# Patient Record
Sex: MALE | Race: WHITE | NOT HISPANIC OR LATINO | Employment: UNEMPLOYED | URBAN - METROPOLITAN AREA
[De-identification: names, ages, dates, MRNs, and addresses within clinical notes are randomized per-mention and may not be internally consistent; named-entity substitution may affect disease eponyms.]

---

## 2019-04-03 ENCOUNTER — HOSPITAL ENCOUNTER (OUTPATIENT)
Dept: ORTHOPEDIC SURGERY | Facility: CLINIC | Age: 44
Discharge: HOME | End: 2019-04-03
Attending: PODIATRIST | Admitting: PODIATRIST

## 2019-04-15 ENCOUNTER — HOSPITAL ENCOUNTER (OUTPATIENT)
Dept: HOME HEALTH SERVICES | Facility: HOME HEALTHCARE | Age: 44
Setting detail: SPECIMEN
Discharge: HOME | End: 2019-04-15
Attending: INTERNAL MEDICINE | Admitting: INTERNAL MEDICINE

## 2019-04-15 LAB
ALBUMIN SERPL-MCNC: 3.3 G/DL (ref 3.5–4.8)
ALBUMIN/GLOB SERPL: 0.8 {RATIO} (ref 1–1.7)
ALP SERPL-CCNC: 69 IU/L (ref 32–91)
ALT SERPL-CCNC: 40 IU/L (ref 17–63)
ANION GAP SERPL CALC-SCNC: 13.7 MMOL/L (ref 10–20)
AST SERPL-CCNC: 16 IU/L (ref 15–41)
BASOPHILS # BLD AUTO: 0.1 10*3/UL (ref 0–0.2)
BASOPHILS NFR BLD AUTO: 1 % (ref 0–2)
BILIRUB SERPL-MCNC: 0.3 MG/DL (ref 0.3–1.2)
BUN SERPL-MCNC: 17 MG/DL (ref 8–20)
BUN/CREAT SERPL: 21.3 (ref 6.2–20.3)
CALCIUM SERPL-MCNC: 8.6 MG/DL (ref 8.9–10.3)
CHLORIDE SERPL-SCNC: 100 MMOL/L (ref 101–111)
CONV CO2: 26 MMOL/L (ref 22–32)
CONV TOTAL PROTEIN: 7.3 G/DL (ref 6.1–7.9)
CREAT UR-MCNC: 0.8 MG/DL (ref 0.7–1.2)
DIFFERENTIAL METHOD BLD: (no result)
EOSINOPHIL # BLD AUTO: 0.3 10*3/UL (ref 0–0.3)
EOSINOPHIL # BLD AUTO: 3 % (ref 0–3)
ERYTHROCYTE [DISTWIDTH] IN BLOOD BY AUTOMATED COUNT: 14.2 % (ref 11.5–14.5)
ERYTHROCYTE [SEDIMENTATION RATE] IN BLOOD BY WESTERGREN METHOD: 34 MM/HR (ref 0–15)
GLOBULIN UR ELPH-MCNC: 4 G/DL (ref 2.5–3.8)
GLUCOSE SERPL-MCNC: 215 MG/DL (ref 65–99)
HCT VFR BLD AUTO: 36.9 % (ref 40–54)
HGB BLD-MCNC: 12.1 G/DL (ref 14–18)
LYMPHOCYTES # BLD AUTO: 2.9 10*3/UL (ref 0.8–4.8)
LYMPHOCYTES NFR BLD AUTO: 28 % (ref 18–42)
MCH RBC QN AUTO: 28.9 PG (ref 26–32)
MCHC RBC AUTO-ENTMCNC: 32.7 G/DL (ref 32–36)
MCV RBC AUTO: 88.6 FL (ref 80–94)
MONOCYTES # BLD AUTO: 0.8 10*3/UL (ref 0.1–1.3)
MONOCYTES NFR BLD AUTO: 8 % (ref 2–11)
NEUTROPHILS # BLD AUTO: 6.2 10*3/UL (ref 2.3–8.6)
NEUTROPHILS NFR BLD AUTO: 60 % (ref 50–75)
NRBC BLD AUTO-RTO: 0 /100{WBCS}
NRBC/RBC NFR BLD MANUAL: 0 10*3/UL
PLATELET # BLD AUTO: 372 10*3/UL (ref 150–450)
PMV BLD AUTO: 8.4 FL (ref 7.4–10.4)
POTASSIUM SERPL-SCNC: 3.7 MMOL/L (ref 3.6–5.1)
RBC # BLD AUTO: 4.17 10*6/UL (ref 4.6–6)
SODIUM SERPL-SCNC: 136 MMOL/L (ref 136–144)
VANCOMYCIN TROUGH SERPL-MCNC: 11 UG/ML (ref 10–20)
WBC # BLD AUTO: 10.3 10*3/UL (ref 4.5–11.5)

## 2019-04-22 ENCOUNTER — HOSPITAL ENCOUNTER (OUTPATIENT)
Dept: HOME HEALTH SERVICES | Facility: HOME HEALTHCARE | Age: 44
Setting detail: SPECIMEN
Discharge: HOME | End: 2019-04-22
Attending: INTERNAL MEDICINE | Admitting: INTERNAL MEDICINE

## 2019-04-22 LAB
ALBUMIN SERPL-MCNC: 3.6 G/DL (ref 3.5–4.8)
ALBUMIN/GLOB SERPL: 0.9 {RATIO} (ref 1–1.7)
ALP SERPL-CCNC: 71 IU/L (ref 32–91)
ALT SERPL-CCNC: 28 IU/L (ref 17–63)
ANION GAP SERPL CALC-SCNC: 14.2 MMOL/L (ref 10–20)
AST SERPL-CCNC: 13 IU/L (ref 15–41)
BASOPHILS # BLD AUTO: 0.1 10*3/UL (ref 0–0.2)
BASOPHILS NFR BLD AUTO: 1 % (ref 0–2)
BILIRUB SERPL-MCNC: 0.7 MG/DL (ref 0.3–1.2)
BUN SERPL-MCNC: 10 MG/DL (ref 8–20)
BUN/CREAT SERPL: 14.3 (ref 6.2–20.3)
CALCIUM SERPL-MCNC: 9.4 MG/DL (ref 8.9–10.3)
CHLORIDE SERPL-SCNC: 99 MMOL/L (ref 101–111)
CONV CO2: 26 MMOL/L (ref 22–32)
CONV TOTAL PROTEIN: 7.5 G/DL (ref 6.1–7.9)
CREAT UR-MCNC: 0.7 MG/DL (ref 0.7–1.2)
DIFFERENTIAL METHOD BLD: (no result)
EOSINOPHIL # BLD AUTO: 0.5 10*3/UL (ref 0–0.3)
EOSINOPHIL # BLD AUTO: 8 % (ref 0–3)
ERYTHROCYTE [DISTWIDTH] IN BLOOD BY AUTOMATED COUNT: 14.9 % (ref 11.5–14.5)
ERYTHROCYTE [SEDIMENTATION RATE] IN BLOOD BY WESTERGREN METHOD: 28 MM/HR (ref 0–15)
GLOBULIN UR ELPH-MCNC: 3.9 G/DL (ref 2.5–3.8)
GLUCOSE SERPL-MCNC: 226 MG/DL (ref 65–99)
HCT VFR BLD AUTO: 40.3 % (ref 40–54)
HGB BLD-MCNC: 13.3 G/DL (ref 14–18)
LYMPHOCYTES # BLD AUTO: 2 10*3/UL (ref 0.8–4.8)
LYMPHOCYTES NFR BLD AUTO: 30 % (ref 18–42)
MCH RBC QN AUTO: 29.2 PG (ref 26–32)
MCHC RBC AUTO-ENTMCNC: 33.1 G/DL (ref 32–36)
MCV RBC AUTO: 88.3 FL (ref 80–94)
MONOCYTES # BLD AUTO: 0.6 10*3/UL (ref 0.1–1.3)
MONOCYTES NFR BLD AUTO: 8 % (ref 2–11)
NEUTROPHILS # BLD AUTO: 3.5 10*3/UL (ref 2.3–8.6)
NEUTROPHILS NFR BLD AUTO: 53 % (ref 50–75)
NRBC BLD AUTO-RTO: 0 /100{WBCS}
NRBC/RBC NFR BLD MANUAL: 0 10*3/UL
PLATELET # BLD AUTO: 293 10*3/UL (ref 150–450)
PMV BLD AUTO: 9 FL (ref 7.4–10.4)
POTASSIUM SERPL-SCNC: 4.2 MMOL/L (ref 3.6–5.1)
RBC # BLD AUTO: 4.56 10*6/UL (ref 4.6–6)
SODIUM SERPL-SCNC: 135 MMOL/L (ref 136–144)
VANCOMYCIN TROUGH SERPL-MCNC: 8 UG/ML (ref 10–20)
WBC # BLD AUTO: 6.6 10*3/UL (ref 4.5–11.5)

## 2019-04-29 ENCOUNTER — HOSPITAL ENCOUNTER (OUTPATIENT)
Dept: HOME HEALTH SERVICES | Facility: HOME HEALTHCARE | Age: 44
Setting detail: SPECIMEN
Discharge: HOME | End: 2019-04-29
Attending: INTERNAL MEDICINE | Admitting: INTERNAL MEDICINE

## 2019-04-29 LAB
ALBUMIN SERPL-MCNC: 3.5 G/DL (ref 3.5–4.8)
ALBUMIN/GLOB SERPL: 1 {RATIO} (ref 1–1.7)
ALP SERPL-CCNC: 70 IU/L (ref 32–91)
ALT SERPL-CCNC: 28 IU/L (ref 17–63)
ANION GAP SERPL CALC-SCNC: 17.8 MMOL/L (ref 10–20)
AST SERPL-CCNC: 17 IU/L (ref 15–41)
BILIRUB SERPL-MCNC: 1 MG/DL (ref 0.3–1.2)
BUN SERPL-MCNC: 10 MG/DL (ref 8–20)
BUN/CREAT SERPL: 14.3 (ref 6.2–20.3)
CALCIUM SERPL-MCNC: 8.9 MG/DL (ref 8.9–10.3)
CHLORIDE SERPL-SCNC: 99 MMOL/L (ref 101–111)
CONV ABS BANDS: 0.1 10*3/UL
CONV CO2: 25 MMOL/L (ref 22–32)
CONV TOTAL PROTEIN: 7.1 G/DL (ref 6.1–7.9)
CREAT UR-MCNC: 0.7 MG/DL (ref 0.7–1.2)
DIFFERENTIAL METHOD BLD: (no result)
EOSINOPHIL # BLD AUTO: 0.3 10*3/UL (ref 0–0.3)
EOSINOPHIL # BLD AUTO: 8 % (ref 0–3)
ERYTHROCYTE [DISTWIDTH] IN BLOOD BY AUTOMATED COUNT: 14.4 % (ref 11.5–14.5)
GLOBULIN UR ELPH-MCNC: 3.6 G/DL (ref 2.5–3.8)
GLUCOSE SERPL-MCNC: 253 MG/DL (ref 65–99)
HCT VFR BLD AUTO: 39.4 % (ref 40–54)
HGB BLD-MCNC: 13.3 G/DL (ref 14–18)
LYMPHOCYTES # BLD AUTO: 1.3 10*3/UL (ref 0.8–4.8)
LYMPHOCYTES NFR BLD AUTO: 45 % (ref 18–42)
MCH RBC QN AUTO: 29.5 PG (ref 26–32)
MCHC RBC AUTO-ENTMCNC: 33.8 G/DL (ref 32–36)
MCV RBC AUTO: 87.4 FL (ref 80–94)
MONOCYTES # BLD AUTO: 0.5 10*3/UL (ref 0.1–1.3)
MONOCYTES NFR BLD AUTO: 15 % (ref 2–11)
NEUTROPHILS # BLD AUTO: 1 10*3/UL (ref 2.3–8.6)
NEUTROPHILS NFR BLD AUTO: 30 % (ref 50–75)
NEUTS BAND NFR BLD MANUAL: 2 % (ref 0–5)
PATHOLOGIST REVIEW: (no result)
PLATELET # BLD AUTO: (no result) 10*3/UL (ref 150–450)
PMV BLD AUTO: 8.8 FL (ref 7.4–10.4)
POTASSIUM SERPL-SCNC: 3.8 MMOL/L (ref 3.6–5.1)
RBC # BLD AUTO: 4.51 10*6/UL (ref 4.6–6)
SODIUM SERPL-SCNC: 138 MMOL/L (ref 136–144)
VANCOMYCIN TROUGH SERPL-MCNC: 14 UG/ML (ref 10–20)
WBC # BLD AUTO: 3.2 10*3/UL (ref 4.5–11.5)

## 2019-05-06 ENCOUNTER — HOSPITAL ENCOUNTER (OUTPATIENT)
Dept: HOME HEALTH SERVICES | Facility: HOME HEALTHCARE | Age: 44
Setting detail: SPECIMEN
Discharge: HOME | End: 2019-05-06
Attending: INTERNAL MEDICINE | Admitting: INTERNAL MEDICINE

## 2019-05-22 ENCOUNTER — CONVERSION ENCOUNTER (OUTPATIENT)
Dept: ORTHOPEDIC SURGERY | Facility: CLINIC | Age: 44
End: 2019-05-22

## 2019-06-04 VITALS
SYSTOLIC BLOOD PRESSURE: 181 MMHG | BODY MASS INDEX: 27.1 KG/M2 | HEIGHT: 75 IN | HEART RATE: 64 BPM | WEIGHT: 218 LBS | DIASTOLIC BLOOD PRESSURE: 98 MMHG

## 2019-08-21 ENCOUNTER — OFFICE VISIT (OUTPATIENT)
Dept: PODIATRY | Facility: CLINIC | Age: 44
End: 2019-08-21

## 2019-08-21 VITALS
WEIGHT: 215 LBS | BODY MASS INDEX: 26.73 KG/M2 | DIASTOLIC BLOOD PRESSURE: 78 MMHG | HEIGHT: 75 IN | SYSTOLIC BLOOD PRESSURE: 136 MMHG | HEART RATE: 76 BPM

## 2019-08-21 DIAGNOSIS — E11.610 CHARCOT'S ARTHROPATHY ASSOCIATED WITH TYPE 2 DIABETES MELLITUS (HCC): Primary | ICD-10-CM

## 2019-08-21 DIAGNOSIS — E11.42 DM TYPE 2 WITH DIABETIC PERIPHERAL NEUROPATHY (HCC): ICD-10-CM

## 2019-08-21 PROCEDURE — 99213 OFFICE O/P EST LOW 20 MIN: CPT | Performed by: PODIATRIST

## 2019-08-21 PROCEDURE — 97760 ORTHOTIC MGMT&TRAING 1ST ENC: CPT | Performed by: PODIATRIST

## 2019-08-21 NOTE — PROGRESS NOTES
"08/21/2019  Foot and Ankle Surgery - Established Patient/Follow-up  Provider: Dr. Adan Souza DPM  Location: [facility]    Subjective:  Maynor Gregg is a 44 y.o. male.     Chief Complaint   Patient presents with   • Left Foot - Follow-up       HPI: Patient returns for routine diabetic foot check.  He states that the lateral aspect of his foot has remained well-healed.  He has not noticed any open wounds or infections since last exam.  He has noticed progressive collapse to the bottom of his foot with pain.  His symptoms are noticed with weightbearing and improved with rest.  He has not had any substantial swelling or redness.  Patient feels that his blood sugars are doing quite well.  He denies any other issues today.    Allergies   Allergen Reactions   • Metformin Diarrhea and Nausea And Vomiting   • Oxycodone-Acetaminophen Nausea And Vomiting and Rash       Current Outpatient Medications on File Prior to Visit   Medication Sig Dispense Refill   • aspirin 325 MG tablet Take 325 mg by mouth Daily.     • Insulin Glargine (BASAGLAR KWIKPEN) 100 UNIT/ML injection pen      • insulin lispro (HUMALOG) 100 UNIT/ML injection Every 8 (Eight) Hours.     • [DISCONTINUED] EPINEPHrine (EPIPEN JR) 0.15 MG/0.3ML solution auto-injector injection      • [DISCONTINUED] insulin glargine (LANTUS) 100 UNIT/ML injection BASAGLAR KWIKPEN 100 UNIT/ML SOPN     • [DISCONTINUED] metFORMIN ER (GLUCOPHAGE-XR) 500 MG 24 hr tablet Take 500 mg by mouth Daily.       No current facility-administered medications on file prior to visit.        Objective   /78   Pulse 76   Ht 190.5 cm (75\")   Wt 97.5 kg (215 lb)   BMI 26.87 kg/m²     Podiatry Exam       General Appearance:  well nourished; well hydrated; no acute distress  Mental Status Exam        Judgement and Insight:  Intact       Orientation:  Oriented to time, place, and person  Cardiovascular (Left)       Dorsalis Pedis Pulse (Lt):   2/4       Posterior Tibialis " Pulse (Lt):   2/4       Capillary Filling Time (Lt):  1-3 Seconds       Edema (Lt):   decreased edema  Dermatological Exam       Temperature:  warm to warm       Skin Elasticity:  normal skin elasticity  Skin: Lateral column of the foot remains well-healed.  No other open wounds or obvious pre-ulcerative lesions.  Neurological Exam (Left)       Paresthesia (Lt):  negative       Patella DTRs (Lt):  symmetric  Monofilament Test (Lt):   not intact       Diabetic Risk (Lt):  Loss of protective sensation with no weakness deformity callus or pre-ulcer        MusculoSkeletal Exam (Left)       Gait and Stance (Lt):   deferred       Muscle Strength (Lt):   appropriate       Hammertoe Deformity (Lt):  2nd, 3rd       Gastroc soleus equinus (Lt):  Inability to dorsiflex past neutral position both straight legged and bent knee       Post tibial tendon (Lt):  no soreness noted       Peroneal Tendon (Lt):  no soreness noted  Additional Musculoskelatal Findings   Progressive plantar lateral collapse to the left foot.  Mild discomfort with firm palpation to the cuboid region.  Laxity noted to the midfoot.    Assessment/Plan   Maynor was seen today for follow-up.    Diagnoses and all orders for this visit:    Charcot's arthropathy associated with type 2 diabetes mellitus (CMS/HCC)  -     XR Foot 3+ View Left    DM type 2 with diabetic peripheral neuropathy (CMS/HCC)      Patient was evaluated today for increased discomfort and collapse of the plantar aspect of the left foot.  Imaging was performed showing progressive deformity and instability affecting the midfoot consistent with Charcot neuroarthropathy.  I explained that this is secondary to his peripheral neuropathy and diabetes as well as the previous partial fifth metatarsal resection.  We discussed the diagnosis and further treatment options at length.  Luckily, he has no open wounds or signs of pre-ulceration I have recommended that we proceed with a cam boot for offloading.   Greater than 15 minutes was spent discussing the proper use and effects.  I have also referred him to [facility] for custom accommodative inserts and shoes.  I also feel that he would benefit from an ankle-foot orthosis with uprights to the left lower extremity to decrease overall stress and prevent further pedal complications.  Patient understands and agrees.  He is to call with any additional issues or concerns.  I would like to see him after he receives the AFO.    Explained importance of diabetic foot care, daily foot checks, and glycemic control. Patient should check both feet on a daily basis, monitor and control blood sugars, make sure that both feet and in between toes are towel dried after baths or showers. Avoid barefoot walking at all times. Check shoes before putting them on.   Patient was given information on proper foot care. Call the office at the first signs of a wound or with signs of infection.    Orders Placed This Encounter   Procedures   • XR Foot 3+ View Left     Weight Bearing     Order Specific Question:   Reason for Exam:     Answer:   Left foot pain in the arch witha noticable lump RM 13 WB          Note is dictated utilizing voice recognition software. Unfortunately this leads to occasional typographical errors. I apologize in advance if the situation occurs. If questions occur please do not hesitate to call our office.

## 2019-08-21 NOTE — PATIENT INSTRUCTIONS
Diabetes Mellitus and Foot Care  Foot care is an important part of your health, especially when you have diabetes. Diabetes may cause you to have problems because of poor blood flow (circulation) to your feet and legs, which can cause your skin to:  · Become thinner and drier.  · Break more easily.  · Heal more slowly.  · Peel and crack.  You may also have nerve damage (neuropathy) in your legs and feet, causing decreased feeling in them. This means that you may not notice minor injuries to your feet that could lead to more serious problems. Noticing and addressing any potential problems early is the best way to prevent future foot problems.  How to care for your feet  Foot hygiene  · Wash your feet daily with warm water and mild soap. Do not use hot water. Then, pat your feet and the areas between your toes until they are completely dry. Do not soak your feet as this can dry your skin.  · Trim your toenails straight across. Do not dig under them or around the cuticle. File the edges of your nails with an emery board or nail file.  · Apply a moisturizing lotion or petroleum jelly to the skin on your feet and to dry, brittle toenails. Use lotion that does not contain alcohol and is unscented. Do not apply lotion between your toes.  Shoes and socks  · Wear clean socks or stockings every day. Make sure they are not too tight. Do not wear knee-high stockings since they may decrease blood flow to your legs.  · Wear shoes that fit properly and have enough cushioning. Always look in your shoes before you put them on to be sure there are no objects inside.  · To break in new shoes, wear them for just a few hours a day. This prevents injuries on your feet.  Wounds, scrapes, corns, and calluses  · Check your feet daily for blisters, cuts, bruises, sores, and redness. If you cannot see the bottom of your feet, use a mirror or ask someone for help.  · Do not cut corns or calluses or try to remove them with medicine.  · If you  find a minor scrape, cut, or break in the skin on your feet, keep it and the skin around it clean and dry. You may clean these areas with mild soap and water. Do not clean the area with peroxide, alcohol, or iodine.  · If you have a wound, scrape, corn, or callus on your foot, look at it several times a day to make sure it is healing and not infected. Check for:  ? Redness, swelling, or pain.  ? Fluid or blood.  ? Warmth.  ? Pus or a bad smell.  General instructions  · Do not cross your legs. This may decrease blood flow to your feet.  · Do not use heating pads or hot water bottles on your feet. They may burn your skin. If you have lost feeling in your feet or legs, you may not know this is happening until it is too late.  · Protect your feet from hot and cold by wearing shoes, such as at the beach or on hot pavement.  · Schedule a complete foot exam at least once a year (annually) or more often if you have foot problems. If you have foot problems, report any cuts, sores, or bruises to your health care provider immediately.  Contact a health care provider if:  · You have a medical condition that increases your risk of infection and you have any cuts, sores, or bruises on your feet.  · You have an injury that is not healing.  · You have redness on your legs or feet.  · You feel burning or tingling in your legs or feet.  · You have pain or cramps in your legs and feet.  · Your legs or feet are numb.  · Your feet always feel cold.  · You have pain around a toenail.  Get help right away if:  · You have a wound, scrape, corn, or callus on your foot and:  ? You have pain, swelling, or redness that gets worse.  ? You have fluid or blood coming from the wound, scrape, corn, or callus.  ? Your wound, scrape, corn, or callus feels warm to the touch.  ? You have pus or a bad smell coming from the wound, scrape, corn, or callus.  ? You have a fever.  ? You have a red line going up your leg.  Summary  · Check your feet every day  for cuts, sores, red spots, swelling, and blisters.  · Moisturize feet and legs daily.  · Wear shoes that fit properly and have enough cushioning.  · If you have foot problems, report any cuts, sores, or bruises to your health care provider immediately.  · Schedule a complete foot exam at least once a year (annually) or more often if you have foot problems.  This information is not intended to replace advice given to you by your health care provider. Make sure you discuss any questions you have with your health care provider.  Document Released: 12/15/2001 Document Revised: 01/30/2019 Document Reviewed: 01/19/2018  [employer] Interactive Patient Education © 2019 [employer]

## 2019-11-20 ENCOUNTER — OFFICE VISIT (OUTPATIENT)
Dept: PODIATRY | Facility: CLINIC | Age: 44
End: 2019-11-20

## 2019-11-20 VITALS
HEART RATE: 84 BPM | DIASTOLIC BLOOD PRESSURE: 79 MMHG | HEIGHT: 75 IN | SYSTOLIC BLOOD PRESSURE: 120 MMHG | WEIGHT: 216 LBS | BODY MASS INDEX: 26.86 KG/M2

## 2019-11-20 DIAGNOSIS — S93.601A FOOT SPRAIN, RIGHT, INITIAL ENCOUNTER: Primary | ICD-10-CM

## 2019-11-20 DIAGNOSIS — E11.42 DM TYPE 2 WITH DIABETIC PERIPHERAL NEUROPATHY (HCC): ICD-10-CM

## 2019-11-20 DIAGNOSIS — M62.838 MUSCLE SPASMS OF BOTH LOWER EXTREMITIES: ICD-10-CM

## 2019-11-20 DIAGNOSIS — E11.610 CHARCOT'S ARTHROPATHY ASSOCIATED WITH TYPE 2 DIABETES MELLITUS (HCC): ICD-10-CM

## 2019-11-20 PROCEDURE — 99213 OFFICE O/P EST LOW 20 MIN: CPT | Performed by: PODIATRIST

## 2019-11-20 RX ORDER — CYCLOBENZAPRINE HCL 5 MG
5 TABLET ORAL 2 TIMES DAILY PRN
Qty: 30 TABLET | Refills: 0 | Status: SHIPPED | OUTPATIENT
Start: 2019-11-20 | End: 2020-01-18

## 2019-11-20 NOTE — PATIENT INSTRUCTIONS
Muscle Cramps and Spasms  Muscle cramps and spasms are when muscles tighten by themselves. They usually get better within minutes. Muscle cramps are painful. They are usually stronger and last longer than muscle spasms. Muscle spasms may or may not be painful. They can last a few seconds or much longer.  Cramps and spasms can affect any muscle, but they occur most often in the calf muscles of the leg. They are usually not caused by a serious problem. In many cases, the cause is not known. Some common causes include:  · Doing more physical work or exercise than your body is ready for.  · Using the muscles too much (overuse) by repeating certain movements too many times.  · Staying in a certain position for a long time.  · Playing a sport or doing an activity without preparing properly.  · Using bad form or technique while playing a sport or doing an activity.  · Not having enough water in your body (dehydration).  · Injury.  · Side effects of some medicines.  · Low levels of the salts and minerals in your blood (electrolytes), such as low potassium or calcium.  Follow these instructions at home:  Managing pain and stiffness         · Massage, stretch, and relax the muscle. Do this for many minutes at a time.  · If told, put heat on tight or tense muscles as often as told by your doctor. Use the heat source that your doctor recommends, such as a moist heat pack or a heating pad.  ? Place a towel between your skin and the heat source.  ? Leave the heat on for 20-30 minutes.  ? Remove the heat if your skin turns bright red. This is very important if you are not able to feel pain, heat, or cold. You may have a greater risk of getting burned.  · If told, put ice on the affected area. This may help if you are sore or have pain after a cramp or spasm.  ? Put ice in a plastic bag.  ? Place a towel between your skin and the bag.  ? Leave the ice on for 20 minutes, 2-3 times a day.  · Try taking hot showers or baths to help  relax tight muscles.  Eating and drinking  · Drink enough fluid to keep your pee (urine) pale yellow.  · Eat a healthy diet to help ensure that your muscles work well. This should include:  ? Fruits and vegetables.  ? Lean protein.  ? Whole grains.  ? Low-fat or nonfat dairy products.  General instructions  · If you are having cramps often, avoid intense exercise for several days.  · Take over-the-counter and prescription medicines only as told by your doctor.  · Watch for any changes in your symptoms.  · Keep all follow-up visits as told by your doctor. This is important.  Contact a doctor if:  · Your cramps or spasms get worse or happen more often.  · Your cramps or spasms do not get better with time.  Summary  · Muscle cramps and spasms are when muscles tighten by themselves. They usually get better within minutes.  · Cramps and spasms occur most often in the calf muscles of the leg.  · Massage, stretch, and relax the muscle. This may help the cramp or spasm go away.  · Drink enough fluid to keep your pee (urine) pale yellow.  This information is not intended to replace advice given to you by your health care provider. Make sure you discuss any questions you have with your health care provider.  Document Released: 11/30/2009 Document Revised: 05/13/2019 Document Reviewed: 05/13/2019  [employer] Interactive Patient Education © 2019 [employer]

## 2019-11-20 NOTE — PROGRESS NOTES
"11/20/2019  Foot and Ankle Surgery - Established Patient/Follow-up  Provider: Dr. Adan Souza DPM  Location: [facility]    Subjective:  Maynor Gregg is a 44 y.o. male.     Chief Complaint   Patient presents with   • Left Foot - Follow-up       HPI: Patient returns for routine diabetic foot check.  He states that he has been doing fairly well and did receive the AFO to the left lower extremity.  He has been wearing the diabetic shoes, inserts, and AFO on a daily basis.  He has not noticed any substantial changes involving the left foot.  He does complain of nightly cramps and spasms to both lower extremities.  He also has intermittent groin discomfort with weightbearing.  He feels that he may have kicked his right foot on something because he noticed mild swelling to the fourth digit.  Otherwise, he feels that his overall health and glycemic control are approximately the same.  He has not had any open wounds or infections since last exam.    Allergies   Allergen Reactions   • Metformin Diarrhea and Nausea And Vomiting   • Oxycodone-Acetaminophen Nausea And Vomiting and Rash       Current Outpatient Medications on File Prior to Visit   Medication Sig Dispense Refill   • aspirin 325 MG tablet Take 325 mg by mouth Daily.     • Insulin Glargine (BASAGLAR KWIKPEN) 100 UNIT/ML injection pen      • insulin lispro (HUMALOG) 100 UNIT/ML injection Every 8 (Eight) Hours.       No current facility-administered medications on file prior to visit.        Objective   /79   Pulse 84   Ht 190.5 cm (75\")   Wt 98 kg (216 lb)   BMI 27.00 kg/m²     Foot/Ankle Exam:       General:   Appearance: appears stated age and healthy    Orientation: AAOx3    Affect: appropriate    Shoes: Custom AFO left.     Right Foot/Ankle:      Vascular   Dorsalis pedis:  2+  Posterior tibial:  2+  Skin Temperature: warm    Edema Grading:  None  CFT:  < 3 seconds  Pedal Hair Growth:  Absent     Neurologic   Light touch sensation:  " Diminished  Hot/Cold sensation: diminished    Protective Sensation using [place]-Stephon Monofilament:  4  Achilles reflex:  1+     Musculoskeletal   Ecchymosis:  None  Tenderness: none    Arch:  Normal  Claw toe:  Second toe, third toe, fourth toe and fifth toe (Reducible deformities)     Muscle Strength   Normal strength    Foot dorsiflexion:  5  Foot plantar flexion:  5  Foot inversion:  5  Foot eversion:  5     Range of Motion   Foot and ankle ROM within normal limits       Dermatologic   Skin: skin intact        Additional Comments No open wounds or signs of infection.  No areas of concern.  Minimal swelling noted to the fourth digit.     Left Foot/Ankle:      Vascular   Dorsalis pedis:  2+  Posterior tibial:  2+  Skin Temperature: warm    Edema Grading:  None  CFT:  < 3 seconds  Pedal Hair Growth:  Absent     Neurologic   Light touch sensation:  Diminished  Hot/cold sensation: diminished    Protective Sensation using [place]-Stephon Monofilament:  2  Achilles reflex:  1+     Musculoskeletal    Amputation   Toes amputated: first toe and fourth toe  Ecchymosis:  None  Tenderness: none    Arch:  Charcot  Claw toe:  Second toe and third toe (Rigid)     Dermatologic   Skin: skin intact        Additional Comments: No progressive deformity or instability affecting the left lower extremity.  Range of motion and muscle strength are limited but appropriate     Image:       Assessment/Plan   Maynor was seen today for follow-up.    Diagnoses and all orders for this visit:    Foot sprain, right, initial encounter  -     XR Foot 3+ View Right    Muscle spasms of both lower extremities    Charcot's arthropathy associated with type 2 diabetes mellitus (CMS/HCC)    DM type 2 with diabetic peripheral neuropathy (CMS/HCC)    Other orders  -     cyclobenzaprine (FLEXERIL) 5 MG tablet; Take 1 tablet by mouth 2 (Two) Times a Day As Needed for Muscle Spasms.      Patient returns for routine diabetic foot check.  He states that  he has been doing fairly well since last exam but does complain of muscle cramps and spasms at night.  He also has been dealing with intermittent groin discomfort with weightbearing.  I explained that a majority of his symptoms are likely secondary to compensation from the AFO.  We did discuss proper stretching, manual therapy, and potentially formal physical therapy for treatment.  He would like to perform at home exercises.  I have prescribed Flexeril 5 mg for symptom management at night.  He was concerned about potential injury affecting the right foot.  Imaging was reviewed showing no fractures or dislocations.  He understands that he remains at high risk for pedal complications.  We did discuss the importance of proper diabetic foot care and glycemic control.  I would like him to monitor his feet closely and call with any issues.  Otherwise, I will see him in 3 months for routine diabetic foot check.  He is to continue wearing the shoes, inserts, and AFO on a daily basis.    Orders Placed This Encounter   Procedures   • XR Foot 3+ View Right     Weight Bearing     Order Specific Question:   Reason for Exam:     Answer:   Right foot pain for about 2 weeks 4th digit was stubbed and he has had swelling and pain since RM 14 WB          Note is dictated utilizing voice recognition software. Unfortunately this leads to occasional typographical errors. I apologize in advance if the situation occurs. If questions occur please do not hesitate to call our office.

## 2020-01-09 ENCOUNTER — TELEPHONE (OUTPATIENT)
Dept: PODIATRY | Facility: CLINIC | Age: 45
End: 2020-01-09

## 2020-01-09 RX ORDER — DOXYCYCLINE HYCLATE 100 MG
100 TABLET ORAL 2 TIMES DAILY
Qty: 20 TABLET | Refills: 0 | Status: SHIPPED | OUTPATIENT
Start: 2020-01-09 | End: 2020-01-24 | Stop reason: HOSPADM

## 2020-01-09 NOTE — TELEPHONE ENCOUNTER
Patient called stating that he has an open wound to his left second toe with redness and swelling.  I have stated that I will call him in an antibiotic and he is to monitor.  If symptoms progress, he will require follow-up in the ED. patient is to call tomorrow and set up an appointment for me early next week.  He is to call with any additional issues or concerns.

## 2020-01-18 ENCOUNTER — APPOINTMENT (OUTPATIENT)
Dept: GENERAL RADIOLOGY | Facility: HOSPITAL | Age: 45
End: 2020-01-18

## 2020-01-18 ENCOUNTER — APPOINTMENT (OUTPATIENT)
Dept: CT IMAGING | Facility: HOSPITAL | Age: 45
End: 2020-01-18

## 2020-01-18 ENCOUNTER — HOSPITAL ENCOUNTER (INPATIENT)
Facility: HOSPITAL | Age: 45
LOS: 4 days | Discharge: HOME HEALTH | End: 2020-01-24
Attending: EMERGENCY MEDICINE | Admitting: PODIATRIST

## 2020-01-18 DIAGNOSIS — L97.514: ICD-10-CM

## 2020-01-18 DIAGNOSIS — M25.511 ACUTE PAIN OF RIGHT SHOULDER: ICD-10-CM

## 2020-01-18 DIAGNOSIS — R73.9 HYPERGLYCEMIA: ICD-10-CM

## 2020-01-18 DIAGNOSIS — R11.15 PERSISTENT VOMITING: ICD-10-CM

## 2020-01-18 DIAGNOSIS — R55 SYNCOPE, UNSPECIFIED SYNCOPE TYPE: Primary | ICD-10-CM

## 2020-01-18 DIAGNOSIS — S06.0X0A CONCUSSION WITHOUT LOSS OF CONSCIOUSNESS, INITIAL ENCOUNTER: ICD-10-CM

## 2020-01-18 DIAGNOSIS — S91.105A OPEN WOUND OF SECOND TOE OF LEFT FOOT, INITIAL ENCOUNTER: ICD-10-CM

## 2020-01-18 PROBLEM — E10.9 TYPE 1 DIABETES MELLITUS WITHOUT COMPLICATIONS (HCC): Status: ACTIVE | Noted: 2018-11-14

## 2020-01-18 PROBLEM — E11.42 DIABETIC PERIPHERAL NEUROPATHY (HCC): Status: ACTIVE | Noted: 2019-04-03

## 2020-01-18 LAB
ANION GAP SERPL CALCULATED.3IONS-SCNC: 13 MMOL/L (ref 5–15)
BASOPHILS # BLD AUTO: 0.1 10*3/MM3 (ref 0–0.2)
BASOPHILS NFR BLD AUTO: 0.4 % (ref 0–1.5)
BUN BLD-MCNC: 19 MG/DL (ref 6–20)
BUN/CREAT SERPL: 18.3 (ref 7–25)
CALCIUM SPEC-SCNC: 8.2 MG/DL (ref 8.6–10.5)
CHLORIDE SERPL-SCNC: 88 MMOL/L (ref 98–107)
CO2 SERPL-SCNC: 27 MMOL/L (ref 22–29)
CREAT BLD-MCNC: 1.04 MG/DL (ref 0.76–1.27)
DEPRECATED RDW RBC AUTO: 41.6 FL (ref 37–54)
EOSINOPHIL # BLD AUTO: 0 10*3/MM3 (ref 0–0.4)
EOSINOPHIL NFR BLD AUTO: 0 % (ref 0.3–6.2)
ERYTHROCYTE [DISTWIDTH] IN BLOOD BY AUTOMATED COUNT: 12.4 % (ref 12.3–15.4)
GFR SERPL CREATININE-BSD FRML MDRD: 78 ML/MIN/1.73
GLUCOSE BLD-MCNC: 520 MG/DL (ref 65–99)
GLUCOSE BLDC GLUCOMTR-MCNC: 312 MG/DL (ref 70–105)
HCT VFR BLD AUTO: 38.5 % (ref 37.5–51)
HGB BLD-MCNC: 13.4 G/DL (ref 13–17.7)
LYMPHOCYTES # BLD AUTO: 1.2 10*3/MM3 (ref 0.7–3.1)
LYMPHOCYTES NFR BLD AUTO: 4.9 % (ref 19.6–45.3)
MCH RBC QN AUTO: 32.6 PG (ref 26.6–33)
MCHC RBC AUTO-ENTMCNC: 34.7 G/DL (ref 31.5–35.7)
MCV RBC AUTO: 94.1 FL (ref 79–97)
MONOCYTES # BLD AUTO: 1.3 10*3/MM3 (ref 0.1–0.9)
MONOCYTES NFR BLD AUTO: 5.6 % (ref 5–12)
NEUTROPHILS # BLD AUTO: 21.3 10*3/MM3 (ref 1.7–7)
NEUTROPHILS NFR BLD AUTO: 89.1 % (ref 42.7–76)
NRBC BLD AUTO-RTO: 0 /100 WBC (ref 0–0.2)
PLATELET # BLD AUTO: 197 10*3/MM3 (ref 140–450)
PMV BLD AUTO: 8.4 FL (ref 6–12)
POTASSIUM BLD-SCNC: 4.2 MMOL/L (ref 3.5–5.2)
RBC # BLD AUTO: 4.09 10*6/MM3 (ref 4.14–5.8)
SODIUM BLD-SCNC: 128 MMOL/L (ref 136–145)
WBC NRBC COR # BLD: 23.9 10*3/MM3 (ref 3.4–10.8)

## 2020-01-18 PROCEDURE — 99219 PR INITIAL OBSERVATION CARE/DAY 50 MINUTES: CPT | Performed by: NURSE PRACTITIONER

## 2020-01-18 PROCEDURE — G0378 HOSPITAL OBSERVATION PER HR: HCPCS

## 2020-01-18 PROCEDURE — 99284 EMERGENCY DEPT VISIT MOD MDM: CPT

## 2020-01-18 PROCEDURE — 82962 GLUCOSE BLOOD TEST: CPT

## 2020-01-18 PROCEDURE — 85025 COMPLETE CBC W/AUTO DIFF WBC: CPT | Performed by: EMERGENCY MEDICINE

## 2020-01-18 PROCEDURE — 63710000001 INSULIN REGULAR HUMAN PER 5 UNITS: Performed by: EMERGENCY MEDICINE

## 2020-01-18 PROCEDURE — 93005 ELECTROCARDIOGRAM TRACING: CPT | Performed by: EMERGENCY MEDICINE

## 2020-01-18 PROCEDURE — 72125 CT NECK SPINE W/O DYE: CPT

## 2020-01-18 PROCEDURE — 70450 CT HEAD/BRAIN W/O DYE: CPT

## 2020-01-18 PROCEDURE — 63710000001 INSULIN GLARGINE PER 5 UNITS: Performed by: NURSE PRACTITIONER

## 2020-01-18 PROCEDURE — 25010000002 ONDANSETRON PER 1 MG: Performed by: EMERGENCY MEDICINE

## 2020-01-18 PROCEDURE — 80048 BASIC METABOLIC PNL TOTAL CA: CPT | Performed by: EMERGENCY MEDICINE

## 2020-01-18 PROCEDURE — 73660 X-RAY EXAM OF TOE(S): CPT

## 2020-01-18 PROCEDURE — 63710000001 INSULIN LISPRO (HUMAN) PER 5 UNITS: Performed by: NURSE PRACTITIONER

## 2020-01-18 PROCEDURE — 25010000002 ONDANSETRON PER 1 MG: Performed by: NURSE PRACTITIONER

## 2020-01-18 RX ORDER — SODIUM CHLORIDE 0.9 % (FLUSH) 0.9 %
10 SYRINGE (ML) INJECTION EVERY 12 HOURS SCHEDULED
Status: DISCONTINUED | OUTPATIENT
Start: 2020-01-18 | End: 2020-01-24 | Stop reason: HOSPADM

## 2020-01-18 RX ORDER — SODIUM CHLORIDE 0.9 % (FLUSH) 0.9 %
10 SYRINGE (ML) INJECTION AS NEEDED
Status: DISCONTINUED | OUTPATIENT
Start: 2020-01-18 | End: 2020-01-24 | Stop reason: HOSPADM

## 2020-01-18 RX ORDER — BISACODYL 5 MG/1
5 TABLET, DELAYED RELEASE ORAL DAILY PRN
Status: DISCONTINUED | OUTPATIENT
Start: 2020-01-18 | End: 2020-01-24 | Stop reason: HOSPADM

## 2020-01-18 RX ORDER — INSULIN GLARGINE 100 [IU]/ML
12 INJECTION, SOLUTION SUBCUTANEOUS NIGHTLY
Status: DISCONTINUED | OUTPATIENT
Start: 2020-01-18 | End: 2020-01-22

## 2020-01-18 RX ORDER — ONDANSETRON 4 MG/1
4 TABLET, FILM COATED ORAL EVERY 6 HOURS PRN
Status: DISCONTINUED | OUTPATIENT
Start: 2020-01-18 | End: 2020-01-24 | Stop reason: HOSPADM

## 2020-01-18 RX ORDER — SODIUM CHLORIDE 0.9 % (FLUSH) 0.9 %
10 SYRINGE (ML) INJECTION AS NEEDED
Status: DISCONTINUED | OUTPATIENT
Start: 2020-01-18 | End: 2020-01-20 | Stop reason: SDUPTHER

## 2020-01-18 RX ORDER — ACETAMINOPHEN 325 MG/1
650 TABLET ORAL EVERY 4 HOURS PRN
Status: DISCONTINUED | OUTPATIENT
Start: 2020-01-18 | End: 2020-01-24 | Stop reason: HOSPADM

## 2020-01-18 RX ORDER — ASPIRIN 325 MG
325 TABLET ORAL DAILY
Status: DISCONTINUED | OUTPATIENT
Start: 2020-01-19 | End: 2020-01-24 | Stop reason: HOSPADM

## 2020-01-18 RX ORDER — ONDANSETRON 2 MG/ML
4 INJECTION INTRAMUSCULAR; INTRAVENOUS ONCE
Status: COMPLETED | OUTPATIENT
Start: 2020-01-18 | End: 2020-01-18

## 2020-01-18 RX ORDER — ONDANSETRON 2 MG/ML
4 INJECTION INTRAMUSCULAR; INTRAVENOUS EVERY 6 HOURS PRN
Status: DISCONTINUED | OUTPATIENT
Start: 2020-01-18 | End: 2020-01-24 | Stop reason: HOSPADM

## 2020-01-18 RX ORDER — ACETAMINOPHEN 160 MG/5ML
650 SOLUTION ORAL EVERY 4 HOURS PRN
Status: DISCONTINUED | OUTPATIENT
Start: 2020-01-18 | End: 2020-01-24 | Stop reason: HOSPADM

## 2020-01-18 RX ORDER — ALUMINA, MAGNESIA, AND SIMETHICONE 2400; 2400; 240 MG/30ML; MG/30ML; MG/30ML
15 SUSPENSION ORAL EVERY 6 HOURS PRN
Status: DISCONTINUED | OUTPATIENT
Start: 2020-01-18 | End: 2020-01-24 | Stop reason: HOSPADM

## 2020-01-18 RX ORDER — ACETAMINOPHEN 650 MG/1
650 SUPPOSITORY RECTAL EVERY 4 HOURS PRN
Status: DISCONTINUED | OUTPATIENT
Start: 2020-01-18 | End: 2020-01-24 | Stop reason: HOSPADM

## 2020-01-18 RX ORDER — SODIUM CHLORIDE 9 MG/ML
125 INJECTION, SOLUTION INTRAVENOUS CONTINUOUS
Status: DISCONTINUED | OUTPATIENT
Start: 2020-01-18 | End: 2020-01-24 | Stop reason: HOSPADM

## 2020-01-18 RX ORDER — NICOTINE POLACRILEX 4 MG
15 LOZENGE BUCCAL
Status: DISCONTINUED | OUTPATIENT
Start: 2020-01-18 | End: 2020-01-24 | Stop reason: HOSPADM

## 2020-01-18 RX ORDER — CHOLECALCIFEROL (VITAMIN D3) 125 MCG
5 CAPSULE ORAL NIGHTLY PRN
Status: DISCONTINUED | OUTPATIENT
Start: 2020-01-18 | End: 2020-01-24 | Stop reason: HOSPADM

## 2020-01-18 RX ORDER — DEXTROSE MONOHYDRATE 25 G/50ML
25 INJECTION, SOLUTION INTRAVENOUS
Status: DISCONTINUED | OUTPATIENT
Start: 2020-01-18 | End: 2020-01-24 | Stop reason: HOSPADM

## 2020-01-18 RX ADMIN — Medication 10 ML: at 23:29

## 2020-01-18 RX ADMIN — INSULIN HUMAN 6 UNITS: 100 INJECTION, SOLUTION PARENTERAL at 21:11

## 2020-01-18 RX ADMIN — SODIUM CHLORIDE 500 ML: 900 INJECTION, SOLUTION INTRAVENOUS at 19:29

## 2020-01-18 RX ADMIN — ACETAMINOPHEN 650 MG: 325 TABLET ORAL at 23:11

## 2020-01-18 RX ADMIN — INSULIN LISPRO 10 UNITS: 100 INJECTION, SOLUTION INTRAVENOUS; SUBCUTANEOUS at 23:10

## 2020-01-18 RX ADMIN — ONDANSETRON 4 MG: 2 INJECTION INTRAMUSCULAR; INTRAVENOUS at 19:29

## 2020-01-18 RX ADMIN — INSULIN GLARGINE 12 UNITS: 100 INJECTION, SOLUTION SUBCUTANEOUS at 23:17

## 2020-01-18 RX ADMIN — ONDANSETRON 4 MG: 2 INJECTION INTRAMUSCULAR; INTRAVENOUS at 23:11

## 2020-01-18 RX ADMIN — SODIUM CHLORIDE 125 ML/HR: 900 INJECTION, SOLUTION INTRAVENOUS at 21:11

## 2020-01-18 NOTE — ED TRIAGE NOTES
Pt reports syncope in the shower at home today. Pt c/o posterior neck pain. C-collar placed in triage.

## 2020-01-19 ENCOUNTER — APPOINTMENT (OUTPATIENT)
Dept: ULTRASOUND IMAGING | Facility: HOSPITAL | Age: 45
End: 2020-01-19

## 2020-01-19 ENCOUNTER — APPOINTMENT (OUTPATIENT)
Dept: GENERAL RADIOLOGY | Facility: HOSPITAL | Age: 45
End: 2020-01-19

## 2020-01-19 PROBLEM — S91.105A OPEN WOUND OF SECOND TOE OF LEFT FOOT: Chronic | Status: ACTIVE | Noted: 2020-01-19

## 2020-01-19 PROBLEM — E10.59 TYPE 1 DIABETES MELLITUS WITH CIRCULATORY COMPLICATION (HCC): Chronic | Status: ACTIVE | Noted: 2018-11-14

## 2020-01-19 PROBLEM — K81.0 ACUTE CHOLECYSTITIS: Status: ACTIVE | Noted: 2020-01-19

## 2020-01-19 LAB
ALBUMIN SERPL-MCNC: 2.3 G/DL (ref 3.5–5.2)
ALBUMIN/GLOB SERPL: 0.6 G/DL
ALP SERPL-CCNC: 121 U/L (ref 39–117)
ALT SERPL W P-5'-P-CCNC: 50 U/L (ref 1–41)
AMPHET+METHAMPHET UR QL: NEGATIVE
ANION GAP SERPL CALCULATED.3IONS-SCNC: 8 MMOL/L (ref 5–15)
AST SERPL-CCNC: 21 U/L (ref 1–40)
B PERT DNA SPEC QL NAA+PROBE: NOT DETECTED
BARBITURATES UR QL SCN: NEGATIVE
BASOPHILS # BLD AUTO: 0.1 10*3/MM3 (ref 0–0.2)
BASOPHILS NFR BLD AUTO: 0.5 % (ref 0–1.5)
BENZODIAZ UR QL SCN: NEGATIVE
BILIRUB SERPL-MCNC: 1.3 MG/DL (ref 0.2–1.2)
BUN BLD-MCNC: 19 MG/DL (ref 6–20)
BUN/CREAT SERPL: 21.8 (ref 7–25)
C PNEUM DNA NPH QL NAA+NON-PROBE: NOT DETECTED
CALCIUM SPEC-SCNC: 8.3 MG/DL (ref 8.6–10.5)
CANNABINOIDS SERPL QL: NEGATIVE
CHLORIDE SERPL-SCNC: 93 MMOL/L (ref 98–107)
CO2 SERPL-SCNC: 29 MMOL/L (ref 22–29)
COCAINE UR QL: NEGATIVE
CREAT BLD-MCNC: 0.87 MG/DL (ref 0.76–1.27)
DEPRECATED RDW RBC AUTO: 41.1 FL (ref 37–54)
EOSINOPHIL # BLD AUTO: 0 10*3/MM3 (ref 0–0.4)
EOSINOPHIL NFR BLD AUTO: 0 % (ref 0.3–6.2)
ERYTHROCYTE [DISTWIDTH] IN BLOOD BY AUTOMATED COUNT: 12.4 % (ref 12.3–15.4)
FLUAV H1 2009 PAND RNA NPH QL NAA+PROBE: NOT DETECTED
FLUAV H1 HA GENE NPH QL NAA+PROBE: NOT DETECTED
FLUAV H3 RNA NPH QL NAA+PROBE: NOT DETECTED
FLUAV SUBTYP SPEC NAA+PROBE: NOT DETECTED
FLUBV RNA ISLT QL NAA+PROBE: NOT DETECTED
GFR SERPL CREATININE-BSD FRML MDRD: 95 ML/MIN/1.73
GLOBULIN UR ELPH-MCNC: 4 GM/DL
GLUCOSE BLD-MCNC: 286 MG/DL (ref 65–99)
GLUCOSE BLDC GLUCOMTR-MCNC: 193 MG/DL (ref 70–105)
GLUCOSE BLDC GLUCOMTR-MCNC: 207 MG/DL (ref 70–105)
GLUCOSE BLDC GLUCOMTR-MCNC: 228 MG/DL (ref 70–105)
GLUCOSE BLDC GLUCOMTR-MCNC: 272 MG/DL (ref 70–105)
HADV DNA SPEC NAA+PROBE: NOT DETECTED
HCOV 229E RNA SPEC QL NAA+PROBE: NOT DETECTED
HCOV HKU1 RNA SPEC QL NAA+PROBE: NOT DETECTED
HCOV NL63 RNA SPEC QL NAA+PROBE: NOT DETECTED
HCOV OC43 RNA SPEC QL NAA+PROBE: NOT DETECTED
HCT VFR BLD AUTO: 37.8 % (ref 37.5–51)
HGB BLD-MCNC: 13.1 G/DL (ref 13–17.7)
HMPV RNA NPH QL NAA+NON-PROBE: NOT DETECTED
HPIV1 RNA SPEC QL NAA+PROBE: NOT DETECTED
HPIV2 RNA SPEC QL NAA+PROBE: NOT DETECTED
HPIV3 RNA NPH QL NAA+PROBE: NOT DETECTED
HPIV4 P GENE NPH QL NAA+PROBE: NOT DETECTED
LIPASE SERPL-CCNC: 10 U/L (ref 13–60)
LYMPHOCYTES # BLD AUTO: 1.6 10*3/MM3 (ref 0.7–3.1)
LYMPHOCYTES NFR BLD AUTO: 7.2 % (ref 19.6–45.3)
M PNEUMO IGG SER IA-ACNC: NOT DETECTED
MCH RBC QN AUTO: 32.9 PG (ref 26.6–33)
MCHC RBC AUTO-ENTMCNC: 34.7 G/DL (ref 31.5–35.7)
MCV RBC AUTO: 94.9 FL (ref 79–97)
METHADONE UR QL SCN: NEGATIVE
MONOCYTES # BLD AUTO: 2.3 10*3/MM3 (ref 0.1–0.9)
MONOCYTES NFR BLD AUTO: 9.9 % (ref 5–12)
NEUTROPHILS # BLD AUTO: 18.9 10*3/MM3 (ref 1.7–7)
NEUTROPHILS NFR BLD AUTO: 82.4 % (ref 42.7–76)
NRBC BLD AUTO-RTO: 0.1 /100 WBC (ref 0–0.2)
OPIATES UR QL: NEGATIVE
OXYCODONE UR QL SCN: NEGATIVE
PLATELET # BLD AUTO: 181 10*3/MM3 (ref 140–450)
PMV BLD AUTO: 9.2 FL (ref 6–12)
POTASSIUM BLD-SCNC: 3.7 MMOL/L (ref 3.5–5.2)
PROT SERPL-MCNC: 6.3 G/DL (ref 6–8.5)
RBC # BLD AUTO: 3.98 10*6/MM3 (ref 4.14–5.8)
RHINOVIRUS RNA SPEC NAA+PROBE: NOT DETECTED
RSV RNA NPH QL NAA+NON-PROBE: NOT DETECTED
SODIUM BLD-SCNC: 130 MMOL/L (ref 136–145)
TSH SERPL DL<=0.05 MIU/L-ACNC: 0.65 UIU/ML (ref 0.27–4.2)
WBC NRBC COR # BLD: 23 10*3/MM3 (ref 3.4–10.8)

## 2020-01-19 PROCEDURE — 87147 CULTURE TYPE IMMUNOLOGIC: CPT | Performed by: HOSPITALIST

## 2020-01-19 PROCEDURE — 80307 DRUG TEST PRSMV CHEM ANLYZR: CPT | Performed by: HOSPITALIST

## 2020-01-19 PROCEDURE — 84443 ASSAY THYROID STIM HORMONE: CPT | Performed by: HOSPITALIST

## 2020-01-19 PROCEDURE — 25010000002 VANCOMYCIN 10 G RECONSTITUTED SOLUTION: Performed by: HOSPITALIST

## 2020-01-19 PROCEDURE — 80053 COMPREHEN METABOLIC PANEL: CPT | Performed by: NURSE PRACTITIONER

## 2020-01-19 PROCEDURE — 76705 ECHO EXAM OF ABDOMEN: CPT

## 2020-01-19 PROCEDURE — 25010000002 PIPERACILLIN SOD-TAZOBACTAM PER 1 G: Performed by: HOSPITALIST

## 2020-01-19 PROCEDURE — 0099U HC BIOFIRE FILMARRAY RESP PANEL 1: CPT | Performed by: HOSPITALIST

## 2020-01-19 PROCEDURE — 85025 COMPLETE CBC W/AUTO DIFF WBC: CPT | Performed by: NURSE PRACTITIONER

## 2020-01-19 PROCEDURE — 63710000001 INSULIN LISPRO (HUMAN) PER 5 UNITS: Performed by: NURSE PRACTITIONER

## 2020-01-19 PROCEDURE — G0378 HOSPITAL OBSERVATION PER HR: HCPCS

## 2020-01-19 PROCEDURE — 87070 CULTURE OTHR SPECIMN AEROBIC: CPT | Performed by: HOSPITALIST

## 2020-01-19 PROCEDURE — 87186 SC STD MICRODIL/AGAR DIL: CPT | Performed by: HOSPITALIST

## 2020-01-19 PROCEDURE — 25010000002 MORPHINE PER 10 MG: Performed by: HOSPITALIST

## 2020-01-19 PROCEDURE — 87205 SMEAR GRAM STAIN: CPT | Performed by: HOSPITALIST

## 2020-01-19 PROCEDURE — 25010000002 CEFEPIME PER 500 MG: Performed by: HOSPITALIST

## 2020-01-19 PROCEDURE — 99225 PR SBSQ OBSERVATION CARE/DAY 25 MINUTES: CPT | Performed by: HOSPITALIST

## 2020-01-19 PROCEDURE — 82962 GLUCOSE BLOOD TEST: CPT

## 2020-01-19 PROCEDURE — 71046 X-RAY EXAM CHEST 2 VIEWS: CPT

## 2020-01-19 PROCEDURE — 63710000001 INSULIN GLARGINE PER 5 UNITS: Performed by: NURSE PRACTITIONER

## 2020-01-19 PROCEDURE — 83690 ASSAY OF LIPASE: CPT | Performed by: NURSE PRACTITIONER

## 2020-01-19 RX ORDER — MORPHINE SULFATE 4 MG/ML
1 INJECTION, SOLUTION INTRAMUSCULAR; INTRAVENOUS 4 TIMES DAILY PRN
Status: DISCONTINUED | OUTPATIENT
Start: 2020-01-19 | End: 2020-01-20

## 2020-01-19 RX ORDER — VANCOMYCIN HYDROCHLORIDE
1500 EVERY 12 HOURS
Status: DISCONTINUED | OUTPATIENT
Start: 2020-01-20 | End: 2020-01-23

## 2020-01-19 RX ADMIN — INSULIN LISPRO 5 UNITS: 100 INJECTION, SOLUTION INTRAVENOUS; SUBCUTANEOUS at 08:03

## 2020-01-19 RX ADMIN — INSULIN GLARGINE 12 UNITS: 100 INJECTION, SOLUTION SUBCUTANEOUS at 21:15

## 2020-01-19 RX ADMIN — INSULIN LISPRO 5 UNITS: 100 INJECTION, SOLUTION INTRAVENOUS; SUBCUTANEOUS at 11:31

## 2020-01-19 RX ADMIN — PIPERACILLIN AND TAZOBACTAM 3.38 G: 3; .375 INJECTION, POWDER, LYOPHILIZED, FOR SOLUTION INTRAVENOUS at 11:31

## 2020-01-19 RX ADMIN — Medication 10 ML: at 21:15

## 2020-01-19 RX ADMIN — CEFEPIME 2 G: 2 INJECTION, POWDER, FOR SOLUTION INTRAVENOUS at 18:03

## 2020-01-19 RX ADMIN — Medication 10 ML: at 08:04

## 2020-01-19 RX ADMIN — INSULIN LISPRO 3 UNITS: 100 INJECTION, SOLUTION INTRAVENOUS; SUBCUTANEOUS at 18:19

## 2020-01-19 RX ADMIN — VANCOMYCIN HYDROCHLORIDE 2000 MG: 10 INJECTION, POWDER, LYOPHILIZED, FOR SOLUTION INTRAVENOUS at 18:49

## 2020-01-19 RX ADMIN — INSULIN LISPRO 8 UNITS: 100 INJECTION, SOLUTION INTRAVENOUS; SUBCUTANEOUS at 20:43

## 2020-01-19 RX ADMIN — ASPIRIN 325 MG ORAL TABLET 325 MG: 325 PILL ORAL at 08:03

## 2020-01-19 RX ADMIN — CEFEPIME 2 G: 2 INJECTION, POWDER, FOR SOLUTION INTRAVENOUS at 23:05

## 2020-01-19 RX ADMIN — ACETAMINOPHEN 650 MG: 325 TABLET ORAL at 08:03

## 2020-01-19 RX ADMIN — MORPHINE SULFATE 1 MG: 4 INJECTION INTRAVENOUS at 18:03

## 2020-01-19 NOTE — PROGRESS NOTES
"      [facility] Medicine Services Daily Progress Note      Hospitalist Team  LOS 1 days      Patient Care Team:  Erwin Castorena MD as PCP - General (Family Medicine)    Patient Location: 246/1      Subjective   Subjective   Complaining of abdominal pain, in the right upper quadrant, he says he has been having some nausea and vomiting for last few days.  Pt right foot on plantar side revealed necrotic ulcer with some black eschar with some discharge.       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 Gregg is a 44 y.o. with a history of DM 1 and neuropathy presented to the [facility] ED on 1/18/2020 with a complaint of syncope, he passed out while he was in the shower today, states he \"went out and woke up in the tub\" Pt is also complaining of nausea, vomiting and fever x 3 days. Pt states he began having nausea and vomiting on Thursday, associated with low grade fever. Pt denies melena, hematochezia, diarrhea. Pt states he then began to have episodes of dizziness, even when lying down, he felt as if the room was spinning. Pt is complaining of head, neck and back pain s/p fall today. Pt has an open wound on his left 2nd toe, he is current with Dr. Souza and is finishing a round of doxycycline.      In the ED, CT head: negative for acute abnormality, CT spine: Normal, Xray of second toe on left foot: negative for fracture, chronic abnormal appearance of second and third MTP joints, soft tissue swelling. Glucose 520, K+4.2, BUN 19, Cr. 1.04, WBC 23.9, Hgb 13.4, Hct 38.5. Pt was given IV insulin 6 units, 500ml NS bolus IV, Zofran 4mg IV. Pt will be admitted for further evaluation and management. " "          Date::          ROS      Objective   Objective      Vital Signs  Temp:  [97.8 °F (36.6 °C)-99.2 °F (37.3 °C)] 98.2 °F (36.8 °C)  Heart Rate:  [] 86  Resp:  [16-21] 18  BP: ()/(56-77) 105/67  Oxygen Therapy  SpO2: 98 %  Pulse Oximetry Type: Intermittent  Device (Oxygen Therapy): room air  Flowsheet Rows      First Filed Value   Admission Height  191.8 cm (75.5\") Documented at 01/18/2020 1854   Admission Weight  98 kg (216 lb 0.8 oz) Documented at 01/18/2020 1854        Intake & Output (last 3 days)       01/16 0701 - 01/17 0700 01/17 0701 - 01/18 0700 01/18 0701 - 01/19 0700 01/19 0701 - 01/20 0700    P.O.   1200     I.V. (mL/kg)    1270.8 (13.4)    IV Piggyback   500 100    Total Intake(mL/kg)   1700 (17.9) 1370.8 (14.5)    Urine (mL/kg/hr)   700     Total Output   700     Net   +1000 +1370.8                Lines, Drains & Airways    Active LDAs     Name:   Placement date:   Placement time:   Site:   Days:    Peripheral IV 01/18/20 1926 Right Arm   01/18/20 1926    Arm   less than 1                  Physical Exam:    Physical Exam   Constitutional: He is oriented to person, place, and time. He appears well-developed and well-nourished. No distress.   HENT:   Head: Normocephalic and atraumatic.   Right Ear: External ear normal.   Left Ear: External ear normal.   Nose: Nose normal.   Mouth/Throat: Oropharynx is clear and moist. No oropharyngeal exudate.   Eyes: Pupils are equal, round, and reactive to light. Conjunctivae and EOM are normal. Right eye exhibits no discharge. Left eye exhibits no discharge. No scleral icterus.   Neck: Normal range of motion. No JVD present. No tracheal deviation present. No thyromegaly present.   Cardiovascular: Normal rate, regular rhythm, normal heart sounds and intact distal pulses. Exam reveals no gallop and no friction rub.   No murmur heard.  Pulmonary/Chest: Effort normal and breath sounds normal. No stridor. No respiratory distress. He has no wheezes. He " has no rales. He exhibits no tenderness.   Abdominal: Soft. He exhibits no distension and no mass. There is tenderness. There is no rebound and no guarding. No hernia.   Slight tender in right upper quadrant, questionable positive Perry sign.   Musculoskeletal: Normal range of motion. He exhibits no edema, tenderness or deformity.   Lymphadenopathy:     He has no cervical adenopathy.   Neurological: He is alert and oriented to person, place, and time. No cranial nerve deficit or sensory deficit. He exhibits normal muscle tone. Coordination normal.   Skin: Skin is warm and dry. No rash noted. He is not diaphoretic. No erythema.   Psychiatric: He has a normal mood and affect. His behavior is normal.   Nursing note and vitals reviewed.           Wounds (last 24 hours)      LDA Wound     Row Name 01/18/20 2338 01/18/20 2247          Wound 01/18/20 2244 Right heel Diabetic Ulcer    Wound - Properties Group Date first assessed: 01/18/20  - Time first assessed: 2244  -SS Present on Hospital Admission: Y  -SS Side: Right  -SS Location: heel  -SS Primary Wound Type: Diabetic ulc  - Stage, Pressure Injury: unstageable  -    Wound Image  --  Images linked: 1  -DL     Closure  Other (Comment) 4x4's, abd pad with curlex wrapped around foot.  -DL  --       User Key  (r) = Recorded By, (t) = Taken By, (c) = Cosigned By    Initials Name Provider Type     Bryanna Ordaz LPN Licensed Nurse    Dorcas Rios RN Registered Nurse          Procedures:              Results Review:     I reviewed the patient's new clinical results.      Lab Results (last 24 hours)     Procedure Component Value Units Date/Time    Respiratory Panel, PCR - Swab, Nasopharynx [[id number]]  (Normal) Collected:  01/19/20 1059    Specimen:  Swab from Nasopharynx Updated:  01/19/20 1545     ADENOVIRUS, PCR Not Detected     Coronavirus 229E Not Detected     Coronavirus HKU1 Not Detected     Coronavirus NL63 Not Detected     Coronavirus OC43 Not  Detected     Human Metapneumovirus Not Detected     Human Rhinovirus/Enterovirus Not Detected     Influenza B PCR Not Detected     Parainfluenza Virus 1 Not Detected     Parainfluenza Virus 2 Not Detected     Parainfluenza Virus 3 Not Detected     Parainfluenza Virus 4 Not Detected     Bordetella pertussis pcr Not Detected     Influenza A H1 2009 PCR Not Detected     Chlamydophila pneumoniae PCR Not Detected     Mycoplasma pneumo by PCR Not Detected     Influenza A PCR Not Detected     Influenza A H3 Not Detected     Influenza A H1 Not Detected     RSV, PCR Not Detected    POC Glucose Once [[id number]]  (Abnormal) Collected:  01/19/20 1116    Specimen:  Blood Updated:  01/19/20 1120     Glucose 207 mg/dL      Comment: Serial Number: [id number]:  [id number]       Lipase [[id number]]  (Abnormal) Collected:  01/19/20 0441    Specimen:  Blood Updated:  01/19/20 0745     Lipase 10 U/L     POC Glucose Once [[id number]]  (Abnormal) Collected:  01/19/20 0700    Specimen:  Blood Updated:  01/19/20 0701     Glucose 228 mg/dL      Comment: Serial Number: [id number]:  [id number]       Comprehensive Metabolic Panel [[id number]]  (Abnormal) Collected:  01/19/20 0441    Specimen:  Blood Updated:  01/19/20 0549     Glucose 286 mg/dL      BUN 19 mg/dL      Creatinine 0.87 mg/dL      Sodium 130 mmol/L      Potassium 3.7 mmol/L      Chloride 93 mmol/L      CO2 29.0 mmol/L      Calcium 8.3 mg/dL      Total Protein 6.3 g/dL      Albumin 2.30 g/dL      ALT (SGPT) 50 U/L      AST (SGOT) 21 U/L      Alkaline Phosphatase 121 U/L      Total Bilirubin 1.3 mg/dL      eGFR Non African Amer 95 mL/min/1.73      Globulin 4.0 gm/dL      A/G Ratio 0.6 g/dL      BUN/Creatinine Ratio 21.8     Anion Gap 8.0 mmol/L     Narrative:       GFR Normal >60  Chronic Kidney Disease <60  Kidney Failure <15      CBC Auto Differential [[id number]]  (Abnormal) Collected:  01/19/20 0441    Specimen:  Blood Updated:  01/19/20 0518     WBC 23.00 10*3/mm3       RBC 3.98 10*6/mm3      Hemoglobin 13.1 g/dL      Hematocrit 37.8 %      MCV 94.9 fL      MCH 32.9 pg      MCHC 34.7 g/dL      RDW 12.4 %      RDW-SD 41.1 fl      MPV 9.2 fL      Platelets 181 10*3/mm3      Neutrophil % 82.4 %      Lymphocyte % 7.2 %      Monocyte % 9.9 %      Eosinophil % 0.0 %      Basophil % 0.5 %      Neutrophils, Absolute 18.90 10*3/mm3      Lymphocytes, Absolute 1.60 10*3/mm3      Monocytes, Absolute 2.30 10*3/mm3      Eosinophils, Absolute 0.00 10*3/mm3      Basophils, Absolute 0.10 10*3/mm3      nRBC 0.1 /100 WBC     POC Glucose Once [[id number]]  (Abnormal) Collected:  01/18/20 2153    Specimen:  Blood Updated:  01/18/20 2154     Glucose 312 mg/dL      Comment: Serial Number: [id number]:  [id number]       Basic Metabolic Panel [[id number]]  (Abnormal) Collected:  01/18/20 1927    Specimen:  Blood Updated:  01/18/20 1956     Glucose 520 mg/dL      BUN 19 mg/dL      Creatinine 1.04 mg/dL      Sodium 128 mmol/L      Potassium 4.2 mmol/L      Chloride 88 mmol/L      CO2 27.0 mmol/L      Calcium 8.2 mg/dL      eGFR Non African Amer 78 mL/min/1.73      BUN/Creatinine Ratio 18.3     Anion Gap 13.0 mmol/L     Narrative:       GFR Normal >60  Chronic Kidney Disease <60  Kidney Failure <15      CBC & Differential [[id number]] Collected:  01/18/20 1927    Specimen:  Blood Updated:  01/18/20 1932    Narrative:       The following orders were created for panel order CBC & Differential.  Procedure                               Abnormality         Status                     ---------                               -----------         ------                     CBC Auto Differential[[id number]]        Abnormal            Final result                 Please view results for these tests on the individual orders.    CBC Auto Differential [[id number]]  (Abnormal) Collected:  01/18/20 1927    Specimen:  Blood Updated:  01/18/20 1932     WBC 23.90 10*3/mm3      RBC 4.09 10*6/mm3      Hemoglobin 13.4 g/dL       Hematocrit 38.5 %      MCV 94.1 fL      MCH 32.6 pg      MCHC 34.7 g/dL      RDW 12.4 %      RDW-SD 41.6 fl      MPV 8.4 fL      Platelets 197 10*3/mm3      Neutrophil % 89.1 %      Lymphocyte % 4.9 %      Monocyte % 5.6 %      Eosinophil % 0.0 %      Basophil % 0.4 %      Neutrophils, Absolute 21.30 10*3/mm3      Lymphocytes, Absolute 1.20 10*3/mm3      Monocytes, Absolute 1.30 10*3/mm3      Eosinophils, Absolute 0.00 10*3/mm3      Basophils, Absolute 0.10 10*3/mm3      nRBC 0.0 /100 WBC         No results found for: HGBA1C            Lab Results   Component Value Date    LIPASE 10 (L) 01/19/2020     No results found for: CHOL, CHLPL, TRIG, HDL, LDL, LDLDIRECT    No results found for: INTRAOP, PREDX, FINALDX, COMDX    Microbiology Results (last 10 days)     Procedure Component Value - Date/Time    Respiratory Panel, PCR - Swab, Nasopharynx [[id number]]  (Normal) Collected:  01/19/20 1059    Lab Status:  Final result Specimen:  Swab from Nasopharynx Updated:  01/19/20 1545     ADENOVIRUS, PCR Not Detected     Coronavirus 229E Not Detected     Coronavirus HKU1 Not Detected     Coronavirus NL63 Not Detected     Coronavirus OC43 Not Detected     Human Metapneumovirus Not Detected     Human Rhinovirus/Enterovirus Not Detected     Influenza B PCR Not Detected     Parainfluenza Virus 1 Not Detected     Parainfluenza Virus 2 Not Detected     Parainfluenza Virus 3 Not Detected     Parainfluenza Virus 4 Not Detected     Bordetella pertussis pcr Not Detected     Influenza A H1 2009 PCR Not Detected     Chlamydophila pneumoniae PCR Not Detected     Mycoplasma pneumo by PCR Not Detected     Influenza A PCR Not Detected     Influenza A H3 Not Detected     Influenza A H1 Not Detected     RSV, PCR Not Detected          ECG/EMG Results (most recent)     Procedure Component Value Units Date/Time    ECG 12 Lead [[id number]] Collected:  01/18/20 1906     Updated:  01/18/20 1907    Narrative:       HEART RATE= 105  bpm  RR Interval= 572   ms  OH Interval= 149  ms  P Horizontal Axis= 3  deg  P Front Axis= 48  deg  QRSD Interval= 79  ms  QT Interval= 328  ms  QRS Axis= 34  deg  T Wave Axis= 2  deg  - BORDERLINE ECG -  Sinus tachycardia  Borderline ST elevation, lateral leads  When compared with ECG of 04-Apr-2019 13:03:16,  Significant change in rhythm  Electronically Signed By:   Date and Time of Study: 2020-01-18 19:06:28                    Ct Head Without Contrast    Result Date: 1/18/2020  1.  Normal exam.  Electronically Signed By-Arabella Mendez On:1/18/2020 8:09 PM This report was finalized on [id number] by  Arabella Mendez .    Ct Cervical Spine Without Contrast    Result Date: 1/18/2020  Normal CT cervical spine without contrast.  Electronically Signed ByAnna Mendez On:1/18/2020 8:11 PM This report was finalized on [id number] by  Arabella Mendez .    Xr Toe 2+ View Left    Result Date: 1/18/2020   1. Negative for fracture. 2. Chronic abnormal appearance of second and third MTP joints. 3. Soft tissue swelling.  Electronically Signed ByAnna Mendez On:1/18/2020 8:07 PM This report was finalized on [id number] by  Arabella Mendez, .    Xr Chest Pa & Lateral    Result Date: 1/19/2020   1. No active cardiopulmonary disease 2. Right PICC line is been removed since previous study of 04/07/2019 3. No significant change from 04/07/2019  Electronically Signed By-Hermilo Vaz Jr. On:1/19/2020 1:44 PM This report was finalized on [id number] by  Hermilo Vaz Jr., .          Xrays, labs reviewed personally by physician.    Medication Review:   I have reviewed the patient's current medication list      Scheduled Meds    aspirin 325 mg Oral Daily   insulin glargine 12 Units Subcutaneous Nightly   insulin lispro 0-14 Units Subcutaneous 4x Daily With Meals & Nightly   piperacillin-tazobactam 3.375 g Intravenous Q8H   sodium chloride 10 mL Intravenous Q12H       Meds Infusions    sodium chloride 125 mL/hr Last Rate: 125 mL/hr (01/19/20 0721)       Meds  PRN  •  acetaminophen **OR** acetaminophen **OR** acetaminophen  •  aluminum-magnesium hydroxide-simethicone  •  bisacodyl  •  dextrose  •  dextrose  •  glucagon (human recombinant)  •  insulin lispro **AND** insulin lispro  •  magnesium hydroxide  •  melatonin  •  ondansetron **OR** ondansetron  •  [COMPLETED] Insert peripheral IV **AND** sodium chloride  •  sodium chloride        Assessment/Plan   Assessment/Plan     Active Hospital Problems    Diagnosis  POA   • Open wound of second toe of left foot [S91.105A]  Yes   • Syncope [R55]  Yes   • Diabetic peripheral neuropathy (CMS/HCC) [E11.42]  Yes   • Type 1 diabetes mellitus with circulatory complication (CMS/HCC) [E10.59]  Yes      Resolved Hospital Problems   No resolved problems to display.       MEDICAL DECISION MAKING COMPLEXITY BY PROBLEM:     Active Hospital Problems:  No notes have been filed under this hospital service.  Service: Hospitalist     Syncope, can be vasovagal from nausea and vomiting persistent in nature otherwise etiology  unclear to me, possible be secondary to dehydration.  -CT head negative  -neuro checks q4  -IV fluids, telemetry, check TSH.  -We will ask for the 2D echo  -We will ask for the urine toxicology.    Diabetic wound ulcer with some discharge and surrounding edema ....   - MRI foot right side  -  Consult Dr. Souza podiatry  - wound culture  - iv cefepime and vancomycin  - ID consult.       Diabetes type 1  -Continue Patient's lantus  -Accuchecks AC and HS  -Cover with sliding scale     Right upper quadrant abdominal pain, patient noted slightly tender there, I am concerned about acute cholecystitis, will ask for the ultrasound gallbladder, IV cefepime, monitor WBC count, monitor clinical progress.           VTE Prophylaxis - SCDs.    VTE Prophylaxis - SCDs.      Code Status -   Code Status and Medical Interventions:   Ordered at: 01/18/20 2121     Level Of Support Discussed With:    Patient     Code Status:    CPR     Medical  Interventions (Level of Support Prior to Arrest):    Full       Discharge Planning          Destination      Coordination has not been started for this encounter.      Durable Medical Equipment      Coordination has not been started for this encounter.      Dialysis/Infusion      Coordination has not been started for this encounter.      Home Medical Care      Coordination has not been started for this encounter.      Therapy      Coordination has not been started for this encounter.      Community Resources      Coordination has not been started for this encounter.            Electronically signed by Peewee Rdz MD, 01/19/20, 3:48 PM.  [facility] Hospitalist Team

## 2020-01-19 NOTE — PLAN OF CARE
Continuing care and monitoring pain and temperature of pt.  Problem: Patient Care Overview  Goal: Plan of Care Review  Outcome: Ongoing (interventions implemented as appropriate)  Flowsheets  Taken 1/19/2020 0335 by Dorcas Bernabe RN  Progress: no change  Outcome Summary: Patient came in with nausea and vomiting. Gave pt. zofran. Pt. resting in room. Pt. has wound on his bottom right foot.  Taken 1/19/2020 0739 by Emy Beckham RN  Plan of Care Reviewed With: patient  Goal: Individualization and Mutuality  Outcome: Ongoing (interventions implemented as appropriate)  Goal: Discharge Needs Assessment  Outcome: Ongoing (interventions implemented as appropriate)  Flowsheets  Taken 1/18/2020 2156 by Dorcas Bernabe RN  Equipment Currently Used at Home: none;glucometer  Taken 1/18/2020 2206 by Dorcas Bernabe RN  Transportation Anticipated: family or friend will provide  Patient/Family Anticipated Services at Transition: none  Patient/Family Anticipates Transition to: home  Goal: Interprofessional Rounds/Family Conf  Outcome: Ongoing (interventions implemented as appropriate)  Flowsheets (Taken 1/19/2020 0812)  Participants: nursing; patient; physician     Problem: Syncope (Adult)  Goal: Identify Related Risk Factors and Signs and Symptoms  Outcome: Ongoing (interventions implemented as appropriate)  Flowsheets (Taken 1/19/2020 0812)  Related Risk Factors (Syncope): other (see comments)  Signs and Symptoms (Syncope): dizziness  Goal: Physical Safety/Health Maintenance  Outcome: Ongoing (interventions implemented as appropriate)  Flowsheets (Taken 1/19/2020 0812)  Physical Safety/Health Maintenance: making progress toward outcome  Goal: Optimal Emotional/Functional Rhea  Outcome: Ongoing (interventions implemented as appropriate)  Flowsheets (Taken 1/19/2020 0812)  Optimal Emotional/Functional Rhea: making progress toward outcome     Problem: Nausea/Vomiting (Adult)  Goal: Identify Related Risk Factors  and Signs and Symptoms  Outcome: Ongoing (interventions implemented as appropriate)  Flowsheets (Taken 1/19/2020 0812)  Related Risk Factors (Nausea/Vomiting): other (see comments)  Signs and Symptoms (Nausea/Vomiting): abdominal discomfort/pain; report of emesis  Goal: Symptom Relief  Outcome: Ongoing (interventions implemented as appropriate)  Flowsheets (Taken 1/19/2020 0812)  Symptom Relief: making progress toward outcome  Goal: Adequate Hydration  Outcome: Ongoing (interventions implemented as appropriate)  Flowsheets (Taken 1/19/2020 0812)  Adequate Hydration: making progress toward outcome     Problem: Fall Risk (Adult)  Goal: Identify Related Risk Factors and Signs and Symptoms  Outcome: Ongoing (interventions implemented as appropriate)  Flowsheets (Taken 1/19/2020 0812)  Related Risk Factors (Fall Risk): sensory deficits; other (see comments) (dizziness and blacking out)  Goal: Absence of Fall  Outcome: Ongoing (interventions implemented as appropriate)  Flowsheets (Taken 1/19/2020 0812)  Absence of Fall: making progress toward outcome     Problem: Pain, Chronic (Adult)  Goal: Identify Related Risk Factors and Signs and Symptoms  Outcome: Ongoing (interventions implemented as appropriate)  Flowsheets (Taken 1/19/2020 0812)  Related Risk Factors (Chronic Pain): disease process  Signs and Symptoms (Chronic Pain): verbalization of pain/discomfort for a prolonged time period  Goal: Acceptable Pain/Comfort Level and Functional Ability  Outcome: Ongoing (interventions implemented as appropriate)  Flowsheets (Taken 1/19/2020 0812)  Acceptable Pain/Comfort Level and Functional Ability: making progress toward outcome

## 2020-01-19 NOTE — PROGRESS NOTES
"Pharmacy Antimicrobial Dosing Service    Subjective:  Maynor Gregg is a 44 y.o.male with skin and soft tissue infection. Pharmacy to dose vancomycin.         Assessment/Plan    1. Day #1 Vancomycin: 2000 mg X 1 dose then 1500 mg every 12 hrs. Trough 1/21 0700 and peak 1/21 0000.     2. Day #1 Cefepime: 2 grams every 8 hrs for crcl >60 ml/min infused over 30 minutes.    Will continue to monitor drug levels, renal function, culture and sensitivities, and patient clinical status.       Objective:  Relevant clinical data and objective history reviewed:  190.5 cm (75\")   94.8 kg (208 lb 14.4 oz)   Ideal body weight: 84.5 kg (186 lb 4.6 oz)  Adjusted ideal body weight: 88.6 kg (195 lb 5.3 oz)  Body mass index is 26.11 kg/m².        Results from last 7 days   Lab Units 01/19/20  0441 01/18/20  1927   CREATININE mg/dL 0.87 1.04     Estimated Creatinine Clearance: 145.3 mL/min (by C-G formula based on SCr of 0.87 mg/dL).  I/O last 3 completed shifts:  In: 1700 [P.O.:1200; IV Piggyback:500]  Out: 700 [Urine:700]    Results from last 7 days   Lab Units 01/19/20  0441 01/18/20  1927   WBC 10*3/mm3 23.00* 23.90*     Temperature    01/19/20 0400 01/19/20 0818 01/19/20 1206   Temp: 98.5 °F (36.9 °C) 97.8 °F (36.6 °C) 98.2 °F (36.8 °C)     Baseline culture/source/susceptibility:  Microbiology Results (last 10 days)       Procedure Component Value - Date/Time    Respiratory Panel, PCR - Swab, Nasopharynx [[id number]]  (Normal) Collected:  01/19/20 1059    Lab Status:  Final result Specimen:  Swab from Nasopharynx Updated:  01/19/20 3691     ADENOVIRUS, PCR Not Detected     Coronavirus 229E Not Detected     Coronavirus HKU1 Not Detected     Coronavirus NL63 Not Detected     Coronavirus OC43 Not Detected     Human Metapneumovirus Not Detected     Human Rhinovirus/Enterovirus Not Detected     Influenza B PCR Not Detected     Parainfluenza Virus 1 Not Detected     Parainfluenza Virus 2 Not Detected     Parainfluenza Virus 3 Not " Detected     Parainfluenza Virus 4 Not Detected     Bordetella pertussis pcr Not Detected     Influenza A H1 2009 PCR Not Detected     Chlamydophila pneumoniae PCR Not Detected     Mycoplasma pneumo by PCR Not Detected     Influenza A PCR Not Detected     Influenza A H3 Not Detected     Influenza A H1 Not Detected     RSV, PCR Not Detected             Anti-Infectives (From admission, onward)      Ordered     Dose/Rate Route Frequency Start Stop    01/19/20 1620  !Vancomycin Level Draw Needed     Ordering Provider:  Peewee Rdz MD     Does not apply Once 01/21/20 0700      01/19/20 1620  !Vancomycin Level Draw Needed     Ordering Provider:  Peewee Rdz MD     Does not apply Once 01/21/20 0000      01/19/20 1619  vancomycin IVPB 1500 mg in 0.9% NaCl (Premix) 250 mL     Ordering Provider:  Peewee Rdz MD    1,500 mg  over 120 Minutes Intravenous Every 12 Hours 01/20/20 0800 01/27/20 0759    01/19/20 1615  cefepime 2 gm IVPB in 100 ml NS (MBP)     Ordering Provider:  Peewee Rdz MD    2 g  over 30 Minutes Intravenous Every 8 Hours 01/19/20 2300 01/26/20 2259    01/19/20 1618  vancomycin 2000 mg/500 mL 0.9% NS IVPB ([facility])     Ordering Provider:  Peewee Rdz MD    2,000 mg  over 120 Minutes Intravenous Once 01/19/20 1800      01/19/20 1615  cefepime 2 gm IVPB in 100 ml NS (MBP)     Ordering Provider:  Peewee Rdz MD    2 g  over 30 Minutes Intravenous Once 01/19/20 1700      01/19/20 1615  Pharmacy to dose vancomycin     Ordering Provider:  Peewee Rdz MD     Does not apply Continuous PRN 01/19/20 1615 01/26/20 1614    01/19/20 1055  piperacillin-tazobactam (ZOSYN) IVPB 3.375 g in 100 mL NS (CD)     Ordering Provider:  Peewee Rdz MD    3.375 g  over 30 Minutes Intravenous Once 01/19/20 1145 01/19/20 1201            Elisa Levi, PharmD  01/19/20 4:21 PM

## 2020-01-19 NOTE — H&P
"      [facility] Medicine Services      Patient Name: Maynor Gregg  : 1975  MRN: [id number]  Primary Care Physician: Erwin Castorena MD  Date of admission: 2020    Patient Care Team:  Erwin Castorena MD as PCP - General (Family Medicine)          Subjective   History Present Illness     Chief Complaint:   Chief Complaint   Patient presents with   • Syncope       Mr. Gregg is a 44 y.o. with a history of DM 1 and neuropathy presented to the [facility] ED on 2020 with a complaint of syncope, he passed out while he was in the shower today, states he \"went out and woke up in the tub\" Pt is also complaining of nausea, vomiting and fever x 3 days. Pt states he began having nausea and vomiting on Thursday, associated with low grade fever. Pt denies melena, hematochezia, diarrhea. Pt states he then began to have episodes of dizziness, even when lying down, he felt as if the room was spinning. Pt is complaining of head, neck and back pain s/p fall today. Pt has an open wound on his left 2nd toe, he is current with Dr. Souza and is finishing a round of doxycycline.     In the ED, CT head: negative for acute abnormality, CT spine: Normal, Xray of second toe on left foot: negative for fracture, chronic abnormal appearance of second and third MTP joints, soft tissue swelling. Glucose 520, K+4.2, BUN 19, Cr. 1.04, WBC 23.9, Hgb 13.4, Hct 38.5. Pt was given IV insulin 6 units, 500ml NS bolus IV, Zofran 4mg IV. Pt will be admitted for further evaluation and management.     Review of Systems   Constitution: Positive for fever and malaise/fatigue.   Eyes: Negative.    Cardiovascular: Positive for syncope.   Respiratory: Negative.    Endocrine: Negative.    Hematologic/Lymphatic: Negative.    Skin: Negative.    Musculoskeletal: Positive for neck pain.   Gastrointestinal: Positive for abdominal pain, nausea and vomiting.   Genitourinary: Negative.    Neurological: " Positive for dizziness, headaches and weakness.   Psychiatric/Behavioral: Negative.    Allergic/Immunologic: Negative.    All other systems reviewed and are negative.          Personal History     Past Medical History:   Past Medical History:   Diagnosis Date   • Coronary artery disease    • Diabetes mellitus (CMS/HCC)        Surgical History:      Past Surgical History:   Procedure Laterality Date   • AMPUTATION FOOT / TOE     • CARDIAC SURGERY     • TOE AMPUTATION Left            Family History: family history includes Diabetes in his father and paternal grandfather; Heart failure in his father. Otherwise pertinent FHx was reviewed and unremarkable.     Social History:  reports that he quit smoking about 8 months ago. He smoked 0.50 packs per day. He has never used smokeless tobacco. He reports that he does not drink alcohol or use drugs.      Medications:  Prior to Admission medications    Medication Sig Start Date End Date Taking? Authorizing Provider   aspirin 325 MG tablet Take 325 mg by mouth Daily.   Yes Emergency, Nurse ALESSIA Calvert   doxycycline (VIBRAMYICN) 100 MG tablet Take 1 tablet by mouth 2 (Two) Times a Day for 10 days. 1/9/20 1/19/20 Yes CROW Souza DPM   Insulin Glargine (BASAGLAR KWIKPEN) 100 UNIT/ML injection pen Inject 12 Units under the skin into the appropriate area as directed Every Night. 4/2/19  Yes Emergency, Nurse Nehal RN   insulin lispro (HUMALOG) 100 UNIT/ML injection Inject  under the skin into the appropriate area as directed Every 8 (Eight) Hours. SSI   Yes Emergency, Nurse Epic, RN   cyclobenzaprine (FLEXERIL) 5 MG tablet Take 1 tablet by mouth 2 (Two) Times a Day As Needed for Muscle Spasms. 11/20/19 1/18/20  CROW Souza DPM       Allergies:    Allergies   Allergen Reactions   • Metformin Diarrhea and Nausea And Vomiting   • Oxycodone-Acetaminophen Nausea And Vomiting and Rash       Objective   Objective     Vital Signs  Temp:  [98.5 °F (36.9 °C)-99.2 °F (37.3 °C)] 99.2  °F (37.3 °C)  Heart Rate:  [] 114  Resp:  [16-21] 21  BP: ()/(59-77) 116/70  SpO2:  [95 %-100 %] 99 %  on   ;   Device (Oxygen Therapy): room air  Body mass index is 26.11 kg/m².    Physical Exam   Constitutional: He is oriented to person, place, and time. He appears well-developed and well-nourished. No distress.   HENT:   Head: Normocephalic.   Eyes: Pupils are equal, round, and reactive to light.   Neck: Normal range of motion.   Cardiovascular: Normal rate, regular rhythm, normal heart sounds and intact distal pulses.   Pulmonary/Chest: Effort normal and breath sounds normal.   Abdominal: Bowel sounds are normal. There is tenderness.   Musculoskeletal: Normal range of motion.   Neurological: He is alert and oriented to person, place, and time. He has normal strength. No cranial nerve deficit or sensory deficit.   Skin: Skin is warm and dry.   Vitals reviewed.      Results Review:  I have personally reviewed most recent cardiac tracings, lab results and radiology images and interpretations and agree with findings.    Results from last 7 days   Lab Units 01/18/20 1927   WBC 10*3/mm3 23.90*   HEMOGLOBIN g/dL 13.4   HEMATOCRIT % 38.5   PLATELETS 10*3/mm3 197     Results from last 7 days   Lab Units 01/18/20 1927   SODIUM mmol/L 128*   POTASSIUM mmol/L 4.2   CHLORIDE mmol/L 88*   CO2 mmol/L 27.0   BUN mg/dL 19   CREATININE mg/dL 1.04   GLUCOSE mg/dL 520*   CALCIUM mg/dL 8.2*     Estimated Creatinine Clearance: 121.5 mL/min (by C-G formula based on SCr of 1.04 mg/dL).  Brief Urine Lab Results     None          Microbiology Results (last 10 days)     ** No results found for the last 240 hours. **          ECG/EMG Results (most recent)     Procedure Component Value Units Date/Time    ECG 12 Lead [[id number]] Collected:  01/18/20 1906     Updated:  01/18/20 1907    Narrative:       HEART RATE= 105  bpm  RR Interval= 572  ms  FL Interval= 149  ms  P Horizontal Axis= 3  deg  P Front Axis= 48  deg  QRSD  Interval= 79  ms  QT Interval= 328  ms  QRS Axis= 34  deg  T Wave Axis= 2  deg  - BORDERLINE ECG -  Sinus tachycardia  Borderline ST elevation, lateral leads  When compared with ECG of 04-Apr-2019 13:03:16,  Significant change in rhythm  Electronically Signed By:   Date and Time of Study: 2020-01-18 19:06:28                    Ct Head Without Contrast    Result Date: 1/18/2020  1.  Normal exam.  Electronically Signed ByAnna Mnedez On:1/18/2020 8:09 PM This report was finalized on [id number] by  Arabella Mendez .    Ct Cervical Spine Without Contrast    Result Date: 1/18/2020  Normal CT cervical spine without contrast.  Electronically Signed ByAnna Mendez On:1/18/2020 8:11 PM This report was finalized on [id number] by  Arabella Mendez .    Xr Toe 2+ View Left    Result Date: 1/18/2020   1. Negative for fracture. 2. Chronic abnormal appearance of second and third MTP joints. 3. Soft tissue swelling.  Electronically Signed ByAnna Mendez On:1/18/2020 8:07 PM This report was finalized on [id number] by  Arabella Mendez, .        Estimated Creatinine Clearance: 121.5 mL/min (by C-G formula based on SCr of 1.04 mg/dL).    Assessment/Plan   Assessment/Plan       Active Hospital Problems    Diagnosis  POA   • Open wound of second toe of left foot [S91.105A]  Yes   • Syncope [R55]  Yes   • Diabetic peripheral neuropathy (CMS/HCC) [E11.42]  Yes   • Type 1 diabetes mellitus with circulatory complication (CMS/HCC) [E10.59]  Yes      Resolved Hospital Problems   No resolved problems to display.       Active Hospital Problems:  No notes have been filed under this hospital service.  Service: Hospitalist    Syncope  -CT head negative  -neuro checks q4  -Check orthostatics  -Most likely due to dehydration, pt with several days worth of vomiting.   -Rehydrate NS 125ml/hr IVF  -Clear liquid diet, advance as tolerated    Diabetic Foot wound, left second toe  -One dose of doxycyline left  -Wound nurse to see and eval  -current  with Dr. Souza. May benefit from consult     Diabetes type 1  -Continue Patient's lantus  -Accuchecks AC and HS  -Cover with sliding scale          VTE Prophylaxis - SCDs.    CODE STATUS:    Code Status and Medical Interventions:   Ordered at: 01/18/20 2121     Level Of Support Discussed With:    Patient     Code Status:    CPR     Medical Interventions (Level of Support Prior to Arrest):    Full       Admission Status:  I believe this patient meets observation criteria.      I discussed the patient's findings and my recommendations with patient and family.        Electronically signed by KAMILLE Degroot, 01/18/20, 9:23 PM.  [facility] Hospitalist Team

## 2020-01-19 NOTE — ED NOTES
Pt states that he has been having vomiting with fever. States that tonight he passed out in the shower and hit his head. States that he does not remember passing out but woke up in the tub. States that he has been having neck pain and a headache.      Kyra Jiménez, RN  01/18/20 1932

## 2020-01-19 NOTE — PLAN OF CARE
Problem: Patient Care Overview  Goal: Plan of Care Review  Outcome: Ongoing (interventions implemented as appropriate)  Flowsheets (Taken 1/19/2020 3746)  Progress: no change  Plan of Care Reviewed With: patient  Outcome Summary: Patient came in with nausea and vomiting. Gave pt. zofran. Pt. resting in room. Pt. has wound on his bottom right foot.

## 2020-01-19 NOTE — ED PROVIDER NOTES
Subjective   Patient is a 44-year-old male complaining of weakness and dizziness and syncope.  He states he has been vomiting the past several days.  He also complains of pain to his left foot from when he had fallen.  He does continue to complain of pain to his head and neck after the syncopal episode.  He denies fever diarrhea dysuria or other complaint.          Review of Systems  Negative for previous headache vomiting chest pain shortness of breath abdominal pain diarrhea dysuria back pain or other complaint.  A complete review systems was obtained and is otherwise negative.  Past Medical History:   Diagnosis Date   • Coronary artery disease    • Diabetes mellitus (CMS/HCC)        Allergies   Allergen Reactions   • Metformin Diarrhea and Nausea And Vomiting   • Oxycodone-Acetaminophen Nausea And Vomiting and Rash       Past Surgical History:   Procedure Laterality Date   • AMPUTATION FOOT / TOE     • CARDIAC SURGERY     • TOE AMPUTATION Left        Family History   Problem Relation Age of Onset   • Diabetes Father    • Heart failure Father    • Diabetes Paternal Grandfather        Social History     Socioeconomic History   • Marital status: Single     Spouse name: Not on file   • Number of children: Not on file   • Years of education: Not on file   • Highest education level: Not on file   Tobacco Use   • Smoking status: Former Smoker     Packs/day: 0.50     Last attempt to quit: 2019     Years since quittin.7   • Smokeless tobacco: Never Used   Substance and Sexual Activity   • Alcohol use: No     Frequency: Never   • Drug use: No   • Sexual activity: Defer           Objective   Physical Exam  Neurologic exam is nonfocal.  HEENT exam shows TMs to be clear.  Oropharynx comers but sclerae nonicteric.  Neck has no nothing JVD or bruits.  He does have tenderness on palpation throughout his C-spine.  Lungs are clear.  Heart has a regular rhythm without murmur gallop her chest is nontender.  Abdomen is soft  nontender.  Patient has normal bowel sounds without rebound or guard.  Back has no CVA tenderness.  Extremity exam shows patient have edema to his left third toe.  He has 2+ pulses and is neurovascular tach  Procedures     EKG interpretation shows normal sinus rhythm with no acute ST change      ED Course      Results for orders placed or performed during the hospital encounter of 01/18/20   Basic Metabolic Panel   Result Value Ref Range    Glucose 520 (C) 65 - 99 mg/dL    BUN 19 6 - 20 mg/dL    Creatinine 1.04 0.76 - 1.27 mg/dL    Sodium 128 (L) 136 - 145 mmol/L    Potassium 4.2 3.5 - 5.2 mmol/L    Chloride 88 (L) 98 - 107 mmol/L    CO2 27.0 22.0 - 29.0 mmol/L    Calcium 8.2 (L) 8.6 - 10.5 mg/dL    eGFR Non African Amer 78 >60 mL/min/1.73    BUN/Creatinine Ratio 18.3 7.0 - 25.0    Anion Gap 13.0 5.0 - 15.0 mmol/L   CBC Auto Differential   Result Value Ref Range    WBC 23.90 (H) 3.40 - 10.80 10*3/mm3    RBC 4.09 (L) 4.14 - 5.80 10*6/mm3    Hemoglobin 13.4 13.0 - 17.7 g/dL    Hematocrit 38.5 37.5 - 51.0 %    MCV 94.1 79.0 - 97.0 fL    MCH 32.6 26.6 - 33.0 pg    MCHC 34.7 31.5 - 35.7 g/dL    RDW 12.4 12.3 - 15.4 %    RDW-SD 41.6 37.0 - 54.0 fl    MPV 8.4 6.0 - 12.0 fL    Platelets 197 140 - 450 10*3/mm3    Neutrophil % 89.1 (H) 42.7 - 76.0 %    Lymphocyte % 4.9 (L) 19.6 - 45.3 %    Monocyte % 5.6 5.0 - 12.0 %    Eosinophil % 0.0 (L) 0.3 - 6.2 %    Basophil % 0.4 0.0 - 1.5 %    Neutrophils, Absolute 21.30 (H) 1.70 - 7.00 10*3/mm3    Lymphocytes, Absolute 1.20 0.70 - 3.10 10*3/mm3    Monocytes, Absolute 1.30 (H) 0.10 - 0.90 10*3/mm3    Eosinophils, Absolute 0.00 0.00 - 0.40 10*3/mm3    Basophils, Absolute 0.10 0.00 - 0.20 10*3/mm3    nRBC 0.0 0.0 - 0.2 /100 WBC     Ct Head Without Contrast    Result Date: 1/18/2020  1.  Normal exam.  Electronically Signed By-Arabella Mendez On:1/18/2020 8:09 PM This report was finalized on [id number] by  Arabella Mendez, .    Ct Cervical Spine Without Contrast    Result Date:  1/18/2020  Normal CT cervical spine without contrast.  Electronically Signed ByAnna Mendez On:1/18/2020 8:11 PM This report was finalized on [id number] by  Arabella Mendez .    Xr Toe 2+ View Left    Result Date: 1/18/2020   1. Negative for fracture. 2. Chronic abnormal appearance of second and third MTP joints. 3. Soft tissue swelling.  Electronically Signed ByAnna Mendez On:1/18/2020 8:07 PM This report was finalized on [id number] by  Arabella Mendez, .                                             MDM  Number of Diagnoses or Management Options  Diagnosis management comments: She has benign physical exam other than hyperglycemia.  Patient was given insulin.  He also has hyponatremia as well as leukocytosis most likely from his persistent vomiting.  Patient will be admitted for IV hydration glucose control and antiemetics.  I did speak the on-call hospitalist.    Risk of Complications, Morbidity, and/or Mortality  Presenting problems: high  Diagnostic procedures: high  Management options: high    Patient Progress  Patient progress: stable      Final diagnoses:   Syncope, unspecified syncope type   Concussion without loss of consciousness, initial encounter   Hyperglycemia   Persistent vomiting            Sidney Miller MD  01/18/20 2053

## 2020-01-20 ENCOUNTER — APPOINTMENT (OUTPATIENT)
Dept: CT IMAGING | Facility: HOSPITAL | Age: 45
End: 2020-01-20

## 2020-01-20 ENCOUNTER — APPOINTMENT (OUTPATIENT)
Dept: MRI IMAGING | Facility: HOSPITAL | Age: 45
End: 2020-01-20

## 2020-01-20 ENCOUNTER — APPOINTMENT (OUTPATIENT)
Dept: GENERAL RADIOLOGY | Facility: HOSPITAL | Age: 45
End: 2020-01-20

## 2020-01-20 ENCOUNTER — APPOINTMENT (OUTPATIENT)
Dept: CARDIOLOGY | Facility: HOSPITAL | Age: 45
End: 2020-01-20

## 2020-01-20 PROBLEM — K81.0 ACUTE CHOLECYSTITIS: Status: RESOLVED | Noted: 2020-01-19 | Resolved: 2020-01-20

## 2020-01-20 PROBLEM — M25.511 ACUTE PAIN OF RIGHT SHOULDER: Status: ACTIVE | Noted: 2020-01-20

## 2020-01-20 PROBLEM — L97.514: Status: ACTIVE | Noted: 2020-01-20

## 2020-01-20 LAB
GLUCOSE BLDC GLUCOMTR-MCNC: 154 MG/DL (ref 70–105)
GLUCOSE BLDC GLUCOMTR-MCNC: 161 MG/DL (ref 70–105)
GLUCOSE BLDC GLUCOMTR-MCNC: 174 MG/DL (ref 70–105)
GLUCOSE BLDC GLUCOMTR-MCNC: 212 MG/DL (ref 70–105)
GLUCOSE BLDC GLUCOMTR-MCNC: 449 MG/DL (ref 70–105)

## 2020-01-20 PROCEDURE — 87070 CULTURE OTHR SPECIMN AEROBIC: CPT | Performed by: INTERNAL MEDICINE

## 2020-01-20 PROCEDURE — 25010000002 VANCOMYCIN 10 G RECONSTITUTED SOLUTION: Performed by: HOSPITALIST

## 2020-01-20 PROCEDURE — 87205 SMEAR GRAM STAIN: CPT | Performed by: INTERNAL MEDICINE

## 2020-01-20 PROCEDURE — 93306 TTE W/DOPPLER COMPLETE: CPT

## 2020-01-20 PROCEDURE — 87147 CULTURE TYPE IMMUNOLOGIC: CPT | Performed by: INTERNAL MEDICINE

## 2020-01-20 PROCEDURE — 73221 MRI JOINT UPR EXTREM W/O DYE: CPT

## 2020-01-20 PROCEDURE — 71275 CT ANGIOGRAPHY CHEST: CPT

## 2020-01-20 PROCEDURE — 73718 MRI LOWER EXTREMITY W/O DYE: CPT

## 2020-01-20 PROCEDURE — 87186 SC STD MICRODIL/AGAR DIL: CPT | Performed by: INTERNAL MEDICINE

## 2020-01-20 PROCEDURE — 63710000001 INSULIN LISPRO (HUMAN) PER 5 UNITS: Performed by: NURSE PRACTITIONER

## 2020-01-20 PROCEDURE — 82962 GLUCOSE BLOOD TEST: CPT

## 2020-01-20 PROCEDURE — 25010000002 CEFEPIME PER 500 MG: Performed by: HOSPITALIST

## 2020-01-20 PROCEDURE — 0 IOPAMIDOL PER 1 ML: Performed by: INTERNAL MEDICINE

## 2020-01-20 PROCEDURE — 73720 MRI LWR EXTREMITY W/O&W/DYE: CPT

## 2020-01-20 PROCEDURE — A9576 INJ PROHANCE MULTIPACK: HCPCS | Performed by: INTERNAL MEDICINE

## 2020-01-20 PROCEDURE — 99253 IP/OBS CNSLTJ NEW/EST LOW 45: CPT | Performed by: PODIATRIST

## 2020-01-20 PROCEDURE — 25010000002 MORPHINE PER 10 MG: Performed by: HOSPITALIST

## 2020-01-20 PROCEDURE — 63710000001 INSULIN GLARGINE PER 5 UNITS: Performed by: NURSE PRACTITIONER

## 2020-01-20 PROCEDURE — 25010000002 GADOTERIDOL PER 1 ML: Performed by: INTERNAL MEDICINE

## 2020-01-20 PROCEDURE — 99232 SBSQ HOSP IP/OBS MODERATE 35: CPT | Performed by: INTERNAL MEDICINE

## 2020-01-20 PROCEDURE — 87641 MR-STAPH DNA AMP PROBE: CPT | Performed by: INTERNAL MEDICINE

## 2020-01-20 PROCEDURE — 25010000002 KETOROLAC TROMETHAMINE PER 15 MG: Performed by: INTERNAL MEDICINE

## 2020-01-20 PROCEDURE — 73030 X-RAY EXAM OF SHOULDER: CPT

## 2020-01-20 RX ORDER — KETOROLAC TROMETHAMINE 30 MG/ML
30 INJECTION, SOLUTION INTRAMUSCULAR; INTRAVENOUS EVERY 6 HOURS PRN
Status: DISCONTINUED | OUTPATIENT
Start: 2020-01-20 | End: 2020-01-24 | Stop reason: HOSPADM

## 2020-01-20 RX ORDER — BUPIVACAINE HYDROCHLORIDE 2.5 MG/ML
4 INJECTION, SOLUTION EPIDURAL; INFILTRATION; INTRACAUDAL ONCE
Status: COMPLETED | OUTPATIENT
Start: 2020-01-21 | End: 2020-01-21

## 2020-01-20 RX ORDER — TRIAMCINOLONE ACETONIDE 40 MG/ML
40 INJECTION, SUSPENSION INTRA-ARTICULAR; INTRAMUSCULAR ONCE
Status: COMPLETED | OUTPATIENT
Start: 2020-01-21 | End: 2020-01-21

## 2020-01-20 RX ADMIN — Medication 10 ML: at 21:15

## 2020-01-20 RX ADMIN — CEFEPIME 2 G: 2 INJECTION, POWDER, FOR SOLUTION INTRAVENOUS at 08:39

## 2020-01-20 RX ADMIN — KETOROLAC TROMETHAMINE 30 MG: 30 INJECTION, SOLUTION INTRAMUSCULAR at 16:08

## 2020-01-20 RX ADMIN — CEFEPIME 2 G: 2 INJECTION, POWDER, FOR SOLUTION INTRAVENOUS at 23:55

## 2020-01-20 RX ADMIN — MORPHINE SULFATE 1 MG: 4 INJECTION INTRAVENOUS at 08:43

## 2020-01-20 RX ADMIN — MORPHINE SULFATE 1 MG: 4 INJECTION INTRAVENOUS at 03:30

## 2020-01-20 RX ADMIN — INSULIN LISPRO 5 UNITS: 100 INJECTION, SOLUTION INTRAVENOUS; SUBCUTANEOUS at 21:15

## 2020-01-20 RX ADMIN — GADOTERIDOL 20 ML: 279.3 INJECTION, SOLUTION INTRAVENOUS at 17:55

## 2020-01-20 RX ADMIN — INSULIN LISPRO 3 UNITS: 100 INJECTION, SOLUTION INTRAVENOUS; SUBCUTANEOUS at 13:23

## 2020-01-20 RX ADMIN — Medication 10 ML: at 09:04

## 2020-01-20 RX ADMIN — INSULIN LISPRO 3 UNITS: 100 INJECTION, SOLUTION INTRAVENOUS; SUBCUTANEOUS at 08:40

## 2020-01-20 RX ADMIN — KETOROLAC TROMETHAMINE 30 MG: 30 INJECTION, SOLUTION INTRAMUSCULAR at 19:36

## 2020-01-20 RX ADMIN — IOPAMIDOL 100 ML: 755 INJECTION, SOLUTION INTRAVENOUS at 17:22

## 2020-01-20 RX ADMIN — VANCOMYCIN HYDROCHLORIDE 1500 MG: 10 INJECTION, POWDER, LYOPHILIZED, FOR SOLUTION INTRAVENOUS at 20:28

## 2020-01-20 RX ADMIN — INSULIN GLARGINE 12 UNITS: 100 INJECTION, SOLUTION SUBCUTANEOUS at 21:15

## 2020-01-20 RX ADMIN — CEFEPIME 2 G: 2 INJECTION, POWDER, FOR SOLUTION INTRAVENOUS at 18:25

## 2020-01-20 RX ADMIN — VANCOMYCIN HYDROCHLORIDE 1500 MG: 10 INJECTION, POWDER, LYOPHILIZED, FOR SOLUTION INTRAVENOUS at 13:08

## 2020-01-20 NOTE — PLAN OF CARE
Problem: Patient Care Overview  Goal: Plan of Care Review  Outcome: Ongoing (interventions implemented as appropriate)  Flowsheets  Taken 1/20/2020 0327  Progress: improving  Outcome Summary: Pt. c/o right shoulder pain, morphine given PRN, pt. wants X-ray of shoulder to be sure there isn't anythign wrong, will continue to monitor  Taken 1/19/2020 1905  Plan of Care Reviewed With: patient

## 2020-01-20 NOTE — CONSULTS
Referring Provider: Emergency room  Reason for Consultation: Right shoulder pain    Patient Care Team:  Erwin Castorena MD as PCP - General (Family Medicine)    Chief complaint right shoulder pain    Subjective .     History of present illness: This patient is a 44-year-old right-handed male who said he fell in the shower 2 nights ago.  He injured his right shoulder.  Never had problems with his shoulder before.  The reason for loss of consciousness is unknown.  He is admitted to the hospital because of a work-up for loss of consciousness, shoulder pain, and foot pain.  He seen Dr. simons for his foot pain.  He has no numbness in his right shoulder he says it just hurts a lot.  Never had pain before.  He says it has not gotten better since he has been in the hospital.      History  Past Medical History:   Diagnosis Date   • Coronary artery disease    • Diabetes mellitus (CMS/HCC)        Past Surgical History:   Procedure Laterality Date   • AMPUTATION FOOT / TOE     • CARDIAC SURGERY     • TOE AMPUTATION Left           Scheduled Meds:      [START ON 1/21/2020] !Vancomycin Level Draw Needed  Does not apply Once   [START ON 1/21/2020] !Vancomycin Level Draw Needed  Does not apply Once   aspirin 325 mg Oral Daily   cefepime 2 g Intravenous Q8H   insulin glargine 12 Units Subcutaneous Nightly   insulin lispro 0-14 Units Subcutaneous 4x Daily With Meals & Nightly   sodium chloride 10 mL Intravenous Q12H   vancomycin 1,500 mg Intravenous Q12H        PRN Meds:   acetaminophen **OR** acetaminophen **OR** acetaminophen  •  aluminum-magnesium hydroxide-simethicone  •  bisacodyl  •  dextrose  •  dextrose  •  glucagon (human recombinant)  •  insulin lispro **AND** insulin lispro  •  ketorolac  •  magnesium hydroxide  •  melatonin  •  ondansetron **OR** ondansetron  •  Pharmacy to dose vancomycin  •  sodium chloride      Allergies:    Metformin and Oxycodone-acetaminophen        Objective     Vital Signs    [unfilled]    Physical Exam:     General Appearance:   Is with 2 nurses in his room.  He is oriented to time place and person.  His in bed with his head of bed elevated approximately 45 degrees.   Extremities:  Right arm he has no deformity.  Good radial pulse.  Skin is intact   Pulses:  Intact   Skin:  Intact   Neurologic:  Except for foot diabetic neuropathy he has upper extremity has normal neurovascular function.       Results Review:  CBC    Results from last 7 days   Lab Units 01/19/20  0441 01/18/20 1927   WBC 10*3/mm3 23.00* 23.90*   HEMOGLOBIN g/dL 13.1 13.4   PLATELETS 10*3/mm3 181 197     BMP Results from last 7 days   Lab Units 01/19/20  0441 01/18/20  1927   SODIUM mmol/L 130* 128*   POTASSIUM mmol/L 3.7 4.2   CHLORIDE mmol/L 93* 88*   CO2 mmol/L 29.0 27.0   BUN mg/dL 19 19   CREATININE mg/dL 0.87 1.04   GLUCOSE mg/dL 286* 520*     Infection Results from last 7 days   Lab Units 01/19/20  1630   WOUNDCX  Culture in progress     Radiology(recent) Xr Shoulder 2+ View Right    Result Date: 1/20/2020   1. No acute right shoulder findings. No significant right shoulder osteoarthritic change. 2. Suspected benign enchondroma or bone infarcts in the right humeral shaft.  Electronically Signed By-Dr. Yamileth Bourne MD On:1/20/2020 12:56 PM This report was finalized on [id number] by Dr. Yamileth Bourne MD.    Ct Head Without Contrast    Result Date: 1/18/2020  1.  Normal exam.  Electronically Signed By-Arabella Mendez On:1/18/2020 8:09 PM This report was finalized on [id number] by  Arabella Mendez .    Ct Cervical Spine Without Contrast    Result Date: 1/18/2020  Normal CT cervical spine without contrast.  Electronically Signed ByAnna Mendez On:1/18/2020 8:11 PM This report was finalized on [id number] by  Ynes Haywood    Xr Toe 2+ View Left    Result Date: 1/18/2020   1. Negative for fracture. 2. Chronic abnormal appearance of second and third MTP joints. 3. Soft tissue swelling.  Electronically  Signed By-Arabella Mendez On:1/18/2020 8:07 PM This report was finalized on [id number] by  Arabella Mendez, .    Us Gallbladder    Result Date: 1/19/2020   1. Sludge in the gallbladder lumen. No evidence of gallbladder wall thickening or fluid in the gallbladder fossa. No evidence of dilatation of bile ducts. No evidence of ascites.  Electronically Signed By-DR. Mal Lott MD On:1/19/2020 5:50 PM This report was finalized on [id number] by DR. Mal Lott MD.    Mri Shoulder Right Without Contrast    Result Date: 1/20/2020  1.There is a tear of the superior labrum from anterior to posterior with mild adjacent cartilage loss in the superior glenoid. This is age indeterminate. 2.No imaging findings of shoulder dislocation. No Hill-Sachs impaction fracture or Bankart lesion. 3.Chondroid lesions in the proximal humeral diaphysis. No associated aggressive features on MRI. Radiographic follow-up of the humerus is recommended in 6 months time. These are likely benign enchondromas. The largest lesion is not seen in the field-of-view. 4.No findings rotator cuff tear.   Electronically Signed By-Millicent Springer MD On:1/20/2020 2:17 PM This report was finalized on [id number] by  Millicent Springer MD.    Mri Foot Right Without Contrast    Result Date: 1/20/2020  1.There is a sinus tract along the plantar aspect of foot superficial to the first metatarsal phalange joint with abnormal soft tissue density along this tract along the plantar aspect of the great toe with abnormal signal intensity in the hallux sesamoid bones. This is suspicious for osteomyelitis of the hallux sesamoid bones. Adjacent phlegmon is likely. Soft tissue evaluation limited without contrast. 2.There are remote and subacute fractures as described. 3.There is erosive change at the second proximal interphalangeal joint and the first interphalangeal joint that may be seen with gouty arthritis. Apparently erosive arthritis such as rheumatoid  arthritis is thought to be unlikely.    Electronically Signed By-Millicent Springer MD On:1/20/2020 2:04 PM This report was finalized on [id number] by  Millicent Springer MD.    Xr Chest Pa & Lateral    Result Date: 1/19/2020   1. No active cardiopulmonary disease 2. Right PICC line is been removed since previous study of 04/07/2019 3. No significant change from 04/07/2019  Electronically Signed By-Hermilo Vaz Jr. On:1/19/2020 1:44 PM This report was finalized on [id number] by  Hermilo Vaz Jr., .        I reviewed the patient's new clinical results.  I reviewed the patient's new imaging results and agree with the interpretation.      Assessment/Plan       Syncope    Diabetic peripheral neuropathy (CMS/HCC)    Type 1 diabetes mellitus with circulatory complication (CMS/HCC)    Open wound of second toe of left foot    Acute pain of right shoulder    Chronic ulcer of right foot, with necrosis of bone (CMS/HCC)      Impression #1 injury to right shoulder with possible labral tear #2 diabetes with peripheral neuropathy #3 open wounds of left foot being taken care of by Dr. Souza    Recommendations #1 I discussed his MRI with him.  I told him it is impossible to tell with whether the labral tear is acute or chronic.  As a result tomorrow we will give him an injection in his right shoulder.  If it helps him a lot then we can start him on some physical therapy.  If it does not help much then we may end up repairing his labral tear.  Thank you    I discussed the patients findings and my recommendations with patient    Indra Rivas MD  01/20/20  4:16 PM

## 2020-01-20 NOTE — CONSULTS
"Diabetes Education  Assessment/Teaching    Patient Name:  Maynor Gregg  YOB: 1975  MRN: [id number]  Admit Date:  1/18/2020      Assessment Date:  1/20/2020    Most Recent Value   General Information    Referral From:  Other (comment) [Admission blood sugar 520.]   Height  190.5 cm (75\")   Height Method  Stated   Weight  94.3 kg (208 lb)   Weight Method  Bed scale   Pregnancy Assessment   Diabetes History   What type of diabetes do you have?  Type 2   Length of Diabetes Diagnosis  1 - 5 years   Current DM knowledge  fair   Do you test your blood sugar at home?  yes   Frequency of checks  3X a day   Meter type  One Touch   Who performs the test?  patient   Typical readings  130-170   Have you had high blood sugar? (>140mg/dl)  yes   How often do you have high blood sugar?  frequently   Do you have any diabetes complications?  foot ulcers, neuropathy, heart disease   Education Preferences   What areas of diabetes would you like to learn about?  avoiding high blood sugar, diabetes complications   Nutrition Information   Assessment Topics   Problem Solving - Assessment  Needs education   Reducing Risk - Assessment  Needs education   DM Goals   Problem Solving - Goal  Today   Reducing Risk - Goal  Today            Most Recent Value   DM Education Needs   Meter  Has own   Meter Type  One Touch   Frequency of Testing  AC meals   Medication  Insulin [Patient on Basaglar kwikpen 12 units at bedtime and Humalog 3 units before meals at home.  Patient states he only eats 2 meals a day.]   Problem Solving  Hyperglycemia, Signs, Symptoms, Treatment   Discharge Plan  Home   Motivation  Not interested   Teaching Method  Discussion   Patient Response  Verbalized understanding            Other Comments:  Discussed admission blood sugar of 520.  Patient states he fell and hit his head in the tub. Patient not interested in discussing his diabetes.        Electronically signed by:  Miranda Marquez RN  01/20/20 3:51 " PM

## 2020-01-20 NOTE — PROGRESS NOTES
Nutrition Services    Patient Name:  Maynor Gregg  YOB: 1975  MRN: [id number]  Admit Date:  1/18/2020    Chart review r/t MST score =2. No significant wt loss per EMR, BMI currently 26. Attempted pt interview, MD at bedside during visit attempt. No PI. Plan for surgery with podiatry tomorrow for debridement of bilateral foot wounds. Screen for diet advancement on 1/22.    Electronically signed by:  Georgia Erickson RD  01/20/20 4:13 PM

## 2020-01-20 NOTE — PAT
Reviewing pt chart for surgery on 1/21/20 with Dr. Souza.  Spoke with Diamond (nurse), CXR wnl, advised to notify surgeon of WBC= 23.00, need to order MRSA screening.  Pt had EKG and Echo , getting chest ct today.

## 2020-01-20 NOTE — PROGRESS NOTES
Discharge Planning Assessment  [facility]     Patient Name: Maynor Gregg  MRN: [id number]  Today's Date: 1/20/2020    Admit Date: 1/18/2020    Discharge Needs Assessment     Row Name 01/20/20 1626       Living Environment    Lives With  child(marcus), dependent    Name(s) of Who Lives With Patient  8 yo son, now staying with grandparents.    Current Living Arrangements  home/apartment/condo    Primary Care Provided by  self    Provides Primary Care For  child(marcus)    Family Caregiver if Needed  none    Able to Return to Prior Arrangements  yes       Resource/Environmental Concerns    Resource/Environmental Concerns  none       Transition Planning    Patient/Family Anticipates Transition to  home    Patient/Family Anticipated Services at Transition  none    Transportation Anticipated  family or friend will provide       Discharge Needs Assessment    Readmission Within the Last 30 Days  no previous admission in last 30 days    Concerns to be Addressed  no discharge needs identified    Equipment Currently Used at Home  glucometer    Anticipated Changes Related to Illness  none    Equipment Needed After Discharge  none    Discharge Coordination/Progress  Return home - possible IV antibiotics at DC.        Discharge Plan     Row Name 01/20/20 8678       Plan    Plan  Return home - possible IV antibiotics at DC.    Plan Comments  Pt has given self home IV antibiotics in the past. Currently employed.                  Demographic Summary     Row Name 01/20/20 1628       General Information    Admission Type  observation    Arrived From  emergency department    Referral Source  admission list    Reason for Consult  discharge planning    Preferred Language  English                   Jason Price RN

## 2020-01-20 NOTE — CONSULTS
Infectious Diseases Consult Note    Referring Provider: Christopher Ron MD    Reason for Consultation: Diabetic foot wounds    Patient Care Team:  Erwin Castorena MD as PCP - General (Family Medicine)    Chief complaint syncope with fall, wound to the right foot and left second toe    Subjective     The patient has been afebrile for the last 24 hours.  The patient is on room air, hemodynamically stable, and is tolerating antimicrobial therapy.    History of present illness:      This is a 44-year-old male who presents the hospital on 1/18/2020 with complaints of syncope and a fall in his shower.  States he also had nausea and vomiting with fever over the previous 3 days.  Patient states that he been feeling dizzy for several days.  He complained of head, neck, and back pain along with right shoulder pain after the fall.  Patient states that he has a wound on his right foot and on his left second toe which was being treated with p.o. doxycycline as an outpatient.  Patient currently denies fever, chills, diaphoresis, shortness of breath but has had some congestion.  He currently still has some nausea but denies current vomiting and diarrhea.  Patient denies any urinary symptoms.  Still having some dizziness.    Patient has been afebrile since admission.  Labs from 1/19/2020 show creatinine 0.87, white blood cell count 23, hemoglobin 13.1, platelets of 181.  Drug screen was negative, respiratory viral DNA panel was negative, and wound culture of the right foot with pending.  CT of the cervical spine was negative for acute findings, CT of the head was normal, x-ray shows no active cardiopulmonary disease, gallbladder ultrasound shows sludge but no evidence of obstruction.  MRI of the right foot shows suspicion for osteomyelitis of the hallux sesamoid bones.  MRI of the right shoulder does not show any indications of dislocation or fracture or rotator cuff tear.  She is currently on IV cefepime and IV  vancomycin and has no known antibiotic allergies.    Our service saw the patient when he had a left great toe amputation in 2018.  We also saw the patient in 2019 when he had a  left foot, fourth digit amputation at the mid tarsal phalangeal joint, excisional debridement to level of bone, left foot partial fifth metatarsal resection on 19.  Patient had group A streptococcus and was treated with 6 weeks of IV antibiotics.  The patient has had an MI with cardiac stents in the past. The patient has a history of type 1 diabetes with neuropathy, CAD, left great toe amputation, and right transmetatarsal amputation.  Patient quit smoking approximately 8 months ago and denies any alcohol or illicit drug abuse.      Review of Systems   Review of Systems   Constitutional: Positive for fatigue.   Respiratory: Negative.    Cardiovascular: Negative.    Gastrointestinal: Negative.    Genitourinary: Negative.    Musculoskeletal: Positive for arthralgias and back pain.        Right shoulder pain    Skin: Positive for wound.   Neurological: Positive for dizziness and headaches.   Psychiatric/Behavioral: Negative.    All other systems reviewed and are negative.      Medications  Medications Prior to Admission   Medication Sig Dispense Refill Last Dose   • aspirin 325 MG tablet Take 325 mg by mouth Daily.   Taking   • [] doxycycline (VIBRAMYICN) 100 MG tablet Take 1 tablet by mouth 2 (Two) Times a Day for 10 days. 20 tablet 0    • Insulin Glargine (BASAGLAR KWIKPEN) 100 UNIT/ML injection pen Inject 12 Units under the skin into the appropriate area as directed Every Night.   Taking   • insulin lispro (HUMALOG) 100 UNIT/ML injection Inject  under the skin into the appropriate area as directed Every 8 (Eight) Hours. SSI   Taking       History  Past Medical History:   Diagnosis Date   • Coronary artery disease    • Diabetes mellitus (CMS/[facility])      Past Surgical History:   Procedure Laterality Date   • AMPUTATION  FOOT / TOE     • CARDIAC SURGERY     • TOE AMPUTATION Left        Family History  Family History   Problem Relation Age of Onset   • Diabetes Father    • Heart failure Father    • Diabetes Paternal Grandfather        Social History   reports that he quit smoking about 8 months ago. He smoked 0.50 packs per day. He has never used smokeless tobacco. He reports that he does not drink alcohol or use drugs.    Allergies  Metformin and Oxycodone-acetaminophen    Objective     Vital Signs   Vital Signs (last 24 hours)       01/19 0700  -  01/20 0659 01/20 0700  -  01/20 1053   Most Recent    Temp (°F) 97.8 -  99.4       99.4 (37.4)    Heart Rate 86 -  110      99     99    Resp   18      20     20    /67 -  107/63      100/66     100/66    SpO2 (%) 97 -  98       97          Physical Exam:  Physical Exam   Constitutional: He is oriented to person, place, and time. He appears well-developed and well-nourished.   HENT:   Head: Normocephalic and atraumatic.   Eyes: Pupils are equal, round, and reactive to light. Conjunctivae and EOM are normal.   Neck: Neck supple.   Cardiovascular: Normal rate, regular rhythm and normal heart sounds.   Pulmonary/Chest: Effort normal and breath sounds normal.   Abdominal: Soft. Bowel sounds are normal.   Musculoskeletal: Normal range of motion.   Previous right TMA    Previous left great toe amputation, 4th and 5th amputation    Neurological: He is alert and oriented to person, place, and time.   Skin: Skin is warm and dry.   Wound on the dorsal aspect of the second digit of the left foot  Wound on the plantar aspect of the right foot  Foul odor    Psychiatric: He has a normal mood and affect.   Vitals reviewed.      Microbiology  Microbiology Results (last 10 days)     Procedure Component Value - Date/Time    Respiratory Panel, PCR - Swab, Nasopharynx [[id number]]  (Normal) Collected:  01/19/20 1059    Lab Status:  Final result Specimen:  Swab from Nasopharynx Updated:  01/19/20 2444      ADENOVIRUS, PCR Not Detected     Coronavirus 229E Not Detected     Coronavirus HKU1 Not Detected     Coronavirus NL63 Not Detected     Coronavirus OC43 Not Detected     Human Metapneumovirus Not Detected     Human Rhinovirus/Enterovirus Not Detected     Influenza B PCR Not Detected     Parainfluenza Virus 1 Not Detected     Parainfluenza Virus 2 Not Detected     Parainfluenza Virus 3 Not Detected     Parainfluenza Virus 4 Not Detected     Bordetella pertussis pcr Not Detected     Influenza A H1 2009 PCR Not Detected     Chlamydophila pneumoniae PCR Not Detected     Mycoplasma pneumo by PCR Not Detected     Influenza A PCR Not Detected     Influenza A H3 Not Detected     Influenza A H1 Not Detected     RSV, PCR Not Detected          Laboratory  Results from last 7 days   Lab Units 01/19/20  0441   WBC 10*3/mm3 23.00*   HEMOGLOBIN g/dL 13.1   HEMATOCRIT % 37.8   PLATELETS 10*3/mm3 181     Results from last 7 days   Lab Units 01/19/20  0441   SODIUM mmol/L 130*   POTASSIUM mmol/L 3.7   CHLORIDE mmol/L 93*   CO2 mmol/L 29.0   BUN mg/dL 19   CREATININE mg/dL 0.87   GLUCOSE mg/dL 286*   CALCIUM mg/dL 8.3*     Results from last 7 days   Lab Units 01/19/20  0441   SODIUM mmol/L 130*   POTASSIUM mmol/L 3.7   CHLORIDE mmol/L 93*   CO2 mmol/L 29.0   BUN mg/dL 19   CREATININE mg/dL 0.87   GLUCOSE mg/dL 286*   CALCIUM mg/dL 8.3*                   Radiology  Imaging Results (Last 72 Hours)     Procedure Component Value Units Date/Time    US Gallbladder [[id number]] Collected:  01/19/20 1747     Updated:  01/19/20 1803    Narrative:       DATE OF EXAM:  1/19/2020 5:04 PM     PROCEDURE:  US GALLBLADDER-     INDICATIONS:  pain in lower ab; R55-Syncope and collapse; S06.6G1W-Fyeaadxoew without  loss of consciousness, initial encounter; R73.9-Hyperglycemia,  unspecified; R11.15-Cyclical vomiting syndrome unrelated to migraine  right upper quadrant abdominal pain today with nausea and vomiting     COMPARISON:  No Comparisons  Available     TECHNIQUE:   Grayscale and color Doppler ultrasound evaluation of the limited abdomen  was performed.     FINDINGS:  Ultrasound right upper quadrant the abdomen reveals no evidence of liver  mass. Liver measures 18.4 cm in size and is mildly enlarged. No evidence  of ascites. Right kidney measures 13.9 cm x 6.9 cm x 6.4 severe size. No  evidence of mass or obstruction right kidney. Ultrasound Doppler shows  normal antegrade blood flow in the portal vein toward the liver and  shows normal antegrade blood flow in hepatic veins away from liver.  Common bile duct measures 2.6 mm diameter and is normal. No evidence  dilatation of bile ducts. Sludge is seen in the gallbladder lumen. No  evidence of gallbladder wall thickening or fluid in the gallbladder  fossa. The pancreas is difficult to visualize        Impression:          1. Sludge in the gallbladder lumen. No evidence of gallbladder wall  thickening or fluid in the gallbladder fossa. No evidence of dilatation  of bile ducts. No evidence of ascites.     Electronically Signed By-DR. Mal Lott MD On:1/19/2020 5:50  PM  This report was finalized on [id number] by DR. Mal Lott MD.    XR Chest PA & Lateral [[id number]] Collected:  01/19/20 1342     Updated:  01/19/20 1355    Narrative:       Examination: XR CHEST PA AND LATERAL-     Date of Exam: 1/19/2020 1:27 PM     Indication: pt fell on Rt side; R55-Syncope and collapse;  S06.7L9P-Axjfzdymif without loss of consciousness, initial encounter;  R73.9-Hyperglycemia, unspecified; R11.15-Cyclical vomiting syndrome  unrelated to migraine.  Patient's a 44-year-old male with high white  blood count, right-sided chest pain from fall yesterday, patient quit  smoking 3 months ago.  Patient gives history of diabetes type 1.  Patient had syncopal episode yesterday      Comparison: Study of 04/07/2019     Technique: 2 radiographic views of the chest were obtained.     Findings:  Today's 2 view  study of the chest was obtained on 01/19/2020 at 1:28 PM  in the PA and lateral projections     The heart size is normal. The lung fields are clear. The diaphragmatic  surfaces are smooth. The upper abdomen is unremarkable. A few scattered  parenchymal calcifications are seen, old granulomatous disease. There is  no evidence of active disease. The right PICC line has been removed  since previous study.       Impression:          1. No active cardiopulmonary disease  2. Right PICC line is been removed since previous study of 04/07/2019  3. No significant change from 04/07/2019     Electronically Signed By-Hermilo Vaz Jr. On:1/19/2020 1:44 PM  This report was finalized on [id number] by  Hermilo Vaz Jr., .    CT Cervical Spine Without Contrast [[id number]] Collected:  01/18/20 2009     Updated:  01/18/20 2013    Narrative:       DATE OF EXAM:  1/18/2020 7:40 PM     PROCEDURE:  CT CERVICAL SPINE WO CONTRAST-     INDICATIONS:   C-spine trauma, NEXUS/CCR negative, low risk     COMPARISON:   No Comparisons Available     TECHNIQUE:  Routine transaxial slices were obtained through the cervical spine  without the administration of intravenous contrast. Reconstructed  coronal and sagittal images were also obtained. Automated exposure  control and iterative construction methods were used.      FINDINGS:  No cervical spine fractures are seen. The bone mineral density is  normal. The alignment is anatomic. The prevertebral soft tissues and  soft tissues in the field of view are normal. MRI is more sensitive to  evaluate for central canal or neural foraminal narrowing.     C1-C2: No significant central canal or neural foraminal narrowing. The  C1-C2 articulation is normal in appearance.     C2-C3: No significant central canal or neural foraminal narrowing.     C3-C4: No significant central canal or neural foraminal narrowing.     C4-C5: No significant central canal or neural foraminal narrowing.     C5-C6: No significant  central canal or neural foraminal narrowing.     C6-C7: No significant central canal or neural foraminal narrowing.     C7-T1: No significant central canal or neural foraminal narrowing.       Impression:       Normal CT cervical spine without contrast.     Electronically Signed ByAnna Mendez On:1/18/2020 8:11 PM  This report was finalized on [id number] by  Arabella Mendez .    CT Head Without Contrast [[id number]] Collected:  01/18/20 2008     Updated:  01/18/20 2011    Narrative:       CT HEAD WO CONTRAST-     Date of Exam: 1/18/2020 7:40 PM     Indication: Dizziness.     Comparison: None available.     Technique:  Without contrast, contiguous axial CT images of the head  were obtained from skull base to vertex.  Coronal and sagittal  reconstructions were performed.  Automated exposure control and  iterative reconstruction methods were used.     FINDINGS  No evidence of intracranial hemorrhage, mass, or midline shift. The  ventricles appear normal in size for the patient's age. No extra-axial  collections identified. The gray white matter differentiation is intact.  No air-fluid levels identified within the paranasal sinuses. The  extra-axial structures demonstrate no acute process.       Impression:       1.  Normal exam.     Electronically Signed ByAnna Mendez On:1/18/2020 8:09 PM  This report was finalized on [id number] by  Arabella Mendez, .    XR Toe 2+ View Left [[id number]] Collected:  01/18/20 2005     Updated:  01/18/20 2009    Narrative:       DATE OF EXAM:  1/18/2020 7:25 PM     PROCEDURE:  XR TOE 2+ VW LEFT-     INDICATIONS:  Trauma     COMPARISON:  08/21/2019 TECHNIQUE:   A minimum of two routine standard radiographic views were obtained of  the left toes.        FINDINGS:  The big toe has been amputated. The head of the second and third  metatarsal tarsals are deformed and compressed. Some small defect in the  base of the proximal phalanx of the second toe. The fourth toe has been  amputated.  There is no soft tissue gas or opaque foreign body. There is  some soft tissue swelling mainly of the second toe. There is no  periostitis identified.     No fracture is identified.       Impression:          1. Negative for fracture.  2. Chronic abnormal appearance of second and third MTP joints.  3. Soft tissue swelling.     Electronically Signed By-Arabella Mendez On:1/18/2020 8:07 PM  This report was finalized on [id number] by  Arabella Mendez, .          Cardiology      Results Review:  I have reviewed all clinical data, test, lab, and imaging results.       Schedule Meds    [START ON 1/21/2020] !Vancomycin Level Draw Needed  Does not apply Once   [START ON 1/21/2020] !Vancomycin Level Draw Needed  Does not apply Once   aspirin 325 mg Oral Daily   cefepime 2 g Intravenous Q8H   insulin glargine 12 Units Subcutaneous Nightly   insulin lispro 0-14 Units Subcutaneous 4x Daily With Meals & Nightly   sodium chloride 10 mL Intravenous Q12H   vancomycin 1,500 mg Intravenous Q12H       Infusion Meds    Pharmacy to dose vancomycin     sodium chloride 125 mL/hr Last Rate: 125 mL/hr (01/19/20 0721)       PRN Meds  acetaminophen **OR** acetaminophen **OR** acetaminophen  •  aluminum-magnesium hydroxide-simethicone  •  bisacodyl  •  dextrose  •  dextrose  •  glucagon (human recombinant)  •  insulin lispro **AND** insulin lispro  •  ketorolac  •  magnesium hydroxide  •  melatonin  •  ondansetron **OR** ondansetron  •  Pharmacy to dose vancomycin  •  sodium chloride      Assessment/Plan       Assessment    Diabetic foot wound  -Wound on the dorsal aspect of the second digit of the left foot  -Wound on the plantar aspect of the right foot   -MRI of the right foot shows suspicion for osteomyelitis of the hallux sesamoid bones  -History of left great toe amputation in November 2018, fourth and fifth digit amputation in April 2019 with 6 weeks of IV antibiotics  -History of right transmetatarsal amputation    Leukocytosis    Type 1  diabetes with neuropathy    Recent complaints of dizziness with syncope on 1/18/2020 which resulted in a fall in the shower  -Imaging studies do not show any acute findings on the CT the head, CT the cervical spine or MRI of the right shoulder  -Screen was negative    Also evidence of nausea, vomiting, and fever at home  -Patient has been afebrile since admission  -Respiratory viral DNA panel was negative    CAD, history of MI with cardiac catheterization    Plan    Continue IV cefepime   Continue IV vancomycin  And IV Flagyl 500 mg every q 8 hours  Wound culture of right foot is pending  Wound culture of left foot  MRI of left foot with and without contrast  2D echo pending  Discussed case with podiatry with I&D of right foot and exploration of left foot depending on results of the MRI- scheduled for tomorrow   Continue supportive care  A.mYnes Knight, APRN  01/20/20  10:53 AM

## 2020-01-20 NOTE — CONSULTS
2020  Foot and Ankle Surgery - Consult  Provider: Dr. Adan Souza DPM  Location: [facility]    Subjective:  Maynor Gregg is a 44 y.o. male.     Chief Complaint   Patient presents with   • Syncope       Consulting Physician: Primary service    Reason for Consult: Bilateral foot wounds    HPI: Patient is a 44-year-old diabetic male who is well-known to me and been admitted for concerns of foot infection.  Patient presents with progressive swelling, redness, and drainage affecting the left second digit.  He feels that the wound is been present for approximately 2 weeks.  He did call my office when wound was noted and we did start him on an oral antibiotic.  He feels that the wound is getting worse.  He was running a fever at home and passed out injuring his shoulder.  He also has noticed progressive wound to the plantar aspect of the right foot.  He has not been feeling well over the last few days.    Allergies   Allergen Reactions   • Metformin Diarrhea and Nausea And Vomiting   • Oxycodone-Acetaminophen Nausea And Vomiting and Rash       Past Medical History:   Diagnosis Date   • Coronary artery disease    • Diabetes mellitus (CMS/[facility])        Past Surgical History:   Procedure Laterality Date   • AMPUTATION FOOT / TOE     • CARDIAC SURGERY     • TOE AMPUTATION Left        Family History   Problem Relation Age of Onset   • Diabetes Father    • Heart failure Father    • Diabetes Paternal Grandfather        Social History     Socioeconomic History   • Marital status: Single     Spouse name: Not on file   • Number of children: Not on file   • Years of education: Not on file   • Highest education level: Not on file   Tobacco Use   • Smoking status: Former Smoker     Packs/day: 0.50     Last attempt to quit: 2019     Years since quittin.7   • Smokeless tobacco: Never Used   Substance and Sexual Activity   • Alcohol use: No     Frequency: Never   • Drug use: No   • Sexual activity: Defer           Current Facility-Administered Medications:   •  [START ON 1/21/2020] !Vancomycin Level Draw Needed, , Does not apply, Once, Peewee Rdz MD  •  [START ON 1/21/2020] !Vancomycin Level Draw Needed, , Does not apply, Once, Peewee Rdz MD  •  acetaminophen (TYLENOL) tablet 650 mg, 650 mg, Oral, Q4H PRN, 650 mg at 01/19/20 0803 **OR** acetaminophen (TYLENOL) 160 MG/5ML solution 650 mg, 650 mg, Oral, Q4H PRN **OR** acetaminophen (TYLENOL) suppository 650 mg, 650 mg, Rectal, Q4H PRN, Travis Garcia APRN  •  aluminum-magnesium hydroxide-simethicone (MAALOX MAX) 400-400-40 MG/5ML suspension 15 mL, 15 mL, Oral, Q6H PRN, Travis Garcia APRN  •  aspirin tablet 325 mg, 325 mg, Oral, Daily, Travis Garcia APRN, 325 mg at 01/19/20 0803  •  bisacodyl (DULCOLAX) EC tablet 5 mg, 5 mg, Oral, Daily PRN, Travis Garcia APRN  •  cefepime 2 gm IVPB in 100 ml NS (MBP), 2 g, Intravenous, Q8H, Peewee Rdz MD, Stopped at 01/20/20 1308  •  dextrose (D50W) 25 g/ 50mL Intravenous Solution 25 g, 25 g, Intravenous, Q15 Min PRN, Travis Garcia APRN  •  dextrose (GLUTOSE) oral gel 15 g, 15 g, Oral, Q15 Min PRN, Travis Garcia APRN  •  glucagon (human recombinant) (GLUCAGEN DIAGNOSTIC) injection 1 mg, 1 mg, Subcutaneous, Q15 Min PRN, Travis Garcia APRN  •  insulin glargine (LANTUS) injection 12 Units, 12 Units, Subcutaneous, Nightly, Travis Garcia APRN, 12 Units at 01/19/20 2115  •  insulin lispro (humaLOG) injection 0-14 Units, 0-14 Units, Subcutaneous, 4x Daily With Meals & Nightly, 3 Units at 01/20/20 1323 **AND** insulin lispro (humaLOG) injection 0-14 Units, 0-14 Units, Subcutaneous, PRN, Travis Garcia APRN  •  ketorolac (TORADOL) injection 30 mg, 30 mg, Intravenous, Q6H PRN, Christopher Ron MD  •  magnesium hydroxide (MILK OF MAGNESIA) suspension 2400 mg/10mL 10 mL, 10 mL, Oral, Daily PRN, Travis Garcia APRN  •  melatonin tablet 5 mg, 5 mg, Oral,  "Nightly PRN, Travis Garcia APRN  •  ondansetron (ZOFRAN) tablet 4 mg, 4 mg, Oral, Q6H PRN **OR** ondansetron (ZOFRAN) injection 4 mg, 4 mg, Intravenous, Q6H PRN, Travis Garcia APRN, 4 mg at 01/18/20 2311  •  Pharmacy to dose vancomycin, , Does not apply, Continuous PRN, Peewee Rdz MD  •  sodium chloride 0.9 % flush 10 mL, 10 mL, Intravenous, Q12H, Travis Garcia APRN, 10 mL at 01/20/20 0904  •  sodium chloride 0.9 % flush 10 mL, 10 mL, Intravenous, PRN, Travis Garcia APRN  •  sodium chloride 0.9 % infusion, 125 mL/hr, Intravenous, Continuous, Sidney Miller MD, Last Rate: 125 mL/hr at 01/19/20 0721, 125 mL/hr at 01/19/20 0721  •  vancomycin IVPB 1500 mg in 0.9% NaCl (Premix) 250 mL, 1,500 mg, Intravenous, Q12H, Peewee Rdz MD, 1,500 mg at 01/20/20 1308    Review of Systems:  General: Denies fever, chills, fatigue, and weakness.  Eyes: Denies vision loss, blurry vision, and excessive redness.  ENT: Denies hearing issues and difficulty swallowing.  Cardiovascular: Denies palpitations, chest pain, or syncopal episodes.  Respiratory: Denies shortness of breath, wheezing, and coughing.  GI: Denies abdominal pain, nausea, and vomiting.   : Denies frequency, hematuria, and urgency.  Musculoskeletal: Denies muscle cramps, joint pains, and stiffness.  Derm: + open wounds to both feet  Neuro: Denies headaches, numbness, loss of coordination, and tremors.  Psych: Denies anxiety and depression.  Endocrine: Denies temperature intolerance and changes in appetite.  Heme: Denies bleeding disorders or abnormal bruising.     Objective   /81   Pulse 86   Temp 98.8 °F (37.1 °C) (Oral)   Resp 16   Ht 190.5 cm (75\")   Wt 94.3 kg (208 lb)   SpO2 97%   BMI 26.00 kg/m²     Foot/Ankle Exam:       General:   Appearance: disheveled    Orientation: AAOx3    Affect: appropriate       Right Foot/Ankle:      Vascular   Dorsalis pedis:  2+  Posterior tibial:  2+  Skin Temperature: warm  "   Edema Grading:  None  CFT:  < 3 seconds  Pedal Hair Growth:  Absent     Neurologic   Light touch sensation:  Diminished  Hot/Cold sensation: diminished    Protective Sensation using Linden-Stephon Monofilament:  Diminished  Achilles reflex:  1+     Musculoskeletal    Amputation   Right toes amputated: No    Ecchymosis:  None  Tenderness: none    Arch:  Normal  Hallux limitus: Yes       Muscle Strength   Normal strength    Foot dorsiflexion:  5  Foot plantar flexion:  5  Foot inversion:  5  Foot eversion:  5     Dermatologic   Skin: color abnormal, drainage, erythema and ulcer    Skin: right foot skin not intact and no right foot cellulitis       Left Foot/Ankle:      Vascular   Dorsalis pedis:  2+  Posterior tibial:  2+  Skin Temperature: warm    Edema Grading:  +2 and pitting  CFT:  < 3 seconds  Pedal Hair Growth:  Absent     Neurologic   Light touch sensation:  Diminished  Hot/cold sensation: diminished    Protective Sensation using Linden-Stephon Monofilament:  Diminished  Achilles reflex:  1+     Musculoskeletal    Amputation   Left toes amputated: Yes    Toes amputated: first toe and fourth toe  Ecchymosis:  None  Tenderness: none    Hammertoe:  Second toe, third toe and fifth toe     Muscle Strength   Normal strength    Foot dorsiflexion:  5  Foot plantar flexion:  5  Foot inversion:  5  Foot eversion:  5     Dermatologic   Skin: cellulitis, erythema, skin changes and ulcer    Skin: left foot skin not intact       Image:       Culture results from last 30 days   Lab 01/19/20  1630   WOUNDCX Culture in progress       Results from last 7 days   Lab Units 01/19/20  0441   WBC 10*3/mm3 23.00*   HEMOGLOBIN g/dL 13.1   HEMATOCRIT % 37.8   PLATELETS 10*3/mm3 181       Assessment/Plan   Patient Active Problem List   Diagnosis   • Syncope   • Diabetic peripheral neuropathy (CMS/HCC)   • Type 1 diabetes mellitus with circulatory complication (CMS/HCC)   • Open wound of second toe of left foot   • Acute pain of  right shoulder   • Chronic ulcer of right foot, with necrosis of bone (CMS/HCC)     Patient presents with progressive issues involving his left foot and has noticed progressing wound affecting the plantar aspect of the right foot.  His white count remains elevated.  MRI was reviewed of the right foot but has not been formally read yet.  I am concerned of marrow edema affecting the sesamoids to this region.  MRI has been ordered for the left foot.  Patient does have a wound that extends to the level of the joint.  I did discuss the findings with the patient at bedside.  I have recommended that we proceed with debridement and sesamoid excision of the right foot.  Planning for the left foot is pending MRI findings.  Patient understands and agrees. We will plan for surgery tomorrow. Case has been discussed with infectious disease.    Thank you for the consultation and allowing me to participate in this patient's care. Please call with any additional questions or concerns.     Note is dictated utilizing voice recognition software. Unfortunately this leads to occasional typographical errors. I apologize in advance if the situation occurs. If questions occur please do not hesitate to call our office.

## 2020-01-20 NOTE — PROGRESS NOTES
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 Gregg is a 44 y.o. with a history of DM 1 and neuropathy presented to the [facility] ED on 1/18/2020 with a complaint of syncope, he passed out while he was in the shower today, states he \"went out and woke up in the tub\" Pt is also complaining of nausea, vomiting and fever x 3 days. Pt states he began having nausea and vomiting on Thursday, associated with low grade fever. Pt denies melena, hematochezia, diarrhea. Pt states he then began to have episodes of dizziness, even when lying down, he felt as if the room was spinning. Pt is complaining of head, neck and back pain s/p fall today. Pt has an open wound on his left 2nd toe, he is current with Dr. Souza and is finishing a round of doxycycline.      In the ED, CT head: negative for acute abnormality, CT spine: Normal, Xray of second toe on left foot: negative for fracture, chronic abnormal appearance of second and third MTP joints, soft tissue swelling. Glucose 520, K+4.2, BUN 19, Cr. 1.04, WBC 23.9, Hgb 13.4, Hct 38.5. Pt was given IV insulin 6 units, 500ml NS bolus IV, Zofran 4mg IV. Pt will be admitted for further evaluation and management.     Date:: 1/20/2020: Patient seen and examined and complains of right shoulder pain.  Patient was admitted because of the syncopal episode and at this time we do not know exactly why he passed out.  Patient landed on the right side and hurt his " "shoulder and arm during the x-ray and MRI of the right shoulder.  Patient also will be seen by orthopedics.        Review of Systems   All other systems reviewed and are negative.        Objective   Objective      Vital Signs  Temp:  [98.8 °F (37.1 °C)-99.4 °F (37.4 °C)] 98.8 °F (37.1 °C)  Heart Rate:  [] 86  Resp:  [16-20] 16  BP: (100-121)/(63-81) 121/81  Oxygen Therapy  SpO2: 97 %  Pulse Oximetry Type: Intermittent  Device (Oxygen Therapy): room air  Flowsheet Rows      First Filed Value   Admission Height  191.8 cm (75.5\") Documented at 01/18/2020 1854   Admission Weight  98 kg (216 lb 0.8 oz) Documented at 01/18/2020 1854        Intake & Output (last 3 days)       01/17 0701 - 01/18 0700 01/18 0701 - 01/19 0700 01/19 0701 - 01/20 0700 01/20 0701 - 01/21 0700    P.O.  1200 720     I.V. (mL/kg)   1270.8 (13.4)     IV Piggyback  500 600     Total Intake(mL/kg)  1700 (17.9) 2590.8 (27.3)     Urine (mL/kg/hr)  700 860 (0.4)     Total Output  700 860     Net  +1000 +1730.8                 Lines, Drains & Airways    Active LDAs     Name:   Placement date:   Placement time:   Site:   Days:    Peripheral IV 01/18/20 1926 Right Arm   01/18/20 1926    Arm   1                  Physical Exam:    Physical Exam   Constitutional: He is oriented to person, place, and time. He appears well-developed and well-nourished. No distress.   HENT:   Head: Normocephalic and atraumatic.   Nose: Nose normal.   Mouth/Throat: Oropharynx is clear and moist. No oropharyngeal exudate.   Eyes: Pupils are equal, round, and reactive to light. Conjunctivae and EOM are normal. Right eye exhibits no discharge. Left eye exhibits no discharge. No scleral icterus.   Neck: Normal range of motion. No JVD present. No tracheal deviation present. No thyromegaly present.   Cardiovascular: Normal rate, regular rhythm, normal heart sounds and intact distal pulses. Exam reveals no gallop and no friction rub.   No murmur heard.  Pulmonary/Chest: Effort " normal and breath sounds normal. No stridor. No respiratory distress. He has no wheezes. He has no rales. He exhibits no tenderness.   Abdominal: Soft. Bowel sounds are normal. He exhibits no distension and no mass. There is no tenderness. There is no rebound and no guarding. No hernia.   Musculoskeletal: He exhibits deformity. He exhibits no edema or tenderness.   Lymphadenopathy:     He has no cervical adenopathy.   Neurological: He is alert and oriented to person, place, and time. No cranial nerve deficit or sensory deficit. He exhibits normal muscle tone. Coordination normal.   Skin: Skin is warm and dry. No rash noted. He is not diaphoretic. No erythema.   Psychiatric: He has a normal mood and affect. His behavior is normal.   Nursing note and vitals reviewed.           Wounds (last 24 hours)      LDA Wound     Row Name 01/20/20 0710 01/19/20 1905          Wound 01/18/20 2244 Right heel Diabetic Ulcer    Wound - Properties Group Date first assessed: 01/18/20  - Time first assessed: 2244  - Present on Hospital Admission: Y  -SS Side: Right  -SS Location: heel  -SS Primary Wound Type: Diabetic ulc  -SS Stage, Pressure Injury: unstageable  -SS    Dressing Appearance  dry;intact  -SM  dry;intact  -VB     Closure  DORIS  -SM  DORIS  -VB       User Key  (r) = Recorded By, (t) = Taken By, (c) = Cosigned By    Initials Name Provider Type    VB Tamara Swartz, RN Registered Nurse    Bryanna Albarran LPN Licensed Nurse    Mae Harrell RN Registered Nurse          Procedures:              Results Review:     I reviewed the patient's new clinical results.      Lab Results (last 24 hours)     Procedure Component Value Units Date/Time    Wound Culture - Wound, Toe, Left [[id number]] Collected:  01/20/20 1342    Specimen:  Wound from Toe, Left Updated:  01/20/20 1350    Wound Culture - Wound, Foot, Right [[id number]] Collected:  01/19/20 1630    Specimen:  Wound from Foot, Right Updated:  01/20/20 1324     Wound  Culture Culture in progress     Gram Stain Many (4+) WBCs per low power field      Many (4+) Gram positive cocci in pairs, chains and clusters      Rare (1+) Gram negative cocci      Few (2+) Gram negative bacilli    POC Glucose Once [[id number]]  (Abnormal) Collected:  01/20/20 1250    Specimen:  Blood Updated:  01/20/20 1251     Glucose 161 mg/dL      Comment: Serial Number: [id number]:  [id number]       POC Glucose Once [[id number]]  (Abnormal) Collected:  01/18/20 1855    Specimen:  Blood Updated:  01/20/20 0932     Glucose 449 mg/dL      Comment: Serial Number: [id number]:  [id number]       POC Glucose Once [[id number]]  (Abnormal) Collected:  01/20/20 0719    Specimen:  Blood Updated:  01/20/20 0721     Glucose 174 mg/dL      Comment: Serial Number: [id number]:  [id number]       POC Glucose Once [[id number]]  (Abnormal) Collected:  01/19/20 2036    Specimen:  Blood Updated:  01/19/20 2038     Glucose 272 mg/dL      Comment: Serial Number: [id number]:  [id number]       Urine Drug Screen - Urine, Clean Catch [[id number]]  (Normal) Collected:  01/19/20 1703    Specimen:  Urine, Clean Catch Updated:  01/19/20 1842     Amphet/Methamphet, Screen Negative     Barbiturates Screen, Urine Negative     Benzodiazepine Screen, Urine Negative     Cocaine Screen, Urine Negative     Opiate Screen Negative     THC, Screen, Urine Negative     Methadone Screen, Urine Negative     Oxycodone Screen, Urine Negative    Narrative:       Negative Thresholds For Drugs Screened:     Amphetamines               500 ng/ml   Barbiturates               200 ng/ml   Benzodiazepines            100 ng/ml   Cocaine                    300 ng/ml   Methadone                  300 ng/ml   Opiates                    300 ng/ml   Oxycodone                  100 ng/ml   THC                        50 ng/ml    The Normal Value for all drugs tested is negative. This report includes final unconfirmed screening results to be used for medical  treatment purposes only. Unconfirmed results must not be used for non-medical purposes such as employment or legal testing. Clinical consideration should be applied to any drug of abuse test, particulary when unconfirmed results are used.  All urine drugs of abuse requests without chain of custody are for medical screening purposes only.  False positives are possible.      TSH [[id number]]  (Normal) Collected:  01/19/20 1633    Specimen:  Blood Updated:  01/19/20 1801     TSH 0.649 uIU/mL     POC Glucose Once [[id number]]  (Abnormal) Collected:  01/19/20 1630    Specimen:  Blood Updated:  01/19/20 1631     Glucose 193 mg/dL      Comment: Serial Number: [id number]:  [id number]       Respiratory Panel, PCR - Swab, Nasopharynx [[id number]]  (Normal) Collected:  01/19/20 1059    Specimen:  Swab from Nasopharynx Updated:  01/19/20 1545     ADENOVIRUS, PCR Not Detected     Coronavirus 229E Not Detected     Coronavirus HKU1 Not Detected     Coronavirus NL63 Not Detected     Coronavirus OC43 Not Detected     Human Metapneumovirus Not Detected     Human Rhinovirus/Enterovirus Not Detected     Influenza B PCR Not Detected     Parainfluenza Virus 1 Not Detected     Parainfluenza Virus 2 Not Detected     Parainfluenza Virus 3 Not Detected     Parainfluenza Virus 4 Not Detected     Bordetella pertussis pcr Not Detected     Influenza A H1 2009 PCR Not Detected     Chlamydophila pneumoniae PCR Not Detected     Mycoplasma pneumo by PCR Not Detected     Influenza A PCR Not Detected     Influenza A H3 Not Detected     Influenza A H1 Not Detected     RSV, PCR Not Detected        No results found for: HGBA1C            Lab Results   Component Value Date    LIPASE 10 (L) 01/19/2020     No results found for: CHOL, CHLPL, TRIG, HDL, LDL, LDLDIRECT    No results found for: INTRAOP, PREDX, FINALDX, COMDX    Microbiology Results (last 10 days)     Procedure Component Value - Date/Time    Wound Culture - Wound, Foot, Right [[id number]]  Collected:  01/19/20 1630    Lab Status:  Preliminary result Specimen:  Wound from Foot, Right Updated:  01/20/20 1324     Wound Culture Culture in progress     Gram Stain Many (4+) WBCs per low power field      Many (4+) Gram positive cocci in pairs, chains and clusters      Rare (1+) Gram negative cocci      Few (2+) Gram negative bacilli    Respiratory Panel, PCR - Swab, Nasopharynx [[id number]]  (Normal) Collected:  01/19/20 1059    Lab Status:  Final result Specimen:  Swab from Nasopharynx Updated:  01/19/20 1545     ADENOVIRUS, PCR Not Detected     Coronavirus 229E Not Detected     Coronavirus HKU1 Not Detected     Coronavirus NL63 Not Detected     Coronavirus OC43 Not Detected     Human Metapneumovirus Not Detected     Human Rhinovirus/Enterovirus Not Detected     Influenza B PCR Not Detected     Parainfluenza Virus 1 Not Detected     Parainfluenza Virus 2 Not Detected     Parainfluenza Virus 3 Not Detected     Parainfluenza Virus 4 Not Detected     Bordetella pertussis pcr Not Detected     Influenza A H1 2009 PCR Not Detected     Chlamydophila pneumoniae PCR Not Detected     Mycoplasma pneumo by PCR Not Detected     Influenza A PCR Not Detected     Influenza A H3 Not Detected     Influenza A H1 Not Detected     RSV, PCR Not Detected          ECG/EMG Results (most recent)     Procedure Component Value Units Date/Time    ECG 12 Lead [[id number]] Collected:  01/18/20 1906     Updated:  01/18/20 1907    Narrative:       HEART RATE= 105  bpm  RR Interval= 572  ms  CT Interval= 149  ms  P Horizontal Axis= 3  deg  P Front Axis= 48  deg  QRSD Interval= 79  ms  QT Interval= 328  ms  QRS Axis= 34  deg  T Wave Axis= 2  deg  - BORDERLINE ECG -  Sinus tachycardia  Borderline ST elevation, lateral leads  When compared with ECG of 04-Apr-2019 13:03:16,  Significant change in rhythm  Electronically Signed By:   Date and Time of Study: 2020-01-18 19:06:28    Adult Transthoracic Echo Complete W/ Cont if Necessary Per Protocol  [[id number]] Resulted:  01/20/20 1412     Updated:  01/20/20 1412     Target HR (85%) 150 bpm      Max. Pred. HR (100%) 176 bpm                     Xr Shoulder 2+ View Right    Result Date: 1/20/2020   1. No acute right shoulder findings. No significant right shoulder osteoarthritic change. 2. Suspected benign enchondroma or bone infarcts in the right humeral shaft.  Electronically Signed By-Dr. Yamileth Bourne MD On:1/20/2020 12:56 PM This report was finalized on [id number] by Dr. Yamileth Bourne MD.    Ct Head Without Contrast    Result Date: 1/18/2020  1.  Normal exam.  Electronically Signed By-Arabella Mendez On:1/18/2020 8:09 PM This report was finalized on [id number] by  Arabella Mendez, .    Ct Cervical Spine Without Contrast    Result Date: 1/18/2020  Normal CT cervical spine without contrast.  Electronically Signed By-Arabella Mendez On:1/18/2020 8:11 PM This report was finalized on [id number] by  Arabella Mendez .    Xr Toe 2+ View Left    Result Date: 1/18/2020   1. Negative for fracture. 2. Chronic abnormal appearance of second and third MTP joints. 3. Soft tissue swelling.  Electronically Signed By-Arabella Mendez On:1/18/2020 8:07 PM This report was finalized on [id number] by  Arabella Mendez .    Us Gallbladder    Result Date: 1/19/2020   1. Sludge in the gallbladder lumen. No evidence of gallbladder wall thickening or fluid in the gallbladder fossa. No evidence of dilatation of bile ducts. No evidence of ascites.  Electronically Signed By-DR. Mal Lott MD On:1/19/2020 5:50 PM This report was finalized on [id number] by DR. Mal Lott MD.    Xr Chest Pa & Lateral    Result Date: 1/19/2020   1. No active cardiopulmonary disease 2. Right PICC line is been removed since previous study of 04/07/2019 3. No significant change from 04/07/2019  Electronically Signed By-Hermilo Vaz Jr. On:1/19/2020 1:44 PM This report was finalized on [id number] by  Hermilo Vaz Jr., .          Xrays,  labs reviewed personally by physician.    Medication Review:   I have reviewed the patient's current medication list      Scheduled Meds    [START ON 1/21/2020] !Vancomycin Level Draw Needed  Does not apply Once   [START ON 1/21/2020] !Vancomycin Level Draw Needed  Does not apply Once   aspirin 325 mg Oral Daily   cefepime 2 g Intravenous Q8H   insulin glargine 12 Units Subcutaneous Nightly   insulin lispro 0-14 Units Subcutaneous 4x Daily With Meals & Nightly   sodium chloride 10 mL Intravenous Q12H   vancomycin 1,500 mg Intravenous Q12H       Meds Infusions    Pharmacy to dose vancomycin     sodium chloride 125 mL/hr Last Rate: 125 mL/hr (01/19/20 0721)       Meds PRN  acetaminophen **OR** acetaminophen **OR** acetaminophen  •  aluminum-magnesium hydroxide-simethicone  •  bisacodyl  •  dextrose  •  dextrose  •  glucagon (human recombinant)  •  insulin lispro **AND** insulin lispro  •  ketorolac  •  magnesium hydroxide  •  melatonin  •  ondansetron **OR** ondansetron  •  Pharmacy to dose vancomycin  •  sodium chloride    I personally reviewed patient's x-ray films and my findings are x-ray shoulder is negative and MRI pending    I personally reviewed patient's EKG strips and my findings are sinus tachycardia    Assessment/Plan   Assessment/Plan     Active Hospital Problems    Diagnosis  POA   • **Syncope [R55]  Yes   • Open wound of second toe of left foot [S91.105A]  Yes   • Acute cholecystitis [K81.0]  Unknown   • Diabetic peripheral neuropathy (CMS/HCC) [E11.42]  Yes   • Type 1 diabetes mellitus with circulatory complication (CMS/HCC) [E10.59]  Yes      Resolved Hospital Problems   No resolved problems to display.       MEDICAL DECISION MAKING COMPLEXITY BY PROBLEM:       Syncope, can be vasovagal from nausea and vomiting persistent in nature otherwise etiology  unclear to me, possible be secondary to dehydration.  -CT head negative  -Continue telemetry  -Echocardiogram done and pending  -We will check for CT PE  study as well as MRI of the brain    Right shoulder pain with very limited range of motion  -We will do MRI of the right shoulder  -Orthopedic consulted  -X-ray of the shoulder reviewed  -IV Toradol for pain control    Diabetic wound ulcer with some discharge and surrounding edema ....   - MRI foot right side pending  -  Consult Dr. Souza podiatry  - wound culture  - iv cefepime and vancomycin  - ID consult.     Diabetes type 1  -Continue Patient's lantus  -Accuchecks AC and HS  -Cover with sliding scale     Right upper quadrant abdominal pain: Resolved      VTE Prophylaxis - Lovenox 40 mg SC daily.      Code Status -   Code Status and Medical Interventions:   Ordered at: 01/18/20 2121     Level Of Support Discussed With:    Patient     Code Status:    CPR     Medical Interventions (Level of Support Prior to Arrest):    Full       Discharge Planning    Patient will need physical therapy and pain medications.      Destination      Coordination has not been started for this encounter.      Durable Medical Equipment      Coordination has not been started for this encounter.      Dialysis/Infusion      Coordination has not been started for this encounter.      Home Medical Care      Coordination has not been started for this encounter.      Therapy      Coordination has not been started for this encounter.      Community Resources      Coordination has not been started for this encounter.            Electronically signed by Christopher Ron MD, 01/20/20, 2:47 PM.  [facility] Hospitalist Team

## 2020-01-20 NOTE — PLAN OF CARE
Problem: Patient Care Overview  Goal: Interprofessional Rounds/Family Conf  Outcome: Ongoing (interventions implemented as appropriate)     Problem: Syncope (Adult)  Goal: Identify Related Risk Factors and Signs and Symptoms  Outcome: Ongoing (interventions implemented as appropriate)     Problem: Fall Risk (Adult)  Goal: Identify Related Risk Factors and Signs and Symptoms  Outcome: Ongoing (interventions implemented as appropriate)     Problem: Fall Risk (Adult)  Goal: Absence of Fall  Outcome: Ongoing (interventions implemented as appropriate)   Patient continues to have pain in right shoulder and right foot, MRI of both extremities done, orthopedic consult , will continue with current care plan and monitor.

## 2020-01-21 ENCOUNTER — ANESTHESIA (OUTPATIENT)
Dept: PERIOP | Facility: HOSPITAL | Age: 45
End: 2020-01-21

## 2020-01-21 ENCOUNTER — ANESTHESIA EVENT (OUTPATIENT)
Dept: PERIOP | Facility: HOSPITAL | Age: 45
End: 2020-01-21

## 2020-01-21 LAB
ALBUMIN SERPL-MCNC: 2.1 G/DL (ref 3.5–5.2)
ALBUMIN/GLOB SERPL: 0.5 G/DL
ALP SERPL-CCNC: 107 U/L (ref 39–117)
ALT SERPL W P-5'-P-CCNC: 41 U/L (ref 1–41)
ANION GAP SERPL CALCULATED.3IONS-SCNC: 7 MMOL/L (ref 5–15)
AST SERPL-CCNC: 28 U/L (ref 1–40)
BASOPHILS # BLD AUTO: 0 10*3/MM3 (ref 0–0.2)
BASOPHILS NFR BLD AUTO: 0.4 % (ref 0–1.5)
BH CV ECHO MEAS - ACS: 2.2 CM
BH CV ECHO MEAS - AO MAX PG (FULL): 0.11 MMHG
BH CV ECHO MEAS - AO MAX PG: 8.8 MMHG
BH CV ECHO MEAS - AO MEAN PG (FULL): 1.8 MMHG
BH CV ECHO MEAS - AO MEAN PG: 5.7 MMHG
BH CV ECHO MEAS - AO ROOT AREA (BSA CORRECTED): 1.2
BH CV ECHO MEAS - AO ROOT AREA: 5.5 CM^2
BH CV ECHO MEAS - AO ROOT DIAM: 2.6 CM
BH CV ECHO MEAS - AO V2 MAX: 148.3 CM/SEC
BH CV ECHO MEAS - AO V2 MEAN: 115.9 CM/SEC
BH CV ECHO MEAS - AO V2 VTI: 28.7 CM
BH CV ECHO MEAS - AORTIC HR: 92.2 BPM
BH CV ECHO MEAS - AORTIC R-R: 0.65 SEC
BH CV ECHO MEAS - ASC AORTA: 2.9 CM
BH CV ECHO MEAS - AVA(I,A): 3.3 CM^2
BH CV ECHO MEAS - AVA(I,D): 3.3 CM^2
BH CV ECHO MEAS - AVA(V,A): 3.6 CM^2
BH CV ECHO MEAS - AVA(V,D): 3.6 CM^2
BH CV ECHO MEAS - BSA(HAYCOCK): 2.2 M^2
BH CV ECHO MEAS - BSA: 2.2 M^2
BH CV ECHO MEAS - BZI_BMI: 26 KILOGRAMS/M^2
BH CV ECHO MEAS - BZI_METRIC_HEIGHT: 190.5 CM
BH CV ECHO MEAS - BZI_METRIC_WEIGHT: 94.3 KG
BH CV ECHO MEAS - CI(AO): 6.5 L/MIN/M^2
BH CV ECHO MEAS - CI(LVOT): 3.9 L/MIN/M^2
BH CV ECHO MEAS - CO(AO): 14.5 L/MIN
BH CV ECHO MEAS - CO(LVOT): 8.7 L/MIN
BH CV ECHO MEAS - EDV(CUBED): 49.1 ML
BH CV ECHO MEAS - EDV(MOD-SP2): 72.1 ML
BH CV ECHO MEAS - EDV(MOD-SP4): 89 ML
BH CV ECHO MEAS - EDV(TEICH): 56.7 ML
BH CV ECHO MEAS - EF(CUBED): 63.8 %
BH CV ECHO MEAS - EF(MOD-BP): 65 %
BH CV ECHO MEAS - EF(MOD-SP2): 59.9 %
BH CV ECHO MEAS - EF(MOD-SP4): 65.9 %
BH CV ECHO MEAS - EF(TEICH): 56.2 %
BH CV ECHO MEAS - ESV(CUBED): 17.8 ML
BH CV ECHO MEAS - ESV(MOD-SP2): 28.9 ML
BH CV ECHO MEAS - ESV(MOD-SP4): 30.3 ML
BH CV ECHO MEAS - ESV(TEICH): 24.8 ML
BH CV ECHO MEAS - FS: 28.7 %
BH CV ECHO MEAS - IVS/LVPW: 1.1
BH CV ECHO MEAS - IVSD: 1.3 CM
BH CV ECHO MEAS - LA DIMENSION(2D): 2.9 CM
BH CV ECHO MEAS - LV DIASTOLIC VOL/BSA (35-75): 39.9 ML/M^2
BH CV ECHO MEAS - LV MASS(C)D: 154.9 GRAMS
BH CV ECHO MEAS - LV MASS(C)DI: 69.4 GRAMS/M^2
BH CV ECHO MEAS - LV MAX PG: 8.7 MMHG
BH CV ECHO MEAS - LV MEAN PG: 3.9 MMHG
BH CV ECHO MEAS - LV SYSTOLIC VOL/BSA (12-30): 13.6 ML/M^2
BH CV ECHO MEAS - LV V1 MAX: 147.4 CM/SEC
BH CV ECHO MEAS - LV V1 MEAN: 90.2 CM/SEC
BH CV ECHO MEAS - LV V1 VTI: 25.7 CM
BH CV ECHO MEAS - LVIDD: 3.7 CM
BH CV ECHO MEAS - LVIDS: 2.6 CM
BH CV ECHO MEAS - LVOT AREA: 3.7 CM^2
BH CV ECHO MEAS - LVOT DIAM: 2.2 CM
BH CV ECHO MEAS - LVPWD: 1.2 CM
BH CV ECHO MEAS - MV A MAX VEL: 92.8 CM/SEC
BH CV ECHO MEAS - MV DEC SLOPE: 401.4 CM/SEC^2
BH CV ECHO MEAS - MV DEC TIME: 0.28 SEC
BH CV ECHO MEAS - MV E MAX VEL: 113.5 CM/SEC
BH CV ECHO MEAS - MV E/A: 1.2
BH CV ECHO MEAS - MV MAX PG: 6.2 MMHG
BH CV ECHO MEAS - MV MEAN PG: 3.3 MMHG
BH CV ECHO MEAS - MV V2 MAX: 124.8 CM/SEC
BH CV ECHO MEAS - MV V2 MEAN: 86.5 CM/SEC
BH CV ECHO MEAS - MV V2 VTI: 27.3 CM
BH CV ECHO MEAS - MVA(VTI): 3.4 CM^2
BH CV ECHO MEAS - PA ACC TIME: 0.12 SEC
BH CV ECHO MEAS - PA MAX PG (FULL): 1 MMHG
BH CV ECHO MEAS - PA MAX PG: 2.8 MMHG
BH CV ECHO MEAS - PA MEAN PG (FULL): 0.95 MMHG
BH CV ECHO MEAS - PA MEAN PG: 2 MMHG
BH CV ECHO MEAS - PA PR(ACCEL): 25.9 MMHG
BH CV ECHO MEAS - PA V2 MAX: 83.9 CM/SEC
BH CV ECHO MEAS - PA V2 MEAN: 68.9 CM/SEC
BH CV ECHO MEAS - PA V2 VTI: 18.4 CM
BH CV ECHO MEAS - PULM DIAS VEL: 49.8 CM/SEC
BH CV ECHO MEAS - PULM S/D: 1.1
BH CV ECHO MEAS - PULM SYS VEL: 55 CM/SEC
BH CV ECHO MEAS - PVA(I,A): 3.7 CM^2
BH CV ECHO MEAS - PVA(I,D): 3.7 CM^2
BH CV ECHO MEAS - PVA(V,A): 3.8 CM^2
BH CV ECHO MEAS - PVA(V,D): 3.8 CM^2
BH CV ECHO MEAS - QP/QS: 0.72
BH CV ECHO MEAS - RAP SYSTOLE: 3 MMHG
BH CV ECHO MEAS - RV MAX PG: 1.8 MMHG
BH CV ECHO MEAS - RV MEAN PG: 1 MMHG
BH CV ECHO MEAS - RV V1 MAX: 67.2 CM/SEC
BH CV ECHO MEAS - RV V1 MEAN: 47.8 CM/SEC
BH CV ECHO MEAS - RV V1 VTI: 14.4 CM
BH CV ECHO MEAS - RVDD: 2.8 CM
BH CV ECHO MEAS - RVOT AREA: 4.7 CM^2
BH CV ECHO MEAS - RVOT DIAM: 2.4 CM
BH CV ECHO MEAS - RVSP: 23.2 MMHG
BH CV ECHO MEAS - SI(AO): 70.4 ML/M^2
BH CV ECHO MEAS - SI(CUBED): 14.1 ML/M^2
BH CV ECHO MEAS - SI(LVOT): 42.1 ML/M^2
BH CV ECHO MEAS - SI(MOD-SP2): 19.4 ML/M^2
BH CV ECHO MEAS - SI(MOD-SP4): 26.3 ML/M^2
BH CV ECHO MEAS - SI(TEICH): 14.3 ML/M^2
BH CV ECHO MEAS - SV(AO): 157.2 ML
BH CV ECHO MEAS - SV(CUBED): 31.4 ML
BH CV ECHO MEAS - SV(LVOT): 94 ML
BH CV ECHO MEAS - SV(MOD-SP2): 43.2 ML
BH CV ECHO MEAS - SV(MOD-SP4): 58.7 ML
BH CV ECHO MEAS - SV(RVOT): 67.3 ML
BH CV ECHO MEAS - SV(TEICH): 31.9 ML
BH CV ECHO MEAS - TR MAX VEL: 224.6 CM/SEC
BILIRUB SERPL-MCNC: 1.9 MG/DL (ref 0.2–1.2)
BUN BLD-MCNC: 21 MG/DL (ref 6–20)
BUN/CREAT SERPL: 29.2 (ref 7–25)
CALCIUM SPEC-SCNC: 7.9 MG/DL (ref 8.6–10.5)
CHLORIDE SERPL-SCNC: 96 MMOL/L (ref 98–107)
CHOLEST SERPL-MCNC: 82 MG/DL (ref 0–200)
CK SERPL-CCNC: 14 U/L (ref 20–200)
CO2 SERPL-SCNC: 29 MMOL/L (ref 22–29)
CREAT BLD-MCNC: 0.72 MG/DL (ref 0.76–1.27)
CRP SERPL-MCNC: 18.76 MG/DL (ref 0–0.5)
DEPRECATED RDW RBC AUTO: 41.1 FL (ref 37–54)
EOSINOPHIL # BLD AUTO: 0 10*3/MM3 (ref 0–0.4)
EOSINOPHIL NFR BLD AUTO: 0.4 % (ref 0.3–6.2)
ERYTHROCYTE [DISTWIDTH] IN BLOOD BY AUTOMATED COUNT: 12.4 % (ref 12.3–15.4)
ERYTHROCYTE [SEDIMENTATION RATE] IN BLOOD: 87 MM/HR (ref 0–15)
FERRITIN SERPL-MCNC: 856.3 NG/ML (ref 30–400)
GFR SERPL CREATININE-BSD FRML MDRD: 119 ML/MIN/1.73
GLOBULIN UR ELPH-MCNC: 4.2 GM/DL
GLUCOSE BLD-MCNC: 184 MG/DL (ref 65–99)
GLUCOSE BLDC GLUCOMTR-MCNC: 130 MG/DL (ref 70–105)
GLUCOSE BLDC GLUCOMTR-MCNC: 142 MG/DL (ref 70–105)
GLUCOSE BLDC GLUCOMTR-MCNC: 157 MG/DL (ref 70–105)
GLUCOSE BLDC GLUCOMTR-MCNC: 167 MG/DL (ref 70–105)
GLUCOSE BLDC GLUCOMTR-MCNC: 180 MG/DL (ref 70–105)
HBA1C MFR BLD: 10.5 % (ref 3.5–5.6)
HCT VFR BLD AUTO: 35.2 % (ref 37.5–51)
HDLC SERPL-MCNC: 15 MG/DL (ref 40–60)
HGB BLD-MCNC: 12.5 G/DL (ref 13–17.7)
LDLC SERPL CALC-MCNC: 37 MG/DL (ref 0–100)
LDLC/HDLC SERPL: 2.49 {RATIO}
LYMPHOCYTES # BLD AUTO: 1.4 10*3/MM3 (ref 0.7–3.1)
LYMPHOCYTES NFR BLD AUTO: 15.9 % (ref 19.6–45.3)
MAGNESIUM SERPL-MCNC: 2.1 MG/DL (ref 1.6–2.6)
MCH RBC QN AUTO: 33.6 PG (ref 26.6–33)
MCHC RBC AUTO-ENTMCNC: 35.5 G/DL (ref 31.5–35.7)
MCV RBC AUTO: 94.6 FL (ref 79–97)
MONOCYTES # BLD AUTO: 1.1 10*3/MM3 (ref 0.1–0.9)
MONOCYTES NFR BLD AUTO: 12.5 % (ref 5–12)
MRSA DNA SPEC QL NAA+PROBE: NORMAL
NEUTROPHILS # BLD AUTO: 6.2 10*3/MM3 (ref 1.7–7)
NEUTROPHILS NFR BLD AUTO: 70.8 % (ref 42.7–76)
NRBC BLD AUTO-RTO: 0 /100 WBC (ref 0–0.2)
PHOSPHATE SERPL-MCNC: 2.6 MG/DL (ref 2.5–4.5)
PLATELET # BLD AUTO: 194 10*3/MM3 (ref 140–450)
PMV BLD AUTO: 8.6 FL (ref 6–12)
POTASSIUM BLD-SCNC: 3.2 MMOL/L (ref 3.5–5.2)
POTASSIUM BLD-SCNC: 3.5 MMOL/L (ref 3.5–5.2)
PROT SERPL-MCNC: 6.3 G/DL (ref 6–8.5)
RBC # BLD AUTO: 3.73 10*6/MM3 (ref 4.14–5.8)
SODIUM BLD-SCNC: 132 MMOL/L (ref 136–145)
TRIGL SERPL-MCNC: 148 MG/DL (ref 0–150)
TROPONIN T SERPL-MCNC: <0.01 NG/ML (ref 0–0.03)
TSH SERPL DL<=0.05 MIU/L-ACNC: 1.22 UIU/ML (ref 0.27–4.2)
URATE SERPL-MCNC: 3.9 MG/DL (ref 3.4–7)
VANCOMYCIN PEAK SERPL-MCNC: 22.6 MCG/ML (ref 20–40)
VANCOMYCIN TROUGH SERPL-MCNC: 8.2 MCG/ML (ref 5–20)
VIT B12 BLD-MCNC: 988 PG/ML (ref 211–946)
VLDLC SERPL-MCNC: 29.6 MG/DL
WBC NRBC COR # BLD: 8.8 10*3/MM3 (ref 3.4–10.8)

## 2020-01-21 PROCEDURE — 87205 SMEAR GRAM STAIN: CPT | Performed by: PODIATRIST

## 2020-01-21 PROCEDURE — 25010000002 KETOROLAC TROMETHAMINE PER 15 MG: Performed by: INTERNAL MEDICINE

## 2020-01-21 PROCEDURE — 83036 HEMOGLOBIN GLYCOSYLATED A1C: CPT | Performed by: INTERNAL MEDICINE

## 2020-01-21 PROCEDURE — 82962 GLUCOSE BLOOD TEST: CPT

## 2020-01-21 PROCEDURE — 88305 TISSUE EXAM BY PATHOLOGIST: CPT | Performed by: PODIATRIST

## 2020-01-21 PROCEDURE — 88311 DECALCIFY TISSUE: CPT | Performed by: PODIATRIST

## 2020-01-21 PROCEDURE — 84100 ASSAY OF PHOSPHORUS: CPT | Performed by: INTERNAL MEDICINE

## 2020-01-21 PROCEDURE — 25010000002 ONDANSETRON PER 1 MG: Performed by: NURSE ANESTHETIST, CERTIFIED REGISTERED

## 2020-01-21 PROCEDURE — 0MBS0ZZ EXCISION OF RIGHT FOOT BURSA AND LIGAMENT, OPEN APPROACH: ICD-10-PCS | Performed by: PODIATRIST

## 2020-01-21 PROCEDURE — 82728 ASSAY OF FERRITIN: CPT | Performed by: INTERNAL MEDICINE

## 2020-01-21 PROCEDURE — 99254 IP/OBS CNSLTJ NEW/EST MOD 60: CPT | Performed by: INTERNAL MEDICINE

## 2020-01-21 PROCEDURE — 93306 TTE W/DOPPLER COMPLETE: CPT | Performed by: INTERNAL MEDICINE

## 2020-01-21 PROCEDURE — 85025 COMPLETE CBC W/AUTO DIFF WBC: CPT | Performed by: INTERNAL MEDICINE

## 2020-01-21 PROCEDURE — 25010000002 MIDAZOLAM PER 1 MG: Performed by: NURSE ANESTHETIST, CERTIFIED REGISTERED

## 2020-01-21 PROCEDURE — 25010000002 CEFEPIME PER 500 MG: Performed by: PODIATRIST

## 2020-01-21 PROCEDURE — 84550 ASSAY OF BLOOD/URIC ACID: CPT | Performed by: INTERNAL MEDICINE

## 2020-01-21 PROCEDURE — 25010000002 KETOROLAC TROMETHAMINE PER 15 MG: Performed by: PODIATRIST

## 2020-01-21 PROCEDURE — 63710000001 INSULIN GLARGINE PER 5 UNITS: Performed by: PODIATRIST

## 2020-01-21 PROCEDURE — 25010000002 VANCOMYCIN 10 G RECONSTITUTED SOLUTION: Performed by: HOSPITALIST

## 2020-01-21 PROCEDURE — 84132 ASSAY OF SERUM POTASSIUM: CPT | Performed by: INTERNAL MEDICINE

## 2020-01-21 PROCEDURE — 25010000002 VANCOMYCIN 10 G RECONSTITUTED SOLUTION: Performed by: PODIATRIST

## 2020-01-21 PROCEDURE — 3E0U33Z INTRODUCTION OF ANTI-INFLAMMATORY INTO JOINTS, PERCUTANEOUS APPROACH: ICD-10-PCS | Performed by: PODIATRIST

## 2020-01-21 PROCEDURE — 87176 TISSUE HOMOGENIZATION CULTR: CPT | Performed by: PODIATRIST

## 2020-01-21 PROCEDURE — 84443 ASSAY THYROID STIM HORMONE: CPT | Performed by: INTERNAL MEDICINE

## 2020-01-21 PROCEDURE — 87070 CULTURE OTHR SPECIMN AEROBIC: CPT | Performed by: PODIATRIST

## 2020-01-21 PROCEDURE — 0QBN0ZZ EXCISION OF RIGHT METATARSAL, OPEN APPROACH: ICD-10-PCS | Performed by: PODIATRIST

## 2020-01-21 PROCEDURE — 25010000002 FENTANYL CITRATE (PF) 100 MCG/2ML SOLUTION: Performed by: NURSE ANESTHETIST, CERTIFIED REGISTERED

## 2020-01-21 PROCEDURE — 86140 C-REACTIVE PROTEIN: CPT | Performed by: INTERNAL MEDICINE

## 2020-01-21 PROCEDURE — 82550 ASSAY OF CK (CPK): CPT | Performed by: INTERNAL MEDICINE

## 2020-01-21 PROCEDURE — 82607 VITAMIN B-12: CPT | Performed by: INTERNAL MEDICINE

## 2020-01-21 PROCEDURE — 85652 RBC SED RATE AUTOMATED: CPT | Performed by: INTERNAL MEDICINE

## 2020-01-21 PROCEDURE — 63710000001 INSULIN LISPRO (HUMAN) PER 5 UNITS: Performed by: NURSE PRACTITIONER

## 2020-01-21 PROCEDURE — 28820 AMPUTATION OF TOE: CPT | Performed by: PODIATRIST

## 2020-01-21 PROCEDURE — 80061 LIPID PANEL: CPT | Performed by: INTERNAL MEDICINE

## 2020-01-21 PROCEDURE — 87075 CULTR BACTERIA EXCEPT BLOOD: CPT | Performed by: PODIATRIST

## 2020-01-21 PROCEDURE — 84484 ASSAY OF TROPONIN QUANT: CPT | Performed by: INTERNAL MEDICINE

## 2020-01-21 PROCEDURE — 0L9W0ZX DRAINAGE OF LEFT FOOT TENDON, OPEN APPROACH, DIAGNOSTIC: ICD-10-PCS | Performed by: PODIATRIST

## 2020-01-21 PROCEDURE — 25010000002 TRIAMCINOLONE PER 10 MG: Performed by: PODIATRIST

## 2020-01-21 PROCEDURE — 3E0U3BZ INTRODUCTION OF ANESTHETIC AGENT INTO JOINTS, PERCUTANEOUS APPROACH: ICD-10-PCS | Performed by: PODIATRIST

## 2020-01-21 PROCEDURE — 87015 SPECIMEN INFECT AGNT CONCNTJ: CPT | Performed by: PODIATRIST

## 2020-01-21 PROCEDURE — 25010000002 CEFEPIME PER 500 MG: Performed by: HOSPITALIST

## 2020-01-21 PROCEDURE — 80202 ASSAY OF VANCOMYCIN: CPT | Performed by: HOSPITALIST

## 2020-01-21 PROCEDURE — 28315 REMOVAL OF SESAMOID BONE: CPT | Performed by: PODIATRIST

## 2020-01-21 PROCEDURE — 25010000002 PROPOFOL 10 MG/ML EMULSION: Performed by: NURSE ANESTHETIST, CERTIFIED REGISTERED

## 2020-01-21 PROCEDURE — 0Y6S0Z0 DETACHMENT AT LEFT 2ND TOE, COMPLETE, OPEN APPROACH: ICD-10-PCS | Performed by: PODIATRIST

## 2020-01-21 PROCEDURE — 87147 CULTURE TYPE IMMUNOLOGIC: CPT | Performed by: PODIATRIST

## 2020-01-21 PROCEDURE — 11043 DBRDMT MUSC&/FSCA 1ST 20/<: CPT | Performed by: PODIATRIST

## 2020-01-21 PROCEDURE — 80053 COMPREHEN METABOLIC PANEL: CPT | Performed by: INTERNAL MEDICINE

## 2020-01-21 PROCEDURE — 83735 ASSAY OF MAGNESIUM: CPT | Performed by: INTERNAL MEDICINE

## 2020-01-21 RX ORDER — PROMETHAZINE HYDROCHLORIDE 25 MG/ML
12.5 INJECTION, SOLUTION INTRAMUSCULAR; INTRAVENOUS ONCE AS NEEDED
Status: DISCONTINUED | OUTPATIENT
Start: 2020-01-21 | End: 2020-01-21 | Stop reason: HOSPADM

## 2020-01-21 RX ORDER — LIDOCAINE HYDROCHLORIDE 20 MG/ML
INJECTION, SOLUTION EPIDURAL; INFILTRATION; INTRACAUDAL; PERINEURAL AS NEEDED
Status: DISCONTINUED | OUTPATIENT
Start: 2020-01-21 | End: 2020-01-21 | Stop reason: SURG

## 2020-01-21 RX ORDER — HYDRALAZINE HYDROCHLORIDE 20 MG/ML
5 INJECTION INTRAMUSCULAR; INTRAVENOUS
Status: DISCONTINUED | OUTPATIENT
Start: 2020-01-21 | End: 2020-01-21 | Stop reason: HOSPADM

## 2020-01-21 RX ORDER — POTASSIUM CHLORIDE 1.5 G/1.77G
40 POWDER, FOR SOLUTION ORAL AS NEEDED
Status: DISCONTINUED | OUTPATIENT
Start: 2020-01-21 | End: 2020-01-24 | Stop reason: HOSPADM

## 2020-01-21 RX ORDER — FENTANYL CITRATE 50 UG/ML
25 INJECTION, SOLUTION INTRAMUSCULAR; INTRAVENOUS
Status: DISCONTINUED | OUTPATIENT
Start: 2020-01-21 | End: 2020-01-21 | Stop reason: HOSPADM

## 2020-01-21 RX ORDER — EPHEDRINE SULFATE 50 MG/ML
5 INJECTION, SOLUTION INTRAVENOUS ONCE AS NEEDED
Status: DISCONTINUED | OUTPATIENT
Start: 2020-01-21 | End: 2020-01-21 | Stop reason: HOSPADM

## 2020-01-21 RX ORDER — MIDAZOLAM HYDROCHLORIDE 1 MG/ML
INJECTION INTRAMUSCULAR; INTRAVENOUS AS NEEDED
Status: DISCONTINUED | OUTPATIENT
Start: 2020-01-21 | End: 2020-01-21 | Stop reason: SURG

## 2020-01-21 RX ORDER — PROMETHAZINE HYDROCHLORIDE 25 MG/1
25 TABLET ORAL ONCE AS NEEDED
Status: DISCONTINUED | OUTPATIENT
Start: 2020-01-21 | End: 2020-01-21 | Stop reason: SDUPTHER

## 2020-01-21 RX ORDER — PROMETHAZINE HYDROCHLORIDE 25 MG/1
25 SUPPOSITORY RECTAL ONCE AS NEEDED
Status: DISCONTINUED | OUTPATIENT
Start: 2020-01-21 | End: 2020-01-21 | Stop reason: SDUPTHER

## 2020-01-21 RX ORDER — FENTANYL CITRATE 50 UG/ML
INJECTION, SOLUTION INTRAMUSCULAR; INTRAVENOUS AS NEEDED
Status: DISCONTINUED | OUTPATIENT
Start: 2020-01-21 | End: 2020-01-21 | Stop reason: SURG

## 2020-01-21 RX ORDER — PROMETHAZINE HYDROCHLORIDE 25 MG/1
25 SUPPOSITORY RECTAL ONCE AS NEEDED
Status: DISCONTINUED | OUTPATIENT
Start: 2020-01-21 | End: 2020-01-21 | Stop reason: HOSPADM

## 2020-01-21 RX ORDER — PROMETHAZINE HYDROCHLORIDE 25 MG/ML
6.25 INJECTION, SOLUTION INTRAMUSCULAR; INTRAVENOUS ONCE AS NEEDED
Status: DISCONTINUED | OUTPATIENT
Start: 2020-01-21 | End: 2020-01-21 | Stop reason: HOSPADM

## 2020-01-21 RX ORDER — ONDANSETRON 2 MG/ML
4 INJECTION INTRAMUSCULAR; INTRAVENOUS ONCE AS NEEDED
Status: DISCONTINUED | OUTPATIENT
Start: 2020-01-21 | End: 2020-01-21 | Stop reason: HOSPADM

## 2020-01-21 RX ORDER — MEPERIDINE HYDROCHLORIDE 25 MG/ML
12.5 INJECTION INTRAMUSCULAR; INTRAVENOUS; SUBCUTANEOUS
Status: DISCONTINUED | OUTPATIENT
Start: 2020-01-21 | End: 2020-01-21 | Stop reason: HOSPADM

## 2020-01-21 RX ORDER — ONDANSETRON 2 MG/ML
INJECTION INTRAMUSCULAR; INTRAVENOUS AS NEEDED
Status: DISCONTINUED | OUTPATIENT
Start: 2020-01-21 | End: 2020-01-21 | Stop reason: SURG

## 2020-01-21 RX ORDER — BACITRACIN 50000 [IU]/1
INJECTION, POWDER, FOR SOLUTION INTRAMUSCULAR AS NEEDED
Status: DISCONTINUED | OUTPATIENT
Start: 2020-01-21 | End: 2020-01-21 | Stop reason: HOSPADM

## 2020-01-21 RX ORDER — PHENYLEPHRINE HCL IN 0.9% NACL 0.5 MG/5ML
.5-3 SYRINGE (ML) INTRAVENOUS
Status: DISCONTINUED | OUTPATIENT
Start: 2020-01-21 | End: 2020-01-21 | Stop reason: HOSPADM

## 2020-01-21 RX ORDER — IPRATROPIUM BROMIDE AND ALBUTEROL SULFATE 2.5; .5 MG/3ML; MG/3ML
3 SOLUTION RESPIRATORY (INHALATION) ONCE AS NEEDED
Status: DISCONTINUED | OUTPATIENT
Start: 2020-01-21 | End: 2020-01-21 | Stop reason: HOSPADM

## 2020-01-21 RX ORDER — LABETALOL HYDROCHLORIDE 5 MG/ML
5 INJECTION, SOLUTION INTRAVENOUS
Status: DISCONTINUED | OUTPATIENT
Start: 2020-01-21 | End: 2020-01-21 | Stop reason: HOSPADM

## 2020-01-21 RX ORDER — ACETAMINOPHEN 650 MG
TABLET, EXTENDED RELEASE ORAL AS NEEDED
Status: DISCONTINUED | OUTPATIENT
Start: 2020-01-21 | End: 2020-01-21 | Stop reason: HOSPADM

## 2020-01-21 RX ORDER — POTASSIUM CHLORIDE 7.45 MG/ML
10 INJECTION INTRAVENOUS
Status: DISCONTINUED | OUTPATIENT
Start: 2020-01-21 | End: 2020-01-24 | Stop reason: HOSPADM

## 2020-01-21 RX ORDER — PROPOFOL 10 MG/ML
VIAL (ML) INTRAVENOUS AS NEEDED
Status: DISCONTINUED | OUTPATIENT
Start: 2020-01-21 | End: 2020-01-21 | Stop reason: SURG

## 2020-01-21 RX ORDER — POTASSIUM CHLORIDE 20 MEQ/1
40 TABLET, EXTENDED RELEASE ORAL AS NEEDED
Status: DISCONTINUED | OUTPATIENT
Start: 2020-01-21 | End: 2020-01-24 | Stop reason: HOSPADM

## 2020-01-21 RX ORDER — PROMETHAZINE HYDROCHLORIDE 25 MG/1
25 TABLET ORAL ONCE AS NEEDED
Status: DISCONTINUED | OUTPATIENT
Start: 2020-01-21 | End: 2020-01-21 | Stop reason: HOSPADM

## 2020-01-21 RX ADMIN — METRONIDAZOLE 500 MG: 500 INJECTION, SOLUTION INTRAVENOUS at 01:00

## 2020-01-21 RX ADMIN — Medication 10 ML: at 20:03

## 2020-01-21 RX ADMIN — KETOROLAC TROMETHAMINE 30 MG: 30 INJECTION, SOLUTION INTRAMUSCULAR at 04:15

## 2020-01-21 RX ADMIN — METRONIDAZOLE 500 MG: 500 INJECTION, SOLUTION INTRAVENOUS at 20:02

## 2020-01-21 RX ADMIN — LIDOCAINE HYDROCHLORIDE 100 MG: 20 INJECTION, SOLUTION EPIDURAL; INFILTRATION; INTRACAUDAL; PERINEURAL at 12:09

## 2020-01-21 RX ADMIN — FENTANYL CITRATE 100 MCG: 50 INJECTION, SOLUTION INTRAMUSCULAR; INTRAVENOUS at 12:58

## 2020-01-21 RX ADMIN — Medication: at 00:05

## 2020-01-21 RX ADMIN — FENTANYL CITRATE 100 MCG: 50 INJECTION, SOLUTION INTRAMUSCULAR; INTRAVENOUS at 12:05

## 2020-01-21 RX ADMIN — POTASSIUM CHLORIDE 40 MEQ: 1500 TABLET, EXTENDED RELEASE ORAL at 09:54

## 2020-01-21 RX ADMIN — VANCOMYCIN HYDROCHLORIDE 1500 MG: 10 INJECTION, POWDER, LYOPHILIZED, FOR SOLUTION INTRAVENOUS at 20:54

## 2020-01-21 RX ADMIN — CEFEPIME 2 G: 2 INJECTION, POWDER, FOR SOLUTION INTRAVENOUS at 16:45

## 2020-01-21 RX ADMIN — MIDAZOLAM 2 MG: 1 INJECTION INTRAMUSCULAR; INTRAVENOUS at 12:05

## 2020-01-21 RX ADMIN — METRONIDAZOLE 500 MG: 500 INJECTION, SOLUTION INTRAVENOUS at 08:16

## 2020-01-21 RX ADMIN — VANCOMYCIN HYDROCHLORIDE 1500 MG: 10 INJECTION, POWDER, LYOPHILIZED, FOR SOLUTION INTRAVENOUS at 09:34

## 2020-01-21 RX ADMIN — INSULIN LISPRO 3 UNITS: 100 INJECTION, SOLUTION INTRAVENOUS; SUBCUTANEOUS at 08:16

## 2020-01-21 RX ADMIN — Medication 10 ML: at 09:00

## 2020-01-21 RX ADMIN — INSULIN GLARGINE 12 UNITS: 100 INJECTION, SOLUTION SUBCUTANEOUS at 20:02

## 2020-01-21 RX ADMIN — Medication: at 06:14

## 2020-01-21 RX ADMIN — ONDANSETRON 4 MG: 2 INJECTION INTRAMUSCULAR; INTRAVENOUS at 13:08

## 2020-01-21 RX ADMIN — CEFEPIME 2 G: 2 INJECTION, POWDER, FOR SOLUTION INTRAVENOUS at 06:13

## 2020-01-21 RX ADMIN — TRIAMCINOLONE ACETONIDE 40 MG: 40 INJECTION, SUSPENSION INTRA-ARTICULAR; INTRAMUSCULAR at 17:55

## 2020-01-21 RX ADMIN — KETOROLAC TROMETHAMINE 30 MG: 30 INJECTION, SOLUTION INTRAMUSCULAR at 20:02

## 2020-01-21 RX ADMIN — PROPOFOL 200 MG: 10 INJECTION, EMULSION INTRAVENOUS at 12:09

## 2020-01-21 RX ADMIN — BUPIVACAINE HYDROCHLORIDE 4 ML: 2.5 INJECTION, SOLUTION EPIDURAL; INFILTRATION; INTRACAUDAL at 17:55

## 2020-01-21 NOTE — PROGRESS NOTES
Infectious Diseases Progress Note      LOS: 2 days   Patient Care Team:  Erwin Castorena MD as PCP - General (Family Medicine)    Chief Complaint:  Fatigue,  right shoulder pain    Subjective       The patient has been afebrile for the last 24 hours.  The patient is on room air, hemodynamically stable, and is tolerating antimicrobial therapy.  Patient one bout of emesis this morning before surgery.  He is just back from surgery on both feet.      Review of Systems:   Review of Systems   Constitutional: Positive for fatigue.   Respiratory: Negative.    Cardiovascular: Negative.    Gastrointestinal: Positive for vomiting.   Genitourinary: Negative.    Musculoskeletal: Positive for arthralgias and back pain.        Right shoulder pain   Skin: Positive for wound.   Neurological: Positive for dizziness and headaches.   Psychiatric/Behavioral: Negative.    All other systems reviewed and are negative.       Objective     Vital Signs  Temp:  [97.6 °F (36.4 °C)-99.4 °F (37.4 °C)] 97.6 °F (36.4 °C)  Heart Rate:  [73-90] 77  Resp:  [11-18] 12  BP: ()/(57-80) 125/76    Physical Exam:  Physical Exam   Constitutional: He is oriented to person, place, and time. He appears well-developed and well-nourished.   HENT:   Head: Normocephalic and atraumatic.   Eyes: Pupils are equal, round, and reactive to light. Conjunctivae and EOM are normal.   Neck: Neck supple.   Cardiovascular: Normal rate, regular rhythm and normal heart sounds.   Pulmonary/Chest: Effort normal and breath sounds normal.   Abdominal: Soft. Bowel sounds are normal.   Musculoskeletal: Normal range of motion.   Neurological: He is alert and oriented to person, place, and time.   Skin: Skin is warm and dry.   Surgical dressing on both feet   Psychiatric: He has a normal mood and affect.   Vitals reviewed.       Results Review:    I have reviewed all clinical data, test, lab, and imaging results.     Radiology  Ct Chest Pulmonary Embolism    Result Date:  1/20/2020   DATE OF EXAM: 1/20/2020 5:20 PM  PROCEDURE: CT CHEST PULMONARY EMBOLISM-  INDICATIONS: Chest pain, acute, PE suspected, high pretest prob; R55-Syncope and collapse; S06.8A9Y-Psygbkcofh without loss of consciousness, initial encounter; R73.9-Hyperglycemia, unspecified; R11.15-Cyclical vomiting syndrome unrelated to migraine; S91.105A-Unspecified open wound of left lesser toe(s) without damage to nail, initial encounter; L97.514-Non-pressure chronic ulcer of other part of right foot chest pain today. Evaluate for pulmonary embolism today.  COMPARISON: No Comparisons Available  TECHNIQUE: Routine transaxial slices were obtained through chest after administration of intravenous 100 ml of Isovue 370. Reconstructed coronal and sagittal images were also obtained. Automated exposure control and iterative reconstruction methods were used.  FINDINGS: CT the chest with contrast reveals no evidence of mass or adenopathy in the base the neck or in the periclavicular soft tissues or the axillary soft tissues. No evidence of mass or adenopathy in the mediastinum or in the right or left pulmonary orquidea. The heart size normal. No evidence pericardial effusion. No evidence of hiatal hernia. There is focal increased density in the right and left breast in the right left anterior chest wall consistent with gynecomastia. No acute disease in the superior aspect of the abdomen. No evidence of thrombus or embolus in the right or left pulmonary artery branches. There are very mild infiltrates in the right lower lobe and left lower lobe along of uncertain etiology.       1. No evidence of thrombus or embolus in the right or left pulmonary artery branches. 2. Questionable very mild diffuse infiltrates throughout the right lower lobe and left lower lobe along might be caused by atelectasis or vascular congestion. 3. No focal abnormality seen in the right or left lungs.  Electronically Signed By-DR. Mal Lott MD  On:1/20/2020 5:42 PM This report was finalized on [id number] by DR. Mal Lott MD.    Mri Foot Left With & Without Contrast    Result Date: 1/20/2020   DATE OF EXAM: 1/20/2020 5:30 PM  PROCEDURE: MRI FOOT LEFT W WO CONTRAST-  INDICATIONS: Osteomyelitis suspected, foot swelling, diabetic; R55-Syncope and collapse; S06.0M5T-Wommobvdbz without loss of consciousness, initial encounter; R73.9-Hyperglycemia, unspecified; R11.15-Cyclical vomiting syndrome unrelated to migraine; S91.105A-Unspecified open wound of left lesser toe(s) without damage to nail, initial encounter; L97.514-Non-pressure chronic ulcer of other part of right foot wi diabetic patient with previous left foot surgery redness and swelling in the left mid and forefoot today. Evaluate for osteomyelitis. Pl  COMPARISON: Plain film images left foot performed on 08/21/2019 MRI left foot performed on 04/04/2019  TECHNIQUE: Prior to and after the administration of 20 mL of ProHance intravenous contrast, multiplanar/multisequence images of the left foot were performed according to routine MRI protocol.  FINDINGS: MRI left foot without and with contrast reveals that the patient is status post amputation of the distal proximal phalanges of the left first toe the patient is status post amputation of distal and middle proximal phalanges of the left fourth toe. The patient is status post surgical resection of the base of the left fifth metatarsal there are small tiny areas of blooming metallic artifact in the soft tissues overlying the lateral aspect of the left foot there is increased T2-weighted signal in the bone marrow of the distal middle proximal phalanges of the left second toe and in the distal head of the left second metatarsal and this is probably caused by fracture and less likely osteomyelitis. There is deformity of the distal head of the left third metatarsal and this is consistent with old fracture.  inflammation and edema can be seen in  the subcutaneous fat soft tissues overlying the dorsal aspect of the left forefoot arthritis is seen in the right first and second and third cuneiform bones and in the left navicular bone. There are several small focal fluid collections in the soft tissues overlying the dorsal aspect of the left midfoot. Largest fluid collection overlies the left first cuneiform bone and measures 2.2 cm x 8.7 mm size. These have uncertain etiology and might be caused by cellulitis or soft tissue infection. Smaller focal fluid collections overlie the dorsal aspect of the left second and third cuneiform bone and overlie the dorsal aspect of the proximal left fourth metatarsal base 1 small focal fluid collection overlie the dorsal aspect of the joint between the left navicular bone in the left cuboid bone. 1 small fluid collection can be seen lying between the bases of the left second and third metatarsal bones there is inflammation and edema and contrast enhancement in the bone marrow in the base of the left second and third and fourth metatarsals and in the anterior aspect of the left cuboid bone that I believe is caused by a Charcot joint and less likely caused by osteomyelitis      Diffuse inflammation and edema in the soft tissues surrounding the dorsal plantar aspect of the left mid and left forefoot. 2. There are several small focal loculated fluid collections overlying the dorsal aspect of the left first and second and third cuneiform bones and overlying the dorsal aspect of the left navicular bone and left cuboid bone in a small fluid collection can be seen lying between the bases of the left second and third metatarsal bases. These fluid collections have uncertain etiology and might be sterile or caused by numerous small abscesses in the soft tissues. Clinical correlation would BE helpful. 3. Signal abnormality in the bone marrow in the distal metaphysis and head of the left second metatarsal and in the bone marrow in the  distal middle and proximal phalanges of the left second toe probably caused by stress fracture and less likely caused by osteomyelitis. 4. Deformity of the distal metaphysis and head of the left third metatarsal most consistent with trauma and fracture. 5. Mild deformity of the left first second and third cuneiform bones in the left navicular bone and left cuboid bone with mild inflammation and edema in the bone marrow of these bones most consistent with arthritis and Charcot joint and less likely caused by osteomyelitis.  Electronically Signed By-DR. Mal Lott MD On:1/20/2020 9:23 PM This report was finalized on [id number] by DR. Mal Lott MD.      Cardiology    Laboratory  Results from last 7 days   Lab Units 01/21/20  0824   WBC 10*3/mm3 8.80   HEMOGLOBIN g/dL 12.5*   HEMATOCRIT % 35.2*   PLATELETS 10*3/mm3 194     Results from last 7 days   Lab Units 01/21/20  0823   SODIUM mmol/L 132*   POTASSIUM mmol/L 3.5  3.2*   CHLORIDE mmol/L 96*   CO2 mmol/L 29.0   BUN mg/dL 21*   CREATININE mg/dL 0.72*   GLUCOSE mg/dL 184*   CALCIUM mg/dL 7.9*     Results from last 7 days   Lab Units 01/21/20  0823   SODIUM mmol/L 132*   POTASSIUM mmol/L 3.5  3.2*   CHLORIDE mmol/L 96*   CO2 mmol/L 29.0   BUN mg/dL 21*   CREATININE mg/dL 0.72*   GLUCOSE mg/dL 184*   CALCIUM mg/dL 7.9*     Results from last 7 days   Lab Units 01/21/20  0823   CK TOTAL U/L 14*     Results from last 7 days   Lab Units 01/21/20  0824   SED RATE mm/hr 87*         Microbiology   Microbiology Results (last 10 days)     Procedure Component Value - Date/Time    Body Fluid Culture - Body Fluid, Toe, Left [[id number]] Collected:  01/21/20 1314    Lab Status:  Preliminary result Specimen:  Body Fluid from Toe, Left Updated:  01/21/20 1412     Gram Stain Few (2+) WBCs per low power field      No organisms seen    Tissue / Bone Culture - Tissue, Foot, Right [[id number]] Collected:  01/21/20 1238    Lab Status:  Preliminary result Specimen:   Tissue from Foot, Right Updated:  01/21/20 1342     Gram Stain Few (2+) WBCs per low power field      Moderate (3+) Gram positive cocci in pairs and chains      Rare (1+) Gram negative bacilli    MRSA Screen, PCR - Swab, Nares [[id number]]  (Normal) Collected:  01/20/20 1839    Lab Status:  Final result Specimen:  Swab from Nares Updated:  01/21/20 0453     MRSA PCR No MRSA Detected    Wound Culture - Wound, Toe, Left [[id number]]  (Abnormal) Collected:  01/20/20 1342    Lab Status:  Preliminary result Specimen:  Wound from Toe, Left Updated:  01/21/20 0807     Wound Culture Scant growth (1+) Staphylococcus aureus      Scant growth (1+) Normal Skin Juliet     Gram Stain Moderate (3+) WBCs per low power field      Occasional Gram positive cocci    Narrative:       Organism under investigation.      Wound Culture - Wound, Foot, Right [[id number]]  (Abnormal) Collected:  01/19/20 1630    Lab Status:  Preliminary result Specimen:  Wound from Foot, Right Updated:  01/21/20 0954     Wound Culture Moderate growth (3+) Streptococcus agalactiae (Group B)     Comment:   This organism is considered to be universally susceptible to penicillin.  No further antibiotic testing will be performed. If Clindamycin or Erythromycin is the drug of choice, notify the laboratory within 7 days to request susceptibility testing.        STREP GROUPING B     Gram Stain Many (4+) WBCs per low power field      Many (4+) Gram positive cocci in pairs, chains and clusters      Rare (1+) Gram negative cocci      Few (2+) Gram negative bacilli    Narrative:       Organism under investigation.      Respiratory Panel, PCR - Swab, Nasopharynx [[id number]]  (Normal) Collected:  01/19/20 1059    Lab Status:  Final result Specimen:  Swab from Nasopharynx Updated:  01/19/20 1545     ADENOVIRUS, PCR Not Detected     Coronavirus 229E Not Detected     Coronavirus HKU1 Not Detected     Coronavirus NL63 Not Detected     Coronavirus OC43 Not Detected     Human  Metapneumovirus Not Detected     Human Rhinovirus/Enterovirus Not Detected     Influenza B PCR Not Detected     Parainfluenza Virus 1 Not Detected     Parainfluenza Virus 2 Not Detected     Parainfluenza Virus 3 Not Detected     Parainfluenza Virus 4 Not Detected     Bordetella pertussis pcr Not Detected     Influenza A H1 2009 PCR Not Detected     Chlamydophila pneumoniae PCR Not Detected     Mycoplasma pneumo by PCR Not Detected     Influenza A PCR Not Detected     Influenza A H3 Not Detected     Influenza A H1 Not Detected     RSV, PCR Not Detected          Medication Review:       Schedule Meds    aspirin 325 mg Oral Daily   triamcinolone acetonide 40 mg Intra-articular Once   And      bupivacaine (PF) 4 mL Intra-articular Once   cefepime 2 g Intravenous Q8H   insulin glargine 12 Units Subcutaneous Nightly   insulin lispro 0-14 Units Subcutaneous 4x Daily With Meals & Nightly   metroNIDAZOLE 500 mg Intravenous Q8H   sodium chloride 10 mL Intravenous Q12H   vancomycin 1,500 mg Intravenous Q12H       Infusion Meds    Pharmacy Consult - Pharmacy to dose     Pharmacy to dose vancomycin     sodium chloride 125 mL/hr Last Rate: 125 mL/hr (01/19/20 0721)       PRN Meds  •  acetaminophen **OR** acetaminophen **OR** acetaminophen  •  aluminum-magnesium hydroxide-simethicone  •  bisacodyl  •  dextrose  •  dextrose  •  glucagon (human recombinant)  •  insulin lispro **AND** insulin lispro  •  ketorolac  •  magnesium hydroxide  •  melatonin  •  ondansetron **OR** ondansetron  •  Pharmacy Consult - Pharmacy to dose  •  Pharmacy to dose vancomycin  •  potassium chloride **OR** potassium chloride **OR** potassium chloride  •  sodium chloride        Assessment/Plan       Antimicrobial Therapy   1.  IV vancomycin    Day 3  2.  IV cefepime    Day 3  3.  IV Flagyl     Day 2  4.      Day  5.      Day      Assessment     Diabetic foot wound  -Wound on the dorsal aspect of the second digit of the left foot  -Wound on the plantar  aspect of the right foot   -MRI of the right foot shows suspicion for osteomyelitis of the hallux sesamoid bones  -History of left great toe amputation in November 2018, fourth and fifth digit amputation in April 2019 with 6 weeks of IV antibiotics  -History of right transmetatarsal amputation  -1/21/2020- right foot excisional debridement to the muscle/tendon with tibial and fibular sesamoidectomy, left foot incision and drainage with second digit amputation at the metatarsophalangeal joint  -Wound culture of the right foot is growing Streptococcus group G and wound culture of the left foot is growing Staphylococcus aureus     Leukocytosis  -resolved     Type 1 diabetes with neuropathy     Recent complaints of dizziness with syncope on 1/18/2020 which resulted in a fall in the shower  -Imaging studies do not show any acute findings on the CT the head, CT the cervical spine or MRI of the right shoulder  -Screen was negative  -Orthopedic surgery following patient for right shoulder pain, will try injection, patient may need surgery     Also evidence of nausea, vomiting, and fever at home  -Patient has been afebrile since admission  -Respiratory viral DNA panel was negative     CAD, history of MI with cardiac catheterization     Plan     Continue IV cefepime   Continue IV vancomycin  Continue IV Flagyl 500 mg every q 8 hours  Will de-escalate antibiotics once intraoperative cultures have resulted  Patient will need 6 weeks of IV antibiotics  2D echo report pending  Continue supportive care  Herb Knight, APRN  01/21/20  3:14 PM

## 2020-01-21 NOTE — PLAN OF CARE
Problem: Patient Care Overview  Goal: Interprofessional Rounds/Family Conf  1/21/2020 1831 by Mae Soriano, RN  Outcome: Ongoing (interventions implemented as appropriate)  1/21/2020 1809 by Mae Soriano, RN  Outcome: Ongoing (interventions implemented as appropriate)   Patient had surgery bilateral feet, Dr Rivas gave injection to right shoulder for pain, patient stable.

## 2020-01-21 NOTE — NURSING NOTE
Patient returned to floor from PACU, report taken, patients vital signs stable, surgical dressing intact bilateral feet, patient is alert and oriented.

## 2020-01-21 NOTE — CONSULTS
Inpatient Endocrine Consult  Consultation requested by Dr. ADDISON Ron for uncontrolled type 1 diabetes  Patient Care Team:  Erwin Castorena MD as PCP - General (Family Medicine)    Chief Complaint: Uncontrolled type 1 diabetes    HPI: 44-year-old male with history of type 1 diabetes diagnosed in 2018, have diabetic neuropathy with amputation of 3 toes on the left side also have ulcer on the right foot admitted after he passed out during the shower at home.  He tells me that his sugars were not low or high.  He is found to have ulcer on the right foot and is being followed by orthopedics/podiatry.  Endocrine consultation is requested for management of diabetes.  At home he takes Basaglar insulin 12 units subcu once a day along with Humalog 3 units with each meal.  He tells me blood sugars at home usually range between .  He denies any chest pain, shortness of breath, nausea, vomiting fever or cough.  He is eating okay at this time.    Past Medical History:   Diagnosis Date   • Coronary artery disease    • Diabetes mellitus (CMS/[facility])        Social History     Socioeconomic History   • Marital status: Single     Spouse name: Not on file   • Number of children: Not on file   • Years of education: Not on file   • Highest education level: Not on file   Tobacco Use   • Smoking status: Former Smoker     Packs/day: 0.50     Last attempt to quit: 2019     Years since quittin.7   • Smokeless tobacco: Never Used   Substance and Sexual Activity   • Alcohol use: No     Frequency: Never   • Drug use: No   • Sexual activity: Defer       Family History   Problem Relation Age of Onset   • Diabetes Father    • Heart failure Father    • Diabetes Paternal Grandfather        Allergies   Allergen Reactions   • Metformin Diarrhea and Nausea And Vomiting   • Oxycodone-Acetaminophen Nausea And Vomiting and Rash       ROS:   Constitutional:  Denies fatigue, tiredness.    Eyes:  Denies change in visual  acuity   HENT:  Denies nasal congestion or sore throat   Respiratory: denies cough, shortness of breath.   Cardiovascular:  denies chest pain, edema   GI:  Denies abdominal pain, nausea, vomiting.   :  Denies Polyuria and Polydipsia  Musculoskeletal:  Denies back pain or joint pain   Integument:  Denies dry skin, rash   Neurologic:  Denies headache, focal weakness or sensory changes   Endocrine:  Denies polyuria or polydipsia   Psychiatric:  Denies depression or anxiety      Vitals:    01/21/20 1429   BP: 125/76   Pulse: 77   Resp: 12   Temp: 97.6 °F (36.4 °C)   SpO2: 95%        General Appearance:    Alert, cooperative, in no acute distress   Head:    Normocephalic, without obvious abnormality, atraumatic           Eyes:    Lids and lashes normal, conjunctivae and sclerae normal,     no  icterus, no pallor, corneas clear, PERRL   Throat:   No oral lesions,  oral mucosa moist. Edentulous   Neck:   No adenopathy, supple,  no thyromegaly, no   carotid bruit   Lungs:    Clear     Heart:    Regular rhythm and normal rate   Chest Wall:    No abnormalities observed   Abdomen:     Normal bowel sounds                Extremities:   Has ulcer on the right foot, he has bandage on the left    foot.              Pulses:   Pulses palpable and equal bilaterally   Skin:   Dry   Neurologic:  Psych:    DTR normal. No focal deficit.     AAOx3, appropriate mood, affect normal         Results Review:     I reviewed the patient's new clinical results.    Lab Results   Component Value Date    GLUCOSE 184 (H) 01/21/2020    BUN 21 (H) 01/21/2020    CREATININE 0.72 (L) 01/21/2020    EGFRIFNONA 119 01/21/2020    BCR 29.2 (H) 01/21/2020    K 3.2 (L) 01/21/2020    K 3.5 01/21/2020    CO2 29.0 01/21/2020    CALCIUM 7.9 (L) 01/21/2020    ALBUMIN 2.10 (L) 01/21/2020    LABIL2 1.0 04/29/2019    AST 28 01/21/2020    ALT 41 01/21/2020       Lab Results   Component Value Date    HGBA1C 10.5 (H) 01/21/2020     Lab Results   Component Value Date     CREATININE 0.72 (L) 01/21/2020     Results from last 7 days   Lab Units 01/21/20  1700 01/21/20  1334 01/21/20  1052 01/21/20  0728 01/20/20  2028 01/20/20  1624   GLUCOSE mg/dL 130* 157* 167* 180* 212* 154*       Medication Review: Reviewed.       Current Facility-Administered Medications:   •  acetaminophen (TYLENOL) tablet 650 mg, 650 mg, Oral, Q4H PRN, 650 mg at 01/19/20 0803 **OR** acetaminophen (TYLENOL) 160 MG/5ML solution 650 mg, 650 mg, Oral, Q4H PRN **OR** acetaminophen (TYLENOL) suppository 650 mg, 650 mg, Rectal, Q4H PRN, CROW Souza DPM  •  aluminum-magnesium hydroxide-simethicone (MAALOX MAX) 400-400-40 MG/5ML suspension 15 mL, 15 mL, Oral, Q6H PRN, CROW Souza DPM  •  aspirin tablet 325 mg, 325 mg, Oral, Daily, CROW Souza DPM, 325 mg at 01/19/20 0803  •  bisacodyl (DULCOLAX) EC tablet 5 mg, 5 mg, Oral, Daily PRN, CROW Souza DPM  •  cefepime 2 gm IVPB in 100 ml NS (MBP), 2 g, Intravenous, Q8H, CROW Souza DPM, Last Rate: 0 mL/hr at 01/20/20 1308, 2 g at 01/21/20 1645  •  dextrose (D50W) 25 g/ 50mL Intravenous Solution 25 g, 25 g, Intravenous, Q15 Min PRN, CROW Souza DPM  •  dextrose (GLUTOSE) oral gel 15 g, 15 g, Oral, Q15 Min PRN, CROW Souza DPM  •  glucagon (human recombinant) (GLUCAGEN DIAGNOSTIC) injection 1 mg, 1 mg, Subcutaneous, Q15 Min PRN, CROW Souza DPM  •  insulin glargine (LANTUS) injection 12 Units, 12 Units, Subcutaneous, Nightly, CROW Souza DPM, 12 Units at 01/20/20 2115  •  insulin lispro (humaLOG) injection 0-14 Units, 0-14 Units, Subcutaneous, 4x Daily With Meals & Nightly, 3 Units at 01/21/20 0816 **AND** insulin lispro (humaLOG) injection 0-14 Units, 0-14 Units, Subcutaneous, PRN, CROW Souza DPM  •  ketorolac (TORADOL) injection 30 mg, 30 mg, Intravenous, Q6H PRN, CROW Souza DPM, 30 mg at 01/21/20 0415  •  magnesium hydroxide (MILK OF MAGNESIA) suspension 2400 mg/10mL 10 mL, 10 mL, Oral, Daily PRN, CROW Souza  URIEL Arellano  •  melatonin tablet 5 mg, 5 mg, Oral, Nightly PRN, CROW Souza DPM  •  metroNIDAZOLE (FLAGYL) 500 mg/100mL IVPB, 500 mg, Intravenous, Q8H, CROW Souza DPM, 500 mg at 01/21/20 0816  •  ondansetron (ZOFRAN) tablet 4 mg, 4 mg, Oral, Q6H PRN **OR** ondansetron (ZOFRAN) injection 4 mg, 4 mg, Intravenous, Q6H PRN, CROW Souza DPM, 4 mg at 01/18/20 2311  •  Pharmacy Consult - Pharmacy to dose, , Does not apply, Continuous PRN, CROW Souza DPM  •  Pharmacy to dose vancomycin, , Does not apply, Continuous PRN, CROW Souza DPM  •  potassium chloride (K-DUR,KLOR-CON) CR tablet 40 mEq, 40 mEq, Oral, PRN, 40 mEq at 01/21/20 0954 **OR** potassium chloride (KLOR-CON) packet 40 mEq, 40 mEq, Oral, PRN **OR** potassium chloride 10 mEq in 100 mL IVPB, 10 mEq, Intravenous, Q1H PRN, CROW Souza DPM  •  sodium chloride 0.9 % flush 10 mL, 10 mL, Intravenous, Q12H, CROW Souza DPM, 10 mL at 01/21/20 0900  •  sodium chloride 0.9 % flush 10 mL, 10 mL, Intravenous, PRN, CROW Souza DPM  •  sodium chloride 0.9 % infusion, 125 mL/hr, Intravenous, Continuous, CROW Souza DPM, Last Rate: 125 mL/hr at 01/19/20 0721  •  vancomycin IVPB 1500 mg in 0.9% NaCl (Premix) 250 mL, 1,500 mg, Intravenous, Q12H, CROW Souza DPM, 1,500 mg at 01/21/20 0934    Assessment/Plan   1   Diabetes mellitus type 1: Uncontrolled with very high A1c  2.  Diabetic foot ulcer on the right side  3.  Multiple toe amputations on the left foot  4.  Diabetic neuropathy  5.  Syncope    Plan:   At this time I will continue Lantus 12 units subcu nightly and add Humalog 3 units with each meal.  We will change Humalog sliding scale to mealtime only.  Will follow blood sugars and make further adjustments.  Have advised him that he needs to control his diabetes better to decrease his risk for future complications.  He verbalized understanding.    Thank you very much for the consultation.             Ezekiel Ron,  MD [facility].      Much of the above report is an electronic transcription/translation of the spoken language to printed text using Dragon Software. As such, the subtleties and finesse of the spoken language may permit erroneous, or at times, nonsensical words or phrases to be inadvertently transcribed; thus changes may be made at a later date to rectify these errors.

## 2020-01-21 NOTE — ANESTHESIA PREPROCEDURE EVALUATION
Anesthesia Evaluation     Patient summary reviewed and Nursing notes reviewed   NPO Solid Status: > 8 hours  NPO Liquid Status: > 8 hours           Airway   Mallampati: I  TM distance: >3 FB  Neck ROM: full  No difficulty expected  Dental - normal exam     Pulmonary - negative pulmonary ROS and normal exam   Cardiovascular - normal exam    (+) CAD,       Neuro/Psych  (+) syncope, numbness,     GI/Hepatic/Renal/Endo    (+)   diabetes mellitus,     Musculoskeletal (-) negative ROS    Abdominal  - normal exam    Bowel sounds: normal.   Substance History - negative use     OB/GYN negative ob/gyn ROS         Other                        Anesthesia Plan    ASA 3     general     intravenous induction     Anesthetic plan, all risks, benefits, and alternatives have been provided, discussed and informed consent has been obtained with: patient.    Plan discussed with CAA.

## 2020-01-21 NOTE — PAYOR COMM NOTE
"Patient was observation through the ER on 01/18/2020 and was changed to inpatient on 01/20/2020.    AUTHORIZATION PENDING:   PLEASE CALL OR FAX DETERMINATION TO CONTACT BELOW. THANK YOU.        Lauren Nair RN MSN  /UR  [facility]  [phone] office  [phone] fax  [email or web address]    [facility]  NPI: [id number]  Tax: 375-      Ebenezer Gregg (44 y.o. Male) 1975  Auth # [id number]      Clinicals for pending inpatient precert authorization.      Date of Birth Social Security Number Address Home Phone MRN    1975  [address] [address] [phone] [id number]    Yazidism Marital Status          Islam Single       Admission Date Admission Type Admitting Provider Attending Provider Department, Room/Bed    1/18/20 Emergency Peewee Rdz MD Lohano, Suresh, MD [facility] 2C MEDICAL INPATIENT, 246/1    Discharge Date Discharge Disposition Discharge Destination                       Attending Provider:  Christopher Ron MD    Allergies:  Metformin, Oxycodone-acetaminophen    Isolation:  Contact   Infection:  MRSA (11/09/18)   Code Status:  CPR    Ht:  190.5 cm (75\")   Wt:  94.3 kg (208 lb)    Admission Cmt:  None   Principal Problem:  Syncope [R55]                 Active Insurance as of 1/18/2020     Primary Coverage     Payor Plan Insurance Group Employer/Plan Group    [facility] BLUE [facility] PPO [id number]     Payor Plan Address Payor Plan Phone Number Payor Plan Fax Number Effective Dates    [address] [phone]  10/13/2019 - None Entered    [facility] [id number]       Subscriber Name Subscriber Birth Date Member ID       EBENEZER GREGG 1975 [id number]                 Emergency Contacts      (Rel.) Home Phone Work Phone Mobile Phone    DAYDAY BATISTA (Friend) [phone] -- [phone]        01/20/20 1353  Inpatient Admission Once    Completed "   Level of Care: Med/Surg    Diagnosis: Syncope, unspecified syncope type [[id number]]    Admitting Physician: ANUPAM BELTARN [[id number]]    Attending Physician: ANNE CRUM [[id number]]    Certification: I certify that inpatient hospital services are medically necessary for greater than 2 midnights.        20 1353   207  Initiate Observation Status Once    Completed   Level of Care: Med/Surg    Diagnosis: Syncope, unspecified syncope type [[id number]]    Admitting Physician: ANUPAM BELTRAN [[id number]]             DX:   Open wound of second toe of left foot (Chronic) ICD-10-CM: S91.105A     Cellulitis ICD-10-CM: L03.90     Syncope ICD-10-CM: R55       Criteria Review: [facility]   Cellulitis  Clinical Indications for Admission to Inpatient Care  •Admission is indicated for 1 or more of the following(1)(2)(3)(4)(5)(6)(7):?Failure of outpatient therapy as indicated by ALL of the following(8)(9):?Progression or no improvement after adequate trial (minimum of 48 hours, with longer period for stable lower extremity infection)  ?Adequate antibiotic regimen as indicated by use of 1 or more of the following:  •Penicillin-allergic patient regimen (clindamycin, extended-spectrum fluoroquinolone, or doxycycline)  ?Outpatient intravenous therapy is not appropriate due to 1 or more of the following(12)(13):  •It is not available or cannot be arranged in clinically appropriate time frame (eg, next day).               History & Physical      Travis Garcia APRN at 203                [facility] Medicine Services      Patient Name: Maynor Gregg  : 1975  MRN: [id number]  Primary Care Physician: Erwin Castorena MD  Date of admission: 2020    Patient Care Team:  Erwin Castorena MD as PCP - General (Family Medicine)          Subjective   History Present Illness     Chief Complaint:   Chief Complaint   Patient presents with   • Syncope       Mr. Gregg  "is a 44 y.o. with a history of DM 1 and neuropathy presented to the [facility] ED on 1/18/2020 with a complaint of syncope, he passed out while he was in the shower today, states he \"went out and woke up in the tub\" Pt is also complaining of nausea, vomiting and fever x 3 days. Pt states he began having nausea and vomiting on Thursday, associated with low grade fever. Pt denies melena, hematochezia, diarrhea. Pt states he then began to have episodes of dizziness, even when lying down, he felt as if the room was spinning. Pt is complaining of head, neck and back pain s/p fall today. Pt has an open wound on his left 2nd toe, he is current with Dr. Souza and is finishing a round of doxycycline.     In the ED, CT head: negative for acute abnormality, CT spine: Normal, Xray of second toe on left foot: negative for fracture, chronic abnormal appearance of second and third MTP joints, soft tissue swelling. Glucose 520, K+4.2, BUN 19, Cr. 1.04, WBC 23.9, Hgb 13.4, Hct 38.5. Pt was given IV insulin 6 units, 500ml NS bolus IV, Zofran 4mg IV. Pt will be admitted for further evaluation and management.     Review of Systems   Constitution: Positive for fever and malaise/fatigue.   Eyes: Negative.    Cardiovascular: Positive for syncope.   Respiratory: Negative.    Endocrine: Negative.    Hematologic/Lymphatic: Negative.    Skin: Negative.    Musculoskeletal: Positive for neck pain.   Gastrointestinal: Positive for abdominal pain, nausea and vomiting.   Genitourinary: Negative.    Neurological: Positive for dizziness, headaches and weakness.   Psychiatric/Behavioral: Negative.    Allergic/Immunologic: Negative.    All other systems reviewed and are negative.          Personal History     Past Medical History:   Past Medical History:   Diagnosis Date   • Coronary artery disease    • Diabetes mellitus (CMS/HCC)        Surgical History:      Past Surgical History:   Procedure Laterality Date   • AMPUTATION FOOT / TOE     • " CARDIAC SURGERY     • TOE AMPUTATION Left            Family History: family history includes Diabetes in his father and paternal grandfather; Heart failure in his father. Otherwise pertinent FHx was reviewed and unremarkable.     Social History:  reports that he quit smoking about 8 months ago. He smoked 0.50 packs per day. He has never used smokeless tobacco. He reports that he does not drink alcohol or use drugs.      Medications:  Prior to Admission medications    Medication Sig Start Date End Date Taking? Authorizing Provider   aspirin 325 MG tablet Take 325 mg by mouth Daily.   Yes Emergency, Nurse ALESSIA Calvert   doxycycline (VIBRAMYICN) 100 MG tablet Take 1 tablet by mouth 2 (Two) Times a Day for 10 days. 1/9/20 1/19/20 Yes CROW Souza DPM   Insulin Glargine (BASAGLAR KWIKPEN) 100 UNIT/ML injection pen Inject 12 Units under the skin into the appropriate area as directed Every Night. 4/2/19  Yes Emergency, Nurse ALESSIA Calvert   insulin lispro (HUMALOG) 100 UNIT/ML injection Inject  under the skin into the appropriate area as directed Every 8 (Eight) Hours. SSI   Yes Emergency, Nurse Epic, RN   cyclobenzaprine (FLEXERIL) 5 MG tablet Take 1 tablet by mouth 2 (Two) Times a Day As Needed for Muscle Spasms. 11/20/19 1/18/20  CROW Souza DPM       Allergies:    Allergies   Allergen Reactions   • Metformin Diarrhea and Nausea And Vomiting   • Oxycodone-Acetaminophen Nausea And Vomiting and Rash       Objective   Objective     Vital Signs  Temp:  [98.5 °F (36.9 °C)-99.2 °F (37.3 °C)] 99.2 °F (37.3 °C)  Heart Rate:  [] 114  Resp:  [16-21] 21  BP: ()/(59-77) 116/70  SpO2:  [95 %-100 %] 99 %  on   ;   Device (Oxygen Therapy): room air  Body mass index is 26.11 kg/m².    Physical Exam   Constitutional: He is oriented to person, place, and time. He appears well-developed and well-nourished. No distress.   HENT:   Head: Normocephalic.   Eyes: Pupils are equal, round, and reactive to light.   Neck: Normal range  of motion.   Cardiovascular: Normal rate, regular rhythm, normal heart sounds and intact distal pulses.   Pulmonary/Chest: Effort normal and breath sounds normal.   Abdominal: Bowel sounds are normal. There is tenderness.   Musculoskeletal: Normal range of motion.   Neurological: He is alert and oriented to person, place, and time. He has normal strength. No cranial nerve deficit or sensory deficit.   Skin: Skin is warm and dry.   Vitals reviewed.      Results Review:  I have personally reviewed most recent cardiac tracings, lab results and radiology images and interpretations and agree with findings.    Results from last 7 days   Lab Units 01/18/20 1927   WBC 10*3/mm3 23.90*   HEMOGLOBIN g/dL 13.4   HEMATOCRIT % 38.5   PLATELETS 10*3/mm3 197     Results from last 7 days   Lab Units 01/18/20 1927   SODIUM mmol/L 128*   POTASSIUM mmol/L 4.2   CHLORIDE mmol/L 88*   CO2 mmol/L 27.0   BUN mg/dL 19   CREATININE mg/dL 1.04   GLUCOSE mg/dL 520*   CALCIUM mg/dL 8.2*     Estimated Creatinine Clearance: 121.5 mL/min (by C-G formula based on SCr of 1.04 mg/dL).  Brief Urine Lab Results     None          Microbiology Results (last 10 days)     ** No results found for the last 240 hours. **          ECG/EMG Results (most recent)     Procedure Component Value Units Date/Time    ECG 12 Lead [[id number]] Collected:  01/18/20 1906     Updated:  01/18/20 1907    Narrative:       HEART RATE= 105  bpm  RR Interval= 572  ms  WV Interval= 149  ms  P Horizontal Axis= 3  deg  P Front Axis= 48  deg  QRSD Interval= 79  ms  QT Interval= 328  ms  QRS Axis= 34  deg  T Wave Axis= 2  deg  - BORDERLINE ECG -  Sinus tachycardia  Borderline ST elevation, lateral leads  When compared with ECG of 04-Apr-2019 13:03:16,  Significant change in rhythm  Electronically Signed By:   Date and Time of Study: 2020-01-18 19:06:28                    Ct Head Without Contrast    Result Date: 1/18/2020  1.  Normal exam.  Electronically Signed By-Arabella Mendez  On:1/18/2020 8:09 PM This report was finalized on [id number] by  Arabella Mendez, .    Ct Cervical Spine Without Contrast    Result Date: 1/18/2020  Normal CT cervical spine without contrast.  Electronically Signed By-Arabella Mendez On:1/18/2020 8:11 PM This report was finalized on [id number] by  Arabella Mendez .    Xr Toe 2+ View Left    Result Date: 1/18/2020   1. Negative for fracture. 2. Chronic abnormal appearance of second and third MTP joints. 3. Soft tissue swelling.  Electronically Signed By-Arabella Mendez On:1/18/2020 8:07 PM This report was finalized on [id number] by  Arabella Mendez, .        Estimated Creatinine Clearance: 121.5 mL/min (by C-G formula based on SCr of 1.04 mg/dL).    Assessment/Plan   Assessment/Plan       Active Hospital Problems    Diagnosis  POA   • Open wound of second toe of left foot [S91.105A]  Yes   • Syncope [R55]  Yes   • Diabetic peripheral neuropathy (CMS/HCC) [E11.42]  Yes   • Type 1 diabetes mellitus with circulatory complication (CMS/HCC) [E10.59]  Yes      Resolved Hospital Problems   No resolved problems to display.       Active Hospital Problems:  No notes have been filed under this hospital service.  Service: Hospitalist    Syncope  -CT head negative  -neuro checks q4  -Check orthostatics  -Most likely due to dehydration, pt with several days worth of vomiting.   -Rehydrate NS 125ml/hr IVF  -Clear liquid diet, advance as tolerated    Diabetic Foot wound, left second toe  -One dose of doxycyline left  -Wound nurse to see and eval  -current with Dr. Souza. May benefit from consult     Diabetes type 1  -Continue Patient's lantus  -Accuchecks AC and HS  -Cover with sliding scale          VTE Prophylaxis - SCDs.    CODE STATUS:    Code Status and Medical Interventions:   Ordered at: 01/18/20 2121     Level Of Support Discussed With:    Patient     Code Status:    CPR     Medical Interventions (Level of Support Prior to Arrest):    Full       Admission Status:  I believe  this patient meets observation criteria.      I discussed the patient's findings and my recommendations with patient and family.        Electronically signed by KAMILLE Degroot, 01/18/20, 9:23 PM.  [facility] Hospitalist Team          Electronically signed by Peewee Rdz MD at 01/19/20 1555          Emergency Department Notes      Naya Li RN at 01/18/20 1856        Pt reports syncope in the shower at home today. Pt c/o posterior neck pain. C-collar placed in triage.     Electronically signed by Naya Li RN at 01/18/20 1856     Kyra Jiménez RN at 01/18/20 1932        Pt states that he has been having vomiting with fever. States that tonight he passed out in the shower and hit his head. States that he does not remember passing out but woke up in the tub. States that he has been having neck pain and a headache.      Kyra Jiménez RN  01/18/20 1932      Electronically signed by Kyra Jiménez RN at 01/18/20 1932     Sidney Miller MD at 01/18/20 2048          Subjective   Patient is a 44-year-old male complaining of weakness and dizziness and syncope.  He states he has been vomiting the past several days.  He also complains of pain to his left foot from when he had fallen.  He does continue to complain of pain to his head and neck after the syncopal episode.  He denies fever diarrhea dysuria or other complaint.          Review of Systems  Negative for previous headache vomiting chest pain shortness of breath abdominal pain diarrhea dysuria back pain or other complaint.  A complete review systems was obtained and is otherwise negative.  Past Medical History:   Diagnosis Date   • Coronary artery disease    • Diabetes mellitus (CMS/HCC)        Allergies   Allergen Reactions   • Metformin Diarrhea and Nausea And Vomiting   • Oxycodone-Acetaminophen Nausea And Vomiting and Rash       Past Surgical History:   Procedure Laterality Date   • AMPUTATION FOOT / TOE     •  CARDIAC SURGERY     • TOE AMPUTATION Left        Family History   Problem Relation Age of Onset   • Diabetes Father    • Heart failure Father    • Diabetes Paternal Grandfather        Social History     Socioeconomic History   • Marital status: Single     Spouse name: Not on file   • Number of children: Not on file   • Years of education: Not on file   • Highest education level: Not on file   Tobacco Use   • Smoking status: Former Smoker     Packs/day: 0.50     Last attempt to quit: 2019     Years since quittin.7   • Smokeless tobacco: Never Used   Substance and Sexual Activity   • Alcohol use: No     Frequency: Never   • Drug use: No   • Sexual activity: Defer           Objective   Physical Exam  Neurologic exam is nonfocal.  HEENT exam shows TMs to be clear.  Oropharynx comers but sclerae nonicteric.  Neck has no nothing JVD or bruits.  He does have tenderness on palpation throughout his C-spine.  Lungs are clear.  Heart has a regular rhythm without murmur gallop her chest is nontender.  Abdomen is soft nontender.  Patient has normal bowel sounds without rebound or guard.  Back has no CVA tenderness.  Extremity exam shows patient have edema to his left third toe.  He has 2+ pulses and is neurovascular tach  Procedures    EKG interpretation shows normal sinus rhythm with no acute ST change      ED Course      Results for orders placed or performed during the hospital encounter of 20   Basic Metabolic Panel   Result Value Ref Range    Glucose 520 (C) 65 - 99 mg/dL    BUN 19 6 - 20 mg/dL    Creatinine 1.04 0.76 - 1.27 mg/dL    Sodium 128 (L) 136 - 145 mmol/L    Potassium 4.2 3.5 - 5.2 mmol/L    Chloride 88 (L) 98 - 107 mmol/L    CO2 27.0 22.0 - 29.0 mmol/L    Calcium 8.2 (L) 8.6 - 10.5 mg/dL    eGFR Non African Amer 78 >60 mL/min/1.73    BUN/Creatinine Ratio 18.3 7.0 - 25.0    Anion Gap 13.0 5.0 - 15.0 mmol/L   CBC Auto Differential   Result Value Ref Range    WBC 23.90 (H) 3.40 - 10.80 10*3/mm3    RBC  4.09 (L) 4.14 - 5.80 10*6/mm3    Hemoglobin 13.4 13.0 - 17.7 g/dL    Hematocrit 38.5 37.5 - 51.0 %    MCV 94.1 79.0 - 97.0 fL    MCH 32.6 26.6 - 33.0 pg    MCHC 34.7 31.5 - 35.7 g/dL    RDW 12.4 12.3 - 15.4 %    RDW-SD 41.6 37.0 - 54.0 fl    MPV 8.4 6.0 - 12.0 fL    Platelets 197 140 - 450 10*3/mm3    Neutrophil % 89.1 (H) 42.7 - 76.0 %    Lymphocyte % 4.9 (L) 19.6 - 45.3 %    Monocyte % 5.6 5.0 - 12.0 %    Eosinophil % 0.0 (L) 0.3 - 6.2 %    Basophil % 0.4 0.0 - 1.5 %    Neutrophils, Absolute 21.30 (H) 1.70 - 7.00 10*3/mm3    Lymphocytes, Absolute 1.20 0.70 - 3.10 10*3/mm3    Monocytes, Absolute 1.30 (H) 0.10 - 0.90 10*3/mm3    Eosinophils, Absolute 0.00 0.00 - 0.40 10*3/mm3    Basophils, Absolute 0.10 0.00 - 0.20 10*3/mm3    nRBC 0.0 0.0 - 0.2 /100 WBC     Ct Head Without Contrast    Result Date: 1/18/2020  1.  Normal exam.  Electronically Signed ByAnna Mendez On:1/18/2020 8:09 PM This report was finalized on [id number] by  Ynes Haywood    Ct Cervical Spine Without Contrast    Result Date: 1/18/2020  Normal CT cervical spine without contrast.  Electronically Signed ByAnna Mendez On:1/18/2020 8:11 PM This report was finalized on [id number] by  Ynes Haywood    Xr Toe 2+ View Left    Result Date: 1/18/2020   1. Negative for fracture. 2. Chronic abnormal appearance of second and third MTP joints. 3. Soft tissue swelling.  Electronically Signed ByAnna Mendez On:1/18/2020 8:07 PM This report was finalized on [id number] by  Arabella Mendez .                                             MDM  Number of Diagnoses or Management Options  Diagnosis management comments: She has benign physical exam other than hyperglycemia.  Patient was given insulin.  He also has hyponatremia as well as leukocytosis most likely from his persistent vomiting.  Patient will be admitted for IV hydration glucose control and antiemetics.  I did speak the on-call hospitalist.    Risk of Complications, Morbidity, and/or  "Mortality  Presenting problems: high  Diagnostic procedures: high  Management options: high    Patient Progress  Patient progress: stable      Final diagnoses:   Syncope, unspecified syncope type   Concussion without loss of consciousness, initial encounter   Hyperglycemia   Persistent vomiting            Sidney Miller MD  01/18/20 2053      Electronically signed by Sidney Miller MD at 01/18/20 2053          Physician Progress Notes (last 72 hours) (Notes from 01/18/20 0914 through 01/21/20 0914)      Christopher Ron MD at 01/20/20 1447                [facility] Medicine Services Daily Progress Note      Hospitalist Team  LOS 1 days      Patient Care Team:  Erwin Castorena MD as PCP - General (Family Medicine)    Patient Location: [facility]      Subjective   Subjective     Chief Complaint / Subjective  Chief Complaint   Patient presents with   • Syncope       Present on Admission:  • Syncope  • Diabetic peripheral neuropathy (CMS/HCC)  • Type 1 diabetes mellitus with circulatory complication (CMS/HCC)  • Open wound of second toe of left foot      Brief Synopsis of Hospital Course/HPI  Mr. Gregg is a 44 y.o. with a history of DM 1 and neuropathy presented to the [facility] ED on 1/18/2020 with a complaint of syncope, he passed out while he was in the shower today, states he \"went out and woke up in the tub\" Pt is also complaining of nausea, vomiting and fever x 3 days. Pt states he began having nausea and vomiting on Thursday, associated with low grade fever. Pt denies melena, hematochezia, diarrhea. Pt states he then began to have episodes of dizziness, even when lying down, he felt as if the room was spinning. Pt is complaining of head, neck and back pain s/p fall today. Pt has an open wound on his left 2nd toe, he is current with Dr. Souza and is finishing a round of doxycycline.      In the ED, CT head: negative for acute abnormality, CT spine: Normal, Xray of second toe on left " "foot: negative for fracture, chronic abnormal appearance of second and third MTP joints, soft tissue swelling. Glucose 520, K+4.2, BUN 19, Cr. 1.04, WBC 23.9, Hgb 13.4, Hct 38.5. Pt was given IV insulin 6 units, 500ml NS bolus IV, Zofran 4mg IV. Pt will be admitted for further evaluation and management.     Date:: 1/20/2020: Patient seen and examined and complains of right shoulder pain.  Patient was admitted because of the syncopal episode and at this time we do not know exactly why he passed out.  Patient landed on the right side and hurt his shoulder and arm during the x-ray and MRI of the right shoulder.  Patient also will be seen by orthopedics.        Review of Systems   All other systems reviewed and are negative.        Objective   Objective      Vital Signs  Temp:  [98.8 °F (37.1 °C)-99.4 °F (37.4 °C)] 98.8 °F (37.1 °C)  Heart Rate:  [] 86  Resp:  [16-20] 16  BP: (100-121)/(63-81) 121/81  Oxygen Therapy  SpO2: 97 %  Pulse Oximetry Type: Intermittent  Device (Oxygen Therapy): room air  Flowsheet Rows      First Filed Value   Admission Height  191.8 cm (75.5\") Documented at 01/18/2020 1854   Admission Weight  98 kg (216 lb 0.8 oz) Documented at 01/18/2020 1854        Intake & Output (last 3 days)       01/17 0701 - 01/18 0700 01/18 0701 - 01/19 0700 01/19 0701 - 01/20 0700 01/20 0701 - 01/21 0700    P.O.  1200 720     I.V. (mL/kg)   1270.8 (13.4)     IV Piggyback  500 600     Total Intake(mL/kg)  1700 (17.9) 2590.8 (27.3)     Urine (mL/kg/hr)  700 860 (0.4)     Total Output  700 860     Net  +1000 +1730.8                 Lines, Drains & Airways    Active LDAs     Name:   Placement date:   Placement time:   Site:   Days:    Peripheral IV 01/18/20 1926 Right Arm   01/18/20 1926    Arm   1                  Physical Exam:    Physical Exam   Constitutional: He is oriented to person, place, and time. He appears well-developed and well-nourished. No distress.   HENT:   Head: Normocephalic and atraumatic. "   Nose: Nose normal.   Mouth/Throat: Oropharynx is clear and moist. No oropharyngeal exudate.   Eyes: Pupils are equal, round, and reactive to light. Conjunctivae and EOM are normal. Right eye exhibits no discharge. Left eye exhibits no discharge. No scleral icterus.   Neck: Normal range of motion. No JVD present. No tracheal deviation present. No thyromegaly present.   Cardiovascular: Normal rate, regular rhythm, normal heart sounds and intact distal pulses. Exam reveals no gallop and no friction rub.   No murmur heard.  Pulmonary/Chest: Effort normal and breath sounds normal. No stridor. No respiratory distress. He has no wheezes. He has no rales. He exhibits no tenderness.   Abdominal: Soft. Bowel sounds are normal. He exhibits no distension and no mass. There is no tenderness. There is no rebound and no guarding. No hernia.   Musculoskeletal: He exhibits deformity. He exhibits no edema or tenderness.   Lymphadenopathy:     He has no cervical adenopathy.   Neurological: He is alert and oriented to person, place, and time. No cranial nerve deficit or sensory deficit. He exhibits normal muscle tone. Coordination normal.   Skin: Skin is warm and dry. No rash noted. He is not diaphoretic. No erythema.   Psychiatric: He has a normal mood and affect. His behavior is normal.   Nursing note and vitals reviewed.           Wounds (last 24 hours)      LDA Wound     Row Name 01/20/20 0710 01/19/20 1905          Wound 01/18/20 2244 Right heel Diabetic Ulcer    Wound - Properties Group Date first assessed: 01/18/20  - Time first assessed: 2244  -SS Present on Hospital Admission: Y  -SS Side: Right  -SS Location: heel  -SS Primary Wound Type: Diabetic ulc  - Stage, Pressure Injury: unstageable  -SS    Dressing Appearance  dry;intact  -SM  dry;intact  -VB     Closure  DORIS  -SM  DORIS  -VB       User Key  (r) = Recorded By, (t) = Taken By, (c) = Cosigned By    Initials Name Provider Type    Tamara Pimentel RN Registered  Nurse    Bryanna Albarran LPN Licensed Nurse    Mae Harrell RN Registered Nurse          Procedures:              Results Review:     I reviewed the patient's new clinical results.      Lab Results (last 24 hours)     Procedure Component Value Units Date/Time    Wound Culture - Wound, Toe, Left [[id number]] Collected:  01/20/20 1342    Specimen:  Wound from Toe, Left Updated:  01/20/20 1350    Wound Culture - Wound, Foot, Right [[id number]] Collected:  01/19/20 1630    Specimen:  Wound from Foot, Right Updated:  01/20/20 1324     Wound Culture Culture in progress     Gram Stain Many (4+) WBCs per low power field      Many (4+) Gram positive cocci in pairs, chains and clusters      Rare (1+) Gram negative cocci      Few (2+) Gram negative bacilli    POC Glucose Once [[id number]]  (Abnormal) Collected:  01/20/20 1250    Specimen:  Blood Updated:  01/20/20 1251     Glucose 161 mg/dL      Comment: Serial Number: [id number]:  [id number]       POC Glucose Once [[id number]]  (Abnormal) Collected:  01/18/20 1855    Specimen:  Blood Updated:  01/20/20 0932     Glucose 449 mg/dL      Comment: Serial Number: [id number]:  [id number]       POC Glucose Once [[id number]]  (Abnormal) Collected:  01/20/20 0719    Specimen:  Blood Updated:  01/20/20 0721     Glucose 174 mg/dL      Comment: Serial Number: [id number]:  [id number]       POC Glucose Once [[id number]]  (Abnormal) Collected:  01/19/20 2036    Specimen:  Blood Updated:  01/19/20 2038     Glucose 272 mg/dL      Comment: Serial Number: [id number]:  [id number]       Urine Drug Screen - Urine, Clean Catch [[id number]]  (Normal) Collected:  01/19/20 1703    Specimen:  Urine, Clean Catch Updated:  01/19/20 1842     Amphet/Methamphet, Screen Negative     Barbiturates Screen, Urine Negative     Benzodiazepine Screen, Urine Negative     Cocaine Screen, Urine Negative     Opiate Screen Negative     THC, Screen, Urine Negative     Methadone Screen, Urine  Negative     Oxycodone Screen, Urine Negative    Narrative:       Negative Thresholds For Drugs Screened:     Amphetamines               500 ng/ml   Barbiturates               200 ng/ml   Benzodiazepines            100 ng/ml   Cocaine                    300 ng/ml   Methadone                  300 ng/ml   Opiates                    300 ng/ml   Oxycodone                  100 ng/ml   THC                        50 ng/ml    The Normal Value for all drugs tested is negative. This report includes final unconfirmed screening results to be used for medical treatment purposes only. Unconfirmed results must not be used for non-medical purposes such as employment or legal testing. Clinical consideration should be applied to any drug of abuse test, particulary when unconfirmed results are used.  All urine drugs of abuse requests without chain of custody are for medical screening purposes only.  False positives are possible.      TSH [[id number]]  (Normal) Collected:  01/19/20 1633    Specimen:  Blood Updated:  01/19/20 1801     TSH 0.649 uIU/mL     POC Glucose Once [[id number]]  (Abnormal) Collected:  01/19/20 1630    Specimen:  Blood Updated:  01/19/20 1631     Glucose 193 mg/dL      Comment: Serial Number: [id number]:  [id number]       Respiratory Panel, PCR - Swab, Nasopharynx [[id number]]  (Normal) Collected:  01/19/20 1059    Specimen:  Swab from Nasopharynx Updated:  01/19/20 1545     ADENOVIRUS, PCR Not Detected     Coronavirus 229E Not Detected     Coronavirus HKU1 Not Detected     Coronavirus NL63 Not Detected     Coronavirus OC43 Not Detected     Human Metapneumovirus Not Detected     Human Rhinovirus/Enterovirus Not Detected     Influenza B PCR Not Detected     Parainfluenza Virus 1 Not Detected     Parainfluenza Virus 2 Not Detected     Parainfluenza Virus 3 Not Detected     Parainfluenza Virus 4 Not Detected     Bordetella pertussis pcr Not Detected     Influenza A H1 2009 PCR Not Detected     Chlamydophila  pneumoniae PCR Not Detected     Mycoplasma pneumo by PCR Not Detected     Influenza A PCR Not Detected     Influenza A H3 Not Detected     Influenza A H1 Not Detected     RSV, PCR Not Detected        No results found for: HGBA1C            Lab Results   Component Value Date    LIPASE 10 (L) 01/19/2020     No results found for: CHOL, CHLPL, TRIG, HDL, LDL, LDLDIRECT    No results found for: INTRAOP, PREDX, FINALDX, COMDX    Microbiology Results (last 10 days)     Procedure Component Value - Date/Time    Wound Culture - Wound, Foot, Right [[id number]] Collected:  01/19/20 1630    Lab Status:  Preliminary result Specimen:  Wound from Foot, Right Updated:  01/20/20 1324     Wound Culture Culture in progress     Gram Stain Many (4+) WBCs per low power field      Many (4+) Gram positive cocci in pairs, chains and clusters      Rare (1+) Gram negative cocci      Few (2+) Gram negative bacilli    Respiratory Panel, PCR - Swab, Nasopharynx [[id number]]  (Normal) Collected:  01/19/20 1059    Lab Status:  Final result Specimen:  Swab from Nasopharynx Updated:  01/19/20 1545     ADENOVIRUS, PCR Not Detected     Coronavirus 229E Not Detected     Coronavirus HKU1 Not Detected     Coronavirus NL63 Not Detected     Coronavirus OC43 Not Detected     Human Metapneumovirus Not Detected     Human Rhinovirus/Enterovirus Not Detected     Influenza B PCR Not Detected     Parainfluenza Virus 1 Not Detected     Parainfluenza Virus 2 Not Detected     Parainfluenza Virus 3 Not Detected     Parainfluenza Virus 4 Not Detected     Bordetella pertussis pcr Not Detected     Influenza A H1 2009 PCR Not Detected     Chlamydophila pneumoniae PCR Not Detected     Mycoplasma pneumo by PCR Not Detected     Influenza A PCR Not Detected     Influenza A H3 Not Detected     Influenza A H1 Not Detected     RSV, PCR Not Detected          ECG/EMG Results (most recent)     Procedure Component Value Units Date/Time    ECG 12 Lead [[id number]] Collected:  01/18/20  1906     Updated:  01/18/20 1907    Narrative:       HEART RATE= 105  bpm  RR Interval= 572  ms  DE Interval= 149  ms  P Horizontal Axis= 3  deg  P Front Axis= 48  deg  QRSD Interval= 79  ms  QT Interval= 328  ms  QRS Axis= 34  deg  T Wave Axis= 2  deg  - BORDERLINE ECG -  Sinus tachycardia  Borderline ST elevation, lateral leads  When compared with ECG of 04-Apr-2019 13:03:16,  Significant change in rhythm  Electronically Signed By:   Date and Time of Study: 2020-01-18 19:06:28    Adult Transthoracic Echo Complete W/ Cont if Necessary Per Protocol [[id number]] Resulted:  01/20/20 1412     Updated:  01/20/20 1412     Target HR (85%) 150 bpm      Max. Pred. HR (100%) 176 bpm                     Xr Shoulder 2+ View Right    Result Date: 1/20/2020   1. No acute right shoulder findings. No significant right shoulder osteoarthritic change. 2. Suspected benign enchondroma or bone infarcts in the right humeral shaft.  Electronically Signed By-Dr. Yamileth Bourne MD On:1/20/2020 12:56 PM This report was finalized on [id number] by Dr. Yamileth Bourne MD.    Ct Head Without Contrast    Result Date: 1/18/2020  1.  Normal exam.  Electronically Signed ByAnna Mendez On:1/18/2020 8:09 PM This report was finalized on [id number] by  Arabella Mendez .    Ct Cervical Spine Without Contrast    Result Date: 1/18/2020  Normal CT cervical spine without contrast.  Electronically Signed ByAnna Mendez On:1/18/2020 8:11 PM This report was finalized on [id number] by  Ynes Haywood    Xr Toe 2+ View Left    Result Date: 1/18/2020   1. Negative for fracture. 2. Chronic abnormal appearance of second and third MTP joints. 3. Soft tissue swelling.  Electronically Signed ByAnna Mendez On:1/18/2020 8:07 PM This report was finalized on [id number] by  Arabella Mendez .    Us Gallbladder    Result Date: 1/19/2020   1. Sludge in the gallbladder lumen. No evidence of gallbladder wall thickening or fluid in the gallbladder fossa. No  evidence of dilatation of bile ducts. No evidence of ascites.  Electronically Signed By-DR. Mal Lott MD On:1/19/2020 5:50 PM This report was finalized on [id number] by DR. Mal Lott MD.    Xr Chest Pa & Lateral    Result Date: 1/19/2020   1. No active cardiopulmonary disease 2. Right PICC line is been removed since previous study of 04/07/2019 3. No significant change from 04/07/2019  Electronically Signed By-Hermilo Vaz Jr. On:1/19/2020 1:44 PM This report was finalized on [id number] by  Hermilo Vaz Jr., .          Xrays, labs reviewed personally by physician.    Medication Review:   I have reviewed the patient's current medication list      Scheduled Meds    [START ON 1/21/2020] !Vancomycin Level Draw Needed  Does not apply Once   [START ON 1/21/2020] !Vancomycin Level Draw Needed  Does not apply Once   aspirin 325 mg Oral Daily   cefepime 2 g Intravenous Q8H   insulin glargine 12 Units Subcutaneous Nightly   insulin lispro 0-14 Units Subcutaneous 4x Daily With Meals & Nightly   sodium chloride 10 mL Intravenous Q12H   vancomycin 1,500 mg Intravenous Q12H       Meds Infusions    Pharmacy to dose vancomycin     sodium chloride 125 mL/hr Last Rate: 125 mL/hr (01/19/20 0721)       Meds PRN  acetaminophen **OR** acetaminophen **OR** acetaminophen  •  aluminum-magnesium hydroxide-simethicone  •  bisacodyl  •  dextrose  •  dextrose  •  glucagon (human recombinant)  •  insulin lispro **AND** insulin lispro  •  ketorolac  •  magnesium hydroxide  •  melatonin  •  ondansetron **OR** ondansetron  •  Pharmacy to dose vancomycin  •  sodium chloride    I personally reviewed patient's x-ray films and my findings are x-ray shoulder is negative and MRI pending    I personally reviewed patient's EKG strips and my findings are sinus tachycardia    Assessment/Plan   Assessment/Plan     Active Hospital Problems    Diagnosis  POA   • **Syncope [R55]  Yes   • Open wound of second toe of left foot  [S91.105A]  Yes   • Acute cholecystitis [K81.0]  Unknown   • Diabetic peripheral neuropathy (CMS/[facility]) [E11.42]  Yes   • 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       MEDICAL DECISION MAKING COMPLEXITY BY PROBLEM:       Syncope, can be vasovagal from nausea and vomiting persistent in nature otherwise etiology  unclear to me, possible be secondary to dehydration.  -CT head negative  -Continue telemetry  -Echocardiogram done and pending  -We will check for CT PE study as well as MRI of the brain    Right shoulder pain with very limited range of motion  -We will do MRI of the right shoulder  -Orthopedic consulted  -X-ray of the shoulder reviewed  -IV Toradol for pain control    Diabetic wound ulcer with some discharge and surrounding edema ....   - MRI foot right side pending  -  Consult Dr. Souza podiatry  - wound culture  - iv cefepime and vancomycin  - ID consult.     Diabetes type 1  -Continue Patient's lantus  -Accuchecks AC and HS  -Cover with sliding scale     Right upper quadrant abdominal pain: Resolved      VTE Prophylaxis - Lovenox 40 mg SC daily.      Code Status -   Code Status and Medical Interventions:   Ordered at: 01/18/20 2121     Level Of Support Discussed With:    Patient     Code Status:    CPR     Medical Interventions (Level of Support Prior to Arrest):    Full       Discharge Planning    Patient will need physical therapy and pain medications.      Destination      Coordination has not been started for this encounter.      Durable Medical Equipment      Coordination has not been started for this encounter.      Dialysis/Infusion      Coordination has not been started for this encounter.      Home Medical Care      Coordination has not been started for this encounter.      Therapy      Coordination has not been started for this encounter.      Community Resources      Coordination has not  "been started for this encounter.            Electronically signed by Christopher Ron MD, 01/20/20, 2:47 PM.  [facility] Hospitalist Team          Electronically signed by Christopher Ron MD at 01/20/20 8262     Peewee Rdz MD at 01/19/20 7448                [facility] Medicine Services Daily Progress Note      Hospitalist Team  LOS 1 days      Patient Care Team:  Erwin Castorena MD as PCP - General (Family Medicine)    Patient Location: [facility]/1      Subjective   Subjective   Complaining of abdominal pain, in the right upper quadrant, he says he has been having some nausea and vomiting for last few days.  Pt right foot on plantar side revealed necrotic ulcer with some black eschar with some discharge.       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 Gregg is a 44 y.o. with a history of DM 1 and neuropathy presented to the [facility] ED on 1/18/2020 with a complaint of syncope, he passed out while he was in the shower today, states he \"went out and woke up in the tub\" Pt is also complaining of nausea, vomiting and fever x 3 days. Pt states he began having nausea and vomiting on Thursday, associated with low grade fever. Pt denies melena, hematochezia, diarrhea. Pt states he then began to have episodes of dizziness, even when lying down, he felt as if the room was spinning. Pt is complaining of head, neck and back pain s/p fall today. Pt has an open wound on his left 2nd toe, he is current with Dr. Souza and is finishing a round of doxycycline.      In the ED, CT head: negative for acute abnormality, CT spine: Normal, Xray of second toe on left foot: negative for fracture, chronic abnormal " "appearance of second and third MTP joints, soft tissue swelling. Glucose 520, K+4.2, BUN 19, Cr. 1.04, WBC 23.9, Hgb 13.4, Hct 38.5. Pt was given IV insulin 6 units, 500ml NS bolus IV, Zofran 4mg IV. Pt will be admitted for further evaluation and management.           Date::          ROS      Objective   Objective      Vital Signs  Temp:  [97.8 °F (36.6 °C)-99.2 °F (37.3 °C)] 98.2 °F (36.8 °C)  Heart Rate:  [] 86  Resp:  [16-21] 18  BP: ()/(56-77) 105/67  Oxygen Therapy  SpO2: 98 %  Pulse Oximetry Type: Intermittent  Device (Oxygen Therapy): room air  Flowsheet Rows      First Filed Value   Admission Height  191.8 cm (75.5\") Documented at 01/18/2020 1854   Admission Weight  98 kg (216 lb 0.8 oz) Documented at 01/18/2020 1854        Intake & Output (last 3 days)       01/16 0701 - 01/17 0700 01/17 0701 - 01/18 0700 01/18 0701 - 01/19 0700 01/19 0701 - 01/20 0700    P.O.   1200     I.V. (mL/kg)    1270.8 (13.4)    IV Piggyback   500 100    Total Intake(mL/kg)   1700 (17.9) 1370.8 (14.5)    Urine (mL/kg/hr)   700     Total Output   700     Net   +1000 +1370.8                Lines, Drains & Airways    Active LDAs     Name:   Placement date:   Placement time:   Site:   Days:    Peripheral IV 01/18/20 1926 Right Arm   01/18/20 1926    Arm   less than 1                  Physical Exam:    Physical Exam   Constitutional: He is oriented to person, place, and time. He appears well-developed and well-nourished. No distress.   HENT:   Head: Normocephalic and atraumatic.   Right Ear: External ear normal.   Left Ear: External ear normal.   Nose: Nose normal.   Mouth/Throat: Oropharynx is clear and moist. No oropharyngeal exudate.   Eyes: Pupils are equal, round, and reactive to light. Conjunctivae and EOM are normal. Right eye exhibits no discharge. Left eye exhibits no discharge. No scleral icterus.   Neck: Normal range of motion. No JVD present. No tracheal deviation present. No thyromegaly present. "   Cardiovascular: Normal rate, regular rhythm, normal heart sounds and intact distal pulses. Exam reveals no gallop and no friction rub.   No murmur heard.  Pulmonary/Chest: Effort normal and breath sounds normal. No stridor. No respiratory distress. He has no wheezes. He has no rales. He exhibits no tenderness.   Abdominal: Soft. He exhibits no distension and no mass. There is tenderness. There is no rebound and no guarding. No hernia.   Slight tender in right upper quadrant, questionable positive Perry sign.   Musculoskeletal: Normal range of motion. He exhibits no edema, tenderness or deformity.   Lymphadenopathy:     He has no cervical adenopathy.   Neurological: He is alert and oriented to person, place, and time. No cranial nerve deficit or sensory deficit. He exhibits normal muscle tone. Coordination normal.   Skin: Skin is warm and dry. No rash noted. He is not diaphoretic. No erythema.   Psychiatric: He has a normal mood and affect. His behavior is normal.   Nursing note and vitals reviewed.           Wounds (last 24 hours)      LDA Wound     Row Name 01/18/20 2338 01/18/20 2247          Wound 01/18/20 2244 Right heel Diabetic Ulcer    Wound - Properties Group Date first assessed: 01/18/20  - Time first assessed: 2244  -SS Present on Hospital Admission: Y  -SS Side: Right  -SS Location: heel  -SS Primary Wound Type: Diabetic [facility]  - Stage, Pressure Injury: unstageable  -    Wound Image  --  Images linked: 1  -DL     Closure  Other (Comment) 4x4's, abd pad with curlex wrapped around foot.  -DL  --       User Key  (r) = Recorded By, (t) = Taken By, (c) = Cosigned By    Initials Name Provider Type    Bryanna Albarran LPN Licensed Nurse    Dorcas Rios RN Registered Nurse          Procedures:              Results Review:     I reviewed the patient's new clinical results.      Lab Results (last 24 hours)     Procedure Component Value Units Date/Time    Respiratory Panel, PCR - Swab, Nasopharynx  [[id number]]  (Normal) Collected:  01/19/20 1059    Specimen:  Swab from Nasopharynx Updated:  01/19/20 1545     ADENOVIRUS, PCR Not Detected     Coronavirus 229E Not Detected     Coronavirus HKU1 Not Detected     Coronavirus NL63 Not Detected     Coronavirus OC43 Not Detected     Human Metapneumovirus Not Detected     Human Rhinovirus/Enterovirus Not Detected     Influenza B PCR Not Detected     Parainfluenza Virus 1 Not Detected     Parainfluenza Virus 2 Not Detected     Parainfluenza Virus 3 Not Detected     Parainfluenza Virus 4 Not Detected     Bordetella pertussis pcr Not Detected     Influenza A H1 2009 PCR Not Detected     Chlamydophila pneumoniae PCR Not Detected     Mycoplasma pneumo by PCR Not Detected     Influenza A PCR Not Detected     Influenza A H3 Not Detected     Influenza A H1 Not Detected     RSV, PCR Not Detected    POC Glucose Once [[id number]]  (Abnormal) Collected:  01/19/20 1116    Specimen:  Blood Updated:  01/19/20 1120     Glucose 207 mg/dL      Comment: Serial Number: [id number]:  [id number]       Lipase [[id number]]  (Abnormal) Collected:  01/19/20 0441    Specimen:  Blood Updated:  01/19/20 0745     Lipase 10 U/L     POC Glucose Once [[id number]]  (Abnormal) Collected:  01/19/20 0700    Specimen:  Blood Updated:  01/19/20 0701     Glucose 228 mg/dL      Comment: Serial Number: [id number]:  [id number]       Comprehensive Metabolic Panel [[id number]]  (Abnormal) Collected:  01/19/20 0441    Specimen:  Blood Updated:  01/19/20 0549     Glucose 286 mg/dL      BUN 19 mg/dL      Creatinine 0.87 mg/dL      Sodium 130 mmol/L      Potassium 3.7 mmol/L      Chloride 93 mmol/L      CO2 29.0 mmol/L      Calcium 8.3 mg/dL      Total Protein 6.3 g/dL      Albumin 2.30 g/dL      ALT (SGPT) 50 U/L      AST (SGOT) 21 U/L      Alkaline Phosphatase 121 U/L      Total Bilirubin 1.3 mg/dL      eGFR Non African Amer 95 mL/min/1.73      Globulin 4.0 gm/dL      A/G Ratio 0.6 g/dL      BUN/Creatinine  Ratio 21.8     Anion Gap 8.0 mmol/L     Narrative:       GFR Normal >60  Chronic Kidney Disease <60  Kidney Failure <15      CBC Auto Differential [[id number]]  (Abnormal) Collected:  01/19/20 0441    Specimen:  Blood Updated:  01/19/20 0518     WBC 23.00 10*3/mm3      RBC 3.98 10*6/mm3      Hemoglobin 13.1 g/dL      Hematocrit 37.8 %      MCV 94.9 fL      MCH 32.9 pg      MCHC 34.7 g/dL      RDW 12.4 %      RDW-SD 41.1 fl      MPV 9.2 fL      Platelets 181 10*3/mm3      Neutrophil % 82.4 %      Lymphocyte % 7.2 %      Monocyte % 9.9 %      Eosinophil % 0.0 %      Basophil % 0.5 %      Neutrophils, Absolute 18.90 10*3/mm3      Lymphocytes, Absolute 1.60 10*3/mm3      Monocytes, Absolute 2.30 10*3/mm3      Eosinophils, Absolute 0.00 10*3/mm3      Basophils, Absolute 0.10 10*3/mm3      nRBC 0.1 /100 WBC     POC Glucose Once [[id number]]  (Abnormal) Collected:  01/18/20 2153    Specimen:  Blood Updated:  01/18/20 2154     Glucose 312 mg/dL      Comment: Serial Number: [id number]:  [id number]       Basic Metabolic Panel [[id number]]  (Abnormal) Collected:  01/18/20 1927    Specimen:  Blood Updated:  01/18/20 1956     Glucose 520 mg/dL      BUN 19 mg/dL      Creatinine 1.04 mg/dL      Sodium 128 mmol/L      Potassium 4.2 mmol/L      Chloride 88 mmol/L      CO2 27.0 mmol/L      Calcium 8.2 mg/dL      eGFR Non African Amer 78 mL/min/1.73      BUN/Creatinine Ratio 18.3     Anion Gap 13.0 mmol/L     Narrative:       GFR Normal >60  Chronic Kidney Disease <60  Kidney Failure <15      CBC & Differential [[id number]] Collected:  01/18/20 1927    Specimen:  Blood Updated:  01/18/20 1932    Narrative:       The following orders were created for panel order CBC & Differential.  Procedure                               Abnormality         Status                     ---------                               -----------         ------                     CBC Auto Differential[[id number]]        Abnormal            Final result                  Please view results for these tests on the individual orders.    CBC Auto Differential [[id number]]  (Abnormal) Collected:  01/18/20 1927    Specimen:  Blood Updated:  01/18/20 1932     WBC 23.90 10*3/mm3      RBC 4.09 10*6/mm3      Hemoglobin 13.4 g/dL      Hematocrit 38.5 %      MCV 94.1 fL      MCH 32.6 pg      MCHC 34.7 g/dL      RDW 12.4 %      RDW-SD 41.6 fl      MPV 8.4 fL      Platelets 197 10*3/mm3      Neutrophil % 89.1 %      Lymphocyte % 4.9 %      Monocyte % 5.6 %      Eosinophil % 0.0 %      Basophil % 0.4 %      Neutrophils, Absolute 21.30 10*3/mm3      Lymphocytes, Absolute 1.20 10*3/mm3      Monocytes, Absolute 1.30 10*3/mm3      Eosinophils, Absolute 0.00 10*3/mm3      Basophils, Absolute 0.10 10*3/mm3      nRBC 0.0 /100 WBC         No results found for: HGBA1C            Lab Results   Component Value Date    LIPASE 10 (L) 01/19/2020     No results found for: CHOL, CHLPL, TRIG, HDL, LDL, LDLDIRECT    No results found for: INTRAOP, PREDX, FINALDX, COMDX    Microbiology Results (last 10 days)     Procedure Component Value - Date/Time    Respiratory Panel, PCR - Swab, Nasopharynx [[id number]]  (Normal) Collected:  01/19/20 1059    Lab Status:  Final result Specimen:  Swab from Nasopharynx Updated:  01/19/20 1545     ADENOVIRUS, PCR Not Detected     Coronavirus 229E Not Detected     Coronavirus HKU1 Not Detected     Coronavirus NL63 Not Detected     Coronavirus OC43 Not Detected     Human Metapneumovirus Not Detected     Human Rhinovirus/Enterovirus Not Detected     Influenza B PCR Not Detected     Parainfluenza Virus 1 Not Detected     Parainfluenza Virus 2 Not Detected     Parainfluenza Virus 3 Not Detected     Parainfluenza Virus 4 Not Detected     Bordetella pertussis pcr Not Detected     Influenza A H1 2009 PCR Not Detected     Chlamydophila pneumoniae PCR Not Detected     Mycoplasma pneumo by PCR Not Detected     Influenza A PCR Not Detected     Influenza A H3 Not Detected     Influenza A H1  Not Detected     RSV, PCR Not Detected          ECG/EMG Results (most recent)     Procedure Component Value Units Date/Time    ECG 12 Lead [[id number]] Collected:  01/18/20 1906     Updated:  01/18/20 1907    Narrative:       HEART RATE= 105  bpm  RR Interval= 572  ms  NE Interval= 149  ms  P Horizontal Axis= 3  deg  P Front Axis= 48  deg  QRSD Interval= 79  ms  QT Interval= 328  ms  QRS Axis= 34  deg  T Wave Axis= 2  deg  - BORDERLINE ECG -  Sinus tachycardia  Borderline ST elevation, lateral leads  When compared with ECG of 04-Apr-2019 13:03:16,  Significant change in rhythm  Electronically Signed By:   Date and Time of Study: 2020-01-18 19:06:28                    Ct Head Without Contrast    Result Date: 1/18/2020  1.  Normal exam.  Electronically Signed By-Arabella Mendez On:1/18/2020 8:09 PM This report was finalized on [id number] by  Arabella Mendez, .    Ct Cervical Spine Without Contrast    Result Date: 1/18/2020  Normal CT cervical spine without contrast.  Electronically Signed ByAnna Mendez On:1/18/2020 8:11 PM This report was finalized on [id number] by  Arabella Mendez .    Xr Toe 2+ View Left    Result Date: 1/18/2020   1. Negative for fracture. 2. Chronic abnormal appearance of second and third MTP joints. 3. Soft tissue swelling.  Electronically Signed ByAnna Mendez On:1/18/2020 8:07 PM This report was finalized on [id number] by  Arabella Mendez, .    Xr Chest Pa & Lateral    Result Date: 1/19/2020   1. No active cardiopulmonary disease 2. Right PICC line is been removed since previous study of 04/07/2019 3. No significant change from 04/07/2019  Electronically Signed By-Hermilo Vaz Jr. On:1/19/2020 1:44 PM This report was finalized on [id number] by  Hermilo Vaz Jr., .          Xrays, labs reviewed personally by physician.    Medication Review:   I have reviewed the patient's current medication list      Scheduled Meds    aspirin 325 mg Oral Daily   insulin glargine 12 Units Subcutaneous  Nightly   insulin lispro 0-14 Units Subcutaneous 4x Daily With Meals & Nightly   piperacillin-tazobactam 3.375 g Intravenous Q8H   sodium chloride 10 mL Intravenous Q12H       Meds Infusions    sodium chloride 125 mL/hr Last Rate: 125 mL/hr (01/19/20 0721)       Meds PRN  •  acetaminophen **OR** acetaminophen **OR** acetaminophen  •  aluminum-magnesium hydroxide-simethicone  •  bisacodyl  •  dextrose  •  dextrose  •  glucagon (human recombinant)  •  insulin lispro **AND** insulin lispro  •  magnesium hydroxide  •  melatonin  •  ondansetron **OR** ondansetron  •  [COMPLETED] Insert peripheral IV **AND** sodium chloride  •  sodium chloride        Assessment/Plan   Assessment/Plan     Active Hospital Problems    Diagnosis  POA   • Open wound of second toe of left foot [S91.105A]  Yes   • Syncope [R55]  Yes   • Diabetic peripheral neuropathy (CMS/HCC) [E11.42]  Yes   • Type 1 diabetes mellitus with circulatory complication (CMS/HCC) [E10.59]  Yes      Resolved Hospital Problems   No resolved problems to display.       MEDICAL DECISION MAKING COMPLEXITY BY PROBLEM:     Active Hospital Problems:  No notes have been filed under this hospital service.  Service: Hospitalist     Syncope, can be vasovagal from nausea and vomiting persistent in nature otherwise etiology  unclear to me, possible be secondary to dehydration.  -CT head negative  -neuro checks q4  -IV fluids, telemetry, check TSH.  -We will ask for the 2D echo  -We will ask for the urine toxicology.    Diabetic wound ulcer with some discharge and surrounding edema ....   - MRI foot right side  -  Consult Dr. Souza podiatry  - wound culture  - iv cefepime and vancomycin  - ID consult.       Diabetes type 1  -Continue Patient's lantus  -Accuchecks AC and HS  -Cover with sliding scale     Right upper quadrant abdominal pain, patient noted slightly tender there, I am concerned about acute cholecystitis, will ask for the ultrasound gallbladder, IV cefepime, monitor WBC  count, monitor clinical progress.           VTE Prophylaxis - SCDs.    VTE Prophylaxis - SCDs.      Code Status -   Code Status and Medical Interventions:   Ordered at: 01/18/20 2121     Level Of Support Discussed With:    Patient     Code Status:    CPR     Medical Interventions (Level of Support Prior to Arrest):    Full       Discharge Planning          Destination      Coordination has not been started for this encounter.      Durable Medical Equipment      Coordination has not been started for this encounter.      Dialysis/Infusion      Coordination has not been started for this encounter.      Home Medical Care      Coordination has not been started for this encounter.      Therapy      Coordination has not been started for this encounter.      Community Resources      Coordination has not been started for this encounter.            Electronically signed by Peewee Rdz MD, 01/19/20, 3:48 PM.  [facility] Hospitalist Team        Electronically signed by Peewee Rdz MD at 01/19/20 1616          Consult Notes (last 72 hours) (Notes from 01/18/20 0914 through 01/21/20 0914)      Indra Rivas MD at 01/20/20 1615      Consult Orders    1. Inpatient Orthopedic Surgery Consult [[id number]] ordered by Christopher Ron MD at 01/20/20 1012                    Referring Provider: Emergency room  Reason for Consultation: Right shoulder pain    Patient Care Team:  Erwin Castorena MD as PCP - General (Family Medicine)    Chief complaint right shoulder pain    Subjective .     History of present illness: This patient is a 44-year-old right-handed male who said he fell in the shower 2 nights ago.  He injured his right shoulder.  Never had problems with his shoulder before.  The reason for loss of consciousness is unknown.  He is admitted to the hospital because of a work-up for loss of consciousness, shoulder pain, and foot pain.  He seen Dr. simons for his foot pain.  He has no numbness in his  right shoulder he says it just hurts a lot.  Never had pain before.  He says it has not gotten better since he has been in the hospital.      History  Past Medical History:   Diagnosis Date   • Coronary artery disease    • Diabetes mellitus (CMS/HCC)        Past Surgical History:   Procedure Laterality Date   • AMPUTATION FOOT / TOE     • CARDIAC SURGERY     • TOE AMPUTATION Left           Scheduled Meds:      [START ON 1/21/2020] !Vancomycin Level Draw Needed  Does not apply Once   [START ON 1/21/2020] !Vancomycin Level Draw Needed  Does not apply Once   aspirin 325 mg Oral Daily   cefepime 2 g Intravenous Q8H   insulin glargine 12 Units Subcutaneous Nightly   insulin lispro 0-14 Units Subcutaneous 4x Daily With Meals & Nightly   sodium chloride 10 mL Intravenous Q12H   vancomycin 1,500 mg Intravenous Q12H        PRN Meds:   acetaminophen **OR** acetaminophen **OR** acetaminophen  •  aluminum-magnesium hydroxide-simethicone  •  bisacodyl  •  dextrose  •  dextrose  •  glucagon (human recombinant)  •  insulin lispro **AND** insulin lispro  •  ketorolac  •  magnesium hydroxide  •  melatonin  •  ondansetron **OR** ondansetron  •  Pharmacy to dose vancomycin  •  sodium chloride      Allergies:    Metformin and Oxycodone-acetaminophen        Objective     Vital Signs   [unfilled]    Physical Exam:     General Appearance:   Is with 2 nurses in his room.  He is oriented to time place and person.  His in bed with his head of bed elevated approximately 45 degrees.   Extremities:  Right arm he has no deformity.  Good radial pulse.  Skin is intact   Pulses:  Intact   Skin:  Intact   Neurologic:  Except for foot diabetic neuropathy he has upper extremity has normal neurovascular function.       Results Review:  CBC    Results from last 7 days   Lab Units 01/19/20 0441 01/18/20  1927   WBC 10*3/mm3 23.00* 23.90*   HEMOGLOBIN g/dL 13.1 13.4   PLATELETS 10*3/mm3 181 197     BMP Results from last 7 days   Lab Units 01/19/20 0441  01/18/20  1927   SODIUM mmol/L 130* 128*   POTASSIUM mmol/L 3.7 4.2   CHLORIDE mmol/L 93* 88*   CO2 mmol/L 29.0 27.0   BUN mg/dL 19 19   CREATININE mg/dL 0.87 1.04   GLUCOSE mg/dL 286* 520*     Infection Results from last 7 days   Lab Units 01/19/20  1630   WOUNDCX  Culture in progress     Radiology(recent) Xr Shoulder 2+ View Right    Result Date: 1/20/2020   1. No acute right shoulder findings. No significant right shoulder osteoarthritic change. 2. Suspected benign enchondroma or bone infarcts in the right humeral shaft.  Electronically Signed By-Dr. Yamileth Bourne MD On:1/20/2020 12:56 PM This report was finalized on [id number] by Dr. Yamileth Bourne MD.    Ct Head Without Contrast    Result Date: 1/18/2020  1.  Normal exam.  Electronically Signed ByAnna Mendez On:1/18/2020 8:09 PM This report was finalized on [id number] by  Arabella Mendez .    Ct Cervical Spine Without Contrast    Result Date: 1/18/2020  Normal CT cervical spine without contrast.  Electronically Signed ByAnna Mendez On:1/18/2020 8:11 PM This report was finalized on [id number] by  Arabella Mendez .    Xr Toe 2+ View Left    Result Date: 1/18/2020   1. Negative for fracture. 2. Chronic abnormal appearance of second and third MTP joints. 3. Soft tissue swelling.  Electronically Signed ByAnna Mendez On:1/18/2020 8:07 PM This report was finalized on [id number] by  Arabella Mendez .    Us Gallbladder    Result Date: 1/19/2020   1. Sludge in the gallbladder lumen. No evidence of gallbladder wall thickening or fluid in the gallbladder fossa. No evidence of dilatation of bile ducts. No evidence of ascites.  Electronically Signed By-DR. Mal Lott MD On:1/19/2020 5:50 PM This report was finalized on [id number] by DR. Mal Lott MD.    Mri Shoulder Right Without Contrast    Result Date: 1/20/2020  1.There is a tear of the superior labrum from anterior to posterior with mild adjacent cartilage loss in the superior  glenoid. This is age indeterminate. 2.No imaging findings of shoulder dislocation. No Hill-Sachs impaction fracture or Bankart lesion. 3.Chondroid lesions in the proximal humeral diaphysis. No associated aggressive features on MRI. Radiographic follow-up of the humerus is recommended in 6 months time. These are likely benign enchondromas. The largest lesion is not seen in the field-of-view. 4.No findings rotator cuff tear.   Electronically Signed By-Millicent Springer MD On:1/20/2020 2:17 PM This report was finalized on [id number] by  Millicent Springer MD.    Mri Foot Right Without Contrast    Result Date: 1/20/2020  1.There is a sinus tract along the plantar aspect of foot superficial to the first metatarsal phalange joint with abnormal soft tissue density along this tract along the plantar aspect of the great toe with abnormal signal intensity in the hallux sesamoid bones. This is suspicious for osteomyelitis of the hallux sesamoid bones. Adjacent phlegmon is likely. Soft tissue evaluation limited without contrast. 2.There are remote and subacute fractures as described. 3.There is erosive change at the second proximal interphalangeal joint and the first interphalangeal joint that may be seen with gouty arthritis. Apparently erosive arthritis such as rheumatoid arthritis is thought to be unlikely.    Electronically Signed By-Millicent Springer MD On:1/20/2020 2:04 PM This report was finalized on [id number] by  Millicent Springer MD.    Xr Chest Pa & Lateral    Result Date: 1/19/2020   1. No active cardiopulmonary disease 2. Right PICC line is been removed since previous study of 04/07/2019 3. No significant change from 04/07/2019  Electronically Signed By-Hermilo Vaz Jr. On:1/19/2020 1:44 PM This report was finalized on [id number] by  Hermilo Vaz Jr., .        I reviewed the patient's new clinical results.  I reviewed the patient's new imaging results and agree with the interpretation.      Assessment/Plan       Syncope     Diabetic peripheral neuropathy (CMS/[facility])    Type 1 diabetes mellitus with circulatory complication (CMS/HCC)    Open wound of second toe of left foot    Acute pain of right shoulder    Chronic ulcer of right foot, with necrosis of bone (CMS/[facility])      Impression #1 injury to right shoulder with possible labral tear #2 diabetes with peripheral neuropathy #3 open wounds of left foot being taken care of by Dr. Souza    Recommendations #1 I discussed his MRI with him.  I told him it is impossible to tell with whether the labral tear is acute or chronic.  As a result tomorrow we will give him an injection in his right shoulder.  If it helps him a lot then we can start him on some physical therapy.  If it does not help much then we may end up repairing his labral tear.  Thank you    I discussed the patients findings and my recommendations with patient    Indra Rivas MD  01/20/20  4:16 PM              Electronically signed by Indra Rivas MD at 01/20/20 1619     CROW Souza DPM at 01/20/20 1411      Consult Orders    1. Inpatient Podiatry Consult [[id number]] ordered by Peewee Rdz MD at 01/19/20 1613                01/18/2020  Foot and Ankle Surgery - Consult  Provider: Dr. Adan Souza DPM  Location: [facility]    Subjective:  Maynor Gregg is a 44 y.o. male.     Chief Complaint   Patient presents with   • Syncope       Consulting Physician: Primary service    Reason for Consult: Bilateral foot wounds    HPI: Patient is a 44-year-old diabetic male who is well-known to me and been admitted for concerns of foot infection.  Patient presents with progressive swelling, redness, and drainage affecting the left second digit.  He feels that the wound is been present for approximately 2 weeks.  He did call my office when wound was noted and we did start him on an oral antibiotic.  He feels that the wound is getting worse.  He was running a fever at home and passed out injuring his shoulder.   He also has noticed progressive wound to the plantar aspect of the right foot.  He has not been feeling well over the last few days.    Allergies   Allergen Reactions   • Metformin Diarrhea and Nausea And Vomiting   • Oxycodone-Acetaminophen Nausea And Vomiting and Rash       Past Medical History:   Diagnosis Date   • Coronary artery disease    • Diabetes mellitus (CMS/HCC)        Past Surgical History:   Procedure Laterality Date   • AMPUTATION FOOT / TOE     • CARDIAC SURGERY     • TOE AMPUTATION Left        Family History   Problem Relation Age of Onset   • Diabetes Father    • Heart failure Father    • Diabetes Paternal Grandfather        Social History     Socioeconomic History   • Marital status: Single     Spouse name: Not on file   • Number of children: Not on file   • Years of education: Not on file   • Highest education level: Not on file   Tobacco Use   • Smoking status: Former Smoker     Packs/day: 0.50     Last attempt to quit: 2019     Years since quittin.7   • Smokeless tobacco: Never Used   Substance and Sexual Activity   • Alcohol use: No     Frequency: Never   • Drug use: No   • Sexual activity: Defer          Current Facility-Administered Medications:   •  [START ON 2020] !Vancomycin Level Draw Needed, , Does not apply, Once, Peewee Rdz MD  •  [START ON 2020] !Vancomycin Level Draw Needed, , Does not apply, Once, Peewee Rdz MD  •  acetaminophen (TYLENOL) tablet 650 mg, 650 mg, Oral, Q4H PRN, 650 mg at 20 **OR** acetaminophen (TYLENOL) 160 MG/5ML solution 650 mg, 650 mg, Oral, Q4H PRN **OR** acetaminophen (TYLENOL) suppository 650 mg, 650 mg, Rectal, Q4H PRN, Travis Garcia APRN  •  aluminum-magnesium hydroxide-simethicone (MAALOX MAX) 400-400-40 MG/5ML suspension 15 mL, 15 mL, Oral, Q6H PRN, Travis Garcia APRN  •  aspirin tablet 325 mg, 325 mg, Oral, Daily, Travis Garcia APRN, 325 mg at 20 08  •  bisacodyl (DULCOLAX) EC  tablet 5 mg, 5 mg, Oral, Daily PRN, Travis Garcia APRN  •  cefepime 2 gm IVPB in 100 ml NS (MBP), 2 g, Intravenous, Q8H, Peewee Rdz MD, Stopped at 01/20/20 1308  •  dextrose (D50W) 25 g/ 50mL Intravenous Solution 25 g, 25 g, Intravenous, Q15 Min PRN, Travis Garcia APRN  •  dextrose (GLUTOSE) oral gel 15 g, 15 g, Oral, Q15 Min PRN, Travis Garcia APRN  •  glucagon (human recombinant) (GLUCAGEN DIAGNOSTIC) injection 1 mg, 1 mg, Subcutaneous, Q15 Min PRN, Travis Garcia APRN  •  insulin glargine (LANTUS) injection 12 Units, 12 Units, Subcutaneous, Nightly, Travis Garcia APRN, 12 Units at 01/19/20 2115  •  insulin lispro (humaLOG) injection 0-14 Units, 0-14 Units, Subcutaneous, 4x Daily With Meals & Nightly, 3 Units at 01/20/20 1323 **AND** insulin lispro (humaLOG) injection 0-14 Units, 0-14 Units, Subcutaneous, PRN, Travis Garcia APRN  •  ketorolac (TORADOL) injection 30 mg, 30 mg, Intravenous, Q6H PRN, Christopher Ron MD  •  magnesium hydroxide (MILK OF MAGNESIA) suspension 2400 mg/10mL 10 mL, 10 mL, Oral, Daily PRN, Travis Garcia APRN  •  melatonin tablet 5 mg, 5 mg, Oral, Nightly PRN, Travis Garcia APRN  •  ondansetron (ZOFRAN) tablet 4 mg, 4 mg, Oral, Q6H PRN **OR** ondansetron (ZOFRAN) injection 4 mg, 4 mg, Intravenous, Q6H PRN, Travis Garcia APRN, 4 mg at 01/18/20 2311  •  Pharmacy to dose vancomycin, , Does not apply, Continuous PRN, Peewee Rdz MD  •  sodium chloride 0.9 % flush 10 mL, 10 mL, Intravenous, Q12H, Travis Garcia APRN, 10 mL at 01/20/20 0904  •  sodium chloride 0.9 % flush 10 mL, 10 mL, Intravenous, PRN, Travis Garcia APRN  •  sodium chloride 0.9 % infusion, 125 mL/hr, Intravenous, Continuous, Sidney Miller MD, Last Rate: 125 mL/hr at 01/19/20 0721, 125 mL/hr at 01/19/20 0721  •  vancomycin IVPB 1500 mg in 0.9% NaCl (Premix) 250 mL, 1,500 mg, Intravenous, Q12H, Peewee Rdz MD, 1,500 mg at  "01/20/20 8216    Review of Systems:  General: Denies fever, chills, fatigue, and weakness.  Eyes: Denies vision loss, blurry vision, and excessive redness.  ENT: Denies hearing issues and difficulty swallowing.  Cardiovascular: Denies palpitations, chest pain, or syncopal episodes.  Respiratory: Denies shortness of breath, wheezing, and coughing.  GI: Denies abdominal pain, nausea, and vomiting.   : Denies frequency, hematuria, and urgency.  Musculoskeletal: Denies muscle cramps, joint pains, and stiffness.  Derm: + open wounds to both feet  Neuro: Denies headaches, numbness, loss of coordination, and tremors.  Psych: Denies anxiety and depression.  Endocrine: Denies temperature intolerance and changes in appetite.  Heme: Denies bleeding disorders or abnormal bruising.     Objective   /81   Pulse 86   Temp 98.8 °F (37.1 °C) (Oral)   Resp 16   Ht 190.5 cm (75\")   Wt 94.3 kg (208 lb)   SpO2 97%   BMI 26.00 kg/m²      Foot/Ankle Exam:       General:   Appearance: disheveled    Orientation: AAOx3    Affect: appropriate       Right Foot/Ankle:      Vascular   Dorsalis pedis:  2+  Posterior tibial:  2+  Skin Temperature: warm    Edema Grading:  None  CFT:  < 3 seconds  Pedal Hair Growth:  Absent     Neurologic   Light touch sensation:  Diminished  Hot/Cold sensation: diminished    Protective Sensation using [place]-Stephon Monofilament:  Diminished  Achilles reflex:  1+     Musculoskeletal    Amputation   Right toes amputated: No    Ecchymosis:  None  Tenderness: none    Arch:  Normal  Hallux limitus: Yes       Muscle Strength   Normal strength    Foot dorsiflexion:  5  Foot plantar flexion:  5  Foot inversion:  5  Foot eversion:  5     Dermatologic   Skin: color abnormal, drainage, erythema and ulcer    Skin: right foot skin not intact and no right foot cellulitis       Left Foot/Ankle:      Vascular   Dorsalis pedis:  2+  Posterior tibial:  2+  Skin Temperature: warm    Edema Grading:  +2 and " pitting  CFT:  < 3 seconds  Pedal Hair Growth:  Absent     Neurologic   Light touch sensation:  Diminished  Hot/cold sensation: diminished    Protective Sensation using [place]-Stephon Monofilament:  Diminished  Achilles reflex:  1+     Musculoskeletal    Amputation   Left toes amputated: Yes    Toes amputated: first toe and fourth toe  Ecchymosis:  None  Tenderness: none    Hammertoe:  Second toe, third toe and fifth toe     Muscle Strength   Normal strength    Foot dorsiflexion:  5  Foot plantar flexion:  5  Foot inversion:  5  Foot eversion:  5     Dermatologic   Skin: cellulitis, erythema, skin changes and ulcer    Skin: left foot skin not intact       Image:       Culture results from last 30 days   Lab 01/19/20  1630   WOUNDCX Culture in progress       Results from last 7 days   Lab Units 01/19/20  0441   WBC 10*3/mm3 23.00*   HEMOGLOBIN g/dL 13.1   HEMATOCRIT % 37.8   PLATELETS 10*3/mm3 181       Assessment/Plan   Patient Active Problem List   Diagnosis   • Syncope   • Diabetic peripheral neuropathy (CMS/HCC)   • Type 1 diabetes mellitus with circulatory complication (CMS/HCC)   • Open wound of second toe of left foot   • Acute pain of right shoulder   • Chronic ulcer of right foot, with necrosis of bone (CMS/HCC)     Patient presents with progressive issues involving his left foot and has noticed progressing wound affecting the plantar aspect of the right foot.  His white count remains elevated.  MRI was reviewed of the right foot but has not been formally read yet.  I am concerned of marrow edema affecting the sesamoids to this region.  MRI has been ordered for the left foot.  Patient does have a wound that extends to the level of the joint.  I did discuss the findings with the patient at bedside.  I have recommended that we proceed with debridement and sesamoid excision of the right foot.  Planning for the left foot is pending MRI findings.  Patient understands and agrees. We will plan for surgery  tomorrow. Case has been discussed with infectious disease.    Thank you for the consultation and allowing me to participate in this patient's care. Please call with any additional questions or concerns.     Note is dictated utilizing voice recognition software. Unfortunately this leads to occasional typographical errors. I apologize in advance if the situation occurs. If questions occur please do not hesitate to call our office.      Electronically signed by CROW Souza DPM at 01/20/20 1534     Bailee Knight APRN at 01/20/20 1053      Consult Orders    1. Inpatient Infectious Diseases Consult [[id number]] ordered by Peewee Rdz MD at 01/19/20 1613           Attestation signed by Bobo Garcia MD at 01/20/20 1721    I have reviewed the documentation above and agree.  The patient was seen and examined.  I agree with the nurse practitioner note including assessment and plan.  Labs including microbiology data and imaging studies were reviewed.    The patient presented to the hospital with bilateral diabetic foot.  There is a foul smell from the right foot and the significant edema of the left foot.  MRI of the right foot was done but not reported yet.  We will order MRI of the left foot.  We will continue vancomycin IV cefepime.  We will add IV Flagyl for empirical anaerobic coverage.  The patient will need surgery depending on MRI findings.  Based on the clinical examination the left second toe appears to be infected with exposed PIP joint.  The patient will require a minimal elevation of the left second toe and may need more surgery depending on the MRI findings.                      Infectious Diseases Consult Note    Referring Provider: Christopher Ron MD    Reason for Consultation: Diabetic foot wounds    Patient Care Team:  Erwin Castorena MD as PCP - General (Family Medicine)    Chief complaint syncope with fall, wound to the right foot and left second toe    Subjective     The  patient has been afebrile for the last 24 hours.  The patient is on room air, hemodynamically stable, and is tolerating antimicrobial therapy.    History of present illness:      This is a 44-year-old male who presents the hospital on 1/18/2020 with complaints of syncope and a fall in his shower.  States he also had nausea and vomiting with fever over the previous 3 days.  Patient states that he been feeling dizzy for several days.  He complained of head, neck, and back pain along with right shoulder pain after the fall.  Patient states that he has a wound on his right foot and on his left second toe which was being treated with p.o. doxycycline as an outpatient.  Patient currently denies fever, chills, diaphoresis, shortness of breath but has had some congestion.  He currently still has some nausea but denies current vomiting and diarrhea.  Patient denies any urinary symptoms.  Still having some dizziness.    Patient has been afebrile since admission.  Labs from 1/19/2020 show creatinine 0.87, white blood cell count 23, hemoglobin 13.1, platelets of 181.  Drug screen was negative, respiratory viral DNA panel was negative, and wound culture of the right foot with pending.  CT of the cervical spine was negative for acute findings, CT of the head was normal, x-ray shows no active cardiopulmonary disease, gallbladder ultrasound shows sludge but no evidence of obstruction.  MRI of the right foot shows suspicion for osteomyelitis of the hallux sesamoid bones.  MRI of the right shoulder does not show any indications of dislocation or fracture or rotator cuff tear.  She is currently on IV cefepime and IV vancomycin and has no known antibiotic allergies.    Our service saw the patient when he had a left great toe amputation in November 2018.  We also saw the patient in April 2019 when he had a  left foot, fourth digit amputation at the mid tarsal phalangeal joint, excisional debridement to level of bone, left foot partial  fifth metatarsal resection on 19.  Patient had group A streptococcus and was treated with 6 weeks of IV antibiotics.  The patient has had an MI with cardiac stents in the past. The patient has a history of type 1 diabetes with neuropathy, CAD, left great toe amputation, and right transmetatarsal amputation.  Patient quit smoking approximately 8 months ago and denies any alcohol or illicit drug abuse.      Review of Systems   Review of Systems   Constitutional: Positive for fatigue.   Respiratory: Negative.    Cardiovascular: Negative.    Gastrointestinal: Negative.    Genitourinary: Negative.    Musculoskeletal: Positive for arthralgias and back pain.        Right shoulder pain    Skin: Positive for wound.   Neurological: Positive for dizziness and headaches.   Psychiatric/Behavioral: Negative.    All other systems reviewed and are negative.      Medications  Medications Prior to Admission   Medication Sig Dispense Refill Last Dose   • aspirin 325 MG tablet Take 325 mg by mouth Daily.   Taking   • [] doxycycline (VIBRAMYICN) 100 MG tablet Take 1 tablet by mouth 2 (Two) Times a Day for 10 days. 20 tablet 0    • Insulin Glargine (BASAGLAR KWIKPEN) 100 UNIT/ML injection pen Inject 12 Units under the skin into the appropriate area as directed Every Night.   Taking   • insulin lispro (HUMALOG) 100 UNIT/ML injection Inject  under the skin into the appropriate area as directed Every 8 (Eight) Hours. SSI   Taking       History  Past Medical History:   Diagnosis Date   • Coronary artery disease    • Diabetes mellitus (CMS/HCC)      Past Surgical History:   Procedure Laterality Date   • AMPUTATION FOOT / TOE     • CARDIAC SURGERY     • TOE AMPUTATION Left        Family History  Family History   Problem Relation Age of Onset   • Diabetes Father    • Heart failure Father    • Diabetes Paternal Grandfather        Social History   reports that he quit smoking about 8 months ago. He smoked 0.50 packs per day. He has  never used smokeless tobacco. He reports that he does not drink alcohol or use drugs.    Allergies  Metformin and Oxycodone-acetaminophen    Objective     Vital Signs   Vital Signs (last 24 hours)       01/19 0700  -  01/20 0659 01/20 0700  -  01/20 1053   Most Recent    Temp (°F) 97.8 -  99.4       99.4 (37.4)    Heart Rate 86 -  110      99     99    Resp   18      20     20    /67 -  107/63      100/66     100/66    SpO2 (%) 97 -  98       97          Physical Exam:  Physical Exam   Constitutional: He is oriented to person, place, and time. He appears well-developed and well-nourished.   HENT:   Head: Normocephalic and atraumatic.   Eyes: Pupils are equal, round, and reactive to light. Conjunctivae and EOM are normal.   Neck: Neck supple.   Cardiovascular: Normal rate, regular rhythm and normal heart sounds.   Pulmonary/Chest: Effort normal and breath sounds normal.   Abdominal: Soft. Bowel sounds are normal.   Musculoskeletal: Normal range of motion.   Previous right TMA    Previous left great toe amputation, 4th and 5th amputation    Neurological: He is alert and oriented to person, place, and time.   Skin: Skin is warm and dry.   Wound on the dorsal aspect of the second digit of the left foot  Wound on the plantar aspect of the right foot  Foul odor    Psychiatric: He has a normal mood and affect.   Vitals reviewed.      Microbiology  Microbiology Results (last 10 days)     Procedure Component Value - Date/Time    Respiratory Panel, PCR - Swab, Nasopharynx [[id number]]  (Normal) Collected:  01/19/20 1059    Lab Status:  Final result Specimen:  Swab from Nasopharynx Updated:  01/19/20 1545     ADENOVIRUS, PCR Not Detected     Coronavirus 229E Not Detected     Coronavirus HKU1 Not Detected     Coronavirus NL63 Not Detected     Coronavirus OC43 Not Detected     Human Metapneumovirus Not Detected     Human Rhinovirus/Enterovirus Not Detected     Influenza B PCR Not Detected     Parainfluenza Virus 1 Not  Detected     Parainfluenza Virus 2 Not Detected     Parainfluenza Virus 3 Not Detected     Parainfluenza Virus 4 Not Detected     Bordetella pertussis pcr Not Detected     Influenza A H1 2009 PCR Not Detected     Chlamydophila pneumoniae PCR Not Detected     Mycoplasma pneumo by PCR Not Detected     Influenza A PCR Not Detected     Influenza A H3 Not Detected     Influenza A H1 Not Detected     RSV, PCR Not Detected          Laboratory  Results from last 7 days   Lab Units 01/19/20  0441   WBC 10*3/mm3 23.00*   HEMOGLOBIN g/dL 13.1   HEMATOCRIT % 37.8   PLATELETS 10*3/mm3 181     Results from last 7 days   Lab Units 01/19/20  0441   SODIUM mmol/L 130*   POTASSIUM mmol/L 3.7   CHLORIDE mmol/L 93*   CO2 mmol/L 29.0   BUN mg/dL 19   CREATININE mg/dL 0.87   GLUCOSE mg/dL 286*   CALCIUM mg/dL 8.3*     Results from last 7 days   Lab Units 01/19/20  0441   SODIUM mmol/L 130*   POTASSIUM mmol/L 3.7   CHLORIDE mmol/L 93*   CO2 mmol/L 29.0   BUN mg/dL 19   CREATININE mg/dL 0.87   GLUCOSE mg/dL 286*   CALCIUM mg/dL 8.3*                   Radiology  Imaging Results (Last 72 Hours)     Procedure Component Value Units Date/Time    US Gallbladder [[id number]] Collected:  01/19/20 1747     Updated:  01/19/20 1803    Narrative:       DATE OF EXAM:  1/19/2020 5:04 PM     PROCEDURE:  US GALLBLADDER-     INDICATIONS:  pain in lower ab; R55-Syncope and collapse; S06.2L9T-Hacfbbojra without  loss of consciousness, initial encounter; R73.9-Hyperglycemia,  unspecified; R11.15-Cyclical vomiting syndrome unrelated to migraine  right upper quadrant abdominal pain today with nausea and vomiting     COMPARISON:  No Comparisons Available     TECHNIQUE:   Grayscale and color Doppler ultrasound evaluation of the limited abdomen  was performed.     FINDINGS:  Ultrasound right upper quadrant the abdomen reveals no evidence of liver  mass. Liver measures 18.4 cm in size and is mildly enlarged. No evidence  of ascites. Right kidney measures 13.9 cm x  6.9 cm x 6.4 severe size. No  evidence of mass or obstruction right kidney. Ultrasound Doppler shows  normal antegrade blood flow in the portal vein toward the liver and  shows normal antegrade blood flow in hepatic veins away from liver.  Common bile duct measures 2.6 mm diameter and is normal. No evidence  dilatation of bile ducts. Sludge is seen in the gallbladder lumen. No  evidence of gallbladder wall thickening or fluid in the gallbladder  fossa. The pancreas is difficult to visualize        Impression:          1. Sludge in the gallbladder lumen. No evidence of gallbladder wall  thickening or fluid in the gallbladder fossa. No evidence of dilatation  of bile ducts. No evidence of ascites.     Electronically Signed By-DR. Mal Lott MD On:1/19/2020 5:50  PM  This report was finalized on [id number] by DR. Mal Lott MD.    XR Chest PA & Lateral [[id number]] Collected:  01/19/20 1342     Updated:  01/19/20 1355    Narrative:       Examination: XR CHEST PA AND LATERAL-     Date of Exam: 1/19/2020 1:27 PM     Indication: pt fell on Rt side; R55-Syncope and collapse;  S06.5B9Z-Qpmvhwrexg without loss of consciousness, initial encounter;  R73.9-Hyperglycemia, unspecified; R11.15-Cyclical vomiting syndrome  unrelated to migraine.  Patient's a 44-year-old male with high white  blood count, right-sided chest pain from fall yesterday, patient quit  smoking 3 months ago.  Patient gives history of diabetes type 1.  Patient had syncopal episode yesterday      Comparison: Study of 04/07/2019     Technique: 2 radiographic views of the chest were obtained.     Findings:  Today's 2 view study of the chest was obtained on 01/19/2020 at 1:28 PM  in the PA and lateral projections     The heart size is normal. The lung fields are clear. The diaphragmatic  surfaces are smooth. The upper abdomen is unremarkable. A few scattered  parenchymal calcifications are seen, old granulomatous disease. There is  no  evidence of active disease. The right PICC line has been removed  since previous study.       Impression:          1. No active cardiopulmonary disease  2. Right PICC line is been removed since previous study of 04/07/2019  3. No significant change from 04/07/2019     Electronically Signed By-Hermilo Vaz Jr. On:1/19/2020 1:44 PM  This report was finalized on [id number] by  Hermilo Vaz Jr., .    CT Cervical Spine Without Contrast [[id number]] Collected:  01/18/20 2009     Updated:  01/18/20 2013    Narrative:       DATE OF EXAM:  1/18/2020 7:40 PM     PROCEDURE:  CT CERVICAL SPINE WO CONTRAST-     INDICATIONS:   C-spine trauma, NEXUS/CCR negative, low risk     COMPARISON:   No Comparisons Available     TECHNIQUE:  Routine transaxial slices were obtained through the cervical spine  without the administration of intravenous contrast. Reconstructed  coronal and sagittal images were also obtained. Automated exposure  control and iterative construction methods were used.      FINDINGS:  No cervical spine fractures are seen. The bone mineral density is  normal. The alignment is anatomic. The prevertebral soft tissues and  soft tissues in the field of view are normal. MRI is more sensitive to  evaluate for central canal or neural foraminal narrowing.     C1-C2: No significant central canal or neural foraminal narrowing. The  C1-C2 articulation is normal in appearance.     C2-C3: No significant central canal or neural foraminal narrowing.     C3-C4: No significant central canal or neural foraminal narrowing.     C4-C5: No significant central canal or neural foraminal narrowing.     C5-C6: No significant central canal or neural foraminal narrowing.     C6-C7: No significant central canal or neural foraminal narrowing.     C7-T1: No significant central canal or neural foraminal narrowing.       Impression:       Normal CT cervical spine without contrast.     Electronically Signed By-Arabella Mendez On:1/18/2020 8:11 PM  This  report was finalized on [id number] by  Arabella Mendez, .    CT Head Without Contrast [[id number]] Collected:  01/18/20 2008     Updated:  01/18/20 2011    Narrative:       CT HEAD WO CONTRAST-     Date of Exam: 1/18/2020 7:40 PM     Indication: Dizziness.     Comparison: None available.     Technique:  Without contrast, contiguous axial CT images of the head  were obtained from skull base to vertex.  Coronal and sagittal  reconstructions were performed.  Automated exposure control and  iterative reconstruction methods were used.     FINDINGS  No evidence of intracranial hemorrhage, mass, or midline shift. The  ventricles appear normal in size for the patient's age. No extra-axial  collections identified. The gray white matter differentiation is intact.  No air-fluid levels identified within the paranasal sinuses. The  extra-axial structures demonstrate no acute process.       Impression:       1.  Normal exam.     Electronically Signed By-Arabella Mendez On:1/18/2020 8:09 PM  This report was finalized on [id number] by  Arabella Mendez, .    XR Toe 2+ View Left [[id number]] Collected:  01/18/20 2005     Updated:  01/18/20 2009    Narrative:       DATE OF EXAM:  1/18/2020 7:25 PM     PROCEDURE:  XR TOE 2+ VW LEFT-     INDICATIONS:  Trauma     COMPARISON:  08/21/2019 TECHNIQUE:   A minimum of two routine standard radiographic views were obtained of  the left toes.        FINDINGS:  The big toe has been amputated. The head of the second and third  metatarsal tarsals are deformed and compressed. Some small defect in the  base of the proximal phalanx of the second toe. The fourth toe has been  amputated. There is no soft tissue gas or opaque foreign body. There is  some soft tissue swelling mainly of the second toe. There is no  periostitis identified.     No fracture is identified.       Impression:          1. Negative for fracture.  2. Chronic abnormal appearance of second and third MTP joints.  3. Soft tissue  swelling.     Electronically Signed By-Arabella Mendez On:1/18/2020 8:07 PM  This report was finalized on [id number] by  Arabella Mendez, .          Cardiology      Results Review:  I have reviewed all clinical data, test, lab, and imaging results.       Schedule Meds    [START ON 1/21/2020] !Vancomycin Level Draw Needed  Does not apply Once   [START ON 1/21/2020] !Vancomycin Level Draw Needed  Does not apply Once   aspirin 325 mg Oral Daily   cefepime 2 g Intravenous Q8H   insulin glargine 12 Units Subcutaneous Nightly   insulin lispro 0-14 Units Subcutaneous 4x Daily With Meals & Nightly   sodium chloride 10 mL Intravenous Q12H   vancomycin 1,500 mg Intravenous Q12H       Infusion Meds    Pharmacy to dose vancomycin     sodium chloride 125 mL/hr Last Rate: 125 mL/hr (01/19/20 0721)       PRN Meds  acetaminophen **OR** acetaminophen **OR** acetaminophen  •  aluminum-magnesium hydroxide-simethicone  •  bisacodyl  •  dextrose  •  dextrose  •  glucagon (human recombinant)  •  insulin lispro **AND** insulin lispro  •  ketorolac  •  magnesium hydroxide  •  melatonin  •  ondansetron **OR** ondansetron  •  Pharmacy to dose vancomycin  •  sodium chloride      Assessment/Plan       Assessment    Diabetic foot wound  -Wound on the dorsal aspect of the second digit of the left foot  -Wound on the plantar aspect of the right foot   -MRI of the right foot shows suspicion for osteomyelitis of the hallux sesamoid bones  -History of left great toe amputation in November 2018, fourth and fifth digit amputation in April 2019 with 6 weeks of IV antibiotics  -History of right transmetatarsal amputation    Leukocytosis    Type 1 diabetes with neuropathy    Recent complaints of dizziness with syncope on 1/18/2020 which resulted in a fall in the shower  -Imaging studies do not show any acute findings on the CT the head, CT the cervical spine or MRI of the right shoulder  -Screen was negative    Also evidence of nausea, vomiting, and fever  at home  -Patient has been afebrile since admission  -Respiratory viral DNA panel was negative    CAD, history of MI with cardiac catheterization    Plan    Continue IV cefepime   Continue IV vancomycin  And IV Flagyl 500 mg every q 8 hours  Wound culture of right foot is pending  Wound culture of left foot  MRI of left foot with and without contrast  2D echo pending  Discussed case with podiatry with I&D of right foot and exploration of left foot depending on results of the MRI- scheduled for tomorrow   Continue supportive care  [facility]      Bailee Knight, KAMILLE  01/20/20  10:53 AM    Electronically signed by Bobo Garcia MD at 01/20/20 0780

## 2020-01-21 NOTE — PROGRESS NOTES
Continued Stay Note  DESTIN Cohen     Patient Name: Maynor Gregg  MRN: [id number]  Today's Date: 1/21/2020    Admit Date: 1/18/2020    Discharge Plan     Row Name 01/21/20 1443       Plan    Plan  Surgery 1/21, will need 6 wks of IV antibiotics at home per ID.                     Jason Price RN

## 2020-01-21 NOTE — ANESTHESIA PROCEDURE NOTES
Airway  Urgency: elective    Date/Time: 1/21/2020 12:10 PM  Airway not difficult    General Information and Staff    Patient location during procedure: OR    Indications and Patient Condition  Indications for airway management: airway protection    Preoxygenated: yes  MILS maintained throughout  Mask difficulty assessment: 1 - vent by mask    Final Airway Details  Final airway type: supraglottic airway      Successful airway: LMA  Size 4    Number of attempts at approach: 1  Assessment: lips, teeth, and gum same as pre-op and atraumatic intubation

## 2020-01-21 NOTE — PLAN OF CARE
Problem: Patient Care Overview  Goal: Plan of Care Review  Outcome: Ongoing (interventions implemented as appropriate)  Flowsheets  Taken 1/21/2020 7551  Progress: improving  Outcome Summary: Pt. states toradol helps better than morphine for shoulder pain, surgery scheduled for today, pt. has been NPO since midnight, will continue to monitor.  Taken 1/20/2020 1903  Plan of Care Reviewed With: patient

## 2020-01-21 NOTE — OP NOTE
Operative Note   Foot and Ankle Surgery   Provider: Dr. dAan Souza   Location: [facility]      Procedure:  1.  Excisional debridement to muscle/tendon, right foot  2.  Tibial and fibular sesamoidectomy with biopsy, right foot  3.  Incision and drainage to the level of tendon, left foot  4.  Second digit amputation at the metatarsal phalangeal joint, left foot    Pre-operative Diagnosis:   1.  Chronic ulcer to the level of bone, right foot  2.  Suspected osteomyelitis, right foot  3.  Chronic ulcer to the level of bone, left foot  4.  Suspected deep space abscess, left foot  5.  Diabetes mellitus with peripheral neuropathy    Post-operative Diagnosis: Same    Surgeon: Adan Souza    Assistant: None    Anesthesia: General    Implants: None    Findings: Significant purulence to the subcutaneous tissues of the right foot extending to the level of the sesamoid apparatus.  Proximal extension of purulence along the fascial plane to the midfoot.  Significant discoloration involving the sesamoid apparatus.  No obvious purulence, tracking, or other concerns to the left foot.     Specimen: Tissue specimen sent to microbiology from the right foot for culture and sensitivity.  Sesamoid apparatus sent to pathology as permanent.  Swab culture of the left foot sent to micro.  Second digit sent to pathology as permanent    Blood Loss: 10cc    Complications: None    Post Op Plan: Return to floor.  Nonweightbearing activity.  Consult placed with wound care nurse for evaluation for wound VAC to the right foot.  Continue IV antibiotics.  Anticipate 6 weeks of IV antibiotics at discharge.  Case was discussed with infectious disease service.  We will continue to monitor the left lower extremity.  I anticipate the swelling is from acute on chronic neuropathic arthropathy.    Summary:    Patient is a 44-year-old diabetic male that is well-known to me and recently admitted for issues involving both feet.  He states that he did  have a syncopal episode last week causing progressive swelling and redness to both feet.  He does have progressive wound involving the plantar aspect of the right foot.  He also noticed substantial swelling and redness to the left foot.  He feels that the left second toe wound is relatively unchanged.  He called me about the left second toe approximately 1 to 2 weeks ago and was placed on an oral antibiotic.  He was doing well until his fall.  On presentation to the hospital, he had significant increased white count.  MRI was performed showing changes involving the sesamoid bones to the right foot.  Given his findings, I have recommended that we proceed with debridement and sesamoidectomy of the right foot with incision and drainage and amputation of the second digit to the left foot.  Patient understands and agrees.    Procedure, risks, complications, and goals were discussed with the patient at bedside.  Risks include but are not limited to infection, complications from anesthesia (including death), chronic pain or numbness, hematoma/seroma, deep vein thrombosis, wound complications, and potential for additional surgical procedures.  Patient understands and elects to proceed with surgery at this time. Informed consent was obtained before proceeding to the operating suite.  All questions were answered to the patient's satisfaction. No guarantees or assurances were given or implied.    Procedure:    Patient was brought to the operating room and placed in the operative table in supine position.  Once general anesthesia was performed, a pneumatic tourniquet was placed about the patient's calf to both lower legs.  Both feet were scrubbed prepped and draped in usual sterile fashion.  A formal timeout was conducted prior skin incision.    Attention was then directed to the right lower extremity.  The limb was elevated and exsanguinated and the pneumatic tourniquet was inflated to 250 mmHg.  The ulcer was evaluated.  A 15  blade was used to perform excisional debridement removing viable and nonviable skin, subcutaneous tissue, deep fascia, and ligamentous tissue.  Purulence was noted at the level of the sesamoid apparatus and shown to extend proximally.  Full-thickness incision was performed proximally and distally to the plantar aspect of the foot.  Purulence was noted to track along the fascial plane proximally.  The purulence was decompressed showing no further extension.  Attention was then redirected to the sesamoid apparatus.  Discoloration was noted to the flexor hallucis longus tendon and the sesamoid apparatus.  The tendon was resected along with the sesamoid apparatus giving direct visualization to the plantar aspect of the first metatarsal phalangeal joint.  Soft tissue specimen was obtained and sent to microbiology.  The sesamoid apparatus was sent to pathology as permanent.  Thorough irrigation was performed with bacitracin and normal saline.  Final evaluation was performed showing no continued necrotic tissue or purulence.  Deep structures were closed with a 2-0 Vicryl in a simple interrupted manner.  The wound was packed with half-inch iodoform.  The proximal and distal incisions were closed with a 2-0 nylon.  Betadine wet-to-dry dressing was performed to the right foot.  The tourniquet was released and prompt hyperemic response was noted to all digits.    The left foot was then addressed.  The limb was elevated and held in position for approximately 3 minutes prior to inflating the Tourniquet to 250 mmHg.  Attention was then directed to the second digit. Two semi-elliptical converging incisions were performed in a vertical fashion encompassing the digit.  Full-thickness incisions were performed to the level of the proximal phalanx.  Dissection continued along the proximal phalanx to the level of the metatarsophalangeal joint.  The digit was disarticulated in its entirety without complication.  The digit was sent off to  pathology as permanent.    Incision was performed proximally over the second metatarsal.  Blunt dissection was performed to the second interspace where noted fluid collection was present on the MRI.  Swab culture was performed to this area.  No abnormal features were noted to the forefoot.  Soft tissues appeared healthy.  No evidence of purulence was noted.  Irrigation to the dorsal incision in the amputation site was performed with bacitracin and normal saline.  Deep structures are closed with a 2-0 Vicryl in a simple interrupted manner.  The incisions were closed with 2-0 nylon and skin staples.  Again, a wet to dry dressing was applied along with Ace wrap compression.  The tourniquet was released and a prompt hyperemic response was noted to remaining digits of the left foot.  Patient tolerated the procedure and anesthesia well. Patient was transferred from the operating room to the recovery room with vital signs stable and neurovascular status unchanged to the operative extremity.      Dr. Adan Souza, URIEL  [facility]  [phone]    Note is dictated utilizing voice recognition software. Unfortunately this leads to occasional typographical errors. I apologize in advance if the situation occurs. If questions occur please do not hesitate to call our office.

## 2020-01-21 NOTE — OP NOTE
Preoperative diagnosis pain in right shoulder plus diabetes  Postoperative diagnosis same with labral tear  Surgeon Dr. Indra Rivas  Anesthesia none  Complications drains transfusions none estimated blood loss 0    Procedure performed injection right shoulder    Indications this patient is a 44-year-old male who fell and injured his right shoulder.  He also has problems with both feet because of his diabetes and was just operated on but by Dr. ismons today.  He was placed on some Toradol and his right shoulder feels much better today.  He was told that I was going to get an injection today and that if his shoulder felt much better than we could avoid surgery.  He was agreeable to that    Procedure the patient was seen in his room and evaluated right shoulder was prepped and draped in usual sterile fashion.  He was given an injection in his right shoulder 40 mg of Kenalog and 4 cc of 0.25% Marcaine plain he tolerated the procedure well.    We will see in the office in 6 weeks or sooner if he has problems.

## 2020-01-21 NOTE — PROGRESS NOTES
"Pharmacy Dosing Service  Antibiotic  Vancomycin    Maynor Gregg is a 44 y.o.male w/bilateral diabetic foot infection, osteo of right foot. S aureus growing in L foot wound cx; Strep agalactiae growing in R foot wound cx.  PMH: DM, history toe amputations      Assessment/Plan  1. Day #3 vancomycin: Goal -600 mcg*h/mL.  Pt received load dose of 2000mg x1 (~21 mg/kg) followed by maintenance dosing of 1500mg Q12h (~16 mg/kg). Based on levels obtained today at steady state, calculated k= 0.14-hr, AUC= 435, true trough= 8.1 mcg/mL. D/t therapeutic AUC, will continue current regimen. Repeat trough in ~2-3 days to assess for accumulation (or sooner if clinically warranted).    2. Day #3 cefepime: 2gm IV Q8h, appropriate for estCrCl >60 mL/min.    3. Day #1 metronidazole: 500mg IV Q8h, for anaerobic coverage.    4. Monitor renal fxn, C&S, pt clinical status, MD A&P.       Relevant clinical data and objective history reviewed:  190.5 cm (75\")   94.3 kg (208 lb)   Ideal body weight: 84.5 kg (186 lb 4.6 oz)  Adjusted ideal body weight: 88.4 kg (194 lb 15.6 oz)  Body mass index is 26 kg/m².    Lab Results   Component Value Date    CREATININE 0.72 (L) 01/21/2020    CREATININE 0.87 01/19/2020    CREATININE 1.04 01/18/2020     Estimated Creatinine Clearance: 174.6 mL/min (A) (by C-G formula based on SCr of 0.72 mg/dL (L)).  I/O last 3 completed shifts:  In: 370 [P.O.:120; IV Piggyback:250]  Out: -     Temperature    01/20/20 1150 01/20/20 2029 01/21/20 0410   Temp: 98.8 °F (37.1 °C) 98.1 °F (36.7 °C) 98.9 °F (37.2 °C)     Lab Results   Component Value Date    WBC 8.80 01/21/2020    WBC 23.00 (H) 01/19/2020    WBC 23.90 (H) 01/18/2020    WBC 3.2 (L) 04/29/2019    WBC 6.6 04/22/2019     No results found for: PROCALCITO  Lab Results   Component Value Date    SEDRATE 87 (H) 01/21/2020       Results from last 7 days   Lab Units 01/21/20  0823 01/21/20  0108   VANCOMYCIN PK mcg/mL  --  22.60   VANCOMYCIN TR mcg/mL 8.20  -- "        Culture/source/susceptibility:  Microbiology Results (last 10 days)       Procedure Component Value - Date/Time    MRSA Screen, PCR - Swab, Nares [[id number]]  (Normal) Collected:  01/20/20 1839    Lab Status:  Final result Specimen:  Swab from Nares Updated:  01/21/20 0453     MRSA PCR No MRSA Detected    Wound Culture - Wound, Toe, Left [[id number]]  (Abnormal) Collected:  01/20/20 1342    Lab Status:  Preliminary result Specimen:  Wound from Toe, Left Updated:  01/21/20 0807     Wound Culture Scant growth (1+) Staphylococcus aureus      Scant growth (1+) Normal Skin Juliet     Gram Stain Moderate (3+) WBCs per low power field      Occasional Gram positive cocci    Narrative:       Organism under investigation.      Wound Culture - Wound, Foot, Right [[id number]]  (Abnormal) Collected:  01/19/20 1630    Lab Status:  Preliminary result Specimen:  Wound from Foot, Right Updated:  01/21/20 0954     Wound Culture Moderate growth (3+) Streptococcus agalactiae (Group B)     Comment:   This organism is considered to be universally susceptible to penicillin.  No further antibiotic testing will be performed. If Clindamycin or Erythromycin is the drug of choice, notify the laboratory within 7 days to request susceptibility testing.        STREP GROUPING B     Gram Stain Many (4+) WBCs per low power field      Many (4+) Gram positive cocci in pairs, chains and clusters      Rare (1+) Gram negative cocci      Few (2+) Gram negative bacilli    Narrative:       Organism under investigation.      Respiratory Panel, PCR - Swab, Nasopharynx [[id number]]  (Normal) Collected:  01/19/20 1059    Lab Status:  Final result Specimen:  Swab from Nasopharynx Updated:  01/19/20 1545     ADENOVIRUS, PCR Not Detected     Coronavirus 229E Not Detected     Coronavirus HKU1 Not Detected     Coronavirus NL63 Not Detected     Coronavirus OC43 Not Detected     Human Metapneumovirus Not Detected     Human Rhinovirus/Enterovirus Not Detected      Influenza B PCR Not Detected     Parainfluenza Virus 1 Not Detected     Parainfluenza Virus 2 Not Detected     Parainfluenza Virus 3 Not Detected     Parainfluenza Virus 4 Not Detected     Bordetella pertussis pcr Not Detected     Influenza A H1 2009 PCR Not Detected     Chlamydophila pneumoniae PCR Not Detected     Mycoplasma pneumo by PCR Not Detected     Influenza A PCR Not Detected     Influenza A H3 Not Detected     Influenza A H1 Not Detected     RSV, PCR Not Detected             Radiology:  Xr Shoulder 2+ View Right    Result Date: 1/20/2020   1. No acute right shoulder findings. No significant right shoulder osteoarthritic change. 2. Suspected benign enchondroma or bone infarcts in the right humeral shaft.  Electronically Signed By-Dr. Yamileth Bourne MD On:1/20/2020 12:56 PM This report was finalized on [id number] by Dr. Yamileth Bourne MD.    Ct Head Without Contrast    Result Date: 1/18/2020  1.  Normal exam.  Electronically Signed By-Arabella Mendez On:1/18/2020 8:09 PM This report was finalized on [id number] by  Arabella Mendez .    Ct Cervical Spine Without Contrast    Result Date: 1/18/2020  Normal CT cervical spine without contrast.  Electronically Signed By-Arabella Mendez On:1/18/2020 8:11 PM This report was finalized on [id number] by  Arabella Mendez .    Xr Toe 2+ View Left    Result Date: 1/18/2020   1. Negative for fracture. 2. Chronic abnormal appearance of second and third MTP joints. 3. Soft tissue swelling.  Electronically Signed By-Arabella Mendez On:1/18/2020 8:07 PM This report was finalized on [id number] by  Arabella Mendez .    Us Gallbladder    Result Date: 1/19/2020   1. Sludge in the gallbladder lumen. No evidence of gallbladder wall thickening or fluid in the gallbladder fossa. No evidence of dilatation of bile ducts. No evidence of ascites.  Electronically Signed By-DR. Mal Lott MD On:1/19/2020 5:50 PM This report was finalized on [id number] by DR. Resendez  MD Kaylie.    Ct Chest Pulmonary Embolism    Result Date: 1/20/2020   1. No evidence of thrombus or embolus in the right or left pulmonary artery branches. 2. Questionable very mild diffuse infiltrates throughout the right lower lobe and left lower lobe along might be caused by atelectasis or vascular congestion. 3. No focal abnormality seen in the right or left lungs.  Electronically Signed By-DR. Mal Lott MD On:1/20/2020 5:42 PM This report was finalized on [id number] by DR. Mal Lott MD.    Mri Shoulder Right Without Contrast    Result Date: 1/20/2020  1.There is a tear of the superior labrum from anterior to posterior with mild adjacent cartilage loss in the superior glenoid. This is age indeterminate. 2.No imaging findings of shoulder dislocation. No Hill-Sachs impaction fracture or Bankart lesion. 3.Chondroid lesions in the proximal humeral diaphysis. No associated aggressive features on MRI. Radiographic follow-up of the humerus is recommended in 6 months time. These are likely benign enchondromas. The largest lesion is not seen in the field-of-view. 4.No findings rotator cuff tear.   Electronically Signed By-Millicent Springer MD On:1/20/2020 2:17 PM This report was finalized on [id number] by  Millicent Springer MD.    Mri Foot Right Without Contrast    Result Date: 1/20/2020  1.There is a sinus tract along the plantar aspect of foot superficial to the first metatarsal phalange joint with abnormal soft tissue density along this tract along the plantar aspect of the great toe with abnormal signal intensity in the hallux sesamoid bones. This is suspicious for osteomyelitis of the hallux sesamoid bones. Adjacent phlegmon is likely. Soft tissue evaluation limited without contrast. 2.There are remote and subacute fractures as described. 3.There is erosive change at the second proximal interphalangeal joint and the first interphalangeal joint that may be seen with gouty arthritis. Apparently  erosive arthritis such as rheumatoid arthritis is thought to be unlikely.    Electronically Signed By-Millicent Springer MD On:1/20/2020 2:04 PM This report was finalized on [id number] by  Millicent Springer MD.    Mri Foot Left With & Without Contrast    Result Date: 1/20/2020  Diffuse inflammation and edema in the soft tissues surrounding the dorsal plantar aspect of the left mid and left forefoot. 2. There are several small focal loculated fluid collections overlying the dorsal aspect of the left first and second and third cuneiform bones and overlying the dorsal aspect of the left navicular bone and left cuboid bone in a small fluid collection can be seen lying between the bases of the left second and third metatarsal bases. These fluid collections have uncertain etiology and might be sterile or caused by numerous small abscesses in the soft tissues. Clinical correlation would BE helpful. 3. Signal abnormality in the bone marrow in the distal metaphysis and head of the left second metatarsal and in the bone marrow in the distal middle and proximal phalanges of the left second toe probably caused by stress fracture and less likely caused by osteomyelitis. 4. Deformity of the distal metaphysis and head of the left third metatarsal most consistent with trauma and fracture. 5. Mild deformity of the left first second and third cuneiform bones in the left navicular bone and left cuboid bone with mild inflammation and edema in the bone marrow of these bones most consistent with arthritis and Charcot joint and less likely caused by osteomyelitis.  Electronically Signed By-DR. Mal Lott MD On:1/20/2020 9:23 PM This report was finalized on [id number] by DR. Mal Lott MD.    Xr Chest Pa & Lateral    Result Date: 1/19/2020   1. No active cardiopulmonary disease 2. Right PICC line is been removed since previous study of 04/07/2019 3. No significant change from 04/07/2019  Electronically Signed By-Hermilo Vaz   On:1/19/2020 1:44 PM This report was finalized on [id number] by  Hermilo Vaz Jr. .      Aldo LynneD, BCPS  01/21/20 9:54 AM

## 2020-01-22 LAB
ANION GAP SERPL CALCULATED.3IONS-SCNC: 8 MMOL/L (ref 5–15)
BASOPHILS # BLD AUTO: 0 10*3/MM3 (ref 0–0.2)
BASOPHILS NFR BLD AUTO: 0.6 % (ref 0–1.5)
BUN BLD-MCNC: 19 MG/DL (ref 6–20)
BUN/CREAT SERPL: 26.4 (ref 7–25)
CALCIUM SPEC-SCNC: 7.5 MG/DL (ref 8.6–10.5)
CHLORIDE SERPL-SCNC: 96 MMOL/L (ref 98–107)
CO2 SERPL-SCNC: 27 MMOL/L (ref 22–29)
CREAT BLD-MCNC: 0.72 MG/DL (ref 0.76–1.27)
DEPRECATED RDW RBC AUTO: 41.1 FL (ref 37–54)
EOSINOPHIL # BLD AUTO: 0.1 10*3/MM3 (ref 0–0.4)
EOSINOPHIL NFR BLD AUTO: 0.8 % (ref 0.3–6.2)
ERYTHROCYTE [DISTWIDTH] IN BLOOD BY AUTOMATED COUNT: 12.3 % (ref 12.3–15.4)
GFR SERPL CREATININE-BSD FRML MDRD: 119 ML/MIN/1.73
GLUCOSE BLD-MCNC: 280 MG/DL (ref 65–99)
GLUCOSE BLDC GLUCOMTR-MCNC: 223 MG/DL (ref 70–105)
GLUCOSE BLDC GLUCOMTR-MCNC: 230 MG/DL (ref 70–105)
GLUCOSE BLDC GLUCOMTR-MCNC: 239 MG/DL (ref 70–105)
GLUCOSE BLDC GLUCOMTR-MCNC: 291 MG/DL (ref 70–105)
HCT VFR BLD AUTO: 35.3 % (ref 37.5–51)
HGB BLD-MCNC: 12.1 G/DL (ref 13–17.7)
LAB AP CASE REPORT: NORMAL
LYMPHOCYTES # BLD AUTO: 1.2 10*3/MM3 (ref 0.7–3.1)
LYMPHOCYTES NFR BLD AUTO: 15.4 % (ref 19.6–45.3)
MCH RBC QN AUTO: 32.5 PG (ref 26.6–33)
MCHC RBC AUTO-ENTMCNC: 34.4 G/DL (ref 31.5–35.7)
MCV RBC AUTO: 94.4 FL (ref 79–97)
MONOCYTES # BLD AUTO: 1.2 10*3/MM3 (ref 0.1–0.9)
MONOCYTES NFR BLD AUTO: 14.7 % (ref 5–12)
NEUTROPHILS # BLD AUTO: 5.4 10*3/MM3 (ref 1.7–7)
NEUTROPHILS NFR BLD AUTO: 68.5 % (ref 42.7–76)
NRBC BLD AUTO-RTO: 0.1 /100 WBC (ref 0–0.2)
PATH REPORT.FINAL DX SPEC: NORMAL
PATH REPORT.GROSS SPEC: NORMAL
PLATELET # BLD AUTO: 206 10*3/MM3 (ref 140–450)
PMV BLD AUTO: 8.5 FL (ref 6–12)
POTASSIUM BLD-SCNC: 3.6 MMOL/L (ref 3.5–5.2)
RBC # BLD AUTO: 3.74 10*6/MM3 (ref 4.14–5.8)
SODIUM BLD-SCNC: 131 MMOL/L (ref 136–145)
WBC NRBC COR # BLD: 7.9 10*3/MM3 (ref 3.4–10.8)

## 2020-01-22 PROCEDURE — 63710000001 INSULIN GLARGINE PER 5 UNITS: Performed by: INTERNAL MEDICINE

## 2020-01-22 PROCEDURE — 25010000002 VANCOMYCIN 10 G RECONSTITUTED SOLUTION: Performed by: PODIATRIST

## 2020-01-22 PROCEDURE — 25010000002 CEFEPIME PER 500 MG: Performed by: PODIATRIST

## 2020-01-22 PROCEDURE — 82962 GLUCOSE BLOOD TEST: CPT

## 2020-01-22 PROCEDURE — 80048 BASIC METABOLIC PNL TOTAL CA: CPT | Performed by: PODIATRIST

## 2020-01-22 PROCEDURE — 25010000002 KETOROLAC TROMETHAMINE PER 15 MG: Performed by: PODIATRIST

## 2020-01-22 PROCEDURE — 85025 COMPLETE CBC W/AUTO DIFF WBC: CPT | Performed by: NURSE PRACTITIONER

## 2020-01-22 PROCEDURE — 99232 SBSQ HOSP IP/OBS MODERATE 35: CPT | Performed by: INTERNAL MEDICINE

## 2020-01-22 PROCEDURE — 63710000001 INSULIN LISPRO (HUMAN) PER 5 UNITS: Performed by: INTERNAL MEDICINE

## 2020-01-22 PROCEDURE — 99024 POSTOP FOLLOW-UP VISIT: CPT | Performed by: PODIATRIST

## 2020-01-22 RX ORDER — INSULIN GLARGINE 100 [IU]/ML
14 INJECTION, SOLUTION SUBCUTANEOUS NIGHTLY
Status: DISCONTINUED | OUTPATIENT
Start: 2020-01-22 | End: 2020-01-24

## 2020-01-22 RX ORDER — ACETAMINOPHEN 650 MG
TABLET, EXTENDED RELEASE ORAL DAILY
Status: DISCONTINUED | OUTPATIENT
Start: 2020-01-22 | End: 2020-01-24 | Stop reason: HOSPADM

## 2020-01-22 RX ADMIN — METRONIDAZOLE 500 MG: 500 INJECTION, SOLUTION INTRAVENOUS at 08:11

## 2020-01-22 RX ADMIN — INSULIN LISPRO 3 UNITS: 100 INJECTION, SOLUTION INTRAVENOUS; SUBCUTANEOUS at 13:46

## 2020-01-22 RX ADMIN — INSULIN LISPRO 3 UNITS: 100 INJECTION, SOLUTION INTRAVENOUS; SUBCUTANEOUS at 17:35

## 2020-01-22 RX ADMIN — VANCOMYCIN HYDROCHLORIDE 1500 MG: 10 INJECTION, POWDER, LYOPHILIZED, FOR SOLUTION INTRAVENOUS at 20:51

## 2020-01-22 RX ADMIN — CEFEPIME 2 G: 2 INJECTION, POWDER, FOR SOLUTION INTRAVENOUS at 23:47

## 2020-01-22 RX ADMIN — INSULIN GLARGINE 14 UNITS: 100 INJECTION, SOLUTION SUBCUTANEOUS at 20:51

## 2020-01-22 RX ADMIN — CEFEPIME 2 G: 2 INJECTION, POWDER, FOR SOLUTION INTRAVENOUS at 06:25

## 2020-01-22 RX ADMIN — VANCOMYCIN HYDROCHLORIDE 1500 MG: 10 INJECTION, POWDER, LYOPHILIZED, FOR SOLUTION INTRAVENOUS at 10:30

## 2020-01-22 RX ADMIN — CEFEPIME 2 G: 2 INJECTION, POWDER, FOR SOLUTION INTRAVENOUS at 00:18

## 2020-01-22 RX ADMIN — METRONIDAZOLE 500 MG: 500 INJECTION, SOLUTION INTRAVENOUS at 18:27

## 2020-01-22 RX ADMIN — INSULIN LISPRO 3 UNITS: 100 INJECTION, SOLUTION INTRAVENOUS; SUBCUTANEOUS at 08:12

## 2020-01-22 RX ADMIN — INSULIN LISPRO 3 UNITS: 100 INJECTION, SOLUTION INTRAVENOUS; SUBCUTANEOUS at 17:36

## 2020-01-22 RX ADMIN — INSULIN LISPRO 3 UNITS: 100 INJECTION, SOLUTION INTRAVENOUS; SUBCUTANEOUS at 13:42

## 2020-01-22 RX ADMIN — SODIUM CHLORIDE 125 ML/HR: 900 INJECTION, SOLUTION INTRAVENOUS at 08:14

## 2020-01-22 RX ADMIN — INSULIN LISPRO 3 UNITS: 100 INJECTION, SOLUTION INTRAVENOUS; SUBCUTANEOUS at 08:11

## 2020-01-22 RX ADMIN — KETOROLAC TROMETHAMINE 30 MG: 30 INJECTION, SOLUTION INTRAMUSCULAR at 20:51

## 2020-01-22 RX ADMIN — KETOROLAC TROMETHAMINE 30 MG: 30 INJECTION, SOLUTION INTRAMUSCULAR at 01:59

## 2020-01-22 RX ADMIN — Medication 10 ML: at 20:52

## 2020-01-22 RX ADMIN — CEFEPIME 2 G: 2 INJECTION, POWDER, FOR SOLUTION INTRAVENOUS at 15:00

## 2020-01-22 RX ADMIN — Medication 10 ML: at 08:12

## 2020-01-22 RX ADMIN — KETOROLAC TROMETHAMINE 30 MG: 30 INJECTION, SOLUTION INTRAMUSCULAR at 15:00

## 2020-01-22 RX ADMIN — METRONIDAZOLE 500 MG: 500 INJECTION, SOLUTION INTRAVENOUS at 01:59

## 2020-01-22 NOTE — NURSING NOTE
Right diabetic foot ulcer, postop day 1 I&D per podiatry yesterday.  Wound base is deep red there are sutures proximal and distal to the primary wound.  Approximate size of the injury 1.5 x 2 cm with a depth of 2 cm there is a tunnel at both 12 and 6:00 tracking along the suture line a 5 cm and 2 cm.  Small amount of serosanguineous exudate noted.  There is no erythema induration odor warmth noted to the periwound site.    The wound was irrigated well with normal saline, Skin-Prep applied to the periwound area, Mepitel drape applied along the suture lines a small piece of adapt ring use at the distal end of the wound.  The wound was packed with black foam and a bolstering technique was used along the tunnels over the incisions.  The wound was then bridged off of the foot and the VAC was set at 125 continuous.  Patient tolerated well good seal obtained no issues    There seems to be some discussion as to home versus rehab.  We will complete the home wound VAC form in case patient is not able to go to rehab

## 2020-01-22 NOTE — ANESTHESIA POSTPROCEDURE EVALUATION
Patient: Maynor Gregg    Procedure Summary     Date:  01/21/20 Room / Location:  [facility] OR 11 / [facility] MAIN OR    Anesthesia Start:  1203 Anesthesia Stop:  1329    Procedures:       DEBRIDEMENT with sesamoid excision (Right Foot)      INCISION AND DRAINAGE LOWER EXTREMITY with second digit amputation (Left Foot) Diagnosis:       Open wound of second toe of left foot, initial encounter      Chronic ulcer of right foot, with necrosis of bone (CMS/HCC)      (Open wound of second toe of left foot, initial encounter [S91.105A])      (Chronic ulcer of right foot, with necrosis of bone (CMS/HCC) [L97.514])    Surgeon:  CROW Souza DPM Provider:  Reji Wu MD    Anesthesia Type:  general ASA Status:  3          Anesthesia Type: general    Vitals  Vitals Value Taken Time   /73 1/21/2020  2:20 PM   Temp 98.2 °F (36.8 °C) 1/21/2020  2:20 PM   Pulse 80 1/21/2020  2:20 PM   Resp 11 1/21/2020  2:20 PM   SpO2 95 % 1/21/2020  2:20 PM           Post Anesthesia Care and Evaluation    Patient location during evaluation: PACU  Patient participation: complete - patient participated  Level of consciousness: awake  Pain score: 0 (See nurse's notes for pain score)  Pain management: adequate  Airway patency: patent  Anesthetic complications: No anesthetic complications  PONV Status: none  Cardiovascular status: acceptable  Respiratory status: acceptable  Hydration status: acceptable    Comments: Patient seen and examined postoperatively; vital signs stable; SpO2 greater than or equal to 90%; cardiopulmonary status stable; nausea/vomiting adequately controlled; pain adequately controlled; no apparent anesthesia complications; patient discharged from anesthesia care when discharge criteria were met

## 2020-01-22 NOTE — PROGRESS NOTES
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 Gregg is a 44 y.o. with a history of DM 1 and neuropathy presented to the [facility] ED on 1/18/2020 with a complaint of syncope, he passed out while he was in the shower today, states he \"went out and woke up in the tub\" Pt is also complaining of nausea, vomiting and fever x 3 days. Pt states he began having nausea and vomiting on Thursday, associated with low grade fever. Pt denies melena, hematochezia, diarrhea. Pt states he then began to have episodes of dizziness, even when lying down, he felt as if the room was spinning. Pt is complaining of head, neck and back pain s/p fall today. Pt has an open wound on his left 2nd toe, he is current with Dr. Souza and is finishing a round of doxycycline.      In the ED, CT head: negative for acute abnormality, CT spine: Normal, Xray of second toe on left foot: negative for fracture, chronic abnormal appearance of second and third MTP joints, soft tissue swelling. Glucose 520, K+4.2, BUN 19, Cr. 1.04, WBC 23.9, Hgb 13.4, Hct 38.5. Pt was given IV insulin 6 units, 500ml NS bolus IV, Zofran 4mg IV. Pt will be admitted for further evaluation and management.     Date:: 1/20/2020: Patient seen and examined and complains of right shoulder pain.  Patient was admitted because of " "the syncopal episode and at this time we do not know exactly why he passed out.  Patient landed on the right side and hurt his shoulder and arm during the x-ray and MRI of the right shoulder.  Patient also will be seen by orthopedics.    Date 1/22/2020: Patient seen and examined and he seemed to doing fairly well.  I also spoke to infectious disease as well as  and nursing at the bedside.  Patient will need nonweightbearing as well as IV antibiotic for 6 weeks and we are considering different options including possible placement.    Review of Systems   All other systems reviewed and are negative.        Objective   Objective      Vital Signs  Temp:  [97.6 °F (36.4 °C)-100.5 °F (38.1 °C)] 98.1 °F (36.7 °C)  Heart Rate:  [74-96] 79  Resp:  [12-16] 16  BP: (121-144)/(64-81) 122/65  Oxygen Therapy  SpO2: 93 %  Pulse Oximetry Type: Continuous  Device (Oxygen Therapy): room air  Flow (L/min): 2  Flowsheet Rows      First Filed Value   Admission Height  191.8 cm (75.5\") Documented at 01/18/2020 1854   Admission Weight  98 kg (216 lb 0.8 oz) Documented at 01/18/2020 1854        Intake & Output (last 3 days)       01/19 0701 - 01/20 0700 01/20 0701 - 01/21 0700 01/21 0701 - 01/22 0700 01/22 0701 - 01/23 0700    P.O. 720 120 0 120    I.V. (mL/kg) 1270.8 (13.4)  700 (7.4)     IV Piggyback 600 250 350     Total Intake(mL/kg) 2590.8 (27.3) 370 (3.9) 1050 (11.1) 120 (1.3)    Urine (mL/kg/hr) 860 (0.4)       Stool   0 0    Total Output 860  0 0    Net +1730.8 +370 +1050 +120                Lines, Drains & Airways    Active LDAs     Name:   Placement date:   Placement time:   Site:   Days:    Peripheral IV 01/18/20 1926 Right Arm   01/18/20 1926    Arm   1                  Physical Exam:    Physical Exam   Constitutional: He is oriented to person, place, and time. He appears well-developed and well-nourished. No distress.   HENT:   Head: Normocephalic and atraumatic.   Nose: Nose normal.   Mouth/Throat: Oropharynx is " clear and moist. No oropharyngeal exudate.   Eyes: Pupils are equal, round, and reactive to light. Conjunctivae and EOM are normal. Right eye exhibits no discharge. Left eye exhibits no discharge. No scleral icterus.   Neck: Normal range of motion. No JVD present. No tracheal deviation present. No thyromegaly present.   Cardiovascular: Normal rate, regular rhythm, normal heart sounds and intact distal pulses. Exam reveals no gallop and no friction rub.   No murmur heard.  Pulmonary/Chest: Effort normal and breath sounds normal. No stridor. No respiratory distress. He has no wheezes. He has no rales. He exhibits no tenderness.   Abdominal: Soft. Bowel sounds are normal. He exhibits no distension and no mass. There is no tenderness. There is no rebound and no guarding. No hernia.   Musculoskeletal: He exhibits deformity. He exhibits no edema or tenderness.   Lymphadenopathy:     He has no cervical adenopathy.   Neurological: He is alert and oriented to person, place, and time. No cranial nerve deficit or sensory deficit. He exhibits normal muscle tone. Coordination normal.   Skin: Skin is warm and dry. No rash noted. He is not diaphoretic. No erythema.   Psychiatric: He has a normal mood and affect. His behavior is normal.   Nursing note and vitals reviewed.           Wounds (last 24 hours)      LDA Wound     Row Name 01/20/20 0710 01/19/20 1905          Wound 01/18/20 2244 Right heel Diabetic Ulcer    Wound - Properties Group Date first assessed: 01/18/20  - Time first assessed: 2244  - Present on Hospital Admission: Y  -SS Side: Right  -SS Location: heel  -SS Primary Wound Type: Diabetic ul  - Stage, Pressure Injury: unstageable  -SS    Dressing Appearance  dry;intact  -SM  dry;intact  -VB     Closure  DORIS  -SM  DORIS  -VB       User Key  (r) = Recorded By, (t) = Taken By, (c) = Cosigned By    Initials Name Provider Type    VB Tamara Swartz RN Registered Nurse    Bryanna Albarran LPN Licensed Nurse  "   Mae Harrell, RN Registered Nurse          Procedures:              Results Review:     I reviewed the patient's new clinical results.      Lab Results (last 24 hours)     Procedure Component Value Units Date/Time    Tissue Pathology Exam [[id number]] Collected:  01/21/20 1247    Specimen:  Bone from Foot, Right; Tissue from Toe, Left Updated:  01/22/20 9485     Case Report --     Surgical Pathology Report                         Case: [id number]                                  Authorizing Provider:  CROW Souza DPM      Collected:           01/21/2020 12:47 PM          Ordering Location:     [facility] MAIN  Received:            01/21/2020 02:12 PM                                 OR                                                                           Pathologist:           Wm Flynn MD                                                            Specimens:   1) - Foot, Right, right tibial and fibular sesmoid                                                  2) - Toe, Left, left second toe                                                             Final Diagnosis --     Specimen #1 (Exostosis, right foot, excision):    Acutely inflamed fibroadipose tissue, viable bone and hypocellular fibrotic marrow    No osteomyelitis or malignancy identified    Specimen #2 (Toe, left second, amputation):    Skin ulceration, soft tissue cellulitis, and focal acute osteomyelitis of nonmarginal bone    Skin, soft tissue and bone resection margins grossly viable    MONICA/sms       Gross Description --     Part 1: Received in formalin designated \"Right foot exostosis\" is a portion of tan-yellow fibro-osseous tissue measuring 2.8 x 2.5 x 0.9 cm. Sectioning reveals yellowish tan cut surfaces. Representative sections of bony tissue are submitted in one cassette after brief decalcification.      Part 2: Received in formalin designated \"Left second toe\" is a digital amputation specimen measuring 6.5 x 2.8 x 2.3 " cm. The distal portion is covered with tan unremarkable skin. A nail is present. The proximal joint has a 1 cm ulcer. The skin and soft tissue resection margins are viable. No softening of the bony tissue deep to the area of ulceration is identified. Representative sections of skin ulceration are submitted in one cassette.      [facility]        Wound Culture - Wound, Foot, Right [[id number]]  (Abnormal) Collected:  01/19/20 1630    Specimen:  Wound from Foot, Right Updated:  01/22/20 1200     Wound Culture Moderate growth (3+) Streptococcus agalactiae (Group B)     Comment:   This organism is considered to be universally susceptible to penicillin.  No further antibiotic testing will be performed. If Clindamycin or Erythromycin is the drug of choice, notify the laboratory within 7 days to request susceptibility testing.        STREP GROUPING B     Wound Culture Scant growth (1+) Staphylococcus aureus, MRSA     Comment:   Methicillin resistant Staphylococcus aureus, Patient may be an isolation risk.        PBP2 Positive     Wound Culture Scant growth (1+) Normal Skin Juliet     Gram Stain Many (4+) WBCs per low power field      Many (4+) Gram positive cocci in pairs, chains and clusters      Rare (1+) Gram negative cocci      Few (2+) Gram negative bacilli    Narrative:       Organism under investigation.      Tissue / Bone Culture - Tissue, Foot, Right [[id number]]  (Abnormal) Collected:  01/21/20 1238    Specimen:  Tissue from Foot, Right Updated:  01/22/20 1151     Tissue Culture Moderate growth (3+) Streptococcus agalactiae (Group B)     Comment:   If Clindamycin or Erythromycin is the drug of choice, notify the laboratory within 7 days to request susceptibility testing.  This organism is considered to be universally susceptible to penicillin.  No further antibiotic testing will be performed. If Clindamycin or Erythromycin is the drug of choice, notify the laboratory within 7 days to request susceptibility testing.         STREP GROUPING B     Gram Stain Few (2+) WBCs per low power field      Moderate (3+) Gram positive cocci in pairs and chains      Rare (1+) Gram negative bacilli    Narrative:       Organism under investigation.    Body Fluid Culture - Body Fluid, Toe, Left [[id number]] Collected:  01/21/20 1314    Specimen:  Body Fluid from Toe, Left Updated:  01/22/20 1133     Body Fluid Culture No growth     Gram Stain Few (2+) WBCs per low power field      No organisms seen    POC Glucose Once [[id number]]  (Abnormal) Collected:  01/22/20 1123    Specimen:  Blood Updated:  01/22/20 1131     Glucose 223 mg/dL      Comment: Serial Number: [id number]:  [id number]       Wound Culture - Wound, Toe, Left [[id number]]  (Abnormal) Collected:  01/20/20 1342    Specimen:  Wound from Toe, Left Updated:  01/22/20 1105     Wound Culture Scant growth (1+) Staphylococcus aureus      Rare Streptococcus agalactiae (Group B)     Comment:   This organism is considered to be universally susceptible to penicillin.  No further antibiotic testing will be performed. If Clindamycin or Erythromycin is the drug of choice, notify the laboratory within 7 days to request susceptibility testing.        STREP GROUPING B     Wound Culture Rare Normal Skin Juliet     Gram Stain Moderate (3+) WBCs per low power field      Occasional Gram positive cocci    Narrative:             POC Glucose Once [[id number]]  (Abnormal) Collected:  01/22/20 0727    Specimen:  Blood Updated:  01/22/20 0728     Glucose 230 mg/dL      Comment: Serial Number: [id number]:  [id number]       Basic Metabolic Panel [[id number]]  (Abnormal) Collected:  01/22/20 0514    Specimen:  Blood Updated:  01/22/20 0623     Glucose 280 mg/dL      BUN 19 mg/dL      Creatinine 0.72 mg/dL      Sodium 131 mmol/L      Potassium 3.6 mmol/L      Chloride 96 mmol/L      CO2 27.0 mmol/L      Calcium 7.5 mg/dL      eGFR Non African Amer 119 mL/min/1.73      BUN/Creatinine Ratio 26.4     Anion Gap 8.0  mmol/L     Narrative:       GFR Normal >60  Chronic Kidney Disease <60  Kidney Failure <15      CBC & Differential [[id number]] Collected:  01/22/20 0514    Specimen:  Blood Updated:  01/22/20 0539    Narrative:       The following orders were created for panel order CBC & Differential.  Procedure                               Abnormality         Status                     ---------                               -----------         ------                     CBC Auto Differential[[id number]]        Abnormal            Final result                 Please view results for these tests on the individual orders.    CBC Auto Differential [[id number]]  (Abnormal) Collected:  01/22/20 0514    Specimen:  Blood Updated:  01/22/20 0539     WBC 7.90 10*3/mm3      RBC 3.74 10*6/mm3      Hemoglobin 12.1 g/dL      Hematocrit 35.3 %      MCV 94.4 fL      MCH 32.5 pg      MCHC 34.4 g/dL      RDW 12.3 %      RDW-SD 41.1 fl      MPV 8.5 fL      Platelets 206 10*3/mm3      Neutrophil % 68.5 %      Lymphocyte % 15.4 %      Monocyte % 14.7 %      Eosinophil % 0.8 %      Basophil % 0.6 %      Neutrophils, Absolute 5.40 10*3/mm3      Lymphocytes, Absolute 1.20 10*3/mm3      Monocytes, Absolute 1.20 10*3/mm3      Eosinophils, Absolute 0.10 10*3/mm3      Basophils, Absolute 0.00 10*3/mm3      nRBC 0.1 /100 WBC     POC Glucose Once [[id number]]  (Abnormal) Collected:  01/21/20 1921    Specimen:  Blood Updated:  01/21/20 1921     Glucose 142 mg/dL      Comment: Serial Number: [id number]:  [id number]       POC Glucose Once [[id number]]  (Abnormal) Collected:  01/21/20 1700    Specimen:  Blood Updated:  01/21/20 1701     Glucose 130 mg/dL      Comment: Serial Number: [id number]:  [id number]       Vitamin B12 [[id number]]  (Abnormal) Collected:  01/21/20 0823    Specimen:  Blood Updated:  01/21/20 1646     Vitamin B-12 988 pg/mL     Narrative:       Results may be falsely increased if patient taking Biotin.          Hemoglobin A1C   Date  Value Ref Range Status   01/21/2020 10.5 (H) 3.5 - 5.6 % Final               Lab Results   Component Value Date    LIPASE 10 (L) 01/19/2020     Lab Results   Component Value Date    CHOL 82 01/21/2020    TRIG 148 01/21/2020    HDL 15 (L) 01/21/2020    LDL 37 01/21/2020       Lab Results   Lab Value Date/Time    FINALDX  01/21/2020 1247     Specimen #1 (Exostosis, right foot, excision):    Acutely inflamed fibroadipose tissue, viable bone and hypocellular fibrotic marrow    No osteomyelitis or malignancy identified    Specimen #2 (Toe, left second, amputation):    Skin ulceration, soft tissue cellulitis, and focal acute osteomyelitis of nonmarginal bone    Skin, soft tissue and bone resection margins grossly viable    MONICA/sms         Microbiology Results (last 10 days)     Procedure Component Value - Date/Time    Body Fluid Culture - Body Fluid, Toe, Left [[id number]] Collected:  01/21/20 1314    Lab Status:  Preliminary result Specimen:  Body Fluid from Toe, Left Updated:  01/22/20 1133     Body Fluid Culture No growth     Gram Stain Few (2+) WBCs per low power field      No organisms seen    Tissue / Bone Culture - Tissue, Foot, Right [[id number]]  (Abnormal) Collected:  01/21/20 1238    Lab Status:  Preliminary result Specimen:  Tissue from Foot, Right Updated:  01/22/20 1151     Tissue Culture Moderate growth (3+) Streptococcus agalactiae (Group B)     Comment:   If Clindamycin or Erythromycin is the drug of choice, notify the laboratory within 7 days to request susceptibility testing.  This organism is considered to be universally susceptible to penicillin.  No further antibiotic testing will be performed. If Clindamycin or Erythromycin is the drug of choice, notify the laboratory within 7 days to request susceptibility testing.        STREP GROUPING B     Gram Stain Few (2+) WBCs per low power field      Moderate (3+) Gram positive cocci in pairs and chains      Rare (1+) Gram negative bacilli    Narrative:        Organism under investigation.    MRSA Screen, PCR - Swab, Nares [[id number]]  (Normal) Collected:  01/20/20 1839    Lab Status:  Final result Specimen:  Swab from Nares Updated:  01/21/20 0453     MRSA PCR No MRSA Detected    Wound Culture - Wound, Toe, Left [[id number]]  (Abnormal) Collected:  01/20/20 1342    Lab Status:  Preliminary result Specimen:  Wound from Toe, Left Updated:  01/22/20 1105     Wound Culture Scant growth (1+) Staphylococcus aureus      Rare Streptococcus agalactiae (Group B)     Comment:   This organism is considered to be universally susceptible to penicillin.  No further antibiotic testing will be performed. If Clindamycin or Erythromycin is the drug of choice, notify the laboratory within 7 days to request susceptibility testing.        STREP GROUPING B     Wound Culture Rare Normal Skin Juliet     Gram Stain Moderate (3+) WBCs per low power field      Occasional Gram positive cocci    Narrative:             Wound Culture - Wound, Foot, Right [[id number]]  (Abnormal) Collected:  01/19/20 1630    Lab Status:  Preliminary result Specimen:  Wound from Foot, Right Updated:  01/22/20 1200     Wound Culture Moderate growth (3+) Streptococcus agalactiae (Group B)     Comment:   This organism is considered to be universally susceptible to penicillin.  No further antibiotic testing will be performed. If Clindamycin or Erythromycin is the drug of choice, notify the laboratory within 7 days to request susceptibility testing.        STREP GROUPING B     Wound Culture Scant growth (1+) Staphylococcus aureus, MRSA     Comment:   Methicillin resistant Staphylococcus aureus, Patient may be an isolation risk.        PBP2 Positive     Wound Culture Scant growth (1+) Normal Skin Juliet     Gram Stain Many (4+) WBCs per low power field      Many (4+) Gram positive cocci in pairs, chains and clusters      Rare (1+) Gram negative cocci      Few (2+) Gram negative bacilli    Narrative:       Organism under  investigation.      Respiratory Panel, PCR - Swab, Nasopharynx [[id number]]  (Normal) Collected:  01/19/20 1059    Lab Status:  Final result Specimen:  Swab from Nasopharynx Updated:  01/19/20 1545     ADENOVIRUS, PCR Not Detected     Coronavirus 229E Not Detected     Coronavirus HKU1 Not Detected     Coronavirus NL63 Not Detected     Coronavirus OC43 Not Detected     Human Metapneumovirus Not Detected     Human Rhinovirus/Enterovirus Not Detected     Influenza B PCR Not Detected     Parainfluenza Virus 1 Not Detected     Parainfluenza Virus 2 Not Detected     Parainfluenza Virus 3 Not Detected     Parainfluenza Virus 4 Not Detected     Bordetella pertussis pcr Not Detected     Influenza A H1 2009 PCR Not Detected     Chlamydophila pneumoniae PCR Not Detected     Mycoplasma pneumo by PCR Not Detected     Influenza A PCR Not Detected     Influenza A H3 Not Detected     Influenza A H1 Not Detected     RSV, PCR Not Detected          ECG/EMG Results (most recent)     Procedure Component Value Units Date/Time    ECG 12 Lead [[id number]] Collected:  01/18/20 1906     Updated:  01/18/20 1907    Narrative:       HEART RATE= 105  bpm  RR Interval= 572  ms  TX Interval= 149  ms  P Horizontal Axis= 3  deg  P Front Axis= 48  deg  QRSD Interval= 79  ms  QT Interval= 328  ms  QRS Axis= 34  deg  T Wave Axis= 2  deg  - BORDERLINE ECG -  Sinus tachycardia  Borderline ST elevation, lateral leads  When compared with ECG of 04-Apr-2019 13:03:16,  Significant change in rhythm  Electronically Signed By:   Date and Time of Study: 2020-01-18 19:06:28    Adult Transthoracic Echo Complete W/ Cont if Necessary Per Protocol [[id number]] Collected:  01/20/20 1027     Updated:  01/21/20 1920     BSA 2.2 m^2      BH CV ECHO BILL - RVDD 2.8 cm      IVSd 1.3 cm      LVIDd 3.7 cm      LVIDs 2.6 cm      LVPWd 1.2 cm      IVS/LVPW 1.1     FS 28.7 %      EDV(Teich) 56.7 ml      ESV(Teich) 24.8 ml      EF(Teich) 56.2 %      EDV(cubed) 49.1 ml       ESV(cubed) 17.8 ml      EF(cubed) 63.8 %      LV mass(C)d 154.9 grams      LV mass(C)dI 69.4 grams/m^2      SV(Teich) 31.9 ml      SI(Teich) 14.3 ml/m^2      SV(cubed) 31.4 ml      SI(cubed) 14.1 ml/m^2      Ao root diam 2.6 cm      Ao root area 5.5 cm^2      ACS 2.2 cm      asc Aorta Diam 2.9 cm      LVOT diam 2.2 cm      LVOT area 3.7 cm^2      RVOT diam 2.4 cm      RVOT area 4.7 cm^2      EDV(MOD-sp4) 89.0 ml      ESV(MOD-sp4) 30.3 ml      EF(MOD-sp4) 65.9 %      EDV(MOD-sp2) 72.1 ml      ESV(MOD-sp2) 28.9 ml      EF(MOD-sp2) 59.9 %      SV(MOD-sp4) 58.7 ml      SI(MOD-sp4) 26.3 ml/m^2      SV(MOD-sp2) 43.2 ml      SI(MOD-sp2) 19.4 ml/m^2      Ao root area (BSA corrected) 1.2     LV Matson Vol (BSA corrected) 39.9 ml/m^2      LV Sys Vol (BSA corrected) 13.6 ml/m^2      Aortic R-R 0.65 sec      Aortic HR 92.2 BPM      MV E max reina 113.5 cm/sec      MV A max reina 92.8 cm/sec      MV E/A 1.2     MV V2 max 124.8 cm/sec      MV max PG 6.2 mmHg      MV V2 mean 86.5 cm/sec      MV mean PG 3.3 mmHg      MV V2 VTI 27.3 cm      MVA(VTI) 3.4 cm^2      MV dec slope 401.4 cm/sec^2      MV dec time 0.28 sec      Ao pk reina 148.3 cm/sec      Ao max PG 8.8 mmHg      Ao max PG (full) 0.11 mmHg      Ao V2 mean 115.9 cm/sec      Ao mean PG 5.7 mmHg      Ao mean PG (full) 1.8 mmHg      Ao V2 VTI 28.7 cm      CONSUELO(I,A) 3.3 cm^2      CONSUELO(I,D) 3.3 cm^2      CONSUELO(V,A) 3.6 cm^2      CONSUELO(V,D) 3.6 cm^2      LV V1 max PG 8.7 mmHg      LV V1 mean PG 3.9 mmHg      LV V1 max 147.4 cm/sec      LV V1 mean 90.2 cm/sec      LV V1 VTI 25.7 cm      CO(Ao) 14.5 l/min      CI(Ao) 6.5 l/min/m^2      SV(Ao) 157.2 ml      SI(Ao) 70.4 ml/m^2      CO(LVOT) 8.7 l/min      CI(LVOT) 3.9 l/min/m^2      SV(LVOT) 94.0 ml      SV(RVOT) 67.3 ml      SI(LVOT) 42.1 ml/m^2      PA V2 max 83.9 cm/sec      PA max PG 2.8 mmHg      PA max PG (full) 1.0 mmHg      PA V2 mean 68.9 cm/sec      PA mean PG 2.0 mmHg      PA mean PG (full) 0.95 mmHg      PA V2 VTI 18.4 cm       PVA(I,A) 3.7 cm^2       CV ECHO BILL - PVA(I,D) 3.7 cm^2       CV ECHO BILL - PVA(V,A) 3.8 cm^2       CV ECHO BILL - PVA(V,D) 3.8 cm^2      PA acc time 0.12 sec      RV V1 max PG 1.8 mmHg      RV V1 mean PG 1.0 mmHg      RV V1 max 67.2 cm/sec      RV V1 mean 47.8 cm/sec      RV V1 VTI 14.4 cm      TR max fermín 224.6 cm/sec      RVSP(TR) 23.2 mmHg      RAP systole 3.0 mmHg      PA pr(Accel) 25.9 mmHg      Pulm Sys Fermín 55.0 cm/sec      Pulm Matson Fermín 49.8 cm/sec      Pulm S/D 1.1     Qp/Qs 0.72      CV ECHO BILL - BZI_BMI 26.0 kilograms/m^2       CV ECHO BILL - BSA(HAYCOCK) 2.2 m^2       CV ECHO BILL - BZI_METRIC_WEIGHT 94.3 kg       CV ECHO BILL - BZI_METRIC_HEIGHT 190.5 cm      EF(MOD-bp) 65.0 %      LA dimension(2D) 2.9 cm     Narrative:       · Left ventricular systolic function is normal.  · Mild mitral valve regurgitation is present  · Mild tricuspid valve regurgitation is present.  · Ejection fraction is about 65%  · No pericardial effusion noted                  Results for orders placed during the hospital encounter of 01/18/20   Adult Transthoracic Echo Complete W/ Cont if Necessary Per Protocol    Narrative · Left ventricular systolic function is normal.  · Mild mitral valve regurgitation is present  · Mild tricuspid valve regurgitation is present.  · Ejection fraction is about 65%  · No pericardial effusion noted          Xr Shoulder 2+ View Right    Result Date: 1/20/2020   1. No acute right shoulder findings. No significant right shoulder osteoarthritic change. 2. Suspected benign enchondroma or bone infarcts in the right humeral shaft.  Electronically Signed By-Dr. Yamileth Bourne MD On:1/20/2020 12:56 PM This report was finalized on [id number] by Dr. Yamileth Bourne MD.    Ct Head Without Contrast    Result Date: 1/18/2020  1.  Normal exam.  Electronically Signed By-Arabella Mendez On:1/18/2020 8:09 PM This report was finalized on [id number] by  Arabella Mendez .    Ct Cervical Spine  Without Contrast    Result Date: 1/18/2020  Normal CT cervical spine without contrast.  Electronically Signed By-Arabella Mendez On:1/18/2020 8:11 PM This report was finalized on [id number] by  Arabella Mendez .    Xr Toe 2+ View Left    Result Date: 1/18/2020   1. Negative for fracture. 2. Chronic abnormal appearance of second and third MTP joints. 3. Soft tissue swelling.  Electronically Signed By-Arabella Mendez On:1/18/2020 8:07 PM This report was finalized on [id number] by  Arabella Mendez .    Us Gallbladder    Result Date: 1/19/2020   1. Sludge in the gallbladder lumen. No evidence of gallbladder wall thickening or fluid in the gallbladder fossa. No evidence of dilatation of bile ducts. No evidence of ascites.  Electronically Signed By-DR. Mal Lott MD On:1/19/2020 5:50 PM This report was finalized on [id number] by DR. Mal Lott MD.    Ct Chest Pulmonary Embolism    Result Date: 1/20/2020   1. No evidence of thrombus or embolus in the right or left pulmonary artery branches. 2. Questionable very mild diffuse infiltrates throughout the right lower lobe and left lower lobe along might be caused by atelectasis or vascular congestion. 3. No focal abnormality seen in the right or left lungs.  Electronically Signed By-DR. Mal Lott MD On:1/20/2020 5:42 PM This report was finalized on [id number] by DR. Mal Lott MD.    Mri Shoulder Right Without Contrast    Result Date: 1/20/2020  1.There is a tear of the superior labrum from anterior to posterior with mild adjacent cartilage loss in the superior glenoid. This is age indeterminate. 2.No imaging findings of shoulder dislocation. No Hill-Sachs impaction fracture or Bankart lesion. 3.Chondroid lesions in the proximal humeral diaphysis. No associated aggressive features on MRI. Radiographic follow-up of the humerus is recommended in 6 months time. These are likely benign enchondromas. The largest lesion is not seen in  the field-of-view. 4.No findings rotator cuff tear.   Electronically Signed By-Millicent Springer MD On:1/20/2020 2:17 PM This report was finalized on [id number] by  Millicent Springer MD.    Mri Foot Right Without Contrast    Result Date: 1/20/2020  1.There is a sinus tract along the plantar aspect of foot superficial to the first metatarsal phalange joint with abnormal soft tissue density along this tract along the plantar aspect of the great toe with abnormal signal intensity in the hallux sesamoid bones. This is suspicious for osteomyelitis of the hallux sesamoid bones. Adjacent phlegmon is likely. Soft tissue evaluation limited without contrast. 2.There are remote and subacute fractures as described. 3.There is erosive change at the second proximal interphalangeal joint and the first interphalangeal joint that may be seen with gouty arthritis. Apparently erosive arthritis such as rheumatoid arthritis is thought to be unlikely.    Electronically Signed By-Millicent Springer MD On:1/20/2020 2:04 PM This report was finalized on [id number] by  Millicent Springer MD.    Mri Foot Left With & Without Contrast    Result Date: 1/20/2020  Diffuse inflammation and edema in the soft tissues surrounding the dorsal plantar aspect of the left mid and left forefoot. 2. There are several small focal loculated fluid collections overlying the dorsal aspect of the left first and second and third cuneiform bones and overlying the dorsal aspect of the left navicular bone and left cuboid bone in a small fluid collection can be seen lying between the bases of the left second and third metatarsal bases. These fluid collections have uncertain etiology and might be sterile or caused by numerous small abscesses in the soft tissues. Clinical correlation would BE helpful. 3. Signal abnormality in the bone marrow in the distal metaphysis and head of the left second metatarsal and in the bone marrow in the distal middle and proximal phalanges of the left second  toe probably caused by stress fracture and less likely caused by osteomyelitis. 4. Deformity of the distal metaphysis and head of the left third metatarsal most consistent with trauma and fracture. 5. Mild deformity of the left first second and third cuneiform bones in the left navicular bone and left cuboid bone with mild inflammation and edema in the bone marrow of these bones most consistent with arthritis and Charcot joint and less likely caused by osteomyelitis.  Electronically Signed By-DR. Mal Lott MD On:1/20/2020 9:23 PM This report was finalized on [id number] by DR. Mal Lott MD.    Xr Chest Pa & Lateral    Result Date: 1/19/2020   1. No active cardiopulmonary disease 2. Right PICC line is been removed since previous study of 04/07/2019 3. No significant change from 04/07/2019  Electronically Signed By-Hermilo Vaz Jr. On:1/19/2020 1:44 PM This report was finalized on [id number] by  Hermilo Vaz Jr., .          Xrays, labs reviewed personally by physician.    Medication Review:   I have reviewed the patient's current medication list      Scheduled Meds    aspirin 325 mg Oral Daily   cefepime 2 g Intravenous Q8H   insulin glargine 12 Units Subcutaneous Nightly   insulin lispro 0-10 Units Subcutaneous TID With Meals   insulin lispro 3 Units Subcutaneous TID With Meals   metroNIDAZOLE 500 mg Intravenous Q8H   povidone-iodine  Topical Daily   sodium chloride 10 mL Intravenous Q12H   vancomycin 1,500 mg Intravenous Q12H       Meds Infusions    Pharmacy Consult - Pharmacy to dose     Pharmacy to dose vancomycin     sodium chloride 125 mL/hr Last Rate: 125 mL/hr (01/22/20 0814)       Meds PRN  •  acetaminophen **OR** acetaminophen **OR** acetaminophen  •  aluminum-magnesium hydroxide-simethicone  •  bisacodyl  •  dextrose  •  dextrose  •  glucagon (human recombinant)  •  ketorolac  •  magnesium hydroxide  •  melatonin  •  ondansetron **OR** ondansetron  •  Pharmacy Consult - Pharmacy  to dose  •  Pharmacy to dose vancomycin  •  potassium chloride **OR** potassium chloride **OR** potassium chloride  •  sodium chloride    I personally reviewed patient's x-ray films and my findings are x-ray shoulder is negative and MRI pending    I personally reviewed patient's EKG strips and my findings are sinus tachycardia    Assessment/Plan   Assessment/Plan     Active Hospital Problems    Diagnosis  POA   • **Syncope [R55]  Yes   • Acute pain of right shoulder [M25.511]  Yes     Priority: Medium     Class: Acute   • Chronic ulcer of right foot, with necrosis of bone (CMS/HCC) [L97.514]  Yes   • Open wound of second toe of left foot [S91.105A]  Yes   • Diabetic peripheral neuropathy (CMS/HCC) [E11.42]  Yes   • 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  -CT head negative  -Continue telemetry  -Echocardiogram is normal  -CT PE negative  -At this time no particular etiology for syncope.    Right shoulder pain with very limited range of motion  -We will do MRI of the right shoulder  -Orthopedic consulted  -X-ray of the shoulder reviewed  -IV Toradol for pain control  -Patient had intra-articular injection today and doing fairly well MRI of the shoulder has been reviewed.  Patient will need follow-up in 6 weeks.    Diabetic wound ulcer with some discharge and surrounding edema .... Consistent with abscess  -Patient seen by podiatry and had procedure done 1/21/2021  -Excisional debridement to muscle/tendon, right foot as well as patient had tibial and fibular sesamoidectomy with biopsy of the right foot and patient also had incision and drainage to the level of tendon of  the left foot and second digit amputation at the metatarsophalangeal joint of the left foot.  -Intra-Op operative cultures are pending  -Continue broad-spectrum antibiotics cefepime/Flagyl/vancomycin  -Patient will need 6 weeks of IV antibiotics       Diabetes type 1  -Continue Patient's lantus  -Accuchecks AC and HS  -Cover with sliding scale  -Patient seen by endocrinology     Right upper quadrant abdominal pain: Resolved      VTE Prophylaxis - Lovenox 40 mg SC daily.      Code Status -   Code Status and Medical Interventions:   Ordered at: 01/18/20 2121     Level Of Support Discussed With:    Patient     Code Status:    CPR     Medical Interventions (Level of Support Prior to Arrest):    Full       Discharge Planning    Patient will need physical therapy and pain medications.      Destination      Coordination has not been started for this encounter.      Durable Medical Equipment      Coordination has not been started for this encounter.      Dialysis/Infusion      Coordination has not been started for this encounter.      Home Medical Care      Coordination has not been started for this encounter.      Therapy      Coordination has not been started for this encounter.      Community Resources      Coordination has not been started for this encounter.            Electronically signed by Christopher Ron MD, 01/22/20, 2:22 PM.  [facility]ist Team

## 2020-01-22 NOTE — PROGRESS NOTES
Infectious Diseases Progress Note      LOS: 3 days   Patient Care Team:  Erwin Castorena MD as PCP - General (Family Medicine)    Chief Complaint:  Fatigue,  right shoulder pain improved, general malaise    Subjective       The patient has been afebrile for the last 24 hours.  The patient is on room air, hemodynamically stable, and is tolerating antimicrobial therapy.  The patient just does not feel well today and is very fatigued, but his right shoulder pain has improved since orthopedic surgery did an injection.      Review of Systems:   Review of Systems   Constitutional: Positive for fatigue.        General malaise   Respiratory: Negative.    Cardiovascular: Negative.    Gastrointestinal: Negative.    Genitourinary: Negative.    Musculoskeletal: Positive for arthralgias and back pain.        Right shoulder pain improved   Skin: Positive for wound.   Neurological: Positive for dizziness and headaches.   Psychiatric/Behavioral: Negative.    All other systems reviewed and are negative.       Objective     Vital Signs  Temp:  [97.6 °F (36.4 °C)-100.5 °F (38.1 °C)] 98.1 °F (36.7 °C)  Heart Rate:  [74-96] 79  Resp:  [11-16] 16  BP: (113-144)/(64-81) 122/65    Physical Exam:  Physical Exam   Constitutional: He is oriented to person, place, and time. He appears well-developed and well-nourished.   HENT:   Head: Normocephalic and atraumatic.   Eyes: Pupils are equal, round, and reactive to light. Conjunctivae and EOM are normal.   Neck: Neck supple.   Cardiovascular: Normal rate, regular rhythm and normal heart sounds.   Pulmonary/Chest: Effort normal and breath sounds normal.   Abdominal: Soft. Bowel sounds are normal.   Musculoskeletal: Normal range of motion.   Neurological: He is alert and oriented to person, place, and time.   Skin: Skin is warm and dry.   Surgical dressing on both feet   Psychiatric: He has a normal mood and affect.   Vitals reviewed.       Results Review:    I have reviewed all clinical  data, test, lab, and imaging results.     Radiology  No Radiology Exams Resulted Within Past 24 Hours    Cardiology    Laboratory  Results from last 7 days   Lab Units 01/22/20  0514   WBC 10*3/mm3 7.90   HEMOGLOBIN g/dL 12.1*   HEMATOCRIT % 35.3*   PLATELETS 10*3/mm3 206     Results from last 7 days   Lab Units 01/22/20  0514   SODIUM mmol/L 131*   POTASSIUM mmol/L 3.6   CHLORIDE mmol/L 96*   CO2 mmol/L 27.0   BUN mg/dL 19   CREATININE mg/dL 0.72*   GLUCOSE mg/dL 280*   CALCIUM mg/dL 7.5*     Results from last 7 days   Lab Units 01/22/20  0514   SODIUM mmol/L 131*   POTASSIUM mmol/L 3.6   CHLORIDE mmol/L 96*   CO2 mmol/L 27.0   BUN mg/dL 19   CREATININE mg/dL 0.72*   GLUCOSE mg/dL 280*   CALCIUM mg/dL 7.5*     Results from last 7 days   Lab Units 01/21/20  0823   CK TOTAL U/L 14*     Results from last 7 days   Lab Units 01/21/20  0824   SED RATE mm/hr 87*         Microbiology   Microbiology Results (last 10 days)     Procedure Component Value - Date/Time    Body Fluid Culture - Body Fluid, Toe, Left [[id number]] Collected:  01/21/20 1314    Lab Status:  Preliminary result Specimen:  Body Fluid from Toe, Left Updated:  01/22/20 1133     Body Fluid Culture No growth     Gram Stain Few (2+) WBCs per low power field      No organisms seen    Tissue / Bone Culture - Tissue, Foot, Right [[id number]]  (Abnormal) Collected:  01/21/20 1238    Lab Status:  Preliminary result Specimen:  Tissue from Foot, Right Updated:  01/22/20 1151     Tissue Culture Moderate growth (3+) Streptococcus agalactiae (Group B)     Comment:   If Clindamycin or Erythromycin is the drug of choice, notify the laboratory within 7 days to request susceptibility testing.  This organism is considered to be universally susceptible to penicillin.  No further antibiotic testing will be performed. If Clindamycin or Erythromycin is the drug of choice, notify the laboratory within 7 days to request susceptibility testing.        STREP GROUPING B     Gram Stain  Few (2+) WBCs per low power field      Moderate (3+) Gram positive cocci in pairs and chains      Rare (1+) Gram negative bacilli    Narrative:       Organism under investigation.    MRSA Screen, PCR - Swab, Nares [[id number]]  (Normal) Collected:  01/20/20 1839    Lab Status:  Final result Specimen:  Swab from Nares Updated:  01/21/20 0453     MRSA PCR No MRSA Detected    Wound Culture - Wound, Toe, Left [[id number]]  (Abnormal) Collected:  01/20/20 1342    Lab Status:  Preliminary result Specimen:  Wound from Toe, Left Updated:  01/22/20 1105     Wound Culture Scant growth (1+) Staphylococcus aureus      Rare Streptococcus agalactiae (Group B)     Comment:   This organism is considered to be universally susceptible to penicillin.  No further antibiotic testing will be performed. If Clindamycin or Erythromycin is the drug of choice, notify the laboratory within 7 days to request susceptibility testing.        STREP GROUPING B     Wound Culture Rare Normal Skin Juliet     Gram Stain Moderate (3+) WBCs per low power field      Occasional Gram positive cocci    Narrative:             Wound Culture - Wound, Foot, Right [[id number]]  (Abnormal) Collected:  01/19/20 1630    Lab Status:  Preliminary result Specimen:  Wound from Foot, Right Updated:  01/22/20 1200     Wound Culture Moderate growth (3+) Streptococcus agalactiae (Group B)     Comment:   This organism is considered to be universally susceptible to penicillin.  No further antibiotic testing will be performed. If Clindamycin or Erythromycin is the drug of choice, notify the laboratory within 7 days to request susceptibility testing.        STREP GROUPING B     Wound Culture Scant growth (1+) Staphylococcus aureus, MRSA     Comment:   Methicillin resistant Staphylococcus aureus, Patient may be an isolation risk.        PBP2 Positive     Wound Culture Scant growth (1+) Normal Skin Juliet     Gram Stain Many (4+) WBCs per low power field      Many (4+) Gram positive  cocci in pairs, chains and clusters      Rare (1+) Gram negative cocci      Few (2+) Gram negative bacilli    Narrative:       Organism under investigation.      Respiratory Panel, PCR - Swab, Nasopharynx [[id number]]  (Normal) Collected:  01/19/20 1059    Lab Status:  Final result Specimen:  Swab from Nasopharynx Updated:  01/19/20 154     ADENOVIRUS, PCR Not Detected     Coronavirus 229E Not Detected     Coronavirus HKU1 Not Detected     Coronavirus NL63 Not Detected     Coronavirus OC43 Not Detected     Human Metapneumovirus Not Detected     Human Rhinovirus/Enterovirus Not Detected     Influenza B PCR Not Detected     Parainfluenza Virus 1 Not Detected     Parainfluenza Virus 2 Not Detected     Parainfluenza Virus 3 Not Detected     Parainfluenza Virus 4 Not Detected     Bordetella pertussis pcr Not Detected     Influenza A H1 2009 PCR Not Detected     Chlamydophila pneumoniae PCR Not Detected     Mycoplasma pneumo by PCR Not Detected     Influenza A PCR Not Detected     Influenza A H3 Not Detected     Influenza A H1 Not Detected     RSV, PCR Not Detected          Medication Review:       Schedule Meds    aspirin 325 mg Oral Daily   cefepime 2 g Intravenous Q8H   insulin glargine 12 Units Subcutaneous Nightly   insulin lispro 0-10 Units Subcutaneous TID With Meals   insulin lispro 3 Units Subcutaneous TID With Meals   metroNIDAZOLE 500 mg Intravenous Q8H   povidone-iodine  Topical Daily   sodium chloride 10 mL Intravenous Q12H   vancomycin 1,500 mg Intravenous Q12H       Infusion Meds    Pharmacy Consult - Pharmacy to dose     Pharmacy to dose vancomycin     sodium chloride 125 mL/hr Last Rate: 125 mL/hr (01/22/20 0814)       PRN Meds  •  acetaminophen **OR** acetaminophen **OR** acetaminophen  •  aluminum-magnesium hydroxide-simethicone  •  bisacodyl  •  dextrose  •  dextrose  •  glucagon (human recombinant)  •  ketorolac  •  magnesium hydroxide  •  melatonin  •  ondansetron **OR** ondansetron  •  Pharmacy  Consult - Pharmacy to dose  •  Pharmacy to dose vancomycin  •  potassium chloride **OR** potassium chloride **OR** potassium chloride  •  sodium chloride        Assessment/Plan       Antimicrobial Therapy   1.  IV vancomycin    Day 4  2.  IV cefepime    Day 4  3.  IV Flagyl     Day 3  4.      Day  5.      Day      Assessment     Diabetic foot wound  -Wound on the dorsal aspect of the second digit of the left foot  -Wound on the plantar aspect of the right foot   -MRI of the right foot shows suspicion for osteomyelitis of the hallux sesamoid bones  -History of left great toe amputation in November 2018, fourth and fifth digit amputation in April 2019 with 6 weeks of IV antibiotics  -History of right transmetatarsal amputation  -1/21/2020- right foot excisional debridement to the muscle/tendon with tibial and fibular sesamoidectomy, left foot incision and drainage with second digit amputation at the metatarsophalangeal joint  -Wound culture of the right foot is growing Streptococcus group B and methicillin-resistant Staphylococcus aureus   -wound culture of the left foot is growing Staphylococcus aureus and group B streptococcus  -Intraoperative cultures of the right foot are growing group B streptococcus     Leukocytosis  -resolved     Type 1 diabetes with neuropathy     Recent complaints of dizziness with syncope on 1/18/2020 which resulted in a fall in the shower  -Imaging studies do not show any acute findings on the CT the head, CT the cervical spine or MRI of the right shoulder  -Screen was negative  -Orthopedic surgery following patient for right shoulder pain, will try injection, patient may need surgery  -2D echo was negative for any acute findings     Also evidence of nausea, vomiting, and fever at home  -Patient has been afebrile since admission  -Respiratory viral DNA panel was negative     CAD, history of MI with cardiac catheterization    Right shoulder pain  -Orthopedic surgery did a steroid injection  on 1/21/2020 and patient has had some improvement in the pain today     Plan     Continue IV cefepime   Continue IV vancomycin  Continue IV Flagyl 500 mg every q 8 hours  Will de-escalate antibiotics once intraoperative cultures have resulted  Patient will need 6 weeks of IV antibiotics  Patient will likely need a wound VAC on the right foot  Continue supportive care  Herb Knight, APRN  01/22/20  2:12 PM

## 2020-01-22 NOTE — PLAN OF CARE
Problem: Infection, Risk/Actual (Adult)  Goal: Identify Related Risk Factors and Signs and Symptoms  Outcome: Ongoing (interventions implemented as appropriate)     Patient has surgical incisions on both left and right feet with staples on the right. Wound vac placed on right foot. Left foot has surgical dressing intact. Patient is partial weight bearing on right foot with cam boot on and non weight bearing on the left foot.

## 2020-01-22 NOTE — PROGRESS NOTES
"01/18/2020  Foot and Ankle Surgery - Inpatient Follow-up  Provider: Dr. Adan Souza DPM  Location: [facility]    Chief Complaint: s/p right wound debridement and sesamoidectomy; s/p left second digit amputation POD #1    Subjective:  Maynor Gregg is a 44 y.o. male.     Patient states that he continues to feel ill and is stressed.  Denies any pain to either foot    Allergies   Allergen Reactions   • Metformin Diarrhea and Nausea And Vomiting   • Oxycodone-Acetaminophen Nausea And Vomiting and Rash       No current facility-administered medications on file prior to encounter.      Current Outpatient Medications on File Prior to Encounter   Medication Sig Dispense Refill   • aspirin 325 MG tablet Take 325 mg by mouth Daily.     • Insulin Glargine (BASAGLAR KWIKPEN) 100 UNIT/ML injection pen Inject 12 Units under the skin into the appropriate area as directed Every Night.     • insulin lispro (HUMALOG) 100 UNIT/ML injection Inject  under the skin into the appropriate area as directed Every 8 (Eight) Hours. SSI         Objective   /65 (BP Location: Left arm, Patient Position: Lying)   Pulse 79   Temp 98.1 °F (36.7 °C) (Oral)   Resp 16   Ht 190.5 cm (75\")   Wt 94.3 kg (208 lb)   SpO2 93%   BMI 26.00 kg/m²     General: Mild distress.  Alert and oriented x3.  Anxious  Vascular: DP and PT pulses are palpable.  CFT are intact to the digits  Neuro: Sensation remains diminished.  Motor function is intact  MSK: s/p left second digit amputation.  No progressive changes to the right foot.  Derm: Surgical site to the right foot has improved with decreased redness and swelling.  No active drainage.  Left foot remains edematous without progressive erythema obvious signs of luis alfredo infection      Results from last 7 days   Lab Units 01/22/20  0514   WBC 10*3/mm3 7.90   HEMOGLOBIN g/dL 12.1*   HEMATOCRIT % 35.3*   PLATELETS 10*3/mm3 206       Assessment/Plan     Patient Active Problem List   Diagnosis "   • Syncope   • Diabetic peripheral neuropathy (CMS/HCC)   • Type 1 diabetes mellitus with circulatory complication (CMS/HCC)   • Open wound of second toe of left foot   • Acute pain of right shoulder   • Chronic ulcer of right foot, with necrosis of bone (CMS/HCC)       Patient is doing relatively well at this time but continues to feel ill.  WBC has normalized.  Patient's right foot appears much improved with decreased redness.  No active drainage noted to the wound site.  I do feel that symptoms involving the left foot are secondary to acute on chronic Charcot arthropathy due to his fall prior to admission.  I have recommended that we proceed with strict nonweightbearing to the left lower extremity to prevent any further instability or collapse.  He is to perform partial protected weightbearing as needed with walker assistance and a cam boot to the right lower extremity.  I have discussed case with wound care and he will be evaluated for a wound VAC to the right foot.  Case is also been discussed extensively with infectious disease service.  I do anticipate IV antibiotics at discharge.  I have asked patient to consider rehab at discharge.  No further operative plans from my standpoint.    Note is dictated utilizing voice recognition software. Unfortunately this leads to occasional typographical errors. I apologize in advance if the situation occurs. If questions occur please do not hesitate to call our office.

## 2020-01-22 NOTE — PLAN OF CARE
Pt receiving prn toradol with relief of back pain. Receiving IV abx and fluids. Will continue to monitor.

## 2020-01-22 NOTE — PROGRESS NOTES
"Nutrition Services    Patient Name:  Maynor Gregg  YOB: 1975  MRN: [id number]  Admit Date:  1/18/2020    Visiting pt for high A1C. Pt stated \"I know how to control my sugar\" and declined education. Pt accepted handouts on the plate method and 5 steps to manage diabetes with the [facility] contact information.     Electronically signed by:  Scar Shaw  01/22/20 2:58 PM   "

## 2020-01-22 NOTE — PROGRESS NOTES
Continued Stay Note  DESTIN Cohen     Patient Name: Maynor Gregg  MRN: [id number]  Today's Date: 1/22/2020    Admit Date: 1/18/2020    Discharge Plan     Row Name 01/22/20 1259       Plan    Plan  DC Plan: Rehab vs home with home health. Possible Wound Care vac, very limited wt bearing. Requires 6 wks IV antibiotics, pending surgical cultures. Possible DC 1/24/20.                   Jason Price RN

## 2020-01-22 NOTE — PLAN OF CARE
Problem: Patient Care Overview  Goal: Plan of Care Review  Outcome: Ongoing (interventions implemented as appropriate)  Flowsheets  Taken 1/21/2020 1831 by Mae Soriano RN  Progress: improving  Taken 1/22/2020 1554 by Suzan Delarosa RN  Plan of Care Reviewed With: patient  Note:   Pt's pain appears to be more controlled today. Wound vac placed on patient's right foot with a cam boot for partial weight bearing. Case management discussed inpatient rehab and IV antibiotics.      Problem: Patient Care Overview  Goal: Interprofessional Rounds/Family Conf  Outcome: Ongoing (interventions implemented as appropriate)     Patient is more comfortable today, wound vac in place on bottom of right foot with cam boot available for partial weight bearing. Plan for patient to be discharged to inpatient for rehab and IV antibiotic. Will continue with current care plans and continue to monitor

## 2020-01-22 NOTE — PAYOR COMM NOTE
"AUTHORIZATION PENDING:   PLEASE CALL OR FAX DETERMINATION TO CONTACT BELOW. THANK YOU.        Lauren Nair RN MSN  /UR  [facility]  [phone] office  [phone] fax  [email or web address]    [facility]  NPI: [id number]  Tax: [id number]      Ebenezer Gregg (44 y.o. Male) 1975  Auth # [id number]    Clinical update as requested.        Date of Birth Social Security Number Address Home Phone MRN    1975  [address] [address] [phone] [id number]    Evangelical Marital Status          Zoroastrian Single       Admission Date Admission Type Admitting Provider Attending Provider Department, Room/Bed    1/18/20 Emergency Christopher Ron MD Lohano, Suresh, MD [facility] 2C MEDICAL INPATIENT, 246/1    Discharge Date Discharge Disposition Discharge Destination                       Attending Provider:  Christopher Ron MD    Allergies:  Metformin, Oxycodone-acetaminophen    Isolation:  Contact   Infection:  MRSA (11/09/18)   Code Status:  CPR    Ht:  190.5 cm (75\")   Wt:  94.3 kg (208 lb)    Admission Cmt:  None   Principal Problem:  Syncope [R55]                 Active Insurance as of 1/18/2020     Primary Coverage     Payor Plan Insurance Group Employer/Plan Group    [employer] BLUE CROSS AN[employer]M BLUE CROSS BLUE SHIELD PPO [id number]     Payor Plan Address Payor Plan Phone Number Payor Plan Fax Number Effective Dates    [address] [phone]  10/13/2019 - None Entered    [facility] [id number]       Subscriber Name Subscriber Birth Date Member ID       EBENEZER GREGG 1975 [id number]                 Emergency Contacts      (Rel.) Home Phone Work Phone Mobile Phone    DAYDAY BATISTA (Friend) [phone] -- [phone]               Physician Progress Notes (last 24 hours) (Notes from 01/21/20 1555 through 01/22/20 1555)      Christopher Ron MD at 01/22/20 1422                [facility] " "Medicine Services Daily Progress Note      Hospitalist Team  LOS 3 days      Patient Care Team:  Erwin Castorena MD as PCP - General (Family Medicine)    Patient Location: [facility]      Subjective   Subjective     Chief Complaint / Subjective  Chief Complaint   Patient presents with   • Syncope       Present on Admission:  • Syncope  • Diabetic peripheral neuropathy (CMS/HCC)  • 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 Gregg is a 44 y.o. with a history of DM 1 and neuropathy presented to the [facility] ED on 1/18/2020 with a complaint of syncope, he passed out while he was in the shower today, states he \"went out and woke up in the tub\" Pt is also complaining of nausea, vomiting and fever x 3 days. Pt states he began having nausea and vomiting on Thursday, associated with low grade fever. Pt denies melena, hematochezia, diarrhea. Pt states he then began to have episodes of dizziness, even when lying down, he felt as if the room was spinning. Pt is complaining of head, neck and back pain s/p fall today. Pt has an open wound on his left 2nd toe, he is current with Dr. Souza and is finishing a round of doxycycline.      In the ED, CT head: negative for acute abnormality, CT spine: Normal, Xray of second toe on left foot: negative for fracture, chronic abnormal appearance of second and third MTP joints, soft tissue swelling. Glucose 520, K+4.2, BUN 19, Cr. 1.04, WBC 23.9, Hgb 13.4, Hct 38.5. Pt was given IV insulin 6 units, 500ml NS bolus IV, Zofran 4mg IV. Pt will be admitted for further evaluation and management.     Date:: 1/20/2020: Patient seen and examined and complains of right shoulder pain.  Patient was admitted because of the syncopal episode and at this " "time we do not know exactly why he passed out.  Patient landed on the right side and hurt his shoulder and arm during the x-ray and MRI of the right shoulder.  Patient also will be seen by orthopedics.    Date 1/22/2020: Patient seen and examined and he seemed to doing fairly well.  I also spoke to infectious disease as well as  and nursing at the bedside.  Patient will need nonweightbearing as well as IV antibiotic for 6 weeks and we are considering different options including possible placement.    Review of Systems   All other systems reviewed and are negative.        Objective   Objective      Vital Signs  Temp:  [97.6 °F (36.4 °C)-100.5 °F (38.1 °C)] 98.1 °F (36.7 °C)  Heart Rate:  [74-96] 79  Resp:  [12-16] 16  BP: (121-144)/(64-81) 122/65  Oxygen Therapy  SpO2: 93 %  Pulse Oximetry Type: Continuous  Device (Oxygen Therapy): room air  Flow (L/min): 2  Flowsheet Rows      First Filed Value   Admission Height  191.8 cm (75.5\") Documented at 01/18/2020 1854   Admission Weight  98 kg (216 lb 0.8 oz) Documented at 01/18/2020 1854        Intake & Output (last 3 days)       01/19 0701 - 01/20 0700 01/20 0701 - 01/21 0700 01/21 0701 - 01/22 0700 01/22 0701 - 01/23 0700    P.O. 720 120 0 120    I.V. (mL/kg) 1270.8 (13.4)  700 (7.4)     IV Piggyback 600 250 350     Total Intake(mL/kg) 2590.8 (27.3) 370 (3.9) 1050 (11.1) 120 (1.3)    Urine (mL/kg/hr) 860 (0.4)       Stool   0 0    Total Output 860  0 0    Net +1730.8 +370 +1050 +120                Lines, Drains & Airways    Active LDAs     Name:   Placement date:   Placement time:   Site:   Days:    Peripheral IV 01/18/20 1926 Right Arm   01/18/20 1926    Arm   1                  Physical Exam:    Physical Exam   Constitutional: He is oriented to person, place, and time. He appears well-developed and well-nourished. No distress.   HENT:   Head: Normocephalic and atraumatic.   Nose: Nose normal.   Mouth/Throat: Oropharynx is clear and moist. No " oropharyngeal exudate.   Eyes: Pupils are equal, round, and reactive to light. Conjunctivae and EOM are normal. Right eye exhibits no discharge. Left eye exhibits no discharge. No scleral icterus.   Neck: Normal range of motion. No JVD present. No tracheal deviation present. No thyromegaly present.   Cardiovascular: Normal rate, regular rhythm, normal heart sounds and intact distal pulses. Exam reveals no gallop and no friction rub.   No murmur heard.  Pulmonary/Chest: Effort normal and breath sounds normal. No stridor. No respiratory distress. He has no wheezes. He has no rales. He exhibits no tenderness.   Abdominal: Soft. Bowel sounds are normal. He exhibits no distension and no mass. There is no tenderness. There is no rebound and no guarding. No hernia.   Musculoskeletal: He exhibits deformity. He exhibits no edema or tenderness.   Lymphadenopathy:     He has no cervical adenopathy.   Neurological: He is alert and oriented to person, place, and time. No cranial nerve deficit or sensory deficit. He exhibits normal muscle tone. Coordination normal.   Skin: Skin is warm and dry. No rash noted. He is not diaphoretic. No erythema.   Psychiatric: He has a normal mood and affect. His behavior is normal.   Nursing note and vitals reviewed.           Wounds (last 24 hours)      LDA Wound     Row Name 01/20/20 0710 01/19/20 1905          Wound 01/18/20 2244 Right heel Diabetic Ulcer    Wound - Properties Group Date first assessed: 01/18/20  - Time first assessed: 2244  -SS Present on Hospital Admission: Y  -SS Side: Right  -SS Location: heel  -SS Primary Wound Type: Diabetic ulc  - Stage, Pressure Injury: unstageable  -SS    Dressing Appearance  dry;intact  -SM  dry;intact  -VB     Closure  DORIS  -SM  DORIS  -VB       User Key  (r) = Recorded By, (t) = Taken By, (c) = Cosigned By    Initials Name Provider Type    VB Tamara Swartz, RN Registered Nurse    Bryanna Albarran LPN Licensed Nurse    Mae Harrell,  "RN Registered Nurse          Procedures:              Results Review:     I reviewed the patient's new clinical results.      Lab Results (last 24 hours)     Procedure Component Value Units Date/Time    Tissue Pathology Exam [[id number]] Collected:  01/21/20 1247    Specimen:  Bone from Foot, Right; Tissue from Toe, Left Updated:  01/22/20 4366     Case Report --     Surgical Pathology Report                         Case: [id number]                                  Authorizing Provider:  CROW Souza DPM      Collected:           01/21/2020 12:47 PM          Ordering Location:     [facility] MAIN  Received:            01/21/2020 02:12 PM                                 OR                                                                           Pathologist:           Wm Flynn MD                                                            Specimens:   1) - Foot, Right, right tibial and fibular sesmoid                                                  2) - Toe, Left, left second toe                                                             Final Diagnosis --     Specimen #1 (Exostosis, right foot, excision):    Acutely inflamed fibroadipose tissue, viable bone and hypocellular fibrotic marrow    No osteomyelitis or malignancy identified    Specimen #2 (Toe, left second, amputation):    Skin ulceration, soft tissue cellulitis, and focal acute osteomyelitis of nonmarginal bone    Skin, soft tissue and bone resection margins grossly viable    MONICA/sms       Gross Description --     Part 1: Received in formalin designated \"Right foot exostosis\" is a portion of tan-yellow fibro-osseous tissue measuring 2.8 x 2.5 x 0.9 cm. Sectioning reveals yellowish tan cut surfaces. Representative sections of bony tissue are submitted in one cassette after brief decalcification.      Part 2: Received in formalin designated \"Left second toe\" is a digital amputation specimen measuring 6.5 x 2.8 x 2.3 cm. The distal " portion is covered with tan unremarkable skin. A nail is present. The proximal joint has a 1 cm ulcer. The skin and soft tissue resection margins are viable. No softening of the bony tissue deep to the area of ulceration is identified. Representative sections of skin ulceration are submitted in one cassette.      [facility]        Wound Culture - Wound, Foot, Right [[id number]]  (Abnormal) Collected:  01/19/20 1630    Specimen:  Wound from Foot, Right Updated:  01/22/20 1200     Wound Culture Moderate growth (3+) Streptococcus agalactiae (Group B)     Comment:   This organism is considered to be universally susceptible to penicillin.  No further antibiotic testing will be performed. If Clindamycin or Erythromycin is the drug of choice, notify the laboratory within 7 days to request susceptibility testing.        STREP GROUPING B     Wound Culture Scant growth (1+) Staphylococcus aureus, MRSA     Comment:   Methicillin resistant Staphylococcus aureus, Patient may be an isolation risk.        PBP2 Positive     Wound Culture Scant growth (1+) Normal Skin Juliet     Gram Stain Many (4+) WBCs per low power field      Many (4+) Gram positive cocci in pairs, chains and clusters      Rare (1+) Gram negative cocci      Few (2+) Gram negative bacilli    Narrative:       Organism under investigation.      Tissue / Bone Culture - Tissue, Foot, Right [[id number]]  (Abnormal) Collected:  01/21/20 1238    Specimen:  Tissue from Foot, Right Updated:  01/22/20 1151     Tissue Culture Moderate growth (3+) Streptococcus agalactiae (Group B)     Comment:   If Clindamycin or Erythromycin is the drug of choice, notify the laboratory within 7 days to request susceptibility testing.  This organism is considered to be universally susceptible to penicillin.  No further antibiotic testing will be performed. If Clindamycin or Erythromycin is the drug of choice, notify the laboratory within 7 days to request susceptibility testing.        STREP  GROUPING B     Gram Stain Few (2+) WBCs per low power field      Moderate (3+) Gram positive cocci in pairs and chains      Rare (1+) Gram negative bacilli    Narrative:       Organism under investigation.    Body Fluid Culture - Body Fluid, Toe, Left [[id number]] Collected:  01/21/20 1314    Specimen:  Body Fluid from Toe, Left Updated:  01/22/20 1133     Body Fluid Culture No growth     Gram Stain Few (2+) WBCs per low power field      No organisms seen    POC Glucose Once [[id number]]  (Abnormal) Collected:  01/22/20 1123    Specimen:  Blood Updated:  01/22/20 1131     Glucose 223 mg/dL      Comment: Serial Number: [id number]:  [id number]       Wound Culture - Wound, Toe, Left [[id number]]  (Abnormal) Collected:  01/20/20 1342    Specimen:  Wound from Toe, Left Updated:  01/22/20 1105     Wound Culture Scant growth (1+) Staphylococcus aureus      Rare Streptococcus agalactiae (Group B)     Comment:   This organism is considered to be universally susceptible to penicillin.  No further antibiotic testing will be performed. If Clindamycin or Erythromycin is the drug of choice, notify the laboratory within 7 days to request susceptibility testing.        STREP GROUPING B     Wound Culture Rare Normal Skin Juliet     Gram Stain Moderate (3+) WBCs per low power field      Occasional Gram positive cocci    Narrative:             POC Glucose Once [[id number]]  (Abnormal) Collected:  01/22/20 0727    Specimen:  Blood Updated:  01/22/20 0728     Glucose 230 mg/dL      Comment: Serial Number: [id number]:  [id number]       Basic Metabolic Panel [[id number]]  (Abnormal) Collected:  01/22/20 0514    Specimen:  Blood Updated:  01/22/20 0623     Glucose 280 mg/dL      BUN 19 mg/dL      Creatinine 0.72 mg/dL      Sodium 131 mmol/L      Potassium 3.6 mmol/L      Chloride 96 mmol/L      CO2 27.0 mmol/L      Calcium 7.5 mg/dL      eGFR Non African Amer 119 mL/min/1.73      BUN/Creatinine Ratio 26.4     Anion Gap 8.0 mmol/L      Narrative:       GFR Normal >60  Chronic Kidney Disease <60  Kidney Failure <15      CBC & Differential [[id number]] Collected:  01/22/20 0514    Specimen:  Blood Updated:  01/22/20 0539    Narrative:       The following orders were created for panel order CBC & Differential.  Procedure                               Abnormality         Status                     ---------                               -----------         ------                     CBC Auto Differential[[id number]]        Abnormal            Final result                 Please view results for these tests on the individual orders.    CBC Auto Differential [[id number]]  (Abnormal) Collected:  01/22/20 0514    Specimen:  Blood Updated:  01/22/20 0539     WBC 7.90 10*3/mm3      RBC 3.74 10*6/mm3      Hemoglobin 12.1 g/dL      Hematocrit 35.3 %      MCV 94.4 fL      MCH 32.5 pg      MCHC 34.4 g/dL      RDW 12.3 %      RDW-SD 41.1 fl      MPV 8.5 fL      Platelets 206 10*3/mm3      Neutrophil % 68.5 %      Lymphocyte % 15.4 %      Monocyte % 14.7 %      Eosinophil % 0.8 %      Basophil % 0.6 %      Neutrophils, Absolute 5.40 10*3/mm3      Lymphocytes, Absolute 1.20 10*3/mm3      Monocytes, Absolute 1.20 10*3/mm3      Eosinophils, Absolute 0.10 10*3/mm3      Basophils, Absolute 0.00 10*3/mm3      nRBC 0.1 /100 WBC     POC Glucose Once [[id number]]  (Abnormal) Collected:  01/21/20 1921    Specimen:  Blood Updated:  01/21/20 1921     Glucose 142 mg/dL      Comment: Serial Number: [id number]:  [id number]       POC Glucose Once [[id number]]  (Abnormal) Collected:  01/21/20 1700    Specimen:  Blood Updated:  01/21/20 1701     Glucose 130 mg/dL      Comment: Serial Number: [id number]:  [id number]       Vitamin B12 [[id number]]  (Abnormal) Collected:  01/21/20 0823    Specimen:  Blood Updated:  01/21/20 1646     Vitamin B-12 988 pg/mL     Narrative:       Results may be falsely increased if patient taking Biotin.          Hemoglobin A1C   Date Value Ref  Range Status   01/21/2020 10.5 (H) 3.5 - 5.6 % Final               Lab Results   Component Value Date    LIPASE 10 (L) 01/19/2020     Lab Results   Component Value Date    CHOL 82 01/21/2020    TRIG 148 01/21/2020    HDL 15 (L) 01/21/2020    LDL 37 01/21/2020       Lab Results   Lab Value Date/Time    FINALDX  01/21/2020 1247     Specimen #1 (Exostosis, right foot, excision):    Acutely inflamed fibroadipose tissue, viable bone and hypocellular fibrotic marrow    No osteomyelitis or malignancy identified    Specimen #2 (Toe, left second, amputation):    Skin ulceration, soft tissue cellulitis, and focal acute osteomyelitis of nonmarginal bone    Skin, soft tissue and bone resection margins grossly viable    MONICA/sms         Microbiology Results (last 10 days)     Procedure Component Value - Date/Time    Body Fluid Culture - Body Fluid, Toe, Left [[id number]] Collected:  01/21/20 1314    Lab Status:  Preliminary result Specimen:  Body Fluid from Toe, Left Updated:  01/22/20 1133     Body Fluid Culture No growth     Gram Stain Few (2+) WBCs per low power field      No organisms seen    Tissue / Bone Culture - Tissue, Foot, Right [[id number]]  (Abnormal) Collected:  01/21/20 1238    Lab Status:  Preliminary result Specimen:  Tissue from Foot, Right Updated:  01/22/20 1151     Tissue Culture Moderate growth (3+) Streptococcus agalactiae (Group B)     Comment:   If Clindamycin or Erythromycin is the drug of choice, notify the laboratory within 7 days to request susceptibility testing.  This organism is considered to be universally susceptible to penicillin.  No further antibiotic testing will be performed. If Clindamycin or Erythromycin is the drug of choice, notify the laboratory within 7 days to request susceptibility testing.        STREP GROUPING B     Gram Stain Few (2+) WBCs per low power field      Moderate (3+) Gram positive cocci in pairs and chains      Rare (1+) Gram negative bacilli    Narrative:       Organism  under investigation.    MRSA Screen, PCR - Swab, Nares [[id number]]  (Normal) Collected:  01/20/20 1839    Lab Status:  Final result Specimen:  Swab from Nares Updated:  01/21/20 0453     MRSA PCR No MRSA Detected    Wound Culture - Wound, Toe, Left [[id number]]  (Abnormal) Collected:  01/20/20 1342    Lab Status:  Preliminary result Specimen:  Wound from Toe, Left Updated:  01/22/20 1105     Wound Culture Scant growth (1+) Staphylococcus aureus      Rare Streptococcus agalactiae (Group B)     Comment:   This organism is considered to be universally susceptible to penicillin.  No further antibiotic testing will be performed. If Clindamycin or Erythromycin is the drug of choice, notify the laboratory within 7 days to request susceptibility testing.        STREP GROUPING B     Wound Culture Rare Normal Skin Juliet     Gram Stain Moderate (3+) WBCs per low power field      Occasional Gram positive cocci    Narrative:             Wound Culture - Wound, Foot, Right [[id number]]  (Abnormal) Collected:  01/19/20 1630    Lab Status:  Preliminary result Specimen:  Wound from Foot, Right Updated:  01/22/20 1200     Wound Culture Moderate growth (3+) Streptococcus agalactiae (Group B)     Comment:   This organism is considered to be universally susceptible to penicillin.  No further antibiotic testing will be performed. If Clindamycin or Erythromycin is the drug of choice, notify the laboratory within 7 days to request susceptibility testing.        STREP GROUPING B     Wound Culture Scant growth (1+) Staphylococcus aureus, MRSA     Comment:   Methicillin resistant Staphylococcus aureus, Patient may be an isolation risk.        PBP2 Positive     Wound Culture Scant growth (1+) Normal Skin Juliet     Gram Stain Many (4+) WBCs per low power field      Many (4+) Gram positive cocci in pairs, chains and clusters      Rare (1+) Gram negative cocci      Few (2+) Gram negative bacilli    Narrative:       Organism under investigation.       Respiratory Panel, PCR - Swab, Nasopharynx [[id number]]  (Normal) Collected:  01/19/20 1059    Lab Status:  Final result Specimen:  Swab from Nasopharynx Updated:  01/19/20 1545     ADENOVIRUS, PCR Not Detected     Coronavirus 229E Not Detected     Coronavirus HKU1 Not Detected     Coronavirus NL63 Not Detected     Coronavirus OC43 Not Detected     Human Metapneumovirus Not Detected     Human Rhinovirus/Enterovirus Not Detected     Influenza B PCR Not Detected     Parainfluenza Virus 1 Not Detected     Parainfluenza Virus 2 Not Detected     Parainfluenza Virus 3 Not Detected     Parainfluenza Virus 4 Not Detected     Bordetella pertussis pcr Not Detected     Influenza A H1 2009 PCR Not Detected     Chlamydophila pneumoniae PCR Not Detected     Mycoplasma pneumo by PCR Not Detected     Influenza A PCR Not Detected     Influenza A H3 Not Detected     Influenza A H1 Not Detected     RSV, PCR Not Detected          ECG/EMG Results (most recent)     Procedure Component Value Units Date/Time    ECG 12 Lead [[id number]] Collected:  01/18/20 1906     Updated:  01/18/20 1907    Narrative:       HEART RATE= 105  bpm  RR Interval= 572  ms  AK Interval= 149  ms  P Horizontal Axis= 3  deg  P Front Axis= 48  deg  QRSD Interval= 79  ms  QT Interval= 328  ms  QRS Axis= 34  deg  T Wave Axis= 2  deg  - BORDERLINE ECG -  Sinus tachycardia  Borderline ST elevation, lateral leads  When compared with ECG of 04-Apr-2019 13:03:16,  Significant change in rhythm  Electronically Signed By:   Date and Time of Study: 2020-01-18 19:06:28    Adult Transthoracic Echo Complete W/ Cont if Necessary Per Protocol [[id number]] Collected:  01/20/20 1027     Updated:  01/21/20 1920     BSA 2.2 m^2      BH CV ECHO BILL - RVDD 2.8 cm      IVSd 1.3 cm      LVIDd 3.7 cm      LVIDs 2.6 cm      LVPWd 1.2 cm      IVS/LVPW 1.1     FS 28.7 %      EDV(Teich) 56.7 ml      ESV(Teich) 24.8 ml      EF(Teich) 56.2 %      EDV(cubed) 49.1 ml      ESV(cubed) 17.8 ml       EF(cubed) 63.8 %      LV mass(C)d 154.9 grams      LV mass(C)dI 69.4 grams/m^2      SV(Teich) 31.9 ml      SI(Teich) 14.3 ml/m^2      SV(cubed) 31.4 ml      SI(cubed) 14.1 ml/m^2      Ao root diam 2.6 cm      Ao root area 5.5 cm^2      ACS 2.2 cm      asc Aorta Diam 2.9 cm      LVOT diam 2.2 cm      LVOT area 3.7 cm^2      RVOT diam 2.4 cm      RVOT area 4.7 cm^2      EDV(MOD-sp4) 89.0 ml      ESV(MOD-sp4) 30.3 ml      EF(MOD-sp4) 65.9 %      EDV(MOD-sp2) 72.1 ml      ESV(MOD-sp2) 28.9 ml      EF(MOD-sp2) 59.9 %      SV(MOD-sp4) 58.7 ml      SI(MOD-sp4) 26.3 ml/m^2      SV(MOD-sp2) 43.2 ml      SI(MOD-sp2) 19.4 ml/m^2      Ao root area (BSA corrected) 1.2     LV Matson Vol (BSA corrected) 39.9 ml/m^2      LV Sys Vol (BSA corrected) 13.6 ml/m^2      Aortic R-R 0.65 sec      Aortic HR 92.2 BPM      MV E max reina 113.5 cm/sec      MV A max reina 92.8 cm/sec      MV E/A 1.2     MV V2 max 124.8 cm/sec      MV max PG 6.2 mmHg      MV V2 mean 86.5 cm/sec      MV mean PG 3.3 mmHg      MV V2 VTI 27.3 cm      MVA(VTI) 3.4 cm^2      MV dec slope 401.4 cm/sec^2      MV dec time 0.28 sec      Ao pk reina 148.3 cm/sec      Ao max PG 8.8 mmHg      Ao max PG (full) 0.11 mmHg      Ao V2 mean 115.9 cm/sec      Ao mean PG 5.7 mmHg      Ao mean PG (full) 1.8 mmHg      Ao V2 VTI 28.7 cm      CONSUELO(I,A) 3.3 cm^2      CONSUELO(I,D) 3.3 cm^2      CONSUELO(V,A) 3.6 cm^2      CONSUELO(V,D) 3.6 cm^2      LV V1 max PG 8.7 mmHg      LV V1 mean PG 3.9 mmHg      LV V1 max 147.4 cm/sec      LV V1 mean 90.2 cm/sec      LV V1 VTI 25.7 cm      CO(Ao) 14.5 l/min      CI(Ao) 6.5 l/min/m^2      SV(Ao) 157.2 ml      SI(Ao) 70.4 ml/m^2      CO(LVOT) 8.7 l/min      CI(LVOT) 3.9 l/min/m^2      SV(LVOT) 94.0 ml      SV(RVOT) 67.3 ml      SI(LVOT) 42.1 ml/m^2      PA V2 max 83.9 cm/sec      PA max PG 2.8 mmHg      PA max PG (full) 1.0 mmHg      PA V2 mean 68.9 cm/sec      PA mean PG 2.0 mmHg      PA mean PG (full) 0.95 mmHg      PA V2 VTI 18.4 cm      PVA(I,A) 3.7 cm^2      BH CV  ECHO BILL - PVA(I,D) 3.7 cm^2       CV ECHO BILL - PVA(V,A) 3.8 cm^2       CV ECHO BILL - PVA(V,D) 3.8 cm^2      PA acc time 0.12 sec      RV V1 max PG 1.8 mmHg      RV V1 mean PG 1.0 mmHg      RV V1 max 67.2 cm/sec      RV V1 mean 47.8 cm/sec      RV V1 VTI 14.4 cm      TR max fermín 224.6 cm/sec      RVSP(TR) 23.2 mmHg      RAP systole 3.0 mmHg      PA pr(Accel) 25.9 mmHg      Pulm Sys Fermín 55.0 cm/sec      Pulm Matson Fermín 49.8 cm/sec      Pulm S/D 1.1     Qp/Qs 0.72      CV ECHO BILL - BZI_BMI 26.0 kilograms/m^2       CV ECHO BILL - BSA(HAYCOCK) 2.2 m^2       CV ECHO BILL - BZI_METRIC_WEIGHT 94.3 kg       CV ECHO BILL - BZI_METRIC_HEIGHT 190.5 cm      EF(MOD-bp) 65.0 %      LA dimension(2D) 2.9 cm     Narrative:       · Left ventricular systolic function is normal.  · Mild mitral valve regurgitation is present  · Mild tricuspid valve regurgitation is present.  · Ejection fraction is about 65%  · No pericardial effusion noted                  Results for orders placed during the hospital encounter of 01/18/20   Adult Transthoracic Echo Complete W/ Cont if Necessary Per Protocol    Narrative · Left ventricular systolic function is normal.  · Mild mitral valve regurgitation is present  · Mild tricuspid valve regurgitation is present.  · Ejection fraction is about 65%  · No pericardial effusion noted          Xr Shoulder 2+ View Right    Result Date: 1/20/2020   1. No acute right shoulder findings. No significant right shoulder osteoarthritic change. 2. Suspected benign enchondroma or bone infarcts in the right humeral shaft.  Electronically Signed By-Dr. Yamileth Bourne MD On:1/20/2020 12:56 PM This report was finalized on [id number] by Dr. Yamileth Bourne MD.    Ct Head Without Contrast    Result Date: 1/18/2020  1.  Normal exam.  Electronically Signed By-Arabella Mendez On:1/18/2020 8:09 PM This report was finalized on [id number] by  Arabella Mendez, .    Ct Cervical Spine Without Contrast    Result Date:  1/18/2020  Normal CT cervical spine without contrast.  Electronically Signed By-Arabella Mendez On:1/18/2020 8:11 PM This report was finalized on [id number] by  Arabella Mendez .    Xr Toe 2+ View Left    Result Date: 1/18/2020   1. Negative for fracture. 2. Chronic abnormal appearance of second and third MTP joints. 3. Soft tissue swelling.  Electronically Signed By-Arabella Mendez On:1/18/2020 8:07 PM This report was finalized on [id number] by  Arabella Mendez .    Us Gallbladder    Result Date: 1/19/2020   1. Sludge in the gallbladder lumen. No evidence of gallbladder wall thickening or fluid in the gallbladder fossa. No evidence of dilatation of bile ducts. No evidence of ascites.  Electronically Signed By-DR. Mal Lott MD On:1/19/2020 5:50 PM This report was finalized on [id number] by DR. Mal Lott MD.    Ct Chest Pulmonary Embolism    Result Date: 1/20/2020   1. No evidence of thrombus or embolus in the right or left pulmonary artery branches. 2. Questionable very mild diffuse infiltrates throughout the right lower lobe and left lower lobe along might be caused by atelectasis or vascular congestion. 3. No focal abnormality seen in the right or left lungs.  Electronically Signed By-DR. Mal Lott MD On:1/20/2020 5:42 PM This report was finalized on [id number] by DR. Mal Lott MD.    Mri Shoulder Right Without Contrast    Result Date: 1/20/2020  1.There is a tear of the superior labrum from anterior to posterior with mild adjacent cartilage loss in the superior glenoid. This is age indeterminate. 2.No imaging findings of shoulder dislocation. No Hill-Sachs impaction fracture or Bankart lesion. 3.Chondroid lesions in the proximal humeral diaphysis. No associated aggressive features on MRI. Radiographic follow-up of the humerus is recommended in 6 months time. These are likely benign enchondromas. The largest lesion is not seen in the field-of-view. 4.No findings  rotator cuff tear.   Electronically Signed By-Millicent Springer MD On:1/20/2020 2:17 PM This report was finalized on [id number] by  Millicent Springer MD.    Mri Foot Right Without Contrast    Result Date: 1/20/2020  1.There is a sinus tract along the plantar aspect of foot superficial to the first metatarsal phalange joint with abnormal soft tissue density along this tract along the plantar aspect of the great toe with abnormal signal intensity in the hallux sesamoid bones. This is suspicious for osteomyelitis of the hallux sesamoid bones. Adjacent phlegmon is likely. Soft tissue evaluation limited without contrast. 2.There are remote and subacute fractures as described. 3.There is erosive change at the second proximal interphalangeal joint and the first interphalangeal joint that may be seen with gouty arthritis. Apparently erosive arthritis such as rheumatoid arthritis is thought to be unlikely.    Electronically Signed By-Millicent Springer MD On:1/20/2020 2:04 PM This report was finalized on [id number] by  Millicent Springer MD.    Mri Foot Left With & Without Contrast    Result Date: 1/20/2020  Diffuse inflammation and edema in the soft tissues surrounding the dorsal plantar aspect of the left mid and left forefoot. 2. There are several small focal loculated fluid collections overlying the dorsal aspect of the left first and second and third cuneiform bones and overlying the dorsal aspect of the left navicular bone and left cuboid bone in a small fluid collection can be seen lying between the bases of the left second and third metatarsal bases. These fluid collections have uncertain etiology and might be sterile or caused by numerous small abscesses in the soft tissues. Clinical correlation would BE helpful. 3. Signal abnormality in the bone marrow in the distal metaphysis and head of the left second metatarsal and in the bone marrow in the distal middle and proximal phalanges of the left second toe probably caused by stress  fracture and less likely caused by osteomyelitis. 4. Deformity of the distal metaphysis and head of the left third metatarsal most consistent with trauma and fracture. 5. Mild deformity of the left first second and third cuneiform bones in the left navicular bone and left cuboid bone with mild inflammation and edema in the bone marrow of these bones most consistent with arthritis and Charcot joint and less likely caused by osteomyelitis.  Electronically Signed By-DR. Mal Lott MD On:1/20/2020 9:23 PM This report was finalized on [id number] by DR. Mal Lott MD.    Xr Chest Pa & Lateral    Result Date: 1/19/2020   1. No active cardiopulmonary disease 2. Right PICC line is been removed since previous study of 04/07/2019 3. No significant change from 04/07/2019  Electronically Signed By-Hermilo Vaz Jr. On:1/19/2020 1:44 PM This report was finalized on [id number] by  Hermilo Vaz Jr., .          Xrays, labs reviewed personally by physician.    Medication Review:   I have reviewed the patient's current medication list      Scheduled Meds    aspirin 325 mg Oral Daily   cefepime 2 g Intravenous Q8H   insulin glargine 12 Units Subcutaneous Nightly   insulin lispro 0-10 Units Subcutaneous TID With Meals   insulin lispro 3 Units Subcutaneous TID With Meals   metroNIDAZOLE 500 mg Intravenous Q8H   povidone-iodine  Topical Daily   sodium chloride 10 mL Intravenous Q12H   vancomycin 1,500 mg Intravenous Q12H       Meds Infusions    Pharmacy Consult - Pharmacy to dose     Pharmacy to dose vancomycin     sodium chloride 125 mL/hr Last Rate: 125 mL/hr (01/22/20 0814)       Meds PRN  •  acetaminophen **OR** acetaminophen **OR** acetaminophen  •  aluminum-magnesium hydroxide-simethicone  •  bisacodyl  •  dextrose  •  dextrose  •  glucagon (human recombinant)  •  ketorolac  •  magnesium hydroxide  •  melatonin  •  ondansetron **OR** ondansetron  •  Pharmacy Consult - Pharmacy to dose  •  Pharmacy to dose  vancomycin  •  potassium chloride **OR** potassium chloride **OR** potassium chloride  •  sodium chloride    I personally reviewed patient's x-ray films and my findings are x-ray shoulder is negative and MRI pending    I personally reviewed patient's EKG strips and my findings are sinus tachycardia    Assessment/Plan   Assessment/Plan     Active Hospital Problems    Diagnosis  POA   • **Syncope [R55]  Yes   • Acute pain of right shoulder [M25.511]  Yes     Priority: Medium     Class: Acute   • Chronic ulcer of right foot, with necrosis of bone (CMS/HCC) [L97.514]  Yes   • Open wound of second toe of left foot [S91.105A]  Yes   • Diabetic peripheral neuropathy (CMS/HCC) [E11.42]  Yes   • 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  -CT head negative  -Continue telemetry  -Echocardiogram is normal  -CT PE negative  -At this time no particular etiology for syncope.    Right shoulder pain with very limited range of motion  -We will do MRI of the right shoulder  -Orthopedic consulted  -X-ray of the shoulder reviewed  -IV Toradol for pain control  -Patient had intra-articular injection today and doing fairly well MRI of the shoulder has been reviewed.  Patient will need follow-up in 6 weeks.    Diabetic wound ulcer with some discharge and surrounding edema .... Consistent with abscess  -Patient seen by podiatry and had procedure done 1/21/2021  -Excisional debridement to muscle/tendon, right foot as well as patient had tibial and fibular sesamoidectomy with biopsy of the right foot and patient also had incision and drainage to the level of tendon of the left foot and second digit  amputation at the metatarsophalangeal joint of the left foot.  -Intra-Op operative cultures are pending  -Continue broad-spectrum antibiotics cefepime/Flagyl/vancomycin  -Patient will need 6 weeks of IV antibiotics       Diabetes type 1  -Continue Patient's lantus  -Accuchecks AC and HS  -Cover with sliding scale  -Patient seen by endocrinology     Right upper quadrant abdominal pain: Resolved      VTE Prophylaxis - Lovenox 40 mg SC daily.      Code Status -   Code Status and Medical Interventions:   Ordered at: 01/18/20 2121     Level Of Support Discussed With:    Patient     Code Status:    CPR     Medical Interventions (Level of Support Prior to Arrest):    Full       Discharge Planning    Patient will need physical therapy and pain medications.      Destination      Coordination has not been started for this encounter.      Durable Medical Equipment      Coordination has not been started for this encounter.      Dialysis/Infusion      Coordination has not been started for this encounter.      Home Medical Care      Coordination has not been started for this encounter.      Therapy      Coordination has not been started for this encounter.      Community Resources      Coordination has not been started for this encounter.            Electronically signed by Christopher Ron MD, 01/22/20, 2:22 PM.  [facility]          Electronically signed by Christopher Ron MD at 01/22/20 1428     Bailee Knight APRN at 01/22/20 1412     Attestation signed by Bobo Garcia MD at 01/22/20 1530    I have reviewed the documentation above and agree.  The patient was seen and examined.  I agree with the nurse practitioner note including assessment and plan.  Labs including microbiology data and imaging studies were reviewed.    The patient will be continued on broad-spectrum antibiotics waiting on the final culture results.  Most likely would be able to switch to IV Rocephin for 6 weeks if the Staphylococcus  aureus isolate was methicillin susceptible                    Infectious Diseases Progress Note      LOS: 3 days   Patient Care Team:  Erwin Castorena MD as PCP - General (Family Medicine)    Chief Complaint:  Fatigue,  right shoulder pain improved, general malaise    Subjective       The patient has been afebrile for the last 24 hours.  The patient is on room air, hemodynamically stable, and is tolerating antimicrobial therapy.  The patient just does not feel well today and is very fatigued, but his right shoulder pain has improved since orthopedic surgery did an injection.      Review of Systems:   Review of Systems   Constitutional: Positive for fatigue.        General malaise   Respiratory: Negative.    Cardiovascular: Negative.    Gastrointestinal: Negative.    Genitourinary: Negative.    Musculoskeletal: Positive for arthralgias and back pain.        Right shoulder pain improved   Skin: Positive for wound.   Neurological: Positive for dizziness and headaches.   Psychiatric/Behavioral: Negative.    All other systems reviewed and are negative.       Objective     Vital Signs  Temp:  [97.6 °F (36.4 °C)-100.5 °F (38.1 °C)] 98.1 °F (36.7 °C)  Heart Rate:  [74-96] 79  Resp:  [11-16] 16  BP: (113-144)/(64-81) 122/65    Physical Exam:  Physical Exam   Constitutional: He is oriented to person, place, and time. He appears well-developed and well-nourished.   HENT:   Head: Normocephalic and atraumatic.   Eyes: Pupils are equal, round, and reactive to light. Conjunctivae and EOM are normal.   Neck: Neck supple.   Cardiovascular: Normal rate, regular rhythm and normal heart sounds.   Pulmonary/Chest: Effort normal and breath sounds normal.   Abdominal: Soft. Bowel sounds are normal.   Musculoskeletal: Normal range of motion.   Neurological: He is alert and oriented to person, place, and time.   Skin: Skin is warm and dry.   Surgical dressing on both feet   Psychiatric: He has a normal mood and affect.   Vitals  reviewed.       Results Review:    I have reviewed all clinical data, test, lab, and imaging results.     Radiology  No Radiology Exams Resulted Within Past 24 Hours    Cardiology    Laboratory  Results from last 7 days   Lab Units 01/22/20  0514   WBC 10*3/mm3 7.90   HEMOGLOBIN g/dL 12.1*   HEMATOCRIT % 35.3*   PLATELETS 10*3/mm3 206     Results from last 7 days   Lab Units 01/22/20  0514   SODIUM mmol/L 131*   POTASSIUM mmol/L 3.6   CHLORIDE mmol/L 96*   CO2 mmol/L 27.0   BUN mg/dL 19   CREATININE mg/dL 0.72*   GLUCOSE mg/dL 280*   CALCIUM mg/dL 7.5*     Results from last 7 days   Lab Units 01/22/20  0514   SODIUM mmol/L 131*   POTASSIUM mmol/L 3.6   CHLORIDE mmol/L 96*   CO2 mmol/L 27.0   BUN mg/dL 19   CREATININE mg/dL 0.72*   GLUCOSE mg/dL 280*   CALCIUM mg/dL 7.5*     Results from last 7 days   Lab Units 01/21/20  0823   CK TOTAL U/L 14*     Results from last 7 days   Lab Units 01/21/20  0824   SED RATE mm/hr 87*         Microbiology   Microbiology Results (last 10 days)     Procedure Component Value - Date/Time    Body Fluid Culture - Body Fluid, Toe, Left [[id number]] Collected:  01/21/20 1314    Lab Status:  Preliminary result Specimen:  Body Fluid from Toe, Left Updated:  01/22/20 1133     Body Fluid Culture No growth     Gram Stain Few (2+) WBCs per low power field      No organisms seen    Tissue / Bone Culture - Tissue, Foot, Right [[id number]]  (Abnormal) Collected:  01/21/20 1238    Lab Status:  Preliminary result Specimen:  Tissue from Foot, Right Updated:  01/22/20 1151     Tissue Culture Moderate growth (3+) Streptococcus agalactiae (Group B)     Comment:   If Clindamycin or Erythromycin is the drug of choice, notify the laboratory within 7 days to request susceptibility testing.  This organism is considered to be universally susceptible to penicillin.  No further antibiotic testing will be performed. If Clindamycin or Erythromycin is the drug of choice, notify the laboratory within 7 days to request  susceptibility testing.        STREP GROUPING B     Gram Stain Few (2+) WBCs per low power field      Moderate (3+) Gram positive cocci in pairs and chains      Rare (1+) Gram negative bacilli    Narrative:       Organism under investigation.    MRSA Screen, PCR - Swab, Nares [[id number]]  (Normal) Collected:  01/20/20 1839    Lab Status:  Final result Specimen:  Swab from Nares Updated:  01/21/20 0453     MRSA PCR No MRSA Detected    Wound Culture - Wound, Toe, Left [[id number]]  (Abnormal) Collected:  01/20/20 1342    Lab Status:  Preliminary result Specimen:  Wound from Toe, Left Updated:  01/22/20 1105     Wound Culture Scant growth (1+) Staphylococcus aureus      Rare Streptococcus agalactiae (Group B)     Comment:   This organism is considered to be universally susceptible to penicillin.  No further antibiotic testing will be performed. If Clindamycin or Erythromycin is the drug of choice, notify the laboratory within 7 days to request susceptibility testing.        STREP GROUPING B     Wound Culture Rare Normal Skin Juliet     Gram Stain Moderate (3+) WBCs per low power field      Occasional Gram positive cocci    Narrative:             Wound Culture - Wound, Foot, Right [[id number]]  (Abnormal) Collected:  01/19/20 1630    Lab Status:  Preliminary result Specimen:  Wound from Foot, Right Updated:  01/22/20 1200     Wound Culture Moderate growth (3+) Streptococcus agalactiae (Group B)     Comment:   This organism is considered to be universally susceptible to penicillin.  No further antibiotic testing will be performed. If Clindamycin or Erythromycin is the drug of choice, notify the laboratory within 7 days to request susceptibility testing.        STREP GROUPING B     Wound Culture Scant growth (1+) Staphylococcus aureus, MRSA     Comment:   Methicillin resistant Staphylococcus aureus, Patient may be an isolation risk.        PBP2 Positive     Wound Culture Scant growth (1+) Normal Skin Juliet     Gram Stain  Many (4+) WBCs per low power field      Many (4+) Gram positive cocci in pairs, chains and clusters      Rare (1+) Gram negative cocci      Few (2+) Gram negative bacilli    Narrative:       Organism under investigation.      Respiratory Panel, PCR - Swab, Nasopharynx [[id number]]  (Normal) Collected:  01/19/20 1059    Lab Status:  Final result Specimen:  Swab from Nasopharynx Updated:  01/19/20 1548     ADENOVIRUS, PCR Not Detected     Coronavirus 229E Not Detected     Coronavirus HKU1 Not Detected     Coronavirus NL63 Not Detected     Coronavirus OC43 Not Detected     Human Metapneumovirus Not Detected     Human Rhinovirus/Enterovirus Not Detected     Influenza B PCR Not Detected     Parainfluenza Virus 1 Not Detected     Parainfluenza Virus 2 Not Detected     Parainfluenza Virus 3 Not Detected     Parainfluenza Virus 4 Not Detected     Bordetella pertussis pcr Not Detected     Influenza A H1 2009 PCR Not Detected     Chlamydophila pneumoniae PCR Not Detected     Mycoplasma pneumo by PCR Not Detected     Influenza A PCR Not Detected     Influenza A H3 Not Detected     Influenza A H1 Not Detected     RSV, PCR Not Detected          Medication Review:       Schedule Meds    aspirin 325 mg Oral Daily   cefepime 2 g Intravenous Q8H   insulin glargine 12 Units Subcutaneous Nightly   insulin lispro 0-10 Units Subcutaneous TID With Meals   insulin lispro 3 Units Subcutaneous TID With Meals   metroNIDAZOLE 500 mg Intravenous Q8H   povidone-iodine  Topical Daily   sodium chloride 10 mL Intravenous Q12H   vancomycin 1,500 mg Intravenous Q12H       Infusion Meds    Pharmacy Consult - Pharmacy to dose     Pharmacy to dose vancomycin     sodium chloride 125 mL/hr Last Rate: 125 mL/hr (01/22/20 0814)       PRN Meds  •  acetaminophen **OR** acetaminophen **OR** acetaminophen  •  aluminum-magnesium hydroxide-simethicone  •  bisacodyl  •  dextrose  •  dextrose  •  glucagon (human recombinant)  •  ketorolac  •  magnesium hydroxide  •   melatonin  •  ondansetron **OR** ondansetron  •  Pharmacy Consult - Pharmacy to dose  •  Pharmacy to dose vancomycin  •  potassium chloride **OR** potassium chloride **OR** potassium chloride  •  sodium chloride        Assessment/Plan       Antimicrobial Therapy   1.  IV vancomycin    Day 4  2.  IV cefepime    Day 4  3.  IV Flagyl     Day 3  4.      Day  5.      Day      Assessment     Diabetic foot wound  -Wound on the dorsal aspect of the second digit of the left foot  -Wound on the plantar aspect of the right foot   -MRI of the right foot shows suspicion for osteomyelitis of the hallux sesamoid bones  -History of left great toe amputation in November 2018, fourth and fifth digit amputation in April 2019 with 6 weeks of IV antibiotics  -History of right transmetatarsal amputation  -1/21/2020- right foot excisional debridement to the muscle/tendon with tibial and fibular sesamoidectomy, left foot incision and drainage with second digit amputation at the metatarsophalangeal joint  -Wound culture of the right foot is growing Streptococcus group B and methicillin-resistant Staphylococcus aureus   -wound culture of the left foot is growing Staphylococcus aureus and group B streptococcus  -Intraoperative cultures of the right foot are growing group B streptococcus     Leukocytosis  -resolved     Type 1 diabetes with neuropathy     Recent complaints of dizziness with syncope on 1/18/2020 which resulted in a fall in the shower  -Imaging studies do not show any acute findings on the CT the head, CT the cervical spine or MRI of the right shoulder  -Screen was negative  -Orthopedic surgery following patient for right shoulder pain, will try injection, patient may need surgery  -2D echo was negative for any acute findings     Also evidence of nausea, vomiting, and fever at home  -Patient has been afebrile since admission  -Respiratory viral DNA panel was negative     CAD, history of MI with cardiac catheterization    Right  "shoulder pain  -Orthopedic surgery did a steroid injection on 1/21/2020 and patient has had some improvement in the pain today     Plan     Continue IV cefepime   Continue IV vancomycin  Continue IV Flagyl 500 mg every q 8 hours  Will de-escalate antibiotics once intraoperative cultures have resulted  Patient will need 6 weeks of IV antibiotics  Patient will likely need a wound VAC on the right foot  Continue supportive care  A.m. Labs      Bailee Knight, APRN  01/22/20  2:12 PM    Electronically signed by Bobo Garcia MD at 01/22/20 1530     CROW Souza DPM at 01/22/20 1225          01/18/2020  Foot and Ankle Surgery - Inpatient Follow-up  Provider: Dr. Adan Souza DPM  Location: [facility]    Chief Complaint: s/p right wound debridement and sesamoidectomy; s/p left second digit amputation POD #1    Subjective:  Maynor Gregg is a 44 y.o. male.     Patient states that he continues to feel ill and is stressed.  Denies any pain to either foot    Allergies   Allergen Reactions   • Metformin Diarrhea and Nausea And Vomiting   • Oxycodone-Acetaminophen Nausea And Vomiting and Rash       No current facility-administered medications on file prior to encounter.      Current Outpatient Medications on File Prior to Encounter   Medication Sig Dispense Refill   • aspirin 325 MG tablet Take 325 mg by mouth Daily.     • Insulin Glargine (BASAGLAR KWIKPEN) 100 UNIT/ML injection pen Inject 12 Units under the skin into the appropriate area as directed Every Night.     • insulin lispro (HUMALOG) 100 UNIT/ML injection Inject  under the skin into the appropriate area as directed Every 8 (Eight) Hours. SSI         Objective   /65 (BP Location: Left arm, Patient Position: Lying)   Pulse 79   Temp 98.1 °F (36.7 °C) (Oral)   Resp 16   Ht 190.5 cm (75\")   Wt 94.3 kg (208 lb)   SpO2 93%   BMI 26.00 kg/m²      General: Mild distress.  Alert and oriented x3.  Anxious  Vascular: DP and PT pulses " are palpable.  CFT are intact to the digits  Neuro: Sensation remains diminished.  Motor function is intact  MSK: s/p left second digit amputation.  No progressive changes to the right foot.  Derm: Surgical site to the right foot has improved with decreased redness and swelling.  No active drainage.  Left foot remains edematous without progressive erythema obvious signs of luis alfredo infection      Results from last 7 days   Lab Units 01/22/20  0514   WBC 10*3/mm3 7.90   HEMOGLOBIN g/dL 12.1*   HEMATOCRIT % 35.3*   PLATELETS 10*3/mm3 206       Assessment/Plan     Patient Active Problem List   Diagnosis   • Syncope   • Diabetic peripheral neuropathy (CMS/HCC)   • Type 1 diabetes mellitus with circulatory complication (CMS/HCC)   • Open wound of second toe of left foot   • Acute pain of right shoulder   • Chronic ulcer of right foot, with necrosis of bone (CMS/HCC)       Patient is doing relatively well at this time but continues to feel ill.  WBC has normalized.  Patient's right foot appears much improved with decreased redness.  No active drainage noted to the wound site.  I do feel that symptoms involving the left foot are secondary to acute on chronic Charcot arthropathy due to his fall prior to admission.  I have recommended that we proceed with strict nonweightbearing to the left lower extremity to prevent any further instability or collapse.  He is to perform partial protected weightbearing as needed with walker assistance and a cam boot to the right lower extremity.  I have discussed case with wound care and he will be evaluated for a wound VAC to the right foot.  Case is also been discussed extensively with infectious disease service.  I do anticipate IV antibiotics at discharge.  I have asked patient to consider rehab at discharge.  No further operative plans from my standpoint.    Note is dictated utilizing voice recognition software. Unfortunately this leads to occasional typographical errors. I apologize in  advance if the situation occurs. If questions occur please do not hesitate to call our office.      Electronically signed by CROW Souza DPM at 20 1308          Consult Notes (last 24 hours) (Notes from 20 1555 through 20 1555)      Ezekiel Ron MD at 20 1816                Inpatient Endocrine Consult  Consultation requested by Dr. ADDISON Ron for uncontrolled type 1 diabetes  Patient Care Team:  Erwin Castorena MD as PCP - General (Family Medicine)    Chief Complaint: Uncontrolled type 1 diabetes    HPI: 44-year-old male with history of type 1 diabetes diagnosed in 2018, have diabetic neuropathy with amputation of 3 toes on the left side also have ulcer on the right foot admitted after he passed out during the shower at home.  He tells me that his sugars were not low or high.  He is found to have ulcer on the right foot and is being followed by orthopedics/podiatry.  Endocrine consultation is requested for management of diabetes.  At home he takes Basaglar insulin 12 units subcu once a day along with Humalog 3 units with each meal.  He tells me blood sugars at home usually range between .  He denies any chest pain, shortness of breath, nausea, vomiting fever or cough.  He is eating okay at this time.    Past Medical History:   Diagnosis Date   • Coronary artery disease    • Diabetes mellitus (CMS/[facility])        Social History     Socioeconomic History   • Marital status: Single     Spouse name: Not on file   • Number of children: Not on file   • Years of education: Not on file   • Highest education level: Not on file   Tobacco Use   • Smoking status: Former Smoker     Packs/day: 0.50     Last attempt to quit: 2019     Years since quittin.7   • Smokeless tobacco: Never Used   Substance and Sexual Activity   • Alcohol use: No     Frequency: Never   • Drug use: No   • Sexual activity: Defer       Family History   Problem Relation Age of Onset   • Diabetes  Father    • Heart failure Father    • Diabetes Paternal Grandfather        Allergies   Allergen Reactions   • Metformin Diarrhea and Nausea And Vomiting   • Oxycodone-Acetaminophen Nausea And Vomiting and Rash       ROS:   Constitutional:  Denies fatigue, tiredness.    Eyes:  Denies change in visual acuity   HENT:  Denies nasal congestion or sore throat   Respiratory: denies cough, shortness of breath.   Cardiovascular:  denies chest pain, edema   GI:  Denies abdominal pain, nausea, vomiting.   :  Denies Polyuria and Polydipsia  Musculoskeletal:  Denies back pain or joint pain   Integument:  Denies dry skin, rash   Neurologic:  Denies headache, focal weakness or sensory changes   Endocrine:  Denies polyuria or polydipsia   Psychiatric:  Denies depression or anxiety      Vitals:    01/21/20 1429   BP: 125/76   Pulse: 77   Resp: 12   Temp: 97.6 °F (36.4 °C)   SpO2: 95%        General Appearance:    Alert, cooperative, in no acute distress   Head:    Normocephalic, without obvious abnormality, atraumatic           Eyes:    Lids and lashes normal, conjunctivae and sclerae normal,     no  icterus, no pallor, corneas clear, PERRL   Throat:   No oral lesions,  oral mucosa moist. Edentulous   Neck:   No adenopathy, supple,  no thyromegaly, no   carotid bruit   Lungs:    Clear     Heart:    Regular rhythm and normal rate   Chest Wall:    No abnormalities observed   Abdomen:     Normal bowel sounds                Extremities:    Has ulcer on the right foot, he has bandage on the left    foot.              Pulses:   Pulses palpable and equal bilaterally   Skin:   Dry   Neurologic:  Psych:    DTR normal. No focal deficit.     AAOx3, appropriate mood, affect normal         Results Review:     I reviewed the patient's new clinical results.    Lab Results   Component Value Date    GLUCOSE 184 (H) 01/21/2020    BUN 21 (H) 01/21/2020    CREATININE 0.72 (L) 01/21/2020    EGFRIFNONA 119 01/21/2020    BCR 29.2 (H) 01/21/2020    K 3.2  (L) 01/21/2020    K 3.5 01/21/2020    CO2 29.0 01/21/2020    CALCIUM 7.9 (L) 01/21/2020    ALBUMIN 2.10 (L) 01/21/2020    LABIL2 1.0 04/29/2019    AST 28 01/21/2020    ALT 41 01/21/2020       Lab Results   Component Value Date    HGBA1C 10.5 (H) 01/21/2020     Lab Results   Component Value Date    CREATININE 0.72 (L) 01/21/2020     Results from last 7 days   Lab Units 01/21/20  1700 01/21/20  1334 01/21/20  1052 01/21/20  0728 01/20/20  2028 01/20/20  1624   GLUCOSE mg/dL 130* 157* 167* 180* 212* 154*       Medication Review: Reviewed.       Current Facility-Administered Medications:   •  acetaminophen (TYLENOL) tablet 650 mg, 650 mg, Oral, Q4H PRN, 650 mg at 01/19/20 0803 **OR** acetaminophen (TYLENOL) 160 MG/5ML solution 650 mg, 650 mg, Oral, Q4H PRN **OR** acetaminophen (TYLENOL) suppository 650 mg, 650 mg, Rectal, Q4H PRN, CROW Souza DPM  •  aluminum-magnesium hydroxide-simethicone (MAALOX MAX) 400-400-40 MG/5ML suspension 15 mL, 15 mL, Oral, Q6H PRN, CROW Souza DPM  •  aspirin tablet 325 mg, 325 mg, Oral, Daily, CROW Souza DPM, 325 mg at 01/19/20 0803  •  bisacodyl (DULCOLAX) EC tablet 5 mg, 5 mg, Oral, Daily PRN, CROW Souza DPM  •  cefepime 2 gm IVPB in 100 ml NS (MBP), 2 g, Intravenous, Q8H, CROW Souza DPM, Last Rate: 0 mL/hr at 01/20/20 1308, 2 g at 01/21/20 1645  •  dextrose (D50W) 25 g/ 50mL Intravenous Solution 25 g, 25 g, Intravenous, Q15 Min PRN, CROW Souza DPM  •  dextrose (GLUTOSE) oral gel 15 g, 15 g, Oral, Q15 Min PRN, CROW Souza DPM  •  glucagon (human recombinant) (GLUCAGEN DIAGNOSTIC) injection 1 mg, 1 mg, Subcutaneous, Q15 Min PRN, CROW Souza DPM  •  insulin glargine (LANTUS) injection 12 Units, 12 Units, Subcutaneous, Nightly, CROW Souza DPM, 12 Units at 01/20/20 2115  •  insulin lispro (humaLOG) injection 0-14 Units, 0-14 Units, Subcutaneous, 4x Daily With Meals & Nightly, 3 Units at 01/21/20 0816 **AND** insulin lispro (humaLOG)  injection 0-14 Units, 0-14 Units, Subcutaneous, PRN, CROW Souza, DPM  •  ketorolac (TORADOL) injection 30 mg, 30 mg, Intravenous, Q6H PRN, CROW Souza, DPM, 30 mg at 01/21/20 0415  •  magnesium hydroxide (MILK OF MAGNESIA) suspension 2400 mg/10mL 10 mL, 10 mL, Oral, Daily PRN, CROW Souza, DPM  •  melatonin tablet 5 mg, 5 mg, Oral, Nightly PRN, CROW Souza, DPM  •  metroNIDAZOLE (FLAGYL) 500 mg/100mL IVPB, 500 mg, Intravenous, Q8H, CROW Souza, DPM, 500 mg at 01/21/20 0816  •  ondansetron (ZOFRAN) tablet 4 mg, 4 mg, Oral, Q6H PRN **OR** ondansetron (ZOFRAN) injection 4 mg, 4 mg, Intravenous, Q6H PRN, CROW Souza, DPM, 4 mg at 01/18/20 2311  •  Pharmacy Consult - Pharmacy to dose, , Does not apply, Continuous PRN, CROW Souza DPM  •  Pharmacy to dose vancomycin, , Does not apply, Continuous PRN, CROW Souza DPM  •  potassium chloride (K-DUR,KLOR-CON) CR tablet 40 mEq, 40 mEq, Oral, PRN, 40 mEq at 01/21/20 0954 **OR** potassium chloride (KLOR-CON) packet 40 mEq, 40 mEq, Oral, PRN **OR** potassium chloride 10 mEq in 100 mL IVPB, 10 mEq, Intravenous, Q1H PRN, CROW Souza, DPM  •  sodium chloride 0.9 % flush 10 mL, 10 mL, Intravenous, Q12H, CROW Souza, DPM, 10 mL at 01/21/20 0900  •  sodium chloride 0.9 % flush 10 mL, 10 mL, Intravenous, PRN, CROW Souza, DPM  •  sodium chloride 0.9 % infusion, 125 mL/hr, Intravenous, Continuous, CROW Souza, DPM, Last Rate: 125 mL/hr at 01/19/20 0721  •  vancomycin IVPB 1500 mg in 0.9% NaCl (Premix) 250 mL, 1,500 mg, Intravenous, Q12H, CROW Souza, DPM, 1,500 mg at 01/21/20 0934    Assessment/Plan   1   Diabetes mellitus type 1: Uncontrolled with very high A1c  2.  Diabetic foot ulcer on the right side  3.  Multiple toe amputations on the left foot  4.  Diabetic neuropathy  5.  Syncope    Plan:   At this time I will continue Lantus 12 units subcu nightly and add Humalog 3 units with each meal.  We will change Humalog  sliding scale to mealtime only.  Will follow blood sugars and make further adjustments.  Have advised him that he needs to control his diabetes better to decrease his risk for future complications.  He verbalized understanding.    Thank you very much for the consultation.            Ezekiel Ron MD FACE.      Much of the above report is an electronic transcription/translation of the spoken language to printed text using Dragon Software. As such, the subtleties and finesse of the spoken language may permit erroneous, or at times, nonsensical words or phrases to be inadvertently transcribed; thus changes may be made at a later date to rectify these errors.          Electronically signed by Ezekiel Ron MD at 01/21/20 2310

## 2020-01-22 NOTE — PROGRESS NOTES
"Pharmacy Dosing Service  Antibiotic  Vancomycin    Maynor Gregg is a 44 y.o.male w/bilateral diabetic foot infection, osteo of right foot; s/p right food excisional debridement + right food sesamoidectomy + left foot I&D + left foot second digit amputation 1/21.   S aureus growing in L foot wound cx; Strep agalactiae growing in R foot wound cx.  PMH: DM, history toe amputations      Assessment/Plan  1. Day #4 vancomycin: Goal -600 mcg*h/mL.  Continue current dosing of 1500mg Q12h (~16 mg/kg) d/t therapeutic AUC 1/21. (Calculated k= 0.14-hr, AUC= 435, true trough= 8.1 mcg/mL.) Repeat trough in ~2 days to assess for accumulation (or sooner if clinically warranted).    2. Day #4 cefepime: 2gm IV Q8h, appropriate for estCrCl >60 mL/min.    3. D#2 metronindazole: 500mg IV Q8h    3. Monitor renal fxn, C&S, pt clinical status, MD A&P.       Relevant clinical data and objective history reviewed:  190.5 cm (75\")   94.3 kg (208 lb)   Ideal body weight: 84.5 kg (186 lb 4.6 oz)  Adjusted ideal body weight: 88.4 kg (194 lb 15.6 oz)  Body mass index is 26 kg/m².    Lab Results   Component Value Date    CREATININE 0.72 (L) 01/22/2020    CREATININE 0.72 (L) 01/21/2020    CREATININE 0.87 01/19/2020     Estimated Creatinine Clearance: 174.6 mL/min (A) (by C-G formula based on SCr of 0.72 mg/dL (L)).  I/O last 3 completed shifts:  In: 1420 [P.O.:120; I.V.:700; IV Piggyback:600]  Out: 0     Temperature    01/21/20 1429 01/21/20 1919 01/22/20 0418   Temp: 97.6 °F (36.4 °C) 100.5 °F (38.1 °C) 97.8 °F (36.6 °C)     Lab Results   Component Value Date    WBC 7.90 01/22/2020    WBC 8.80 01/21/2020    WBC 23.00 (H) 01/19/2020    WBC 23.90 (H) 01/18/2020    WBC 3.2 (L) 04/29/2019     No results found for: PROCALCITO  Lab Results   Component Value Date    SEDRATE 87 (H) 01/21/2020    CRP 18.76 (H) 01/21/2020       Results from last 7 days   Lab Units 01/21/20  0823 01/21/20  0108   VANCOMYCIN PK mcg/mL  --  22.60   VANCOMYCIN TR " mcg/mL 8.20  --        Culture/source/susceptibility:  Microbiology Results (last 10 days)       Procedure Component Value - Date/Time    Body Fluid Culture - Body Fluid, Toe, Left [[id number]] Collected:  01/21/20 1314    Lab Status:  Preliminary result Specimen:  Body Fluid from Toe, Left Updated:  01/21/20 1412     Gram Stain Few (2+) WBCs per low power field      No organisms seen    Tissue / Bone Culture - Tissue, Foot, Right [[id number]] Collected:  01/21/20 1238    Lab Status:  Preliminary result Specimen:  Tissue from Foot, Right Updated:  01/21/20 1342     Gram Stain Few (2+) WBCs per low power field      Moderate (3+) Gram positive cocci in pairs and chains      Rare (1+) Gram negative bacilli    MRSA Screen, PCR - Swab, Nares [[id number]]  (Normal) Collected:  01/20/20 1839    Lab Status:  Final result Specimen:  Swab from Nares Updated:  01/21/20 0453     MRSA PCR No MRSA Detected    Wound Culture - Wound, Toe, Left [[id number]]  (Abnormal) Collected:  01/20/20 1342    Lab Status:  Preliminary result Specimen:  Wound from Toe, Left Updated:  01/21/20 0807     Wound Culture Scant growth (1+) Staphylococcus aureus      Scant growth (1+) Normal Skin Juliet     Gram Stain Moderate (3+) WBCs per low power field      Occasional Gram positive cocci    Narrative:       Organism under investigation.      Wound Culture - Wound, Foot, Right [[id number]]  (Abnormal) Collected:  01/19/20 1630    Lab Status:  Preliminary result Specimen:  Wound from Foot, Right Updated:  01/21/20 0954     Wound Culture Moderate growth (3+) Streptococcus agalactiae (Group B)     Comment:   This organism is considered to be universally susceptible to penicillin.  No further antibiotic testing will be performed. If Clindamycin or Erythromycin is the drug of choice, notify the laboratory within 7 days to request susceptibility testing.        STREP GROUPING B     Gram Stain Many (4+) WBCs per low power field      Many (4+) Gram positive cocci  in pairs, chains and clusters      Rare (1+) Gram negative cocci      Few (2+) Gram negative bacilli    Narrative:       Organism under investigation.      Respiratory Panel, PCR - Swab, Nasopharynx [[id number]]  (Normal) Collected:  01/19/20 1059    Lab Status:  Final result Specimen:  Swab from Nasopharynx Updated:  01/19/20 1541     ADENOVIRUS, PCR Not Detected     Coronavirus 229E Not Detected     Coronavirus HKU1 Not Detected     Coronavirus NL63 Not Detected     Coronavirus OC43 Not Detected     Human Metapneumovirus Not Detected     Human Rhinovirus/Enterovirus Not Detected     Influenza B PCR Not Detected     Parainfluenza Virus 1 Not Detected     Parainfluenza Virus 2 Not Detected     Parainfluenza Virus 3 Not Detected     Parainfluenza Virus 4 Not Detected     Bordetella pertussis pcr Not Detected     Influenza A H1 2009 PCR Not Detected     Chlamydophila pneumoniae PCR Not Detected     Mycoplasma pneumo by PCR Not Detected     Influenza A PCR Not Detected     Influenza A H3 Not Detected     Influenza A H1 Not Detected     RSV, PCR Not Detected             Radiology:  Xr Shoulder 2+ View Right    Result Date: 1/20/2020   1. No acute right shoulder findings. No significant right shoulder osteoarthritic change. 2. Suspected benign enchondroma or bone infarcts in the right humeral shaft.  Electronically Signed By-Dr. Yamileth Bourne MD On:1/20/2020 12:56 PM This report was finalized on [id number] by Dr. Yamileth Bourne MD.    Ct Head Without Contrast    Result Date: 1/18/2020  1.  Normal exam.  Electronically Signed ByAnna Mendez On:1/18/2020 8:09 PM This report was finalized on [id number] by  Arabella Mendez .    Ct Cervical Spine Without Contrast    Result Date: 1/18/2020  Normal CT cervical spine without contrast.  Electronically Signed By-Arabella Mendez On:1/18/2020 8:11 PM This report was finalized on [id number] by  Ynes Haywood    Xr Toe 2+ View Left    Result Date: 1/18/2020   1. Negative for  fracture. 2. Chronic abnormal appearance of second and third MTP joints. 3. Soft tissue swelling.  Electronically Signed By-Arabella Mendez On:1/18/2020 8:07 PM This report was finalized on [id number] by  Arabella Mendez, .    Us Gallbladder    Result Date: 1/19/2020   1. Sludge in the gallbladder lumen. No evidence of gallbladder wall thickening or fluid in the gallbladder fossa. No evidence of dilatation of bile ducts. No evidence of ascites.  Electronically Signed By-DR. Mal Ltot MD On:1/19/2020 5:50 PM This report was finalized on [id number] by DR. Mal Lott MD.    Ct Chest Pulmonary Embolism    Result Date: 1/20/2020   1. No evidence of thrombus or embolus in the right or left pulmonary artery branches. 2. Questionable very mild diffuse infiltrates throughout the right lower lobe and left lower lobe along might be caused by atelectasis or vascular congestion. 3. No focal abnormality seen in the right or left lungs.  Electronically Signed By-DR. Mal Lott MD On:1/20/2020 5:42 PM This report was finalized on [id number] by DR. Mal Lott MD.    Mri Shoulder Right Without Contrast    Result Date: 1/20/2020  1.There is a tear of the superior labrum from anterior to posterior with mild adjacent cartilage loss in the superior glenoid. This is age indeterminate. 2.No imaging findings of shoulder dislocation. No Hill-Sachs impaction fracture or Bankart lesion. 3.Chondroid lesions in the proximal humeral diaphysis. No associated aggressive features on MRI. Radiographic follow-up of the humerus is recommended in 6 months time. These are likely benign enchondromas. The largest lesion is not seen in the field-of-view. 4.No findings rotator cuff tear.   Electronically Signed By-iMllicent Springer MD On:1/20/2020 2:17 PM This report was finalized on [id number] by  Millicent Springer MD.    Mri Foot Right Without Contrast    Result Date: 1/20/2020  1.There is a sinus tract along the  plantar aspect of foot superficial to the first metatarsal phalange joint with abnormal soft tissue density along this tract along the plantar aspect of the great toe with abnormal signal intensity in the hallux sesamoid bones. This is suspicious for osteomyelitis of the hallux sesamoid bones. Adjacent phlegmon is likely. Soft tissue evaluation limited without contrast. 2.There are remote and subacute fractures as described. 3.There is erosive change at the second proximal interphalangeal joint and the first interphalangeal joint that may be seen with gouty arthritis. Apparently erosive arthritis such as rheumatoid arthritis is thought to be unlikely.    Electronically Signed By-Millicent Springer MD On:1/20/2020 2:04 PM This report was finalized on [id number] by  Millicent Springer MD.    Mri Foot Left With & Without Contrast    Result Date: 1/20/2020  Diffuse inflammation and edema in the soft tissues surrounding the dorsal plantar aspect of the left mid and left forefoot. 2. There are several small focal loculated fluid collections overlying the dorsal aspect of the left first and second and third cuneiform bones and overlying the dorsal aspect of the left navicular bone and left cuboid bone in a small fluid collection can be seen lying between the bases of the left second and third metatarsal bases. These fluid collections have uncertain etiology and might be sterile or caused by numerous small abscesses in the soft tissues. Clinical correlation would BE helpful. 3. Signal abnormality in the bone marrow in the distal metaphysis and head of the left second metatarsal and in the bone marrow in the distal middle and proximal phalanges of the left second toe probably caused by stress fracture and less likely caused by osteomyelitis. 4. Deformity of the distal metaphysis and head of the left third metatarsal most consistent with trauma and fracture. 5. Mild deformity of the left first second and third cuneiform bones in the  left navicular bone and left cuboid bone with mild inflammation and edema in the bone marrow of these bones most consistent with arthritis and Charcot joint and less likely caused by osteomyelitis.  Electronically Signed By-DR. Mal Lott MD On:1/20/2020 9:23 PM This report was finalized on [id number] by DR. Mal Lott MD.    Xr Chest Pa & Lateral    Result Date: 1/19/2020   1. No active cardiopulmonary disease 2. Right PICC line is been removed since previous study of 04/07/2019 3. No significant change from 04/07/2019  Electronically Signed By-Hermilo Vaz Jr. On:1/19/2020 1:44 PM This report was finalized on [id number] by  Hermilo Vaz Jr., .       Rivka Love, AldoD, BCPS  01/22/20 8:01 AM

## 2020-01-23 ENCOUNTER — APPOINTMENT (OUTPATIENT)
Dept: GENERAL RADIOLOGY | Facility: HOSPITAL | Age: 45
End: 2020-01-23

## 2020-01-23 LAB
ANION GAP SERPL CALCULATED.3IONS-SCNC: 6 MMOL/L (ref 5–15)
BACTERIA SPEC AEROBE CULT: ABNORMAL
BASOPHILS # BLD AUTO: 0 10*3/MM3 (ref 0–0.2)
BASOPHILS NFR BLD AUTO: 0.6 % (ref 0–1.5)
BUN BLD-MCNC: 15 MG/DL (ref 6–20)
BUN/CREAT SERPL: 22.1 (ref 7–25)
CALCIUM SPEC-SCNC: 7.6 MG/DL (ref 8.6–10.5)
CHLORIDE SERPL-SCNC: 100 MMOL/L (ref 98–107)
CK SERPL-CCNC: 29 U/L (ref 20–200)
CO2 SERPL-SCNC: 27 MMOL/L (ref 22–29)
CREAT BLD-MCNC: 0.68 MG/DL (ref 0.76–1.27)
DEPRECATED RDW RBC AUTO: 41.6 FL (ref 37–54)
EOSINOPHIL # BLD AUTO: 0.1 10*3/MM3 (ref 0–0.4)
EOSINOPHIL NFR BLD AUTO: 1.7 % (ref 0.3–6.2)
ERYTHROCYTE [DISTWIDTH] IN BLOOD BY AUTOMATED COUNT: 12.5 % (ref 12.3–15.4)
GFR SERPL CREATININE-BSD FRML MDRD: 127 ML/MIN/1.73
GLUCOSE BLD-MCNC: 221 MG/DL (ref 65–99)
GLUCOSE BLDC GLUCOMTR-MCNC: 176 MG/DL (ref 70–105)
GLUCOSE BLDC GLUCOMTR-MCNC: 185 MG/DL (ref 70–105)
GLUCOSE BLDC GLUCOMTR-MCNC: 191 MG/DL (ref 70–105)
GLUCOSE BLDC GLUCOMTR-MCNC: 212 MG/DL (ref 70–105)
GRAM STN SPEC: ABNORMAL
HCT VFR BLD AUTO: 34.7 % (ref 37.5–51)
HGB BLD-MCNC: 12.3 G/DL (ref 13–17.7)
LYMPHOCYTES # BLD AUTO: 1.4 10*3/MM3 (ref 0.7–3.1)
LYMPHOCYTES NFR BLD AUTO: 18 % (ref 19.6–45.3)
Lab: POSITIVE
MCH RBC QN AUTO: 33 PG (ref 26.6–33)
MCHC RBC AUTO-ENTMCNC: 35.4 G/DL (ref 31.5–35.7)
MCV RBC AUTO: 93.4 FL (ref 79–97)
MONOCYTES # BLD AUTO: 1.1 10*3/MM3 (ref 0.1–0.9)
MONOCYTES NFR BLD AUTO: 13.6 % (ref 5–12)
NEUTROPHILS # BLD AUTO: 5.2 10*3/MM3 (ref 1.7–7)
NEUTROPHILS NFR BLD AUTO: 66.1 % (ref 42.7–76)
NRBC BLD AUTO-RTO: 0.1 /100 WBC (ref 0–0.2)
PLATELET # BLD AUTO: 245 10*3/MM3 (ref 140–450)
PMV BLD AUTO: 8.5 FL (ref 6–12)
POTASSIUM BLD-SCNC: 3.5 MMOL/L (ref 3.5–5.2)
RBC # BLD AUTO: 3.71 10*6/MM3 (ref 4.14–5.8)
SODIUM BLD-SCNC: 133 MMOL/L (ref 136–145)
STREP GROUPING: ABNORMAL
WBC NRBC COR # BLD: 7.9 10*3/MM3 (ref 3.4–10.8)

## 2020-01-23 PROCEDURE — 25010000002 KETOROLAC TROMETHAMINE PER 15 MG: Performed by: PODIATRIST

## 2020-01-23 PROCEDURE — 80048 BASIC METABOLIC PNL TOTAL CA: CPT | Performed by: NURSE PRACTITIONER

## 2020-01-23 PROCEDURE — 73620 X-RAY EXAM OF FOOT: CPT

## 2020-01-23 PROCEDURE — 25010000002 CEFTRIAXONE PER 250 MG: Performed by: INTERNAL MEDICINE

## 2020-01-23 PROCEDURE — 99024 POSTOP FOLLOW-UP VISIT: CPT | Performed by: PODIATRIST

## 2020-01-23 PROCEDURE — 82550 ASSAY OF CK (CPK): CPT | Performed by: NURSE PRACTITIONER

## 2020-01-23 PROCEDURE — 85025 COMPLETE CBC W/AUTO DIFF WBC: CPT | Performed by: NURSE PRACTITIONER

## 2020-01-23 PROCEDURE — 25010000002 VANCOMYCIN 10 G RECONSTITUTED SOLUTION: Performed by: INTERNAL MEDICINE

## 2020-01-23 PROCEDURE — 63710000001 INSULIN GLARGINE PER 5 UNITS: Performed by: INTERNAL MEDICINE

## 2020-01-23 PROCEDURE — 63710000001 INSULIN LISPRO (HUMAN) PER 5 UNITS: Performed by: INTERNAL MEDICINE

## 2020-01-23 PROCEDURE — 82962 GLUCOSE BLOOD TEST: CPT

## 2020-01-23 PROCEDURE — 25010000002 DAPTOMYCIN PER 1 MG: Performed by: NURSE PRACTITIONER

## 2020-01-23 PROCEDURE — 99232 SBSQ HOSP IP/OBS MODERATE 35: CPT | Performed by: INTERNAL MEDICINE

## 2020-01-23 PROCEDURE — 73600 X-RAY EXAM OF ANKLE: CPT

## 2020-01-23 PROCEDURE — 25010000002 CEFEPIME PER 500 MG: Performed by: PODIATRIST

## 2020-01-23 RX ADMIN — INSULIN LISPRO 2 UNITS: 100 INJECTION, SOLUTION INTRAVENOUS; SUBCUTANEOUS at 17:57

## 2020-01-23 RX ADMIN — Medication 10 ML: at 08:49

## 2020-01-23 RX ADMIN — VANCOMYCIN HYDROCHLORIDE 1500 MG: 10 INJECTION, POWDER, LYOPHILIZED, FOR SOLUTION INTRAVENOUS at 08:48

## 2020-01-23 RX ADMIN — INSULIN LISPRO 4 UNITS: 100 INJECTION, SOLUTION INTRAVENOUS; SUBCUTANEOUS at 08:50

## 2020-01-23 RX ADMIN — ACETAMINOPHEN 650 MG: 325 TABLET ORAL at 17:55

## 2020-01-23 RX ADMIN — SODIUM CHLORIDE 125 ML/HR: 900 INJECTION, SOLUTION INTRAVENOUS at 01:46

## 2020-01-23 RX ADMIN — ASPIRIN 325 MG ORAL TABLET 325 MG: 325 PILL ORAL at 08:48

## 2020-01-23 RX ADMIN — INSULIN LISPRO 4 UNITS: 100 INJECTION, SOLUTION INTRAVENOUS; SUBCUTANEOUS at 17:57

## 2020-01-23 RX ADMIN — KETOROLAC TROMETHAMINE 30 MG: 30 INJECTION, SOLUTION INTRAMUSCULAR at 03:54

## 2020-01-23 RX ADMIN — INSULIN GLARGINE 14 UNITS: 100 INJECTION, SOLUTION SUBCUTANEOUS at 21:17

## 2020-01-23 RX ADMIN — CEFEPIME 2 G: 2 INJECTION, POWDER, FOR SOLUTION INTRAVENOUS at 06:21

## 2020-01-23 RX ADMIN — DAPTOMYCIN 550 MG: 500 INJECTION, POWDER, LYOPHILIZED, FOR SOLUTION INTRAVENOUS at 17:56

## 2020-01-23 RX ADMIN — INSULIN LISPRO 4 UNITS: 100 INJECTION, SOLUTION INTRAVENOUS; SUBCUTANEOUS at 12:39

## 2020-01-23 RX ADMIN — INSULIN LISPRO 2 UNITS: 100 INJECTION, SOLUTION INTRAVENOUS; SUBCUTANEOUS at 12:38

## 2020-01-23 RX ADMIN — METRONIDAZOLE 500 MG: 500 INJECTION, SOLUTION INTRAVENOUS at 01:45

## 2020-01-23 RX ADMIN — POVIDONE-IODINE: 10 SOLUTION TOPICAL at 08:49

## 2020-01-23 RX ADMIN — INSULIN LISPRO 2 UNITS: 100 INJECTION, SOLUTION INTRAVENOUS; SUBCUTANEOUS at 08:48

## 2020-01-23 RX ADMIN — CEFTRIAXONE SODIUM 2 G: 2 INJECTION, POWDER, FOR SOLUTION INTRAMUSCULAR; INTRAVENOUS at 12:00

## 2020-01-23 NOTE — PLAN OF CARE
Pt complaining of neck pain receiving toradol and IV abx for infections of both feet. Wound vac on right foot with scant drainage. Will continue to monitor

## 2020-01-23 NOTE — PROGRESS NOTES
"     LOS: 4 days   Patient Care Team:  Erwin Castorena MD as PCP - General (Family Medicine)    Chief Complaint: Right shoulder follow-up    Subjective     Interval History:     History taken from: patient      Objective     Vital Signs  Visit Vitals  /79 (BP Location: Left arm, Patient Position: Lying)   Pulse 74   Temp 97.7 °F (36.5 °C) (Oral)   Resp 16   Ht 190.5 cm (75\")   Wt 94.3 kg (208 lb)   SpO2 97%   BMI 26.00 kg/m²       Physical Exam:        General Appearance:   Is by himself.  He is in the right lateral decubitus position.  Other words he is laying on his right shoulder which she says does not hurt at all.  He says the injection helped tremendously.   Extremities:  Good function of his median radial and ulnar nerves.   Pulses:  Intact   Skin:  Intact   Neurologic:  Diabetic neuropathy         Results Review:    CBC    Results from last 7 days   Lab Units 01/23/20  0325 01/22/20  0514 01/21/20  0824 01/19/20  0441 01/18/20  1927   WBC 10*3/mm3 7.90 7.90 8.80 23.00* 23.90*   HEMOGLOBIN g/dL 12.3* 12.1* 12.5* 13.1 13.4   PLATELETS 10*3/mm3 245 206 194 181 197     BMP Results from last 7 days   Lab Units 01/23/20  0325 01/22/20  0514 01/21/20  0823 01/19/20  0441 01/18/20  1927   SODIUM mmol/L 133* 131* 132* 130* 128*   POTASSIUM mmol/L 3.5 3.6 3.5  3.2* 3.7 4.2   CHLORIDE mmol/L 100 96* 96* 93* 88*   CO2 mmol/L 27.0 27.0 29.0 29.0 27.0   BUN mg/dL 15 19 21* 19 19   CREATININE mg/dL 0.68* 0.72* 0.72* 0.87 1.04   GLUCOSE mg/dL 221* 280* 184* 286* 520*   MAGNESIUM mg/dL  --   --  2.1  --   --    PHOSPHORUS mg/dL  --   --  2.6  --   --      Infection Results from last 7 days   Lab Units 01/21/20  1314 01/20/20  1342 01/19/20  1630   BODYFLDCX  No growth  --   --    WOUNDCX   --  Scant growth (1+) Staphylococcus aureus*  Rare Streptococcus agalactiae (Group B)*  Rare Normal Skin Juliet Moderate growth (3+) Streptococcus agalactiae (Group B)*  Scant growth (1+) Staphylococcus aureus, MRSA*  " Scant growth (1+) Normal Skin Juliet     Radiology(recent) No radiology results for the last day    Imaging reviewed: No new images taken from the orthopedic point of view    Medication Review:   Scheduled Meds:  aspirin 325 mg Oral Daily   cefepime 2 g Intravenous Q8H   insulin glargine 14 Units Subcutaneous Nightly   insulin lispro 0-10 Units Subcutaneous TID With Meals   insulin lispro 4 Units Subcutaneous TID With Meals   metroNIDAZOLE 500 mg Intravenous Q8H   povidone-iodine  Topical Daily   sodium chloride 10 mL Intravenous Q12H   vancomycin 1,500 mg Intravenous Q12H     Continuous Infusions:  Pharmacy Consult - Pharmacy to dose     Pharmacy to dose vancomycin     sodium chloride 125 mL/hr Last Rate: 125 mL/hr (01/23/20 0146)     PRN Meds:.•  acetaminophen **OR** acetaminophen **OR** acetaminophen  •  aluminum-magnesium hydroxide-simethicone  •  bisacodyl  •  dextrose  •  dextrose  •  glucagon (human recombinant)  •  ketorolac  •  magnesium hydroxide  •  melatonin  •  ondansetron **OR** ondansetron  •  Pharmacy Consult - Pharmacy to dose  •  Pharmacy to dose vancomycin  •  potassium chloride **OR** potassium chloride **OR** potassium chloride  •  sodium chloride    Assessment/Plan       Syncope    Diabetic peripheral neuropathy (CMS/HCC)    Type 1 diabetes mellitus with circulatory complication (CMS/HCC)    Open wound of second toe of left foot    Acute pain of right shoulder    Chronic ulcer of right foot, with necrosis of bone (CMS/HCC)    Impression #1 injury to right shoulder with vast improvement 2 days following an injection #2 diabetic neuropathy    Recommendations #1 physical therapy #2 follow-up in the office in 6 weeks.        Indra Rivas MD  01/23/20  7:07 AM

## 2020-01-23 NOTE — DISCHARGE INSTRUCTIONS
Follow-up with Dr. simons next Wednesday afternoon  Home health to perform wound VAC changes to the right foot every other day after discharge.  Left lower extremity dressing is to remain intact until follow-up.  Mostly nonweightbearing but toe-touch weightbearing appropriate to the right foot with cam boot as needed    1. Please take your medicationss as prescribed    2. Please review your discharge medication list and compare it with medication you are already taking or will be taking. Report any inconsistency to our team or your primary care provider.    3. Please call us if you have any concerns or questions about your stay at this hospital. You may call us at [phone] and extension [phone] or email [email or web address]    4. Please follow up with your primary care doctor as soon as possible and reveiw your pending results from your stay at the hospital as you may have some pendng results for your test.     5. Please fill out your survey if you receive one and provide us with your honest feedback.     6. We wish you get well soon and let us know if there is anything we can do to make it easy for your next visit if any.

## 2020-01-23 NOTE — PROGRESS NOTES
Continued Stay Note  DESTIN Cohen     Patient Name: Maynor Gregg  MRN: [id number]  Today's Date: 1/23/2020    Admit Date: 1/18/2020    Discharge Plan     Row Name 01/23/20 1342       Plan    Plan  DC Plan: Home with [facility] and [facility]        Possible DC 1/24/20       Expected Discharge Date and Time     Expected Discharge Date Expected Discharge Time    Jan 23, 2020             Jason Price RN

## 2020-01-23 NOTE — PROGRESS NOTES
Daily Progress Note    Patient Care Team:  Erwin Castorena MD as PCP - General (Family Medicine)    Chief Complaint: Follow up type 1 diabetes    HPI:  44-year-old male with history of type 1 diabetes along with diabetic neuropathy and amputation of 3 toes on the left side also ulcer on the right foot has been undergoing some surgery as well as IV antibiotic.  Endocrine consultation was requested for diabetes management.  He is currently on Basaglar/Lantus 14 units once a day along with Humalog 4 units with each meal.  Blood sugars are still running high.  He is eating well.    ROS:   Constitutional:  Denies fatigue, tiredness.    Eyes:  Denies change in visual acuity   HENT:  Denies nasal congestion or sore throat   Respiratory: denies cough, shortness of breath.   Cardiovascular:  denies chest pain, edema   GI:  Denies abdominal pain, nausea, vomiting.   :  Denies polyuria and polydipsia  Musculoskeletal:  Denies back pain or joint pain   Integument:  Denies rash   Neurologic:  Denies headache, focal weakness or sensory changes   Endocrine:  Denies polyuria or polydipsia   Psychiatric:  Denies depression or anxiety       Vitals:    01/23/20 1136   BP: 137/83   Pulse: 67   Resp: 18   Temp: 97.6 °F (36.4 °C)   SpO2: 97%       Physical Exam:  GEN: NAD, conversant  EYES: EOMI, PERRL, no conjunctival erythema  NECK: no thyromegaly, full ROM   CV: RRR, no murmurs/rubs/gallops, no peripheral edema  LUNG: CTAB, no wheezes/rales/ronchi  SKIN: no rashes, no acanthosis  MSK: Has ulcer on right foot, bandage on left   NEURO: no tremors, DTR normal  PSYCH: AOX3, appropriate mood, affect normal      Results Review:     I reviewed the patient's new clinical results.    Glucose   Date Value Ref Range Status   01/23/2020 221 (H) 65 - 99 mg/dL Final     Sodium   Date Value Ref Range Status   01/23/2020 133 (L) 136 - 145 mmol/L Final     Potassium   Date Value Ref Range Status   01/23/2020 3.5 3.5 - 5.2 mmol/L  Final     CO2   Date Value Ref Range Status   01/23/2020 27.0 22.0 - 29.0 mmol/L Final     Chloride   Date Value Ref Range Status   01/23/2020 100 98 - 107 mmol/L Final     Anion Gap   Date Value Ref Range Status   01/23/2020 6.0 5.0 - 15.0 mmol/L Final     Creatinine   Date Value Ref Range Status   01/23/2020 0.68 (L) 0.76 - 1.27 mg/dL Final     BUN   Date Value Ref Range Status   01/23/2020 15 6 - 20 mg/dL Final     BUN/Creatinine Ratio   Date Value Ref Range Status   01/23/2020 22.1 7.0 - 25.0 Final     Calcium   Date Value Ref Range Status   01/23/2020 7.6 (L) 8.6 - 10.5 mg/dL Final     eGFR Non  Amer   Date Value Ref Range Status   01/23/2020 127 >60 mL/min/1.73 Final     Alkaline Phosphatase   Date Value Ref Range Status   01/21/2020 107 39 - 117 U/L Final     Total Protein   Date Value Ref Range Status   01/21/2020 6.3 6.0 - 8.5 g/dL Final     ALT (SGPT)   Date Value Ref Range Status   01/21/2020 41 1 - 41 U/L Final     AST (SGOT)   Date Value Ref Range Status   01/21/2020 28 1 - 40 U/L Final     Total Bilirubin   Date Value Ref Range Status   01/21/2020 1.9 (H) 0.2 - 1.2 mg/dL Final     Albumin   Date Value Ref Range Status   01/21/2020 2.10 (L) 3.50 - 5.20 g/dL Final     Globulin   Date Value Ref Range Status   01/21/2020 4.2 gm/dL Final     Magnesium   Date Value Ref Range Status   01/21/2020 2.1 1.6 - 2.6 mg/dL Final     Phosphorus   Date Value Ref Range Status   01/21/2020 2.6 2.5 - 4.5 mg/dL Final     Lab Results   Component Value Date    HGBA1C 10.5 (H) 01/21/2020     No results found for: GLUF, MICROALBUR  Results from last 7 days   Lab Units 01/23/20  1131 01/23/20  0712 01/22/20  1917 01/22/20  1631 01/22/20  1123 01/22/20  0727   GLUCOSE mg/dL 176* 185* 291* 239* 223* 230*             Medication Review: Reviewed.       aspirin 325 mg Oral Daily   cefTRIAXone 2 g Intravenous Q24H   DAPTOmycin 6 mg/kg Intravenous Q24H   insulin glargine 14 Units Subcutaneous Nightly   insulin lispro 0-10 Units  Subcutaneous TID With Meals   insulin lispro 4 Units Subcutaneous TID With Meals   povidone-iodine  Topical Daily   sodium chloride 10 mL Intravenous Q12H         Assessment/Plan   1   Diabetes mellitus type 1: Uncontrolled with very high A1c  2.  Diabetic foot ulcer on the right side  3.  Multiple toe amputations on the left foot  4.  Diabetic neuropathy  5.  Syncope     Plan:    Blood sugars are better, at this time I will continue current regimen and follow blood sugars and make further adjustments as needed.             Ezekiel Ron MD. FACE    Much of the above report is an electronic transcription/translation of the spoken language to printed text using Dragon Software. As such, the subtleties and finesse of the spoken language may permit erroneous, or at times, nonsensical words or phrases to be inadvertently transcribed; thus changes may be made at a later date to rectify these errors.

## 2020-01-23 NOTE — PROGRESS NOTES
Infectious Diseases Progress Note      LOS: 4 days   Patient Care Team:  Erwin Castorena MD as PCP - General (Family Medicine)    Chief Complaint:  Fatigue,  right shoulder pain improved, general malaise    Subjective       The patient has been afebrile for the last 24 hours.  The patient is on room air, hemodynamically stable, and is tolerating antimicrobial therapy.  Patient is still not feeling very well and did have another episode of vomiting this morning.  Patient complaining of some blood in his urine.      Review of Systems:   Review of Systems   Constitutional: Positive for fatigue.        General malaise   Respiratory: Negative.    Cardiovascular: Negative.    Gastrointestinal: Positive for vomiting.   Genitourinary: Negative.    Musculoskeletal: Positive for arthralgias and back pain.        Right shoulder pain improved   Skin: Positive for wound.   Neurological: Positive for dizziness and headaches.   Psychiatric/Behavioral: Negative.    All other systems reviewed and are negative.       Objective     Vital Signs  Temp:  [97.6 °F (36.4 °C)-98.1 °F (36.7 °C)] 97.6 °F (36.4 °C)  Heart Rate:  [67-76] 67  Resp:  [15-18] 18  BP: (119-143)/(70-83) 137/83    Physical Exam:  Physical Exam   Constitutional: He is oriented to person, place, and time. He appears well-developed and well-nourished.   HENT:   Head: Normocephalic and atraumatic.   Eyes: Pupils are equal, round, and reactive to light. Conjunctivae and EOM are normal.   Neck: Neck supple.   Cardiovascular: Normal rate, regular rhythm and normal heart sounds.   Pulmonary/Chest: Effort normal and breath sounds normal.   Abdominal: Soft. Bowel sounds are normal.   Musculoskeletal: Normal range of motion.   Neurological: He is alert and oriented to person, place, and time.   Skin: Skin is warm and dry.   Wound VAC now on right foot    Incision on left foot appears to be healing well   Psychiatric: He has a normal mood and affect.   Vitals  reviewed.       Results Review:    I have reviewed all clinical data, test, lab, and imaging results.     Radiology  No Radiology Exams Resulted Within Past 24 Hours    Cardiology    Laboratory  Results from last 7 days   Lab Units 01/23/20  0325   WBC 10*3/mm3 7.90   HEMOGLOBIN g/dL 12.3*   HEMATOCRIT % 34.7*   PLATELETS 10*3/mm3 245     Results from last 7 days   Lab Units 01/23/20  0325   SODIUM mmol/L 133*   POTASSIUM mmol/L 3.5   CHLORIDE mmol/L 100   CO2 mmol/L 27.0   BUN mg/dL 15   CREATININE mg/dL 0.68*   GLUCOSE mg/dL 221*   CALCIUM mg/dL 7.6*     Results from last 7 days   Lab Units 01/23/20  0325   SODIUM mmol/L 133*   POTASSIUM mmol/L 3.5   CHLORIDE mmol/L 100   CO2 mmol/L 27.0   BUN mg/dL 15   CREATININE mg/dL 0.68*   GLUCOSE mg/dL 221*   CALCIUM mg/dL 7.6*     Results from last 7 days   Lab Units 01/21/20  0823   CK TOTAL U/L 14*     Results from last 7 days   Lab Units 01/21/20  0824   SED RATE mm/hr 87*         Microbiology   Microbiology Results (last 10 days)     Procedure Component Value - Date/Time    Body Fluid Culture - Body Fluid, Toe, Left [[id number]] Collected:  01/21/20 1314    Lab Status:  Preliminary result Specimen:  Body Fluid from Toe, Left Updated:  01/23/20 0916     Body Fluid Culture No growth at 2 days     Gram Stain Few (2+) WBCs per low power field      No organisms seen    Tissue / Bone Culture - Tissue, Foot, Right [[id number]]  (Abnormal) Collected:  01/21/20 1238    Lab Status:  Final result Specimen:  Tissue from Foot, Right Updated:  01/23/20 0925     Tissue Culture Moderate growth (3+) Streptococcus agalactiae (Group B)     Comment:   If Clindamycin or Erythromycin is the drug of choice, notify the laboratory within 7 days to request susceptibility testing.  This organism is considered to be universally susceptible to penicillin.  No further antibiotic testing will be performed. If Clindamycin or Erythromycin is the drug of choice, notify the laboratory within 7 days to  request susceptibility testing.        STREP GROUPING B     Gram Stain Few (2+) WBCs per low power field      Moderate (3+) Gram positive cocci in pairs and chains      Rare (1+) Gram negative bacilli    MRSA Screen, PCR - Swab, Nares [[id number]]  (Normal) Collected:  01/20/20 1839    Lab Status:  Final result Specimen:  Swab from Nares Updated:  01/21/20 0453     MRSA PCR No MRSA Detected    Wound Culture - Wound, Toe, Left [[id number]]  (Abnormal)  (Susceptibility) Collected:  01/20/20 1342    Lab Status:  Final result Specimen:  Wound from Toe, Left Updated:  01/23/20 0755     Wound Culture Scant growth (1+) Staphylococcus aureus      Rare Streptococcus agalactiae (Group B)     Comment:   This organism is considered to be universally susceptible to penicillin.  No further antibiotic testing will be performed. If Clindamycin or Erythromycin is the drug of choice, notify the laboratory within 7 days to request susceptibility testing.        STREP GROUPING B     Wound Culture Rare Normal Skin Juliet     Gram Stain Moderate (3+) WBCs per low power field      Occasional Gram positive cocci    Narrative:             Susceptibility      Staphylococcus aureus     KAILA     Clindamycin Susceptible     Erythromycin Susceptible     Inducible Clindamycin Resistance Negative     Oxacillin Susceptible     Penicillin G Resistant     Rifampin Susceptible     Tetracycline Susceptible     Trimethoprim + Sulfamethoxazole Susceptible     Vancomycin Susceptible                Susceptibility Comments     Staphylococcus aureus    This isolate does not demonstrate inducible clindamycin resistance in vitro.               Wound Culture - Wound, Foot, Right [[id number]]  (Abnormal)  (Susceptibility) Collected:  01/19/20 1630    Lab Status:  Edited Result - FINAL Specimen:  Wound from Foot, Right Updated:  01/23/20 1354     Wound Culture Moderate growth (3+) Streptococcus agalactiae (Group B)     Comment:   This organism is considered to be  universally susceptible to penicillin.  No further antibiotic testing will be performed. If Clindamycin or Erythromycin is the drug of choice, notify the laboratory within 7 days to request susceptibility testing.        STREP GROUPING B     Wound Culture Scant growth (1+) Staphylococcus aureus, MRSA     Comment:   Methicillin resistant Staphylococcus aureus, Patient may be an isolation risk.        PBP2 Positive     Wound Culture Scant growth (1+) Streptococcus anginosus      Scant growth (1+) Normal Skin Juliet     Gram Stain Many (4+) WBCs per low power field      Many (4+) Gram positive cocci in pairs, chains and clusters      Rare (1+) Gram negative cocci      Few (2+) Gram negative bacilli    Narrative:             Susceptibility      Staphylococcus aureus, MRSA     KAILA     Clindamycin Susceptible     Daptomycin Susceptible (C) [1]      Erythromycin Resistant     Inducible Clindamycin Resistance Negative     Oxacillin Resistant     Penicillin G Resistant     Rifampin Susceptible     Tetracycline Susceptible     Trimethoprim + Sulfamethoxazole Susceptible     Vancomycin Susceptible            [1]   Appended report. These results have been appended to a previously final verified report.             Susceptibility      Streptococcus anginosus     KAILA     Ceftriaxone Susceptible     Penicillin G Susceptible     Vancomycin Susceptible                Susceptibility Comments     Staphylococcus aureus, MRSA    This isolate does not demonstrate inducible clindamycin resistance in vitro.               Respiratory Panel, PCR - Swab, Nasopharynx [[id number]]  (Normal) Collected:  01/19/20 1059    Lab Status:  Final result Specimen:  Swab from Nasopharynx Updated:  01/19/20 2020     ADENOVIRUS, PCR Not Detected     Coronavirus 229E Not Detected     Coronavirus HKU1 Not Detected     Coronavirus NL63 Not Detected     Coronavirus OC43 Not Detected     Human Metapneumovirus Not Detected     Human Rhinovirus/Enterovirus Not  Detected     Influenza B PCR Not Detected     Parainfluenza Virus 1 Not Detected     Parainfluenza Virus 2 Not Detected     Parainfluenza Virus 3 Not Detected     Parainfluenza Virus 4 Not Detected     Bordetella pertussis pcr Not Detected     Influenza A H1 2009 PCR Not Detected     Chlamydophila pneumoniae PCR Not Detected     Mycoplasma pneumo by PCR Not Detected     Influenza A PCR Not Detected     Influenza A H3 Not Detected     Influenza A H1 Not Detected     RSV, PCR Not Detected          Medication Review:       Schedule Meds    aspirin 325 mg Oral Daily   cefTRIAXone 2 g Intravenous Q24H   DAPTOmycin 6 mg/kg Intravenous Q24H   insulin glargine 14 Units Subcutaneous Nightly   insulin lispro 0-10 Units Subcutaneous TID With Meals   insulin lispro 4 Units Subcutaneous TID With Meals   povidone-iodine  Topical Daily   sodium chloride 10 mL Intravenous Q12H       Infusion Meds    Pharmacy Consult - Pharmacy to dose     Pharmacy to dose vancomycin     sodium chloride 125 mL/hr Last Rate: 125 mL/hr (01/23/20 0146)       PRN Meds  •  acetaminophen **OR** acetaminophen **OR** acetaminophen  •  aluminum-magnesium hydroxide-simethicone  •  bisacodyl  •  dextrose  •  dextrose  •  glucagon (human recombinant)  •  ketorolac  •  magnesium hydroxide  •  melatonin  •  ondansetron **OR** ondansetron  •  Pharmacy Consult - Pharmacy to dose  •  Pharmacy to dose vancomycin  •  potassium chloride **OR** potassium chloride **OR** potassium chloride  •  sodium chloride        Assessment/Plan       Antimicrobial Therapy   1.  IV vancomycin    Day 5  2.  IV cefepime    Day   3.  IV Flagyl     Day   4.  IV Rocephin    day1  5.  IV Daptomycin    Day 1      Assessment     Diabetic foot wound  -Wound on the dorsal aspect of the second digit of the left foot  -Wound on the plantar aspect of the right foot   -MRI of the right foot shows suspicion for osteomyelitis of the hallux sesamoid bones  -History of left great toe amputation in  November 2018, fourth and fifth digit amputation in April 2019 with 6 weeks of IV antibiotics  -History of right transmetatarsal amputation  -1/21/2020- right foot excisional debridement to the muscle/tendon with tibial and fibular sesamoidectomy, left foot incision and drainage with second digit amputation at the metatarsophalangeal joint  -Wound culture of the right foot is growing Streptococcus group B and methicillin-resistant Staphylococcus aureus   -wound culture of the left foot is growing Staphylococcus aureus and group B streptococcus  -Intraoperative cultures of the right foot are growing group B streptococcus     Leukocytosis  -resolved     Type 1 diabetes with neuropathy     Recent complaints of dizziness with syncope on 1/18/2020 which resulted in a fall in the shower  -Imaging studies do not show any acute findings on the CT the head, CT the cervical spine or MRI of the right shoulder  -Screen was negative  -Orthopedic surgery following patient for right shoulder pain, will try injection, patient may need surgery  -2D echo was negative for any acute findings     Also evidence of nausea, vomiting, and fever at home  -Patient has been afebrile since admission  -Respiratory viral DNA panel was negative     CAD, history of MI with cardiac catheterization    Right shoulder pain  -Orthopedic surgery did a steroid injection on 1/21/2020 and patient has had some improvement in the pain today     Plan     Discontinue IV cefepime   Discontinue IV vancomycin  Discontinue IV Flagyl   IV Rocephin 2 g every 24 hours for 6 weeks total treatment-last day on 2/29/20  Start IV daptomycin 6 mg/kg (550 mg (every 24 hours for 6 weeks total treatment-last day on 2/29/2020  Stat CPK  Patient is currently not on a statin and should not be started on a statin till after the IV daptomycin is completed  Patient will need a PICC line before discharge-please remove when IV antibiotics are completed.  Weekly labs CBC/creatinine/sed  rate/CPK  Urinalysis  Continue supportive care  [facility]    Please fax all post discharge lab results, imaging studies and correspondence to this fax number [phone]  For any question or concern please contact our service number [phone]      Bailee Knight, KAMILLE  01/23/20  2:00 PM

## 2020-01-23 NOTE — PROGRESS NOTES
"01/18/2020  Foot and Ankle Surgery - Inpatient Follow-up  Provider: Dr. Adan Souza DPM  Location: [facility]    Chief Complaint: Right foot wound; left second digit amputation    Subjective:  Maynor Gregg is a 44 y.o. male.     Patient states that he is doing better today.  He plans on returning home with home health.    Allergies   Allergen Reactions   • Metformin Diarrhea and Nausea And Vomiting   • Oxycodone-Acetaminophen Nausea And Vomiting and Rash       No current facility-administered medications on file prior to encounter.      Current Outpatient Medications on File Prior to Encounter   Medication Sig Dispense Refill   • aspirin 325 MG tablet Take 325 mg by mouth Daily.     • Insulin Glargine (BASAGLAR KWIKPEN) 100 UNIT/ML injection pen Inject 12 Units under the skin into the appropriate area as directed Every Night.     • insulin lispro (HUMALOG) 100 UNIT/ML injection Inject  under the skin into the appropriate area as directed Every 8 (Eight) Hours. SSI         Objective   /79 (BP Location: Left arm, Patient Position: Lying)   Pulse 74   Temp 97.7 °F (36.5 °C) (Oral)   Resp 16   Ht 190.5 cm (75\")   Wt 94.3 kg (208 lb)   SpO2 97%   BMI 26.00 kg/m²     General: Mild distress.  Alert and oriented x3.  Anxious  Vascular: DP and PT pulses are palpable.  CFT are intact to the digits  Neuro: Sensation remains diminished.  Motor function is intact  MSK: s/p left second digit amputation.  No progressive changes to the right foot.  Derm:  Wound VAC applied to the right foot.  Left foot is doing well.  Decreased edema and erythema.  Amputation site appears healthy.  No concerning features of infection to the left lower extremity.         Results from last 7 days   Lab Units 01/23/20  0325   WBC 10*3/mm3 7.90   HEMOGLOBIN g/dL 12.3*   HEMATOCRIT % 34.7*   PLATELETS 10*3/mm3 245       Assessment/Plan     Patient Active Problem List   Diagnosis   • Syncope   • Diabetic peripheral " neuropathy (CMS/HCC)   • Type 1 diabetes mellitus with circulatory complication (CMS/HCC)   • Open wound of second toe of left foot   • Acute pain of right shoulder   • Chronic ulcer of right foot, with necrosis of bone (CMS/HCC)       Patient appears to be doing quite well at this time.  The right foot does have wound VAC placed.  The left foot was evaluated today showing decreased edema and erythema.  Imaging was reviewed showing no obvious fragmentation or dislocation to the midfoot or ankle.  Amputation site appears to be doing well.  I have recommended that we proceed with immobilization and compression therapy to the left leg.  Dressing to the left lower extremity can remain intact until follow-up with me.  Weightbearing as needed in cam boot to the right foot.  Home health is to proceed with wound VAC changes every other day.  He is to see me in the wound care center next Wednesday.    Note is dictated utilizing voice recognition software. Unfortunately this leads to occasional typographical errors. I apologize in advance if the situation occurs. If questions occur please do not hesitate to call our office.

## 2020-01-23 NOTE — PROGRESS NOTES
Daily Progress Note    Patient Care Team:  Erwin Castorena MD as PCP - General (Family Medicine)    Chief Complaint: Follow-up type 1 diabetes    HPI: 44-year-old male with history of type 1 diabetes along with diabetic neuropathy and amputation of 3 toes on the left side also ulcer on the right foot has been undergoing some surgery as well as IV antibiotic.  Endocrine consultation was requested for diabetes management.  He is currently on Basaglar/Lantus 12 units once a day along with Humalog 3 units with each meal.  Blood sugars are still running high.  He is eating well.    ROS:   Constitutional:  Denies fatigue, tiredness.    Eyes:  Denies change in visual acuity   HENT:  Denies nasal congestion or sore throat   Respiratory: denies cough, shortness of breath.   Cardiovascular:  denies chest pain, edema   GI:  Denies abdominal pain, nausea, vomiting.   :  Denies polyuria and polydipsia  Musculoskeletal:  Denies back pain or joint pain   Integument:  Denies rash   Neurologic:  Denies headache, focal weakness or sensory changes   Endocrine:  Denies polyuria or polydipsia   Psychiatric:  Denies depression or anxiety       Vitals:    01/22/20 1209   BP: 122/65   Pulse: 79   Resp: 16   Temp: 98.1 °F (36.7 °C)   SpO2: 93%       Physical Exam:  GEN: NAD, conversant  EYES: EOMI, PERRL, no conjunctival erythema  NECK: no thyromegaly, full ROM   CV: RRR, no murmurs/rubs/gallops, no peripheral edema  LUNG: CTAB, no wheezes/rales/ronchi  SKIN: no rashes, no acanthosis  MSK: Has ulcer on the right foot, he has bandage on the left    foot.  NEURO: no tremors, DTR normal  PSYCH: AOX3, appropriate mood, affect normal      Results Review:     I reviewed the patient's new clinical results.    Glucose   Date Value Ref Range Status   01/22/2020 280 (H) 65 - 99 mg/dL Final     Sodium   Date Value Ref Range Status   01/22/2020 131 (L) 136 - 145 mmol/L Final     Potassium   Date Value Ref Range Status   01/22/2020  3.6 3.5 - 5.2 mmol/L Final     CO2   Date Value Ref Range Status   01/22/2020 27.0 22.0 - 29.0 mmol/L Final     Chloride   Date Value Ref Range Status   01/22/2020 96 (L) 98 - 107 mmol/L Final     Anion Gap   Date Value Ref Range Status   01/22/2020 8.0 5.0 - 15.0 mmol/L Final     Creatinine   Date Value Ref Range Status   01/22/2020 0.72 (L) 0.76 - 1.27 mg/dL Final     BUN   Date Value Ref Range Status   01/22/2020 19 6 - 20 mg/dL Final     BUN/Creatinine Ratio   Date Value Ref Range Status   01/22/2020 26.4 (H) 7.0 - 25.0 Final     Calcium   Date Value Ref Range Status   01/22/2020 7.5 (L) 8.6 - 10.5 mg/dL Final     eGFR Non  Amer   Date Value Ref Range Status   01/22/2020 119 >60 mL/min/1.73 Final     Alkaline Phosphatase   Date Value Ref Range Status   01/21/2020 107 39 - 117 U/L Final     Total Protein   Date Value Ref Range Status   01/21/2020 6.3 6.0 - 8.5 g/dL Final     ALT (SGPT)   Date Value Ref Range Status   01/21/2020 41 1 - 41 U/L Final     AST (SGOT)   Date Value Ref Range Status   01/21/2020 28 1 - 40 U/L Final     Total Bilirubin   Date Value Ref Range Status   01/21/2020 1.9 (H) 0.2 - 1.2 mg/dL Final     Albumin   Date Value Ref Range Status   01/21/2020 2.10 (L) 3.50 - 5.20 g/dL Final     Globulin   Date Value Ref Range Status   01/21/2020 4.2 gm/dL Final     Magnesium   Date Value Ref Range Status   01/21/2020 2.1 1.6 - 2.6 mg/dL Final     Phosphorus   Date Value Ref Range Status   01/21/2020 2.6 2.5 - 4.5 mg/dL Final     Lab Results   Component Value Date    HGBA1C 10.5 (H) 01/21/2020     No results found for: GLUF, MICROALBUR  Results from last 7 days   Lab Units 01/22/20  1917 01/22/20  1631 01/22/20  1123 01/22/20  0727 01/21/20  1921 01/21/20  1700   GLUCOSE mg/dL 291* 239* 223* 230* 142* 130*             Medication Review: Reviewed.       aspirin 325 mg Oral Daily   cefepime 2 g Intravenous Q8H   insulin glargine 12 Units Subcutaneous Nightly   insulin lispro 0-10 Units Subcutaneous  TID With Meals   insulin lispro 3 Units Subcutaneous TID With Meals   metroNIDAZOLE 500 mg Intravenous Q8H   povidone-iodine  Topical Daily   sodium chloride 10 mL Intravenous Q12H   vancomycin 1,500 mg Intravenous Q12H         Assessment/Plan   1   Diabetes mellitus type 1: Uncontrolled with very high A1c  2.  Diabetic foot ulcer on the right side  3.  Multiple toe amputations on the left foot  4.  Diabetic neuropathy  5.  Syncope     Plan:     At this time I will increase Lantus to 14 units at night and increase Humalog to 4 with each meal and continue Humalog sliding scale with meals and follow blood sugars and make further adjustments as needed.             Ezekiel Ron MD. FACE    Much of the above report is an electronic transcription/translation of the spoken language to printed text using Dragon Software. As such, the subtleties and finesse of the spoken language may permit erroneous, or at times, nonsensical words or phrases to be inadvertently transcribed; thus changes may be made at a later date to rectify these errors.

## 2020-01-23 NOTE — PROGRESS NOTES
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 Gergg is a 44 y.o. with a history of DM 1 and neuropathy presented to the [facility] ED on 1/18/2020 with a complaint of syncope, he passed out while he was in the shower today, states he \"went out and woke up in the tub\" Pt is also complaining of nausea, vomiting and fever x 3 days. Pt states he began having nausea and vomiting on Thursday, associated with low grade fever. Pt denies melena, hematochezia, diarrhea. Pt states he then began to have episodes of dizziness, even when lying down, he felt as if the room was spinning. Pt is complaining of head, neck and back pain s/p fall today. Pt has an open wound on his left 2nd toe, he is current with Dr. Souza and is finishing a round of doxycycline.      In the ED, CT head: negative for acute abnormality, CT spine: Normal, Xray of second toe on left foot: negative for fracture, chronic abnormal appearance of second and third MTP joints, soft tissue swelling. Glucose 520, K+4.2, BUN 19, Cr. 1.04, WBC 23.9, Hgb 13.4, Hct 38.5. Pt was given IV insulin 6 units, 500ml NS bolus IV, Zofran 4mg IV. Pt will be admitted for further evaluation and management.     Date:: 1/20/2020: Patient seen and examined and complains of right shoulder pain.  Patient was admitted because of " "the syncopal episode and at this time we do not know exactly why he passed out.  Patient landed on the right side and hurt his shoulder and arm during the x-ray and MRI of the right shoulder.  Patient also will be seen by orthopedics.    Date 1/22/2020: Patient seen and examined and he seemed to doing fairly well.  I also spoke to infectious disease as well as  and nursing at the bedside.  Patient will need nonweightbearing as well as IV antibiotic for 6 weeks and we are considering different options including possible placement.    Date 1/23/2020: Patient seen and examined.  Patient denies any complaints and case management as well as infectious disease arranging for IV antibiotics which hopefully will be arranged by tomorrow.    Review of Systems   All other systems reviewed and are negative.        Objective   Objective      Vital Signs  Temp:  [97.6 °F (36.4 °C)-98.1 °F (36.7 °C)] 97.6 °F (36.4 °C)  Heart Rate:  [67-76] 67  Resp:  [15-18] 18  BP: (119-143)/(70-83) 137/83  Oxygen Therapy  SpO2: 97 %  Pulse Oximetry Type: Continuous  Device (Oxygen Therapy): room air  Flow (L/min): 2  Flowsheet Rows      First Filed Value   Admission Height  191.8 cm (75.5\") Documented at 01/18/2020 1854   Admission Weight  98 kg (216 lb 0.8 oz) Documented at 01/18/2020 1854        Intake & Output (last 3 days)       01/21 0701 - 01/22 0700 01/22 0701 - 01/23 0700 01/23 0701 - 01/24 0700    P.O. 0 360 960    I.V. (mL/kg) 700 (7.4) 1000 (10.6)     IV Piggyback 350 350     Total Intake(mL/kg) 1050 (11.1) 1710 (18.1) 960 (10.2)    Urine (mL/kg/hr)  1000 (0.4) 1000 (0.9)    Stool 0 0     Total Output 0 1000 1000    Net +1050 +710 -40               Lines, Drains & Airways    Active LDAs     Name:   Placement date:   Placement time:   Site:   Days:    Peripheral IV 01/18/20 1926 Right Arm   01/18/20 1926    Arm   1                  Physical Exam:    Physical Exam   Constitutional: He is oriented to person, place, and " time. He appears well-developed and well-nourished. No distress.   HENT:   Head: Normocephalic and atraumatic.   Nose: Nose normal.   Mouth/Throat: Oropharynx is clear and moist. No oropharyngeal exudate.   Eyes: Pupils are equal, round, and reactive to light. Conjunctivae and EOM are normal. Right eye exhibits no discharge. Left eye exhibits no discharge. No scleral icterus.   Neck: Normal range of motion. No JVD present. No tracheal deviation present. No thyromegaly present.   Cardiovascular: Normal rate, regular rhythm, normal heart sounds and intact distal pulses. Exam reveals no gallop and no friction rub.   No murmur heard.  Pulmonary/Chest: Effort normal and breath sounds normal. No stridor. No respiratory distress. He has no wheezes. He has no rales. He exhibits no tenderness.   Abdominal: Soft. Bowel sounds are normal. He exhibits no distension and no mass. There is no tenderness. There is no rebound and no guarding. No hernia.   Musculoskeletal: He exhibits deformity. He exhibits no edema or tenderness.   Lymphadenopathy:     He has no cervical adenopathy.   Neurological: He is alert and oriented to person, place, and time. No cranial nerve deficit or sensory deficit. He exhibits normal muscle tone. Coordination normal.   Skin: Skin is warm and dry. No rash noted. He is not diaphoretic. No erythema.   Psychiatric: He has a normal mood and affect. His behavior is normal.   Nursing note and vitals reviewed.           Wounds (last 24 hours)      LDA Wound     Row Name 01/20/20 0710 01/19/20 1905          Wound 01/18/20 2244 Right heel Diabetic Ulcer    Wound - Properties Group Date first assessed: 01/18/20  - Time first assessed: 2244  -SS Present on Hospital Admission: Y  -SS Side: Right  -SS Location: heel  -SS Primary Wound Type: Diabetic ulc  - Stage, Pressure Injury: unstageable  -SS    Dressing Appearance  dry;intact  -SM  dry;intact  -VB     Closure  DORIS  -SM  DORIS  -VB       User Key  (r) =  Recorded By, (t) = Taken By, (c) = Cosigned By    Initials Name Provider Type    Tamara Pimentel, RN Registered Nurse    Bryanna Albarran LPN Licensed Nurse    Mae Harrell RN Registered Nurse          Procedures:              Results Review:     I reviewed the patient's new clinical results.      Lab Results (last 24 hours)     Procedure Component Value Units Date/Time    POC Glucose Once [[id number]]  (Abnormal) Collected:  01/23/20 1624    Specimen:  Blood Updated:  01/23/20 1625     Glucose 191 mg/dL      Comment: Serial Number: [id number]:  [id number]       CK [[id number]]  (Normal) Collected:  01/23/20 1427    Specimen:  Blood Updated:  01/23/20 1539     Creatine Kinase 29 U/L     Wound Culture - Wound, Foot, Right [[id number]]  (Abnormal)  (Susceptibility) Collected:  01/19/20 1630    Specimen:  Wound from Foot, Right Updated:  01/23/20 1354     Wound Culture Moderate growth (3+) Streptococcus agalactiae (Group B)     Comment:   This organism is considered to be universally susceptible to penicillin.  No further antibiotic testing will be performed. If Clindamycin or Erythromycin is the drug of choice, notify the laboratory within 7 days to request susceptibility testing.        STREP GROUPING B     Wound Culture Scant growth (1+) Staphylococcus aureus, MRSA     Comment:   Methicillin resistant Staphylococcus aureus, Patient may be an isolation risk.        PBP2 Positive     Wound Culture Scant growth (1+) Streptococcus anginosus      Scant growth (1+) Normal Skin Juliet     Gram Stain Many (4+) WBCs per low power field      Many (4+) Gram positive cocci in pairs, chains and clusters      Rare (1+) Gram negative cocci      Few (2+) Gram negative bacilli    Narrative:             Susceptibility      Staphylococcus aureus, MRSA     KAILA     Clindamycin Susceptible     Daptomycin Susceptible (C) [1]      Erythromycin Resistant     Inducible Clindamycin Resistance Negative     Oxacillin  Resistant     Penicillin G Resistant     Rifampin Susceptible     Tetracycline Susceptible     Trimethoprim + Sulfamethoxazole Susceptible     Vancomycin Susceptible            [1]   Appended report. These results have been appended to a previously final verified report.             Susceptibility      Streptococcus anginosus     KAILA     Ceftriaxone Susceptible     Penicillin G Susceptible     Vancomycin Susceptible                Susceptibility Comments     Staphylococcus aureus, MRSA    This isolate does not demonstrate inducible clindamycin resistance in vitro.               POC Glucose Once [[id number]]  (Abnormal) Collected:  01/23/20 1131    Specimen:  Blood Updated:  01/23/20 1137     Glucose 176 mg/dL      Comment: Serial Number: [id number]:  [id number]       Tissue / Bone Culture - Tissue, Foot, Right [[id number]]  (Abnormal) Collected:  01/21/20 1238    Specimen:  Tissue from Foot, Right Updated:  01/23/20 0925     Tissue Culture Moderate growth (3+) Streptococcus agalactiae (Group B)     Comment:   If Clindamycin or Erythromycin is the drug of choice, notify the laboratory within 7 days to request susceptibility testing.  This organism is considered to be universally susceptible to penicillin.  No further antibiotic testing will be performed. If Clindamycin or Erythromycin is the drug of choice, notify the laboratory within 7 days to request susceptibility testing.        STREP GROUPING B     Gram Stain Few (2+) WBCs per low power field      Moderate (3+) Gram positive cocci in pairs and chains      Rare (1+) Gram negative bacilli    Body Fluid Culture - Body Fluid, Toe, Left [[id number]] Collected:  01/21/20 1314    Specimen:  Body Fluid from Toe, Left Updated:  01/23/20 0916     Body Fluid Culture No growth at 2 days     Gram Stain Few (2+) WBCs per low power field      No organisms seen    Wound Culture - Wound, Toe, Left [[id number]]  (Abnormal)  (Susceptibility) Collected:  01/20/20 1342     Specimen:  Wound from Toe, Left Updated:  01/23/20 0755     Wound Culture Scant growth (1+) Staphylococcus aureus      Rare Streptococcus agalactiae (Group B)     Comment:   This organism is considered to be universally susceptible to penicillin.  No further antibiotic testing will be performed. If Clindamycin or Erythromycin is the drug of choice, notify the laboratory within 7 days to request susceptibility testing.        STREP GROUPING B     Wound Culture Rare Normal Skin Juliet     Gram Stain Moderate (3+) WBCs per low power field      Occasional Gram positive cocci    Narrative:             Susceptibility      Staphylococcus aureus     KAILA     Clindamycin Susceptible     Erythromycin Susceptible     Inducible Clindamycin Resistance Negative     Oxacillin Susceptible     Penicillin G Resistant     Rifampin Susceptible     Tetracycline Susceptible     Trimethoprim + Sulfamethoxazole Susceptible     Vancomycin Susceptible                Susceptibility Comments     Staphylococcus aureus    This isolate does not demonstrate inducible clindamycin resistance in vitro.               POC Glucose Once [[id number]]  (Abnormal) Collected:  01/23/20 0712    Specimen:  Blood Updated:  01/23/20 0713     Glucose 185 mg/dL      Comment: Serial Number: [id number]:  [id number]       Basic Metabolic Panel [[id number]]  (Abnormal) Collected:  01/23/20 0325    Specimen:  Blood Updated:  01/23/20 0433     Glucose 221 mg/dL      BUN 15 mg/dL      Creatinine 0.68 mg/dL      Sodium 133 mmol/L      Potassium 3.5 mmol/L      Chloride 100 mmol/L      CO2 27.0 mmol/L      Calcium 7.6 mg/dL      eGFR Non African Amer 127 mL/min/1.73      BUN/Creatinine Ratio 22.1     Anion Gap 6.0 mmol/L     Narrative:       GFR Normal >60  Chronic Kidney Disease <60  Kidney Failure <15      CBC & Differential [[id number]] Collected:  01/23/20 0325    Specimen:  Blood Updated:  01/23/20 0402    Narrative:       The following orders were created for panel  order CBC & Differential.  Procedure                               Abnormality         Status                     ---------                               -----------         ------                     CBC Auto Differential[[id number]]        Abnormal            Final result                 Please view results for these tests on the individual orders.    CBC Auto Differential [[id number]]  (Abnormal) Collected:  01/23/20 0325    Specimen:  Blood Updated:  01/23/20 0402     WBC 7.90 10*3/mm3      RBC 3.71 10*6/mm3      Hemoglobin 12.3 g/dL      Hematocrit 34.7 %      MCV 93.4 fL      MCH 33.0 pg      MCHC 35.4 g/dL      RDW 12.5 %      RDW-SD 41.6 fl      MPV 8.5 fL      Platelets 245 10*3/mm3      Neutrophil % 66.1 %      Lymphocyte % 18.0 %      Monocyte % 13.6 %      Eosinophil % 1.7 %      Basophil % 0.6 %      Neutrophils, Absolute 5.20 10*3/mm3      Lymphocytes, Absolute 1.40 10*3/mm3      Monocytes, Absolute 1.10 10*3/mm3      Eosinophils, Absolute 0.10 10*3/mm3      Basophils, Absolute 0.00 10*3/mm3      nRBC 0.1 /100 WBC     POC Glucose Once [[id number]]  (Abnormal) Collected:  01/22/20 1917    Specimen:  Blood Updated:  01/22/20 1918     Glucose 291 mg/dL      Comment: Serial Number: [id number]:  [id number]           Hemoglobin A1C   Date Value Ref Range Status   01/21/2020 10.5 (H) 3.5 - 5.6 % Final               Lab Results   Component Value Date    LIPASE 10 (L) 01/19/2020     Lab Results   Component Value Date    CHOL 82 01/21/2020    TRIG 148 01/21/2020    HDL 15 (L) 01/21/2020    LDL 37 01/21/2020       Lab Results   Lab Value Date/Time    FINALDX  01/21/2020 1247     Specimen #1 (Exostosis, right foot, excision):    Acutely inflamed fibroadipose tissue, viable bone and hypocellular fibrotic marrow    No osteomyelitis or malignancy identified    Specimen #2 (Toe, left second, amputation):    Skin ulceration, soft tissue cellulitis, and focal acute osteomyelitis of nonmarginal bone    Skin, soft  tissue and bone resection margins grossly viable    [facility]         Microbiology Results (last 10 days)     Procedure Component Value - Date/Time    Body Fluid Culture - Body Fluid, Toe, Left [[id number]] Collected:  01/21/20 1314    Lab Status:  Preliminary result Specimen:  Body Fluid from Toe, Left Updated:  01/23/20 0916     Body Fluid Culture No growth at 2 days     Gram Stain Few (2+) WBCs per low power field      No organisms seen    Tissue / Bone Culture - Tissue, Foot, Right [[id number]]  (Abnormal) Collected:  01/21/20 1238    Lab Status:  Final result Specimen:  Tissue from Foot, Right Updated:  01/23/20 0925     Tissue Culture Moderate growth (3+) Streptococcus agalactiae (Group B)     Comment:   If Clindamycin or Erythromycin is the drug of choice, notify the laboratory within 7 days to request susceptibility testing.  This organism is considered to be universally susceptible to penicillin.  No further antibiotic testing will be performed. If Clindamycin or Erythromycin is the drug of choice, notify the laboratory within 7 days to request susceptibility testing.        STREP GROUPING B     Gram Stain Few (2+) WBCs per low power field      Moderate (3+) Gram positive cocci in pairs and chains      Rare (1+) Gram negative bacilli    MRSA Screen, PCR - Swab, Nares [[id number]]  (Normal) Collected:  01/20/20 1839    Lab Status:  Final result Specimen:  Swab from Nares Updated:  01/21/20 0453     MRSA PCR No MRSA Detected    Wound Culture - Wound, Toe, Left [[id number]]  (Abnormal)  (Susceptibility) Collected:  01/20/20 1342    Lab Status:  Final result Specimen:  Wound from Toe, Left Updated:  01/23/20 0755     Wound Culture Scant growth (1+) Staphylococcus aureus      Rare Streptococcus agalactiae (Group B)     Comment:   This organism is considered to be universally susceptible to penicillin.  No further antibiotic testing will be performed. If Clindamycin or Erythromycin is the drug of choice, notify the  laboratory within 7 days to request susceptibility testing.        STREP GROUPING B     Wound Culture Rare Normal Skin Juliet     Gram Stain Moderate (3+) WBCs per low power field      Occasional Gram positive cocci    Narrative:             Susceptibility      Staphylococcus aureus     KAILA     Clindamycin Susceptible     Erythromycin Susceptible     Inducible Clindamycin Resistance Negative     Oxacillin Susceptible     Penicillin G Resistant     Rifampin Susceptible     Tetracycline Susceptible     Trimethoprim + Sulfamethoxazole Susceptible     Vancomycin Susceptible                Susceptibility Comments     Staphylococcus aureus    This isolate does not demonstrate inducible clindamycin resistance in vitro.               Wound Culture - Wound, Foot, Right [[id number]]  (Abnormal)  (Susceptibility) Collected:  01/19/20 1630    Lab Status:  Edited Result - FINAL Specimen:  Wound from Foot, Right Updated:  01/23/20 9169     Wound Culture Moderate growth (3+) Streptococcus agalactiae (Group B)     Comment:   This organism is considered to be universally susceptible to penicillin.  No further antibiotic testing will be performed. If Clindamycin or Erythromycin is the drug of choice, notify the laboratory within 7 days to request susceptibility testing.        STREP GROUPING B     Wound Culture Scant growth (1+) Staphylococcus aureus, MRSA     Comment:   Methicillin resistant Staphylococcus aureus, Patient may be an isolation risk.        PBP2 Positive     Wound Culture Scant growth (1+) Streptococcus anginosus      Scant growth (1+) Normal Skin Juliet     Gram Stain Many (4+) WBCs per low power field      Many (4+) Gram positive cocci in pairs, chains and clusters      Rare (1+) Gram negative cocci      Few (2+) Gram negative bacilli    Narrative:             Susceptibility      Staphylococcus aureus, MRSA     KAILA     Clindamycin Susceptible     Daptomycin Susceptible (C) [1]      Erythromycin Resistant     Inducible  Clindamycin Resistance Negative     Oxacillin Resistant     Penicillin G Resistant     Rifampin Susceptible     Tetracycline Susceptible     Trimethoprim + Sulfamethoxazole Susceptible     Vancomycin Susceptible            [1]   Appended report. These results have been appended to a previously final verified report.             Susceptibility      Streptococcus anginosus     KAILA     Ceftriaxone Susceptible     Penicillin G Susceptible     Vancomycin Susceptible                Susceptibility Comments     Staphylococcus aureus, MRSA    This isolate does not demonstrate inducible clindamycin resistance in vitro.               Respiratory Panel, PCR - Swab, Nasopharynx [[id number]]  (Normal) Collected:  01/19/20 1059    Lab Status:  Final result Specimen:  Swab from Nasopharynx Updated:  01/19/20 8965     ADENOVIRUS, PCR Not Detected     Coronavirus 229E Not Detected     Coronavirus HKU1 Not Detected     Coronavirus NL63 Not Detected     Coronavirus OC43 Not Detected     Human Metapneumovirus Not Detected     Human Rhinovirus/Enterovirus Not Detected     Influenza B PCR Not Detected     Parainfluenza Virus 1 Not Detected     Parainfluenza Virus 2 Not Detected     Parainfluenza Virus 3 Not Detected     Parainfluenza Virus 4 Not Detected     Bordetella pertussis pcr Not Detected     Influenza A H1 2009 PCR Not Detected     Chlamydophila pneumoniae PCR Not Detected     Mycoplasma pneumo by PCR Not Detected     Influenza A PCR Not Detected     Influenza A H3 Not Detected     Influenza A H1 Not Detected     RSV, PCR Not Detected          ECG/EMG Results (most recent)     Procedure Component Value Units Date/Time    ECG 12 Lead [[id number]] Collected:  01/18/20 1906     Updated:  01/18/20 1907    Narrative:       HEART RATE= 105  bpm  RR Interval= 572  ms  IL Interval= 149  ms  P Horizontal Axis= 3  deg  P Front Axis= 48  deg  QRSD Interval= 79  ms  QT Interval= 328  ms  QRS Axis= 34  deg  T Wave Axis= 2  deg  - BORDERLINE ECG  -  Sinus tachycardia  Borderline ST elevation, lateral leads  When compared with ECG of 04-Apr-2019 13:03:16,  Significant change in rhythm  Electronically Signed By:   Date and Time of Study: 2020-01-18 19:06:28    Adult Transthoracic Echo Complete W/ Cont if Necessary Per Protocol [[id number]] Collected:  01/20/20 1027     Updated:  01/21/20 1920     BSA 2.2 m^2       CV ECHO BILL - RVDD 2.8 cm      IVSd 1.3 cm      LVIDd 3.7 cm      LVIDs 2.6 cm      LVPWd 1.2 cm      IVS/LVPW 1.1     FS 28.7 %      EDV(Teich) 56.7 ml      ESV(Teich) 24.8 ml      EF(Teich) 56.2 %      EDV(cubed) 49.1 ml      ESV(cubed) 17.8 ml      EF(cubed) 63.8 %      LV mass(C)d 154.9 grams      LV mass(C)dI 69.4 grams/m^2      SV(Teich) 31.9 ml      SI(Teich) 14.3 ml/m^2      SV(cubed) 31.4 ml      SI(cubed) 14.1 ml/m^2      Ao root diam 2.6 cm      Ao root area 5.5 cm^2      ACS 2.2 cm      asc Aorta Diam 2.9 cm      LVOT diam 2.2 cm      LVOT area 3.7 cm^2      RVOT diam 2.4 cm      RVOT area 4.7 cm^2      EDV(MOD-sp4) 89.0 ml      ESV(MOD-sp4) 30.3 ml      EF(MOD-sp4) 65.9 %      EDV(MOD-sp2) 72.1 ml      ESV(MOD-sp2) 28.9 ml      EF(MOD-sp2) 59.9 %      SV(MOD-sp4) 58.7 ml      SI(MOD-sp4) 26.3 ml/m^2      SV(MOD-sp2) 43.2 ml      SI(MOD-sp2) 19.4 ml/m^2      Ao root area (BSA corrected) 1.2     LV Matson Vol (BSA corrected) 39.9 ml/m^2      LV Sys Vol (BSA corrected) 13.6 ml/m^2      Aortic R-R 0.65 sec      Aortic HR 92.2 BPM      MV E max reina 113.5 cm/sec      MV A max reina 92.8 cm/sec      MV E/A 1.2     MV V2 max 124.8 cm/sec      MV max PG 6.2 mmHg      MV V2 mean 86.5 cm/sec      MV mean PG 3.3 mmHg      MV V2 VTI 27.3 cm      MVA(VTI) 3.4 cm^2      MV dec slope 401.4 cm/sec^2      MV dec time 0.28 sec      Ao pk reina 148.3 cm/sec      Ao max PG 8.8 mmHg      Ao max PG (full) 0.11 mmHg      Ao V2 mean 115.9 cm/sec      Ao mean PG 5.7 mmHg      Ao mean PG (full) 1.8 mmHg      Ao V2 VTI 28.7 cm      CONSUELO(I,A) 3.3 cm^2      CONSUELO(I,D) 3.3  cm^2      CONSUELO(V,A) 3.6 cm^2      CONSUELO(V,D) 3.6 cm^2      LV V1 max PG 8.7 mmHg      LV V1 mean PG 3.9 mmHg      LV V1 max 147.4 cm/sec      LV V1 mean 90.2 cm/sec      LV V1 VTI 25.7 cm      CO(Ao) 14.5 l/min      CI(Ao) 6.5 l/min/m^2      SV(Ao) 157.2 ml      SI(Ao) 70.4 ml/m^2      CO(LVOT) 8.7 l/min      CI(LVOT) 3.9 l/min/m^2      SV(LVOT) 94.0 ml      SV(RVOT) 67.3 ml      SI(LVOT) 42.1 ml/m^2      PA V2 max 83.9 cm/sec      PA max PG 2.8 mmHg      PA max PG (full) 1.0 mmHg      PA V2 mean 68.9 cm/sec      PA mean PG 2.0 mmHg      PA mean PG (full) 0.95 mmHg      PA V2 VTI 18.4 cm      PVA(I,A) 3.7 cm^2       CV ECHO BILL - PVA(I,D) 3.7 cm^2       CV ECHO BILL - PVA(V,A) 3.8 cm^2       CV ECHO BILL - PVA(V,D) 3.8 cm^2      PA acc time 0.12 sec      RV V1 max PG 1.8 mmHg      RV V1 mean PG 1.0 mmHg      RV V1 max 67.2 cm/sec      RV V1 mean 47.8 cm/sec      RV V1 VTI 14.4 cm      TR max fermín 224.6 cm/sec      RVSP(TR) 23.2 mmHg      RAP systole 3.0 mmHg      PA pr(Accel) 25.9 mmHg      Pulm Sys Fermín 55.0 cm/sec      Pulm Matson Fermín 49.8 cm/sec      Pulm S/D 1.1     Qp/Qs 0.72      CV ECHO BILL - BZI_BMI 26.0 kilograms/m^2       CV ECHO BILL - BSA(HAYCOCK) 2.2 m^2       CV ECHO BILL - BZI_METRIC_WEIGHT 94.3 kg       CV ECHO BILL - BZI_METRIC_HEIGHT 190.5 cm      EF(MOD-bp) 65.0 %      LA dimension(2D) 2.9 cm     Narrative:       · Left ventricular systolic function is normal.  · Mild mitral valve regurgitation is present  · Mild tricuspid valve regurgitation is present.  · Ejection fraction is about 65%  · No pericardial effusion noted                  Results for orders placed during the hospital encounter of 01/18/20   Adult Transthoracic Echo Complete W/ Cont if Necessary Per Protocol    Narrative · Left ventricular systolic function is normal.  · Mild mitral valve regurgitation is present  · Mild tricuspid valve regurgitation is present.  · Ejection fraction is about 65%  · No pericardial effusion  noted          Xr Shoulder 2+ View Right    Result Date: 1/20/2020   1. No acute right shoulder findings. No significant right shoulder osteoarthritic change. 2. Suspected benign enchondroma or bone infarcts in the right humeral shaft.  Electronically Signed By-Dr. Yamileth Bourne MD On:1/20/2020 12:56 PM This report was finalized on [id number] by Dr. Yamileth Bourne MD.    Xr Ankle 2 View Left    Result Date: 1/23/2020  1.Soft tissue swelling. 2.There are degenerative changes involving the midfoot. It looks like there is an erosion of the base of the fifth metatarsal. 3.Ankle joint effusion.  Depending on clinical findings MRI may be warranted to reassess the ankle area and exclude osteomyelitis. Please see MRI of the foot report for findings with respect to the forefoot area.  Electronically Signed By-Babar Felton On:1/23/2020 2:22 PM This report was finalized on [id number] by  Babar Felton, .    Xr Foot 2 View Left    Result Date: 1/23/2020  1. Interval postsurgical changes of resection of the second toe. 2. Additional chronic findings above.  Electronically Signed By-Gustabo Calzada On:1/23/2020 3:26 PM This report was finalized on [id number] by  Gustabo Calzada .    Ct Head Without Contrast    Result Date: 1/18/2020  1.  Normal exam.  Electronically Signed By-Arabella Mendez On:1/18/2020 8:09 PM This report was finalized on [id number] by  Arabella Mendez .    Ct Cervical Spine Without Contrast    Result Date: 1/18/2020  Normal CT cervical spine without contrast.  Electronically Signed By-Arabella Mendez On:1/18/2020 8:11 PM This report was finalized on [id number] by  Ynes Haywood    Xr Toe 2+ View Left    Result Date: 1/18/2020   1. Negative for fracture. 2. Chronic abnormal appearance of second and third MTP joints. 3. Soft tissue swelling.  Electronically Signed By-Arabella Mendez On:1/18/2020 8:07 PM This report was finalized on [id number] by  Arabella Mendez .    Us Gallbladder    Result Date:  1/19/2020   1. Sludge in the gallbladder lumen. No evidence of gallbladder wall thickening or fluid in the gallbladder fossa. No evidence of dilatation of bile ducts. No evidence of ascites.  Electronically Signed By-DR. Mal Lott MD On:1/19/2020 5:50 PM This report was finalized on [id number] by DR. Mal Lott MD.    Ct Chest Pulmonary Embolism    Result Date: 1/20/2020   1. No evidence of thrombus or embolus in the right or left pulmonary artery branches. 2. Questionable very mild diffuse infiltrates throughout the right lower lobe and left lower lobe along might be caused by atelectasis or vascular congestion. 3. No focal abnormality seen in the right or left lungs.  Electronically Signed By-DR. Mal Lott MD On:1/20/2020 5:42 PM This report was finalized on [id number] by DR. Mal Lott MD.    Mri Shoulder Right Without Contrast    Result Date: 1/20/2020  1.There is a tear of the superior labrum from anterior to posterior with mild adjacent cartilage loss in the superior glenoid. This is age indeterminate. 2.No imaging findings of shoulder dislocation. No Hill-Sachs impaction fracture or Bankart lesion. 3.Chondroid lesions in the proximal humeral diaphysis. No associated aggressive features on MRI. Radiographic follow-up of the humerus is recommended in 6 months time. These are likely benign enchondromas. The largest lesion is not seen in the field-of-view. 4.No findings rotator cuff tear.   Electronically Signed By-Millicent Springer MD On:1/20/2020 2:17 PM This report was finalized on [id number] by  Millicent Springer MD.    Mri Foot Right Without Contrast    Result Date: 1/20/2020  1.There is a sinus tract along the plantar aspect of foot superficial to the first metatarsal phalange joint with abnormal soft tissue density along this tract along the plantar aspect of the great toe with abnormal signal intensity in the hallux sesamoid bones. This is suspicious for  osteomyelitis of the hallux sesamoid bones. Adjacent phlegmon is likely. Soft tissue evaluation limited without contrast. 2.There are remote and subacute fractures as described. 3.There is erosive change at the second proximal interphalangeal joint and the first interphalangeal joint that may be seen with gouty arthritis. Apparently erosive arthritis such as rheumatoid arthritis is thought to be unlikely.    Electronically Signed By-Millicent Springer MD On:1/20/2020 2:04 PM This report was finalized on [id number] by  Millicent Springer MD.    Mri Foot Left With & Without Contrast    Result Date: 1/20/2020  Diffuse inflammation and edema in the soft tissues surrounding the dorsal plantar aspect of the left mid and left forefoot. 2. There are several small focal loculated fluid collections overlying the dorsal aspect of the left first and second and third cuneiform bones and overlying the dorsal aspect of the left navicular bone and left cuboid bone in a small fluid collection can be seen lying between the bases of the left second and third metatarsal bases. These fluid collections have uncertain etiology and might be sterile or caused by numerous small abscesses in the soft tissues. Clinical correlation would BE helpful. 3. Signal abnormality in the bone marrow in the distal metaphysis and head of the left second metatarsal and in the bone marrow in the distal middle and proximal phalanges of the left second toe probably caused by stress fracture and less likely caused by osteomyelitis. 4. Deformity of the distal metaphysis and head of the left third metatarsal most consistent with trauma and fracture. 5. Mild deformity of the left first second and third cuneiform bones in the left navicular bone and left cuboid bone with mild inflammation and edema in the bone marrow of these bones most consistent with arthritis and Charcot joint and less likely caused by osteomyelitis.  Electronically Signed By-DR. Mal Lott MD  On:1/20/2020 9:23 PM This report was finalized on [id number] by DR. Mal Lott MD.    Xr Chest Pa & Lateral    Result Date: 1/19/2020   1. No active cardiopulmonary disease 2. Right PICC line is been removed since previous study of 04/07/2019 3. No significant change from 04/07/2019  Electronically Signed By-Hermilo Vaz Jr. On:1/19/2020 1:44 PM This report was finalized on [id number] by  Hermilo Vaz Jr., .          Xrays, labs reviewed personally by physician.    Medication Review:   I have reviewed the patient's current medication list      Scheduled Meds    aspirin 325 mg Oral Daily   cefTRIAXone 2 g Intravenous Q24H   DAPTOmycin 6 mg/kg Intravenous Q24H   insulin glargine 14 Units Subcutaneous Nightly   insulin lispro 0-10 Units Subcutaneous TID With Meals   insulin lispro 4 Units Subcutaneous TID With Meals   povidone-iodine  Topical Daily   sodium chloride 10 mL Intravenous Q12H       Meds Infusions    Pharmacy Consult - Pharmacy to dose     sodium chloride 125 mL/hr Last Rate: 125 mL/hr (01/23/20 0146)       Meds PRN  •  acetaminophen **OR** acetaminophen **OR** acetaminophen  •  aluminum-magnesium hydroxide-simethicone  •  bisacodyl  •  dextrose  •  dextrose  •  glucagon (human recombinant)  •  ketorolac  •  magnesium hydroxide  •  melatonin  •  ondansetron **OR** ondansetron  •  Pharmacy Consult - Pharmacy to dose  •  potassium chloride **OR** potassium chloride **OR** potassium chloride  •  sodium chloride    I personally reviewed patient's x-ray films and my findings are x-ray shoulder is negative and MRI pending    I personally reviewed patient's EKG strips and my findings are sinus tachycardia    Assessment/Plan   Assessment/Plan     Active Hospital Problems    Diagnosis  POA   • **Syncope [R55]  Yes   • Acute pain of right shoulder [M25.511]  Yes     Priority: Medium     Class: Acute   • Chronic ulcer of right foot, with necrosis of bone (CMS/HCC) [L97.514]  Yes   • Open wound of  second toe of left foot [S91.105A]  Yes   • Diabetic peripheral neuropathy (CMS/HCC) [E11.42]  Yes   • 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  -CT head negative  -Continue telemetry  -Echocardiogram is normal  -CT PE negative  -At this time no particular etiology for syncope.    Right shoulder pain with very limited range of motion  -We will do MRI of the right shoulder  -Orthopedic consulted  -X-ray of the shoulder reviewed  -IV Toradol for pain control  -Patient had intra-articular injection today and doing fairly well MRI of the shoulder has been reviewed.  Patient will need follow-up in 6 weeks.    Diabetic wound ulcer with some discharge and surrounding edema .... Consistent with abscess  -Patient seen by podiatry and had procedure done 1/21/2021  -Excisional debridement to muscle/tendon, right foot as well as patient had tibial and fibular sesamoidectomy with biopsy of the right foot and patient also had incision and drainage to the level of tendon of the left foot and second digit amputation at the metatarsophalangeal joint of the left foot.  -Intra-Op operative cultures are pending  -Continue broad-spectrum antibiotics cefepime/Flagyl/vancomycin  -Patient will need 6 weeks of IV antibiotics and antibiotic has been switched to Rocephin       Diabetes type 1  -Continue Patient's lantus  -Accuchecks AC and HS  -Cover with sliding scale  -Patient seen by endocrinology     Right upper quadrant abdominal pain: Resolved      VTE Prophylaxis - Lovenox 40 mg SC daily.      Code Status -   Code Status and Medical Interventions:   Ordered at: 01/18/20 2121     Level Of  Support Discussed With:    Patient     Code Status:    CPR     Medical Interventions (Level of Support Prior to Arrest):    Full       Discharge Planning    Patient will need physical therapy and pain medications.      Destination      Coordination has not been started for this encounter.      Durable Medical Equipment      Coordination has not been started for this encounter.      Dialysis/Infusion      Coordination has not been started for this encounter.      Home Medical Care      Coordination has not been started for this encounter.      Therapy      Coordination has not been started for this encounter.      Community Resources      Coordination has not been started for this encounter.            Electronically signed by Christopher Ron MD, 01/23/20, 7:02 PM.  [facility] Hospitalist Team

## 2020-01-24 VITALS
HEART RATE: 63 BPM | RESPIRATION RATE: 17 BRPM | TEMPERATURE: 97.6 F | WEIGHT: 208 LBS | HEIGHT: 75 IN | SYSTOLIC BLOOD PRESSURE: 151 MMHG | BODY MASS INDEX: 25.86 KG/M2 | DIASTOLIC BLOOD PRESSURE: 78 MMHG | OXYGEN SATURATION: 97 %

## 2020-01-24 PROBLEM — R55 SYNCOPE: Status: RESOLVED | Noted: 2020-01-18 | Resolved: 2020-01-24

## 2020-01-24 PROBLEM — M25.511 ACUTE PAIN OF RIGHT SHOULDER: Status: RESOLVED | Noted: 2020-01-20 | Resolved: 2020-01-24

## 2020-01-24 LAB
ANION GAP SERPL CALCULATED.3IONS-SCNC: 7 MMOL/L (ref 5–15)
BACTERIA FLD CULT: NORMAL
BACTERIA SPEC ANAEROBE CULT: ABNORMAL
BILIRUB UR QL STRIP: ABNORMAL
BUN BLD-MCNC: 12 MG/DL (ref 6–20)
BUN/CREAT SERPL: 18.5 (ref 7–25)
CALCIUM SPEC-SCNC: 7.6 MG/DL (ref 8.6–10.5)
CHLORIDE SERPL-SCNC: 100 MMOL/L (ref 98–107)
CK SERPL-CCNC: 24 U/L (ref 20–200)
CLARITY UR: CLEAR
CO2 SERPL-SCNC: 26 MMOL/L (ref 22–29)
COLOR UR: ABNORMAL
CREAT BLD-MCNC: 0.65 MG/DL (ref 0.76–1.27)
DEPRECATED RDW RBC AUTO: 44.2 FL (ref 37–54)
EOSINOPHIL # BLD MANUAL: 0.18 10*3/MM3 (ref 0–0.4)
EOSINOPHIL NFR BLD MANUAL: 2 % (ref 0.3–6.2)
ERYTHROCYTE [DISTWIDTH] IN BLOOD BY AUTOMATED COUNT: 13 % (ref 12.3–15.4)
GFR SERPL CREATININE-BSD FRML MDRD: 133 ML/MIN/1.73
GIANT PLATELETS: ABNORMAL
GLUCOSE BLD-MCNC: 232 MG/DL (ref 65–99)
GLUCOSE BLDC GLUCOMTR-MCNC: 155 MG/DL (ref 70–105)
GLUCOSE BLDC GLUCOMTR-MCNC: 190 MG/DL (ref 70–105)
GLUCOSE BLDC GLUCOMTR-MCNC: 196 MG/DL (ref 70–105)
GLUCOSE UR STRIP-MCNC: ABNORMAL MG/DL
GRAM STN SPEC: NORMAL
GRAM STN SPEC: NORMAL
HCT VFR BLD AUTO: 36 % (ref 37.5–51)
HGB BLD-MCNC: 12.3 G/DL (ref 13–17.7)
HGB UR QL STRIP.AUTO: NEGATIVE
KETONES UR QL STRIP: NEGATIVE
LEUKOCYTE ESTERASE UR QL STRIP.AUTO: NEGATIVE
LYMPHOCYTES # BLD MANUAL: 1.69 10*3/MM3 (ref 0.7–3.1)
LYMPHOCYTES NFR BLD MANUAL: 19 % (ref 19.6–45.3)
LYMPHOCYTES NFR BLD MANUAL: 8 % (ref 5–12)
MCH RBC QN AUTO: 33 PG (ref 26.6–33)
MCHC RBC AUTO-ENTMCNC: 34.3 G/DL (ref 31.5–35.7)
MCV RBC AUTO: 96.1 FL (ref 79–97)
MONOCYTES # BLD AUTO: 0.71 10*3/MM3 (ref 0.1–0.9)
MYELOCYTES NFR BLD MANUAL: 1 % (ref 0–0)
NEUTROPHILS # BLD AUTO: 6.23 10*3/MM3 (ref 1.7–7)
NEUTROPHILS NFR BLD MANUAL: 57 % (ref 42.7–76)
NEUTS BAND NFR BLD MANUAL: 13 % (ref 0–5)
NITRITE UR QL STRIP: NEGATIVE
PH UR STRIP.AUTO: 6.5 [PH] (ref 5–8)
PLATELET # BLD AUTO: 333 10*3/MM3 (ref 140–450)
PMV BLD AUTO: 8.5 FL (ref 6–12)
POTASSIUM BLD-SCNC: 3.6 MMOL/L (ref 3.5–5.2)
PROT UR QL STRIP: NEGATIVE
RBC # BLD AUTO: 3.74 10*6/MM3 (ref 4.14–5.8)
RBC MORPH BLD: NORMAL
SCAN SLIDE: NORMAL
SODIUM BLD-SCNC: 133 MMOL/L (ref 136–145)
SP GR UR STRIP: 1.03 (ref 1–1.03)
UROBILINOGEN UR QL STRIP: ABNORMAL
WBC MORPH BLD: NORMAL
WBC NRBC COR # BLD: 8.9 10*3/MM3 (ref 3.4–10.8)

## 2020-01-24 PROCEDURE — 93005 ELECTROCARDIOGRAM TRACING: CPT | Performed by: INTERNAL MEDICINE

## 2020-01-24 PROCEDURE — 25010000002 DAPTOMYCIN PER 1 MG: Performed by: NURSE PRACTITIONER

## 2020-01-24 PROCEDURE — 02HV33Z INSERTION OF INFUSION DEVICE INTO SUPERIOR VENA CAVA, PERCUTANEOUS APPROACH: ICD-10-PCS | Performed by: PODIATRIST

## 2020-01-24 PROCEDURE — C1751 CATH, INF, PER/CENT/MIDLINE: HCPCS

## 2020-01-24 PROCEDURE — 82550 ASSAY OF CK (CPK): CPT | Performed by: NURSE PRACTITIONER

## 2020-01-24 PROCEDURE — 85007 BL SMEAR W/DIFF WBC COUNT: CPT | Performed by: NURSE PRACTITIONER

## 2020-01-24 PROCEDURE — 99239 HOSP IP/OBS DSCHRG MGMT >30: CPT | Performed by: INTERNAL MEDICINE

## 2020-01-24 PROCEDURE — 80048 BASIC METABOLIC PNL TOTAL CA: CPT | Performed by: NURSE PRACTITIONER

## 2020-01-24 PROCEDURE — 25010000002 CEFTRIAXONE PER 250 MG: Performed by: INTERNAL MEDICINE

## 2020-01-24 PROCEDURE — 63710000001 INSULIN LISPRO (HUMAN) PER 5 UNITS: Performed by: INTERNAL MEDICINE

## 2020-01-24 PROCEDURE — 82962 GLUCOSE BLOOD TEST: CPT

## 2020-01-24 PROCEDURE — 25010000002 KETOROLAC TROMETHAMINE PER 15 MG: Performed by: PODIATRIST

## 2020-01-24 PROCEDURE — 99024 POSTOP FOLLOW-UP VISIT: CPT | Performed by: PODIATRIST

## 2020-01-24 PROCEDURE — 85025 COMPLETE CBC W/AUTO DIFF WBC: CPT | Performed by: NURSE PRACTITIONER

## 2020-01-24 PROCEDURE — 99232 SBSQ HOSP IP/OBS MODERATE 35: CPT | Performed by: INTERNAL MEDICINE

## 2020-01-24 PROCEDURE — 81003 URINALYSIS AUTO W/O SCOPE: CPT | Performed by: NURSE PRACTITIONER

## 2020-01-24 RX ORDER — INSULIN GLARGINE 100 [IU]/ML
15 INJECTION, SOLUTION SUBCUTANEOUS NIGHTLY
Status: DISCONTINUED | OUTPATIENT
Start: 2020-01-24 | End: 2020-01-24 | Stop reason: HOSPADM

## 2020-01-24 RX ORDER — METRONIDAZOLE 500 MG/1
500 TABLET ORAL EVERY 8 HOURS SCHEDULED
Qty: 90 TABLET | Refills: 1 | Status: SHIPPED | OUTPATIENT
Start: 2020-01-24 | End: 2020-03-06

## 2020-01-24 RX ORDER — METRONIDAZOLE 500 MG/1
500 TABLET ORAL EVERY 8 HOURS SCHEDULED
Status: DISCONTINUED | OUTPATIENT
Start: 2020-01-24 | End: 2020-01-24 | Stop reason: HOSPADM

## 2020-01-24 RX ORDER — INSULIN GLARGINE 100 [IU]/ML
14 INJECTION, SOLUTION SUBCUTANEOUS NIGHTLY
Start: 2020-01-24 | End: 2020-01-24 | Stop reason: SDUPTHER

## 2020-01-24 RX ORDER — INSULIN GLARGINE 100 [IU]/ML
15 INJECTION, SOLUTION SUBCUTANEOUS NIGHTLY
Start: 2020-01-24 | End: 2020-05-03 | Stop reason: HOSPADM

## 2020-01-24 RX ADMIN — POVIDONE-IODINE: 10 SOLUTION TOPICAL at 10:04

## 2020-01-24 RX ADMIN — INSULIN LISPRO 2 UNITS: 100 INJECTION, SOLUTION INTRAVENOUS; SUBCUTANEOUS at 12:38

## 2020-01-24 RX ADMIN — Medication 10 ML: at 10:03

## 2020-01-24 RX ADMIN — KETOROLAC TROMETHAMINE 30 MG: 30 INJECTION, SOLUTION INTRAMUSCULAR at 01:43

## 2020-01-24 RX ADMIN — INSULIN LISPRO 4 UNITS: 100 INJECTION, SOLUTION INTRAVENOUS; SUBCUTANEOUS at 09:13

## 2020-01-24 RX ADMIN — INSULIN LISPRO 4 UNITS: 100 INJECTION, SOLUTION INTRAVENOUS; SUBCUTANEOUS at 12:38

## 2020-01-24 RX ADMIN — DAPTOMYCIN 550 MG: 500 INJECTION, POWDER, LYOPHILIZED, FOR SOLUTION INTRAVENOUS at 16:07

## 2020-01-24 RX ADMIN — INSULIN LISPRO 2 UNITS: 100 INJECTION, SOLUTION INTRAVENOUS; SUBCUTANEOUS at 09:12

## 2020-01-24 RX ADMIN — CEFTRIAXONE SODIUM 2 G: 2 INJECTION, POWDER, FOR SOLUTION INTRAMUSCULAR; INTRAVENOUS at 09:12

## 2020-01-24 RX ADMIN — METRONIDAZOLE 500 MG: 500 TABLET ORAL at 16:07

## 2020-01-24 NOTE — CONSULTS
picc team consult:    picc line placed RUE cephalic vessel utilizing US guidance and modified seldinger technque with easily compressible vessel without difficulty.  picc tip svc/caj per sapiens device.

## 2020-01-24 NOTE — PROGRESS NOTES
Continued Stay Note   [facility]     Patient Name: Maynor Gregg  MRN: [id number]  Today's Date: 1/24/2020    Admit Date: 1/18/2020    Discharge Plan     Row Name 01/24/20 1708       Plan    Plan  Home with [facility] (order placed) and [facility] - accepted.               Expected Discharge Date and Time     Expected Discharge Date Expected Discharge Time    Jan 24, 2020             Jason Price RN

## 2020-01-24 NOTE — PROGRESS NOTES
Infectious Diseases Progress Note      LOS: 5 days   Patient Care Team:  Erwin Castorena MD as PCP - General (Family Medicine)    Chief Complaint: Feeling slightly better    Subjective       The patient has been afebrile for the last 24 hours.  The patient is on room air, hemodynamically stable, and is tolerating antimicrobial therapy.      Review of Systems:   Review of Systems   Constitutional: Positive for fatigue.        General malaise   Respiratory: Negative.    Cardiovascular: Negative.    Gastrointestinal: Negative.    Genitourinary: Negative.    Musculoskeletal: Positive for arthralgias and back pain.        Right shoulder pain improved   Skin: Positive for wound.   Neurological: Positive for dizziness and headaches.   Psychiatric/Behavioral: Negative.    All other systems reviewed and are negative.       Objective     Vital Signs  Temp:  [97.6 °F (36.4 °C)-98.4 °F (36.9 °C)] 97.6 °F (36.4 °C)  Heart Rate:  [63-73] 63  Resp:  [14-17] 17  BP: (136-163)/(77-86) 151/78    Physical Exam:  Physical Exam   Constitutional: He is oriented to person, place, and time. He appears well-developed and well-nourished.   HENT:   Head: Normocephalic and atraumatic.   Eyes: Pupils are equal, round, and reactive to light. Conjunctivae and EOM are normal.   Neck: Neck supple.   Cardiovascular: Normal rate, regular rhythm and normal heart sounds.   Pulmonary/Chest: Effort normal and breath sounds normal.   Abdominal: Soft. Bowel sounds are normal.   Musculoskeletal: Normal range of motion.   Neurological: He is alert and oriented to person, place, and time.   Skin: Skin is warm and dry.   Wound VAC now on right foot    Incision on left foot appears to be healing well   Psychiatric: He has a normal mood and affect.   Vitals reviewed.       Results Review:    I have reviewed all clinical data, test, lab, and imaging results.     Radiology  No Radiology Exams Resulted Within Past 24  Hours    Cardiology    Laboratory  Results from last 7 days   Lab Units 01/24/20  0352   WBC 10*3/mm3 8.90   HEMOGLOBIN g/dL 12.3*   HEMATOCRIT % 36.0*   PLATELETS 10*3/mm3 333     Results from last 7 days   Lab Units 01/24/20  0352   SODIUM mmol/L 133*   POTASSIUM mmol/L 3.6   CHLORIDE mmol/L 100   CO2 mmol/L 26.0   BUN mg/dL 12   CREATININE mg/dL 0.65*   GLUCOSE mg/dL 232*   CALCIUM mg/dL 7.6*     Results from last 7 days   Lab Units 01/24/20  0352   SODIUM mmol/L 133*   POTASSIUM mmol/L 3.6   CHLORIDE mmol/L 100   CO2 mmol/L 26.0   BUN mg/dL 12   CREATININE mg/dL 0.65*   GLUCOSE mg/dL 232*   CALCIUM mg/dL 7.6*     Results from last 7 days   Lab Units 01/24/20  0352   CK TOTAL U/L 24     Results from last 7 days   Lab Units 01/21/20  0824   SED RATE mm/hr 87*         Microbiology   Microbiology Results (last 10 days)     Procedure Component Value - Date/Time    Anaerobic Culture - Body Fluid, Toe, Left [[id number]] Collected:  01/21/20 1314    Lab Status:  Preliminary result Specimen:  Body Fluid from Toe, Left Updated:  01/24/20 0736     Anaerobic Culture No anaerobes isolated at 3 days    Body Fluid Culture - Body Fluid, Toe, Left [[id number]] Collected:  01/21/20 1314    Lab Status:  Final result Specimen:  Body Fluid from Toe, Left Updated:  01/24/20 0943     Body Fluid Culture No growth at 3 days     Gram Stain Few (2+) WBCs per low power field      No organisms seen    Anaerobic Culture - Tissue, Foot, Right [[id number]]  (Abnormal) Collected:  01/21/20 1238    Lab Status:  Final result Specimen:  Tissue from Foot, Right Updated:  01/24/20 0848     Anaerobic Culture Prevotella loescheii    Tissue / Bone Culture - Tissue, Foot, Right [[id number]]  (Abnormal) Collected:  01/21/20 1238    Lab Status:  Final result Specimen:  Tissue from Foot, Right Updated:  01/23/20 0925     Tissue Culture Moderate growth (3+) Streptococcus agalactiae (Group B)     Comment:   If Clindamycin or Erythromycin is the drug of choice,  notify the laboratory within 7 days to request susceptibility testing.  This organism is considered to be universally susceptible to penicillin.  No further antibiotic testing will be performed. If Clindamycin or Erythromycin is the drug of choice, notify the laboratory within 7 days to request susceptibility testing.        STREP GROUPING B     Gram Stain Few (2+) WBCs per low power field      Moderate (3+) Gram positive cocci in pairs and chains      Rare (1+) Gram negative bacilli    MRSA Screen, PCR - Swab, Nares [[id number]]  (Normal) Collected:  01/20/20 1839    Lab Status:  Final result Specimen:  Swab from Nares Updated:  01/21/20 0453     MRSA PCR No MRSA Detected    Wound Culture - Wound, Toe, Left [[id number]]  (Abnormal)  (Susceptibility) Collected:  01/20/20 1342    Lab Status:  Final result Specimen:  Wound from Toe, Left Updated:  01/23/20 0755     Wound Culture Scant growth (1+) Staphylococcus aureus      Rare Streptococcus agalactiae (Group B)     Comment:   This organism is considered to be universally susceptible to penicillin.  No further antibiotic testing will be performed. If Clindamycin or Erythromycin is the drug of choice, notify the laboratory within 7 days to request susceptibility testing.        STREP GROUPING B     Wound Culture Rare Normal Skin Juliet     Gram Stain Moderate (3+) WBCs per low power field      Occasional Gram positive cocci    Narrative:             Susceptibility      Staphylococcus aureus     KAILA     Clindamycin Susceptible     Erythromycin Susceptible     Inducible Clindamycin Resistance Negative     Oxacillin Susceptible     Penicillin G Resistant     Rifampin Susceptible     Tetracycline Susceptible     Trimethoprim + Sulfamethoxazole Susceptible     Vancomycin Susceptible                Susceptibility Comments     Staphylococcus aureus    This isolate does not demonstrate inducible clindamycin resistance in vitro.               Wound Culture - Wound, Foot, Right  [[id number]]  (Abnormal)  (Susceptibility) Collected:  01/19/20 1630    Lab Status:  Edited Result - FINAL Specimen:  Wound from Foot, Right Updated:  01/23/20 1354     Wound Culture Moderate growth (3+) Streptococcus agalactiae (Group B)     Comment:   This organism is considered to be universally susceptible to penicillin.  No further antibiotic testing will be performed. If Clindamycin or Erythromycin is the drug of choice, notify the laboratory within 7 days to request susceptibility testing.        STREP GROUPING B     Wound Culture Scant growth (1+) Staphylococcus aureus, MRSA     Comment:   Methicillin resistant Staphylococcus aureus, Patient may be an isolation risk.        PBP2 Positive     Wound Culture Scant growth (1+) Streptococcus anginosus      Scant growth (1+) Normal Skin Juliet     Gram Stain Many (4+) WBCs per low power field      Many (4+) Gram positive cocci in pairs, chains and clusters      Rare (1+) Gram negative cocci      Few (2+) Gram negative bacilli    Narrative:             Susceptibility      Staphylococcus aureus, MRSA     KAILA     Clindamycin Susceptible     Daptomycin Susceptible (C) [1]      Erythromycin Resistant     Inducible Clindamycin Resistance Negative     Oxacillin Resistant     Penicillin G Resistant     Rifampin Susceptible     Tetracycline Susceptible     Trimethoprim + Sulfamethoxazole Susceptible     Vancomycin Susceptible            [1]   Appended report. These results have been appended to a previously final verified report.             Susceptibility      Streptococcus anginosus     KAILA     Ceftriaxone Susceptible     Penicillin G Susceptible     Vancomycin Susceptible                Susceptibility Comments     Staphylococcus aureus, MRSA    This isolate does not demonstrate inducible clindamycin resistance in vitro.               Respiratory Panel, PCR - Swab, Nasopharynx [[id number]]  (Normal) Collected:  01/19/20 1059    Lab Status:  Final result Specimen:  Swab from  Nasopharynx Updated:  01/19/20 1545     ADENOVIRUS, PCR Not Detected     Coronavirus 229E Not Detected     Coronavirus HKU1 Not Detected     Coronavirus NL63 Not Detected     Coronavirus OC43 Not Detected     Human Metapneumovirus Not Detected     Human Rhinovirus/Enterovirus Not Detected     Influenza B PCR Not Detected     Parainfluenza Virus 1 Not Detected     Parainfluenza Virus 2 Not Detected     Parainfluenza Virus 3 Not Detected     Parainfluenza Virus 4 Not Detected     Bordetella pertussis pcr Not Detected     Influenza A H1 2009 PCR Not Detected     Chlamydophila pneumoniae PCR Not Detected     Mycoplasma pneumo by PCR Not Detected     Influenza A PCR Not Detected     Influenza A H3 Not Detected     Influenza A H1 Not Detected     RSV, PCR Not Detected          Medication Review:       Schedule Meds    aspirin 325 mg Oral Daily   cefTRIAXone 2 g Intravenous Q24H   DAPTOmycin 6 mg/kg Intravenous Q24H   insulin glargine 15 Units Subcutaneous Nightly   insulin lispro 0-10 Units Subcutaneous TID With Meals   insulin lispro 5 Units Subcutaneous TID With Meals   metroNIDAZOLE 500 mg Oral Q8H   povidone-iodine  Topical Daily   sodium chloride 10 mL Intravenous Q12H       Infusion Meds    sodium chloride 125 mL/hr Last Rate: 125 mL/hr (01/23/20 0146)       PRN Meds  •  acetaminophen **OR** acetaminophen **OR** acetaminophen  •  aluminum-magnesium hydroxide-simethicone  •  bisacodyl  •  dextrose  •  dextrose  •  glucagon (human recombinant)  •  ketorolac  •  magnesium hydroxide  •  melatonin  •  ondansetron **OR** ondansetron  •  potassium chloride **OR** potassium chloride **OR** potassium chloride  •  sodium chloride        Assessment/Plan       Antimicrobial Therapy   1.        Day   2.        Day   3.  PO Flagyl     Day 1  4.  IV Rocephin    Day 2  5.  IV Daptomycin    Day 2      Assessment     Diabetic foot wound  -Wound on the dorsal aspect of the second digit of the left foot  -Wound on the plantar aspect of  the right foot   -MRI of the right foot shows suspicion for osteomyelitis of the hallux sesamoid bones  -History of left great toe amputation in November 2018, fourth and fifth digit amputation in April 2019 with 6 weeks of IV antibiotics  -History of right transmetatarsal amputation  -1/21/2020- right foot excisional debridement to the muscle/tendon with tibial and fibular sesamoidectomy, left foot incision and drainage with second digit amputation at the metatarsophalangeal joint  -Wound culture of the right foot is growing Streptococcus group B and methicillin-resistant Staphylococcus aureus   -wound culture of the left foot is growing Staphylococcus aureus and group B streptococcus  -Intraoperative cultures of the right foot are growing group B streptococcus  -Intraoperative right foot cultures are now also growing Prevotella loescheii      Leukocytosis  -resolved     Type 1 diabetes with neuropathy     Recent complaints of dizziness with syncope on 1/18/2020 which resulted in a fall in the shower  -Imaging studies do not show any acute findings on the CT the head, CT the cervical spine or MRI of the right shoulder  -Screen was negative  -Orthopedic surgery following patient for right shoulder pain, will try injection, patient may need surgery  -2D echo was negative for any acute findings     Also evidence of nausea, vomiting, and fever at home  -Patient has been afebrile since admission  -Respiratory viral DNA panel was negative     CAD, history of MI with cardiac catheterization    Right shoulder pain  -Orthopedic surgery did a steroid injection on 1/21/2020 and patient has had some improvement in the pain today     Plan     Continue IV Rocephin 2 g every 24 hours for 6 weeks total treatment-last day on 2/29/20  Continue IV daptomycin 6 mg/kg (550 mg) every 24 hours for 6 weeks total treatment-last day on 2/29/2020  Start p.o. Flagyl 500 mg 3 times daily for 6 weeks-last day on 2/29/2020  Patient is currently  not on a statin and should not be started on a statin till after the IV daptomycin is completed  Patient will need a PICC line before discharge-please remove when IV antibiotics are completed.  Weekly labs CBC/creatinine/sed rate/CPK  Urinalysis-pending collection  Continue supportive care  Okay to discharge from Infectious Disease standpoint when IV antibiotics are completed    Please fax all post discharge lab results, imaging studies and correspondence to this fax number [phone]  For any question or concern please contact our service number [phone]      Bailee Knight, KAMILLE  01/24/20  3:46 PM

## 2020-01-24 NOTE — SIGNIFICANT NOTE
01/24/20 1645   Discharge of Care   Discharge Mode wheel chair   Discharge Destination home with home health   Discharged Accompanied by family member/friend   Discharge Contact Information if Applicable [phone]   Discharge Teaching Done  Yes   Learning Method Explanation

## 2020-01-24 NOTE — PROGRESS NOTES
Daily Progress Note    Patient Care Team:  Erwin Castorena MD as PCP - General (Family Medicine)    Chief Complaint: Follow-up type 1 diabetes    HPI:  44-year-old male with history of type 1 diabetes along with diabetic neuropathy and amputation of 3 toes on the left side also ulcer on the right foot has been undergoing some surgery as well as IV antibiotic.  Endocrine consultation was requested for diabetes management.  He is currently on Basaglar/Lantus 14 units once a day along with Humalog 4 units with each meal.  Blood sugars are still running high.  He is eating well.    ROS:   Constitutional:  Denies fatigue, tiredness.    Eyes:  Denies change in visual acuity   HENT:  Denies nasal congestion or sore throat   Respiratory: denies cough, shortness of breath.   Cardiovascular:  denies chest pain, edema   GI:  Denies abdominal pain, nausea, vomiting.   :  Denies polyuria and polydipsia  Musculoskeletal:  Denies back pain or joint pain   Integument:  Denies rash   Neurologic:  Denies headache, focal weakness or sensory changes   Endocrine:  Denies polyuria or polydipsia   Psychiatric:  Denies depression or anxiety       Vitals:    01/24/20 1207   BP: 151/78   Pulse: 63   Resp: 17   Temp: 97.6 °F (36.4 °C)   SpO2: 97%       Physical Exam:  GEN: NAD, conversant  EYES: EOMI, PERRL, no conjunctival erythema  NECK: no thyromegaly, full ROM   CV: RRR, no murmurs/rubs/gallops, no peripheral edema  LUNG: CTAB, no wheezes/rales/ronchi  SKIN: no rashes, no acanthosis  MSK: Has ulcer on right foot and bandage on the left foot.  NEURO: no tremors, DTR normal  PSYCH: AOX3, appropriate mood, affect normal      Results Review:     I reviewed the patient's new clinical results.    Glucose   Date Value Ref Range Status   01/24/2020 232 (H) 65 - 99 mg/dL Final     Sodium   Date Value Ref Range Status   01/24/2020 133 (L) 136 - 145 mmol/L Final     Potassium   Date Value Ref Range Status   01/24/2020 3.6 3.5 -  5.2 mmol/L Final     CO2   Date Value Ref Range Status   01/24/2020 26.0 22.0 - 29.0 mmol/L Final     Chloride   Date Value Ref Range Status   01/24/2020 100 98 - 107 mmol/L Final     Anion Gap   Date Value Ref Range Status   01/24/2020 7.0 5.0 - 15.0 mmol/L Final     Creatinine   Date Value Ref Range Status   01/24/2020 0.65 (L) 0.76 - 1.27 mg/dL Final     BUN   Date Value Ref Range Status   01/24/2020 12 6 - 20 mg/dL Final     BUN/Creatinine Ratio   Date Value Ref Range Status   01/24/2020 18.5 7.0 - 25.0 Final     Calcium   Date Value Ref Range Status   01/24/2020 7.6 (L) 8.6 - 10.5 mg/dL Final     eGFR Non  Amer   Date Value Ref Range Status   01/24/2020 133 >60 mL/min/1.73 Final     Lab Results   Component Value Date    HGBA1C 10.5 (H) 01/21/2020     No results found for: GLUF, MICROALBUR  Results from last 7 days   Lab Units 01/24/20  1142 01/24/20  0720 01/23/20  1945 01/23/20  1624 01/23/20  1131 01/23/20  0712   GLUCOSE mg/dL 196* 190* 212* 191* 176* 185*             Medication Review: Reviewed.       aspirin 325 mg Oral Daily   cefTRIAXone 2 g Intravenous Q24H   DAPTOmycin 6 mg/kg Intravenous Q24H   insulin glargine 14 Units Subcutaneous Nightly   insulin lispro 0-10 Units Subcutaneous TID With Meals   insulin lispro 4 Units Subcutaneous TID With Meals   metroNIDAZOLE 500 mg Oral Q8H   povidone-iodine  Topical Daily   sodium chloride 10 mL Intravenous Q12H         Assessment/Plan   1   Diabetes mellitus type 1: Uncontrolled with very high A1c  2.  Diabetic foot ulcer on the right side  3.  Multiple toe amputations on the left foot  4.  Diabetic neuropathy  5.  Syncope     Plan:    We will increase Lantus to 15 units subcu daily and increase Humalog to 5 with each meal and continue to follow blood sugars and make further adjustments as needed.             Ezekiel Ron MD. FACE    Much of the above report is an electronic transcription/translation of the spoken language to printed text using Dragon  Software. As such, the subtleties and finesse of the spoken language may permit erroneous, or at times, nonsensical words or phrases to be inadvertently transcribed; thus changes may be made at a later date to rectify these errors.

## 2020-01-24 NOTE — NURSING NOTE
Wound care follow up for npwt dressing change.    Right foot wound bed pink/moist with approximately 20% sough in wound bed. Cleaned/irrigated with 10ml saline into wound. Surgical incision macerated open area near incisionline measuring approximately 3x3cm small yellow necrotic area in this wound bed approximately 2%.  Staples are still approximated.  Discussed with Dr. Souza.      Skin prep applied to entire periarea, adaptr ring placed around wound.  maxsorb placed over entire incision line and open area.  Black foam in wound bed tucked into tunneling areas. sensatrac pad bridged off wound bed to lower leg.

## 2020-01-24 NOTE — DISCHARGE SUMMARY
"      [facility] Medicine Services  DISCHARGE SUMMARY        Prepared For PCP:  Erwin Castorena MD    Patient Name: Maynor Gregg  : 1975  MRN: [id number]      Date of Admission:   2020    Date of Discharge:  2020    Length of stay:  LOS: 5 days     Hospital Course     Presenting Problem:   Persistent vomiting [R11.15]  Hyperglycemia [R73.9]  Concussion without loss of consciousness, initial encounter [S06.0X0A]  Syncope, unspecified syncope type [R55]  Syncope, unspecified syncope type [R55]  Syncope, unspecified syncope type [R55]      Active Hospital Problems    Diagnosis  POA   • Chronic ulcer of right foot, with necrosis of bone (CMS/[facility]) [L97.514]  Yes   • Open wound of second toe of left foot [S91.105A]  Yes   • Diabetic peripheral neuropathy (CMS/[facility]) [E11.42]  Yes   • 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 Gregg is a 44 y.o. with a history of DM 1 and neuropathy presented to the [facility] ED on 2020 with a complaint of syncope, he passed out while he was in the shower, states he \"went out and woke up in the tub\" Pt is also complaining of nausea, vomiting and fever x 3 days. Pt states he began having nausea and vomiting on Thursday, associated with low grade fever. Pt denies melena, hematochezia, diarrhea. Pt states he then began to have episodes of dizziness, even when lying down, he felt as if the room was spinning. Pt is complaining of head, neck and back pain s/p fall today. Pt has an open wound on his left 2nd toe, he is current with Dr. Souza and is finishing a round of doxycycline.      In the ED, CT head: negative for acute " abnormality, CT spine: Normal, Xray of second toe on left foot: negative for fracture, chronic abnormal appearance of second and third MTP joints, soft tissue swelling. Glucose 520, K+4.2, BUN 19, Cr. 1.04, WBC 23.9, Hgb 13.4, Hct 38.5. Pt was given IV insulin 6 units, 500ml NS bolus IV, Zofran 4mg IV. Pt will be admitted for further evaluation and management.      Hospital course: Patient apparently was kept in the hospital was seen by hospitalist/infectious disease/podiatry.  Patient currently found to have cellulitis/abscess of the foot and was treated as such by debridement as well as IV antibiotics.  Patient will need 6 weeks of antibiotics for recurrent infection as well as possible osteomyelitis although patient MRI did not show osteomyelitis.  Patient also had right shoulder injury and for which we did the MRI of the right shoulder which did not show any fractures and was seen by orthopedics and they injected the shoulder and his shoulder has been improving.      Syncope, can be vasovagal from nausea and vomiting persistent in nature otherwise etiology  unclear to me, possible be secondary to dehydration.  -CT head negative  -Continue telemetry  -Echocardiogram is normal  -CT PE negative  -At this time no particular etiology for syncope.     Right shoulder pain with very limited range of motion  -We will do MRI of the right shoulder  -Orthopedic consulted  -X-ray of the shoulder reviewed  -IV Toradol for pain control  -Patient had intra-articular injection today and doing fairly well MRI of the shoulder has been reviewed.  Patient will need follow-up in 6 weeks.     Diabetic wound ulcer with some discharge and surrounding edema .... Consistent with abscess  -Patient seen by podiatry and had procedure done 1/21/2021  -Excisional debridement to muscle/tendon, right foot as well as patient had tibial and fibular sesamoidectomy with biopsy of the right foot and patient also had incision and drainage to the level  of tendon of the left foot and second digit amputation at the metatarsophalangeal joint of the left foot.  -Intra-Op operative cultures are pending  -Continue broad-spectrum antibiotics cefepime/Flagyl/vancomycin  -Patient will need 6 weeks of IV antibiotics and antibiotic has been switched to Rocephin        Diabetes type 1  -Patient has been seen by endocrinology during in-house and will need follow-up with PCP and endocrinology as an outpatient.  -Continue Patient's lantus  -Accuchecks AC and HS  -Cover with sliding scale  -Patient seen by endocrinology     Right upper quadrant abdominal pain: Resolved    Recommendation for Outpatient Providers:             Reasons For Change In Medications and Indications for New Medications:        Day of Discharge     HPI:       Vital Signs:   Temp:  [97.6 °F (36.4 °C)-98.4 °F (36.9 °C)] 97.6 °F (36.4 °C)  Heart Rate:  [63-73] 63  Resp:  [14-17] 17  BP: (136-163)/(77-86) 151/78     Physical Exam:  Physical Exam   Cardiovascular: Normal rate and regular rhythm.   Pulmonary/Chest: Effort normal and breath sounds normal.   Abdominal: Soft. Bowel sounds are normal.   Nursing note and vitals reviewed.      Pertinent  and/or Most Recent Results     Results from last 7 days   Lab Units 01/24/20  0352 01/23/20  0325 01/22/20  0514 01/21/20  0824 01/21/20  0823 01/19/20  0441 01/18/20  1927   WBC 10*3/mm3 8.90 7.90 7.90 8.80  --  23.00* 23.90*   HEMOGLOBIN g/dL 12.3* 12.3* 12.1* 12.5*  --  13.1 13.4   HEMATOCRIT % 36.0* 34.7* 35.3* 35.2*  --  37.8 38.5   PLATELETS 10*3/mm3 333 245 206 194  --  181 197   SODIUM mmol/L 133* 133* 131*  --  132* 130* 128*   POTASSIUM mmol/L 3.6 3.5 3.6  --  3.5  3.2* 3.7 4.2   CHLORIDE mmol/L 100 100 96*  --  96* 93* 88*   CO2 mmol/L 26.0 27.0 27.0  --  29.0 29.0 27.0   BUN mg/dL 12 15 19  --  21* 19 19   CREATININE mg/dL 0.65* 0.68* 0.72*  --  0.72* 0.87 1.04   GLUCOSE mg/dL 232* 221* 280*  --  184* 286* 520*   CALCIUM mg/dL 7.6* 7.6* 7.5*  --  7.9* 8.3*  8.2*     Results from last 7 days   Lab Units 01/21/20  0823 01/19/20  0441   BILIRUBIN mg/dL 1.9* 1.3*   ALK PHOS U/L 107 121*   ALT (SGPT) U/L 41 50*   AST (SGOT) U/L 28 21     Results from last 7 days   Lab Units 01/21/20  0823   CHOLESTEROL mg/dL 82   TRIGLYCERIDES mg/dL 148   HDL CHOL mg/dL 15*     Results from last 7 days   Lab Units 01/21/20  0823 01/19/20  1633   TSH uIU/mL 1.220 0.649   HEMOGLOBIN A1C % 10.5*  --    TROPONIN T ng/mL <0.010  --        Brief Urine Lab Results     None          Microbiology Results Abnormal     Procedure Component Value - Date/Time    Body Fluid Culture - Body Fluid, Toe, Left [[id number]] Collected:  01/21/20 1314    Lab Status:  Final result Specimen:  Body Fluid from Toe, Left Updated:  01/24/20 0943     Body Fluid Culture No growth at 3 days     Gram Stain Few (2+) WBCs per low power field      No organisms seen    Anaerobic Culture - Tissue, Foot, Right [[id number]]  (Abnormal) Collected:  01/21/20 1238    Lab Status:  Final result Specimen:  Tissue from Foot, Right Updated:  01/24/20 0848     Anaerobic Culture Prevotella loescheii    Anaerobic Culture - Body Fluid, Toe, Left [[id number]] Collected:  01/21/20 1314    Lab Status:  Preliminary result Specimen:  Body Fluid from Toe, Left Updated:  01/24/20 0736     Anaerobic Culture No anaerobes isolated at 3 days    Wound Culture - Wound, Foot, Right [[id number]]  (Abnormal)  (Susceptibility) Collected:  01/19/20 1630    Lab Status:  Edited Result - FINAL Specimen:  Wound from Foot, Right Updated:  01/23/20 1354     Wound Culture Moderate growth (3+) Streptococcus agalactiae (Group B)     Comment:   This organism is considered to be universally susceptible to penicillin.  No further antibiotic testing will be performed. If Clindamycin or Erythromycin is the drug of choice, notify the laboratory within 7 days to request susceptibility testing.        STREP GROUPING B     Wound Culture Scant growth (1+) Staphylococcus aureus,  MRSA     Comment:   Methicillin resistant Staphylococcus aureus, Patient may be an isolation risk.        PBP2 Positive     Wound Culture Scant growth (1+) Streptococcus anginosus      Scant growth (1+) Normal Skin Juliet     Gram Stain Many (4+) WBCs per low power field      Many (4+) Gram positive cocci in pairs, chains and clusters      Rare (1+) Gram negative cocci      Few (2+) Gram negative bacilli    Narrative:             Susceptibility      Staphylococcus aureus, MRSA     KAILA     Clindamycin Susceptible     Daptomycin Susceptible (C) [1]      Erythromycin Resistant     Inducible Clindamycin Resistance Negative     Oxacillin Resistant     Penicillin G Resistant     Rifampin Susceptible     Tetracycline Susceptible     Trimethoprim + Sulfamethoxazole Susceptible     Vancomycin Susceptible            [1]   Appended report. These results have been appended to a previously final verified report.             Susceptibility      Streptococcus anginosus     KAILA     Ceftriaxone Susceptible     Penicillin G Susceptible     Vancomycin Susceptible                Susceptibility Comments     Staphylococcus aureus, MRSA    This isolate does not demonstrate inducible clindamycin resistance in vitro.               Tissue / Bone Culture - Tissue, Foot, Right [[id number]]  (Abnormal) Collected:  01/21/20 1238    Lab Status:  Final result Specimen:  Tissue from Foot, Right Updated:  01/23/20 0968     Tissue Culture Moderate growth (3+) Streptococcus agalactiae (Group B)     Comment:   If Clindamycin or Erythromycin is the drug of choice, notify the laboratory within 7 days to request susceptibility testing.  This organism is considered to be universally susceptible to penicillin.  No further antibiotic testing will be performed. If Clindamycin or Erythromycin is the drug of choice, notify the laboratory within 7 days to request susceptibility testing.        STREP GROUPING B     Gram Stain Few (2+) WBCs per low power field       Moderate (3+) Gram positive cocci in pairs and chains      Rare (1+) Gram negative bacilli    Wound Culture - Wound, Toe, Left [[id number]]  (Abnormal)  (Susceptibility) Collected:  01/20/20 1342    Lab Status:  Final result Specimen:  Wound from Toe, Left Updated:  01/23/20 0755     Wound Culture Scant growth (1+) Staphylococcus aureus      Rare Streptococcus agalactiae (Group B)     Comment:   This organism is considered to be universally susceptible to penicillin.  No further antibiotic testing will be performed. If Clindamycin or Erythromycin is the drug of choice, notify the laboratory within 7 days to request susceptibility testing.        STREP GROUPING B     Wound Culture Rare Normal Skin Juliet     Gram Stain Moderate (3+) WBCs per low power field      Occasional Gram positive cocci    Narrative:             Susceptibility      Staphylococcus aureus     KAILA     Clindamycin Susceptible     Erythromycin Susceptible     Inducible Clindamycin Resistance Negative     Oxacillin Susceptible     Penicillin G Resistant     Rifampin Susceptible     Tetracycline Susceptible     Trimethoprim + Sulfamethoxazole Susceptible     Vancomycin Susceptible                Susceptibility Comments     Staphylococcus aureus    This isolate does not demonstrate inducible clindamycin resistance in vitro.               MRSA Screen, PCR - Swab, Nares [[id number]]  (Normal) Collected:  01/20/20 1839    Lab Status:  Final result Specimen:  Swab from Nares Updated:  01/21/20 0453     MRSA PCR No MRSA Detected    Respiratory Panel, PCR - Swab, Nasopharynx [[id number]]  (Normal) Collected:  01/19/20 1059    Lab Status:  Final result Specimen:  Swab from Nasopharynx Updated:  01/19/20 1545     ADENOVIRUS, PCR Not Detected     Coronavirus 229E Not Detected     Coronavirus HKU1 Not Detected     Coronavirus NL63 Not Detected     Coronavirus OC43 Not Detected     Human Metapneumovirus Not Detected     Human Rhinovirus/Enterovirus Not Detected      Influenza B PCR Not Detected     Parainfluenza Virus 1 Not Detected     Parainfluenza Virus 2 Not Detected     Parainfluenza Virus 3 Not Detected     Parainfluenza Virus 4 Not Detected     Bordetella pertussis pcr Not Detected     Influenza A H1 2009 PCR Not Detected     Chlamydophila pneumoniae PCR Not Detected     Mycoplasma pneumo by PCR Not Detected     Influenza A PCR Not Detected     Influenza A H3 Not Detected     Influenza A H1 Not Detected     RSV, PCR Not Detected          Xr Shoulder 2+ View Right    Result Date: 1/20/2020  Impression:  1. No acute right shoulder findings. No significant right shoulder osteoarthritic change. 2. Suspected benign enchondroma or bone infarcts in the right humeral shaft.  Electronically Signed By-Dr. Yamileth Bourne MD On:1/20/2020 12:56 PM This report was finalized on [id number] by Dr. Yamileth Bourne MD.    Xr Ankle 2 View Left    Result Date: 1/23/2020  Impression: 1.Soft tissue swelling. 2.There are degenerative changes involving the midfoot. It looks like there is an erosion of the base of the fifth metatarsal. 3.Ankle joint effusion.  Depending on clinical findings MRI may be warranted to reassess the ankle area and exclude osteomyelitis. Please see MRI of the foot report for findings with respect to the forefoot area.  Electronically Signed By-Babar Felton On:1/23/2020 2:22 PM This report was finalized on [id number] by  Babar Felton, .    Xr Foot 2 View Left    Result Date: 1/23/2020  Impression: 1. Interval postsurgical changes of resection of the second toe. 2. Additional chronic findings above.  Electronically Signed By-Gustabo Calzada On:1/23/2020 3:26 PM This report was finalized on [id number] by  Gustabo Calzada, .    Ct Head Without Contrast    Result Date: 1/18/2020  Impression: 1.  Normal exam.  Electronically Signed By-Arabella Mendez On:1/18/2020 8:09 PM This report was finalized on [id number] by  Arabella Mendez, .    Ct Cervical Spine Without  Contrast    Result Date: 1/18/2020  Impression: Normal CT cervical spine without contrast.  Electronically Signed By-Arabella Mednez On:1/18/2020 8:11 PM This report was finalized on [id number] by  Arabella Mendez .    Xr Toe 2+ View Left    Result Date: 1/18/2020  Impression:  1. Negative for fracture. 2. Chronic abnormal appearance of second and third MTP joints. 3. Soft tissue swelling.  Electronically Signed By-Arabella Mendez On:1/18/2020 8:07 PM This report was finalized on [id number] by  Arabella Mendez .    Us Gallbladder    Result Date: 1/19/2020  Impression:  1. Sludge in the gallbladder lumen. No evidence of gallbladder wall thickening or fluid in the gallbladder fossa. No evidence of dilatation of bile ducts. No evidence of ascites.  Electronically Signed By-DR. Mal Lott MD On:1/19/2020 5:50 PM This report was finalized on [id number] by DR. Mal Lott MD.    Ct Chest Pulmonary Embolism    Result Date: 1/20/2020  Impression:  1. No evidence of thrombus or embolus in the right or left pulmonary artery branches. 2. Questionable very mild diffuse infiltrates throughout the right lower lobe and left lower lobe along might be caused by atelectasis or vascular congestion. 3. No focal abnormality seen in the right or left lungs.  Electronically Signed By-DR. Mal Lott MD On:1/20/2020 5:42 PM This report was finalized on [id number] by DR. Mal Lott MD.    Mri Shoulder Right Without Contrast    Result Date: 1/20/2020  Impression: 1.There is a tear of the superior labrum from anterior to posterior with mild adjacent cartilage loss in the superior glenoid. This is age indeterminate. 2.No imaging findings of shoulder dislocation. No Hill-Sachs impaction fracture or Bankart lesion. 3.Chondroid lesions in the proximal humeral diaphysis. No associated aggressive features on MRI. Radiographic follow-up of the humerus is recommended in 6 months time. These are likely  benign enchondromas. The largest lesion is not seen in the field-of-view. 4.No findings rotator cuff tear.   Electronically Signed By-Millicent Springer MD On:1/20/2020 2:17 PM This report was finalized on [id number] by  Millicent Springer MD.    Mri Foot Right Without Contrast    Result Date: 1/20/2020  Impression: 1.There is a sinus tract along the plantar aspect of foot superficial to the first metatarsal phalange joint with abnormal soft tissue density along this tract along the plantar aspect of the great toe with abnormal signal intensity in the hallux sesamoid bones. This is suspicious for osteomyelitis of the hallux sesamoid bones. Adjacent phlegmon is likely. Soft tissue evaluation limited without contrast. 2.There are remote and subacute fractures as described. 3.There is erosive change at the second proximal interphalangeal joint and the first interphalangeal joint that may be seen with gouty arthritis. Apparently erosive arthritis such as rheumatoid arthritis is thought to be unlikely.    Electronically Signed By-Millicent Springer MD On:1/20/2020 2:04 PM This report was finalized on [id number] by  Millicent Springer MD.    Mri Foot Left With & Without Contrast    Result Date: 1/20/2020  Impression: Diffuse inflammation and edema in the soft tissues surrounding the dorsal plantar aspect of the left mid and left forefoot. 2. There are several small focal loculated fluid collections overlying the dorsal aspect of the left first and second and third cuneiform bones and overlying the dorsal aspect of the left navicular bone and left cuboid bone in a small fluid collection can be seen lying between the bases of the left second and third metatarsal bases. These fluid collections have uncertain etiology and might be sterile or caused by numerous small abscesses in the soft tissues. Clinical correlation would BE helpful. 3. Signal abnormality in the bone marrow in the distal metaphysis and head of the left second metatarsal and in  the bone marrow in the distal middle and proximal phalanges of the left second toe probably caused by stress fracture and less likely caused by osteomyelitis. 4. Deformity of the distal metaphysis and head of the left third metatarsal most consistent with trauma and fracture. 5. Mild deformity of the left first second and third cuneiform bones in the left navicular bone and left cuboid bone with mild inflammation and edema in the bone marrow of these bones most consistent with arthritis and Charcot joint and less likely caused by osteomyelitis.  Electronically Signed By-DR. Mal Lott MD On:1/20/2020 9:23 PM This report was finalized on [id number] by DR. Mal Lott MD.    Xr Chest Pa & Lateral    Result Date: 1/19/2020  Impression:  1. No active cardiopulmonary disease 2. Right PICC line is been removed since previous study of 04/07/2019 3. No significant change from 04/07/2019  Electronically Signed By-Hermilo Vaz Jr. On:1/19/2020 1:44 PM This report was finalized on [id number] by  Hermilo Vaz Jr., .                Results for orders placed during the hospital encounter of 01/18/20   Adult Transthoracic Echo Complete W/ Cont if Necessary Per Protocol    Narrative · Left ventricular systolic function is normal.  · Mild mitral valve regurgitation is present  · Mild tricuspid valve regurgitation is present.  · Ejection fraction is about 65%  · No pericardial effusion noted                  Test Results Pending at Discharge   Order Current Status    Anaerobic Culture - Body Fluid, Toe, Left Preliminary result            Procedures Performed  Procedure(s):  DEBRIDEMENT with sesamoid excision  INCISION AND DRAINAGE LOWER EXTREMITY with second digit amputation         Consults:   Consults     Date and Time Order Name Status Description    1/21/2020 1217 Inpatient Endocrinology Consult      1/20/2020 1012 Inpatient Orthopedic Surgery Consult Completed     1/19/2020 1613 Inpatient Infectious  Diseases Consult Completed     1/19/2020 1613 Inpatient Podiatry Consult Completed     1/19/2020 1055 Inpatient General Surgery Consult      1/18/2020 2048 Hospitalist (on-call MD unless specified) Completed             Discharge Details        Discharge Medications      New Medications      Instructions Start Date   cefTRIAXone 2 g in sodium chloride 0.9 % 100 mL IVPB   2 g, Intravenous, Every 24 Hours      DAPTOmycin 550 mg in sodium chloride 0.9 % 50 mL   6 mg/kg (550 mg), Intravenous, Every 24 Hours         Changes to Medications      Instructions Start Date   Insulin Glargine 100 UNIT/ML injection pen  Commonly known as:  BASAGLAR KWIKPEN  What changed:  how much to take   14 Units, Subcutaneous, Nightly      insulin lispro 100 UNIT/ML injection  Commonly known as:  HUMALOG  What changed:  how much to take   4 Units, Subcutaneous, Every 8 Hours Scheduled, SSI         Continue These Medications      Instructions Start Date   aspirin 325 MG tablet   325 mg, Oral, Daily         Stop These Medications    doxycycline 100 MG tablet  Commonly known as:  VIBRAMYICN            Allergies   Allergen Reactions   • Metformin Diarrhea and Nausea And Vomiting   • Oxycodone-Acetaminophen Nausea And Vomiting and Rash         Discharge Disposition:  Home-Health Care [facility]    Diet:  Hospital:  Diet Order   Procedures   • Diet Diabetic/Consistent Carbs; Diabetic - Consistent Carb         Discharge Activity:         CODE STATUS:    Code Status and Medical Interventions:   Ordered at: 01/18/20 2121     Level Of Support Discussed With:    Patient     Code Status:    CPR     Medical Interventions (Level of Support Prior to Arrest):    Full         Follow-up Appointments  Future Appointments   Date Time Provider Department Center   1/29/2020 11:45 AM [facility] ROOM 3  [facility] None   2/19/2020  9:00 AM CR[facility] Souza [facility] None       Additional Instructions for the Follow-ups that You Need to Schedule      Ambulatory Referral to Home Health   As directed      Face to Face Visit Date:  1/23/2020    Follow-up provider for Plan of Care?:  I treated the patient in an acute care facility and will not continue treatment after discharge.    Follow-up provider:  JANIE NICHOLS [[id number]]    Reason/Clinical Findings:  Diabetic foot ulcer    Describe mobility limitations that make leaving home difficult:  Weakness, non wt bearing    Nursing/Therapeutic Services Requested:  Other (eval and treat)    Frequency:  1 Week 1                 Condition on Discharge:      Stable          Electronically signed by Christopher Ron MD, 01/24/20, 1:07 PM.    Time: I spent  32  minutes on this discharge activity which included face-to-face encounter with the patient/reviewing the data in the system/coordination of the care with the nursing staff as well as consultants/documentation/entering orders.

## 2020-01-24 NOTE — PROGRESS NOTES
Continued Stay Note   [facility]     Patient Name: Maynor Gregg  MRN: [id number]  Today's Date: 1/24/2020    Admit Date: 1/18/2020    Discharge Plan     Row Name 01/24/20 1010       Plan    Plan  DC Plan Update: Home/[facility] - pending.    Plan Comments  [facility] unable to supply IV antibiotics due to insurance - referred to [facility].               Expected Discharge Date and Time     Expected Discharge Date Expected Discharge Time    Jan 24, 2020             Jason Price RN

## 2020-01-24 NOTE — PROGRESS NOTES
"01/18/2020  Foot and Ankle Surgery - Inpatient Follow-up  Provider: Dr. Adan Souza DPM  Location: [facility]    Chief Complaint: Right foot wound; left second digit amputation POD#3    Subjective:  Maynor Gregg is a 44 y.o. male.     No new issues. Anxious for discharge.    Allergies   Allergen Reactions   • Metformin Diarrhea and Nausea And Vomiting   • Oxycodone-Acetaminophen Nausea And Vomiting and Rash       No current facility-administered medications on file prior to encounter.      Current Outpatient Medications on File Prior to Encounter   Medication Sig Dispense Refill   • aspirin 325 MG tablet Take 325 mg by mouth Daily.     • [DISCONTINUED] Insulin Glargine (BASAGLAR KWIKPEN) 100 UNIT/ML injection pen Inject 12 Units under the skin into the appropriate area as directed Every Night.     • [DISCONTINUED] insulin lispro (HUMALOG) 100 UNIT/ML injection Inject  under the skin into the appropriate area as directed Every 8 (Eight) Hours. SSI         Objective   /86 (BP Location: Left arm, Patient Position: Lying)   Pulse 73   Temp 98.3 °F (36.8 °C) (Oral)   Resp 15   Ht 190.5 cm (75\")   Wt 94.3 kg (208 lb)   SpO2 96%   BMI 26.00 kg/m²     General: Mild distress.  Alert and oriented x3.  Anxious  Vascular: DP and PT pulses are palpable.  CFT are intact to the digits  Neuro: Sensation remains diminished.  Motor function is intact  MSK: s/p left second digit amputation.  No progressive changes to the right foot.  Derm:  Wound VAC applied to the right foot. Left compression wrap in place      Results from last 7 days   Lab Units 01/24/20  0352   WBC 10*3/mm3 8.90   HEMOGLOBIN g/dL 12.3*   HEMATOCRIT % 36.0*   PLATELETS 10*3/mm3 333       Assessment/Plan     Patient Active Problem List   Diagnosis   • Diabetic peripheral neuropathy (CMS/HCC)   • Type 1 diabetes mellitus with circulatory complication (CMS/HCC)   • Open wound of second toe of left foot   • Chronic ulcer of right foot, " with necrosis of bone (CMS/HCC)       No new issues.  Wound VAC was changed earlier this morning.  I did discuss findings with wound care nurse.  No additional plans.  Continue the same.  Anticipate discharge soon.  Follow-up with me in the wound care center next Wednesday.  Will sign off at this time    Note is dictated utilizing voice recognition software. Unfortunately this leads to occasional typographical errors. I apologize in advance if the situation occurs. If questions occur please do not hesitate to call our office.

## 2020-01-25 ENCOUNTER — READMISSION MANAGEMENT (OUTPATIENT)
Dept: CALL CENTER | Facility: HOSPITAL | Age: 45
End: 2020-01-25

## 2020-01-25 PROCEDURE — 93010 ELECTROCARDIOGRAM REPORT: CPT | Performed by: INTERNAL MEDICINE

## 2020-01-25 NOTE — OUTREACH NOTE
Prep Survey      Responses   Facility patient discharged from?  [facility]   Is patient eligible?  Yes   Discharge diagnosis  Syncope,   Acute pain of right shoulder,   Diabetic wound ulcer with some discharge and surrounding edema,      second digit amputation    Does the patient have one of the following disease processes/diagnoses(primary or secondary)?  General Surgery   Does the patient have Home health ordered?  Yes   What is the Home health agency?    [facility] and [facility] (for IV abx)   Is there a DME ordered?  No   Medication alerts for this patient  IV abx x6 weeks   Prep survey completed?  Yes          Nancy Aparicio RN

## 2020-01-26 LAB — BACTERIA SPEC ANAEROBE CULT: NORMAL

## 2020-01-27 NOTE — PROGRESS NOTES
Case Management Discharge Note      Final Note: [facility] for IV antibiotics,                      Final Discharge Disposition Code: 06 - home with home health care

## 2020-01-27 NOTE — PAYOR COMM NOTE
"AUTHORIZATION PENDING:   PLEASE CALL OR FAX DETERMINATION TO CONTACT BELOW. THANK YOU.        Lauren Nair RN MSN  /UR  [facility]  [phone] office  [phone] fax  [email or web address]    [facility]  NPI: [id number]  Tax: [id number]      Ebenezer Gregg (44 y.o. Male) 1975  Auth # [id number]    Patient was discharged home with [facility] health on 01/24/2020.      Date of Birth Social Security Number Address Home Phone MRN    1975  [address] [address] [phone] [id number]    Orthodox Marital Status          Judaism Single       Admission Date Admission Type Admitting Provider Attending Provider Department, Room/Bed    1/18/20 Emergency Christopher Ron MD  [facility] 2C MEDICAL INPATIENT, 246/1    Discharge Date Discharge Disposition Discharge Destination        1/24/2020 Home-Health Care [facility]              Attending Provider:  (none)   Allergies:  Metformin, Oxycodone-acetaminophen    Isolation:  Contact   Infection:  MRSA (11/09/18)   Code Status:  Prior    Ht:  190.5 cm (75\")   Wt:  94.3 kg (208 lb)    Admission Cmt:  None   Principal Problem:  Syncope [R55]                 Active Insurance as of 1/18/2020     Primary Coverage     Payor Plan Insurance Group Employer/Plan Group    [facility] PPO [id number]     Payor Plan Address Payor Plan Phone Number Payor Plan Fax Number Effective Dates    [address] [phone]  10/13/2019 - None Entered    [facility] [id number]       Subscriber Name Subscriber Birth Date Member ID       EBENEZER GREGG 1975 [id number]                 Emergency Contacts      (Rel.) Home Phone Work Phone Mobile Phone    DAYDAY BATISTA (Friend) [phone] -- [phone]               Discharge Summary      Christopher Ron MD at 01/24/20 1307                [facility]  DISCHARGE " "SUMMARY        Prepared For PCP:  Erwin Castorena MD    Patient Name: Maynor Gregg  : 1975  MRN: [id number]      Date of Admission:   2020    Date of Discharge:  2020    Length of stay:  LOS: 5 days     Hospital Course     Presenting Problem:   Persistent vomiting [R11.15]  Hyperglycemia [R73.9]  Concussion without loss of consciousness, initial encounter [S06.0X0A]  Syncope, unspecified syncope type [R55]  Syncope, unspecified syncope type [R55]  Syncope, unspecified syncope type [R55]      Active Hospital Problems    Diagnosis  POA   • Chronic ulcer of right foot, with necrosis of bone (CMS/HCC) [L97.514]  Yes   • Open wound of second toe of left foot [S91.105A]  Yes   • Diabetic peripheral neuropathy (CMS/HCC) [E11.42]  Yes   • 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 Gregg is a 44 y.o. with a history of DM 1 and neuropathy presented to the [facility] ED on 2020 with a complaint of syncope, he passed out while he was in the shower, states he \"went out and woke up in the tub\" Pt is also complaining of nausea, vomiting and fever x 3 days. Pt states he began having nausea and vomiting on Thursday, associated with low grade fever. Pt denies melena, hematochezia, diarrhea. Pt states he then began to have episodes of dizziness, even when lying down, he felt as if the room was spinning. Pt is complaining of head, neck and back pain s/p fall today. Pt has an open wound on his left 2nd toe, he is current with Dr. Souza and is finishing a round of doxycycline.      In the ED, CT head: negative for acute abnormality, CT spine: Normal, Xray of second toe on left foot: " negative for fracture, chronic abnormal appearance of second and third MTP joints, soft tissue swelling. Glucose 520, K+4.2, BUN 19, Cr. 1.04, WBC 23.9, Hgb 13.4, Hct 38.5. Pt was given IV insulin 6 units, 500ml NS bolus IV, Zofran 4mg IV. Pt will be admitted for further evaluation and management.      Hospital course: Patient apparently was kept in the hospital was seen by hospitalist/infectious disease/podiatry.  Patient currently found to have cellulitis/abscess of the foot and was treated as such by debridement as well as IV antibiotics.  Patient will need 6 weeks of antibiotics for recurrent infection as well as possible osteomyelitis although patient MRI did not show osteomyelitis.  Patient also had right shoulder injury and for which we did the MRI of the right shoulder which did not show any fractures and was seen by orthopedics and they injected the shoulder and his shoulder has been improving.      Syncope, can be vasovagal from nausea and vomiting persistent in nature otherwise etiology  unclear to me, possible be secondary to dehydration.  -CT head negative  -Continue telemetry  -Echocardiogram is normal  -CT PE negative  -At this time no particular etiology for syncope.     Right shoulder pain with very limited range of motion  -We will do MRI of the right shoulder  -Orthopedic consulted  -X-ray of the shoulder reviewed  -IV Toradol for pain control  -Patient had intra-articular injection today and doing fairly well MRI of the shoulder has been reviewed.  Patient will need follow-up in 6 weeks.     Diabetic wound ulcer with some discharge and surrounding edema .... Consistent with abscess  -Patient seen by podiatry and had procedure done 1/21/2021  -Excisional debridement to muscle/tendon, right foot as well as patient had tibial and fibular sesamoidectomy with biopsy of the right foot and patient also had incision and drainage to the level of tendon of the left foot and second digit amputation at the  metatarsophalangeal joint of the left foot.  -Intra-Op operative cultures are pending  -Continue broad-spectrum antibiotics cefepime/Flagyl/vancomycin  -Patient will need 6 weeks of IV antibiotics and antibiotic has been switched to Rocephin        Diabetes type 1  -Patient has been seen by endocrinology during in-house and will need follow-up with PCP and endocrinology as an outpatient.  -Continue Patient's lantus  -Accuchecks AC and HS  -Cover with sliding scale  -Patient seen by endocrinology     Right upper quadrant abdominal pain: Resolved    Recommendation for Outpatient Providers:             Reasons For Change In Medications and Indications for New Medications:        Day of Discharge     HPI:       Vital Signs:   Temp:  [97.6 °F (36.4 °C)-98.4 °F (36.9 °C)] 97.6 °F (36.4 °C)  Heart Rate:  [63-73] 63  Resp:  [14-17] 17  BP: (136-163)/(77-86) 151/78     Physical Exam:  Physical Exam   Cardiovascular: Normal rate and regular rhythm.   Pulmonary/Chest: Effort normal and breath sounds normal.   Abdominal: Soft. Bowel sounds are normal.   Nursing note and vitals reviewed.      Pertinent  and/or Most Recent Results     Results from last 7 days   Lab Units 01/24/20  0352 01/23/20  0325 01/22/20  0514 01/21/20  0824 01/21/20  0823 01/19/20  0441 01/18/20  1927   WBC 10*3/mm3 8.90 7.90 7.90 8.80  --  23.00* 23.90*   HEMOGLOBIN g/dL 12.3* 12.3* 12.1* 12.5*  --  13.1 13.4   HEMATOCRIT % 36.0* 34.7* 35.3* 35.2*  --  37.8 38.5   PLATELETS 10*3/mm3 333 245 206 194  --  181 197   SODIUM mmol/L 133* 133* 131*  --  132* 130* 128*   POTASSIUM mmol/L 3.6 3.5 3.6  --  3.5  3.2* 3.7 4.2   CHLORIDE mmol/L 100 100 96*  --  96* 93* 88*   CO2 mmol/L 26.0 27.0 27.0  --  29.0 29.0 27.0   BUN mg/dL 12 15 19  --  21* 19 19   CREATININE mg/dL 0.65* 0.68* 0.72*  --  0.72* 0.87 1.04   GLUCOSE mg/dL 232* 221* 280*  --  184* 286* 520*   CALCIUM mg/dL 7.6* 7.6* 7.5*  --  7.9* 8.3* 8.2*     Results from last 7 days   Lab Units 01/21/20  0818  01/19/20  0441   BILIRUBIN mg/dL 1.9* 1.3*   ALK PHOS U/L 107 121*   ALT (SGPT) U/L 41 50*   AST (SGOT) U/L 28 21     Results from last 7 days   Lab Units 01/21/20  0823   CHOLESTEROL mg/dL 82   TRIGLYCERIDES mg/dL 148   HDL CHOL mg/dL 15*     Results from last 7 days   Lab Units 01/21/20  0823 01/19/20  1633   TSH uIU/mL 1.220 0.649   HEMOGLOBIN A1C % 10.5*  --    TROPONIN T ng/mL <0.010  --        Brief Urine Lab Results     None          Microbiology Results Abnormal     Procedure Component Value - Date/Time    Body Fluid Culture - Body Fluid, Toe, Left [[id number]] Collected:  01/21/20 1314    Lab Status:  Final result Specimen:  Body Fluid from Toe, Left Updated:  01/24/20 0943     Body Fluid Culture No growth at 3 days     Gram Stain Few (2+) WBCs per low power field      No organisms seen    Anaerobic Culture - Tissue, Foot, Right [[id number]]  (Abnormal) Collected:  01/21/20 1238    Lab Status:  Final result Specimen:  Tissue from Foot, Right Updated:  01/24/20 0848     Anaerobic Culture Prevotella loescheii    Anaerobic Culture - Body Fluid, Toe, Left [[id number]] Collected:  01/21/20 1314    Lab Status:  Preliminary result Specimen:  Body Fluid from Toe, Left Updated:  01/24/20 0736     Anaerobic Culture No anaerobes isolated at 3 days    Wound Culture - Wound, Foot, Right [[id number]]  (Abnormal)  (Susceptibility) Collected:  01/19/20 1630    Lab Status:  Edited Result - FINAL Specimen:  Wound from Foot, Right Updated:  01/23/20 1354     Wound Culture Moderate growth (3+) Streptococcus agalactiae (Group B)     Comment:   This organism is considered to be universally susceptible to penicillin.  No further antibiotic testing will be performed. If Clindamycin or Erythromycin is the drug of choice, notify the laboratory within 7 days to request susceptibility testing.        STREP GROUPING B     Wound Culture Scant growth (1+) Staphylococcus aureus, MRSA     Comment:   Methicillin resistant Staphylococcus aureus,  Patient may be an isolation risk.        PBP2 Positive     Wound Culture Scant growth (1+) Streptococcus anginosus      Scant growth (1+) Normal Skin Juliet     Gram Stain Many (4+) WBCs per low power field      Many (4+) Gram positive cocci in pairs, chains and clusters      Rare (1+) Gram negative cocci      Few (2+) Gram negative bacilli    Narrative:             Susceptibility      Staphylococcus aureus, MRSA     KAILA     Clindamycin Susceptible     Daptomycin Susceptible (C) [1]      Erythromycin Resistant     Inducible Clindamycin Resistance Negative     Oxacillin Resistant     Penicillin G Resistant     Rifampin Susceptible     Tetracycline Susceptible     Trimethoprim + Sulfamethoxazole Susceptible     Vancomycin Susceptible            [1]   Appended report. These results have been appended to a previously final verified report.             Susceptibility      Streptococcus anginosus     KAILA     Ceftriaxone Susceptible     Penicillin G Susceptible     Vancomycin Susceptible                Susceptibility Comments     Staphylococcus aureus, MRSA    This isolate does not demonstrate inducible clindamycin resistance in vitro.               Tissue / Bone Culture - Tissue, Foot, Right [[id number]]  (Abnormal) Collected:  01/21/20 1238    Lab Status:  Final result Specimen:  Tissue from Foot, Right Updated:  01/23/20 0982     Tissue Culture Moderate growth (3+) Streptococcus agalactiae (Group B)     Comment:   If Clindamycin or Erythromycin is the drug of choice, notify the laboratory within 7 days to request susceptibility testing.  This organism is considered to be universally susceptible to penicillin.  No further antibiotic testing will be performed. If Clindamycin or Erythromycin is the drug of choice, notify the laboratory within 7 days to request susceptibility testing.        STREP GROUPING B     Gram Stain Few (2+) WBCs per low power field      Moderate (3+) Gram positive cocci in pairs and chains      Rare  (1+) Gram negative bacilli    Wound Culture - Wound, Toe, Left [[id number]]  (Abnormal)  (Susceptibility) Collected:  01/20/20 1342    Lab Status:  Final result Specimen:  Wound from Toe, Left Updated:  01/23/20 0755     Wound Culture Scant growth (1+) Staphylococcus aureus      Rare Streptococcus agalactiae (Group B)     Comment:   This organism is considered to be universally susceptible to penicillin.  No further antibiotic testing will be performed. If Clindamycin or Erythromycin is the drug of choice, notify the laboratory within 7 days to request susceptibility testing.        STREP GROUPING B     Wound Culture Rare Normal Skin Juliet     Gram Stain Moderate (3+) WBCs per low power field      Occasional Gram positive cocci    Narrative:             Susceptibility      Staphylococcus aureus     KAILA     Clindamycin Susceptible     Erythromycin Susceptible     Inducible Clindamycin Resistance Negative     Oxacillin Susceptible     Penicillin G Resistant     Rifampin Susceptible     Tetracycline Susceptible     Trimethoprim + Sulfamethoxazole Susceptible     Vancomycin Susceptible                Susceptibility Comments     Staphylococcus aureus    This isolate does not demonstrate inducible clindamycin resistance in vitro.               MRSA Screen, PCR - Swab, Nares [[id number]]  (Normal) Collected:  01/20/20 1839    Lab Status:  Final result Specimen:  Swab from Nares Updated:  01/21/20 0453     MRSA PCR No MRSA Detected    Respiratory Panel, PCR - Swab, Nasopharynx [[id number]]  (Normal) Collected:  01/19/20 1059    Lab Status:  Final result Specimen:  Swab from Nasopharynx Updated:  01/19/20 1545     ADENOVIRUS, PCR Not Detected     Coronavirus 229E Not Detected     Coronavirus HKU1 Not Detected     Coronavirus NL63 Not Detected     Coronavirus OC43 Not Detected     Human Metapneumovirus Not Detected     Human Rhinovirus/Enterovirus Not Detected     Influenza B PCR Not Detected     Parainfluenza Virus 1 Not  Detected     Parainfluenza Virus 2 Not Detected     Parainfluenza Virus 3 Not Detected     Parainfluenza Virus 4 Not Detected     Bordetella pertussis pcr Not Detected     Influenza A H1 2009 PCR Not Detected     Chlamydophila pneumoniae PCR Not Detected     Mycoplasma pneumo by PCR Not Detected     Influenza A PCR Not Detected     Influenza A H3 Not Detected     Influenza A H1 Not Detected     RSV, PCR Not Detected          Xr Shoulder 2+ View Right    Result Date: 1/20/2020  Impression:  1. No acute right shoulder findings. No significant right shoulder osteoarthritic change. 2. Suspected benign enchondroma or bone infarcts in the right humeral shaft.  Electronically Signed By-Dr. Yamileth Bourne MD On:1/20/2020 12:56 PM This report was finalized on [id number] by Dr. Yamileth Bourne MD.    Xr Ankle 2 View Left    Result Date: 1/23/2020  Impression: 1.Soft tissue swelling. 2.There are degenerative changes involving the midfoot. It looks like there is an erosion of the base of the fifth metatarsal. 3.Ankle joint effusion.  Depending on clinical findings MRI may be warranted to reassess the ankle area and exclude osteomyelitis. Please see MRI of the foot report for findings with respect to the forefoot area.  Electronically Signed By-Babar Felton On:1/23/2020 2:22 PM This report was finalized on [id number] by  Babar Felton, .    Xr Foot 2 View Left    Result Date: 1/23/2020  Impression: 1. Interval postsurgical changes of resection of the second toe. 2. Additional chronic findings above.  Electronically Signed By-Gustabo Calzada On:1/23/2020 3:26 PM This report was finalized on [id number] by  Gustabo Calzada, .    Ct Head Without Contrast    Result Date: 1/18/2020  Impression: 1.  Normal exam.  Electronically Signed By-Arabella Mendez On:1/18/2020 8:09 PM This report was finalized on [id number] by  Arabella Mendez, .    Ct Cervical Spine Without Contrast    Result Date: 1/18/2020  Impression: Normal CT cervical spine  without contrast.  Electronically Signed By-Arabella Mendez On:1/18/2020 8:11 PM This report was finalized on [id number] by  Arabella Mendez .    Xr Toe 2+ View Left    Result Date: 1/18/2020  Impression:  1. Negative for fracture. 2. Chronic abnormal appearance of second and third MTP joints. 3. Soft tissue swelling.  Electronically Signed By-Arabella Mendez On:1/18/2020 8:07 PM This report was finalized on [id number] by  Arabella Mendez .    Us Gallbladder    Result Date: 1/19/2020  Impression:  1. Sludge in the gallbladder lumen. No evidence of gallbladder wall thickening or fluid in the gallbladder fossa. No evidence of dilatation of bile ducts. No evidence of ascites.  Electronically Signed By-DR. Mal Lott MD On:1/19/2020 5:50 PM This report was finalized on [id number] by DR. Mal Lott MD.    Ct Chest Pulmonary Embolism    Result Date: 1/20/2020  Impression:  1. No evidence of thrombus or embolus in the right or left pulmonary artery branches. 2. Questionable very mild diffuse infiltrates throughout the right lower lobe and left lower lobe along might be caused by atelectasis or vascular congestion. 3. No focal abnormality seen in the right or left lungs.  Electronically Signed By-DR. Mal Lott MD On:1/20/2020 5:42 PM This report was finalized on [id number] by DR. Mal Lott MD.    Mri Shoulder Right Without Contrast    Result Date: 1/20/2020  Impression: 1.There is a tear of the superior labrum from anterior to posterior with mild adjacent cartilage loss in the superior glenoid. This is age indeterminate. 2.No imaging findings of shoulder dislocation. No Hill-Sachs impaction fracture or Bankart lesion. 3.Chondroid lesions in the proximal humeral diaphysis. No associated aggressive features on MRI. Radiographic follow-up of the humerus is recommended in 6 months time. These are likely benign enchondromas. The largest lesion is not seen in the field-of-view. 4.No  findings rotator cuff tear.   Electronically Signed By-Millicent Springer MD On:1/20/2020 2:17 PM This report was finalized on [id number] by  Millicent Springer MD.    Mri Foot Right Without Contrast    Result Date: 1/20/2020  Impression: 1.There is a sinus tract along the plantar aspect of foot superficial to the first metatarsal phalange joint with abnormal soft tissue density along this tract along the plantar aspect of the great toe with abnormal signal intensity in the hallux sesamoid bones. This is suspicious for osteomyelitis of the hallux sesamoid bones. Adjacent phlegmon is likely. Soft tissue evaluation limited without contrast. 2.There are remote and subacute fractures as described. 3.There is erosive change at the second proximal interphalangeal joint and the first interphalangeal joint that may be seen with gouty arthritis. Apparently erosive arthritis such as rheumatoid arthritis is thought to be unlikely.    Electronically Signed By-Millicent Springer MD On:1/20/2020 2:04 PM This report was finalized on [id number] by  Millicent Springer MD.    Mri Foot Left With & Without Contrast    Result Date: 1/20/2020  Impression: Diffuse inflammation and edema in the soft tissues surrounding the dorsal plantar aspect of the left mid and left forefoot. 2. There are several small focal loculated fluid collections overlying the dorsal aspect of the left first and second and third cuneiform bones and overlying the dorsal aspect of the left navicular bone and left cuboid bone in a small fluid collection can be seen lying between the bases of the left second and third metatarsal bases. These fluid collections have uncertain etiology and might be sterile or caused by numerous small abscesses in the soft tissues. Clinical correlation would BE helpful. 3. Signal abnormality in the bone marrow in the distal metaphysis and head of the left second metatarsal and in the bone marrow in the distal middle and proximal phalanges of the left second  toe probably caused by stress fracture and less likely caused by osteomyelitis. 4. Deformity of the distal metaphysis and head of the left third metatarsal most consistent with trauma and fracture. 5. Mild deformity of the left first second and third cuneiform bones in the left navicular bone and left cuboid bone with mild inflammation and edema in the bone marrow of these bones most consistent with arthritis and Charcot joint and less likely caused by osteomyelitis.  Electronically Signed By-DR. Mal Lott MD On:1/20/2020 9:23 PM This report was finalized on [id number] by DR. Mal Lott MD.    Xr Chest Pa & Lateral    Result Date: 1/19/2020  Impression:  1. No active cardiopulmonary disease 2. Right PICC line is been removed since previous study of 04/07/2019 3. No significant change from 04/07/2019  Electronically Signed By-Hermilo Vaz Jr. On:1/19/2020 1:44 PM This report was finalized on [id number] by  Hermilo Vaz Jr., .                Results for orders placed during the hospital encounter of 01/18/20   Adult Transthoracic Echo Complete W/ Cont if Necessary Per Protocol    Narrative · Left ventricular systolic function is normal.  · Mild mitral valve regurgitation is present  · Mild tricuspid valve regurgitation is present.  · Ejection fraction is about 65%  · No pericardial effusion noted                  Test Results Pending at Discharge   Order Current Status    Anaerobic Culture - Body Fluid, Toe, Left Preliminary result            Procedures Performed  Procedure(s):  DEBRIDEMENT with sesamoid excision  INCISION AND DRAINAGE LOWER EXTREMITY with second digit amputation         Consults:   Consults     Date and Time Order Name Status Description    1/21/2020 1217 Inpatient Endocrinology Consult      1/20/2020 1012 Inpatient Orthopedic Surgery Consult Completed     1/19/2020 1613 Inpatient Infectious Diseases Consult Completed     1/19/2020 1613 Inpatient Podiatry Consult Completed      1/19/2020 1055 Inpatient General Surgery Consult      1/18/2020 2048 Hospitalist (on-call MD unless specified) Completed             Discharge Details        Discharge Medications      New Medications      Instructions Start Date   cefTRIAXone 2 g in sodium chloride 0.9 % 100 mL IVPB   2 g, Intravenous, Every 24 Hours      DAPTOmycin 550 mg in sodium chloride 0.9 % 50 mL   6 mg/kg (550 mg), Intravenous, Every 24 Hours         Changes to Medications      Instructions Start Date   Insulin Glargine 100 UNIT/ML injection pen  Commonly known as:  BASAGLAR KWIKPEN  What changed:  how much to take   14 Units, Subcutaneous, Nightly      insulin lispro 100 UNIT/ML injection  Commonly known as:  HUMALOG  What changed:  how much to take   4 Units, Subcutaneous, Every 8 Hours Scheduled, SSI         Continue These Medications      Instructions Start Date   aspirin 325 MG tablet   325 mg, Oral, Daily         Stop These Medications    doxycycline 100 MG tablet  Commonly known as:  VIBRAMYICN            Allergies   Allergen Reactions   • Metformin Diarrhea and Nausea And Vomiting   • Oxycodone-Acetaminophen Nausea And Vomiting and Rash         Discharge Disposition:  Home-Health Care Svc    Diet:  Hospital:  Diet Order   Procedures   • Diet Diabetic/Consistent Carbs; Diabetic - Consistent Carb         Discharge Activity:         CODE STATUS:    Code Status and Medical Interventions:   Ordered at: 01/18/20 2121     Level Of Support Discussed With:    Patient     Code Status:    CPR     Medical Interventions (Level of Support Prior to Arrest):    Full         Follow-up Appointments  Future Appointments   Date Time Provider Department Center   1/29/2020 11:45 AM [facility] ROOM 3 [facility] None   2/19/2020  9:00 AM CROW Souza [facility] None       Additional Instructions for the Follow-ups that You Need to Schedule     Ambulatory Referral to Home Health   As directed      Face to Face Visit Date:  1/23/2020     Follow-up provider for Plan of Care?:  I treated the patient in an acute care facility and will not continue treatment after discharge.    Follow-up provider:  JANIE NICHOLS [[id number]]    Reason/Clinical Findings:  Diabetic foot ulcer    Describe mobility limitations that make leaving home difficult:  Weakness, non wt bearing    Nursing/Therapeutic Services Requested:  Other (eval and treat)    Frequency:  1 Week 1                 Condition on Discharge:      Stable          Electronically signed by Christopher Ron MD, 01/24/20, 1:07 PM.    Time: I spent  32  minutes on this discharge activity which included face-to-face encounter with the patient/reviewing the data in the system/coordination of the care with the nursing staff as well as consultants/documentation/entering orders.      Electronically signed by Christopher Ron MD at 01/24/20 9868

## 2020-01-28 ENCOUNTER — READMISSION MANAGEMENT (OUTPATIENT)
Dept: CALL CENTER | Facility: HOSPITAL | Age: 45
End: 2020-01-28

## 2020-01-28 NOTE — OUTREACH NOTE
General Surgery Week 1 Survey      Responses   Facility patient discharged from?  [facility]   Does the patient have one of the following disease processes/diagnoses(primary or secondary)?  General Surgery   Is there a successful TCM telephone encounter documented?  No   Week 1 attempt successful?  Yes   Call start time  1606   Call end time  1611   Discharge diagnosis  Syncope,   Acute pain of right shoulder,   Diabetic wound ulcer with some discharge and surrounding edema,      second digit amputation    Is patient permission given to speak with other caregiver?  No   Medication alerts for this patient  IV abx x6 weeks   Meds reviewed with patient/caregiver?  Yes   Is the patient having any side effects they believe may be caused by any medication additions or changes?  No   Does the patient have all medications related to this admission filled (includes all antibiotics, pain medications, etc.)  Yes   Is the patient taking all medications as directed (includes completed medication regime)?  Yes   Does the patient have a follow up appointment scheduled with their surgeon?  Yes [Patient reports that he is seeing his surgeon tomorrow. ]   Has the patient kept scheduled appointments due by today?  Yes   What is the Home health agency?    [facility] (for [facility]x)   Has home health visited the patient within 72 hours of discharge?  Yes   Home health comments  Patient states HH coming every other day.    What DME was ordered?  Wound vac.    Has all DME been delivered?  Yes   Psychosocial issues?  No   Did the patient receive a copy of their discharge instructions?  Yes   Nursing interventions  Reviewed instructions with patient   What is the patient's perception of their health status since discharge?  Improving   Nursing interventions  Nurse provided patient education   Is the patient /caregiver able to teach back basic post-op care?  No tub bath, swimming, or hot tub until instructed by MD, Take showers only  when approved by MD-sponge bathe until then   Is the patient/caregiver able to teach back signs and symptoms of incisional infection?  Increased redness, swelling or pain at the incisonal site, Increased drainage or bleeding, Incisional warmth, Pus or odor from incision, Fever   Is the patient/caregiver able to teach back steps to recovery at home?  Set small, achievable goals for return to baseline health, Rest and rebuild strength, gradually increase activity   Is the patient/caregiver able to teach back the hierarchy of who to call/visit for symptoms/problems? PCP, Specialist, Home health nurse, Urgent Care, ED, 911  Yes   Week 1 call completed?  Yes          Miranda Lowry RN

## 2020-01-29 ENCOUNTER — OFFICE VISIT (OUTPATIENT)
Dept: WOUND CARE | Facility: HOSPITAL | Age: 45
End: 2020-01-29

## 2020-01-29 PROCEDURE — G0463 HOSPITAL OUTPT CLINIC VISIT: HCPCS

## 2020-01-30 ENCOUNTER — LAB REQUISITION (OUTPATIENT)
Dept: LAB | Facility: HOSPITAL | Age: 45
End: 2020-01-30

## 2020-01-30 DIAGNOSIS — L97.514 NON-PRESSURE CHRONIC ULCER OF OTHER PART OF RIGHT FOOT WITH NECROSIS OF BONE (HCC): ICD-10-CM

## 2020-01-30 LAB
BASOPHILS # BLD AUTO: 0.1 10*3/MM3 (ref 0–0.2)
BASOPHILS NFR BLD AUTO: 0.7 % (ref 0–1.5)
CK SERPL-CCNC: 35 U/L (ref 20–200)
CREAT BLD-MCNC: 0.75 MG/DL (ref 0.76–1.27)
DEPRECATED RDW RBC AUTO: 45.5 FL (ref 37–54)
EOSINOPHIL # BLD AUTO: 0.1 10*3/MM3 (ref 0–0.4)
EOSINOPHIL NFR BLD AUTO: 1.2 % (ref 0.3–6.2)
ERYTHROCYTE [DISTWIDTH] IN BLOOD BY AUTOMATED COUNT: 13.6 % (ref 12.3–15.4)
ERYTHROCYTE [SEDIMENTATION RATE] IN BLOOD: 88 MM/HR (ref 0–15)
GFR SERPL CREATININE-BSD FRML MDRD: 113 ML/MIN/1.73
HCT VFR BLD AUTO: 39.4 % (ref 37.5–51)
HGB BLD-MCNC: 13.2 G/DL (ref 13–17.7)
LYMPHOCYTES # BLD AUTO: 2.2 10*3/MM3 (ref 0.7–3.1)
LYMPHOCYTES NFR BLD AUTO: 18 % (ref 19.6–45.3)
MCH RBC QN AUTO: 32 PG (ref 26.6–33)
MCHC RBC AUTO-ENTMCNC: 33.5 G/DL (ref 31.5–35.7)
MCV RBC AUTO: 95.4 FL (ref 79–97)
MONOCYTES # BLD AUTO: 1 10*3/MM3 (ref 0.1–0.9)
MONOCYTES NFR BLD AUTO: 8.2 % (ref 5–12)
NEUTROPHILS # BLD AUTO: 8.8 10*3/MM3 (ref 1.7–7)
NEUTROPHILS NFR BLD AUTO: 71.9 % (ref 42.7–76)
NRBC BLD AUTO-RTO: 0 /100 WBC (ref 0–0.2)
PLATELET # BLD AUTO: 392 10*3/MM3 (ref 140–450)
PMV BLD AUTO: 7.9 FL (ref 6–12)
RBC # BLD AUTO: 4.13 10*6/MM3 (ref 4.14–5.8)
WBC NRBC COR # BLD: 12.2 10*3/MM3 (ref 3.4–10.8)

## 2020-01-30 PROCEDURE — 82565 ASSAY OF CREATININE: CPT | Performed by: INTERNAL MEDICINE

## 2020-01-30 PROCEDURE — 85025 COMPLETE CBC W/AUTO DIFF WBC: CPT | Performed by: INTERNAL MEDICINE

## 2020-01-30 PROCEDURE — 82550 ASSAY OF CK (CPK): CPT | Performed by: INTERNAL MEDICINE

## 2020-01-30 PROCEDURE — 85652 RBC SED RATE AUTOMATED: CPT | Performed by: INTERNAL MEDICINE

## 2020-02-04 ENCOUNTER — READMISSION MANAGEMENT (OUTPATIENT)
Dept: CALL CENTER | Facility: HOSPITAL | Age: 45
End: 2020-02-04

## 2020-02-04 NOTE — OUTREACH NOTE
General Surgery Week 2 Survey      Responses   Facility patient discharged from?  [facility]   Does the patient have one of the following disease processes/diagnoses(primary or secondary)?  General Surgery   Week 2 attempt successful?  Yes   Call start time  1609   Call end time  1612   Meds reviewed with patient/caregiver?  Yes   Is the patient having any side effects they believe may be caused by any medication additions or changes?  No   Does the patient have all medications related to this admission filled (includes all antibiotics, pain medications, etc.)  Yes   Is the patient taking all medications as directed (includes completed medication regime)?  Yes   Does the patient have a follow up appointment scheduled with their surgeon?  Yes   Has the patient kept scheduled appointments due by today?  Yes   What is the Home health agency?    [facility] (for IV abx)   Has home health visited the patient within 72 hours of discharge?  Yes   What DME was ordered?  Wound vac.    Has all DME been delivered?  Yes   Did the patient receive a copy of their discharge instructions?  Yes   Nursing interventions  Reviewed instructions with patient   What is the patient's perception of their health status since discharge?  Improving   Nursing interventions  Nurse provided patient education   Is the patient /caregiver able to teach back basic post-op care?  Keep incision areas clean,dry and protected, Drive as instructed by MD in discharge instructions, Take showers only when approved by MD-sponge bathe until then, No tub bath, swimming, or hot tub until instructed by MD, Lifting as instructed by MD in discharge instructions, Do not remove steri-strips   Is the patient/caregiver able to teach back signs and symptoms of incisional infection?  Increased redness, swelling or pain at the incisonal site, Increased drainage or bleeding, Incisional warmth, Pus or odor from incision, Fever   Is the patient/caregiver able to  teach back steps to recovery at home?  Set small, achievable goals for return to baseline health, Rest and rebuild strength, gradually increase activity, Make a list of questions for surgeon's appointment   Is the patient/caregiver able to teach back the hierarchy of who to call/visit for symptoms/problems? PCP, Specialist, Home health nurse, Urgent Care, ED, 911  Yes   Week 2 call completed?  Yes   Revoked  No further contact(revokes)-requires comment          Sarita Haskins LPN

## 2020-02-06 ENCOUNTER — LAB REQUISITION (OUTPATIENT)
Dept: LAB | Facility: HOSPITAL | Age: 45
End: 2020-02-06

## 2020-02-06 ENCOUNTER — OFFICE VISIT (OUTPATIENT)
Dept: PODIATRY | Facility: CLINIC | Age: 45
End: 2020-02-06

## 2020-02-06 VITALS
BODY MASS INDEX: 25.86 KG/M2 | HEIGHT: 75 IN | DIASTOLIC BLOOD PRESSURE: 88 MMHG | WEIGHT: 208 LBS | HEART RATE: 99 BPM | SYSTOLIC BLOOD PRESSURE: 143 MMHG

## 2020-02-06 DIAGNOSIS — E11.42 DM TYPE 2 WITH DIABETIC PERIPHERAL NEUROPATHY (HCC): ICD-10-CM

## 2020-02-06 DIAGNOSIS — Z00.00 ENCOUNTER FOR GENERAL ADULT MEDICAL EXAMINATION WITHOUT ABNORMAL FINDINGS: ICD-10-CM

## 2020-02-06 DIAGNOSIS — L97.513: Primary | ICD-10-CM

## 2020-02-06 LAB
BASOPHILS # BLD AUTO: 0.1 10*3/MM3 (ref 0–0.2)
BASOPHILS NFR BLD AUTO: 0.8 % (ref 0–1.5)
CK SERPL-CCNC: 55 U/L (ref 20–200)
CREAT BLD-MCNC: 0.83 MG/DL (ref 0.76–1.27)
DEPRECATED RDW RBC AUTO: 42.9 FL (ref 37–54)
EOSINOPHIL # BLD AUTO: 0.1 10*3/MM3 (ref 0–0.4)
EOSINOPHIL NFR BLD AUTO: 0.6 % (ref 0.3–6.2)
ERYTHROCYTE [DISTWIDTH] IN BLOOD BY AUTOMATED COUNT: 12.8 % (ref 12.3–15.4)
ERYTHROCYTE [SEDIMENTATION RATE] IN BLOOD: 99 MM/HR (ref 0–15)
GFR SERPL CREATININE-BSD FRML MDRD: 101 ML/MIN/1.73
HCT VFR BLD AUTO: 35.5 % (ref 37.5–51)
HGB BLD-MCNC: 12.4 G/DL (ref 13–17.7)
LYMPHOCYTES # BLD AUTO: 1.6 10*3/MM3 (ref 0.7–3.1)
LYMPHOCYTES NFR BLD AUTO: 15.8 % (ref 19.6–45.3)
MCH RBC QN AUTO: 33.1 PG (ref 26.6–33)
MCHC RBC AUTO-ENTMCNC: 35 G/DL (ref 31.5–35.7)
MCV RBC AUTO: 94.5 FL (ref 79–97)
MONOCYTES # BLD AUTO: 1 10*3/MM3 (ref 0.1–0.9)
MONOCYTES NFR BLD AUTO: 9.8 % (ref 5–12)
NEUTROPHILS # BLD AUTO: 7.6 10*3/MM3 (ref 1.7–7)
NEUTROPHILS NFR BLD AUTO: 73 % (ref 42.7–76)
NRBC BLD AUTO-RTO: 0 /100 WBC (ref 0–0.2)
PLATELET # BLD AUTO: 370 10*3/MM3 (ref 140–450)
PMV BLD AUTO: 8.9 FL (ref 6–12)
RBC # BLD AUTO: 3.75 10*6/MM3 (ref 4.14–5.8)
WBC NRBC COR # BLD: 10.4 10*3/MM3 (ref 3.4–10.8)

## 2020-02-06 PROCEDURE — 82565 ASSAY OF CREATININE: CPT | Performed by: INTERNAL MEDICINE

## 2020-02-06 PROCEDURE — 99024 POSTOP FOLLOW-UP VISIT: CPT | Performed by: PODIATRIST

## 2020-02-06 PROCEDURE — 85025 COMPLETE CBC W/AUTO DIFF WBC: CPT | Performed by: INTERNAL MEDICINE

## 2020-02-06 PROCEDURE — 85652 RBC SED RATE AUTOMATED: CPT | Performed by: INTERNAL MEDICINE

## 2020-02-06 PROCEDURE — 82550 ASSAY OF CK (CPK): CPT | Performed by: INTERNAL MEDICINE

## 2020-02-06 PROCEDURE — 13160 SEC CLSR SURG WND/DEHSN XTN: CPT | Performed by: PODIATRIST

## 2020-02-07 NOTE — PROGRESS NOTES
02/06/2020  Foot and Ankle Surgery - Established Patient/Follow-up  Provider: Dr. Adan Souza DPM  Location: [facility]    Subjective:  Maynor Gregg is a 44 y.o. male.     Chief Complaint   Patient presents with   • Left Foot - Follow-up, Post-op   • Right Foot - Follow-up, Post-op       HPI: Patient presents to my office after recent discharge from the hospital regarding bilateral lower extremity issues.  I did see him in the wound care center last week.  He underwent left second digit amputation as well as right foot incision and drainage with removal of sesamoid bones.  He has been treated with wound VAC therapy to the right lower extremity but states that he is having issues with home health and proper seal of the VAC.  He does not want to continue wearing the wound VAC.  He feels that is making the wound worse.  His left foot is doing quite well.  He has remained in the cam boot to the right lower extremity.  He continues to receive the IV antibiotics.    Allergies   Allergen Reactions   • Metformin Diarrhea and Nausea And Vomiting   • Oxycodone-Acetaminophen Nausea And Vomiting and Rash       Current Outpatient Medications on File Prior to Visit   Medication Sig Dispense Refill   • aspirin 325 MG tablet Take 325 mg by mouth Daily.     • cefTRIAXone 2 g in sodium chloride 0.9 % 100 mL IVPB Infuse 2 g into a venous catheter Daily for 41 doses. Indications: Bone and/or Joint Infection     • DAPTOmycin 550 mg in sodium chloride 0.9 % 50 mL Infuse 550 mg into a venous catheter Daily for 41 doses. Indications: Bone and/or Joint Infection, Infection of the Skin and/or Soft Tissue     • Insulin Glargine (BASAGLAR KWIKPEN) 100 UNIT/ML injection pen Inject 15 Units under the skin into the appropriate area as directed Every Night.     • insulin lispro (HUMALOG) 100 UNIT/ML injection Inject 5 Units under the skin into the appropriate area as directed Every 8 (Eight) Hours. SSI  12   • metroNIDAZOLE  "(FLAGYL) 500 MG tablet Take 1 tablet by mouth Every 8 (Eight) Hours for 126 doses. Indications: Bone and/or Joint Infection, Infection of the Skin and/or Soft Tissue 90 tablet 1     No current facility-administered medications on file prior to visit.        Objective   /88   Pulse 99   Ht 190.5 cm (75\")   Wt 94.3 kg (208 lb)   BMI 26.00 kg/m²     General: Mild distress.  Alert and oriented x3.   Vascular: DP and PT pulses are palpable.  CFT are intact to the digits  Neuro: Sensation remains diminished.  Motor function is intact  MSK: s/p left second digit amputation.  Left foot appears stable with continued edema.  No progressive deformity instability.  Right foot remains stable without tenderness  Derm: Incision site to the left foot is well-healed.  No other concerning features to the left lower extremity.  The right foot wound has improved from previous evaluation in the wound care center.  He does have increased granulation tissue but the underlying first metatarsal head remains exposed.  No active drainage.  He does have breakdown affecting the medial and dorsal aspect of his foot likely consistent with adhesive wound VAC dressing.  No other open wounds.    Assessment/Plan   Maynor was seen today for follow-up, post-op, follow-up and post-op.    Diagnoses and all orders for this visit:    Chronic ulcer of great toe of right foot, with necrosis of muscle (CMS/HCC)    DM type 2 with diabetic peripheral neuropathy (CMS/HCC)      Patient returns to my office as he does not want to be seen in the wound care center any longer.  He does not want to proceed with wound VAC therapy as he feels that is making the right foot worse.  On evaluation, the wound does seem somewhat improved with increased granulation tissue.  I do feel that he is having increased drainage and seal issues secondary to weightbearing activity.  Patient states that he does understand and has been walking intermittently.  Given that he " does not want to proceed with a wound VAC and he has exposed bone, I do feel that we should attempt secondary closure of the wound in office.  I did discuss the procedure and aftercare.  He does understand that he will require strict nonweightbearing activity to give the closure any amount of success.  I would like him to start Betadine wet-to-dry dressings daily to the right foot.  No dressings are needed to the left.  He may proceed with weightbearing activity as needed on the left lower extremity.  I would like him to continue receiving the IV antibiotics and I will see him in 1 to 2 weeks for reevaluation.    Secondary closure of complex surgical wound: Right foot    Verbal consent was obtained prior to procedure.  The right wound was prepped with Betadine.  No anesthesia was required secondary to patient's neuropathy.  Deep fascia and subcutaneous tissue were closed with a 3-0 Vicryl in a simple interrupted manner.  Good coverage of the underlying first metatarsal bone was achieved.  The skin was approximated and covered with Betadine wet-to-dry dressing.  Patient tolerated the procedure well.  Hemostasis was achieved.    No orders of the defined types were placed in this encounter.         Note is dictated utilizing voice recognition software. Unfortunately this leads to occasional typographical errors. I apologize in advance if the situation occurs. If questions occur please do not hesitate to call our office.

## 2020-02-12 ENCOUNTER — LAB REQUISITION (OUTPATIENT)
Dept: LAB | Facility: HOSPITAL | Age: 45
End: 2020-02-12

## 2020-02-12 DIAGNOSIS — Z00.00 ENCOUNTER FOR GENERAL ADULT MEDICAL EXAMINATION WITHOUT ABNORMAL FINDINGS: ICD-10-CM

## 2020-02-12 LAB
BASOPHILS # BLD AUTO: 0.1 10*3/MM3 (ref 0–0.2)
BASOPHILS NFR BLD AUTO: 0.8 % (ref 0–1.5)
CK SERPL-CCNC: 34 U/L (ref 20–200)
CREAT BLD-MCNC: 0.82 MG/DL (ref 0.76–1.27)
DEPRECATED RDW RBC AUTO: 40.7 FL (ref 37–54)
EOSINOPHIL # BLD AUTO: 0.6 10*3/MM3 (ref 0–0.4)
EOSINOPHIL NFR BLD AUTO: 7 % (ref 0.3–6.2)
ERYTHROCYTE [DISTWIDTH] IN BLOOD BY AUTOMATED COUNT: 12.4 % (ref 12.3–15.4)
ERYTHROCYTE [SEDIMENTATION RATE] IN BLOOD: 98 MM/HR (ref 0–15)
GFR SERPL CREATININE-BSD FRML MDRD: 102 ML/MIN/1.73
HCT VFR BLD AUTO: 35.2 % (ref 37.5–51)
HGB BLD-MCNC: 12.6 G/DL (ref 13–17.7)
LYMPHOCYTES # BLD AUTO: 2.3 10*3/MM3 (ref 0.7–3.1)
LYMPHOCYTES NFR BLD AUTO: 28 % (ref 19.6–45.3)
MCH RBC QN AUTO: 33 PG (ref 26.6–33)
MCHC RBC AUTO-ENTMCNC: 35.7 G/DL (ref 31.5–35.7)
MCV RBC AUTO: 92.3 FL (ref 79–97)
MONOCYTES # BLD AUTO: 0.9 10*3/MM3 (ref 0.1–0.9)
MONOCYTES NFR BLD AUTO: 11.3 % (ref 5–12)
NEUTROPHILS # BLD AUTO: 4.4 10*3/MM3 (ref 1.7–7)
NEUTROPHILS NFR BLD AUTO: 52.9 % (ref 42.7–76)
NRBC BLD AUTO-RTO: 0.1 /100 WBC (ref 0–0.2)
PLATELET # BLD AUTO: 322 10*3/MM3 (ref 140–450)
PMV BLD AUTO: 8 FL (ref 6–12)
RBC # BLD AUTO: 3.81 10*6/MM3 (ref 4.14–5.8)
WBC NRBC COR # BLD: 8.2 10*3/MM3 (ref 3.4–10.8)

## 2020-02-12 PROCEDURE — 82565 ASSAY OF CREATININE: CPT | Performed by: INTERNAL MEDICINE

## 2020-02-12 PROCEDURE — 85652 RBC SED RATE AUTOMATED: CPT | Performed by: INTERNAL MEDICINE

## 2020-02-12 PROCEDURE — 85025 COMPLETE CBC W/AUTO DIFF WBC: CPT | Performed by: INTERNAL MEDICINE

## 2020-02-12 PROCEDURE — 82550 ASSAY OF CK (CPK): CPT | Performed by: INTERNAL MEDICINE

## 2020-02-17 ENCOUNTER — OFFICE VISIT (OUTPATIENT)
Dept: PODIATRY | Facility: CLINIC | Age: 45
End: 2020-02-17

## 2020-02-17 VITALS
SYSTOLIC BLOOD PRESSURE: 146 MMHG | DIASTOLIC BLOOD PRESSURE: 84 MMHG | HEIGHT: 75 IN | WEIGHT: 208 LBS | HEART RATE: 116 BPM | BODY MASS INDEX: 25.86 KG/M2

## 2020-02-17 DIAGNOSIS — E11.42 DM TYPE 2 WITH DIABETIC PERIPHERAL NEUROPATHY (HCC): ICD-10-CM

## 2020-02-17 DIAGNOSIS — L97.513: Primary | ICD-10-CM

## 2020-02-17 DIAGNOSIS — M86.671 CHRONIC OSTEOMYELITIS OF RIGHT FOOT (HCC): ICD-10-CM

## 2020-02-17 PROCEDURE — 99213 OFFICE O/P EST LOW 20 MIN: CPT | Performed by: PODIATRIST

## 2020-02-18 PROBLEM — L97.513: Status: ACTIVE | Noted: 2020-02-18

## 2020-02-18 PROBLEM — M86.671 CHRONIC OSTEOMYELITIS OF RIGHT FOOT: Status: ACTIVE | Noted: 2020-02-18

## 2020-02-18 NOTE — PROGRESS NOTES
02/17/2020  Foot and Ankle Surgery - Established Patient/Follow-up  Provider: Dr. Adan Souza DPM  Location: [facility]    Subjective:  Maynor Gregg is a 45 y.o. male.     Chief Complaint   Patient presents with   • Left Foot - Follow-up, Post-op   • Right Foot - Follow-up, Post-op       HPI: Patient returns for follow-up on right foot wound after attempted delayed closure in office.  Patient states that he is not been able to stay off weightbearing.  He has placed weight on the wound and has noticed gapping to the wound site.  He does have significant maceration and increased redness today.  He denies any constitutional signs of infection.  He continues to receive his antibiotics.    Allergies   Allergen Reactions   • Metformin Diarrhea and Nausea And Vomiting   • Oxycodone-Acetaminophen Nausea And Vomiting and Rash       Current Outpatient Medications on File Prior to Visit   Medication Sig Dispense Refill   • aspirin 325 MG tablet Take 325 mg by mouth Daily.     • cefTRIAXone 2 g in sodium chloride 0.9 % 100 mL IVPB Infuse 2 g into a venous catheter Daily for 41 doses. Indications: Bone and/or Joint Infection     • DAPTOmycin 550 mg in sodium chloride 0.9 % 50 mL Infuse 550 mg into a venous catheter Daily for 41 doses. Indications: Bone and/or Joint Infection, Infection of the Skin and/or Soft Tissue     • Insulin Glargine (BASAGLAR KWIKPEN) 100 UNIT/ML injection pen Inject 15 Units under the skin into the appropriate area as directed Every Night.     • insulin lispro (HUMALOG) 100 UNIT/ML injection Inject 5 Units under the skin into the appropriate area as directed Every 8 (Eight) Hours. SSI  12   • metroNIDAZOLE (FLAGYL) 500 MG tablet Take 1 tablet by mouth Every 8 (Eight) Hours for 126 doses. Indications: Bone and/or Joint Infection, Infection of the Skin and/or Soft Tissue 90 tablet 1   • NOVOLOG 100 UNIT/ML injection        No current facility-administered medications on file prior to  "visit.        Objective   /84   Pulse 116   Ht 190.5 cm (75\")   Wt 94.3 kg (208 lb)   BMI 26.00 kg/m²     General: Mild distress.  Alert and oriented x3.   Vascular: DP and PT pulses are palpable.  CFT are intact to the digits  Neuro: Sensation remains diminished.  Motor function is intact  MSK: s/p left second digit amputation.  Left foot appears stable with continued edema.  No progressive deformity instability.  Right foot remains stable without tenderness  Derm: Incision site has gapped and continues to extend to the plantar aspect of the first metatarsal head.  Mild serous drainage.  No luis alfredo purulence.  Moderate periwound erythema and maceration.  Subtle discoloration involving the plantar aspect of the first metatarsal head.    Assessment/Plan   Maynor was seen today for follow-up, post-op, follow-up and post-op.    Diagnoses and all orders for this visit:    Chronic ulcer of great toe of right foot, with necrosis of muscle (CMS/HCC)  -     CBC (No Diff); Future  -     Basic Metabolic Panel; Future  -     ECG 12 Lead; Future  -     XR Chest 2 View; Future  -     Case Request; Standing  -     Case Request    Chronic osteomyelitis of right foot (CMS/HCC)  -     CBC (No Diff); Future  -     Basic Metabolic Panel; Future  -     ECG 12 Lead; Future  -     XR Chest 2 View; Future  -     Case Request; Standing  -     Case Request    DM type 2 with diabetic peripheral neuropathy (CMS/HCC)    Other orders  -     Follow Anesthesia Guidelines / Standing Orders; Future  -     Obtain Informed Consent; Future  -     Provide NPO Instructions to Patient; Future  -     Chlorhexidine Skin Prep; Future      Patient has been ambulating on the right foot and attempted delayed closure has failed.  He does have moderate erythema and maceration with serous drainage.  There is discoloration to the head of the first metatarsal which is concerning for osteomyelitis.  I did discuss this with him at length.  In the wounds " current state, I do not feel that healing is likely.  He has failed multiple wound care attempts.  We did discuss other options including partial first ray resection.  I do feel that this is the most appropriate option at this time to potentially allow offloading and decompression of the skin to allow closure.  We did discuss the risks and benefits at length.  Patient understands and agrees.  I have recommended that we proceed with the surgery sooner than later.  He does understand that he will likely proceed with admission after surgery for evaluation by infectious disease.  I have asked that he monitor the foot closely for any progressive redness, swelling, or drainage.  Patient is to call the office or report to the ED.  Orders Placed This Encounter   Procedures   • XR Chest 2 View     Standing Status:   Future     Standing Expiration Date:   2/18/2021     Order Specific Question:   Reason for Exam:     Answer:   Preop   • CBC (No Diff)     Standing Status:   Future     Standing Expiration Date:   2/18/2021   • Basic Metabolic Panel     Standing Status:   Future     Standing Expiration Date:   2/18/2021   • Follow Anesthesia Guidelines / Standing Orders     Standing Status:   Future   • Obtain Informed Consent     Standing Status:   Future     Order Specific Question:   Informed Consent Given For     Answer:   Partial first ray resection of the right foot with attempted closure   • Provide NPO Instructions to Patient     Standing Status:   Future   • Chlorhexidine Skin Prep     Chlorhexidine Skin Prep and Instructions For All Patients Having A Procedure Requiring an Outward Incision if Not Allergic. If Allergic, Give Antibacterial Skin Wipes and Instructions. Do Not Use For Facial Cases or on Any Mucus Membranes.     Standing Status:   Future   • ECG 12 Lead     Standing Status:   Future     Standing Expiration Date:   2/18/2021     Order Specific Question:   Reason for Exam:     Answer:   Preop          Note is  dictated utilizing voice recognition software. Unfortunately this leads to occasional typographical errors. I apologize in advance if the situation occurs. If questions occur please do not hesitate to call our office.

## 2020-02-19 ENCOUNTER — LAB REQUISITION (OUTPATIENT)
Dept: LAB | Facility: HOSPITAL | Age: 45
End: 2020-02-19

## 2020-02-19 DIAGNOSIS — Z00.00 ENCOUNTER FOR GENERAL ADULT MEDICAL EXAMINATION WITHOUT ABNORMAL FINDINGS: ICD-10-CM

## 2020-02-19 LAB
ANION GAP SERPL CALCULATED.3IONS-SCNC: 10 MMOL/L (ref 5–15)
BASOPHILS # BLD AUTO: 0.1 10*3/MM3 (ref 0–0.2)
BASOPHILS NFR BLD AUTO: 0.7 % (ref 0–1.5)
BUN BLD-MCNC: 19 MG/DL (ref 6–20)
BUN/CREAT SERPL: 19.6 (ref 7–25)
CALCIUM SPEC-SCNC: 8.7 MG/DL (ref 8.6–10.5)
CHLORIDE SERPL-SCNC: 94 MMOL/L (ref 98–107)
CK SERPL-CCNC: 187 U/L (ref 20–200)
CO2 SERPL-SCNC: 28 MMOL/L (ref 22–29)
CREAT BLD-MCNC: 0.97 MG/DL (ref 0.76–1.27)
CREAT BLD-MCNC: 0.99 MG/DL (ref 0.76–1.27)
DEPRECATED RDW RBC AUTO: 40.3 FL (ref 37–54)
EOSINOPHIL # BLD AUTO: 0.2 10*3/MM3 (ref 0–0.4)
EOSINOPHIL NFR BLD AUTO: 2.4 % (ref 0.3–6.2)
ERYTHROCYTE [DISTWIDTH] IN BLOOD BY AUTOMATED COUNT: 12.4 % (ref 12.3–15.4)
ERYTHROCYTE [SEDIMENTATION RATE] IN BLOOD: 108 MM/HR (ref 0–15)
GFR SERPL CREATININE-BSD FRML MDRD: 82 ML/MIN/1.73
GFR SERPL CREATININE-BSD FRML MDRD: 84 ML/MIN/1.73
GLUCOSE BLD-MCNC: 499 MG/DL (ref 65–99)
HCT VFR BLD AUTO: 35.4 % (ref 37.5–51)
HGB BLD-MCNC: 12 G/DL (ref 13–17.7)
LYMPHOCYTES # BLD AUTO: 2.7 10*3/MM3 (ref 0.7–3.1)
LYMPHOCYTES NFR BLD AUTO: 29.3 % (ref 19.6–45.3)
MCH RBC QN AUTO: 31 PG (ref 26.6–33)
MCHC RBC AUTO-ENTMCNC: 34 G/DL (ref 31.5–35.7)
MCV RBC AUTO: 91.3 FL (ref 79–97)
MONOCYTES # BLD AUTO: 0.8 10*3/MM3 (ref 0.1–0.9)
MONOCYTES NFR BLD AUTO: 8.3 % (ref 5–12)
NEUTROPHILS # BLD AUTO: 5.5 10*3/MM3 (ref 1.7–7)
NEUTROPHILS NFR BLD AUTO: 59.3 % (ref 42.7–76)
NRBC BLD AUTO-RTO: 0.1 /100 WBC (ref 0–0.2)
PLATELET # BLD AUTO: 357 10*3/MM3 (ref 140–450)
PMV BLD AUTO: 7.6 FL (ref 6–12)
POTASSIUM BLD-SCNC: 4.3 MMOL/L (ref 3.5–5.2)
RBC # BLD AUTO: 3.88 10*6/MM3 (ref 4.14–5.8)
SODIUM BLD-SCNC: 132 MMOL/L (ref 136–145)
WBC NRBC COR # BLD: 9.2 10*3/MM3 (ref 3.4–10.8)

## 2020-02-19 PROCEDURE — 82550 ASSAY OF CK (CPK): CPT | Performed by: INTERNAL MEDICINE

## 2020-02-19 PROCEDURE — 85025 COMPLETE CBC W/AUTO DIFF WBC: CPT | Performed by: INTERNAL MEDICINE

## 2020-02-19 PROCEDURE — 85652 RBC SED RATE AUTOMATED: CPT | Performed by: INTERNAL MEDICINE

## 2020-02-19 PROCEDURE — 80048 BASIC METABOLIC PNL TOTAL CA: CPT | Performed by: INTERNAL MEDICINE

## 2020-02-21 ENCOUNTER — APPOINTMENT (OUTPATIENT)
Dept: PREADMISSION TESTING | Facility: HOSPITAL | Age: 45
End: 2020-02-21

## 2020-02-24 ENCOUNTER — TELEPHONE (OUTPATIENT)
Dept: ORTHOPEDIC SURGERY | Facility: CLINIC | Age: 45
End: 2020-02-24

## 2020-02-26 ENCOUNTER — LAB REQUISITION (OUTPATIENT)
Dept: LAB | Facility: HOSPITAL | Age: 45
End: 2020-02-26

## 2020-02-26 DIAGNOSIS — Z00.00 ENCOUNTER FOR GENERAL ADULT MEDICAL EXAMINATION WITHOUT ABNORMAL FINDINGS: ICD-10-CM

## 2020-02-26 LAB
BASOPHILS # BLD AUTO: 0.1 10*3/MM3 (ref 0–0.2)
BASOPHILS NFR BLD AUTO: 0.7 % (ref 0–1.5)
CK SERPL-CCNC: 39 U/L (ref 20–200)
CREAT BLD-MCNC: 0.8 MG/DL (ref 0.76–1.27)
DEPRECATED RDW RBC AUTO: 40.7 FL (ref 37–54)
EOSINOPHIL # BLD AUTO: 0.2 10*3/MM3 (ref 0–0.4)
EOSINOPHIL NFR BLD AUTO: 1.1 % (ref 0.3–6.2)
ERYTHROCYTE [DISTWIDTH] IN BLOOD BY AUTOMATED COUNT: 12.7 % (ref 12.3–15.4)
ERYTHROCYTE [SEDIMENTATION RATE] IN BLOOD: 108 MM/HR (ref 0–15)
GFR SERPL CREATININE-BSD FRML MDRD: 105 ML/MIN/1.73
HCT VFR BLD AUTO: 34.4 % (ref 37.5–51)
HGB BLD-MCNC: 11.9 G/DL (ref 13–17.7)
LYMPHOCYTES # BLD AUTO: 3.8 10*3/MM3 (ref 0.7–3.1)
LYMPHOCYTES NFR BLD AUTO: 28 % (ref 19.6–45.3)
MCH RBC QN AUTO: 31.1 PG (ref 26.6–33)
MCHC RBC AUTO-ENTMCNC: 34.6 G/DL (ref 31.5–35.7)
MCV RBC AUTO: 89.8 FL (ref 79–97)
MONOCYTES # BLD AUTO: 1.3 10*3/MM3 (ref 0.1–0.9)
MONOCYTES NFR BLD AUTO: 9.5 % (ref 5–12)
NEUTROPHILS # BLD AUTO: 8.1 10*3/MM3 (ref 1.7–7)
NEUTROPHILS NFR BLD AUTO: 60.7 % (ref 42.7–76)
NRBC BLD AUTO-RTO: 0.1 /100 WBC (ref 0–0.2)
PLATELET # BLD AUTO: 468 10*3/MM3 (ref 140–450)
PMV BLD AUTO: 7.7 FL (ref 6–12)
RBC # BLD AUTO: 3.82 10*6/MM3 (ref 4.14–5.8)
WBC NRBC COR # BLD: 13.4 10*3/MM3 (ref 3.4–10.8)

## 2020-02-26 PROCEDURE — 85025 COMPLETE CBC W/AUTO DIFF WBC: CPT | Performed by: INTERNAL MEDICINE

## 2020-02-26 PROCEDURE — 85652 RBC SED RATE AUTOMATED: CPT | Performed by: INTERNAL MEDICINE

## 2020-02-26 PROCEDURE — 82565 ASSAY OF CREATININE: CPT | Performed by: INTERNAL MEDICINE

## 2020-02-26 PROCEDURE — 82550 ASSAY OF CK (CPK): CPT | Performed by: INTERNAL MEDICINE

## 2020-02-27 ENCOUNTER — ANESTHESIA EVENT (OUTPATIENT)
Dept: PERIOP | Facility: HOSPITAL | Age: 45
End: 2020-02-27

## 2020-02-28 ENCOUNTER — HOSPITAL ENCOUNTER (OUTPATIENT)
Facility: HOSPITAL | Age: 45
Discharge: HOME HEALTH | End: 2020-02-29
Attending: PODIATRIST | Admitting: PODIATRIST

## 2020-02-28 ENCOUNTER — APPOINTMENT (OUTPATIENT)
Dept: GENERAL RADIOLOGY | Facility: HOSPITAL | Age: 45
End: 2020-02-28

## 2020-02-28 ENCOUNTER — ANESTHESIA (OUTPATIENT)
Dept: PERIOP | Facility: HOSPITAL | Age: 45
End: 2020-02-28

## 2020-02-28 DIAGNOSIS — L97.513: ICD-10-CM

## 2020-02-28 DIAGNOSIS — M86.671 CHRONIC OSTEOMYELITIS OF RIGHT FOOT (HCC): ICD-10-CM

## 2020-02-28 PROBLEM — I95.9 HYPOTENSION: Status: ACTIVE | Noted: 2020-02-28

## 2020-02-28 PROBLEM — I25.10 CAD (CORONARY ARTERY DISEASE): Chronic | Status: ACTIVE | Noted: 2020-02-28

## 2020-02-28 LAB
ALBUMIN SERPL-MCNC: 2.4 G/DL (ref 3.5–5.2)
ALBUMIN/GLOB SERPL: 0.6 G/DL
ALP SERPL-CCNC: 113 U/L (ref 39–117)
ALT SERPL W P-5'-P-CCNC: 31 U/L (ref 1–41)
ANION GAP SERPL CALCULATED.3IONS-SCNC: 7 MMOL/L (ref 5–15)
AST SERPL-CCNC: 25 U/L (ref 1–40)
BASOPHILS # BLD AUTO: 0.1 10*3/MM3 (ref 0–0.2)
BASOPHILS NFR BLD AUTO: 0.9 % (ref 0–1.5)
BILIRUB SERPL-MCNC: 0.4 MG/DL (ref 0.2–1.2)
BUN BLD-MCNC: 22 MG/DL (ref 6–20)
BUN/CREAT SERPL: 30.6 (ref 7–25)
CALCIUM SPEC-SCNC: 8.2 MG/DL (ref 8.6–10.5)
CHLORIDE SERPL-SCNC: 102 MMOL/L (ref 98–107)
CO2 SERPL-SCNC: 29 MMOL/L (ref 22–29)
CREAT BLD-MCNC: 0.72 MG/DL (ref 0.76–1.27)
DEPRECATED RDW RBC AUTO: 40.7 FL (ref 37–54)
EOSINOPHIL # BLD AUTO: 0.3 10*3/MM3 (ref 0–0.4)
EOSINOPHIL NFR BLD AUTO: 3.3 % (ref 0.3–6.2)
ERYTHROCYTE [DISTWIDTH] IN BLOOD BY AUTOMATED COUNT: 12.6 % (ref 12.3–15.4)
GFR SERPL CREATININE-BSD FRML MDRD: 118 ML/MIN/1.73
GLOBULIN UR ELPH-MCNC: 4.3 GM/DL
GLUCOSE BLD-MCNC: 155 MG/DL (ref 65–99)
GLUCOSE BLDC GLUCOMTR-MCNC: 139 MG/DL (ref 70–105)
GLUCOSE BLDC GLUCOMTR-MCNC: 182 MG/DL (ref 70–105)
GLUCOSE BLDC GLUCOMTR-MCNC: 213 MG/DL (ref 70–105)
GLUCOSE BLDC GLUCOMTR-MCNC: 248 MG/DL (ref 70–105)
HCT VFR BLD AUTO: 32.7 % (ref 37.5–51)
HGB BLD-MCNC: 11.2 G/DL (ref 13–17.7)
LYMPHOCYTES # BLD AUTO: 2.9 10*3/MM3 (ref 0.7–3.1)
LYMPHOCYTES NFR BLD AUTO: 33.2 % (ref 19.6–45.3)
MCH RBC QN AUTO: 31.5 PG (ref 26.6–33)
MCHC RBC AUTO-ENTMCNC: 34.3 G/DL (ref 31.5–35.7)
MCV RBC AUTO: 91.8 FL (ref 79–97)
MONOCYTES # BLD AUTO: 0.8 10*3/MM3 (ref 0.1–0.9)
MONOCYTES NFR BLD AUTO: 9.3 % (ref 5–12)
NEUTROPHILS # BLD AUTO: 4.7 10*3/MM3 (ref 1.7–7)
NEUTROPHILS NFR BLD AUTO: 53.3 % (ref 42.7–76)
NRBC BLD AUTO-RTO: 0 /100 WBC (ref 0–0.2)
PLATELET # BLD AUTO: 363 10*3/MM3 (ref 140–450)
PMV BLD AUTO: 7.2 FL (ref 6–12)
POTASSIUM BLD-SCNC: 4.2 MMOL/L (ref 3.5–5.2)
PROT SERPL-MCNC: 6.7 G/DL (ref 6–8.5)
RBC # BLD AUTO: 3.56 10*6/MM3 (ref 4.14–5.8)
SODIUM BLD-SCNC: 138 MMOL/L (ref 136–145)
WBC NRBC COR # BLD: 8.8 10*3/MM3 (ref 3.4–10.8)

## 2020-02-28 PROCEDURE — 80053 COMPREHEN METABOLIC PANEL: CPT | Performed by: NURSE PRACTITIONER

## 2020-02-28 PROCEDURE — 25010000002 MIDAZOLAM PER 1 MG: Performed by: ANESTHESIOLOGIST ASSISTANT

## 2020-02-28 PROCEDURE — 71045 X-RAY EXAM CHEST 1 VIEW: CPT

## 2020-02-28 PROCEDURE — 25010000002 DAPTOMYCIN PER 1 MG: Performed by: NURSE PRACTITIONER

## 2020-02-28 PROCEDURE — 25010000002 FENTANYL CITRATE (PF) 100 MCG/2ML SOLUTION: Performed by: ANESTHESIOLOGIST ASSISTANT

## 2020-02-28 PROCEDURE — 0Y6M0Z9 DETACHMENT AT RIGHT FOOT, PARTIAL 1ST RAY, OPEN APPROACH: ICD-10-PCS | Performed by: PODIATRIST

## 2020-02-28 PROCEDURE — 87075 CULTR BACTERIA EXCEPT BLOOD: CPT | Performed by: PODIATRIST

## 2020-02-28 PROCEDURE — 88311 DECALCIFY TISSUE: CPT | Performed by: PODIATRIST

## 2020-02-28 PROCEDURE — 99218 PR INITIAL OBSERVATION CARE/DAY 30 MINUTES: CPT | Performed by: HOSPITALIST

## 2020-02-28 PROCEDURE — 28820 AMPUTATION OF TOE: CPT | Performed by: PODIATRIST

## 2020-02-28 PROCEDURE — 14040 TIS TRNFR F/C/C/M/N/A/G/H/F: CPT | Performed by: PODIATRIST

## 2020-02-28 PROCEDURE — 88305 TISSUE EXAM BY PATHOLOGIST: CPT | Performed by: PODIATRIST

## 2020-02-28 PROCEDURE — 63710000001 ONDANSETRON PER 8 MG: Performed by: NURSE PRACTITIONER

## 2020-02-28 PROCEDURE — 25010000002 VANCOMYCIN 1 G RECONSTITUTED SOLUTION 1 EACH VIAL: Performed by: ANESTHESIOLOGIST ASSISTANT

## 2020-02-28 PROCEDURE — 82962 GLUCOSE BLOOD TEST: CPT

## 2020-02-28 PROCEDURE — 02HV33Z INSERTION OF INFUSION DEVICE INTO SUPERIOR VENA CAVA, PERCUTANEOUS APPROACH: ICD-10-PCS | Performed by: HOSPITALIST

## 2020-02-28 PROCEDURE — 87070 CULTURE OTHR SPECIMN AEROBIC: CPT | Performed by: PODIATRIST

## 2020-02-28 PROCEDURE — 25010000002 CEFTRIAXONE PER 250 MG: Performed by: NURSE PRACTITIONER

## 2020-02-28 PROCEDURE — 63710000001 INSULIN GLARGINE PER 5 UNITS: Performed by: NURSE PRACTITIONER

## 2020-02-28 PROCEDURE — 87205 SMEAR GRAM STAIN: CPT | Performed by: PODIATRIST

## 2020-02-28 PROCEDURE — 25010000002 VANCOMYCIN 10 G RECONSTITUTED SOLUTION: Performed by: PODIATRIST

## 2020-02-28 PROCEDURE — 85025 COMPLETE CBC W/AUTO DIFF WBC: CPT | Performed by: NURSE PRACTITIONER

## 2020-02-28 PROCEDURE — G0378 HOSPITAL OBSERVATION PER HR: HCPCS

## 2020-02-28 PROCEDURE — 63710000001 INSULIN LISPRO (HUMAN) PER 5 UNITS: Performed by: HOSPITALIST

## 2020-02-28 PROCEDURE — 25010000002 PROPOFOL 10 MG/ML EMULSION: Performed by: ANESTHESIOLOGIST ASSISTANT

## 2020-02-28 PROCEDURE — 87176 TISSUE HOMOGENIZATION CULTR: CPT | Performed by: PODIATRIST

## 2020-02-28 PROCEDURE — 0HXMXZZ TRANSFER RIGHT FOOT SKIN, EXTERNAL APPROACH: ICD-10-PCS | Performed by: PODIATRIST

## 2020-02-28 RX ORDER — SODIUM CHLORIDE 0.9 % (FLUSH) 0.9 %
10 SYRINGE (ML) INJECTION EVERY 12 HOURS SCHEDULED
Status: DISCONTINUED | OUTPATIENT
Start: 2020-02-28 | End: 2020-02-29 | Stop reason: HOSPADM

## 2020-02-28 RX ORDER — ONDANSETRON 2 MG/ML
4 INJECTION INTRAMUSCULAR; INTRAVENOUS ONCE AS NEEDED
Status: DISCONTINUED | OUTPATIENT
Start: 2020-02-28 | End: 2020-02-28 | Stop reason: HOSPADM

## 2020-02-28 RX ORDER — ONDANSETRON 4 MG/1
4 TABLET, FILM COATED ORAL EVERY 6 HOURS PRN
Status: DISCONTINUED | OUTPATIENT
Start: 2020-02-28 | End: 2020-02-29 | Stop reason: HOSPADM

## 2020-02-28 RX ORDER — MIDAZOLAM HYDROCHLORIDE 1 MG/ML
INJECTION INTRAMUSCULAR; INTRAVENOUS AS NEEDED
Status: DISCONTINUED | OUTPATIENT
Start: 2020-02-28 | End: 2020-02-28 | Stop reason: SURG

## 2020-02-28 RX ORDER — ONDANSETRON 2 MG/ML
4 INJECTION INTRAMUSCULAR; INTRAVENOUS EVERY 6 HOURS PRN
Status: DISCONTINUED | OUTPATIENT
Start: 2020-02-28 | End: 2020-02-29 | Stop reason: HOSPADM

## 2020-02-28 RX ORDER — FENTANYL CITRATE 50 UG/ML
25 INJECTION, SOLUTION INTRAMUSCULAR; INTRAVENOUS
Status: DISCONTINUED | OUTPATIENT
Start: 2020-02-28 | End: 2020-02-28 | Stop reason: HOSPADM

## 2020-02-28 RX ORDER — SODIUM CHLORIDE 0.9 % (FLUSH) 0.9 %
10 SYRINGE (ML) INJECTION EVERY 12 HOURS SCHEDULED
Status: DISCONTINUED | OUTPATIENT
Start: 2020-02-28 | End: 2020-02-28 | Stop reason: HOSPADM

## 2020-02-28 RX ORDER — EPHEDRINE SULFATE/0.9% NACL/PF 25 MG/5 ML
SYRINGE (ML) INTRAVENOUS AS NEEDED
Status: DISCONTINUED | OUTPATIENT
Start: 2020-02-28 | End: 2020-02-28 | Stop reason: SURG

## 2020-02-28 RX ORDER — PROPOFOL 10 MG/ML
VIAL (ML) INTRAVENOUS AS NEEDED
Status: DISCONTINUED | OUTPATIENT
Start: 2020-02-28 | End: 2020-02-28 | Stop reason: SURG

## 2020-02-28 RX ORDER — BACITRACIN 50000 [IU]/1
INJECTION, POWDER, FOR SOLUTION INTRAMUSCULAR AS NEEDED
Status: DISCONTINUED | OUTPATIENT
Start: 2020-02-28 | End: 2020-02-28 | Stop reason: HOSPADM

## 2020-02-28 RX ORDER — FLUMAZENIL 0.1 MG/ML
0.1 INJECTION INTRAVENOUS AS NEEDED
Status: DISCONTINUED | OUTPATIENT
Start: 2020-02-28 | End: 2020-02-28 | Stop reason: HOSPADM

## 2020-02-28 RX ORDER — INSULIN GLARGINE 100 [IU]/ML
15 INJECTION, SOLUTION SUBCUTANEOUS NIGHTLY
Status: DISCONTINUED | OUTPATIENT
Start: 2020-02-28 | End: 2020-02-29 | Stop reason: HOSPADM

## 2020-02-28 RX ORDER — SODIUM CHLORIDE 0.9 % (FLUSH) 0.9 %
10 SYRINGE (ML) INJECTION AS NEEDED
Status: DISCONTINUED | OUTPATIENT
Start: 2020-02-28 | End: 2020-02-29 | Stop reason: HOSPADM

## 2020-02-28 RX ORDER — ACETAMINOPHEN 325 MG/1
650 TABLET ORAL EVERY 4 HOURS PRN
Status: DISCONTINUED | OUTPATIENT
Start: 2020-02-28 | End: 2020-02-29 | Stop reason: HOSPADM

## 2020-02-28 RX ORDER — PROMETHAZINE HYDROCHLORIDE 25 MG/1
25 TABLET ORAL ONCE AS NEEDED
Status: DISCONTINUED | OUTPATIENT
Start: 2020-02-28 | End: 2020-02-28 | Stop reason: HOSPADM

## 2020-02-28 RX ORDER — MAGNESIUM SULFATE 1 G/100ML
1 INJECTION INTRAVENOUS AS NEEDED
Status: DISCONTINUED | OUTPATIENT
Start: 2020-02-28 | End: 2020-02-29 | Stop reason: HOSPADM

## 2020-02-28 RX ORDER — SODIUM CHLORIDE 0.9 % (FLUSH) 0.9 %
10 SYRINGE (ML) INJECTION AS NEEDED
Status: DISCONTINUED | OUTPATIENT
Start: 2020-02-28 | End: 2020-02-28 | Stop reason: HOSPADM

## 2020-02-28 RX ORDER — PROMETHAZINE HYDROCHLORIDE 25 MG/ML
6.25 INJECTION, SOLUTION INTRAMUSCULAR; INTRAVENOUS ONCE AS NEEDED
Status: DISCONTINUED | OUTPATIENT
Start: 2020-02-28 | End: 2020-02-28 | Stop reason: HOSPADM

## 2020-02-28 RX ORDER — ACETAMINOPHEN 160 MG/5ML
650 SOLUTION ORAL EVERY 4 HOURS PRN
Status: DISCONTINUED | OUTPATIENT
Start: 2020-02-28 | End: 2020-02-29 | Stop reason: HOSPADM

## 2020-02-28 RX ORDER — METRONIDAZOLE 500 MG/1
500 TABLET ORAL EVERY 8 HOURS SCHEDULED
Status: DISCONTINUED | OUTPATIENT
Start: 2020-02-28 | End: 2020-02-29 | Stop reason: HOSPADM

## 2020-02-28 RX ORDER — VANCOMYCIN HYDROCHLORIDE
15 ONCE
Status: COMPLETED | OUTPATIENT
Start: 2020-02-28 | End: 2020-02-29

## 2020-02-28 RX ORDER — IPRATROPIUM BROMIDE AND ALBUTEROL SULFATE 2.5; .5 MG/3ML; MG/3ML
3 SOLUTION RESPIRATORY (INHALATION) ONCE AS NEEDED
Status: DISCONTINUED | OUTPATIENT
Start: 2020-02-28 | End: 2020-02-28 | Stop reason: HOSPADM

## 2020-02-28 RX ORDER — MEPERIDINE HYDROCHLORIDE 25 MG/ML
12.5 INJECTION INTRAMUSCULAR; INTRAVENOUS; SUBCUTANEOUS
Status: DISCONTINUED | OUTPATIENT
Start: 2020-02-28 | End: 2020-02-28 | Stop reason: HOSPADM

## 2020-02-28 RX ORDER — DIPHENHYDRAMINE HYDROCHLORIDE 50 MG/ML
12.5 INJECTION INTRAMUSCULAR; INTRAVENOUS
Status: DISCONTINUED | OUTPATIENT
Start: 2020-02-28 | End: 2020-02-28 | Stop reason: HOSPADM

## 2020-02-28 RX ORDER — LABETALOL HYDROCHLORIDE 5 MG/ML
5 INJECTION, SOLUTION INTRAVENOUS
Status: DISCONTINUED | OUTPATIENT
Start: 2020-02-28 | End: 2020-02-28 | Stop reason: HOSPADM

## 2020-02-28 RX ORDER — FENTANYL CITRATE 50 UG/ML
INJECTION, SOLUTION INTRAMUSCULAR; INTRAVENOUS AS NEEDED
Status: DISCONTINUED | OUTPATIENT
Start: 2020-02-28 | End: 2020-02-28 | Stop reason: SURG

## 2020-02-28 RX ORDER — SODIUM CHLORIDE 9 MG/ML
125 INJECTION, SOLUTION INTRAVENOUS CONTINUOUS
Status: DISCONTINUED | OUTPATIENT
Start: 2020-02-28 | End: 2020-02-29 | Stop reason: HOSPADM

## 2020-02-28 RX ORDER — PHENYLEPHRINE HCL IN 0.9% NACL 0.5 MG/5ML
SYRINGE (ML) INTRAVENOUS AS NEEDED
Status: DISCONTINUED | OUTPATIENT
Start: 2020-02-28 | End: 2020-02-28 | Stop reason: SURG

## 2020-02-28 RX ORDER — PROMETHAZINE HYDROCHLORIDE 25 MG/1
25 SUPPOSITORY RECTAL ONCE AS NEEDED
Status: DISCONTINUED | OUTPATIENT
Start: 2020-02-28 | End: 2020-02-28 | Stop reason: HOSPADM

## 2020-02-28 RX ORDER — NALOXONE HCL 0.4 MG/ML
0.4 VIAL (ML) INJECTION AS NEEDED
Status: DISCONTINUED | OUTPATIENT
Start: 2020-02-28 | End: 2020-02-28 | Stop reason: HOSPADM

## 2020-02-28 RX ORDER — SODIUM CHLORIDE, SODIUM LACTATE, POTASSIUM CHLORIDE, CALCIUM CHLORIDE 600; 310; 30; 20 MG/100ML; MG/100ML; MG/100ML; MG/100ML
9 INJECTION, SOLUTION INTRAVENOUS CONTINUOUS PRN
Status: DISCONTINUED | OUTPATIENT
Start: 2020-02-28 | End: 2020-02-28

## 2020-02-28 RX ORDER — ACETAMINOPHEN 650 MG/1
650 SUPPOSITORY RECTAL EVERY 4 HOURS PRN
Status: DISCONTINUED | OUTPATIENT
Start: 2020-02-28 | End: 2020-02-29 | Stop reason: HOSPADM

## 2020-02-28 RX ORDER — POTASSIUM CHLORIDE 20 MEQ/1
40 TABLET, EXTENDED RELEASE ORAL AS NEEDED
Status: DISCONTINUED | OUTPATIENT
Start: 2020-02-28 | End: 2020-02-29 | Stop reason: HOSPADM

## 2020-02-28 RX ORDER — MAGNESIUM SULFATE HEPTAHYDRATE 40 MG/ML
2 INJECTION, SOLUTION INTRAVENOUS AS NEEDED
Status: DISCONTINUED | OUTPATIENT
Start: 2020-02-28 | End: 2020-02-29 | Stop reason: HOSPADM

## 2020-02-28 RX ADMIN — FENTANYL CITRATE 50 MCG: 50 INJECTION, SOLUTION INTRAMUSCULAR; INTRAVENOUS at 09:50

## 2020-02-28 RX ADMIN — MIDAZOLAM 2 MG: 1 INJECTION INTRAMUSCULAR; INTRAVENOUS at 09:19

## 2020-02-28 RX ADMIN — SODIUM CHLORIDE, SODIUM LACTATE, POTASSIUM CHLORIDE, AND CALCIUM CHLORIDE 9 ML/HR: 600; 310; 30; 20 INJECTION, SOLUTION INTRAVENOUS at 08:42

## 2020-02-28 RX ADMIN — SODIUM CHLORIDE 125 ML/HR: 0.9 INJECTION, SOLUTION INTRAVENOUS at 17:09

## 2020-02-28 RX ADMIN — Medication 10 ML: at 20:39

## 2020-02-28 RX ADMIN — METRONIDAZOLE 500 MG: 500 TABLET ORAL at 22:16

## 2020-02-28 RX ADMIN — PROPOFOL 40 MG: 10 INJECTION, EMULSION INTRAVENOUS at 09:21

## 2020-02-28 RX ADMIN — ONDANSETRON HYDROCHLORIDE 4 MG: 4 TABLET, FILM COATED ORAL at 15:12

## 2020-02-28 RX ADMIN — Medication 10 MG: at 10:15

## 2020-02-28 RX ADMIN — VANCOMYCIN HYDROCHLORIDE 1500 MG: 1 INJECTION, POWDER, LYOPHILIZED, FOR SOLUTION INTRAVENOUS at 09:20

## 2020-02-28 RX ADMIN — VANCOMYCIN HYDROCHLORIDE 1500 MG: 10 INJECTION, POWDER, LYOPHILIZED, FOR SOLUTION INTRAVENOUS at 08:42

## 2020-02-28 RX ADMIN — CEFTRIAXONE SODIUM 2 G: 2 INJECTION, POWDER, FOR SOLUTION INTRAMUSCULAR; INTRAVENOUS at 22:16

## 2020-02-28 RX ADMIN — FENTANYL CITRATE 50 MCG: 50 INJECTION, SOLUTION INTRAMUSCULAR; INTRAVENOUS at 09:19

## 2020-02-28 RX ADMIN — INSULIN LISPRO 5 UNITS: 100 INJECTION, SOLUTION INTRAVENOUS; SUBCUTANEOUS at 17:26

## 2020-02-28 RX ADMIN — PHENYLEPHRINE HYDROCHLORIDE 100 MCG: 10 INJECTION INTRAVENOUS at 09:59

## 2020-02-28 RX ADMIN — PROPOFOL 110 MCG/KG/MIN: 10 INJECTION, EMULSION INTRAVENOUS at 09:20

## 2020-02-28 RX ADMIN — METRONIDAZOLE 500 MG: 500 TABLET ORAL at 15:12

## 2020-02-28 RX ADMIN — INSULIN LISPRO 5 UNITS: 100 INJECTION, SOLUTION INTRAVENOUS; SUBCUTANEOUS at 22:14

## 2020-02-28 RX ADMIN — INSULIN GLARGINE 15 UNITS: 100 INJECTION, SOLUTION SUBCUTANEOUS at 22:19

## 2020-02-28 RX ADMIN — DAPTOMYCIN 550 MG: 500 INJECTION, POWDER, LYOPHILIZED, FOR SOLUTION INTRAVENOUS at 13:32

## 2020-02-28 NOTE — ANESTHESIA PREPROCEDURE EVALUATION
Anesthesia Evaluation     Patient summary reviewed and Nursing notes reviewed   NPO Solid Status: > 8 hours  NPO Liquid Status: > 8 hours           Airway   Mallampati: II  TM distance: >3 FB  Neck ROM: full  No difficulty expected  Dental - normal exam     Pulmonary - negative pulmonary ROS and normal exam   Cardiovascular - normal exam    (+) CAD,       Neuro/Psych  (+) numbness,     GI/Hepatic/Renal/Endo    (+) obesity,   diabetes mellitus type 1 poorly controlled using insulin,     Musculoskeletal (-) negative ROS    Abdominal  - normal exam    Bowel sounds: normal.   Substance History - negative use     OB/GYN negative ob/gyn ROS         Other                        Anesthesia Plan    ASA 3     MAC     intravenous induction     Anesthetic plan, all risks, benefits, and alternatives have been provided, discussed and informed consent has been obtained with: patient.    Plan discussed with CAA.

## 2020-02-28 NOTE — ANESTHESIA POSTPROCEDURE EVALUATION
Patient: Maynor Gregg    Procedure Summary     Date:  02/28/20 Room / Location:  [facility] OR 08 / [facility] MAIN OR    Anesthesia Start:  0918 Anesthesia Stop:  1026    Procedure:  PARTIAL FIRST RAY RESECTION RIGHT FOOT (Right Foot) Diagnosis:       Chronic ulcer of great toe of right foot, with necrosis of muscle (CMS/HCC)      Chronic osteomyelitis of right foot (CMS/HCC)      (Chronic ulcer of great toe of right foot, with necrosis of muscle (CMS/HCC) [L97.513])      (Chronic osteomyelitis of right foot (CMS/HCC) [M86.671])    Surgeon:  CROW Souza DPM Provider:  Marci Aburto MD    Anesthesia Type:  MAC ASA Status:  3          Anesthesia Type: MAC    Vitals  Vitals Value Taken Time   /68 2/28/2020 11:08 AM   Temp     Pulse 73 2/28/2020 11:08 AM   Resp 11 2/28/2020 11:08 AM   SpO2 98 % 2/28/2020 11:08 AM   Vitals shown include unvalidated device data.        Post Anesthesia Care and Evaluation    Patient location during evaluation: PACU  Patient participation: complete - patient participated  Level of consciousness: awake  Pain score: 0 (See nurse's notes for pain score)  Pain management: adequate  Airway patency: patent  Anesthetic complications: No anesthetic complications  PONV Status: none  Cardiovascular status: acceptable  Respiratory status: acceptable  Hydration status: acceptable    Comments: Patient seen and examined postoperatively; vital signs stable; SpO2 greater than or equal to 90%; cardiopulmonary status stable; nausea/vomiting adequately controlled; pain adequately controlled; no apparent anesthesia complications; patient discharged from anesthesia care when discharge criteria were met

## 2020-02-29 VITALS
SYSTOLIC BLOOD PRESSURE: 127 MMHG | HEIGHT: 74 IN | DIASTOLIC BLOOD PRESSURE: 74 MMHG | HEART RATE: 88 BPM | RESPIRATION RATE: 16 BRPM | TEMPERATURE: 98.1 F | WEIGHT: 202.38 LBS | OXYGEN SATURATION: 98 % | BODY MASS INDEX: 25.97 KG/M2

## 2020-02-29 LAB
ALBUMIN SERPL-MCNC: 2.3 G/DL (ref 3.5–5.2)
ALBUMIN/GLOB SERPL: 0.5 G/DL
ALP SERPL-CCNC: 106 U/L (ref 39–117)
ALT SERPL W P-5'-P-CCNC: 31 U/L (ref 1–41)
ANION GAP SERPL CALCULATED.3IONS-SCNC: 7 MMOL/L (ref 5–15)
AST SERPL-CCNC: 23 U/L (ref 1–40)
BASOPHILS # BLD AUTO: 0.1 10*3/MM3 (ref 0–0.2)
BASOPHILS NFR BLD AUTO: 0.6 % (ref 0–1.5)
BILIRUB SERPL-MCNC: 0.5 MG/DL (ref 0.2–1.2)
BUN BLD-MCNC: 14 MG/DL (ref 6–20)
BUN/CREAT SERPL: 20 (ref 7–25)
CALCIUM SPEC-SCNC: 7.8 MG/DL (ref 8.6–10.5)
CHLORIDE SERPL-SCNC: 100 MMOL/L (ref 98–107)
CK SERPL-CCNC: 63 U/L (ref 20–200)
CO2 SERPL-SCNC: 27 MMOL/L (ref 22–29)
CREAT BLD-MCNC: 0.7 MG/DL (ref 0.76–1.27)
DEPRECATED RDW RBC AUTO: 39.8 FL (ref 37–54)
EOSINOPHIL # BLD AUTO: 0.3 10*3/MM3 (ref 0–0.4)
EOSINOPHIL NFR BLD AUTO: 2.4 % (ref 0.3–6.2)
ERYTHROCYTE [DISTWIDTH] IN BLOOD BY AUTOMATED COUNT: 12.4 % (ref 12.3–15.4)
GFR SERPL CREATININE-BSD FRML MDRD: 122 ML/MIN/1.73
GLOBULIN UR ELPH-MCNC: 4.2 GM/DL
GLUCOSE BLD-MCNC: 235 MG/DL (ref 65–99)
GLUCOSE BLDC GLUCOMTR-MCNC: 175 MG/DL (ref 70–105)
GLUCOSE BLDC GLUCOMTR-MCNC: 214 MG/DL (ref 70–105)
HCT VFR BLD AUTO: 32.7 % (ref 37.5–51)
HGB BLD-MCNC: 11.1 G/DL (ref 13–17.7)
LYMPHOCYTES # BLD AUTO: 2.7 10*3/MM3 (ref 0.7–3.1)
LYMPHOCYTES NFR BLD AUTO: 26.1 % (ref 19.6–45.3)
MAGNESIUM SERPL-MCNC: 2.2 MG/DL (ref 1.6–2.6)
MCH RBC QN AUTO: 30.6 PG (ref 26.6–33)
MCHC RBC AUTO-ENTMCNC: 33.9 G/DL (ref 31.5–35.7)
MCV RBC AUTO: 90.3 FL (ref 79–97)
MONOCYTES # BLD AUTO: 0.9 10*3/MM3 (ref 0.1–0.9)
MONOCYTES NFR BLD AUTO: 8.6 % (ref 5–12)
NEUTROPHILS # BLD AUTO: 6.5 10*3/MM3 (ref 1.7–7)
NEUTROPHILS NFR BLD AUTO: 62.3 % (ref 42.7–76)
NRBC BLD AUTO-RTO: 0 /100 WBC (ref 0–0.2)
PLATELET # BLD AUTO: 380 10*3/MM3 (ref 140–450)
PMV BLD AUTO: 7.3 FL (ref 6–12)
POTASSIUM BLD-SCNC: 4.3 MMOL/L (ref 3.5–5.2)
PROT SERPL-MCNC: 6.5 G/DL (ref 6–8.5)
RBC # BLD AUTO: 3.62 10*6/MM3 (ref 4.14–5.8)
SODIUM BLD-SCNC: 134 MMOL/L (ref 136–145)
WBC NRBC COR # BLD: 10.5 10*3/MM3 (ref 3.4–10.8)

## 2020-02-29 PROCEDURE — 83735 ASSAY OF MAGNESIUM: CPT | Performed by: NURSE PRACTITIONER

## 2020-02-29 PROCEDURE — 85025 COMPLETE CBC W/AUTO DIFF WBC: CPT | Performed by: NURSE PRACTITIONER

## 2020-02-29 PROCEDURE — 25010000002 DAPTOMYCIN PER 1 MG: Performed by: NURSE PRACTITIONER

## 2020-02-29 PROCEDURE — 80053 COMPREHEN METABOLIC PANEL: CPT | Performed by: NURSE PRACTITIONER

## 2020-02-29 PROCEDURE — 63710000001 INSULIN LISPRO (HUMAN) PER 5 UNITS: Performed by: HOSPITALIST

## 2020-02-29 PROCEDURE — 99217 PR OBSERVATION CARE DISCHARGE MANAGEMENT: CPT | Performed by: HOSPITALIST

## 2020-02-29 PROCEDURE — G0378 HOSPITAL OBSERVATION PER HR: HCPCS

## 2020-02-29 PROCEDURE — 82550 ASSAY OF CK (CPK): CPT | Performed by: NURSE PRACTITIONER

## 2020-02-29 PROCEDURE — C1751 CATH, INF, PER/CENT/MIDLINE: HCPCS

## 2020-02-29 PROCEDURE — 82962 GLUCOSE BLOOD TEST: CPT

## 2020-02-29 RX ORDER — SODIUM CHLORIDE 0.9 % (FLUSH) 0.9 %
10 SYRINGE (ML) INJECTION EVERY 12 HOURS SCHEDULED
Status: DISCONTINUED | OUTPATIENT
Start: 2020-02-29 | End: 2020-02-29 | Stop reason: HOSPADM

## 2020-02-29 RX ORDER — SODIUM CHLORIDE 0.9 % (FLUSH) 0.9 %
10 SYRINGE (ML) INJECTION AS NEEDED
Status: DISCONTINUED | OUTPATIENT
Start: 2020-02-29 | End: 2020-02-29

## 2020-02-29 RX ORDER — SODIUM CHLORIDE 0.9 % (FLUSH) 0.9 %
10 SYRINGE (ML) INJECTION EVERY 12 HOURS SCHEDULED
Status: DISCONTINUED | OUTPATIENT
Start: 2020-02-29 | End: 2020-02-29

## 2020-02-29 RX ORDER — SODIUM CHLORIDE 0.9 % (FLUSH) 0.9 %
20 SYRINGE (ML) INJECTION AS NEEDED
Status: DISCONTINUED | OUTPATIENT
Start: 2020-02-29 | End: 2020-02-29 | Stop reason: HOSPADM

## 2020-02-29 RX ORDER — SODIUM CHLORIDE 0.9 % (FLUSH) 0.9 %
10 SYRINGE (ML) INJECTION AS NEEDED
Status: DISCONTINUED | OUTPATIENT
Start: 2020-02-29 | End: 2020-02-29 | Stop reason: HOSPADM

## 2020-02-29 RX ORDER — SODIUM CHLORIDE 0.9 % (FLUSH) 0.9 %
20 SYRINGE (ML) INJECTION AS NEEDED
Status: DISCONTINUED | OUTPATIENT
Start: 2020-02-29 | End: 2020-02-29

## 2020-02-29 RX ADMIN — METRONIDAZOLE 500 MG: 500 TABLET ORAL at 14:47

## 2020-02-29 RX ADMIN — DAPTOMYCIN 550 MG: 500 INJECTION, POWDER, LYOPHILIZED, FOR SOLUTION INTRAVENOUS at 14:46

## 2020-02-29 RX ADMIN — INSULIN LISPRO 5 UNITS: 100 INJECTION, SOLUTION INTRAVENOUS; SUBCUTANEOUS at 08:00

## 2020-02-29 RX ADMIN — Medication 10 ML: at 08:06

## 2020-02-29 RX ADMIN — METRONIDAZOLE 500 MG: 500 TABLET ORAL at 05:30

## 2020-02-29 RX ADMIN — INSULIN LISPRO 5 UNITS: 100 INJECTION, SOLUTION INTRAVENOUS; SUBCUTANEOUS at 12:00

## 2020-03-01 ENCOUNTER — READMISSION MANAGEMENT (OUTPATIENT)
Dept: CALL CENTER | Facility: HOSPITAL | Age: 45
End: 2020-03-01

## 2020-03-01 LAB
BACTERIA SPEC AEROBE CULT: ABNORMAL
GRAM STN SPEC: ABNORMAL
GRAM STN SPEC: ABNORMAL

## 2020-03-01 NOTE — OUTREACH NOTE
Prep Survey      Responses   Facility patient discharged from?  [facility]   Is patient eligible?  Yes   Discharge diagnosis  Right foot osteomyelitis S/P Partial first ray resection right foot    Does the patient have one of the following disease processes/diagnoses(primary or secondary)?  General Surgery   Does the patient have Home health ordered?  Yes   What is the Home health agency?    [facility] (for IV abx)   Is there a DME ordered?  No   Prep survey completed?  Yes          Cora Vilchis RN

## 2020-03-02 LAB
LAB AP CASE REPORT: NORMAL
PATH REPORT.FINAL DX SPEC: NORMAL
PATH REPORT.GROSS SPEC: NORMAL

## 2020-03-03 ENCOUNTER — READMISSION MANAGEMENT (OUTPATIENT)
Dept: CALL CENTER | Facility: HOSPITAL | Age: 45
End: 2020-03-03

## 2020-03-03 NOTE — OUTREACH NOTE
General Surgery Week 1 Survey      Responses   [facility] patient discharged from?  [facility]   Does the patient have one of the following disease processes/diagnoses(primary or secondary)?  General Surgery   Is there a successful TCM telephone encounter documented?  No   Week 1 attempt successful?  Yes   Call start time  1116   Call end time  1118   Discharge diagnosis  Right foot osteomyelitis S/P Partial first ray resection right foot    Medication alerts for this patient  IV abx x6 weeks   Meds reviewed with patient/caregiver?  Yes   Is the patient having any side effects they believe may be caused by any medication additions or changes?  No   Does the patient have all medications related to this admission filled (includes all antibiotics, pain medications, etc.)  Yes   Is the patient taking all medications as directed (includes completed medication regime)?  Yes   Does the patient have a follow up appointment scheduled with their surgeon?  Yes   Has the patient kept scheduled appointments due by today?  N/A   What is the Home health agency?    [facility] (for [facility]x)   Has home health visited the patient within 72 hours of discharge?  Yes   Did the patient receive a copy of their discharge instructions?  Yes   Nursing interventions  Reviewed instructions with patient   What is the patient's perception of their health status since discharge?  Improving   Nursing interventions  Nurse provided patient education   Is the patient /caregiver able to teach back basic post-op care?  Practice 'cough and deep breath', Drive as instructed by MD in discharge instructions, Take showers only when approved by MD-sponge bathe until then, No tub bath, swimming, or hot tub until instructed by MD, Keep incision areas clean,dry and protected, Do not remove steri-strips, Lifting as instructed by MD in discharge instructions   Is the patient/caregiver able to teach back signs and symptoms of incisional infection?   Increased redness, swelling or pain at the incisonal site, Increased drainage or bleeding, Incisional warmth, Pus or odor from incision, Fever   Is the patient/caregiver able to teach back steps to recovery at home?  Set small, achievable goals for return to baseline health, Rest and rebuild strength, gradually increase activity, Weigh daily, Practice good oral hygiene, Eat a well-balance diet, Make a list of questions for surgeon's appointment   Is the patient/caregiver able to teach back the hierarchy of who to call/visit for symptoms/problems? PCP, Specialist, Home health nurse, Urgent Care, ED, 911  Yes   Additional teach back comments  Patient states he is waiting to hear back for Dr. Souza office regarding his medical leave.  States home health is coming in.     Week 1 call completed?  Yes   Wrap up additional comments  Pt waiting on Dr. Souza office to return his call          Lora Turcios LPN

## 2020-03-04 ENCOUNTER — LAB REQUISITION (OUTPATIENT)
Dept: LAB | Facility: HOSPITAL | Age: 45
End: 2020-03-04

## 2020-03-04 DIAGNOSIS — Z00.00 ENCOUNTER FOR GENERAL ADULT MEDICAL EXAMINATION WITHOUT ABNORMAL FINDINGS: ICD-10-CM

## 2020-03-04 LAB
ALBUMIN SERPL-MCNC: 2.9 G/DL (ref 3.5–5.2)
ALBUMIN/GLOB SERPL: 0.5 G/DL
ALP SERPL-CCNC: 142 U/L (ref 39–117)
ALT SERPL W P-5'-P-CCNC: 41 U/L (ref 1–41)
ANION GAP SERPL CALCULATED.3IONS-SCNC: 8 MMOL/L (ref 5–15)
AST SERPL-CCNC: 22 U/L (ref 1–40)
BACTERIA SPEC ANAEROBE CULT: NORMAL
BASOPHILS # BLD AUTO: 0.1 10*3/MM3 (ref 0–0.2)
BASOPHILS NFR BLD AUTO: 0.9 % (ref 0–1.5)
BILIRUB SERPL-MCNC: 0.5 MG/DL (ref 0.2–1.2)
BUN BLD-MCNC: 12 MG/DL (ref 6–20)
BUN/CREAT SERPL: 14.1 (ref 7–25)
CALCIUM SPEC-SCNC: 9.3 MG/DL (ref 8.6–10.5)
CHLORIDE SERPL-SCNC: 93 MMOL/L (ref 98–107)
CK SERPL-CCNC: 41 U/L (ref 20–200)
CO2 SERPL-SCNC: 29 MMOL/L (ref 22–29)
CREAT BLD-MCNC: 0.85 MG/DL (ref 0.76–1.27)
DEPRECATED RDW RBC AUTO: 39.4 FL (ref 37–54)
EOSINOPHIL # BLD AUTO: 0.3 10*3/MM3 (ref 0–0.4)
EOSINOPHIL NFR BLD AUTO: 2.8 % (ref 0.3–6.2)
ERYTHROCYTE [DISTWIDTH] IN BLOOD BY AUTOMATED COUNT: 12.4 % (ref 12.3–15.4)
ERYTHROCYTE [SEDIMENTATION RATE] IN BLOOD: 91 MM/HR (ref 0–15)
GFR SERPL CREATININE-BSD FRML MDRD: 97 ML/MIN/1.73
GLOBULIN UR ELPH-MCNC: 5.5 GM/DL
GLUCOSE BLD-MCNC: 402 MG/DL (ref 65–99)
HCT VFR BLD AUTO: 37.1 % (ref 37.5–51)
HGB BLD-MCNC: 13.1 G/DL (ref 13–17.7)
LYMPHOCYTES # BLD AUTO: 2.9 10*3/MM3 (ref 0.7–3.1)
LYMPHOCYTES NFR BLD AUTO: 24.5 % (ref 19.6–45.3)
MCH RBC QN AUTO: 31.5 PG (ref 26.6–33)
MCHC RBC AUTO-ENTMCNC: 35.3 G/DL (ref 31.5–35.7)
MCV RBC AUTO: 89.3 FL (ref 79–97)
MONOCYTES # BLD AUTO: 0.9 10*3/MM3 (ref 0.1–0.9)
MONOCYTES NFR BLD AUTO: 7.8 % (ref 5–12)
NEUTROPHILS # BLD AUTO: 7.4 10*3/MM3 (ref 1.7–7)
NEUTROPHILS NFR BLD AUTO: 64 % (ref 42.7–76)
NRBC BLD AUTO-RTO: 0 /100 WBC (ref 0–0.2)
PLATELET # BLD AUTO: 460 10*3/MM3 (ref 140–450)
PMV BLD AUTO: 7.5 FL (ref 6–12)
POTASSIUM BLD-SCNC: 4.2 MMOL/L (ref 3.5–5.2)
PROT SERPL-MCNC: 8.4 G/DL (ref 6–8.5)
RBC # BLD AUTO: 4.15 10*6/MM3 (ref 4.14–5.8)
SODIUM BLD-SCNC: 130 MMOL/L (ref 136–145)
WBC NRBC COR # BLD: 11.7 10*3/MM3 (ref 3.4–10.8)

## 2020-03-04 PROCEDURE — 82550 ASSAY OF CK (CPK): CPT | Performed by: INTERNAL MEDICINE

## 2020-03-04 PROCEDURE — 85652 RBC SED RATE AUTOMATED: CPT | Performed by: INTERNAL MEDICINE

## 2020-03-04 PROCEDURE — 80053 COMPREHEN METABOLIC PANEL: CPT | Performed by: INTERNAL MEDICINE

## 2020-03-04 PROCEDURE — 85025 COMPLETE CBC W/AUTO DIFF WBC: CPT | Performed by: INTERNAL MEDICINE

## 2020-03-11 ENCOUNTER — LAB REQUISITION (OUTPATIENT)
Dept: LAB | Facility: HOSPITAL | Age: 45
End: 2020-03-11

## 2020-03-11 DIAGNOSIS — Z00.00 ENCOUNTER FOR GENERAL ADULT MEDICAL EXAMINATION WITHOUT ABNORMAL FINDINGS: ICD-10-CM

## 2020-03-11 LAB
ALBUMIN SERPL-MCNC: 2.7 G/DL (ref 3.5–5.2)
ALBUMIN/GLOB SERPL: 0.6 G/DL
ALP SERPL-CCNC: 121 U/L (ref 39–117)
ALT SERPL W P-5'-P-CCNC: 38 U/L (ref 1–41)
ANION GAP SERPL CALCULATED.3IONS-SCNC: 10 MMOL/L (ref 5–15)
AST SERPL-CCNC: 23 U/L (ref 1–40)
BASOPHILS # BLD AUTO: 0.1 10*3/MM3 (ref 0–0.2)
BASOPHILS NFR BLD AUTO: 1 % (ref 0–1.5)
BILIRUB SERPL-MCNC: 0.5 MG/DL (ref 0.2–1.2)
BUN BLD-MCNC: 13 MG/DL (ref 6–20)
BUN/CREAT SERPL: 16.7 (ref 7–25)
CALCIUM SPEC-SCNC: 8.6 MG/DL (ref 8.6–10.5)
CHLORIDE SERPL-SCNC: 96 MMOL/L (ref 98–107)
CK SERPL-CCNC: 79 U/L (ref 20–200)
CO2 SERPL-SCNC: 30 MMOL/L (ref 22–29)
CREAT BLD-MCNC: 0.78 MG/DL (ref 0.76–1.27)
DEPRECATED RDW RBC AUTO: 40.3 FL (ref 37–54)
EOSINOPHIL # BLD AUTO: 0.2 10*3/MM3 (ref 0–0.4)
EOSINOPHIL NFR BLD AUTO: 2.8 % (ref 0.3–6.2)
ERYTHROCYTE [DISTWIDTH] IN BLOOD BY AUTOMATED COUNT: 12.9 % (ref 12.3–15.4)
ERYTHROCYTE [SEDIMENTATION RATE] IN BLOOD: 78 MM/HR (ref 0–15)
GFR SERPL CREATININE-BSD FRML MDRD: 108 ML/MIN/1.73
GLOBULIN UR ELPH-MCNC: 4.6 GM/DL
GLUCOSE BLD-MCNC: 300 MG/DL (ref 65–99)
HCT VFR BLD AUTO: 33 % (ref 37.5–51)
HGB BLD-MCNC: 12 G/DL (ref 13–17.7)
LYMPHOCYTES # BLD AUTO: 2.8 10*3/MM3 (ref 0.7–3.1)
LYMPHOCYTES NFR BLD AUTO: 35 % (ref 19.6–45.3)
MCH RBC QN AUTO: 32.3 PG (ref 26.6–33)
MCHC RBC AUTO-ENTMCNC: 36.3 G/DL (ref 31.5–35.7)
MCV RBC AUTO: 89.2 FL (ref 79–97)
MONOCYTES # BLD AUTO: 0.8 10*3/MM3 (ref 0.1–0.9)
MONOCYTES NFR BLD AUTO: 10.3 % (ref 5–12)
NEUTROPHILS # BLD AUTO: 4 10*3/MM3 (ref 1.7–7)
NEUTROPHILS NFR BLD AUTO: 50.9 % (ref 42.7–76)
NRBC BLD AUTO-RTO: 0 /100 WBC (ref 0–0.2)
PLATELET # BLD AUTO: 279 10*3/MM3 (ref 140–450)
PMV BLD AUTO: 8 FL (ref 6–12)
POTASSIUM BLD-SCNC: 4.4 MMOL/L (ref 3.5–5.2)
PROT SERPL-MCNC: 7.3 G/DL (ref 6–8.5)
RBC # BLD AUTO: 3.7 10*6/MM3 (ref 4.14–5.8)
SODIUM BLD-SCNC: 136 MMOL/L (ref 136–145)
WBC NRBC COR # BLD: 7.9 10*3/MM3 (ref 3.4–10.8)

## 2020-03-11 PROCEDURE — 85652 RBC SED RATE AUTOMATED: CPT | Performed by: INTERNAL MEDICINE

## 2020-03-11 PROCEDURE — 85025 COMPLETE CBC W/AUTO DIFF WBC: CPT | Performed by: INTERNAL MEDICINE

## 2020-03-11 PROCEDURE — 82550 ASSAY OF CK (CPK): CPT | Performed by: INTERNAL MEDICINE

## 2020-03-11 PROCEDURE — 80053 COMPREHEN METABOLIC PANEL: CPT | Performed by: INTERNAL MEDICINE

## 2020-03-12 ENCOUNTER — OFFICE VISIT (OUTPATIENT)
Dept: PODIATRY | Facility: CLINIC | Age: 45
End: 2020-03-12

## 2020-03-12 VITALS
HEART RATE: 109 BPM | SYSTOLIC BLOOD PRESSURE: 150 MMHG | HEIGHT: 75 IN | WEIGHT: 208 LBS | BODY MASS INDEX: 25.86 KG/M2 | DIASTOLIC BLOOD PRESSURE: 80 MMHG

## 2020-03-12 DIAGNOSIS — L97.512 CHRONIC ULCER OF RIGHT FOOT WITH FAT LAYER EXPOSED (HCC): Primary | ICD-10-CM

## 2020-03-12 DIAGNOSIS — M86.671 CHRONIC OSTEOMYELITIS OF RIGHT FOOT (HCC): ICD-10-CM

## 2020-03-12 DIAGNOSIS — E11.42 DM TYPE 2 WITH DIABETIC PERIPHERAL NEUROPATHY (HCC): ICD-10-CM

## 2020-03-12 PROCEDURE — 99213 OFFICE O/P EST LOW 20 MIN: CPT | Performed by: PODIATRIST

## 2020-03-12 NOTE — PROGRESS NOTES
"03/12/2020  Foot and Ankle Surgery - Established Patient/Follow-up  Provider: Dr. Adan Souza DPM  Location: [facility]    Subjective:  Maynor Gregg is a 45 y.o. male.     Chief Complaint   Patient presents with   • Right Foot - Follow-up       HPI: Patient returns for follow-up regarding his right foot but now complains of a large wound involving the lateral aspect of the foot.  Patient did have a previous stable blister to this area which has now avulsed showing underlying wound.  He has attempted nonweightbearing activity to the right foot but states that he did fall.  He is receiving the IV antibiotics as directed.    Allergies   Allergen Reactions   • Metformin Diarrhea and Nausea And Vomiting   • Oxycodone-Acetaminophen Nausea And Vomiting and Rash       Current Outpatient Medications on File Prior to Visit   Medication Sig Dispense Refill   • aspirin 325 MG tablet Take 325 mg by mouth Daily. Pt to stop 2/27 per Angelique     • Insulin Glargine (BASAGLAR KWIKPEN) 100 UNIT/ML injection pen Inject 15 Units under the skin into the appropriate area as directed Every Night.     • [DISCONTINUED] insulin lispro (HUMALOG) 100 UNIT/ML injection Inject 5 Units under the skin into the appropriate area as directed Every 8 (Eight) Hours. SSI  12     No current facility-administered medications on file prior to visit.        Objective   /80   Pulse 109   Ht 190.5 cm (75\")   Wt 94.3 kg (208 lb)   BMI 26.00 kg/m²     General: Mild distress.  Alert and oriented x3.   Vascular: DP and PT pulses are palpable.  CFT are intact to the digits  Neuro: Sensation remains diminished.  Motor function is intact  MSK: s/p left second digit amputation.  Left foot appears stable with continued edema.  No progressive deformity instability.  Right foot remains stable without tenderness  Derm: Incision site remains mostly intact with stable sutures and staple placement.  He does have a small area of dehiscence noted " to the plantar incision site with extension to the subcutaneous tissues.  Ulceration to the lateral column of the foot measuring 3.5 x 1 cm and extending to the subcutaneous tissue.  No concerning features of infection are present    Assessment/Plan   Maynor was seen today for follow-up.    Diagnoses and all orders for this visit:    Chronic ulcer of right foot with fat layer exposed (CMS/HCC)    Chronic osteomyelitis of right foot (CMS/HCC)    DM type 2 with diabetic peripheral neuropathy (CMS/HCC)      Patient returns for follow-up after right partial first ray resection; however, now he does have an open wound to the lateral aspect of the foot.  The wound is relatively stable but does extend to the subcutaneous tissue region.  I have asked that he apply Betadine to this wound along with the incision site on a daily basis.  I do recommend that he remain strictly off weightbearing and in the cam boot for added protection.  He is to call with any progressive signs of infection.  I would like him to continue receiving the IV antibiotics and I will see him in 2 weeks for reevaluation    No orders of the defined types were placed in this encounter.         Note is dictated utilizing voice recognition software. Unfortunately this leads to occasional typographical errors. I apologize in advance if the situation occurs. If questions occur please do not hesitate to call our office.

## 2020-03-18 ENCOUNTER — LAB REQUISITION (OUTPATIENT)
Dept: LAB | Facility: HOSPITAL | Age: 45
End: 2020-03-18

## 2020-03-18 DIAGNOSIS — M86.671 OTHER CHRONIC OSTEOMYELITIS, RIGHT ANKLE AND FOOT (HCC): ICD-10-CM

## 2020-03-18 LAB
ALBUMIN SERPL-MCNC: 3.1 G/DL (ref 3.5–5.2)
ALBUMIN/GLOB SERPL: 0.6 G/DL
ALP SERPL-CCNC: 129 U/L (ref 39–117)
ALT SERPL W P-5'-P-CCNC: 38 U/L (ref 1–41)
ANION GAP SERPL CALCULATED.3IONS-SCNC: 10 MMOL/L (ref 5–15)
AST SERPL-CCNC: 21 U/L (ref 1–40)
BASOPHILS # BLD AUTO: 0.1 10*3/MM3 (ref 0–0.2)
BASOPHILS NFR BLD AUTO: 0.8 % (ref 0–1.5)
BILIRUB SERPL-MCNC: 0.5 MG/DL (ref 0.2–1.2)
BUN BLD-MCNC: 17 MG/DL (ref 6–20)
BUN/CREAT SERPL: 19.3 (ref 7–25)
CALCIUM SPEC-SCNC: 8.9 MG/DL (ref 8.6–10.5)
CHLORIDE SERPL-SCNC: 95 MMOL/L (ref 98–107)
CK SERPL-CCNC: 84 U/L (ref 20–200)
CO2 SERPL-SCNC: 28 MMOL/L (ref 22–29)
CREAT BLD-MCNC: 0.88 MG/DL (ref 0.76–1.27)
DEPRECATED RDW RBC AUTO: 40.7 FL (ref 37–54)
EOSINOPHIL # BLD AUTO: 0.1 10*3/MM3 (ref 0–0.4)
EOSINOPHIL NFR BLD AUTO: 1.3 % (ref 0.3–6.2)
ERYTHROCYTE [DISTWIDTH] IN BLOOD BY AUTOMATED COUNT: 12.9 % (ref 12.3–15.4)
GFR SERPL CREATININE-BSD FRML MDRD: 94 ML/MIN/1.73
GLOBULIN UR ELPH-MCNC: 4.9 GM/DL
GLUCOSE BLD-MCNC: 404 MG/DL (ref 65–99)
HCT VFR BLD AUTO: 33.3 % (ref 37.5–51)
HGB BLD-MCNC: 11.7 G/DL (ref 13–17.7)
LYMPHOCYTES # BLD AUTO: 2.7 10*3/MM3 (ref 0.7–3.1)
LYMPHOCYTES NFR BLD AUTO: 25 % (ref 19.6–45.3)
MCH RBC QN AUTO: 32 PG (ref 26.6–33)
MCHC RBC AUTO-ENTMCNC: 35.3 G/DL (ref 31.5–35.7)
MCV RBC AUTO: 90.6 FL (ref 79–97)
MONOCYTES # BLD AUTO: 0.9 10*3/MM3 (ref 0.1–0.9)
MONOCYTES NFR BLD AUTO: 8 % (ref 5–12)
NEUTROPHILS # BLD AUTO: 6.9 10*3/MM3 (ref 1.7–7)
NEUTROPHILS NFR BLD AUTO: 64.9 % (ref 42.7–76)
NRBC BLD AUTO-RTO: 0.1 /100 WBC (ref 0–0.2)
PLATELET # BLD AUTO: 290 10*3/MM3 (ref 140–450)
PMV BLD AUTO: 8 FL (ref 6–12)
POTASSIUM BLD-SCNC: 4.3 MMOL/L (ref 3.5–5.2)
PROT SERPL-MCNC: 8 G/DL (ref 6–8.5)
RBC # BLD AUTO: 3.67 10*6/MM3 (ref 4.14–5.8)
SODIUM BLD-SCNC: 133 MMOL/L (ref 136–145)
WBC NRBC COR # BLD: 10.7 10*3/MM3 (ref 3.4–10.8)

## 2020-03-18 PROCEDURE — 85025 COMPLETE CBC W/AUTO DIFF WBC: CPT | Performed by: INTERNAL MEDICINE

## 2020-03-18 PROCEDURE — 80053 COMPREHEN METABOLIC PANEL: CPT | Performed by: INTERNAL MEDICINE

## 2020-03-18 PROCEDURE — 82550 ASSAY OF CK (CPK): CPT | Performed by: INTERNAL MEDICINE

## 2020-03-19 RX ORDER — GABAPENTIN 300 MG/1
CAPSULE ORAL
Qty: 87 CAPSULE | Refills: 0 | Status: SHIPPED | OUTPATIENT
Start: 2020-03-19 | End: 2020-04-18

## 2020-03-25 ENCOUNTER — LAB REQUISITION (OUTPATIENT)
Dept: LAB | Facility: HOSPITAL | Age: 45
End: 2020-03-25

## 2020-03-25 DIAGNOSIS — Z00.00 ENCOUNTER FOR GENERAL ADULT MEDICAL EXAMINATION WITHOUT ABNORMAL FINDINGS: ICD-10-CM

## 2020-03-25 LAB
ALBUMIN SERPL-MCNC: 3 G/DL (ref 3.5–5.2)
ALBUMIN/GLOB SERPL: 0.7 G/DL
ALP SERPL-CCNC: 139 U/L (ref 39–117)
ALT SERPL W P-5'-P-CCNC: 29 U/L (ref 1–41)
ANION GAP SERPL CALCULATED.3IONS-SCNC: 8 MMOL/L (ref 5–15)
AST SERPL-CCNC: 15 U/L (ref 1–40)
BASOPHILS # BLD AUTO: 0 10*3/MM3 (ref 0–0.2)
BASOPHILS NFR BLD AUTO: 0.6 % (ref 0–1.5)
BILIRUB SERPL-MCNC: 0.3 MG/DL (ref 0.2–1.2)
BUN BLD-MCNC: 10 MG/DL (ref 6–20)
BUN/CREAT SERPL: 13 (ref 7–25)
CALCIUM SPEC-SCNC: 8.6 MG/DL (ref 8.6–10.5)
CHLORIDE SERPL-SCNC: 95 MMOL/L (ref 98–107)
CK SERPL-CCNC: 50 U/L (ref 20–200)
CO2 SERPL-SCNC: 31 MMOL/L (ref 22–29)
CREAT BLD-MCNC: 0.77 MG/DL (ref 0.76–1.27)
DEPRECATED RDW RBC AUTO: 39.8 FL (ref 37–54)
EOSINOPHIL # BLD AUTO: 0.2 10*3/MM3 (ref 0–0.4)
EOSINOPHIL NFR BLD AUTO: 3.4 % (ref 0.3–6.2)
ERYTHROCYTE [DISTWIDTH] IN BLOOD BY AUTOMATED COUNT: 12.9 % (ref 12.3–15.4)
ERYTHROCYTE [SEDIMENTATION RATE] IN BLOOD: 59 MM/HR (ref 0–15)
GFR SERPL CREATININE-BSD FRML MDRD: 109 ML/MIN/1.73
GLOBULIN UR ELPH-MCNC: 4.2 GM/DL
GLUCOSE BLD-MCNC: 423 MG/DL (ref 65–99)
HCT VFR BLD AUTO: 34.6 % (ref 37.5–51)
HGB BLD-MCNC: 12.2 G/DL (ref 13–17.7)
LYMPHOCYTES # BLD AUTO: 2 10*3/MM3 (ref 0.7–3.1)
LYMPHOCYTES NFR BLD AUTO: 30.3 % (ref 19.6–45.3)
MCH RBC QN AUTO: 31 PG (ref 26.6–33)
MCHC RBC AUTO-ENTMCNC: 35.2 G/DL (ref 31.5–35.7)
MCV RBC AUTO: 88.1 FL (ref 79–97)
MONOCYTES # BLD AUTO: 0.4 10*3/MM3 (ref 0.1–0.9)
MONOCYTES NFR BLD AUTO: 6.3 % (ref 5–12)
NEUTROPHILS # BLD AUTO: 3.9 10*3/MM3 (ref 1.7–7)
NEUTROPHILS NFR BLD AUTO: 59.4 % (ref 42.7–76)
NRBC BLD AUTO-RTO: 0.1 /100 WBC (ref 0–0.2)
PLATELET # BLD AUTO: 304 10*3/MM3 (ref 140–450)
PMV BLD AUTO: 8 FL (ref 6–12)
POTASSIUM BLD-SCNC: 3.9 MMOL/L (ref 3.5–5.2)
PROT SERPL-MCNC: 7.2 G/DL (ref 6–8.5)
RBC # BLD AUTO: 3.92 10*6/MM3 (ref 4.14–5.8)
SODIUM BLD-SCNC: 134 MMOL/L (ref 136–145)
WBC NRBC COR # BLD: 6.6 10*3/MM3 (ref 3.4–10.8)

## 2020-03-25 PROCEDURE — 80053 COMPREHEN METABOLIC PANEL: CPT | Performed by: INTERNAL MEDICINE

## 2020-03-25 PROCEDURE — 85025 COMPLETE CBC W/AUTO DIFF WBC: CPT | Performed by: INTERNAL MEDICINE

## 2020-03-25 PROCEDURE — 82550 ASSAY OF CK (CPK): CPT | Performed by: INTERNAL MEDICINE

## 2020-03-25 PROCEDURE — 85652 RBC SED RATE AUTOMATED: CPT | Performed by: INTERNAL MEDICINE

## 2020-04-01 ENCOUNTER — LAB REQUISITION (OUTPATIENT)
Dept: LAB | Facility: HOSPITAL | Age: 45
End: 2020-04-01

## 2020-04-01 DIAGNOSIS — Z00.00 ENCOUNTER FOR GENERAL ADULT MEDICAL EXAMINATION WITHOUT ABNORMAL FINDINGS: ICD-10-CM

## 2020-04-01 LAB
ALBUMIN SERPL-MCNC: 3.4 G/DL (ref 3.5–5.2)
ALBUMIN/GLOB SERPL: 0.7 G/DL
ALP SERPL-CCNC: 166 U/L (ref 39–117)
ALT SERPL W P-5'-P-CCNC: 42 U/L (ref 1–41)
ANION GAP SERPL CALCULATED.3IONS-SCNC: 13 MMOL/L (ref 5–15)
AST SERPL-CCNC: 30 U/L (ref 1–40)
BASOPHILS # BLD AUTO: 0 10*3/MM3 (ref 0–0.2)
BASOPHILS NFR BLD AUTO: 0.5 % (ref 0–1.5)
BILIRUB SERPL-MCNC: 0.7 MG/DL (ref 0.2–1.2)
BUN BLD-MCNC: 11 MG/DL (ref 6–20)
BUN/CREAT SERPL: 12.9 (ref 7–25)
CALCIUM SPEC-SCNC: 9.2 MG/DL (ref 8.6–10.5)
CHLORIDE SERPL-SCNC: 97 MMOL/L (ref 98–107)
CK SERPL-CCNC: 110 U/L (ref 20–200)
CO2 SERPL-SCNC: 31 MMOL/L (ref 22–29)
CREAT BLD-MCNC: 0.85 MG/DL (ref 0.76–1.27)
DEPRECATED RDW RBC AUTO: 41.1 FL (ref 37–54)
EOSINOPHIL # BLD AUTO: 0.3 10*3/MM3 (ref 0–0.4)
EOSINOPHIL NFR BLD AUTO: 3 % (ref 0.3–6.2)
ERYTHROCYTE [DISTWIDTH] IN BLOOD BY AUTOMATED COUNT: 13.3 % (ref 12.3–15.4)
ERYTHROCYTE [SEDIMENTATION RATE] IN BLOOD: 55 MM/HR (ref 0–15)
GFR SERPL CREATININE-BSD FRML MDRD: 97 ML/MIN/1.73
GLOBULIN UR ELPH-MCNC: 4.9 GM/DL
GLUCOSE BLD-MCNC: 98 MG/DL (ref 65–99)
HCT VFR BLD AUTO: 36.8 % (ref 37.5–51)
HGB BLD-MCNC: 13.2 G/DL (ref 13–17.7)
LYMPHOCYTES # BLD AUTO: 3.7 10*3/MM3 (ref 0.7–3.1)
LYMPHOCYTES NFR BLD AUTO: 39.6 % (ref 19.6–45.3)
MCH RBC QN AUTO: 31.5 PG (ref 26.6–33)
MCHC RBC AUTO-ENTMCNC: 35.8 G/DL (ref 31.5–35.7)
MCV RBC AUTO: 87.9 FL (ref 79–97)
MONOCYTES # BLD AUTO: 0.9 10*3/MM3 (ref 0.1–0.9)
MONOCYTES NFR BLD AUTO: 9.7 % (ref 5–12)
NEUTROPHILS # BLD AUTO: 4.4 10*3/MM3 (ref 1.7–7)
NEUTROPHILS NFR BLD AUTO: 47.2 % (ref 42.7–76)
NRBC BLD AUTO-RTO: 0 /100 WBC (ref 0–0.2)
PLATELET # BLD AUTO: 257 10*3/MM3 (ref 140–450)
PMV BLD AUTO: 7.8 FL (ref 6–12)
POTASSIUM BLD-SCNC: 3.3 MMOL/L (ref 3.5–5.2)
PROT SERPL-MCNC: 8.3 G/DL (ref 6–8.5)
RBC # BLD AUTO: 4.18 10*6/MM3 (ref 4.14–5.8)
SODIUM BLD-SCNC: 141 MMOL/L (ref 136–145)
WBC NRBC COR # BLD: 9.4 10*3/MM3 (ref 3.4–10.8)

## 2020-04-01 PROCEDURE — 85025 COMPLETE CBC W/AUTO DIFF WBC: CPT | Performed by: INTERNAL MEDICINE

## 2020-04-01 PROCEDURE — 80053 COMPREHEN METABOLIC PANEL: CPT | Performed by: INTERNAL MEDICINE

## 2020-04-01 PROCEDURE — 82550 ASSAY OF CK (CPK): CPT | Performed by: INTERNAL MEDICINE

## 2020-04-01 PROCEDURE — 85652 RBC SED RATE AUTOMATED: CPT | Performed by: INTERNAL MEDICINE

## 2020-04-09 ENCOUNTER — OFFICE VISIT (OUTPATIENT)
Dept: PODIATRY | Facility: CLINIC | Age: 45
End: 2020-04-09

## 2020-04-09 VITALS
BODY MASS INDEX: 25.86 KG/M2 | HEIGHT: 75 IN | TEMPERATURE: 97.8 F | WEIGHT: 208 LBS | DIASTOLIC BLOOD PRESSURE: 84 MMHG | HEART RATE: 92 BPM | SYSTOLIC BLOOD PRESSURE: 156 MMHG

## 2020-04-09 DIAGNOSIS — L97.513: ICD-10-CM

## 2020-04-09 DIAGNOSIS — E11.42 DM TYPE 2 WITH DIABETIC PERIPHERAL NEUROPATHY (HCC): ICD-10-CM

## 2020-04-09 DIAGNOSIS — L97.512 CHRONIC ULCER OF RIGHT FOOT WITH FAT LAYER EXPOSED (HCC): Primary | ICD-10-CM

## 2020-04-09 DIAGNOSIS — M86.671 CHRONIC OSTEOMYELITIS OF RIGHT FOOT (HCC): ICD-10-CM

## 2020-04-09 PROCEDURE — 99213 OFFICE O/P EST LOW 20 MIN: CPT | Performed by: PODIATRIST

## 2020-04-09 NOTE — PROGRESS NOTES
"04/09/2020  Foot and Ankle Surgery - Established Patient/Follow-up  Provider: Dr. Adan Souza DPM  Location: [facility]    Subjective:  Maynor Gregg is a 45 y.o. male.     Chief Complaint   Patient presents with   • Right Foot - Follow-up, Post-op, Wound Check       HPI: Patient returns for follow-up on his right foot.  Patient states that he took a fall last week and felt a pop to the plantar aspect of his foot.  He did check and noticed that the wound was much larger to the plantar region.  The lateral wound remained relatively unchanged.  He has been performing dressing changes as directed.  He continues to receive the IV antibiotics.  Denies any constitutional signs of infection.  States that he has an upcoming appointment with his primary care physician.    Allergies   Allergen Reactions   • Metformin Diarrhea and Nausea And Vomiting   • Oxycodone-Acetaminophen Nausea And Vomiting and Rash       Current Outpatient Medications on File Prior to Visit   Medication Sig Dispense Refill   • aspirin 325 MG tablet Take 325 mg by mouth Daily. Pt to stop 2/27 per Angelique     • gabapentin (NEURONTIN) 300 MG capsule Take 1 capsule by mouth Daily for 1 day, THEN 1 capsule 2 (Two) Times a Day for 1 day, THEN 1 capsule 3 (Three) Times a Day for 28 days. 87 capsule 0   • Insulin Glargine (BASAGLAR KWIKPEN) 100 UNIT/ML injection pen Inject 15 Units under the skin into the appropriate area as directed Every Night.     • NOVOLOG 100 UNIT/ML injection INJECT BY SUBCUTANEOUS ROUTE 5 UNITS EVERY 8 HRS       No current facility-administered medications on file prior to visit.        Objective   /84   Pulse 92   Temp 97.8 °F (36.6 °C) (Oral)   Ht 190.5 cm (75\")   Wt 94.3 kg (208 lb)   BMI 26.00 kg/m²     General: Mild distress.  Alert and oriented x3.   Vascular: DP and PT pulses are palpable.  CFT are intact to the digits  Neuro: Sensation remains diminished.  Motor function is intact  MSK: s/p left " second digit amputation.  Left foot appears stable with continued edema.  No progressive deformity instability.  Right foot remains stable without tenderness  Derm: A majority of the incision site remains well-healed.  He does have a large area of dehiscence with undermining and mild maceration to the plantar foot.  The wound measures approximately 2 cm in diameter and does extend to the subcutaneous tissue.  No obvious extension to the level of bone.  No purulence.  He continues to have ulceration to the subcutaneous tissue on the lateral column of the foot measuring the same at 3.5 x 1 cm.    Assessment/Plan   Maynor was seen today for follow-up, post-op and wound check.    Diagnoses and all orders for this visit:    Chronic ulcer of right foot with fat layer exposed (CMS/HCC)    Chronic ulcer of great toe of right foot, with necrosis of muscle (CMS/HCC)    Chronic osteomyelitis of right foot (CMS/HCC)    DM type 2 with diabetic peripheral neuropathy (CMS/HCC)      Patient returns with continued issues involving the right foot.  He states that he did fall and feels that the plantar wound increased in size.  Today, he does have mild undermining and maceration.  I did discuss the importance of strict nonweightbearing with him.  I would like him to finish his IV antibiotics.  He is to perform daily Betadine wet-to-dry dressing changes and proper padding to the wound sites.  He is to call with any concerning features.  I do feel that he needs to keep his follow-up appointment with his primary care physician for glycemic management.  Patient may need to consider endocrinology referral.  Patient understands and agrees.  He understands that he remains at high risk of major limb loss.  I would like to see him in 2 weeks for reevaluation.    No orders of the defined types were placed in this encounter.         Note is dictated utilizing voice recognition software. Unfortunately this leads to occasional typographical  errors. I apologize in advance if the situation occurs. If questions occur please do not hesitate to call our office.

## 2020-04-26 ENCOUNTER — APPOINTMENT (OUTPATIENT)
Dept: CT IMAGING | Facility: HOSPITAL | Age: 45
End: 2020-04-26

## 2020-04-26 ENCOUNTER — HOSPITAL ENCOUNTER (INPATIENT)
Facility: HOSPITAL | Age: 45
LOS: 7 days | Discharge: HOME HEALTH | End: 2020-05-03
Attending: INTERNAL MEDICINE | Admitting: HOSPITALIST

## 2020-04-26 ENCOUNTER — APPOINTMENT (OUTPATIENT)
Dept: GENERAL RADIOLOGY | Facility: HOSPITAL | Age: 45
End: 2020-04-26

## 2020-04-26 DIAGNOSIS — M86.471 CHRONIC OSTEOMYELITIS OF RIGHT FOOT WITH DRAINING SINUS (HCC): ICD-10-CM

## 2020-04-26 DIAGNOSIS — L97.514: ICD-10-CM

## 2020-04-26 DIAGNOSIS — M86.9 OSTEOMYELITIS OF RIGHT FOOT, UNSPECIFIED TYPE (HCC): ICD-10-CM

## 2020-04-26 DIAGNOSIS — D72.829 LEUKOCYTOSIS, UNSPECIFIED TYPE: ICD-10-CM

## 2020-04-26 DIAGNOSIS — R06.00 DYSPNEA, UNSPECIFIED TYPE: Primary | ICD-10-CM

## 2020-04-26 DIAGNOSIS — R11.2 NAUSEA AND VOMITING, INTRACTABILITY OF VOMITING NOT SPECIFIED, UNSPECIFIED VOMITING TYPE: ICD-10-CM

## 2020-04-26 DIAGNOSIS — Z20.822 SUSPECTED COVID-19 VIRUS INFECTION: ICD-10-CM

## 2020-04-26 LAB
ALBUMIN SERPL-MCNC: 3.1 G/DL (ref 3.5–5.2)
ALBUMIN/GLOB SERPL: 0.5 G/DL
ALP SERPL-CCNC: 173 U/L (ref 39–117)
ALT SERPL W P-5'-P-CCNC: 31 U/L (ref 1–41)
ANION GAP SERPL CALCULATED.3IONS-SCNC: 18 MMOL/L (ref 5–15)
AST SERPL-CCNC: 24 U/L (ref 1–40)
B PERT DNA SPEC QL NAA+PROBE: NOT DETECTED
BASOPHILS # BLD AUTO: 0.1 10*3/MM3 (ref 0–0.2)
BASOPHILS NFR BLD AUTO: 0.4 % (ref 0–1.5)
BILIRUB SERPL-MCNC: 1.1 MG/DL (ref 0.2–1.2)
BUN BLD-MCNC: 16 MG/DL (ref 6–20)
BUN/CREAT SERPL: 16.7 (ref 7–25)
C PNEUM DNA NPH QL NAA+NON-PROBE: NOT DETECTED
CALCIUM SPEC-SCNC: 9.3 MG/DL (ref 8.6–10.5)
CHLORIDE SERPL-SCNC: 87 MMOL/L (ref 98–107)
CO2 SERPL-SCNC: 26 MMOL/L (ref 22–29)
CREAT BLD-MCNC: 0.96 MG/DL (ref 0.76–1.27)
CRP SERPL-MCNC: 30.35 MG/DL (ref 0–0.5)
D DIMER PPP FEU-MCNC: 5.31 MCGFEU/ML (ref 0.17–0.59)
D-LACTATE SERPL-SCNC: 3 MMOL/L (ref 0.5–2)
DEPRECATED RDW RBC AUTO: 42 FL (ref 37–54)
EOSINOPHIL # BLD AUTO: 0 10*3/MM3 (ref 0–0.4)
EOSINOPHIL NFR BLD AUTO: 0 % (ref 0.3–6.2)
ERYTHROCYTE [DISTWIDTH] IN BLOOD BY AUTOMATED COUNT: 13.6 % (ref 12.3–15.4)
FLUAV H1 2009 PAND RNA NPH QL NAA+PROBE: NOT DETECTED
FLUAV H1 HA GENE NPH QL NAA+PROBE: NOT DETECTED
FLUAV H3 RNA NPH QL NAA+PROBE: NOT DETECTED
FLUAV SUBTYP SPEC NAA+PROBE: NOT DETECTED
FLUBV RNA ISLT QL NAA+PROBE: NOT DETECTED
GFR SERPL CREATININE-BSD FRML MDRD: 85 ML/MIN/1.73
GLOBULIN UR ELPH-MCNC: 5.7 GM/DL
GLUCOSE BLD-MCNC: 290 MG/DL (ref 65–99)
HADV DNA SPEC NAA+PROBE: NOT DETECTED
HCOV 229E RNA SPEC QL NAA+PROBE: NOT DETECTED
HCOV HKU1 RNA SPEC QL NAA+PROBE: NOT DETECTED
HCOV NL63 RNA SPEC QL NAA+PROBE: NOT DETECTED
HCOV OC43 RNA SPEC QL NAA+PROBE: NOT DETECTED
HCT VFR BLD AUTO: 39.7 % (ref 37.5–51)
HGB BLD-MCNC: 13.3 G/DL (ref 13–17.7)
HMPV RNA NPH QL NAA+NON-PROBE: NOT DETECTED
HOLD SPECIMEN: NORMAL
HOLD SPECIMEN: NORMAL
HPIV1 RNA SPEC QL NAA+PROBE: NOT DETECTED
HPIV2 RNA SPEC QL NAA+PROBE: NOT DETECTED
HPIV3 RNA NPH QL NAA+PROBE: NOT DETECTED
HPIV4 P GENE NPH QL NAA+PROBE: NOT DETECTED
LACTATE HOLD SPECIMEN: NORMAL
LDH SERPL-CCNC: 197 U/L (ref 135–225)
LIPASE SERPL-CCNC: 13 U/L (ref 13–60)
LYMPHOCYTES # BLD AUTO: 1.1 10*3/MM3 (ref 0.7–3.1)
LYMPHOCYTES NFR BLD AUTO: 5.4 % (ref 19.6–45.3)
M PNEUMO IGG SER IA-ACNC: NOT DETECTED
MCH RBC QN AUTO: 29.1 PG (ref 26.6–33)
MCHC RBC AUTO-ENTMCNC: 33.6 G/DL (ref 31.5–35.7)
MCV RBC AUTO: 86.6 FL (ref 79–97)
MONOCYTES # BLD AUTO: 1.2 10*3/MM3 (ref 0.1–0.9)
MONOCYTES NFR BLD AUTO: 6.3 % (ref 5–12)
NEUTROPHILS # BLD AUTO: 17 10*3/MM3 (ref 1.7–7)
NEUTROPHILS NFR BLD AUTO: 87.9 % (ref 42.7–76)
NRBC BLD AUTO-RTO: 0 /100 WBC (ref 0–0.2)
NT-PROBNP SERPL-MCNC: 500.3 PG/ML (ref 5–450)
PLATELET # BLD AUTO: 405 10*3/MM3 (ref 140–450)
PMV BLD AUTO: 7.2 FL (ref 6–12)
POTASSIUM BLD-SCNC: 3.4 MMOL/L (ref 3.5–5.2)
PROCALCITONIN SERPL-MCNC: 0.68 NG/ML (ref 0.1–0.25)
PROT SERPL-MCNC: 8.8 G/DL (ref 6–8.5)
RBC # BLD AUTO: 4.58 10*6/MM3 (ref 4.14–5.8)
RHINOVIRUS RNA SPEC NAA+PROBE: NOT DETECTED
RSV RNA NPH QL NAA+NON-PROBE: NOT DETECTED
S PYO AG THROAT QL: NEGATIVE
SODIUM BLD-SCNC: 131 MMOL/L (ref 136–145)
TROPONIN T SERPL-MCNC: <0.01 NG/ML (ref 0–0.03)
WBC NRBC COR # BLD: 19.3 10*3/MM3 (ref 3.4–10.8)

## 2020-04-26 PROCEDURE — 93005 ELECTROCARDIOGRAM TRACING: CPT | Performed by: INTERNAL MEDICINE

## 2020-04-26 PROCEDURE — 85379 FIBRIN DEGRADATION QUANT: CPT | Performed by: PHYSICIAN ASSISTANT

## 2020-04-26 PROCEDURE — 99284 EMERGENCY DEPT VISIT MOD MDM: CPT

## 2020-04-26 PROCEDURE — 71275 CT ANGIOGRAPHY CHEST: CPT

## 2020-04-26 PROCEDURE — 83690 ASSAY OF LIPASE: CPT | Performed by: PHYSICIAN ASSISTANT

## 2020-04-26 PROCEDURE — G0378 HOSPITAL OBSERVATION PER HR: HCPCS

## 2020-04-26 PROCEDURE — 25010000002 ONDANSETRON PER 1 MG: Performed by: PHYSICIAN ASSISTANT

## 2020-04-26 PROCEDURE — 25010000002 CEFEPIME PER 500 MG: Performed by: PHYSICIAN ASSISTANT

## 2020-04-26 PROCEDURE — 87147 CULTURE TYPE IMMUNOLOGIC: CPT | Performed by: PHYSICIAN ASSISTANT

## 2020-04-26 PROCEDURE — 93005 ELECTROCARDIOGRAM TRACING: CPT | Performed by: PHYSICIAN ASSISTANT

## 2020-04-26 PROCEDURE — 99222 1ST HOSP IP/OBS MODERATE 55: CPT | Performed by: NURSE PRACTITIONER

## 2020-04-26 PROCEDURE — 73630 X-RAY EXAM OF FOOT: CPT

## 2020-04-26 PROCEDURE — 84145 PROCALCITONIN (PCT): CPT | Performed by: PHYSICIAN ASSISTANT

## 2020-04-26 PROCEDURE — 0 IOPAMIDOL PER 1 ML: Performed by: PHYSICIAN ASSISTANT

## 2020-04-26 PROCEDURE — 83605 ASSAY OF LACTIC ACID: CPT

## 2020-04-26 PROCEDURE — 83615 LACTATE (LD) (LDH) ENZYME: CPT | Performed by: PHYSICIAN ASSISTANT

## 2020-04-26 PROCEDURE — 85025 COMPLETE CBC W/AUTO DIFF WBC: CPT | Performed by: PHYSICIAN ASSISTANT

## 2020-04-26 PROCEDURE — 87205 SMEAR GRAM STAIN: CPT | Performed by: PHYSICIAN ASSISTANT

## 2020-04-26 PROCEDURE — 83880 ASSAY OF NATRIURETIC PEPTIDE: CPT | Performed by: PHYSICIAN ASSISTANT

## 2020-04-26 PROCEDURE — 87186 SC STD MICRODIL/AGAR DIL: CPT | Performed by: PHYSICIAN ASSISTANT

## 2020-04-26 PROCEDURE — 87635 SARS-COV-2 COVID-19 AMP PRB: CPT | Performed by: PHYSICIAN ASSISTANT

## 2020-04-26 PROCEDURE — 87150 DNA/RNA AMPLIFIED PROBE: CPT | Performed by: PHYSICIAN ASSISTANT

## 2020-04-26 PROCEDURE — 86140 C-REACTIVE PROTEIN: CPT | Performed by: PHYSICIAN ASSISTANT

## 2020-04-26 PROCEDURE — 80053 COMPREHEN METABOLIC PANEL: CPT | Performed by: PHYSICIAN ASSISTANT

## 2020-04-26 PROCEDURE — 0099U HC BIOFIRE FILMARRAY RESP PANEL 1: CPT | Performed by: PHYSICIAN ASSISTANT

## 2020-04-26 PROCEDURE — 71045 X-RAY EXAM CHEST 1 VIEW: CPT

## 2020-04-26 PROCEDURE — 87070 CULTURE OTHR SPECIMN AEROBIC: CPT | Performed by: PHYSICIAN ASSISTANT

## 2020-04-26 PROCEDURE — 25010000002 PROMETHAZINE PER 50 MG

## 2020-04-26 PROCEDURE — 25010000002 PROMETHAZINE PER 50 MG: Performed by: PHYSICIAN ASSISTANT

## 2020-04-26 PROCEDURE — 25010000002 VANCOMYCIN: Performed by: PHYSICIAN ASSISTANT

## 2020-04-26 PROCEDURE — 87651 STREP A DNA AMP PROBE: CPT | Performed by: PHYSICIAN ASSISTANT

## 2020-04-26 PROCEDURE — 87040 BLOOD CULTURE FOR BACTERIA: CPT | Performed by: PHYSICIAN ASSISTANT

## 2020-04-26 PROCEDURE — 84484 ASSAY OF TROPONIN QUANT: CPT | Performed by: PHYSICIAN ASSISTANT

## 2020-04-26 RX ORDER — DEXTROSE MONOHYDRATE 25 G/50ML
25 INJECTION, SOLUTION INTRAVENOUS
Status: DISCONTINUED | OUTPATIENT
Start: 2020-04-26 | End: 2020-04-30 | Stop reason: SDUPTHER

## 2020-04-26 RX ORDER — BISACODYL 5 MG/1
5 TABLET, DELAYED RELEASE ORAL DAILY PRN
Status: DISCONTINUED | OUTPATIENT
Start: 2020-04-26 | End: 2020-04-30 | Stop reason: HOSPADM

## 2020-04-26 RX ORDER — PROMETHAZINE HYDROCHLORIDE 25 MG/ML
12.5 INJECTION, SOLUTION INTRAMUSCULAR; INTRAVENOUS ONCE
Status: COMPLETED | OUTPATIENT
Start: 2020-04-26 | End: 2020-04-26

## 2020-04-26 RX ORDER — ONDANSETRON 2 MG/ML
4 INJECTION INTRAMUSCULAR; INTRAVENOUS ONCE
Status: COMPLETED | OUTPATIENT
Start: 2020-04-26 | End: 2020-04-26

## 2020-04-26 RX ORDER — ONDANSETRON 2 MG/ML
4 INJECTION INTRAMUSCULAR; INTRAVENOUS EVERY 6 HOURS PRN
Status: DISCONTINUED | OUTPATIENT
Start: 2020-04-26 | End: 2020-04-30 | Stop reason: HOSPADM

## 2020-04-26 RX ORDER — NICOTINE POLACRILEX 4 MG
15 LOZENGE BUCCAL
Status: DISCONTINUED | OUTPATIENT
Start: 2020-04-26 | End: 2020-04-30 | Stop reason: SDUPTHER

## 2020-04-26 RX ORDER — PROMETHAZINE HYDROCHLORIDE 25 MG/ML
INJECTION, SOLUTION INTRAMUSCULAR; INTRAVENOUS
Status: COMPLETED
Start: 2020-04-26 | End: 2020-04-26

## 2020-04-26 RX ORDER — CHOLECALCIFEROL (VITAMIN D3) 125 MCG
5 CAPSULE ORAL NIGHTLY PRN
Status: DISCONTINUED | OUTPATIENT
Start: 2020-04-26 | End: 2020-05-03 | Stop reason: HOSPADM

## 2020-04-26 RX ORDER — SODIUM CHLORIDE 0.9 % (FLUSH) 0.9 %
10 SYRINGE (ML) INJECTION AS NEEDED
Status: DISCONTINUED | OUTPATIENT
Start: 2020-04-26 | End: 2020-04-30 | Stop reason: HOSPADM

## 2020-04-26 RX ORDER — SODIUM CHLORIDE 9 MG/ML
INJECTION, SOLUTION INTRAVENOUS
Status: DISPENSED
Start: 2020-04-26 | End: 2020-04-27

## 2020-04-26 RX ORDER — ONDANSETRON 4 MG/1
4 TABLET, FILM COATED ORAL EVERY 6 HOURS PRN
Status: DISCONTINUED | OUTPATIENT
Start: 2020-04-26 | End: 2020-04-30 | Stop reason: HOSPADM

## 2020-04-26 RX ADMIN — IOPAMIDOL 68 ML: 755 INJECTION, SOLUTION INTRAVENOUS at 22:04

## 2020-04-26 RX ADMIN — CEFEPIME HYDROCHLORIDE 2 G: 2 INJECTION, POWDER, FOR SOLUTION INTRAVENOUS at 20:11

## 2020-04-26 RX ADMIN — VANCOMYCIN HYDROCHLORIDE 2000 MG: 1 INJECTION, POWDER, LYOPHILIZED, FOR SOLUTION INTRAVENOUS at 21:18

## 2020-04-26 RX ADMIN — ONDANSETRON 4 MG: 2 INJECTION INTRAMUSCULAR; INTRAVENOUS at 19:35

## 2020-04-26 RX ADMIN — PROMETHAZINE HYDROCHLORIDE 12.5 MG: 25 INJECTION, SOLUTION INTRAMUSCULAR; INTRAVENOUS at 20:29

## 2020-04-26 RX ADMIN — PROMETHAZINE HYDROCHLORIDE 12.5 MG: 25 INJECTION INTRAMUSCULAR; INTRAVENOUS at 20:29

## 2020-04-26 RX ADMIN — SODIUM CHLORIDE, SODIUM LACTATE, POTASSIUM CHLORIDE, AND CALCIUM CHLORIDE 1000 ML: .6; .31; .03; .02 INJECTION, SOLUTION INTRAVENOUS at 19:36

## 2020-04-27 ENCOUNTER — APPOINTMENT (OUTPATIENT)
Dept: MRI IMAGING | Facility: HOSPITAL | Age: 45
End: 2020-04-27

## 2020-04-27 PROBLEM — R07.9 CHEST PAIN: Status: ACTIVE | Noted: 2020-04-27

## 2020-04-27 PROBLEM — Z20.822 SUSPECTED COVID-19 VIRUS INFECTION: Status: ACTIVE | Noted: 2020-04-27

## 2020-04-27 PROBLEM — M86.9 OSTEOMYELITIS OF RIGHT FOOT (HCC): Status: ACTIVE | Noted: 2020-04-27

## 2020-04-27 PROBLEM — R11.2 NAUSEA AND VOMITING: Status: ACTIVE | Noted: 2020-04-27

## 2020-04-27 PROBLEM — A41.89 SEPSIS, GRAM POSITIVE (HCC): Status: ACTIVE | Noted: 2020-04-27

## 2020-04-27 LAB
ALBUMIN SERPL-MCNC: 2.4 G/DL (ref 3.5–5.2)
ALBUMIN/GLOB SERPL: 0.5 G/DL
ALP SERPL-CCNC: 139 U/L (ref 39–117)
ALT SERPL W P-5'-P-CCNC: 26 U/L (ref 1–41)
ANION GAP SERPL CALCULATED.3IONS-SCNC: 15 MMOL/L (ref 5–15)
AST SERPL-CCNC: 21 U/L (ref 1–40)
BACTERIA BLD CULT: ABNORMAL
BASOPHILS # BLD AUTO: 0.1 10*3/MM3 (ref 0–0.2)
BASOPHILS NFR BLD AUTO: 0.5 % (ref 0–1.5)
BILIRUB SERPL-MCNC: 0.9 MG/DL (ref 0.2–1.2)
BOTTLE TYPE: ABNORMAL
BUN BLD-MCNC: 18 MG/DL (ref 6–20)
BUN/CREAT SERPL: 23.7 (ref 7–25)
CALCIUM SPEC-SCNC: 8.6 MG/DL (ref 8.6–10.5)
CHLORIDE SERPL-SCNC: 90 MMOL/L (ref 98–107)
CHOLEST SERPL-MCNC: 113 MG/DL (ref 0–200)
CO2 SERPL-SCNC: 27 MMOL/L (ref 22–29)
CREAT BLD-MCNC: 0.76 MG/DL (ref 0.76–1.27)
D-LACTATE SERPL-SCNC: 1.4 MMOL/L (ref 0.5–2)
DEPRECATED RDW RBC AUTO: 41.6 FL (ref 37–54)
EOSINOPHIL # BLD AUTO: 0 10*3/MM3 (ref 0–0.4)
EOSINOPHIL NFR BLD AUTO: 0 % (ref 0.3–6.2)
ERYTHROCYTE [DISTWIDTH] IN BLOOD BY AUTOMATED COUNT: 13.5 % (ref 12.3–15.4)
FERRITIN SERPL-MCNC: 1039 NG/ML (ref 30–400)
GFR SERPL CREATININE-BSD FRML MDRD: 111 ML/MIN/1.73
GLOBULIN UR ELPH-MCNC: 4.9 GM/DL
GLUCOSE BLD-MCNC: 294 MG/DL (ref 65–99)
GLUCOSE BLDC GLUCOMTR-MCNC: 313 MG/DL (ref 70–105)
GLUCOSE BLDC GLUCOMTR-MCNC: 327 MG/DL (ref 70–105)
GLUCOSE BLDC GLUCOMTR-MCNC: 359 MG/DL (ref 70–105)
GLUCOSE BLDC GLUCOMTR-MCNC: 383 MG/DL (ref 70–105)
HCT VFR BLD AUTO: 33.7 % (ref 37.5–51)
HDLC SERPL-MCNC: 25 MG/DL (ref 40–60)
HGB BLD-MCNC: 11.8 G/DL (ref 13–17.7)
LDLC SERPL CALC-MCNC: 61 MG/DL (ref 0–100)
LDLC/HDLC SERPL: 2.42 {RATIO}
LYMPHOCYTES # BLD AUTO: 1.3 10*3/MM3 (ref 0.7–3.1)
LYMPHOCYTES NFR BLD AUTO: 6.2 % (ref 19.6–45.3)
MCH RBC QN AUTO: 30.2 PG (ref 26.6–33)
MCHC RBC AUTO-ENTMCNC: 35 G/DL (ref 31.5–35.7)
MCV RBC AUTO: 86.4 FL (ref 79–97)
MONOCYTES # BLD AUTO: 2 10*3/MM3 (ref 0.1–0.9)
MONOCYTES NFR BLD AUTO: 9.8 % (ref 5–12)
NEUTROPHILS # BLD AUTO: 16.9 10*3/MM3 (ref 1.7–7)
NEUTROPHILS NFR BLD AUTO: 83.5 % (ref 42.7–76)
NRBC BLD AUTO-RTO: 0 /100 WBC (ref 0–0.2)
PLATELET # BLD AUTO: 348 10*3/MM3 (ref 140–450)
PMV BLD AUTO: 7.3 FL (ref 6–12)
POTASSIUM BLD-SCNC: 3.6 MMOL/L (ref 3.5–5.2)
PROT SERPL-MCNC: 7.3 G/DL (ref 6–8.5)
RBC # BLD AUTO: 3.9 10*6/MM3 (ref 4.14–5.8)
SARS-COV-2 RNA RESP QL NAA+PROBE: NOT DETECTED
SODIUM BLD-SCNC: 132 MMOL/L (ref 136–145)
TRIGL SERPL-MCNC: 137 MG/DL (ref 0–150)
TROPONIN T SERPL-MCNC: 0.02 NG/ML (ref 0–0.03)
TROPONIN T SERPL-MCNC: <0.01 NG/ML (ref 0–0.03)
TSH SERPL DL<=0.05 MIU/L-ACNC: 0.42 UIU/ML (ref 0.27–4.2)
VLDLC SERPL-MCNC: 27.4 MG/DL
WBC NRBC COR # BLD: 20.3 10*3/MM3 (ref 3.4–10.8)

## 2020-04-27 PROCEDURE — 82962 GLUCOSE BLOOD TEST: CPT

## 2020-04-27 PROCEDURE — 84443 ASSAY THYROID STIM HORMONE: CPT | Performed by: NURSE PRACTITIONER

## 2020-04-27 PROCEDURE — 63710000001 INSULIN LISPRO (HUMAN) PER 5 UNITS: Performed by: NURSE PRACTITIONER

## 2020-04-27 PROCEDURE — 83605 ASSAY OF LACTIC ACID: CPT

## 2020-04-27 PROCEDURE — 82728 ASSAY OF FERRITIN: CPT | Performed by: NURSE PRACTITIONER

## 2020-04-27 PROCEDURE — 99253 IP/OBS CNSLTJ NEW/EST LOW 45: CPT | Performed by: PODIATRIST

## 2020-04-27 PROCEDURE — 80053 COMPREHEN METABOLIC PANEL: CPT | Performed by: NURSE PRACTITIONER

## 2020-04-27 PROCEDURE — A9579 GAD-BASE MR CONTRAST NOS,1ML: HCPCS | Performed by: HOSPITALIST

## 2020-04-27 PROCEDURE — 25010000002 ENOXAPARIN PER 10 MG: Performed by: NURSE PRACTITIONER

## 2020-04-27 PROCEDURE — 87040 BLOOD CULTURE FOR BACTERIA: CPT | Performed by: HOSPITALIST

## 2020-04-27 PROCEDURE — 63710000001 INSULIN LISPRO (HUMAN) PER 5 UNITS: Performed by: HOSPITALIST

## 2020-04-27 PROCEDURE — 25010000002 GADOTERIDOL PER 1 ML: Performed by: HOSPITALIST

## 2020-04-27 PROCEDURE — 25010000002 ONDANSETRON PER 1 MG: Performed by: NURSE PRACTITIONER

## 2020-04-27 PROCEDURE — 73720 MRI LWR EXTREMITY W/O&W/DYE: CPT

## 2020-04-27 PROCEDURE — 84484 ASSAY OF TROPONIN QUANT: CPT | Performed by: NURSE PRACTITIONER

## 2020-04-27 PROCEDURE — 25010000002 CEFEPIME PER 500 MG: Performed by: NURSE PRACTITIONER

## 2020-04-27 PROCEDURE — 99232 SBSQ HOSP IP/OBS MODERATE 35: CPT | Performed by: HOSPITALIST

## 2020-04-27 PROCEDURE — 80061 LIPID PANEL: CPT | Performed by: NURSE PRACTITIONER

## 2020-04-27 PROCEDURE — 25010000002 VANCOMYCIN 10 G RECONSTITUTED SOLUTION: Performed by: NURSE PRACTITIONER

## 2020-04-27 PROCEDURE — 25010000002 METOCLOPRAMIDE PER 10 MG: Performed by: HOSPITALIST

## 2020-04-27 PROCEDURE — 25010000002 METOCLOPRAMIDE PER 10 MG: Performed by: NURSE PRACTITIONER

## 2020-04-27 PROCEDURE — 85025 COMPLETE CBC W/AUTO DIFF WBC: CPT | Performed by: NURSE PRACTITIONER

## 2020-04-27 RX ORDER — METOCLOPRAMIDE HYDROCHLORIDE 5 MG/ML
10 INJECTION INTRAMUSCULAR; INTRAVENOUS ONCE
Status: COMPLETED | OUTPATIENT
Start: 2020-04-27 | End: 2020-04-27

## 2020-04-27 RX ORDER — VANCOMYCIN 1.75 GRAM/500 ML IN 0.9 % SODIUM CHLORIDE INTRAVENOUS
1750 EVERY 12 HOURS
Status: DISCONTINUED | OUTPATIENT
Start: 2020-04-27 | End: 2020-04-29

## 2020-04-27 RX ORDER — ACETAMINOPHEN 650 MG/1
650 SUPPOSITORY RECTAL EVERY 4 HOURS PRN
Status: DISCONTINUED | OUTPATIENT
Start: 2020-04-27 | End: 2020-04-30 | Stop reason: HOSPADM

## 2020-04-27 RX ORDER — PANTOPRAZOLE SODIUM 40 MG/10ML
40 INJECTION, POWDER, LYOPHILIZED, FOR SOLUTION INTRAVENOUS DAILY
Status: DISCONTINUED | OUTPATIENT
Start: 2020-04-27 | End: 2020-04-30 | Stop reason: HOSPADM

## 2020-04-27 RX ORDER — IBUPROFEN 600 MG/1
600 TABLET ORAL EVERY 4 HOURS PRN
Status: DISCONTINUED | OUTPATIENT
Start: 2020-04-27 | End: 2020-05-03 | Stop reason: HOSPADM

## 2020-04-27 RX ORDER — ASPIRIN 325 MG
325 TABLET ORAL DAILY
Status: DISCONTINUED | OUTPATIENT
Start: 2020-04-27 | End: 2020-04-29

## 2020-04-27 RX ORDER — METOCLOPRAMIDE HYDROCHLORIDE 5 MG/ML
10 INJECTION INTRAMUSCULAR; INTRAVENOUS
Status: DISCONTINUED | OUTPATIENT
Start: 2020-04-27 | End: 2020-05-03 | Stop reason: HOSPADM

## 2020-04-27 RX ORDER — ACETAMINOPHEN 325 MG/1
650 TABLET ORAL EVERY 6 HOURS PRN
Status: DISCONTINUED | OUTPATIENT
Start: 2020-04-27 | End: 2020-04-30 | Stop reason: HOSPADM

## 2020-04-27 RX ORDER — INSULIN GLARGINE 100 [IU]/ML
15 INJECTION, SOLUTION SUBCUTANEOUS NIGHTLY
Status: DISCONTINUED | OUTPATIENT
Start: 2020-04-27 | End: 2020-04-29

## 2020-04-27 RX ADMIN — GADOTERIDOL 20 ML: 279.3 INJECTION, SOLUTION INTRAVENOUS at 17:20

## 2020-04-27 RX ADMIN — INSULIN LISPRO 7 UNITS: 100 INJECTION, SOLUTION INTRAVENOUS; SUBCUTANEOUS at 08:14

## 2020-04-27 RX ADMIN — ASPIRIN 325 MG ORAL TABLET 325 MG: 325 PILL ORAL at 08:11

## 2020-04-27 RX ADMIN — CEFEPIME HYDROCHLORIDE 2 G: 2 INJECTION, POWDER, FOR SOLUTION INTRAVENOUS at 05:52

## 2020-04-27 RX ADMIN — INSULIN LISPRO 5 UNITS: 100 INJECTION, SOLUTION INTRAVENOUS; SUBCUTANEOUS at 18:14

## 2020-04-27 RX ADMIN — PANTOPRAZOLE SODIUM 40 MG: 40 INJECTION, POWDER, FOR SOLUTION INTRAVENOUS at 08:11

## 2020-04-27 RX ADMIN — Medication 10 ML: at 08:11

## 2020-04-27 RX ADMIN — CEFEPIME HYDROCHLORIDE 2 G: 2 INJECTION, POWDER, FOR SOLUTION INTRAVENOUS at 12:24

## 2020-04-27 RX ADMIN — NITROGLYCERIN 0.5 INCH: 20 OINTMENT TOPICAL at 05:52

## 2020-04-27 RX ADMIN — VANCOMYCIN HYDROCHLORIDE 1750 MG: 10 INJECTION, POWDER, LYOPHILIZED, FOR SOLUTION INTRAVENOUS at 08:11

## 2020-04-27 RX ADMIN — NITROGLYCERIN 0.5 INCH: 20 OINTMENT TOPICAL at 12:24

## 2020-04-27 RX ADMIN — NITROGLYCERIN 0.5 INCH: 20 OINTMENT TOPICAL at 18:15

## 2020-04-27 RX ADMIN — ACETAMINOPHEN 650 MG: 325 TABLET, FILM COATED ORAL at 22:30

## 2020-04-27 RX ADMIN — INSULIN LISPRO 7 UNITS: 100 INJECTION, SOLUTION INTRAVENOUS; SUBCUTANEOUS at 18:18

## 2020-04-27 RX ADMIN — ENOXAPARIN SODIUM 40 MG: 40 INJECTION SUBCUTANEOUS at 08:11

## 2020-04-27 RX ADMIN — ACETAMINOPHEN 650 MG: 650 SUPPOSITORY RECTAL at 06:32

## 2020-04-27 RX ADMIN — ACETAMINOPHEN 650 MG: 325 TABLET, FILM COATED ORAL at 10:41

## 2020-04-27 RX ADMIN — ONDANSETRON 4 MG: 2 INJECTION INTRAMUSCULAR; INTRAVENOUS at 00:14

## 2020-04-27 RX ADMIN — IBUPROFEN 600 MG: 600 TABLET ORAL at 18:14

## 2020-04-27 RX ADMIN — ACETAMINOPHEN 650 MG: 325 TABLET, FILM COATED ORAL at 16:10

## 2020-04-27 RX ADMIN — INSULIN LISPRO 8 UNITS: 100 INJECTION, SOLUTION INTRAVENOUS; SUBCUTANEOUS at 12:25

## 2020-04-27 RX ADMIN — VANCOMYCIN HYDROCHLORIDE 1750 MG: 10 INJECTION, POWDER, LYOPHILIZED, FOR SOLUTION INTRAVENOUS at 20:13

## 2020-04-27 RX ADMIN — METOCLOPRAMIDE 10 MG: 5 INJECTION, SOLUTION INTRAMUSCULAR; INTRAVENOUS at 06:32

## 2020-04-27 RX ADMIN — METOCLOPRAMIDE 10 MG: 5 INJECTION, SOLUTION INTRAMUSCULAR; INTRAVENOUS at 20:12

## 2020-04-27 RX ADMIN — ENOXAPARIN SODIUM 40 MG: 40 INJECTION SUBCUTANEOUS at 20:04

## 2020-04-27 RX ADMIN — INSULIN LISPRO 8 UNITS: 100 INJECTION, SOLUTION INTRAVENOUS; SUBCUTANEOUS at 20:31

## 2020-04-27 RX ADMIN — CEFEPIME HYDROCHLORIDE 2 G: 2 INJECTION, POWDER, FOR SOLUTION INTRAVENOUS at 19:38

## 2020-04-28 ENCOUNTER — ANESTHESIA (OUTPATIENT)
Dept: PERIOP | Facility: HOSPITAL | Age: 45
End: 2020-04-28

## 2020-04-28 ENCOUNTER — ANESTHESIA EVENT (OUTPATIENT)
Dept: PERIOP | Facility: HOSPITAL | Age: 45
End: 2020-04-28

## 2020-04-28 LAB
ANION GAP SERPL CALCULATED.3IONS-SCNC: 13 MMOL/L (ref 5–15)
BACTERIA SPEC AEROBE CULT: ABNORMAL
BACTERIA SPEC AEROBE CULT: ABNORMAL
BASOPHILS # BLD AUTO: 0.1 10*3/MM3 (ref 0–0.2)
BASOPHILS NFR BLD AUTO: 0.3 % (ref 0–1.5)
BUN BLD-MCNC: 31 MG/DL (ref 6–20)
BUN/CREAT SERPL: 24.6 (ref 7–25)
CALCIUM SPEC-SCNC: 8.3 MG/DL (ref 8.6–10.5)
CHLORIDE SERPL-SCNC: 90 MMOL/L (ref 98–107)
CO2 SERPL-SCNC: 26 MMOL/L (ref 22–29)
CREAT BLD-MCNC: 1.26 MG/DL (ref 0.76–1.27)
CRP SERPL-MCNC: 25.66 MG/DL (ref 0–0.5)
DEPRECATED RDW RBC AUTO: 43.3 FL (ref 37–54)
EOSINOPHIL # BLD AUTO: 0 10*3/MM3 (ref 0–0.4)
EOSINOPHIL NFR BLD AUTO: 0.1 % (ref 0.3–6.2)
ERYTHROCYTE [DISTWIDTH] IN BLOOD BY AUTOMATED COUNT: 13.8 % (ref 12.3–15.4)
GFR SERPL CREATININE-BSD FRML MDRD: 62 ML/MIN/1.73
GLUCOSE BLD-MCNC: 305 MG/DL (ref 65–99)
GLUCOSE BLDC GLUCOMTR-MCNC: 234 MG/DL (ref 70–105)
GLUCOSE BLDC GLUCOMTR-MCNC: 240 MG/DL (ref 70–105)
GLUCOSE BLDC GLUCOMTR-MCNC: 250 MG/DL (ref 70–105)
GLUCOSE BLDC GLUCOMTR-MCNC: 254 MG/DL (ref 70–105)
GLUCOSE BLDC GLUCOMTR-MCNC: 280 MG/DL (ref 70–105)
GRAM STN SPEC: ABNORMAL
HCT VFR BLD AUTO: 34.3 % (ref 37.5–51)
HGB BLD-MCNC: 11.2 G/DL (ref 13–17.7)
LYMPHOCYTES # BLD AUTO: 1 10*3/MM3 (ref 0.7–3.1)
LYMPHOCYTES NFR BLD AUTO: 5.5 % (ref 19.6–45.3)
MCH RBC QN AUTO: 28.8 PG (ref 26.6–33)
MCHC RBC AUTO-ENTMCNC: 32.6 G/DL (ref 31.5–35.7)
MCV RBC AUTO: 88.3 FL (ref 79–97)
MONOCYTES # BLD AUTO: 1.7 10*3/MM3 (ref 0.1–0.9)
MONOCYTES NFR BLD AUTO: 9.6 % (ref 5–12)
NEUTROPHILS # BLD AUTO: 15.3 10*3/MM3 (ref 1.7–7)
NEUTROPHILS NFR BLD AUTO: 84.5 % (ref 42.7–76)
NRBC BLD AUTO-RTO: 0 /100 WBC (ref 0–0.2)
PLATELET # BLD AUTO: 296 10*3/MM3 (ref 140–450)
PMV BLD AUTO: 8 FL (ref 6–12)
POTASSIUM BLD-SCNC: 3.7 MMOL/L (ref 3.5–5.2)
PROCALCITONIN SERPL-MCNC: 31.8 NG/ML (ref 0.1–0.25)
RBC # BLD AUTO: 3.88 10*6/MM3 (ref 4.14–5.8)
SODIUM BLD-SCNC: 129 MMOL/L (ref 136–145)
T4 FREE SERPL-MCNC: 1.99 NG/DL (ref 0.93–1.7)
TSH SERPL DL<=0.05 MIU/L-ACNC: 1.04 UIU/ML (ref 0.27–4.2)
VANCOMYCIN PEAK SERPL-MCNC: 23.4 MCG/ML (ref 20–40)
VANCOMYCIN TROUGH SERPL-MCNC: 15.8 MCG/ML (ref 5–20)
VIT B12 BLD-MCNC: 592 PG/ML (ref 211–946)
WBC NRBC COR # BLD: 18.1 10*3/MM3 (ref 3.4–10.8)

## 2020-04-28 PROCEDURE — 63710000001 INSULIN LISPRO (HUMAN) PER 5 UNITS: Performed by: PODIATRIST

## 2020-04-28 PROCEDURE — 25010000002 VANCOMYCIN 10 G RECONSTITUTED SOLUTION: Performed by: PODIATRIST

## 2020-04-28 PROCEDURE — 87015 SPECIMEN INFECT AGNT CONCNTJ: CPT | Performed by: PODIATRIST

## 2020-04-28 PROCEDURE — 63710000001 INSULIN GLARGINE PER 5 UNITS: Performed by: PODIATRIST

## 2020-04-28 PROCEDURE — 25010000002 METOCLOPRAMIDE PER 10 MG: Performed by: PODIATRIST

## 2020-04-28 PROCEDURE — 80048 BASIC METABOLIC PNL TOTAL CA: CPT | Performed by: HOSPITALIST

## 2020-04-28 PROCEDURE — 80202 ASSAY OF VANCOMYCIN: CPT | Performed by: NURSE PRACTITIONER

## 2020-04-28 PROCEDURE — 0Y6M0ZB DETACHMENT AT RIGHT FOOT, PARTIAL 2ND RAY, OPEN APPROACH: ICD-10-PCS | Performed by: PODIATRIST

## 2020-04-28 PROCEDURE — 86140 C-REACTIVE PROTEIN: CPT | Performed by: HOSPITALIST

## 2020-04-28 PROCEDURE — 0Q9N0ZZ DRAINAGE OF RIGHT METATARSAL, OPEN APPROACH: ICD-10-PCS | Performed by: PODIATRIST

## 2020-04-28 PROCEDURE — 84443 ASSAY THYROID STIM HORMONE: CPT | Performed by: HOSPITALIST

## 2020-04-28 PROCEDURE — 27892 DECOMPRESSION OF LEG: CPT | Performed by: PODIATRIST

## 2020-04-28 PROCEDURE — 25010000002 VANCOMYCIN 10 G RECONSTITUTED SOLUTION: Performed by: NURSE PRACTITIONER

## 2020-04-28 PROCEDURE — 82962 GLUCOSE BLOOD TEST: CPT

## 2020-04-28 PROCEDURE — 87076 CULTURE ANAEROBE IDENT EACH: CPT | Performed by: PODIATRIST

## 2020-04-28 PROCEDURE — 25010000002 DEXAMETHASONE PER 1 MG: Performed by: ANESTHESIOLOGY

## 2020-04-28 PROCEDURE — 0Y6M0ZF DETACHMENT AT RIGHT FOOT, PARTIAL 5TH RAY, OPEN APPROACH: ICD-10-PCS | Performed by: PODIATRIST

## 2020-04-28 PROCEDURE — 87116 MYCOBACTERIA CULTURE: CPT | Performed by: PODIATRIST

## 2020-04-28 PROCEDURE — 28805 AMPUTATION THRU METATARSAL: CPT | Performed by: PODIATRIST

## 2020-04-28 PROCEDURE — 99232 SBSQ HOSP IP/OBS MODERATE 35: CPT | Performed by: HOSPITALIST

## 2020-04-28 PROCEDURE — 87206 SMEAR FLUORESCENT/ACID STAI: CPT | Performed by: PODIATRIST

## 2020-04-28 PROCEDURE — 63710000001 INSULIN LISPRO (HUMAN) PER 5 UNITS: Performed by: NURSE PRACTITIONER

## 2020-04-28 PROCEDURE — 63710000001 INSULIN LISPRO (HUMAN) PER 5 UNITS: Performed by: HOSPITALIST

## 2020-04-28 PROCEDURE — 87176 TISSUE HOMOGENIZATION CULTR: CPT | Performed by: PODIATRIST

## 2020-04-28 PROCEDURE — 87147 CULTURE TYPE IMMUNOLOGIC: CPT | Performed by: PODIATRIST

## 2020-04-28 PROCEDURE — 0Y6M0Z9 DETACHMENT AT RIGHT FOOT, PARTIAL 1ST RAY, OPEN APPROACH: ICD-10-PCS | Performed by: PODIATRIST

## 2020-04-28 PROCEDURE — 25010000002 FENTANYL CITRATE (PF) 100 MCG/2ML SOLUTION: Performed by: ANESTHESIOLOGY

## 2020-04-28 PROCEDURE — 25010000002 CEFEPIME PER 500 MG: Performed by: NURSE PRACTITIONER

## 2020-04-28 PROCEDURE — 25010000002 PROPOFOL 10 MG/ML EMULSION: Performed by: ANESTHESIOLOGY

## 2020-04-28 PROCEDURE — 0KNS0ZZ RELEASE RIGHT LOWER LEG MUSCLE, OPEN APPROACH: ICD-10-PCS | Performed by: PODIATRIST

## 2020-04-28 PROCEDURE — 87075 CULTR BACTERIA EXCEPT BLOOD: CPT | Performed by: PODIATRIST

## 2020-04-28 PROCEDURE — 25010000002 METOCLOPRAMIDE PER 10 MG: Performed by: HOSPITALIST

## 2020-04-28 PROCEDURE — 25010000002 ONDANSETRON PER 1 MG: Performed by: NURSE PRACTITIONER

## 2020-04-28 PROCEDURE — 88304 TISSUE EXAM BY PATHOLOGIST: CPT | Performed by: PODIATRIST

## 2020-04-28 PROCEDURE — 25010000002 ENOXAPARIN PER 10 MG: Performed by: PODIATRIST

## 2020-04-28 PROCEDURE — 87205 SMEAR GRAM STAIN: CPT | Performed by: PODIATRIST

## 2020-04-28 PROCEDURE — 28003 TREATMENT OF FOOT INFECTION: CPT | Performed by: PODIATRIST

## 2020-04-28 PROCEDURE — 83036 HEMOGLOBIN GLYCOSYLATED A1C: CPT | Performed by: HOSPITALIST

## 2020-04-28 PROCEDURE — 25010000002 CEFEPIME PER 500 MG: Performed by: PODIATRIST

## 2020-04-28 PROCEDURE — 84145 PROCALCITONIN (PCT): CPT | Performed by: HOSPITALIST

## 2020-04-28 PROCEDURE — 0Y6M0ZD DETACHMENT AT RIGHT FOOT, PARTIAL 4TH RAY, OPEN APPROACH: ICD-10-PCS | Performed by: PODIATRIST

## 2020-04-28 PROCEDURE — 25010000002 MIDAZOLAM PER 1 MG: Performed by: ANESTHESIOLOGY

## 2020-04-28 PROCEDURE — 88311 DECALCIFY TISSUE: CPT | Performed by: PODIATRIST

## 2020-04-28 PROCEDURE — 0Y6M0ZC DETACHMENT AT RIGHT FOOT, PARTIAL 3RD RAY, OPEN APPROACH: ICD-10-PCS | Performed by: PODIATRIST

## 2020-04-28 PROCEDURE — 87070 CULTURE OTHR SPECIMN AEROBIC: CPT | Performed by: PODIATRIST

## 2020-04-28 PROCEDURE — 82607 VITAMIN B-12: CPT | Performed by: HOSPITALIST

## 2020-04-28 PROCEDURE — 85025 COMPLETE CBC W/AUTO DIFF WBC: CPT | Performed by: HOSPITALIST

## 2020-04-28 PROCEDURE — 84439 ASSAY OF FREE THYROXINE: CPT | Performed by: HOSPITALIST

## 2020-04-28 PROCEDURE — 88307 TISSUE EXAM BY PATHOLOGIST: CPT | Performed by: PODIATRIST

## 2020-04-28 RX ORDER — FENTANYL CITRATE 50 UG/ML
50 INJECTION, SOLUTION INTRAMUSCULAR; INTRAVENOUS
Status: DISCONTINUED | OUTPATIENT
Start: 2020-04-28 | End: 2020-04-28 | Stop reason: HOSPADM

## 2020-04-28 RX ORDER — MEPERIDINE HYDROCHLORIDE 25 MG/ML
12.5 INJECTION INTRAMUSCULAR; INTRAVENOUS; SUBCUTANEOUS
Status: DISCONTINUED | OUTPATIENT
Start: 2020-04-28 | End: 2020-04-28 | Stop reason: HOSPADM

## 2020-04-28 RX ORDER — MIDAZOLAM HYDROCHLORIDE 1 MG/ML
INJECTION INTRAMUSCULAR; INTRAVENOUS AS NEEDED
Status: DISCONTINUED | OUTPATIENT
Start: 2020-04-28 | End: 2020-04-28 | Stop reason: SURG

## 2020-04-28 RX ORDER — ONDANSETRON 2 MG/ML
4 INJECTION INTRAMUSCULAR; INTRAVENOUS ONCE AS NEEDED
Status: DISCONTINUED | OUTPATIENT
Start: 2020-04-28 | End: 2020-04-28 | Stop reason: HOSPADM

## 2020-04-28 RX ORDER — PROPOFOL 10 MG/ML
VIAL (ML) INTRAVENOUS AS NEEDED
Status: DISCONTINUED | OUTPATIENT
Start: 2020-04-28 | End: 2020-04-28 | Stop reason: SURG

## 2020-04-28 RX ORDER — SODIUM CHLORIDE 9 MG/ML
100 INJECTION, SOLUTION INTRAVENOUS CONTINUOUS
Status: DISCONTINUED | OUTPATIENT
Start: 2020-04-28 | End: 2020-04-30 | Stop reason: HOSPADM

## 2020-04-28 RX ORDER — DEXAMETHASONE SODIUM PHOSPHATE 4 MG/ML
INJECTION, SOLUTION INTRA-ARTICULAR; INTRALESIONAL; INTRAMUSCULAR; INTRAVENOUS; SOFT TISSUE AS NEEDED
Status: DISCONTINUED | OUTPATIENT
Start: 2020-04-28 | End: 2020-04-28 | Stop reason: SURG

## 2020-04-28 RX ORDER — HYDROMORPHONE HCL 110MG/55ML
0.2 PATIENT CONTROLLED ANALGESIA SYRINGE INTRAVENOUS
Status: DISCONTINUED | OUTPATIENT
Start: 2020-04-28 | End: 2020-04-28 | Stop reason: HOSPADM

## 2020-04-28 RX ORDER — IPRATROPIUM BROMIDE AND ALBUTEROL SULFATE 2.5; .5 MG/3ML; MG/3ML
3 SOLUTION RESPIRATORY (INHALATION) ONCE AS NEEDED
Status: DISCONTINUED | OUTPATIENT
Start: 2020-04-28 | End: 2020-04-28 | Stop reason: HOSPADM

## 2020-04-28 RX ORDER — DEXAMETHASONE SODIUM PHOSPHATE 4 MG/ML
8 INJECTION, SOLUTION INTRA-ARTICULAR; INTRALESIONAL; INTRAMUSCULAR; INTRAVENOUS; SOFT TISSUE ONCE AS NEEDED
Status: DISCONTINUED | OUTPATIENT
Start: 2020-04-28 | End: 2020-04-28 | Stop reason: HOSPADM

## 2020-04-28 RX ORDER — EPHEDRINE SULFATE 50 MG/ML
5 INJECTION, SOLUTION INTRAVENOUS ONCE AS NEEDED
Status: DISCONTINUED | OUTPATIENT
Start: 2020-04-28 | End: 2020-04-28 | Stop reason: HOSPADM

## 2020-04-28 RX ORDER — FENTANYL CITRATE 50 UG/ML
INJECTION, SOLUTION INTRAMUSCULAR; INTRAVENOUS AS NEEDED
Status: DISCONTINUED | OUTPATIENT
Start: 2020-04-28 | End: 2020-04-28 | Stop reason: SURG

## 2020-04-28 RX ADMIN — Medication 10 ML: at 08:29

## 2020-04-28 RX ADMIN — SODIUM CHLORIDE 1000 ML: 900 INJECTION, SOLUTION INTRAVENOUS at 01:10

## 2020-04-28 RX ADMIN — ACETAMINOPHEN 650 MG: 325 TABLET, FILM COATED ORAL at 18:59

## 2020-04-28 RX ADMIN — IBUPROFEN 600 MG: 600 TABLET ORAL at 20:20

## 2020-04-28 RX ADMIN — CEFEPIME HYDROCHLORIDE 2 G: 2 INJECTION, POWDER, FOR SOLUTION INTRAVENOUS at 20:22

## 2020-04-28 RX ADMIN — INSULIN LISPRO 6 UNITS: 100 INJECTION, SOLUTION INTRAVENOUS; SUBCUTANEOUS at 17:59

## 2020-04-28 RX ADMIN — ACETAMINOPHEN 650 MG: 650 SUPPOSITORY RECTAL at 08:31

## 2020-04-28 RX ADMIN — FENTANYL CITRATE 50 MCG: 50 INJECTION, SOLUTION INTRAMUSCULAR; INTRAVENOUS at 09:40

## 2020-04-28 RX ADMIN — METOCLOPRAMIDE 10 MG: 5 INJECTION, SOLUTION INTRAMUSCULAR; INTRAVENOUS at 17:59

## 2020-04-28 RX ADMIN — VANCOMYCIN HYDROCHLORIDE 1750 MG: 10 INJECTION, POWDER, LYOPHILIZED, FOR SOLUTION INTRAVENOUS at 20:20

## 2020-04-28 RX ADMIN — CEFEPIME HYDROCHLORIDE 2 G: 2 INJECTION, POWDER, FOR SOLUTION INTRAVENOUS at 05:12

## 2020-04-28 RX ADMIN — DEXAMETHASONE SODIUM PHOSPHATE 4 MG: 4 INJECTION, SOLUTION INTRAMUSCULAR; INTRAVENOUS at 10:18

## 2020-04-28 RX ADMIN — INSULIN GLARGINE 15 UNITS: 100 INJECTION, SOLUTION SUBCUTANEOUS at 20:22

## 2020-04-28 RX ADMIN — PROPOFOL 150 MCG/KG/MIN: 10 INJECTION, EMULSION INTRAVENOUS at 09:52

## 2020-04-28 RX ADMIN — VANCOMYCIN HYDROCHLORIDE 1750 MG: 10 INJECTION, POWDER, LYOPHILIZED, FOR SOLUTION INTRAVENOUS at 09:40

## 2020-04-28 RX ADMIN — CEFEPIME HYDROCHLORIDE 2 G: 2 INJECTION, POWDER, FOR SOLUTION INTRAVENOUS at 12:55

## 2020-04-28 RX ADMIN — PANTOPRAZOLE SODIUM 40 MG: 40 INJECTION, POWDER, FOR SOLUTION INTRAVENOUS at 08:29

## 2020-04-28 RX ADMIN — FENTANYL CITRATE 50 MCG: 50 INJECTION, SOLUTION INTRAMUSCULAR; INTRAVENOUS at 09:58

## 2020-04-28 RX ADMIN — PROPOFOL 50 MG: 10 INJECTION, EMULSION INTRAVENOUS at 09:45

## 2020-04-28 RX ADMIN — ONDANSETRON 4 MG: 2 INJECTION INTRAMUSCULAR; INTRAVENOUS at 10:21

## 2020-04-28 RX ADMIN — SODIUM CHLORIDE 100 ML/HR: 900 INJECTION, SOLUTION INTRAVENOUS at 03:21

## 2020-04-28 RX ADMIN — METOCLOPRAMIDE 10 MG: 5 INJECTION, SOLUTION INTRAMUSCULAR; INTRAVENOUS at 08:29

## 2020-04-28 RX ADMIN — PROPOFOL 50 MG: 10 INJECTION, EMULSION INTRAVENOUS at 09:52

## 2020-04-28 RX ADMIN — INSULIN LISPRO 4 UNITS: 100 INJECTION, SOLUTION INTRAVENOUS; SUBCUTANEOUS at 13:05

## 2020-04-28 RX ADMIN — MIDAZOLAM 2 MG: 1 INJECTION INTRAMUSCULAR; INTRAVENOUS at 09:40

## 2020-04-28 RX ADMIN — ENOXAPARIN SODIUM 40 MG: 40 INJECTION SUBCUTANEOUS at 20:21

## 2020-04-28 RX ADMIN — PROPOFOL 50 MG: 10 INJECTION, EMULSION INTRAVENOUS at 09:42

## 2020-04-28 RX ADMIN — Medication: at 02:18

## 2020-04-28 RX ADMIN — INSULIN LISPRO 5 UNITS: 100 INJECTION, SOLUTION INTRAVENOUS; SUBCUTANEOUS at 17:59

## 2020-04-28 RX ADMIN — METOCLOPRAMIDE 10 MG: 5 INJECTION, SOLUTION INTRAMUSCULAR; INTRAVENOUS at 20:30

## 2020-04-28 RX ADMIN — INSULIN LISPRO 5 UNITS: 100 INJECTION, SOLUTION INTRAVENOUS; SUBCUTANEOUS at 08:29

## 2020-04-28 RX ADMIN — VANCOMYCIN HYDROCHLORIDE 1750 MG: 10 INJECTION, POWDER, LYOPHILIZED, FOR SOLUTION INTRAVENOUS at 08:29

## 2020-04-28 NOTE — ANESTHESIA POSTPROCEDURE EVALUATION
Patient: Maynor Gregg    Procedure Summary     Date:  04/28/20 Room / Location:  [facility] OR 09 / [facility] MAIN OR    Anesthesia Start:  0940 Anesthesia Stop:  1047    Procedure:  incision and drainage ,transmetatarsal amputation, fasciotomy (Right Foot) Diagnosis:       Chronic ulcer of right foot, with necrosis of bone (CMS/HCC)      (Chronic ulcer of right foot, with necrosis of bone (CMS/HCC) [L97.514])    Surgeon:  CROW Souza DPM Provider:  Herb Prado MD    Anesthesia Type:  MAC ASA Status:  3          Anesthesia Type: MAC    Vitals  Vitals Value Taken Time   /75 4/28/2020  3:04 PM   Temp 99.3 °F (37.4 °C) 4/28/2020  1:00 PM   Pulse 96 4/28/2020  3:08 PM   Resp 22 4/28/2020  1:00 PM   SpO2 98 % 4/28/2020  3:08 PM   Vitals shown include unvalidated device data.        Post Anesthesia Care and Evaluation    Patient location during evaluation: PACU  Patient participation: complete - patient participated  Level of consciousness: awake  Pain scale: See nurse's notes for pain score.  Pain management: adequate  Airway patency: patent  Anesthetic complications: No anesthetic complications  PONV Status: none  Cardiovascular status: acceptable  Respiratory status: acceptable  Hydration status: acceptable    Comments: Patient seen and examined postoperatively; vital signs stable; SpO2 greater than or equal to 90%; cardiopulmonary status stable; nausea/vomiting adequately controlled; pain adequately controlled; no apparent anesthesia complications; patient discharged from anesthesia care when discharge criteria were met

## 2020-04-28 NOTE — ANESTHESIA PREPROCEDURE EVALUATION
Anesthesia Evaluation     Patient summary reviewed and Nursing notes reviewed   NPO Solid Status: > 8 hours  NPO Liquid Status: > 8 hours           Airway   Mallampati: II  TM distance: >3 FB  Neck ROM: full  No difficulty expected  Dental - normal exam     Pulmonary - normal exam   (+) a smoker Former, shortness of breath,   Cardiovascular - normal exam    (+) CAD,       Neuro/Psych  (+) numbness,     GI/Hepatic/Renal/Endo    (+) obesity,   diabetes mellitus type 1 poorly controlled using insulin,     Musculoskeletal (-) negative ROS    Abdominal  - normal exam    Bowel sounds: normal.   Substance History - negative use     OB/GYN negative ob/gyn ROS         Other                          Anesthesia Plan    ASA 3     MAC     intravenous induction     Anesthetic plan, all risks, benefits, and alternatives have been provided, discussed and informed consent has been obtained with: patient.

## 2020-04-29 ENCOUNTER — ANESTHESIA EVENT (OUTPATIENT)
Dept: PERIOP | Facility: HOSPITAL | Age: 45
End: 2020-04-29

## 2020-04-29 LAB
ALBUMIN SERPL-MCNC: 1.9 G/DL (ref 3.5–5.2)
ALBUMIN/GLOB SERPL: 0.4 G/DL
ALP SERPL-CCNC: 139 U/L (ref 39–117)
ALT SERPL W P-5'-P-CCNC: 20 U/L (ref 1–41)
ANION GAP SERPL CALCULATED.3IONS-SCNC: 13 MMOL/L (ref 5–15)
AST SERPL-CCNC: 14 U/L (ref 1–40)
BACTERIA SPEC AEROBE CULT: ABNORMAL
BACTERIA SPEC AEROBE CULT: ABNORMAL
BASOPHILS # BLD AUTO: 0 10*3/MM3 (ref 0–0.2)
BASOPHILS NFR BLD AUTO: 0.1 % (ref 0–1.5)
BILIRUB SERPL-MCNC: 0.5 MG/DL (ref 0.2–1.2)
BUN BLD-MCNC: 32 MG/DL (ref 6–20)
BUN/CREAT SERPL: 31.4 (ref 7–25)
CALCIUM SPEC-SCNC: 8.1 MG/DL (ref 8.6–10.5)
CHLORIDE SERPL-SCNC: 94 MMOL/L (ref 98–107)
CO2 SERPL-SCNC: 22 MMOL/L (ref 22–29)
CREAT BLD-MCNC: 1.02 MG/DL (ref 0.76–1.27)
DEPRECATED RDW RBC AUTO: 42.9 FL (ref 37–54)
EOSINOPHIL # BLD AUTO: 0 10*3/MM3 (ref 0–0.4)
EOSINOPHIL NFR BLD AUTO: 0 % (ref 0.3–6.2)
ERYTHROCYTE [DISTWIDTH] IN BLOOD BY AUTOMATED COUNT: 13.8 % (ref 12.3–15.4)
GFR SERPL CREATININE-BSD FRML MDRD: 79 ML/MIN/1.73
GLOBULIN UR ELPH-MCNC: 4.3 GM/DL
GLUCOSE BLD-MCNC: 381 MG/DL (ref 65–99)
GLUCOSE BLDC GLUCOMTR-MCNC: 367 MG/DL (ref 70–105)
GLUCOSE BLDC GLUCOMTR-MCNC: 416 MG/DL (ref 70–105)
GLUCOSE BLDC GLUCOMTR-MCNC: 423 MG/DL (ref 70–105)
GLUCOSE BLDC GLUCOMTR-MCNC: 430 MG/DL (ref 70–105)
GRAM STN SPEC: ABNORMAL
HBA1C MFR BLD: 9.3 % (ref 3.5–5.6)
HCT VFR BLD AUTO: 33.9 % (ref 37.5–51)
HGB BLD-MCNC: 11.2 G/DL (ref 13–17.7)
LYMPHOCYTES # BLD AUTO: 1.2 10*3/MM3 (ref 0.7–3.1)
LYMPHOCYTES NFR BLD AUTO: 6.1 % (ref 19.6–45.3)
MCH RBC QN AUTO: 29.1 PG (ref 26.6–33)
MCHC RBC AUTO-ENTMCNC: 33.1 G/DL (ref 31.5–35.7)
MCV RBC AUTO: 88 FL (ref 79–97)
MONOCYTES # BLD AUTO: 1.7 10*3/MM3 (ref 0.1–0.9)
MONOCYTES NFR BLD AUTO: 9.2 % (ref 5–12)
NEUTROPHILS # BLD AUTO: 16 10*3/MM3 (ref 1.7–7)
NEUTROPHILS NFR BLD AUTO: 84.6 % (ref 42.7–76)
NRBC BLD AUTO-RTO: 0 /100 WBC (ref 0–0.2)
PLATELET # BLD AUTO: 332 10*3/MM3 (ref 140–450)
PMV BLD AUTO: 7.8 FL (ref 6–12)
POTASSIUM BLD-SCNC: 3.9 MMOL/L (ref 3.5–5.2)
PROT SERPL-MCNC: 6.2 G/DL (ref 6–8.5)
RBC # BLD AUTO: 3.85 10*6/MM3 (ref 4.14–5.8)
SODIUM BLD-SCNC: 129 MMOL/L (ref 136–145)
WBC NRBC COR # BLD: 19 10*3/MM3 (ref 3.4–10.8)

## 2020-04-29 PROCEDURE — 25010000002 VANCOMYCIN 10 G RECONSTITUTED SOLUTION: Performed by: PODIATRIST

## 2020-04-29 PROCEDURE — 25010000002 CEFEPIME PER 500 MG: Performed by: HOSPITALIST

## 2020-04-29 PROCEDURE — 80053 COMPREHEN METABOLIC PANEL: CPT | Performed by: INTERNAL MEDICINE

## 2020-04-29 PROCEDURE — 63710000001 INSULIN LISPRO (HUMAN) PER 5 UNITS: Performed by: PODIATRIST

## 2020-04-29 PROCEDURE — 63710000001 INSULIN GLARGINE PER 5 UNITS: Performed by: INTERNAL MEDICINE

## 2020-04-29 PROCEDURE — 25010000002 METOCLOPRAMIDE PER 10 MG: Performed by: PODIATRIST

## 2020-04-29 PROCEDURE — 85025 COMPLETE CBC W/AUTO DIFF WBC: CPT | Performed by: INTERNAL MEDICINE

## 2020-04-29 PROCEDURE — 25010000002 CEFTRIAXONE PER 250 MG: Performed by: NURSE PRACTITIONER

## 2020-04-29 PROCEDURE — 99254 IP/OBS CNSLTJ NEW/EST MOD 60: CPT | Performed by: INTERNAL MEDICINE

## 2020-04-29 PROCEDURE — 25010000002 CEFEPIME PER 500 MG: Performed by: PODIATRIST

## 2020-04-29 PROCEDURE — 82962 GLUCOSE BLOOD TEST: CPT

## 2020-04-29 PROCEDURE — 63710000001 INSULIN LISPRO (HUMAN) PER 5 UNITS: Performed by: HOSPITALIST

## 2020-04-29 RX ORDER — INSULIN GLARGINE 100 [IU]/ML
18 INJECTION, SOLUTION SUBCUTANEOUS NIGHTLY
Status: DISCONTINUED | OUTPATIENT
Start: 2020-04-29 | End: 2020-04-30

## 2020-04-29 RX ADMIN — IBUPROFEN 600 MG: 600 TABLET ORAL at 20:27

## 2020-04-29 RX ADMIN — CEFTRIAXONE SODIUM 2 G: 2 INJECTION, POWDER, FOR SOLUTION INTRAMUSCULAR; INTRAVENOUS at 20:26

## 2020-04-29 RX ADMIN — INSULIN LISPRO 5 UNITS: 100 INJECTION, SOLUTION INTRAVENOUS; SUBCUTANEOUS at 12:32

## 2020-04-29 RX ADMIN — SODIUM CHLORIDE 500 ML: 0.9 INJECTION, SOLUTION INTRAVENOUS at 23:10

## 2020-04-29 RX ADMIN — INSULIN LISPRO 9 UNITS: 100 INJECTION, SOLUTION INTRAVENOUS; SUBCUTANEOUS at 17:20

## 2020-04-29 RX ADMIN — Medication 10 ML: at 08:15

## 2020-04-29 RX ADMIN — METOCLOPRAMIDE 10 MG: 5 INJECTION, SOLUTION INTRAMUSCULAR; INTRAVENOUS at 12:31

## 2020-04-29 RX ADMIN — IBUPROFEN 600 MG: 600 TABLET ORAL at 08:15

## 2020-04-29 RX ADMIN — ASPIRIN 325 MG ORAL TABLET 325 MG: 325 PILL ORAL at 08:15

## 2020-04-29 RX ADMIN — INSULIN LISPRO 9 UNITS: 100 INJECTION, SOLUTION INTRAVENOUS; SUBCUTANEOUS at 20:02

## 2020-04-29 RX ADMIN — MELATONIN TAB 5 MG 5 MG: 5 TAB at 20:43

## 2020-04-29 RX ADMIN — INSULIN GLARGINE 18 UNITS: 100 INJECTION, SOLUTION SUBCUTANEOUS at 20:43

## 2020-04-29 RX ADMIN — INSULIN LISPRO 5 UNITS: 100 INJECTION, SOLUTION INTRAVENOUS; SUBCUTANEOUS at 17:21

## 2020-04-29 RX ADMIN — INSULIN LISPRO 8 UNITS: 100 INJECTION, SOLUTION INTRAVENOUS; SUBCUTANEOUS at 12:31

## 2020-04-29 RX ADMIN — PANTOPRAZOLE SODIUM 40 MG: 40 INJECTION, POWDER, FOR SOLUTION INTRAVENOUS at 08:14

## 2020-04-29 RX ADMIN — INSULIN LISPRO 8 UNITS: 100 INJECTION, SOLUTION INTRAVENOUS; SUBCUTANEOUS at 08:14

## 2020-04-29 RX ADMIN — METOCLOPRAMIDE 10 MG: 5 INJECTION, SOLUTION INTRAMUSCULAR; INTRAVENOUS at 20:26

## 2020-04-29 RX ADMIN — CEFEPIME HYDROCHLORIDE 2 G: 2 INJECTION, POWDER, FOR SOLUTION INTRAVENOUS at 12:30

## 2020-04-29 RX ADMIN — INSULIN LISPRO 5 UNITS: 100 INJECTION, SOLUTION INTRAVENOUS; SUBCUTANEOUS at 08:15

## 2020-04-29 RX ADMIN — METOCLOPRAMIDE 10 MG: 5 INJECTION, SOLUTION INTRAMUSCULAR; INTRAVENOUS at 17:24

## 2020-04-29 RX ADMIN — METOCLOPRAMIDE 10 MG: 5 INJECTION, SOLUTION INTRAMUSCULAR; INTRAVENOUS at 08:14

## 2020-04-29 RX ADMIN — CEFEPIME HYDROCHLORIDE 2 G: 2 INJECTION, POWDER, FOR SOLUTION INTRAVENOUS at 04:07

## 2020-04-29 RX ADMIN — VANCOMYCIN HYDROCHLORIDE 1750 MG: 10 INJECTION, POWDER, LYOPHILIZED, FOR SOLUTION INTRAVENOUS at 08:18

## 2020-04-30 ENCOUNTER — ANESTHESIA (OUTPATIENT)
Dept: PERIOP | Facility: HOSPITAL | Age: 45
End: 2020-04-30

## 2020-04-30 ENCOUNTER — APPOINTMENT (OUTPATIENT)
Dept: GENERAL RADIOLOGY | Facility: HOSPITAL | Age: 45
End: 2020-04-30

## 2020-04-30 LAB
ANION GAP SERPL CALCULATED.3IONS-SCNC: 10 MMOL/L (ref 5–15)
ANION GAP SERPL CALCULATED.3IONS-SCNC: 8 MMOL/L (ref 5–15)
BACTERIA SPEC AEROBE CULT: ABNORMAL
BACTERIA SPEC AEROBE CULT: ABNORMAL
BASOPHILS # BLD AUTO: 0.1 10*3/MM3 (ref 0–0.2)
BASOPHILS NFR BLD AUTO: 0.8 % (ref 0–1.5)
BUN BLD-MCNC: 31 MG/DL (ref 6–20)
BUN BLD-MCNC: 31 MG/DL (ref 6–20)
BUN/CREAT SERPL: 33.7 (ref 7–25)
BUN/CREAT SERPL: 36.5 (ref 7–25)
CALCIUM SPEC-SCNC: 7.9 MG/DL (ref 8.6–10.5)
CALCIUM SPEC-SCNC: 8.4 MG/DL (ref 8.6–10.5)
CHLORIDE SERPL-SCNC: 96 MMOL/L (ref 98–107)
CHLORIDE SERPL-SCNC: 96 MMOL/L (ref 98–107)
CO2 SERPL-SCNC: 25 MMOL/L (ref 22–29)
CO2 SERPL-SCNC: 27 MMOL/L (ref 22–29)
CREAT BLD-MCNC: 0.85 MG/DL (ref 0.76–1.27)
CREAT BLD-MCNC: 0.92 MG/DL (ref 0.76–1.27)
D-LACTATE SERPL-SCNC: 1.3 MMOL/L (ref 0.5–2)
DEPRECATED RDW RBC AUTO: 43.8 FL (ref 37–54)
EOSINOPHIL # BLD AUTO: 0 10*3/MM3 (ref 0–0.4)
EOSINOPHIL NFR BLD AUTO: 0 % (ref 0.3–6.2)
ERYTHROCYTE [DISTWIDTH] IN BLOOD BY AUTOMATED COUNT: 13.9 % (ref 12.3–15.4)
GFR SERPL CREATININE-BSD FRML MDRD: 89 ML/MIN/1.73
GFR SERPL CREATININE-BSD FRML MDRD: 97 ML/MIN/1.73
GLUCOSE BLD-MCNC: 327 MG/DL (ref 65–99)
GLUCOSE BLD-MCNC: 383 MG/DL (ref 65–99)
GLUCOSE BLDC GLUCOMTR-MCNC: 300 MG/DL (ref 70–105)
GLUCOSE BLDC GLUCOMTR-MCNC: 301 MG/DL (ref 70–105)
GLUCOSE BLDC GLUCOMTR-MCNC: 307 MG/DL (ref 70–105)
GLUCOSE BLDC GLUCOMTR-MCNC: 375 MG/DL (ref 70–105)
GRAM STN SPEC: ABNORMAL
GRAM STN SPEC: ABNORMAL
HCT VFR BLD AUTO: 30.1 % (ref 37.5–51)
HGB BLD-MCNC: 10.1 G/DL (ref 13–17.7)
LAB AP CASE REPORT: NORMAL
LYMPHOCYTES # BLD AUTO: 0.6 10*3/MM3 (ref 0.7–3.1)
LYMPHOCYTES NFR BLD AUTO: 4.5 % (ref 19.6–45.3)
MCH RBC QN AUTO: 29.4 PG (ref 26.6–33)
MCHC RBC AUTO-ENTMCNC: 33.6 G/DL (ref 31.5–35.7)
MCV RBC AUTO: 87.6 FL (ref 79–97)
MONOCYTES # BLD AUTO: 0.7 10*3/MM3 (ref 0.1–0.9)
MONOCYTES NFR BLD AUTO: 5 % (ref 5–12)
NEUTROPHILS # BLD AUTO: 12.1 10*3/MM3 (ref 1.7–7)
NEUTROPHILS NFR BLD AUTO: 89.7 % (ref 42.7–76)
NRBC BLD AUTO-RTO: 0.1 /100 WBC (ref 0–0.2)
PATH REPORT.FINAL DX SPEC: NORMAL
PATH REPORT.GROSS SPEC: NORMAL
PLATELET # BLD AUTO: 330 10*3/MM3 (ref 140–450)
PMV BLD AUTO: 7.8 FL (ref 6–12)
POTASSIUM BLD-SCNC: 3.3 MMOL/L (ref 3.5–5.2)
POTASSIUM BLD-SCNC: 3.4 MMOL/L (ref 3.5–5.2)
RBC # BLD AUTO: 3.43 10*6/MM3 (ref 4.14–5.8)
SODIUM BLD-SCNC: 129 MMOL/L (ref 136–145)
SODIUM BLD-SCNC: 133 MMOL/L (ref 136–145)
WBC NRBC COR # BLD: 13.4 10*3/MM3 (ref 3.4–10.8)

## 2020-04-30 PROCEDURE — 73590 X-RAY EXAM OF LOWER LEG: CPT

## 2020-04-30 PROCEDURE — 25010000002 FENTANYL CITRATE (PF) 100 MCG/2ML SOLUTION: Performed by: ANESTHESIOLOGY

## 2020-04-30 PROCEDURE — 25010000002 PROPOFOL 10 MG/ML EMULSION: Performed by: ANESTHESIOLOGY

## 2020-04-30 PROCEDURE — 83605 ASSAY OF LACTIC ACID: CPT | Performed by: PHYSICIAN ASSISTANT

## 2020-04-30 PROCEDURE — 85025 COMPLETE CBC W/AUTO DIFF WBC: CPT | Performed by: NURSE PRACTITIONER

## 2020-04-30 PROCEDURE — 63710000001 INSULIN LISPRO (HUMAN) PER 5 UNITS: Performed by: INTERNAL MEDICINE

## 2020-04-30 PROCEDURE — 63710000001 INSULIN LISPRO (HUMAN) PER 5 UNITS: Performed by: HOSPITALIST

## 2020-04-30 PROCEDURE — 25010000002 MORPHINE PER 10 MG: Performed by: ORTHOPAEDIC SURGERY

## 2020-04-30 PROCEDURE — 80048 BASIC METABOLIC PNL TOTAL CA: CPT | Performed by: NURSE PRACTITIONER

## 2020-04-30 PROCEDURE — 25010000002 CEFTRIAXONE PER 250 MG: Performed by: ORTHOPAEDIC SURGERY

## 2020-04-30 PROCEDURE — 25010000002 VANCOMYCIN 10 G RECONSTITUTED SOLUTION: Performed by: ORTHOPAEDIC SURGERY

## 2020-04-30 PROCEDURE — 88307 TISSUE EXAM BY PATHOLOGIST: CPT | Performed by: ORTHOPAEDIC SURGERY

## 2020-04-30 PROCEDURE — 82962 GLUCOSE BLOOD TEST: CPT

## 2020-04-30 PROCEDURE — 80048 BASIC METABOLIC PNL TOTAL CA: CPT | Performed by: ORTHOPAEDIC SURGERY

## 2020-04-30 PROCEDURE — 99232 SBSQ HOSP IP/OBS MODERATE 35: CPT | Performed by: INTERNAL MEDICINE

## 2020-04-30 PROCEDURE — 63710000001 INSULIN LISPRO (HUMAN) PER 5 UNITS: Performed by: ORTHOPAEDIC SURGERY

## 2020-04-30 PROCEDURE — 25010000002 MORPHINE PER 10 MG: Performed by: NURSE PRACTITIONER

## 2020-04-30 PROCEDURE — 25010000002 FENTANYL CITRATE (PF) 250 MCG/5ML SOLUTION: Performed by: ANESTHESIOLOGY

## 2020-04-30 PROCEDURE — 99232 SBSQ HOSP IP/OBS MODERATE 35: CPT | Performed by: HOSPITALIST

## 2020-04-30 PROCEDURE — 63710000001 INSULIN GLARGINE PER 5 UNITS: Performed by: INTERNAL MEDICINE

## 2020-04-30 PROCEDURE — 25010000002 MIDAZOLAM PER 1 MG: Performed by: ANESTHESIOLOGY

## 2020-04-30 PROCEDURE — 25010000002 METOCLOPRAMIDE PER 10 MG: Performed by: ORTHOPAEDIC SURGERY

## 2020-04-30 PROCEDURE — 0Y6H0Z2 DETACHMENT AT RIGHT LOWER LEG, MID, OPEN APPROACH: ICD-10-PCS | Performed by: ORTHOPAEDIC SURGERY

## 2020-04-30 RX ORDER — BUPIVACAINE HYDROCHLORIDE 7.5 MG/ML
INJECTION, SOLUTION EPIDURAL; RETROBULBAR
Status: COMPLETED | OUTPATIENT
Start: 2020-04-30 | End: 2020-04-30

## 2020-04-30 RX ORDER — DEXTROSE MONOHYDRATE 25 G/50ML
25 INJECTION, SOLUTION INTRAVENOUS
Status: DISCONTINUED | OUTPATIENT
Start: 2020-04-30 | End: 2020-05-03 | Stop reason: HOSPADM

## 2020-04-30 RX ORDER — OXYCODONE HYDROCHLORIDE 5 MG/1
5 TABLET ORAL ONCE AS NEEDED
Status: DISCONTINUED | OUTPATIENT
Start: 2020-04-30 | End: 2020-04-30 | Stop reason: HOSPADM

## 2020-04-30 RX ORDER — SODIUM CHLORIDE 0.9 % (FLUSH) 0.9 %
10 SYRINGE (ML) INJECTION AS NEEDED
Status: DISCONTINUED | OUTPATIENT
Start: 2020-04-30 | End: 2020-04-30 | Stop reason: HOSPADM

## 2020-04-30 RX ORDER — PROMETHAZINE HYDROCHLORIDE 25 MG/ML
6.25 INJECTION, SOLUTION INTRAMUSCULAR; INTRAVENOUS ONCE AS NEEDED
Status: DISCONTINUED | OUTPATIENT
Start: 2020-04-30 | End: 2020-04-30 | Stop reason: HOSPADM

## 2020-04-30 RX ORDER — ONDANSETRON 2 MG/ML
4 INJECTION INTRAMUSCULAR; INTRAVENOUS EVERY 6 HOURS PRN
Status: DISCONTINUED | OUTPATIENT
Start: 2020-04-30 | End: 2020-05-03 | Stop reason: HOSPADM

## 2020-04-30 RX ORDER — FENTANYL CITRATE 50 UG/ML
INJECTION, SOLUTION INTRAMUSCULAR; INTRAVENOUS
Status: COMPLETED | OUTPATIENT
Start: 2020-04-30 | End: 2020-04-30

## 2020-04-30 RX ORDER — MIDAZOLAM HYDROCHLORIDE 1 MG/ML
INJECTION INTRAMUSCULAR; INTRAVENOUS AS NEEDED
Status: DISCONTINUED | OUTPATIENT
Start: 2020-04-30 | End: 2020-04-30 | Stop reason: SURG

## 2020-04-30 RX ORDER — SODIUM CHLORIDE, SODIUM LACTATE, POTASSIUM CHLORIDE, CALCIUM CHLORIDE 600; 310; 30; 20 MG/100ML; MG/100ML; MG/100ML; MG/100ML
9 INJECTION, SOLUTION INTRAVENOUS CONTINUOUS PRN
Status: DISCONTINUED | OUTPATIENT
Start: 2020-04-30 | End: 2020-04-30 | Stop reason: HOSPADM

## 2020-04-30 RX ORDER — PROMETHAZINE HYDROCHLORIDE 25 MG/1
25 TABLET ORAL ONCE AS NEEDED
Status: DISCONTINUED | OUTPATIENT
Start: 2020-04-30 | End: 2020-04-30 | Stop reason: HOSPADM

## 2020-04-30 RX ORDER — PROMETHAZINE HYDROCHLORIDE 25 MG/1
25 SUPPOSITORY RECTAL ONCE AS NEEDED
Status: DISCONTINUED | OUTPATIENT
Start: 2020-04-30 | End: 2020-04-30 | Stop reason: HOSPADM

## 2020-04-30 RX ORDER — NICOTINE POLACRILEX 4 MG
15 LOZENGE BUCCAL
Status: DISCONTINUED | OUTPATIENT
Start: 2020-04-30 | End: 2020-05-03 | Stop reason: HOSPADM

## 2020-04-30 RX ORDER — MORPHINE SULFATE 4 MG/ML
3 INJECTION, SOLUTION INTRAMUSCULAR; INTRAVENOUS ONCE
Status: COMPLETED | OUTPATIENT
Start: 2020-04-30 | End: 2020-04-30

## 2020-04-30 RX ORDER — SODIUM CHLORIDE 0.9 % (FLUSH) 0.9 %
10 SYRINGE (ML) INJECTION EVERY 12 HOURS SCHEDULED
Status: DISCONTINUED | OUTPATIENT
Start: 2020-04-30 | End: 2020-04-30 | Stop reason: HOSPADM

## 2020-04-30 RX ORDER — VANCOMYCIN HYDROCHLORIDE
1500 EVERY 12 HOURS
Status: DISCONTINUED | OUTPATIENT
Start: 2020-04-30 | End: 2020-04-30

## 2020-04-30 RX ORDER — NICOTINE POLACRILEX 4 MG
15 LOZENGE BUCCAL
Status: DISCONTINUED | OUTPATIENT
Start: 2020-04-30 | End: 2020-04-30 | Stop reason: HOSPADM

## 2020-04-30 RX ORDER — MORPHINE SULFATE 4 MG/ML
1 INJECTION, SOLUTION INTRAMUSCULAR; INTRAVENOUS
Status: DISCONTINUED | OUTPATIENT
Start: 2020-04-30 | End: 2020-04-30 | Stop reason: HOSPADM

## 2020-04-30 RX ORDER — HYDROMORPHONE HCL 110MG/55ML
1 PATIENT CONTROLLED ANALGESIA SYRINGE INTRAVENOUS
Status: DISCONTINUED | OUTPATIENT
Start: 2020-04-30 | End: 2020-04-30 | Stop reason: HOSPADM

## 2020-04-30 RX ORDER — ONDANSETRON 4 MG/1
4 TABLET, FILM COATED ORAL EVERY 6 HOURS PRN
Status: DISCONTINUED | OUTPATIENT
Start: 2020-04-30 | End: 2020-05-03 | Stop reason: HOSPADM

## 2020-04-30 RX ORDER — VANCOMYCIN 1.75 GRAM/500 ML IN 0.9 % SODIUM CHLORIDE INTRAVENOUS
1750 EVERY 12 HOURS
Status: COMPLETED | OUTPATIENT
Start: 2020-04-30 | End: 2020-05-03

## 2020-04-30 RX ORDER — SODIUM CHLORIDE 9 MG/ML
125 INJECTION, SOLUTION INTRAVENOUS CONTINUOUS
Status: DISPENSED | OUTPATIENT
Start: 2020-04-30 | End: 2020-05-01

## 2020-04-30 RX ORDER — MORPHINE SULFATE 4 MG/ML
4 INJECTION, SOLUTION INTRAMUSCULAR; INTRAVENOUS
Status: DISCONTINUED | OUTPATIENT
Start: 2020-04-30 | End: 2020-05-03 | Stop reason: HOSPADM

## 2020-04-30 RX ORDER — ASPIRIN 325 MG
325 TABLET, DELAYED RELEASE (ENTERIC COATED) ORAL DAILY
Status: DISCONTINUED | OUTPATIENT
Start: 2020-05-01 | End: 2020-05-03 | Stop reason: HOSPADM

## 2020-04-30 RX ORDER — SODIUM CHLORIDE 0.9 % (FLUSH) 0.9 %
1-10 SYRINGE (ML) INJECTION AS NEEDED
Status: DISCONTINUED | OUTPATIENT
Start: 2020-04-30 | End: 2020-05-03 | Stop reason: HOSPADM

## 2020-04-30 RX ORDER — ONDANSETRON 2 MG/ML
4 INJECTION INTRAMUSCULAR; INTRAVENOUS ONCE AS NEEDED
Status: DISCONTINUED | OUTPATIENT
Start: 2020-04-30 | End: 2020-04-30 | Stop reason: HOSPADM

## 2020-04-30 RX ORDER — DEXTROSE MONOHYDRATE 25 G/50ML
25 INJECTION, SOLUTION INTRAVENOUS
Status: DISCONTINUED | OUTPATIENT
Start: 2020-04-30 | End: 2020-04-30 | Stop reason: HOSPADM

## 2020-04-30 RX ORDER — INSULIN GLARGINE 100 [IU]/ML
22 INJECTION, SOLUTION SUBCUTANEOUS NIGHTLY
Status: DISCONTINUED | OUTPATIENT
Start: 2020-04-30 | End: 2020-05-01

## 2020-04-30 RX ORDER — SODIUM CHLORIDE 9 MG/ML
75 INJECTION, SOLUTION INTRAVENOUS CONTINUOUS
Status: DISCONTINUED | OUTPATIENT
Start: 2020-04-30 | End: 2020-05-03 | Stop reason: HOSPADM

## 2020-04-30 RX ORDER — HYDROCODONE BITARTRATE AND ACETAMINOPHEN 7.5; 325 MG/1; MG/1
1 TABLET ORAL EVERY 4 HOURS PRN
Status: DISCONTINUED | OUTPATIENT
Start: 2020-04-30 | End: 2020-05-03 | Stop reason: HOSPADM

## 2020-04-30 RX ORDER — HYDROCODONE BITARTRATE AND ACETAMINOPHEN 10; 325 MG/1; MG/1
0.5 TABLET ORAL EVERY 4 HOURS PRN
Status: DISCONTINUED | OUTPATIENT
Start: 2020-04-30 | End: 2020-05-03 | Stop reason: HOSPADM

## 2020-04-30 RX ORDER — FENTANYL CITRATE 50 UG/ML
INJECTION, SOLUTION INTRAMUSCULAR; INTRAVENOUS AS NEEDED
Status: DISCONTINUED | OUTPATIENT
Start: 2020-04-30 | End: 2020-04-30 | Stop reason: SURG

## 2020-04-30 RX ORDER — DIPHENHYDRAMINE HYDROCHLORIDE 50 MG/ML
12.5 INJECTION INTRAMUSCULAR; INTRAVENOUS
Status: DISCONTINUED | OUTPATIENT
Start: 2020-04-30 | End: 2020-04-30 | Stop reason: HOSPADM

## 2020-04-30 RX ORDER — PROPOFOL 10 MG/ML
VIAL (ML) INTRAVENOUS AS NEEDED
Status: DISCONTINUED | OUTPATIENT
Start: 2020-04-30 | End: 2020-04-30 | Stop reason: SURG

## 2020-04-30 RX ORDER — ACETAMINOPHEN 325 MG/1
325 TABLET ORAL EVERY 4 HOURS PRN
Status: DISCONTINUED | OUTPATIENT
Start: 2020-04-30 | End: 2020-05-03 | Stop reason: HOSPADM

## 2020-04-30 RX ORDER — NALOXONE HCL 0.4 MG/ML
0.4 VIAL (ML) INJECTION
Status: DISCONTINUED | OUTPATIENT
Start: 2020-04-30 | End: 2020-05-03 | Stop reason: HOSPADM

## 2020-04-30 RX ADMIN — SODIUM CHLORIDE, PRESERVATIVE FREE 10 ML: 5 INJECTION INTRAVENOUS at 20:06

## 2020-04-30 RX ADMIN — PROPOFOL 50 MG: 10 INJECTION, EMULSION INTRAVENOUS at 08:33

## 2020-04-30 RX ADMIN — METOCLOPRAMIDE 10 MG: 5 INJECTION, SOLUTION INTRAMUSCULAR; INTRAVENOUS at 20:05

## 2020-04-30 RX ADMIN — MORPHINE SULFATE 3 MG: 4 INJECTION INTRAVENOUS at 03:14

## 2020-04-30 RX ADMIN — SODIUM CHLORIDE 125 ML/HR: 900 INJECTION, SOLUTION INTRAVENOUS at 12:52

## 2020-04-30 RX ADMIN — VANCOMYCIN HYDROCHLORIDE 1750 MG: 10 INJECTION, POWDER, LYOPHILIZED, FOR SOLUTION INTRAVENOUS at 14:02

## 2020-04-30 RX ADMIN — HYDROCODONE BITARTRATE AND ACETAMINOPHEN 0.5 TABLET: 10; 325 TABLET ORAL at 17:00

## 2020-04-30 RX ADMIN — POLYETHYLENE GLYCOL 3350 17 G: 17 POWDER, FOR SOLUTION ORAL at 12:48

## 2020-04-30 RX ADMIN — INSULIN GLARGINE 22 UNITS: 100 INJECTION, SOLUTION SUBCUTANEOUS at 20:06

## 2020-04-30 RX ADMIN — FENTANYL CITRATE 25 MCG: 50 INJECTION, SOLUTION INTRAMUSCULAR; INTRAVENOUS at 08:22

## 2020-04-30 RX ADMIN — SODIUM CHLORIDE 100 ML/HR: 900 INJECTION, SOLUTION INTRAVENOUS at 03:20

## 2020-04-30 RX ADMIN — INSULIN LISPRO 6 UNITS: 100 INJECTION, SOLUTION INTRAVENOUS; SUBCUTANEOUS at 08:00

## 2020-04-30 RX ADMIN — MORPHINE SULFATE 4 MG: 4 INJECTION INTRAVENOUS at 17:48

## 2020-04-30 RX ADMIN — CEFTRIAXONE SODIUM 2 G: 2 INJECTION, POWDER, FOR SOLUTION INTRAMUSCULAR; INTRAVENOUS at 20:05

## 2020-04-30 RX ADMIN — SODIUM CHLORIDE 75 ML/HR: 900 INJECTION, SOLUTION INTRAVENOUS at 22:32

## 2020-04-30 RX ADMIN — FENTANYL CITRATE 25 MCG: 50 INJECTION, SOLUTION INTRAMUSCULAR; INTRAVENOUS at 08:31

## 2020-04-30 RX ADMIN — INSULIN LISPRO 7 UNITS: 100 INJECTION, SOLUTION INTRAVENOUS; SUBCUTANEOUS at 16:59

## 2020-04-30 RX ADMIN — METOCLOPRAMIDE 10 MG: 5 INJECTION, SOLUTION INTRAMUSCULAR; INTRAVENOUS at 16:59

## 2020-04-30 RX ADMIN — FENTANYL CITRATE 50 MCG: 50 INJECTION, SOLUTION INTRAMUSCULAR; INTRAVENOUS at 08:46

## 2020-04-30 RX ADMIN — PROPOFOL 50 MCG/KG/MIN: 10 INJECTION, EMULSION INTRAVENOUS at 08:30

## 2020-04-30 RX ADMIN — METOCLOPRAMIDE 10 MG: 5 INJECTION, SOLUTION INTRAMUSCULAR; INTRAVENOUS at 12:47

## 2020-04-30 RX ADMIN — INSULIN LISPRO 10 UNITS: 100 INJECTION, SOLUTION INTRAVENOUS; SUBCUTANEOUS at 17:01

## 2020-04-30 RX ADMIN — MORPHINE SULFATE 4 MG: 4 INJECTION INTRAVENOUS at 22:33

## 2020-04-30 RX ADMIN — MORPHINE SULFATE 4 MG: 4 INJECTION INTRAVENOUS at 15:08

## 2020-04-30 RX ADMIN — MIDAZOLAM 2 MG: 1 INJECTION INTRAMUSCULAR; INTRAVENOUS at 08:21

## 2020-04-30 RX ADMIN — MORPHINE SULFATE 4 MG: 4 INJECTION INTRAVENOUS at 20:05

## 2020-04-30 RX ADMIN — MORPHINE SULFATE 4 MG: 4 INJECTION INTRAVENOUS at 12:48

## 2020-04-30 RX ADMIN — BUPIVACAINE HYDROCHLORIDE 1.8 ML: 7.5 INJECTION, SOLUTION EPIDURAL; RETROBULBAR at 08:31

## 2020-05-01 LAB
ALBUMIN SERPL-MCNC: 2 G/DL (ref 3.5–5.2)
ALBUMIN/GLOB SERPL: 0.5 G/DL
ALP SERPL-CCNC: 106 U/L (ref 39–117)
ALT SERPL W P-5'-P-CCNC: 20 U/L (ref 1–41)
ANION GAP SERPL CALCULATED.3IONS-SCNC: 8 MMOL/L (ref 5–15)
AST SERPL-CCNC: 33 U/L (ref 1–40)
BASOPHILS # BLD AUTO: 0 10*3/MM3 (ref 0–0.2)
BASOPHILS NFR BLD AUTO: 0.3 % (ref 0–1.5)
BILIRUB SERPL-MCNC: 0.3 MG/DL (ref 0.2–1.2)
BUN BLD-MCNC: 18 MG/DL (ref 6–20)
BUN/CREAT SERPL: 26.9 (ref 7–25)
CALCIUM SPEC-SCNC: 7.8 MG/DL (ref 8.6–10.5)
CHLORIDE SERPL-SCNC: 98 MMOL/L (ref 98–107)
CO2 SERPL-SCNC: 27 MMOL/L (ref 22–29)
CREAT BLD-MCNC: 0.67 MG/DL (ref 0.76–1.27)
DEPRECATED RDW RBC AUTO: 42.4 FL (ref 37–54)
EOSINOPHIL # BLD AUTO: 0.1 10*3/MM3 (ref 0–0.4)
EOSINOPHIL NFR BLD AUTO: 0.5 % (ref 0.3–6.2)
ERYTHROCYTE [DISTWIDTH] IN BLOOD BY AUTOMATED COUNT: 13.6 % (ref 12.3–15.4)
GFR SERPL CREATININE-BSD FRML MDRD: 128 ML/MIN/1.73
GLOBULIN UR ELPH-MCNC: 4.2 GM/DL
GLUCOSE BLD-MCNC: 217 MG/DL (ref 65–99)
GLUCOSE BLDC GLUCOMTR-MCNC: 128 MG/DL (ref 70–105)
GLUCOSE BLDC GLUCOMTR-MCNC: 154 MG/DL (ref 70–105)
GLUCOSE BLDC GLUCOMTR-MCNC: 181 MG/DL (ref 70–105)
GLUCOSE BLDC GLUCOMTR-MCNC: 225 MG/DL (ref 70–105)
HCT VFR BLD AUTO: 28.5 % (ref 37.5–51)
HGB BLD-MCNC: 9.9 G/DL (ref 13–17.7)
LAB AP CASE REPORT: NORMAL
LYMPHOCYTES # BLD AUTO: 1.8 10*3/MM3 (ref 0.7–3.1)
LYMPHOCYTES NFR BLD AUTO: 17 % (ref 19.6–45.3)
MCH RBC QN AUTO: 30.5 PG (ref 26.6–33)
MCHC RBC AUTO-ENTMCNC: 34.9 G/DL (ref 31.5–35.7)
MCV RBC AUTO: 87.4 FL (ref 79–97)
MONOCYTES # BLD AUTO: 1.1 10*3/MM3 (ref 0.1–0.9)
MONOCYTES NFR BLD AUTO: 10.2 % (ref 5–12)
NEUTROPHILS # BLD AUTO: 7.8 10*3/MM3 (ref 1.7–7)
NEUTROPHILS NFR BLD AUTO: 72 % (ref 42.7–76)
NRBC BLD AUTO-RTO: 0.1 /100 WBC (ref 0–0.2)
PATH REPORT.FINAL DX SPEC: NORMAL
PATH REPORT.GROSS SPEC: NORMAL
PLATELET # BLD AUTO: 312 10*3/MM3 (ref 140–450)
PMV BLD AUTO: 7.8 FL (ref 6–12)
POTASSIUM BLD-SCNC: 3.2 MMOL/L (ref 3.5–5.2)
POTASSIUM BLD-SCNC: 3.7 MMOL/L (ref 3.5–5.2)
PROT SERPL-MCNC: 6.2 G/DL (ref 6–8.5)
RBC # BLD AUTO: 3.25 10*6/MM3 (ref 4.14–5.8)
SODIUM BLD-SCNC: 133 MMOL/L (ref 136–145)
WBC NRBC COR # BLD: 10.8 10*3/MM3 (ref 3.4–10.8)

## 2020-05-01 PROCEDURE — 25010000002 VANCOMYCIN 10 G RECONSTITUTED SOLUTION: Performed by: ORTHOPAEDIC SURGERY

## 2020-05-01 PROCEDURE — 25010000002 CEFTRIAXONE PER 250 MG: Performed by: ORTHOPAEDIC SURGERY

## 2020-05-01 PROCEDURE — 25010000002 MORPHINE PER 10 MG: Performed by: ORTHOPAEDIC SURGERY

## 2020-05-01 PROCEDURE — 63710000001 INSULIN LISPRO (HUMAN) PER 5 UNITS: Performed by: INTERNAL MEDICINE

## 2020-05-01 PROCEDURE — 85025 COMPLETE CBC W/AUTO DIFF WBC: CPT | Performed by: INTERNAL MEDICINE

## 2020-05-01 PROCEDURE — 99232 SBSQ HOSP IP/OBS MODERATE 35: CPT | Performed by: INTERNAL MEDICINE

## 2020-05-01 PROCEDURE — 63710000001 INSULIN GLARGINE PER 5 UNITS: Performed by: INTERNAL MEDICINE

## 2020-05-01 PROCEDURE — 82962 GLUCOSE BLOOD TEST: CPT

## 2020-05-01 PROCEDURE — 97161 PT EVAL LOW COMPLEX 20 MIN: CPT

## 2020-05-01 PROCEDURE — 25010000002 METOCLOPRAMIDE PER 10 MG: Performed by: ORTHOPAEDIC SURGERY

## 2020-05-01 PROCEDURE — 80053 COMPREHEN METABOLIC PANEL: CPT | Performed by: INTERNAL MEDICINE

## 2020-05-01 PROCEDURE — 84132 ASSAY OF SERUM POTASSIUM: CPT | Performed by: HOSPITALIST

## 2020-05-01 PROCEDURE — 99231 SBSQ HOSP IP/OBS SF/LOW 25: CPT | Performed by: HOSPITALIST

## 2020-05-01 PROCEDURE — 97116 GAIT TRAINING THERAPY: CPT

## 2020-05-01 RX ORDER — POTASSIUM CHLORIDE 20 MEQ/1
40 TABLET, EXTENDED RELEASE ORAL AS NEEDED
Status: DISCONTINUED | OUTPATIENT
Start: 2020-05-01 | End: 2020-05-03 | Stop reason: HOSPADM

## 2020-05-01 RX ORDER — MAGNESIUM SULFATE HEPTAHYDRATE 40 MG/ML
2 INJECTION, SOLUTION INTRAVENOUS AS NEEDED
Status: DISCONTINUED | OUTPATIENT
Start: 2020-05-01 | End: 2020-05-03 | Stop reason: HOSPADM

## 2020-05-01 RX ORDER — MAGNESIUM SULFATE HEPTAHYDRATE 40 MG/ML
4 INJECTION, SOLUTION INTRAVENOUS AS NEEDED
Status: DISCONTINUED | OUTPATIENT
Start: 2020-05-01 | End: 2020-05-03 | Stop reason: HOSPADM

## 2020-05-01 RX ORDER — POTASSIUM CHLORIDE 1.5 G/1.77G
40 POWDER, FOR SOLUTION ORAL AS NEEDED
Status: DISCONTINUED | OUTPATIENT
Start: 2020-05-01 | End: 2020-05-03 | Stop reason: HOSPADM

## 2020-05-01 RX ORDER — INSULIN GLARGINE 100 [IU]/ML
25 INJECTION, SOLUTION SUBCUTANEOUS NIGHTLY
Status: DISCONTINUED | OUTPATIENT
Start: 2020-05-01 | End: 2020-05-02

## 2020-05-01 RX ADMIN — INSULIN LISPRO 4 UNITS: 100 INJECTION, SOLUTION INTRAVENOUS; SUBCUTANEOUS at 13:08

## 2020-05-01 RX ADMIN — MORPHINE SULFATE 4 MG: 4 INJECTION INTRAVENOUS at 07:48

## 2020-05-01 RX ADMIN — INSULIN GLARGINE 25 UNITS: 100 INJECTION, SOLUTION SUBCUTANEOUS at 20:43

## 2020-05-01 RX ADMIN — MORPHINE SULFATE 4 MG: 4 INJECTION INTRAVENOUS at 20:31

## 2020-05-01 RX ADMIN — INSULIN LISPRO 2 UNITS: 100 INJECTION, SOLUTION INTRAVENOUS; SUBCUTANEOUS at 17:57

## 2020-05-01 RX ADMIN — POTASSIUM CHLORIDE 40 MEQ: 1500 TABLET, EXTENDED RELEASE ORAL at 11:05

## 2020-05-01 RX ADMIN — ASPIRIN 325 MG: 325 TABLET ORAL at 08:54

## 2020-05-01 RX ADMIN — VANCOMYCIN HYDROCHLORIDE 1750 MG: 10 INJECTION, POWDER, LYOPHILIZED, FOR SOLUTION INTRAVENOUS at 00:24

## 2020-05-01 RX ADMIN — CEFTRIAXONE SODIUM 2 G: 2 INJECTION, POWDER, FOR SOLUTION INTRAMUSCULAR; INTRAVENOUS at 20:28

## 2020-05-01 RX ADMIN — MORPHINE SULFATE 4 MG: 4 INJECTION INTRAVENOUS at 00:39

## 2020-05-01 RX ADMIN — POTASSIUM CHLORIDE 40 MEQ: 1500 TABLET, EXTENDED RELEASE ORAL at 08:54

## 2020-05-01 RX ADMIN — MORPHINE SULFATE 4 MG: 4 INJECTION INTRAVENOUS at 03:15

## 2020-05-01 RX ADMIN — METOCLOPRAMIDE 10 MG: 5 INJECTION, SOLUTION INTRAMUSCULAR; INTRAVENOUS at 11:05

## 2020-05-01 RX ADMIN — INSULIN LISPRO 2 UNITS: 100 INJECTION, SOLUTION INTRAVENOUS; SUBCUTANEOUS at 08:53

## 2020-05-01 RX ADMIN — INSULIN LISPRO 10 UNITS: 100 INJECTION, SOLUTION INTRAVENOUS; SUBCUTANEOUS at 13:09

## 2020-05-01 RX ADMIN — MORPHINE SULFATE 4 MG: 4 INJECTION INTRAVENOUS at 15:41

## 2020-05-01 RX ADMIN — METOCLOPRAMIDE 10 MG: 5 INJECTION, SOLUTION INTRAMUSCULAR; INTRAVENOUS at 20:31

## 2020-05-01 RX ADMIN — VANCOMYCIN HYDROCHLORIDE 1750 MG: 10 INJECTION, POWDER, LYOPHILIZED, FOR SOLUTION INTRAVENOUS at 11:06

## 2020-05-01 RX ADMIN — MORPHINE SULFATE 4 MG: 4 INJECTION INTRAVENOUS at 11:05

## 2020-05-01 RX ADMIN — HYDROCODONE BITARTRATE AND ACETAMINOPHEN 1 TABLET: 7.5; 325 TABLET ORAL at 08:54

## 2020-05-01 RX ADMIN — SODIUM CHLORIDE, PRESERVATIVE FREE 10 ML: 5 INJECTION INTRAVENOUS at 20:31

## 2020-05-01 RX ADMIN — METOCLOPRAMIDE 10 MG: 5 INJECTION, SOLUTION INTRAMUSCULAR; INTRAVENOUS at 07:48

## 2020-05-01 RX ADMIN — METOCLOPRAMIDE 10 MG: 5 INJECTION, SOLUTION INTRAMUSCULAR; INTRAVENOUS at 17:49

## 2020-05-01 NOTE — DISCHARGE PLACEMENT REQUEST
"Ebenezer Gregg (45 y.o. Male)     Date of Birth Social Security Number Address Home Phone MRN    1975  [address] [address] [phone] [id number]    Restorationist Marital Status          Jainism Single       Admission Date Admission Type Admitting Provider Attending Provider Department, Room/Bed    4/26/20 Emergency Clay Fonseca DO Gebre-Egziabher, Aman I, DO [facility] NEURO HEART, 269/1    Discharge Date Discharge Disposition Discharge Destination                       Attending Provider:  Clay Fonseca DO    Allergies:  Metformin, Oxycodone-acetaminophen    Isolation:  None   Infection:  None   Code Status:  CPR    Ht:  190.5 cm (75\")   Wt:  105 kg (232 lb 5.8 oz)    Admission Cmt:  None   Principal Problem:  Sepsis, Gram positive (CMS/[facility]ly [facility]) [A41.89]                 Active Insurance as of 4/26/2020     Primary Coverage     Payor Plan Insurance Group Employer/Plan Group    [employer]UE CROSS [facility] [email or web address] [facility] PPO [id number]     Payor Plan Address Payor Plan Phone Number Payor Plan Fax Number Effective Dates    [address] [phone]  10/13/2019 - None Entered    [facility] [address]       Subscriber Name Subscriber Birth Date Member ID       EBENEZER GREGG 1975 [id number]                 Emergency Contacts      (Rel.) Home Phone Work Phone Mobile Phone    Marsha Garza (Relative) -- -- [phone]        Ambulatory Referral to Home Health [[id number]] (Order [id number])   Order   Date: 5/1/2020 Department: [facility] NEURO HEART Ordering/Authorizing: Clay Fonseca DO   Order History   Outpatient   Date/Time Action Taken User Additional Information   05/01/20 1431 Sign Angelique Elizabeth, RN Ordering Mode: Verbal with readback   Order Details     Frequency Duration Priority Order Class   None None Routine Internal Referral   Start Date/Time     Start Date   05/01/20   Order Information     Order Date " Service Start Date Start Time   05/01/20 Surgery 05/01/20    Reference Links     Associated Reports External References   View Encounter Current Health Referral Information   Order Questions     Question Answer Comment   Face to Face Visit Date: 5/1/2020    Follow-up provider for Plan of Care? I treated the patient in an acute care facility and will not continue treatment after discharge.    Follow-up provider: KACIE RIVERA    Reason/Clinical Findings Bacteremia/BKA   -   IV ABX    Describe mobility limitations that make leaving home difficult: Bacteremia/BKA   -   IV ABX    Nursing/Therapeutic Services Requested Other Eval and Treat    Frequency: 1 Week 1           Verbal Order Info     Action Created on Order Mode Entered by Responsible Provider Signed by Signed on   Ordering 05/01/20 1431 Verbal with readback Angelique Elizabeth, Clay Lyons DO     Source Order Set / Preference List     Preference List   AMB FAM REFERRALS [[id number]]   Clinical Indications      ICD-10-CM ICD-9-CM   Osteomyelitis of right foot, unspecified type (CMS/Formerly [facility])     M86.9 730.27   Reprint Order Requisition     Ambulatory Referral to Home Health (Order #[id number]) on 5/1/20   Encounter-Level Cardiology Documents:     There are no encounter-level cardiology documents.   Encounter     View Encounter          Order Provider Info         Office phone Pager E-mail   Ordering User Angelique Elizabeth RN -- -- --   Authorizing Provider Clay Fonseca [phone] -- --   Attending Provider Clay Fonseca [phone] -- --   Entered By Angelique Elizabeth RN -- -- --   Ordering Provider Clay Fonseca [phone] -- --   Linked Charges     No charges linked to this procedure   Tracking Reports      Cosign Tracking Order Transmittal Tracking   Authorized by:  Clay Fonseca DO  (NPI: [id number])       Lab Component SmartPhrase Guide     Ambulatory Referral to Home Health (Order #[id number]) on  20         Insurance Information                [facility] BLUE CROSS/[facility] [employer]OSS [employer]UE [employer] PPO Phone: [phone]    Subscriber: Maynor Gregg Subscriber#: [id number]    Group#: [id number] Precert#:              History & Physical      GarciaTravis APRN at 20 9829     Attestation signed by Clay Fonseca DO at 20 1405    I have read the H&P by KAMILLE Atkins                        [facility] Medicine Services      Patient Name: Maynor Gregg  : 1975  MRN: [id number]  Primary Care Physician: Fred Lemons FNP  Date of admission: 2020    Patient Care Team:  Fred Lemons FNP as PCP - General (Family Medicine)          Subjective   History Present Illness     Chief Complaint:   Chief Complaint   Patient presents with   • Shortness of Breath       Mr. Gregg is a 45 y.o.  male with a history of hypertension, diabetes, CAD presented to the [facility] ED on 2020 with a complaint of body aches, nausea, vomiting and shortness of breath for 3 days.  Patient does state he saw his PCP on Friday, 2020, was swabbed for the flu, rapid strep, and coronavirus.  Flu and strep were negative at that time.  Patient states he feels worse has not been able to keep anything down for the past 24 to 48 hours.  Patient states body aches feel like the flu, constant, dull ache.  Patient states nausea with vomiting x3 days.  Patient reports shortness of breath at times even at rest.  Patient states he has some epigastric pain worse with vomiting, 6 out of 10, burning sensation, intermittent, does not radiate.  Patient denies diarrhea, palpitations, fever, hematochezia, hemoptysis and melena.  Patient also reporting increased redness and open areas to right foot.  Patient states the open areas on his plantar side of his foot are worse than when last seen by Dr. Souza.  Redness is also worse on his entire foot.    In  the ED, troponin < 0.10, glucose 290, potassium 3.4, BUN 16, creatinine 0.96, alkaline phosphatase 173, ALT 31, AST 24, C-reactive protein 30.35, lactate 3.0, d-dimer 5.31, WBC 19.3, Hgb 13.3, HCT 39.7, platelets 405, blood cultures pending, respiratory panel negative, rapid strep negative, chest x-ray negative for active disease, x-ray right foot: Questionable osteomyelitis of the amputation site of the distal first metatarsal bone and also at the site of the slightly displaced oblique fracture through the base of the fifth metatarsal bone.  There is soft tissue gas consistent with wounds of both location which appear to extend to the underlying bone.  There is a healing nondisplaced fracture through the head of the second   Metatarsal bone.  Patient received 1 L LR bolus IV, 2 g cefepime IV, 2000 mg vancomycin IV, 12.5 mg Phenergan IM, patient will be admitted for further evaluation and management.      Review of Systems   Constitution: Positive for malaise/fatigue.   HENT: Negative.    Eyes: Negative.    Cardiovascular: Positive for chest pain.   Respiratory: Positive for cough and shortness of breath.    Endocrine: Negative.    Hematologic/Lymphatic: Negative.    Skin: Positive for color change and poor wound healing.        Redness to entire foot, open wound to right plantar surface of foot   Musculoskeletal: Positive for myalgias.   Gastrointestinal: Positive for nausea and vomiting.   Genitourinary: Negative.    Neurological: Negative.    Psychiatric/Behavioral: Negative.    Allergic/Immunologic: Negative.    All other systems reviewed and are negative.          Personal History     Past Medical History:   Past Medical History:   Diagnosis Date   • Coronary artery disease    • Diabetes mellitus (CMS/HCC)    • MI, old 2010       Surgical History:      Past Surgical History:   Procedure Laterality Date   • AMPUTATION DIGIT Right 2/28/2020    Procedure: PARTIAL FIRST RAY RESECTION RIGHT FOOT;  Surgeon: CROW Souza  URIEL Arellano;  Location: [facility] OR;  Service: Podiatry;  Laterality: Right;   • AMPUTATION FOOT / TOE     • CARDIAC CATHETERIZATION  11/2010     RCA artery   • CARDIAC CATHETERIZATION  2015    LAD artery   • CARDIAC SURGERY     • INCISION AND DRAINAGE LEG Left 1/21/2020    Procedure: INCISION AND DRAINAGE LOWER EXTREMITY with second digit amputation;  Surgeon: CROW Souza DPM;  Location: [facility] OR;  Service: Podiatry   • TOE AMPUTATION Left    • WOUND DEBRIDEMENT Right 1/21/2020    Procedure: DEBRIDEMENT with sesamoid excision;  Surgeon: CROW Souza DPM;  Location: [facility] OR;  Service: Podiatry           Family History: family history includes Diabetes in his father and paternal grandfather; Heart failure in his father. Otherwise pertinent FHx was reviewed and unremarkable.     Social History:  reports that he quit smoking about a year ago. He smoked 0.50 packs per day. He has never used smokeless tobacco. He reports that he does not drink alcohol or use drugs.      Medications:  Prior to Admission medications    Medication Sig Start Date End Date Taking? Authorizing Provider   aspirin 325 MG tablet Take 325 mg by mouth Daily. Pt to stop 2/27 per [facility]    Emergency, Nurse Epic, RN   Insulin Glargine (BASAGLAR KWIKPEN) 100 UNIT/ML injection pen Inject 15 Units under the skin into the appropriate area as directed Every Night. 1/24/20   Christopher Ron MD   NOVOLOG 100 UNIT/ML injection INJECT BY SUBCUTANEOUS ROUTE 5 UNITS EVERY 8 HRS 3/4/20   Provider, MD Katharina       Allergies:    Allergies   Allergen Reactions   • Metformin Diarrhea and Nausea And Vomiting   • Oxycodone-Acetaminophen Nausea And Vomiting and Rash       Objective   Objective     Vital Signs  Temp:  [99 °F (37.2 °C)] 99 °F (37.2 °C)  Heart Rate:  [111-128] 111  Resp:  [18] 18  BP: (116-130)/(56-77) 116/56  SpO2:  [95 %-100 %] 95 %  on   ;      Body mass index is 26 kg/m².    Physical Exam   Constitutional: He is oriented  to person, place, and time. He appears well-developed.   HENT:   Head: Normocephalic.   Eyes: Conjunctivae are normal.   Neck: Normal range of motion.   Cardiovascular: Regular rhythm, normal heart sounds and intact distal pulses.   Tachycardia   Pulmonary/Chest: Breath sounds normal.   Abdominal: Soft. Bowel sounds are normal.   Musculoskeletal: Normal range of motion. He exhibits edema, tenderness and deformity.   Neurological: He is alert and oriented to person, place, and time.   Skin: Skin is warm. There is erythema.   Open wound to plantar surface of the right foot, redness to right foot   Vitals reviewed.      Results Review:  I have personally reviewed most recent cardiac tracings, lab results, microbiology results and radiology images and interpretations and agree with findings.    Results from last 7 days   Lab Units 04/26/20  1939   WBC 10*3/mm3 19.30*   HEMOGLOBIN g/dL 13.3   HEMATOCRIT % 39.7   PLATELETS 10*3/mm3 405     Results from last 7 days   Lab Units 04/26/20  1950 04/26/20 1939   SODIUM mmol/L  --  131*   POTASSIUM mmol/L  --  3.4*   CHLORIDE mmol/L  --  87*   CO2 mmol/L  --  26.0   BUN mg/dL  --  16   CREATININE mg/dL  --  0.96   GLUCOSE mg/dL  --  290*   CALCIUM mg/dL  --  9.3   ALT (SGPT) U/L  --  31   AST (SGOT) U/L  --  24   TROPONIN T ng/mL  --  <0.010   PROBNP pg/mL  --  500.3*   LACTATE mmol/L 3.0*  --    PROCALCITONIN ng/mL  --  0.68*     Estimated Creatinine Clearance: 129.6 mL/min (by C-G formula based on SCr of 0.96 mg/dL).  Brief Urine Lab Results  (Last result in the past 365 days)      Color   Clarity   Blood   Leuk Est   Nitrite   Protein   CREAT   Urine HCG        01/24/20 1445 Dark Yellow  Comment:  Result checked  Clear Negative Negative Negative Negative               Microbiology Results (last 10 days)     Procedure Component Value - Date/Time    Respiratory Panel, PCR - Swab, Nasopharynx [[id number]]  (Normal) Collected:  04/26/20 2032    Lab Status:  Final result Specimen:   Swab from Nasopharynx Updated:  04/26/20 2155     ADENOVIRUS, PCR Not Detected     Coronavirus 229E Not Detected     Coronavirus HKU1 Not Detected     Coronavirus NL63 Not Detected     Coronavirus OC43 Not Detected     Human Metapneumovirus Not Detected     Human Rhinovirus/Enterovirus Not Detected     Influenza B PCR Not Detected     Parainfluenza Virus 1 Not Detected     Parainfluenza Virus 2 Not Detected     Parainfluenza Virus 3 Not Detected     Parainfluenza Virus 4 Not Detected     Bordetella pertussis pcr Not Detected     Influenza A H1 2009 PCR Not Detected     Chlamydophila pneumoniae PCR Not Detected     Mycoplasma pneumo by PCR Not Detected     Influenza A PCR Not Detected     Influenza A H3 Not Detected     Influenza A H1 Not Detected     RSV, PCR Not Detected    Narrative:       The coronavirus on the RVP is NOT COVID-19 and is NOT indicative of infection with COVID-19.     Rapid Strep A Screen - Swab, Throat [[id number]]  (Normal) Collected:  04/26/20 2032    Lab Status:  Final result Specimen:  Swab from Throat Updated:  04/26/20 2049     Strep A Ag Negative          ECG/EMG Results (most recent)     Procedure Component Value Units Date/Time    ECG 12 Lead [[id number]] Collected:  04/26/20 1910     Updated:  04/26/20 1913    Narrative:       HEART RATE= 126  bpm  RR Interval= 476  ms  FL Interval= 134  ms  P Horizontal Axis= -12  deg  P Front Axis= 61  deg  QRSD Interval= 77  ms  QT Interval= 336  ms  QRS Axis= 38  deg  T Wave Axis= 32  deg  - OTHERWISE NORMAL ECG -  Sinus tachycardia  Electronically Signed By:   Date and Time of Study: 2020-04-26 19:10:46               Results for orders placed during the hospital encounter of 01/18/20   Adult Transthoracic Echo Complete W/ Cont if Necessary Per Protocol    Narrative · Left ventricular systolic function is normal.  · Mild mitral valve regurgitation is present  · Mild tricuspid valve regurgitation is present.  · Ejection fraction is about 65%  · No  pericardial effusion noted          Xr Foot 3+ View Right    Result Date: 4/26/2020   1. Previous amputation procedure the right great toe and most of the first metatarsal bone. 2. Questionable osteomyelitis at the amputation site of the distal first metatarsal bone and also at the site of a slightly displaced oblique fracture through the base of the fifth metatarsal bone. There is soft tissue gas consistent with wounds at both locations which appear to extend to the underlying bone. 3. There is a healing nondisplaced fracture through the head of the second metatarsal bone.  Electronically Signed By-Gary Marie On:4/26/2020 7:53 PM This report was finalized on [id number] by  Gary Marie, .    Ct Chest Pulmonary Embolism    Result Date: 4/26/2020  No CTA evidence of pulmonary embolism or acute aortic pathology. No acute cardiopulmonary process seen. Sequela of prior granulomatous disease. Electronically signed by:  Keiko Ann M.D.  4/26/2020 8:44 PM    Xr Chest Ap    Result Date: 4/26/2020  No active disease.  Electronically Signed By-Gary Marie On:4/26/2020 7:53 PM This report was finalized on [id number] by  Gary Maire, .        Estimated Creatinine Clearance: 129.6 mL/min (by C-G formula based on SCr of 0.96 mg/dL).    Assessment/Plan   Assessment/Plan       Active Hospital Problems    Diagnosis  POA   • **Dyspnea [R06.00]  Yes     Priority: High   • Type 1 diabetes mellitus with circulatory complication (CMS/HCC) [E10.59]  Yes     Priority: High   • Suspected Covid-19 Virus Infection [R68.89]  Yes     Priority: Medium   • Osteomyelitis of right foot (CMS/HCC) [M86.9]  Yes     Priority: Medium   • Nausea and vomiting [R11.2]  Yes     Priority: Medium   • CAD (coronary artery disease) [I25.10]  Yes     Priority: Medium   • Diabetic peripheral neuropathy (CMS/HCC) [E11.42]  Yes     Priority: Medium      Resolved Hospital Problems   No resolved problems to display.     Chest pain  -Initial  troponin<0.10  -Trend serial troponins  -Check TSH, lipids  -Cardiac monitor  -EKG for chest pain  -1/2 inch Nitropaste to chest wall  -History of CAD with 2 stents (2010, does not follow-up with cardiology)    Dyspnea  -Cough, shortness of breath  -Rule out COVID 19  -Monitor oxygen saturations  -Keep oxygen greater than 92%  -  -C-reactive protein 30.35  -Procalcitonin 0.68  -D-dimer 5.31  -Check ferritin  -Lovenox 40 mg subcu daily  -Patient in enhanced droplet/contact precautions    Gastroenteritis  -Clear liquid diet  -Advance as tolerated  -Zofran for nausea, vomiting  -Patient was hydrated with 1 L LR in ED    Chronic osteomyelitis of the right foot  -Patient last saw Dr. Souza 4/9/2020  -Patient doing Betadine dressings to right foot  -Patient started on vancomycin, cefepime  -Dr. Souza consulted    Sepsis rule out  -Patient with tachycardia, cough, osteomyelitis, WBC 19.3, lactic acid 3.0  -Patient was started on vancomycin, cefepime  -Follow blood cultures  -Infectious disease consult    Diabetes type 1  -Continue long-acting home dose  -Accu-Cheks AC  -Sliding scale insulin as needed        VTE Prophylaxis -   Mechanical Order History:     None      Pharmalogical Order History:     Ordered     Dose Route Frequency Stop    04/26/20 2320  enoxaparin (LOVENOX) syringe 40 mg      40 mg SC Every 24 Hours --          CODE STATUS:    Code Status and Medical Interventions:   Ordered at: 04/26/20 2320     Level Of Support Discussed With:    Patient     Code Status:    CPR     Medical Interventions (Level of Support Prior to Arrest):    Full       This patient has been examined wearing appropriate Personal Protective Equipment . 04/26/20      I discussed the patient's findings and my recommendations with patient.        Electronically signed by KAMILLE Degroot, 04/26/20, 11:23 PM.  [facility] Hospitalist Team          Electronically signed by Clay Fonseca DO at 04/27/20 2651

## 2020-05-01 NOTE — PAYOR COMM NOTE
"This is clinical update for Ebenezer Gregg, auth# [id number]     EXTENDED AUTHORIZATION PENDING: LAST COVERED DAY 4/29. UPDATES WERE SENT 4/28 AND 4/29. PLEASE CALL OR FAX DETERMINATION TO CONTACT BELOW. THANK YOU.     JAGJIT WOLF RN  UTILIZATION REVIEW  [facility]  PH: [phone]  FX: [phone]      Ebenezer Gregg (45 y.o. Male)     Date of Birth Social Security Number Address Home Phone MRN    1975  [address] [place] [address] [phone] [id number]    Sabianism Marital Status          Scientology Single       Admission Date Admission Type Admitting Provider Attending Provider Department, Room/Bed    4/26/20 Emergency Clay Fonseca, Clay Franco DO [facility] NEURO HEART, 269/1    Discharge Date Discharge Disposition Discharge Destination                       Attending Provider:  Clay Fonseca DO    Allergies:  Metformin, Oxycodone-acetaminophen    Isolation:  None   Infection:  None   Code Status:  CPR    Ht:  190.5 cm (75\")   Wt:  105 kg (232 lb 5.8 oz)    Admission Cmt:  None   Principal Problem:  Sepsis, Gram positive (CMS/[facility]) [A41.89]                 Active Insurance as of 4/26/2020     Primary Coverage     Payor Plan Insurance Group Employer/Plan Group    [facility] [employer] [facility] [employer] BLUE [facility] PPO [id number]     Payor Plan Address Payor Plan Phone Number Payor Plan Fax Number Effective Dates    [address] [phone]  10/13/2019 - None Entered    [facility] [id number]       Subscriber Name Subscriber Birth Date Member ID       EBENEZER GREGG 1975 [id number]                 Emergency Contacts      (Rel.) Home Phone Work Phone Mobile Phone    Marsha Garza (Relative) -- -- [phone]               Operative/Procedure Notes (last 48 hours) (Notes from 04/29/20 0856 through 05/01/20 0856)      Donn Alcantara MD at 04/30/20 0852        AMPUTATION BELOW KNEE  Procedure " Report    Patient Name:  Maynor Gregg  YOB: 1975    Date of Surgery:  4/30/2020         Pre-op Diagnosis: Right diabetic foot infection    Postop diagnosis: Same    Indication: 45-year-old white male diabetic with recurrent foot ulcers.  Underwent a partial amputation of his foot this week but continued to have significant tissue loss and drainage.  Felt that this should be amputated to avoid further loss of tissue and get the patient mobilized      Procedure/CPT® Codes:      Procedure(s):  AMPUTATION BELOW KNEE    Staff:  Surgeon(s):  Donn Alcantara MD    Assistant: Minerva Horowitz APRN    Anesthesia: Spinal    Estimated Blood Loss: 100 ml    Implants:    Nothing was implanted during the procedure    Specimen:          Right leg        Findings: Large midfoot wound with drainage and foul smell.  Tissue at amputation level is healthy.  Preoperative pictures were saved in the system    Complications: 0    Description of Procedure: Patient was seen in the holding room.  Identified the right leg is correct leg.  Was already on vancomycin and Rocephin.  Leg was initialed.  He was taken the OR where he had spinal anesthesia.  Had timeout form.  Was positioned supine with a bump under his right hip.  Had sterile prep and draping of his right lower extremity.      Donn Alcantara MD     Date: 4/30/2020  Time: 09:36      The foot was sealed off and Ioban.  The leg was gravity exsanguinated and tourniquet inflated to 300 mmHg around the high thigh.  Total tourniquet time was 12 minutes.  Fishmouth type incisions were made posterior flap twice as long as the anterior flap.  The muscle was transected anteriorly and the tibia exposed circumferentially.  The tibia is transected transversely with a power saw at the 13 cm length from the joint line.  The fibula is transected 1 cm proximal to this.  The anterior tibia was chamfered with a saw and the edges smooth.  The flap was continued posteriorly with a 10  blade.  The leg was sent for specimen.  The vessels were clamped.  Sciatic nerve was clamped.  These were all then tied with 2-0 silk stick ties.  Tourniquet was released.  There was minimal bleeding.  Small veins were cauterized.  The wound was thoroughly irrigated.  The muscle flaps were then closed anterior to posteriorly using #1 Vicryl interrupted.  The subcu with 2-0 Vicryl leaving some gaps for drainage.  The skin was closed with staples with wide gaps.  Sterile dressings were then applied    Plan is for vancomycin and Rocephin for 3 days then home on p.o. Antibiotics.  We will remove staples in 2 weeks.  Prosthesis use in 4 to 6 weeks    Electronically signed by Donn Alcantara MD at 04/30/20 0941          Physician Progress Notes (last 48 hours) (Notes from 04/29/20 0856 through 05/01/20 0856)      Donn Alcantara MD at 05/01/20 0744               LOS: 5 days   Patient Care Team:  Fred Lemons FNP as PCP - General (Family Medicine)        Subjective     High pain status post right BKA April 30      Objective     Vital Signs  Vitals:    05/01/20 0500 05/01/20 0600 05/01/20 0613 05/01/20 0700   BP:   112/63    BP Location:   Right arm    Patient Position:   Lying    Pulse: 77 74 82 80   Resp:   17    Temp:   97.5 °F (36.4 °C)    TempSrc:   Oral    SpO2: 91% 95% 97% 94%   Weight:   105 kg (232 lb 5.8 oz)    Height:           Physical Exam:   Alert and oriented x3  Dressing clean dry and intact  Able to flex and extend knee  X-ray looks good of the tibia     Results Review:     I reviewed the patient's new clinical results.  I reviewed the patient's new imaging results    Lab Results (last 24 hours)     Procedure Component Value Units Date/Time    Comprehensive Metabolic Panel [[id number]]  (Abnormal) Collected:  05/01/20 0455    Specimen:  Blood Updated:  05/01/20 0542     Glucose 217 mg/dL      BUN 18 mg/dL      Creatinine 0.67 mg/dL      Sodium 133 mmol/L      Potassium 3.2 mmol/L      Chloride 98 mmol/L       CO2 27.0 mmol/L      Calcium 7.8 mg/dL      Total Protein 6.2 g/dL      Albumin 2.00 g/dL      ALT (SGPT) 20 U/L      AST (SGOT) 33 U/L      Alkaline Phosphatase 106 U/L      Total Bilirubin 0.3 mg/dL      eGFR Non African Amer 128 mL/min/1.73      Globulin 4.2 gm/dL      A/G Ratio 0.5 g/dL      BUN/Creatinine Ratio 26.9     Anion Gap 8.0 mmol/L     Narrative:       GFR Normal >60  Chronic Kidney Disease <60  Kidney Failure <15      CBC & Differential [[id number]] Collected:  05/01/20 0455    Specimen:  Blood Updated:  05/01/20 0515    Narrative:       The following orders were created for panel order CBC & Differential.  Procedure                               Abnormality         Status                     ---------                               -----------         ------                     CBC Auto Differential[[id number]]        Abnormal            Final result                 Please view results for these tests on the individual orders.    CBC Auto Differential [[id number]]  (Abnormal) Collected:  05/01/20 0455    Specimen:  Blood Updated:  05/01/20 0515     WBC 10.80 10*3/mm3      RBC 3.25 10*6/mm3      Hemoglobin 9.9 g/dL      Hematocrit 28.5 %      MCV 87.4 fL      MCH 30.5 pg      MCHC 34.9 g/dL      RDW 13.6 %      RDW-SD 42.4 fl      MPV 7.8 fL      Platelets 312 10*3/mm3      Neutrophil % 72.0 %      Lymphocyte % 17.0 %      Monocyte % 10.2 %      Eosinophil % 0.5 %      Basophil % 0.3 %      Neutrophils, Absolute 7.80 10*3/mm3      Lymphocytes, Absolute 1.80 10*3/mm3      Monocytes, Absolute 1.10 10*3/mm3      Eosinophils, Absolute 0.10 10*3/mm3      Basophils, Absolute 0.00 10*3/mm3      nRBC 0.1 /100 WBC     Blood Culture - Blood, Hand, Right [[id number]] Collected:  04/27/20 2005    Specimen:  Blood from Hand, Right Updated:  04/30/20 2015     Blood Culture No growth at 3 days    Blood Culture - Blood, Wrist, Right [[id number]] Collected:  04/27/20 2005    Specimen:  Blood from Wrist, Right Updated:   "04/30/20 2015     Blood Culture No growth at 3 days    POC Glucose Once [[id number]]  (Abnormal) Collected:  04/30/20 1631    Specimen:  Blood Updated:  04/30/20 1633     Glucose 307 mg/dL      Comment: Serial Number: [id number]:  [id number]       Tissue Pathology Exam [[id number]] Collected:  04/28/20 1023    Specimen:  Tissue from Foot, Right; Tissue from Foot, Right Updated:  04/30/20 1202     Case Report --     Surgical Pathology Report                         Case: [id number]                                  Authorizing Provider:  CROW Souza DPM      Collected:           04/28/2020 10:23 AM          Ordering Location:     [facility] MAIN  Received:            04/29/2020 06:05 AM                                 OR                                                                           Pathologist:           Wm Blair MD                                                           Specimens:   1) - Foot, Right, right foot tissue                                                                 2) - Foot, Right, right foot                                                                Final Diagnosis --     Specimen #1 (Right foot tissue, debridement):    Gangrenous necrosis of soft tissue    Bone with acute osteomyelitis    Specimen #2 (Right lower extremity, distal foot, amputation):    Gangrenous necrosis of soft tissue    Bone with acute osteomyelitis    JPR/ARASELI/sms        Gross Description --     Part 1: Received in formalin designated \"Right foot tissue\" is an unoriented fragment of reddish gray somewhat friable and necrotic appearing tissue measuring 1.8 x 1.1 x 0.5 cm. Sectioning reveals a chalky cut surface consistent with bone. Submitted in one cassette after decalcification.      MONICA/sms    Now received is an additional specimen in formalin.     Specimen #2 is received labeled \"Right foot.\" Received in formalin is a single distal amputation specimen of the right foot. The " specimen measures 10.7 cm in length x 10 cm medial to lateral x 5.6 cm dorsal to plantar. The great toe has been previously amputated revealing a roughened focally ulcerated area in the area of the previous amputation. Ulcer is also present on the medial plantar aspect which measures 1.8 cm and is located a distance of 1.0 cm from the soft tissue resection margin. The other five toes appear intact with nails in fair repair. The bone in the area below the ulcer is softened. The specimen is submitted as follows:    2A        Section of the plantar ulcer  2B        Sections of bone for decalcification deep to the ulcer  2C        Representative sections of skin and soft tissue through the previous great toe amputation site      JPR/sms          Basic Metabolic Panel [[id number]]  (Abnormal) Collected:  04/30/20 1109    Specimen:  Blood Updated:  04/30/20 1158     Glucose 327 mg/dL      BUN 31 mg/dL      Creatinine 0.92 mg/dL      Sodium 133 mmol/L      Potassium 3.4 mmol/L      Chloride 96 mmol/L      CO2 27.0 mmol/L      Calcium 8.4 mg/dL      eGFR Non African Amer 89 mL/min/1.73      BUN/Creatinine Ratio 33.7     Anion Gap 10.0 mmol/L     Narrative:       GFR Normal >60  Chronic Kidney Disease <60  Kidney Failure <15      POC Glucose Once [[id number]]  (Abnormal) Collected:  04/30/20 1054    Specimen:  Blood Updated:  04/30/20 1055     Glucose 300 mg/dL      Comment: Serial Number: [id number]:  [id number]       Tissue Pathology Exam [[id number]] Collected:  04/30/20 0858    Specimen:  Tissue from Leg, Right Updated:  04/30/20 1048    POC Glucose Once [[id number]]  (Abnormal) Collected:  04/30/20 0936    Specimen:  Blood Updated:  04/30/20 0938     Glucose 301 mg/dL      Comment: Serial Number: [id number]:  [id number]       Tissue / Bone Culture - Tissue, Foot, Right [[id number]]  (Abnormal) Collected:  04/28/20 1025    Specimen:  Tissue from Foot, Right Updated:  04/30/20 0906     Tissue Culture Light growth  (2+) Streptococcus agalactiae (Group B)     Comment: This organism is considered to be universally susceptible to penicillin.  No further antibiotic testing will be performed. If Clindamycin or Erythromycin is the drug of choice, notify the laboratory within 7 days to request susceptibility testing.         Rare Normal Skin Juliet     Gram Stain Few (2+) WBCs per low power field      Many (4+) Mixed bacterial morphotypes seen on Gram Stain        Imaging Results (Last 24 Hours)     Procedure Component Value Units Date/Time    XR Tibia Fibula 2 View Right [[id number]] Collected:  04/30/20 1153     Updated:  04/30/20 1156    Narrative:       EXAMINATION: XR TIBIA FIBULA 2 VW RIGHT-     DATE OF EXAM: 4/30/2020 11:20 AM     INDICATION: Status post BKA; R06.00-Dyspnea, unspecified;  M86.9-Osteomyelitis, unspecified; D72.829-Elevated white blood cell  count, unspecified; R11.2-Nausea with vomiting, unspecified;  R68.89-Other general symptoms and signs; L97.514-Non-pressure chronic  ulcer of other part of right foot with necrosis of bone.     COMPARISON: Right foot radiographs dated 04/26/2020     TECHNIQUE: Two views of the right tibia and fibula were obtained.     FINDINGS: There is a new right below the knee amputation. The osseous  margins of the stump appear sharp without evidence of erosion. There is  soft tissue edema involving the distal stump. There are cutaneous  staples present along the end of the stump. The knee joint remains in  anatomic alignment.       Impression:       1. Expected postoperative appearance status post right below the knee  amputation.     Electronically Signed By-Magno Agarwal On:4/30/2020 11:54 AM  This report was finalized on [id number] by  Magno Agarwal, .            Medication Review:   Scheduled Meds:  aspirin 325 mg Oral Daily   cefTRIAXone 2 g Intravenous Q24H   insulin glargine 22 Units Subcutaneous Nightly   insulin lispro 0-9 Units Subcutaneous TID AC   insulin lispro 10  Units Subcutaneous TID With Meals   metoclopramide 10 mg Intravenous 4x Daily AC & at Bedtime   nitroglycerin 0.5 inch Topical Q6H   polyethylene glycol 17 g Oral Daily   vancomycin 1,750 mg Intravenous Q12H     Continuous Infusions:  Pharmacy to dose vancomycin     sodium chloride 75 mL/hr Last Rate: 75 mL/hr (04/30/20 2232)     PRN Meds:.•  acetaminophen  •  dextrose  •  dextrose  •  glucagon (human recombinant)  •  HYDROcodone-acetaminophen  •  HYDROcodone-acetaminophen  •  ibuprofen  •  insulin lispro **AND** insulin lispro  •  magnesium hydroxide  •  magnesium sulfate **OR** magnesium sulfate **OR** magnesium sulfate  •  melatonin  •  Morphine **AND** naloxone  •  ondansetron **OR** ondansetron  •  Pharmacy to dose vancomycin  •  potassium & sodium phosphates **OR** potassium & sodium phosphates  •  potassium chloride  •  potassium chloride  •  sodium chloride      Assessment/Plan     Doing well status post right BKA    Plan for disposition: Would give 3 days postop IV antibiotics then may switch to p.o. doxycycline for 5 more days.  Remove dressing postop day 5  Work on range of motion  Return 2 weeks for staple removal    Donn Alcantara MD  05/01/20  07:44          Electronically signed by Donn Alcantara MD at 05/01/20 0746     Clay Fonseca DO at 04/30/20 1646                [facility] ER Medicine Services Daily Progress Note      Hospitalist Team  LOS 4 days      Patient Care Team:  Fred Lemons FNP as PCP - General (Family Medicine)    Patient Location: [facility]      Subjective   Subjective     Chief Complaint / Subjective  Chief Complaint   Patient presents with   • Shortness of Breath         Brief Synopsis of Hospital Course/HPI    The patient is a 45 y.o. male with a history of hypertension, IDDM, CAD and right foot ulcer status post partial first metatarsal ray amputation 2/27/2020.  He was recently discharged on 2/29/2020 with 6 weeks course of  "Rocephin's/daptomycin/Flagyl.    The patient presented to the ED on 4/26/2020 complaining of 3 days of nausea, vomiting and body aches.  He had presented to his PCP on 4/24/2020 and influenza and strep throat were ruled out.  COVID-19 test was done but ended up coming to the emergency room because of intractable symptoms of vomiting.  He also complains of increased erythema of his right foot.    Date::    4/27/20: Examined in the morning 4/27/2020.  Afebrile. COVID19 ruled out.  4/28/20: ID and podiatry following.  Planned I&Dand TMA.  4/29/20: pain controlled.  Consulted endocrinology  4/30/20: s/p right BKA. afebrile    Review of Systems   All other systems reviewed and are negative.        Objective   Objective      Vital Signs  Temp:  [97.4 °F (36.3 °C)-98.5 °F (36.9 °C)] 97.4 °F (36.3 °C)  Heart Rate:  [] 74  Resp:  [11-21] 14  BP: ()/(42-65) 91/48  Oxygen Therapy  SpO2: 95 %  Pulse Oximetry Type: Intermittent  Device (Oxygen Therapy): room air  Device (Oxygen Therapy): room air  Flow (L/min): 6  Flowsheet Rows      First Filed Value   Admission Height  190.5 cm (75\") Documented at 04/26/2020 1907   Admission Weight  94.3 kg (208 lb) Documented at 04/26/2020 1907        Intake & Output (last 3 days)       04/27 0701 - 04/28 0700 04/28 0701 - 04/29 0700 04/29 0701 - 04/30 0700 04/30 0701 - 05/01 0700    P.O. 480 222 720 0    I.V. (mL/kg) 1000 (10.1) 900 (9.1) 4055 (40.1) 784 (7.8)    IV Piggyback 2020  500     Total Intake(mL/kg) 3500 (35.2) 1122 (11.3) 5275 (52.2) 784 (7.8)    Urine (mL/kg/hr) 800 (0.3) 950 (0.4) 1475 (0.6) 750 (0.8)    Emesis/NG output        Stool   0     Blood  50      Total Output 800 1000 1475 750    Net +2700 +122 +3800 +34            Stool Unmeasured Occurrence   1 x         Lines, Drains & Airways    Active LDAs     Name:   Placement date:   Placement time:   Site:   Days:    Peripheral IV 04/27/20 0830 Left;Posterior Hand   04/27/20    0830    Hand   less than 1          "         Physical Exam:    Physical Exam   Constitutional: He is oriented to person, place, and time. He is cooperative.   Appears older than stated age.   HENT:   Head: Normocephalic.   Mouth/Throat: Oropharynx is clear and moist and mucous membranes are normal.   Eyes: Pupils are equal, round, and reactive to light. Conjunctivae and EOM are normal.   Neck: Trachea normal and full passive range of motion without pain.   Cardiovascular: Normal rate and regular rhythm.   Pulmonary/Chest: Effort normal and breath sounds normal.   Abdominal: Bowel sounds are normal. There is no tenderness.   Musculoskeletal:   Right BKA        Lymphadenopathy:     He has no cervical adenopathy.   Neurological: He is alert and oriented to person, place, and time. No cranial nerve deficit.   Skin: Skin is warm and dry. No rash noted.   Psychiatric: He has a normal mood and affect. His speech is normal. Cognition and memory are normal.            Wounds (last 24 hours)      LDA Wound     Row Name 04/30/20 1548 04/30/20 1132 04/30/20 1029       Wound 02/28/20 0941 Right anterior foot Incision    Wound - Properties Group Date first assessed: 02/28/20  -AL Time first assessed: 0941  -AL Present on Hospital Admission: N  -AL Side: Right  -AL Orientation: anterior  -AL Location: foot  -AL Primary Wound Type: Incision  -AL       Wound 01/18/20 2244 Right heel Diabetic Ulcer    Wound - Properties Group Date first assessed: 01/18/20  -SS Time first assessed: 2244  -SS Present on Hospital Admission: Y  -SS Side: Right  -SS Location: heel  -SS Primary Wound Type: Diabetic ul  - Stage, Pressure Injury: unstageable  -SS       Wound Right foot Incision    Wound - Properties Group Side: Right  -HL Location: foot  -HL Primary Wound Type: Incision  -HL       Wound Left foot Incision    Wound - Properties Group Side: Left  -HL Location: foot  -HL Primary Wound Type: Incision  -HL       Wound 04/28/20 1037 Right anterior foot Incision    Wound -  Properties Group Date first assessed: 04/28/20  -MK Time first assessed: 1037  -MK Side: Right  -MK Orientation: anterior  -MK Location: foot  -MK Primary Wound Type: Incision  -MK    Dressing Appearance  intact  -CL  intact  -CL  --    Closure  DORIS  -CL  DORIS  -CL  --    Base  dressing in place, unable to visualize  -CL  dressing in place, unable to visualize  -CL  --    Drainage Amount  none  -CL  none  -CL  --       Wound 04/30/20 0916 Right anterior;lower;proximal leg Incision    Wound - Properties Group Date first assessed: 04/30/20  -SP Time first assessed: 0916  -SP Present on Hospital Admission: N  -SP Side: Right  -SP Orientation: anterior;lower;proximal  -SP Location: leg  -SP Primary Wound Type: Incision  -SP    Dressing Appearance  dry;intact  -CL  dry;intact  -CL  dry;intact  -LJ    Closure  DORIS  -CL  DORIS  -CL  --    Drainage Amount  none  -CL  none  -CL  --       NPWT (Negative Pressure Wound Therapy) 01/22/20 1400 right foot    NPWT (Negative Pressure Wound Therapy) - Properties Group Placement Date: 01/22/20  -MB Placement Time: 1400  -MB Location: right foot  -MB    Row Name 04/30/20 1018 04/30/20 1003 04/30/20 0948       Wound 02/28/20 0941 Right anterior foot Incision    Wound - Properties Group Date first assessed: 02/28/20  -AL Time first assessed: 0941  -AL Present on Hospital Admission: N  -AL Side: Right  -AL Orientation: anterior  -AL Location: foot  -AL Primary Wound Type: Incision  -AL       Wound 01/18/20 2244 Right heel Diabetic Ulcer    Wound - Properties Group Date first assessed: 01/18/20  -SS Time first assessed: 2244  -SS Present on Hospital Admission: Y  -SS Side: Right  -SS Location: heel  -SS Primary Wound Type: Diabetic ulc  -SS Stage, Pressure Injury: unstageable  -SS       Wound Right foot Incision    Wound - Properties Group Side: Right  -HL Location: foot  -HL Primary Wound Type: Incision  -HL       Wound Left foot Incision    Wound - Properties Group Side: Left  -HL  Location: foot  -HL Primary Wound Type: Incision  -HL       Wound 04/28/20 1037 Right anterior foot Incision    Wound - Properties Group Date first assessed: 04/28/20  -MK Time first assessed: 1037  -MK Side: Right  -MK Orientation: anterior  -MK Location: foot  -MK Primary Wound Type: Incision  -MK       Wound 04/30/20 0916 Right anterior;lower;proximal leg Incision    Wound - Properties Group Date first assessed: 04/30/20  -SP Time first assessed: 0916  -SP Present on Hospital Admission: N  -SP Side: Right  -SP Orientation: anterior;lower;proximal  -SP Location: leg  -SP Primary Wound Type: Incision  -SP    Dressing Appearance  dry;intact  -LJ  dry;intact  -LJ  dry;intact  -LJ       NPWT (Negative Pressure Wound Therapy) 01/22/20 1400 right foot    NPWT (Negative Pressure Wound Therapy) - Properties Group Placement Date: 01/22/20  -MB Placement Time: 1400  -MB Location: right foot  -MB    Row Name 04/30/20 0933 04/30/20 0926 04/30/20 0916       Wound 02/28/20 0941 Right anterior foot Incision    Wound - Properties Group Date first assessed: 02/28/20  -AL Time first assessed: 0941  -AL Present on Hospital Admission: N  -AL Side: Right  -AL Orientation: anterior  -AL Location: foot  -AL Primary Wound Type: Incision  -AL       Wound 01/18/20 2244 Right heel Diabetic Ulcer    Wound - Properties Group Date first assessed: 01/18/20  -SS Time first assessed: 2244  -SS Present on Hospital Admission: Y  -SS Side: Right  -SS Location: heel  -SS Primary Wound Type: Diabetic ulc  -SS Stage, Pressure Injury: unstageable  -SS       Wound Right foot Incision    Wound - Properties Group Side: Right  -HL Location: foot  -HL Primary Wound Type: Incision  -HL       Wound Left foot Incision    Wound - Properties Group Side: Left  -HL Location: foot  -HL Primary Wound Type: Incision  -HL       Wound 04/28/20 1037 Right anterior foot Incision    Wound - Properties Group Date first assessed: 04/28/20  -MK Time first assessed: 1037  -MK  Side: Right  -MK Orientation: anterior  -MK Location: foot  -MK Primary Wound Type: Incision  -MK       Wound 04/30/20 0916 Right anterior;lower;proximal leg Incision    Wound - Properties Group Date first assessed: 04/30/20  -SP Time first assessed: 0916  -SP Present on Hospital Admission: N  -SP Side: Right  -SP Orientation: anterior;lower;proximal  -SP Location: leg  -SP Primary Wound Type: Incision  -SP    Dressing Appearance  dry;intact  -LJ  --  --    Dressing Care, Wound  --  dressing applied coban instead of ace bandage previously charted  -SP  dressing applied xeroform, fluffs, cast padding, ace bandage  -SP       NPWT (Negative Pressure Wound Therapy) 01/22/20 1400 right foot    NPWT (Negative Pressure Wound Therapy) - Properties Group Placement Date: 01/22/20  -MB Placement Time: 1400  -MB Location: right foot  -MB    Row Name 04/30/20 0722 04/30/20 0423 04/29/20 2328       Wound 02/28/20 0941 Right anterior foot Incision    Wound - Properties Group Date first assessed: 02/28/20  -AL Time first assessed: 0941  -AL Present on Hospital Admission: N  -AL Side: Right  -AL Orientation: anterior  -AL Location: foot  -AL Primary Wound Type: Incision  -AL       Wound 01/18/20 2244 Right heel Diabetic Ulcer    Wound - Properties Group Date first assessed: 01/18/20  -SS Time first assessed: 2244  -SS Present on Hospital Admission: Y  -SS Side: Right  -SS Location: heel  -SS Primary Wound Type: Diabetic ulc  -SS Stage, Pressure Injury: unstageable  -SS       Wound Right foot Incision    Wound - Properties Group Side: Right  -HL Location: foot  -HL Primary Wound Type: Incision  -HL       Wound Left foot Incision    Wound - Properties Group Side: Left  -HL Location: foot  -HL Primary Wound Type: Incision  -HL       Wound 04/28/20 1037 Right anterior foot Incision    Wound - Properties Group Date first assessed: 04/28/20  -MK Time first assessed: 1037  -MK Side: Right  -MK Orientation: anterior  -MK Location: foot  -MK  Primary Wound Type: Incision  -MK    Dressing Appearance  intact  -CL  intact  -MR  intact  -MR    Closure  DORIS  -CL  DORIS  -MR  DORIS  -MR    Base  dressing in place, unable to visualize  -CL  dressing in place, unable to visualize  -MR  dressing in place, unable to visualize  -MR    Drainage Amount  none  -CL  --  --       Wound 04/30/20 0916 Right anterior;lower;proximal leg Incision    Wound - Properties Group Date first assessed: 04/30/20  -SP Time first assessed: 0916  -SP Present on Hospital Admission: N  -SP Side: Right  -SP Orientation: anterior;lower;proximal  -SP Location: leg  -SP Primary Wound Type: Incision  -SP       NPWT (Negative Pressure Wound Therapy) 01/22/20 1400 right foot    NPWT (Negative Pressure Wound Therapy) - Properties Group Placement Date: 01/22/20  -MB Placement Time: 1400  -MB Location: right foot  -MB    Row Name 04/29/20 2059             Wound 02/28/20 0941 Right anterior foot Incision    Wound - Properties Group Date first assessed: 02/28/20  -AL Time first assessed: 0941  -AL Present on Hospital Admission: N  -AL Side: Right  -AL Orientation: anterior  -AL Location: foot  -AL Primary Wound Type: Incision  -AL       Wound 01/18/20 2244 Right heel Diabetic Ulcer    Wound - Properties Group Date first assessed: 01/18/20  -SS Time first assessed: 2244  -SS Present on Hospital Admission: Y  -SS Side: Right  -SS Location: heel  -SS Primary Wound Type: Diabetic ulc  -SS Stage, Pressure Injury: unstageable  -SS       Wound Right foot Incision    Wound - Properties Group Side: Right  -HL Location: foot  -HL Primary Wound Type: Incision  -HL       Wound Left foot Incision    Wound - Properties Group Side: Left  -HL Location: foot  -HL Primary Wound Type: Incision  -HL       Wound 04/28/20 1037 Right anterior foot Incision    Wound - Properties Group Date first assessed: 04/28/20  -MK Time first assessed: 1037  -MK Side: Right  -MK Orientation: anterior  -MK Location: foot  -MK Primary Wound  Type: Incision  -MK    Dressing Appearance  intact  -MR      Closure  DORIS  -MR      Base  dressing in place, unable to visualize  -MR         NPWT (Negative Pressure Wound Therapy) 01/22/20 1400 right foot    NPWT (Negative Pressure Wound Therapy) - Properties Group Placement Date: 01/22/20  -MB Placement Time: 1400  -MB Location: right foot  -MB      User Key  (r) = Recorded By, (t) = Taken By, (c) = Cosigned By    Initials Name Provider Type    Marsha Denney, RN Registered Nurse    Jess Macedo, RN Registered Nurse    Rebeca Ying, RN Registered Nurse    Jacqui Garcia, RN Registered Nurse     ParisaAmi, RN Registered Nurse    SS Bryanna Ordaz, LPN Licensed Nurse    Becki Sheets, RN Registered Nurse    Suzan Abdullahi, KSENIAA Registered Nurse    Ramona Salcido RN Registered Nurse          Procedures:    Procedure(s):  incision and drain right foot          Results Review:     I reviewed the patient's new clinical results.      Lab Results (last 24 hours)     Procedure Component Value Units Date/Time    POC Glucose Once [[id number]]  (Abnormal) Collected:  04/30/20 1631    Specimen:  Blood Updated:  04/30/20 1633     Glucose 307 mg/dL      Comment: Serial Number: [id number]:  [id number]       Tissue Pathology Exam [[id number]] Collected:  04/28/20 1023    Specimen:  Tissue from Foot, Right; Tissue from Foot, Right Updated:  04/30/20 1202     Case Report --     Surgical Pathology Report                         Case: [id number]                                  Authorizing Provider:  CROW Souza DPM      Collected:           04/28/2020 10:23 AM          Ordering Location:     [facility] MAIN  Received:            04/29/2020 06:05 AM                                 OR                                                                           Pathologist:           Wm Blair MD                                                           Specimens:   1)  "- Foot, Right, right foot tissue                                                                 2) - Foot, Right, right foot                                                                Final Diagnosis --     Specimen #1 (Right foot tissue, debridement):    Gangrenous necrosis of soft tissue    Bone with acute osteomyelitis    Specimen #2 (Right lower extremity, distal foot, amputation):    Gangrenous necrosis of soft tissue    Bone with acute osteomyelitis    [facility]        Gross Description --     Part 1: Received in formalin designated \"Right foot tissue\" is an unoriented fragment of reddish gray somewhat friable and necrotic appearing tissue measuring 1.8 x 1.1 x 0.5 cm. Sectioning reveals a chalky cut surface consistent with bone. Submitted in one cassette after decalcification.      [facility]/sms    Now received is an additional specimen in formalin.     Specimen #2 is received labeled \"Right foot.\" Received in formalin is a single distal amputation specimen of the right foot. The specimen measures 10.7 cm in length x 10 cm medial to lateral x 5.6 cm dorsal to plantar. The great toe has been previously amputated revealing a roughened focally ulcerated area in the area of the previous amputation. Ulcer is also present on the medial plantar aspect which measures 1.8 cm and is located a distance of 1.0 cm from the soft tissue resection margin. The other five toes appear intact with nails in fair repair. The bone in the area below the ulcer is softened. The specimen is submitted as follows:    2A        Section of the plantar ulcer  2B        Sections of bone for decalcification deep to the ulcer  2C        Representative sections of skin and soft tissue through the previous great toe amputation site      [facility]          Basic Metabolic Panel [[id number]]  (Abnormal) Collected:  04/30/20 1109    Specimen:  Blood Updated:  04/30/20 1158     Glucose 327 mg/dL      BUN 31 mg/dL      Creatinine 0.92 mg/dL      Sodium " 133 mmol/L      Potassium 3.4 mmol/L      Chloride 96 mmol/L      CO2 27.0 mmol/L      Calcium 8.4 mg/dL      eGFR Non African Amer 89 mL/min/1.73      BUN/Creatinine Ratio 33.7     Anion Gap 10.0 mmol/L     Narrative:       GFR Normal >60  Chronic Kidney Disease <60  Kidney Failure <15      POC Glucose Once [[id number]]  (Abnormal) Collected:  04/30/20 1054    Specimen:  Blood Updated:  04/30/20 1055     Glucose 300 mg/dL      Comment: Serial Number: [id number]:  [id number]       Tissue Pathology Exam [[id number]] Collected:  04/30/20 0858    Specimen:  Tissue from Leg, Right Updated:  04/30/20 1048    POC Glucose Once [[id number]]  (Abnormal) Collected:  04/30/20 0936    Specimen:  Blood Updated:  04/30/20 0938     Glucose 301 mg/dL      Comment: Serial Number: [id number]:  [id number]       Tissue / Bone Culture - Tissue, Foot, Right [[id number]]  (Abnormal) Collected:  04/28/20 1025    Specimen:  Tissue from Foot, Right Updated:  04/30/20 0906     Tissue Culture Light growth (2+) Streptococcus agalactiae (Group B)     Comment: This organism is considered to be universally susceptible to penicillin.  No further antibiotic testing will be performed. If Clindamycin or Erythromycin is the drug of choice, notify the laboratory within 7 days to request susceptibility testing.         Rare Normal Skin Juliet     Gram Stain Few (2+) WBCs per low power field      Many (4+) Mixed bacterial morphotypes seen on Gram Stain    POC Glucose Once [[id number]]  (Abnormal) Collected:  04/30/20 0741    Specimen:  Blood Updated:  04/30/20 0742     Glucose 375 mg/dL      Comment: Serial Number: [id number]:  [id number]       Basic Metabolic Panel [[id number]]  (Abnormal) Collected:  04/30/20 0548    Specimen:  Blood Updated:  04/30/20 0629     Glucose 383 mg/dL      BUN 31 mg/dL      Creatinine 0.85 mg/dL      Sodium 129 mmol/L      Potassium 3.3 mmol/L      Chloride 96 mmol/L      CO2 25.0 mmol/L      Calcium 7.9 mg/dL       eGFR Non African Amer 97 mL/min/1.73      BUN/Creatinine Ratio 36.5     Anion Gap 8.0 mmol/L     Narrative:       GFR Normal >60  Chronic Kidney Disease <60  Kidney Failure <15      CBC & Differential [[id number]] Collected:  04/30/20 0548    Specimen:  Blood Updated:  04/30/20 0605    Narrative:       The following orders were created for panel order CBC & Differential.  Procedure                               Abnormality         Status                     ---------                               -----------         ------                     CBC Auto Differential[[id number]]        Abnormal            Final result                 Please view results for these tests on the individual orders.    CBC Auto Differential [[id number]]  (Abnormal) Collected:  04/30/20 0548    Specimen:  Blood Updated:  04/30/20 0605     WBC 13.40 10*3/mm3      RBC 3.43 10*6/mm3      Hemoglobin 10.1 g/dL      Hematocrit 30.1 %      MCV 87.6 fL      MCH 29.4 pg      MCHC 33.6 g/dL      RDW 13.9 %      RDW-SD 43.8 fl      MPV 7.8 fL      Platelets 330 10*3/mm3      Neutrophil % 89.7 %      Lymphocyte % 4.5 %      Monocyte % 5.0 %      Eosinophil % 0.0 %      Basophil % 0.8 %      Neutrophils, Absolute 12.10 10*3/mm3      Lymphocytes, Absolute 0.60 10*3/mm3      Monocytes, Absolute 0.70 10*3/mm3      Eosinophils, Absolute 0.00 10*3/mm3      Basophils, Absolute 0.10 10*3/mm3      nRBC 0.1 /100 WBC     Lactic Acid, Plasma [[id number]]  (Normal) Collected:  04/30/20 0022    Specimen:  Blood Updated:  04/30/20 0042     Lactate 1.3 mmol/L     Blood Culture - Blood, Hand, Right [[id number]] Collected:  04/27/20 2005    Specimen:  Blood from Hand, Right Updated:  04/29/20 2015     Blood Culture No growth at 2 days    Blood Culture - Blood, Wrist, Right [[id number]] Collected:  04/27/20 2005    Specimen:  Blood from Wrist, Right Updated:  04/29/20 2015     Blood Culture No growth at 2 days    POC Glucose Once [[id number]]  (Abnormal) Collected:  04/29/20  1954    Specimen:  Blood Updated:  04/29/20 1957     Glucose 430 mg/dL      Comment: Serial Number: [id number]:  [id number]           Hemoglobin A1C   Date Value Ref Range Status   04/28/2020 9.3 (H) 3.5 - 5.6 % Final               Lab Results   Component Value Date    LIPASE 13 04/26/2020     Lab Results   Component Value Date    CHOL 113 04/27/2020    TRIG 137 04/27/2020    HDL 25 (L) 04/27/2020    LDL 61 04/27/2020       Lab Results   Lab Value Date/Time    FINALDX  04/28/2020 1023     Specimen #1 (Right foot tissue, debridement):    Gangrenous necrosis of soft tissue    Bone with acute osteomyelitis    Specimen #2 (Right lower extremity, distal foot, amputation):    Gangrenous necrosis of soft tissue    Bone with acute osteomyelitis    [facility]/sms       FINALDX  02/28/2020 0951     First toe, right foot, amputation:     Acute osteomyelitis     Skin with dermal inflammatory features consistent with chronic ulceration       [facility]/sms          Microbiology Results (last 10 days)     Procedure Component Value - Date/Time    Tissue / Bone Culture - Tissue, Foot, Right [[id number]]  (Abnormal) Collected:  04/28/20 1025    Lab Status:  Final result Specimen:  Tissue from Foot, Right Updated:  04/30/20 0906     Tissue Culture Light growth (2+) Streptococcus agalactiae (Group B)     Comment: This organism is considered to be universally susceptible to penicillin.  No further antibiotic testing will be performed. If Clindamycin or Erythromycin is the drug of choice, notify the laboratory within 7 days to request susceptibility testing.         Rare Normal Skin Juliet     Gram Stain Few (2+) WBCs per low power field      Many (4+) Mixed bacterial morphotypes seen on Gram Stain    AFB Culture - Tissue, Foot, Right [[id number]] Collected:  04/28/20 1025    Lab Status:  Preliminary result Specimen:  Tissue from Foot, Right Updated:  04/29/20 0904     AFB Stain No acid fast bacilli seen    Blood Culture - Blood, Hand, Right  [[id number]] Collected:  04/27/20 2005    Lab Status:  Preliminary result Specimen:  Blood from Hand, Right Updated:  04/29/20 2015     Blood Culture No growth at 2 days    Blood Culture - Blood, Wrist, Right [[id number]] Collected:  04/27/20 2005    Lab Status:  Preliminary result Specimen:  Blood from Wrist, Right Updated:  04/29/20 2015     Blood Culture No growth at 2 days    Respiratory Panel, PCR - Swab, Nasopharynx [[id number]]  (Normal) Collected:  04/26/20 2032    Lab Status:  Final result Specimen:  Swab from Nasopharynx Updated:  04/26/20 2155     ADENOVIRUS, PCR Not Detected     Coronavirus 229E Not Detected     Coronavirus HKU1 Not Detected     Coronavirus NL63 Not Detected     Coronavirus OC43 Not Detected     Human Metapneumovirus Not Detected     Human Rhinovirus/Enterovirus Not Detected     Influenza B PCR Not Detected     Parainfluenza Virus 1 Not Detected     Parainfluenza Virus 2 Not Detected     Parainfluenza Virus 3 Not Detected     Parainfluenza Virus 4 Not Detected     Bordetella pertussis pcr Not Detected     Influenza A H1 2009 PCR Not Detected     Chlamydophila pneumoniae PCR Not Detected     Mycoplasma pneumo by PCR Not Detected     Influenza A PCR Not Detected     Influenza A H3 Not Detected     Influenza A H1 Not Detected     RSV, PCR Not Detected    Narrative:       The coronavirus on the RVP is NOT COVID-19 and is NOT indicative of infection with COVID-19.     Rapid Strep A Screen - Swab, Throat [[id number]]  (Normal) Collected:  04/26/20 2032    Lab Status:  Final result Specimen:  Swab from Throat Updated:  04/26/20 2049     Strep A Ag Negative    Wound Culture - Swab, Foot, Left [[id number]]  (Abnormal) Collected:  04/26/20 2032    Lab Status:  Final result Specimen:  Swab from Foot, Left Updated:  04/28/20 0719     Wound Culture Moderate growth (3+) Streptococcus agalactiae (Group B)     Comment: This organism is considered to be universally susceptible to penicillin.  No further  antibiotic testing will be performed. If Clindamycin or Erythromycin is the drug of choice, notify the laboratory within 7 days to request susceptibility testing.         Light growth (2+) Normal Skin Juliet     Gram Stain Few (2+) WBCs seen      Few (2+) Gram positive cocci      Rare (1+) Gram negative bacilli    SARS-CoV-2 PCR ([place] IN-HOUSE PERFORMED), NP SWAB IN TRANSPORT MEDIA - Swab, Nasopharynx [[id number]]  (Normal) Collected:  04/26/20 2032    Lab Status:  Final result Specimen:  Swab from Nasopharynx Updated:  04/27/20 1203     COVID19 Not Detected    Blood Culture - Blood, Hand, Left [[id number]]  (Abnormal) Collected:  04/26/20 2004    Lab Status:  Final result Specimen:  Blood from Hand, Left Updated:  04/29/20 0608     Blood Culture Streptococcus agalactiae (Group B)     Comment: Refer to previous blood culture collected on 4/26/2020 at 1939 for KAILA's.          Gram Stain Aerobic Bottle Gram positive cocci in chains      Anaerobic Bottle Gram positive cocci in chains    Blood Culture - Blood, Arm, Right [[id number]]  (Abnormal)  (Susceptibility) Collected:  04/26/20 1939    Lab Status:  Final result Specimen:  Blood from Arm, Right Updated:  04/29/20 0607     Blood Culture Streptococcus agalactiae (Group B)     Gram Stain Anaerobic Bottle Gram positive cocci in chains    Susceptibility      Streptococcus agalactiae (Group B)     KAILA     Ceftriaxone Susceptible     Clindamycin Susceptible     Levofloxacin Susceptible     Penicillin G Susceptible     Vancomycin Susceptible                    Blood Culture ID, PCR - Blood, Arm, Right [[id number]]  (Abnormal) Collected:  04/26/20 1939    Lab Status:  Final result Specimen:  Blood from Arm, Right Updated:  04/27/20 1035     BCID, PCR Streptococcus agalactiae (Group B). Identification by BCID PCR.     BOTTLE TYPE Anaerobic Bottle          ECG/EMG Results (most recent)     Procedure Component Value Units Date/Time    ECG 12 Lead [[id number]] Collected:   04/26/20 1910     Updated:  04/27/20 0831    Narrative:       HEART RATE= 126  bpm  RR Interval= 476  ms  GA Interval= 134  ms  P Horizontal Axis= -12  deg  P Front Axis= 61  deg  QRSD Interval= 77  ms  QT Interval= 336  ms  QRS Axis= 38  deg  T Wave Axis= 32  deg  - OTHERWISE NORMAL ECG -  Sinus tachycardia  When compared with ECG of 24-Jan-2020 12:16:18,  Significant rate increase  Electronically Signed By: Ricki Mendoza) 27-Apr-2020 08:30:58  Date and Time of Study: 2020-04-26 19:10:46               Results for orders placed during the hospital encounter of 01/18/20   Adult Transthoracic Echo Complete W/ Cont if Necessary Per Protocol    Narrative · Left ventricular systolic function is normal.  · Mild mitral valve regurgitation is present  · Mild tricuspid valve regurgitation is present.  · Ejection fraction is about 65%  · No pericardial effusion noted          Xr Tibia Fibula 2 View Right    Result Date: 4/30/2020  1. Expected postoperative appearance status post right below the knee amputation.  Electronically Signed By-Magno Agarwal On:4/30/2020 11:54 AM This report was finalized on [id number] by  Magno Agarwal, .    Xr Foot 3+ View Right    Result Date: 4/26/2020   1. Previous amputation procedure the right great toe and most of the first metatarsal bone. 2. Questionable osteomyelitis at the amputation site of the distal first metatarsal bone and also at the site of a slightly displaced oblique fracture through the base of the fifth metatarsal bone. There is soft tissue gas consistent with wounds at both locations which appear to extend to the underlying bone. 3. There is a healing nondisplaced fracture through the head of the second metatarsal bone.  Electronically Signed By-Gary Marie On:4/26/2020 7:53 PM This report was finalized on [id number] by  Gary Marie, .    Ct Chest Pulmonary Embolism    Result Date: 4/26/2020  No CTA evidence of pulmonary embolism or acute aortic pathology. No  acute cardiopulmonary process seen. Sequela of prior granulomatous disease. Electronically signed by:  Keiko Ann M.D.  4/26/2020 8:44 PM    Mri Foot Right With & Without Contrast    Result Date: 4/27/2020   1. Postoperative changes from amputation of the great toe at the level of mid metatarsal with abundant surrounding callus formation. There is T1 marrow placement, bone marrow edema like signal, and enhancement throughout the first metatarsal, consistent with osteomyelitis. 2. Soft tissue wound at the lateral midfoot/forefoot extending to the nondisplaced fracture at the base of the fifth metatarsal. T1 marrow placement, bone marrow edema like signal, and enhancement throughout the fifth metatarsal is consistent with osteomyelitis. 3. Irregular fluid collection extending from the soft tissue wound at the lateral midfoot/forefoot and surrounding the second through fifth MTP joints, suspicious for abscess. 4. Nondisplaced fracture of the second metatarsal head with suspected osteomyelitis of the second metatarsal and possible second MTP joint septic arthritis. 5. Patchy bone marrow edema like signal and enhancement multifocal areas of suspected marrow placement in the anterior process of the calcaneus, cuboid, cuneiforms, and the bases of the second, third, and fourth metatarsals, which could represent early osteomyelitis or stress marrow changes. 6. Diffuse soft tissue and muscular edema enhancement, consistent with cellulitis and myositis.    Electronically Signed By-Delbert Dinero On:4/27/2020 6:30 PM This report was finalized on [id number] by  Delbert Dinero, .    Xr Chest Ap    Result Date: 4/26/2020  No active disease.  Electronically Signed By-Gary Marie On:4/26/2020 7:53 PM This report was finalized on [id number] by  Gary Marie, .          Xrays, labs reviewed personally by physician.    Medication Review:   I have reviewed the patient's current medication list      Scheduled Meds    [START ON  5/1/2020] aspirin 325 mg Oral Daily   cefTRIAXone 2 g Intravenous Q24H   insulin glargine 22 Units Subcutaneous Nightly   insulin lispro 0-9 Units Subcutaneous TID AC   insulin lispro 10 Units Subcutaneous TID With Meals   metoclopramide 10 mg Intravenous 4x Daily AC & at Bedtime   nitroglycerin 0.5 inch Topical Q6H   polyethylene glycol 17 g Oral Daily   vancomycin 1,750 mg Intravenous Q12H       Meds Infusions    Pharmacy to dose vancomycin     sodium chloride 75 mL/hr    sodium chloride 125 mL/hr Last Rate: 125 mL/hr (04/30/20 1252)       Meds PRN  •  acetaminophen  •  dextrose  •  dextrose  •  glucagon (human recombinant)  •  HYDROcodone-acetaminophen  •  HYDROcodone-acetaminophen  •  ibuprofen  •  insulin lispro **AND** insulin lispro  •  magnesium hydroxide  •  melatonin  •  Morphine **AND** naloxone  •  ondansetron **OR** ondansetron  •  Pharmacy to dose vancomycin  •  sodium chloride      Assessment/Plan   Assessment/Plan     Active Hospital Problems    Diagnosis  POA   • **Sepsis, Gram positive (CMS/[facility]) [A41.89]  Yes     Priority: Medium   • Chest pain [R07.9]  Yes     Priority: High   • Suspected Covid-19 Virus Infection [R68.89]  Yes     Priority: Medium   • Osteomyelitis of right foot (CMS/[facility]) [M86.9]  Yes     Priority: Medium   • Dyspnea [R06.00]  Yes     Priority: Medium   • CAD (coronary artery disease) [I25.10]  Yes     Priority: Medium   • Chronic ulcer of right foot, with necrosis of bone (CMS/[facility]) [L97.514]  Unknown     Priority: Medium   • Diabetic peripheral neuropathy (CMS/[facility]) [E11.42]  Yes     Priority: Medium   • Type 1 diabetes mellitus with circulatory complication (CMS/[facility]) [E10.59]  Yes     Priority: Medium   • Nausea and vomiting [R11.2]  Yes      Resolved Hospital Problems   No resolved problems to display.       MEDICAL DECISION MAKING COMPLEXITY BY PROBLEM:     Sepsis:  -Likely from right foot infection  -s/p right TMA 4/28/20  -s/p right BKA 4/30/20  -2/2 blood culture bottles  with group B strep  -Continue vancomycin and rocephin  -Repeat blood cultures x2  -Consulted ID known to patient  -Orthopedics consulted for right BKA    Chest pain:  -Cardiac enzymes trended  -Likely noncardiac chest pain  -Patient with known CAD with 2 stents (2010)     Dyspnea:  -Resolved  -COVID 19 ruled out 4/27/2020  -C-reactive protein 30.35, Procalcitonin 0.68  -D-dimer 5.31--> CTA of chest--> no PE    Chronic osteomyelitis of the right foot:  -wound culture 1/20/2020-->MRSA, strep B, Prevotella--> treated with Rocephin/daptomycin/Flagyl  -s/p partial ray amputation 2/27/2020--> tissue/bone culture no growth  -DC on 2/29/2020 on Rocephin/daptomycin/Flagyl x6 weeks  -Patient last saw Dr. Souza 4/9/2020  -s/p right TMA 4/28/20  -Orthopedics consulted for right BKA      Diabetes type 1  -Continue long-acting home dose  -Accu-Cheks AC  -Sliding scale insulin as needed         VTE Prophylaxis -   Mechanical Order History:     None      Pharmalogical Order History:     Ordered     Dose Route Frequency Stop    04/27/20 0851  enoxaparin (LOVENOX) syringe 40 mg      40 mg SC Nightly --    04/26/20 2320  enoxaparin (LOVENOX) syringe 40 mg  Status:  Discontinued      40 mg SC Daily 04/27/20 0851            Code Status -   Code Status and Medical Interventions:   Ordered at: 04/30/20 1056     Level Of Support Discussed With:    Patient     Code Status:    CPR     Medical Interventions (Level of Support Prior to Arrest):    Full       This patient has been examined wearing appropriate Personal Protective Equipment and discussed with nursing. 04/30/20        Discharge Planning          Destination      Coordination has not been started for this encounter.      Durable Medical Equipment      Coordination has not been started for this encounter.      Dialysis/Infusion      Coordination has not been started for this encounter.      Home Medical Care      Coordination has not been started for this encounter.      Therapy       Coordination has not been started for this encounter.      Community Resources      Coordination has not been started for this encounter.            Electronically signed by Clay Fonseca DO, 04/30/20, 16:46.  [facility] Hospitalist Team        Electronically signed by Clay Fonseca DO at 04/30/20 1650     Bobo Garcia MD at 04/30/20 1529          Infectious Diseases Progress Note      LOS: 4 days   Patient Care Team:  Fred Lemons FNP as PCP - General (Family Medicine)    Chief Complaint: Right leg stump pain    Subjective       The patient has been afebrile for the last 24 hours.  The patient is on room air, hemodynamically stable, and is tolerating antimicrobial therapy.  He was complaining of right leg pain      Review of Systems:   Review of Systems   Constitutional: Negative.    HENT: Negative.    Respiratory: Negative.    Cardiovascular: Negative.    Gastrointestinal: Negative.    Genitourinary: Negative.    Musculoskeletal: Positive for arthralgias.   Skin: Positive for wound.   Neurological: Negative.    Psychiatric/Behavioral: Negative.         Objective     Vital Signs  Temp:  [97.4 °F (36.3 °C)-98.5 °F (36.9 °C)] 97.4 °F (36.3 °C)  Heart Rate:  [] 74  Resp:  [11-21] 14  BP: ()/(42-68) 91/48    Physical Exam:  Physical Exam   Constitutional: He is oriented to person, place, and time. He appears well-developed and well-nourished.   HENT:   Head: Normocephalic and atraumatic.   Eyes: Pupils are equal, round, and reactive to light. Conjunctivae and EOM are normal.   Neck: Neck supple.   Cardiovascular: Normal rate, regular rhythm and normal heart sounds.   Pulmonary/Chest: Effort normal and breath sounds normal.   Abdominal: Soft. Bowel sounds are normal.   Musculoskeletal:   Previous left great toe amputation    Status post right leg amputation with surgical dressing on the right leg stump   Neurological: He is alert and oriented to person, place, and time.    Skin: Skin is warm and dry.   Psychiatric: He has a normal mood and affect.   Vitals reviewed.       Results Review:    I have reviewed all clinical data, test, lab, and imaging results.     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  COMPARISON: Right foot radiographs dated 04/26/2020  TECHNIQUE: Two views of the right tibia and fibula were obtained.  FINDINGS: There is a new right below the knee amputation. The osseous margins of the stump appear sharp without evidence of erosion. There is soft tissue edema involving the distal stump. There are cutaneous staples present along the end of the stump. The knee joint remains in anatomic alignment.      1. Expected postoperative appearance status post right below the knee amputation.  Electronically Signed By-Magno Agarwal On:4/30/2020 11:54 AM This report was finalized on [id number] by  Magno Agarwal, .      Cardiology    Laboratory    Results from last 7 days   Lab Units 04/30/20  0548 04/29/20  0544 04/28/20  0632 04/27/20 0208 04/26/20 1939   WBC 10*3/mm3 13.40* 19.00* 18.10* 20.30* 19.30*   HEMOGLOBIN g/dL 10.1* 11.2* 11.2* 11.8* 13.3   HEMATOCRIT % 30.1* 33.9* 34.3* 33.7* 39.7   PLATELETS 10*3/mm3 330 332 296 348 405     Results from last 7 days   Lab Units 04/30/20  1109 04/30/20  0548 04/29/20  0544 04/28/20  0632 04/27/20  0208 04/26/20 1939   SODIUM mmol/L 133* 129* 129* 129* 132* 131*   POTASSIUM mmol/L 3.4* 3.3* 3.9 3.7 3.6 3.4*   CHLORIDE mmol/L 96* 96* 94* 90* 90* 87*   CO2 mmol/L 27.0 25.0 22.0 26.0 27.0 26.0   BUN mg/dL 31* 31* 32* 31*  18 16   CREATININE mg/dL 0.92 0.85 1.02 1.26 0.76 0.96   GLUCOSE mg/dL 327* 383* 381* 305* 294* 290*   ALBUMIN g/dL  --   --  1.90*  --  2.40* 3.10*   BILIRUBIN mg/dL  --   --  0.5  --  0.9 1.1   ALK PHOS U/L  --   --  139*  --  139* 173*   AST (SGOT) U/L  --   --  14  --  21 24   ALT (SGPT) U/L  --   --  20  --  26 31   LIPASE U/L  --   --   --   --   --  13   CALCIUM mg/dL 8.4* 7.9* 8.1* 8.3* 8.6 9.3                 Microbiology   Microbiology Results (last 10 days)     Procedure Component Value - Date/Time    Tissue / Bone Culture - Tissue, Foot, Right [[id number]]  (Abnormal) Collected:  04/28/20 1025    Lab Status:  Final result Specimen:  Tissue from Foot, Right Updated:  04/30/20 0906     Tissue Culture Light growth (2+) Streptococcus agalactiae (Group B)     Comment: This organism is considered to be universally susceptible to penicillin.  No further antibiotic testing will be performed. If Clindamycin or Erythromycin is the drug of choice, notify the laboratory within 7 days to request susceptibility testing.         Rare Normal Skin Juliet     Gram Stain Few (2+) WBCs per low power field      Many (4+) Mixed bacterial morphotypes seen on Gram Stain    AFB Culture - Tissue, Foot, Right [[id number]] Collected:  04/28/20 1025    Lab Status:  Preliminary result Specimen:  Tissue from Foot, Right Updated:  04/29/20 0904     AFB Stain No acid fast bacilli seen    Blood Culture - Blood, Hand, Right [[id number]] Collected:  04/27/20 2005    Lab Status:  Preliminary result Specimen:  Blood from Hand, Right Updated:  04/29/20 2015     Blood Culture No growth at 2 days    Blood Culture - Blood, Wrist, Right [[id number]] Collected:  04/27/20 2005    Lab Status:  Preliminary result Specimen:  Blood from Wrist, Right Updated:  04/29/20 2015     Blood Culture No growth at 2 days    Respiratory Panel, PCR - Swab, Nasopharynx [[id number]]  (Normal) Collected:  04/26/20 2032    Lab Status:  Final result Specimen:  Swab from  Nasopharynx Updated:  04/26/20 2155     ADENOVIRUS, PCR Not Detected     Coronavirus 229E Not Detected     Coronavirus HKU1 Not Detected     Coronavirus NL63 Not Detected     Coronavirus OC43 Not Detected     Human Metapneumovirus Not Detected     Human Rhinovirus/Enterovirus Not Detected     Influenza B PCR Not Detected     Parainfluenza Virus 1 Not Detected     Parainfluenza Virus 2 Not Detected     Parainfluenza Virus 3 Not Detected     Parainfluenza Virus 4 Not Detected     Bordetella pertussis pcr Not Detected     Influenza A H1 2009 PCR Not Detected     Chlamydophila pneumoniae PCR Not Detected     Mycoplasma pneumo by PCR Not Detected     Influenza A PCR Not Detected     Influenza A H3 Not Detected     Influenza A H1 Not Detected     RSV, PCR Not Detected    Narrative:       The coronavirus on the RVP is NOT COVID-19 and is NOT indicative of infection with COVID-19.     Rapid Strep A Screen - Swab, Throat [[id number]]  (Normal) Collected:  04/26/20 2032    Lab Status:  Final result Specimen:  Swab from Throat Updated:  04/26/20 2049     Strep A Ag Negative    Wound Culture - Swab, Foot, Left [[id number]]  (Abnormal) Collected:  04/26/20 2032    Lab Status:  Final result Specimen:  Swab from Foot, Left Updated:  04/28/20 0719     Wound Culture Moderate growth (3+) Streptococcus agalactiae (Group B)     Comment: This organism is considered to be universally susceptible to penicillin.  No further antibiotic testing will be performed. If Clindamycin or Erythromycin is the drug of choice, notify the laboratory within 7 days to request susceptibility testing.         Light growth (2+) Normal Skin Juliet     Gram Stain Few (2+) WBCs seen      Few (2+) Gram positive cocci      Rare (1+) Gram negative bacilli    SARS-CoV-2 PCR ([place] IN-HOUSE PERFORMED), NP SWAB IN TRANSPORT MEDIA - Swab, Nasopharynx [[id number]]  (Normal) Collected:  04/26/20 2032    Lab Status:  Final result Specimen:  Swab from Nasopharynx  Updated:  04/27/20 1203     COVID19 Not Detected    Blood Culture - Blood, Hand, Left [[id number]]  (Abnormal) Collected:  04/26/20 2004    Lab Status:  Final result Specimen:  Blood from Hand, Left Updated:  04/29/20 0608     Blood Culture Streptococcus agalactiae (Group B)     Comment: Refer to previous blood culture collected on 4/26/2020 at 1939 for KAILA's.          Gram Stain Aerobic Bottle Gram positive cocci in chains      Anaerobic Bottle Gram positive cocci in chains    Blood Culture - Blood, Arm, Right [[id number]]  (Abnormal)  (Susceptibility) Collected:  04/26/20 1939    Lab Status:  Final result Specimen:  Blood from Arm, Right Updated:  04/29/20 0607     Blood Culture Streptococcus agalactiae (Group B)     Gram Stain Anaerobic Bottle Gram positive cocci in chains    Susceptibility      Streptococcus agalactiae (Group B)     KAILA     Ceftriaxone Susceptible     Clindamycin Susceptible     Levofloxacin Susceptible     Penicillin G Susceptible     Vancomycin Susceptible                    Blood Culture ID, PCR - Blood, Arm, Right [[id number]]  (Abnormal) Collected:  04/26/20 1939    Lab Status:  Final result Specimen:  Blood from Arm, Right Updated:  04/27/20 1035     BCID, PCR Streptococcus agalactiae (Group B). Identification by BCID PCR.     BOTTLE TYPE Anaerobic Bottle          Medication Review:       Schedule Meds    [START ON 5/1/2020] aspirin 325 mg Oral Daily   cefTRIAXone 2 g Intravenous Q24H   insulin glargine 22 Units Subcutaneous Nightly   insulin lispro 0-9 Units Subcutaneous TID AC   insulin lispro 10 Units Subcutaneous TID With Meals   metoclopramide 10 mg Intravenous 4x Daily AC & at Bedtime   nitroglycerin 0.5 inch Topical Q6H   polyethylene glycol 17 g Oral Daily   vancomycin 1,750 mg Intravenous Q12H       Infusion Meds    Pharmacy to dose vancomycin     sodium chloride 75 mL/hr    sodium chloride 125 mL/hr Last Rate: 125 mL/hr (04/30/20 1252)       PRN Meds  •  acetaminophen  •   dextrose  •  dextrose  •  glucagon (human recombinant)  •  HYDROcodone-acetaminophen  •  HYDROcodone-acetaminophen  •  ibuprofen  •  insulin lispro **AND** insulin lispro  •  magnesium hydroxide  •  melatonin  •  Morphine **AND** naloxone  •  ondansetron **OR** ondansetron  •  Pharmacy to dose vancomycin  •  sodium chloride        Assessment/Plan       Antimicrobial Therapy   1.  IV vancomycin    day  2.  IV cefepime    day  3.  IV Rocephin    day  4.      Day  5.      Day      Assessment     Bacteremia-cultures are growing group B streptococcus  -Likely source is right foot wound     Right diabetic foot wound on both the lateral aspect of the foot by the great toe and the plantar aspect by the fifth toe  -MRI shows osteomyelitis throughout the entire right foot  -Cultures are growing group B streptococcus  -Right partial first partial ray amputation on 2/27/2020-cultures from admission were negative but previous cultures grew methicillin-resistant Staphylococcus aureus, group B streptococcus and Prevotella species.   -Patient had 6 weeks of IV daptomycin IV Rocephin and p.o. Flagyl from the amputation date and was finished on 4/9/2020  -Patient states that he had fallen several times and is hit his right foot which is because the current wound  -4/28/2020-patient had a right TMA but the infection was extending up into his lower leg  The patient eventually underwent right below-knee amputation         Patient admitted with complaints of shortness of breath, body aches, nausea, vomiting  -COVID 19 screen was negative and chest CT was not indicative of a COVID infection   -Patient is on room air     Fever-101.3 degrees-likely related to bacteremia  -Resolving     Recent left big toe amputation secondary to left diabetic foot.  Wound healed completely     Type 1 diabetes with neuropathy     CAD, history of MI with cardiac catheterization           Plan     Continue Rocephin 2 g every 24 hours-patient will need 2 weeks  total treatment for the bacteremia  Okay to continue IV vancomycin for short course, 3 days after surgery  Patient will need a midline before discharge-please remove when IV antibiotics are completed  Continue supportive care  A.m. labs    Bobo Garcia MD  04/30/20  15:29     Note is dictated utilizing voice recognition software/Dragon    Electronically signed by Bobo Garcia MD at 04/30/20 1531     Ezekiel Ron MD at 04/30/20 1254                 Daily Progress Note    Patient Care Team:  Fred Lemons FNP as PCP - General (Family Medicine)    Chief Complaint: Follow-up type 1 diabetes    HPI: Patient seen and examined, status post right below-knee amputation on 4/30/2020.  Blood sugar continues to run high.    ROS:   Patient in mild distress and not able to answer any question as his recent postop.      Vitals:    04/30/20 1050   BP: 94/56   Pulse: 75   Resp: 18   Temp: 97.4 °F (36.3 °C)   SpO2: 94%       Physical Exam:  GEN: NAD,   CV: RRR  LUNG: CTA  MSK: Status post right below-knee amputation        Results Review:     I reviewed the patient's new clinical results.    Glucose   Date Value Ref Range Status   04/30/2020 327 (H) 65 - 99 mg/dL Final     Sodium   Date Value Ref Range Status   04/30/2020 133 (L) 136 - 145 mmol/L Final     Potassium   Date Value Ref Range Status   04/30/2020 3.4 (L) 3.5 - 5.2 mmol/L Final     CO2   Date Value Ref Range Status   04/30/2020 27.0 22.0 - 29.0 mmol/L Final     Chloride   Date Value Ref Range Status   04/30/2020 96 (L) 98 - 107 mmol/L Final     Anion Gap   Date Value Ref Range Status   04/30/2020 10.0 5.0 - 15.0 mmol/L Final     Creatinine   Date Value Ref Range Status   04/30/2020 0.92 0.76 - 1.27 mg/dL Final     BUN   Date Value Ref Range Status   04/30/2020 31 (H) 6 - 20 mg/dL Final     BUN/Creatinine Ratio   Date Value Ref Range Status   04/30/2020 33.7 (H) 7.0 - 25.0 Final     Calcium   Date Value Ref Range Status   04/30/2020 8.4 (L) 8.6 - 10.5 mg/dL Final      eGFR Non  Amer   Date Value Ref Range Status   04/30/2020 89 >60 mL/min/1.73 Final     Alkaline Phosphatase   Date Value Ref Range Status   04/29/2020 139 (H) 39 - 117 U/L Final     Total Protein   Date Value Ref Range Status   04/29/2020 6.2 6.0 - 8.5 g/dL Final     ALT (SGPT)   Date Value Ref Range Status   04/29/2020 20 1 - 41 U/L Final     AST (SGOT)   Date Value Ref Range Status   04/29/2020 14 1 - 40 U/L Final     Total Bilirubin   Date Value Ref Range Status   04/29/2020 0.5 0.2 - 1.2 mg/dL Final     Albumin   Date Value Ref Range Status   04/29/2020 1.90 (L) 3.50 - 5.20 g/dL Final     Globulin   Date Value Ref Range Status   04/29/2020 4.3 gm/dL Final     Lab Results   Component Value Date    HGBA1C 9.3 (H) 04/28/2020    HGBA1C 10.5 (H) 01/21/2020     No results found for: GLUF, MICROALBUR  Results from last 7 days   Lab Units 04/30/20  1054 04/30/20  0936 04/30/20  0741 04/29/20  1954 04/29/20  1618 04/29/20  1107   GLUCOSE mg/dL 300* 301* 375* 430* 423* 416*             Medication Review: Reviewed.       [START ON 5/1/2020] aspirin 325 mg Oral Daily   cefTRIAXone 2 g Intravenous Q24H   insulin glargine 18 Units Subcutaneous Nightly   insulin lispro 8 Units Subcutaneous TID With Meals   metoclopramide 10 mg Intravenous 4x Daily AC & at Bedtime   nitroglycerin 0.5 inch Topical Q6H   polyethylene glycol 17 g Oral Daily   vancomycin 1,750 mg Intravenous Q12H         Assessment/Plan   1.  Diabetes mellitus type 1: Uncontrolled with significant hyperglycemia.  I will increase insulin both doses both Lantus and Humalog along with Humalog sliding scale and try to bring blood sugars below 180.  2.  Status post right below-knee amputation on April 30, 2020.            Ezekiel Ron MD. FACE                Electronically signed by Ezekiel Ron MD at 04/30/20 1258     Bobo Garcia MD at 04/29/20 1632          Infectious Diseases Progress Note      LOS: 3 days   Patient Care Team:  Fred Lemons FNP  as PCP - General (Family Medicine)    Chief Complaint: Feeling better overall today    Subjective       The patient has been afebrile for the last 24 hours.  The patient is on room air, hemodynamically stable, and is tolerating antimicrobial therapy.      Review of Systems:   Review of Systems   Constitutional: Negative.    HENT: Negative.    Respiratory: Negative.    Cardiovascular: Negative.    Gastrointestinal: Negative.    Genitourinary: Negative.    Musculoskeletal: Positive for arthralgias.   Skin: Positive for wound.   Neurological: Negative.    Psychiatric/Behavioral: Negative.         Objective     Vital Signs  Temp:  [97.2 °F (36.2 °C)-98.4 °F (36.9 °C)] 97.5 °F (36.4 °C)  Heart Rate:  [88-97] 97  Resp:  [12-19] 19  BP: ()/(46-66) 89/48    Physical Exam:  Physical Exam   Constitutional: He is oriented to person, place, and time. He appears well-developed and well-nourished.   HENT:   Head: Normocephalic and atraumatic.   Eyes: Pupils are equal, round, and reactive to light. Conjunctivae and EOM are normal.   Neck: Neck supple.   Cardiovascular: Normal rate, regular rhythm and normal heart sounds.   Pulmonary/Chest: Effort normal and breath sounds normal.   Abdominal: Soft. Bowel sounds are normal.   Musculoskeletal:   Previous left great toe amputation    Surgical dressing on right foot   Neurological: He is alert and oriented to person, place, and time.   Skin: Skin is warm and dry.   Psychiatric: He has a normal mood and affect.   Vitals reviewed.       Results Review:    I have reviewed all clinical data, test, lab, and imaging results.     Radiology  No Radiology Exams Resulted Within Past 24 Hours    Cardiology    Laboratory    Results from last 7 days   Lab Units 04/29/20  0544 04/28/20  0632 04/27/20  0208 04/26/20  1939   WBC 10*3/mm3 19.00* 18.10* 20.30* 19.30*   HEMOGLOBIN g/dL 11.2* 11.2* 11.8* 13.3   HEMATOCRIT % 33.9* 34.3* 33.7* 39.7   PLATELETS 10*3/mm3 332 296 348 405     Results from  last 7 days   Lab Units 04/29/20  0544 04/28/20  0632 04/27/20  0208 04/26/20  1939   SODIUM mmol/L 129* 129* 132* 131*   POTASSIUM mmol/L 3.9 3.7 3.6 3.4*   CHLORIDE mmol/L 94* 90* 90* 87*   CO2 mmol/L 22.0 26.0 27.0 26.0   BUN mg/dL 32* 31* 18 16   CREATININE mg/dL 1.02 1.26 0.76 0.96   GLUCOSE mg/dL 381* 305* 294* 290*   ALBUMIN g/dL 1.90*  --  2.40* 3.10*   BILIRUBIN mg/dL 0.5  --  0.9 1.1   ALK PHOS U/L 139*  --  139* 173*   AST (SGOT) U/L 14  --  21 24   ALT (SGPT) U/L 20  --  26 31   LIPASE U/L  --   --   --  13   CALCIUM mg/dL 8.1* 8.3* 8.6 9.3                 Microbiology   Microbiology Results (last 10 days)     Procedure Component Value - Date/Time    Tissue / Bone Culture - Tissue, Foot, Right [[id number]]  (Abnormal) Collected:  04/28/20 1025    Lab Status:  Preliminary result Specimen:  Tissue from Foot, Right Updated:  04/29/20 0816     Tissue Culture Light growth (2+) Streptococcus agalactiae (Group B)     Comment: This organism is considered to be universally susceptible to penicillin.  No further antibiotic testing will be performed. If Clindamycin or Erythromycin is the drug of choice, notify the laboratory within 7 days to request susceptibility testing.        Gram Stain Few (2+) WBCs per low power field      Many (4+) Mixed bacterial morphotypes seen on Gram Stain    AFB Culture - Tissue, Foot, Right [[id number]] Collected:  04/28/20 1025    Lab Status:  Preliminary result Specimen:  Tissue from Foot, Right Updated:  04/29/20 0904     AFB Stain No acid fast bacilli seen    Blood Culture - Blood, Hand, Right [[id number]] Collected:  04/27/20 2005    Lab Status:  Preliminary result Specimen:  Blood from Hand, Right Updated:  04/28/20 2015     Blood Culture No growth at 24 hours    Blood Culture - Blood, Wrist, Right [[id number]] Collected:  04/27/20 2005    Lab Status:  Preliminary result Specimen:  Blood from Wrist, Right Updated:  04/28/20 2015     Blood Culture No growth at 24 hours    Respiratory  Panel, PCR - Swab, Nasopharynx [[id number]]  (Normal) Collected:  04/26/20 2032    Lab Status:  Final result Specimen:  Swab from Nasopharynx Updated:  04/26/20 2155     ADENOVIRUS, PCR Not Detected     Coronavirus 229E Not Detected     Coronavirus HKU1 Not Detected     Coronavirus NL63 Not Detected     Coronavirus OC43 Not Detected     Human Metapneumovirus Not Detected     Human Rhinovirus/Enterovirus Not Detected     Influenza B PCR Not Detected     Parainfluenza Virus 1 Not Detected     Parainfluenza Virus 2 Not Detected     Parainfluenza Virus 3 Not Detected     Parainfluenza Virus 4 Not Detected     Bordetella pertussis pcr Not Detected     Influenza A H1 2009 PCR Not Detected     Chlamydophila pneumoniae PCR Not Detected     Mycoplasma pneumo by PCR Not Detected     Influenza A PCR Not Detected     Influenza A H3 Not Detected     Influenza A H1 Not Detected     RSV, PCR Not Detected    Narrative:       The coronavirus on the RVP is NOT COVID-19 and is NOT indicative of infection with COVID-19.     Rapid Strep A Screen - Swab, Throat [[id number]]  (Normal) Collected:  04/26/20 2032    Lab Status:  Final result Specimen:  Swab from Throat Updated:  04/26/20 2049     Strep A Ag Negative    Wound Culture - Swab, Foot, Left [[id number]]  (Abnormal) Collected:  04/26/20 2032    Lab Status:  Final result Specimen:  Swab from Foot, Left Updated:  04/28/20 0719     Wound Culture Moderate growth (3+) Streptococcus agalactiae (Group B)     Comment: This organism is considered to be universally susceptible to penicillin.  No further antibiotic testing will be performed. If Clindamycin or Erythromycin is the drug of choice, notify the laboratory within 7 days to request susceptibility testing.         Light growth (2+) Normal Skin Juliet     Gram Stain Few (2+) WBCs seen      Few (2+) Gram positive cocci      Rare (1+) Gram negative bacilli    SARS-CoV-2 PCR ([place] IN-HOUSE PERFORMED), NP SWAB IN TRANSPORT MEDIA - Swab,  Nasopharynx [[id number]]  (Normal) Collected:  04/26/20 2032    Lab Status:  Final result Specimen:  Swab from Nasopharynx Updated:  04/27/20 1203     COVID19 Not Detected    Blood Culture - Blood, Hand, Left [[id number]]  (Abnormal) Collected:  04/26/20 2004    Lab Status:  Final result Specimen:  Blood from Hand, Left Updated:  04/29/20 0608     Blood Culture Streptococcus agalactiae (Group B)     Comment: Refer to previous blood culture collected on 4/26/2020 at 1939 for KAILA's.          Gram Stain Aerobic Bottle Gram positive cocci in chains      Anaerobic Bottle Gram positive cocci in chains    Blood Culture - Blood, Arm, Right [[id number]]  (Abnormal)  (Susceptibility) Collected:  04/26/20 1939    Lab Status:  Final result Specimen:  Blood from Arm, Right Updated:  04/29/20 0607     Blood Culture Streptococcus agalactiae (Group B)     Gram Stain Anaerobic Bottle Gram positive cocci in chains    Susceptibility      Streptococcus agalactiae (Group B)     KAILA     Ceftriaxone Susceptible     Clindamycin Susceptible     Levofloxacin Susceptible     Penicillin G Susceptible     Vancomycin Susceptible                    Blood Culture ID, PCR - Blood, Arm, Right [[id number]]  (Abnormal) Collected:  04/26/20 1939    Lab Status:  Final result Specimen:  Blood from Arm, Right Updated:  04/27/20 1035     BCID, PCR Streptococcus agalactiae (Group B). Identification by BCID PCR.     BOTTLE TYPE Anaerobic Bottle          Medication Review:       Schedule Meds    !Vancomycin Level Draw Needed  Does not apply Once   [START ON 4/30/2020] !Vancomycin Level Draw Needed  Does not apply Once   cefepime 2 g Intravenous Q8H   insulin glargine 15 Units Subcutaneous Nightly   insulin lispro 0-9 Units Subcutaneous TID AC   insulin lispro 5 Units Subcutaneous TID With Meals   metoclopramide 10 mg Intravenous 4x Daily AC & at Bedtime   nitroglycerin 0.5 inch Topical Q6H   pantoprazole 40 mg Intravenous Daily   vancomycin 1,750 mg Intravenous  Q12H       Infusion Meds    Pharmacy to Dose Cefepime     Pharmacy to dose vancomycin     sodium chloride 100 mL/hr Last Rate: 100 mL/hr (04/28/20 0321)       PRN Meds  acetaminophen  •  acetaminophen  •  bisacodyl  •  dextrose  •  dextrose  •  glucagon (human recombinant)  •  ibuprofen  •  insulin lispro **AND** insulin lispro  •  magnesium hydroxide  •  melatonin  •  ondansetron **OR** ondansetron  •  Pharmacy to Dose Cefepime  •  Pharmacy to dose vancomycin  •  [COMPLETED] Insert peripheral IV **AND** sodium chloride        Assessment/Plan       Antimicrobial Therapy   1.  IV vancomycin    day  2.  IV cefepime    day  3.  IV Rocephin    day  4.      Day  5.      Day      Assessment     Bacteremia-cultures are growing group B streptococcus  -Likely source is right foot wound     Right diabetic foot wound on both the lateral aspect of the foot by the great toe and the plantar aspect by the fifth toe  -MRI shows osteomyelitis throughout the entire right foot  -Cultures are growing group B streptococcus  -Right partial first partial ray amputation on 2/27/2020-cultures from admission were negative but previous cultures grew methicillin-resistant Staphylococcus aureus, group B streptococcus and Prevotella species.   -Patient had 6 weeks of IV daptomycin IV Rocephin and p.o. Flagyl from the amputation date and was finished on 4/9/2020  -Patient states that he had fallen several times and is hit his right foot which is because the current wound  -4/28/2020-patient had a right TMA but the infection was extending up into his lower leg     Patient admitted with complaints of shortness of breath, body aches, nausea, vomiting  -COVID 19 screen was negative and chest CT was not indicative of a COVID infection   -Patient is on room air     Fever-101.3 degrees-likely related to bacteremia  -Resolving     Recent left big toe amputation secondary to left diabetic foot.  Wound healed completely     Type 1 diabetes with  neuropathy     CAD, history of MI with cardiac catheterization           Plan     Discontinue IV vancomycin and IV cefepime   Start IV Rocephin 2 g every 24 hours-patient will need 2 weeks total treatment for the bacteremia  Patient will need a midline before discharge-please remove when IV antibiotics are completed  Weekly labs CBC/creatinine/sed rate  Patient scheduled for right BKA tomorrow  Continue supportive care  A.m. labs    Bailee DE SOUZA Stewkush, KAMILLE  04/29/20  16:32    Electronically signed by Bobo Garcia MD at 04/29/20 1814          Consult Notes (last 48 hours) (Notes from 04/29/20 0856 through 05/01/20 0856)      Ezekiel Ron MD at 04/29/20 2012                Inpatient Endocrine Consult  Consultation Requested by Dr. Bautista for uncontrolled type 1 diabetes diabetes.  Patient Care Team:  Fred Lemons FNP as PCP - General (Family Medicine)    Chief Complaint: Uncontrolled type 1 diabetes    HPI: 45-year-old male with history of type 1 diabetes, hypertension, diabetic neuropathy with history of transmetatarsal amputation of the right foot in the past and also had amputation of the toes on the left side is initially admitted With complaints of nausea along with vomiting and body aches. COVID -19 test was negative.  He is now being scheduled for amputation of the right foot due to infection.  He has been having significant hypoglycemia and endocrine consultation is requested for evaluation and management of his diabetes.  He tells me at home he takes Basaglar insulin 12 units at bedtime along with NovoLog sliding scale usually between 3 to 8 units with each meal and his sugar runs less than 250 most of the time.  Currently eating well and denies any nausea, vomiting or any other significant issues at this time.    Past Medical History:   Diagnosis Date   • Coronary artery disease    • Diabetes mellitus (CMS/HCC)    • MI, old 2010       Social History     Socioeconomic History   • Marital status:  Single     Spouse name: Not on file   • Number of children: Not on file   • Years of education: Not on file   • Highest education level: Not on file   Tobacco Use   • Smoking status: Former Smoker     Packs/day: 0.50     Last attempt to quit: 2019     Years since quittin.9   • Smokeless tobacco: Never Used   Substance and Sexual Activity   • Alcohol use: No     Frequency: Never   • Drug use: No   • Sexual activity: Defer       Family History   Problem Relation Age of Onset   • Diabetes Father    • Heart failure Father    • Diabetes Paternal Grandfather        Allergies   Allergen Reactions   • Metformin Diarrhea and Nausea And Vomiting   • Oxycodone-Acetaminophen Nausea And Vomiting and Rash       ROS:   Constitutional:  Denies fatigue, tiredness.    Eyes:  Denies change in visual acuity   HENT:  Denies nasal congestion or sore throat   Respiratory: denies cough, shortness of breath.   Cardiovascular:  denies chest pain, edema   GI:  Denies abdominal pain, nausea, vomiting.   :  Denies Polyuria and Polydipsia  Musculoskeletal:  Denies back pain or joint pain   Integument:  Denies dry skin, rash   Neurologic:  Denies headache, focal weakness or sensory changes   Endocrine:  Denies polyuria or polydipsia   Psychiatric:  Denies depression or anxiety      Vitals:    20 1816   BP: (!) 87/51   Pulse:    Resp: 18   Temp: 97.9 °F (36.6 °C)   SpO2:         General Appearance:    Alert, cooperative, in no acute distress   Head:    Normocephalic, without obvious abnormality, atraumatic           Eyes:    Lids and lashes normal, conjunctivae and sclerae normal,       Throat:   No oral lesions,  oral mucosa moist. Edentulous   Neck:   No adenopathy, supple,  no thyromegaly, no   carotid bruit   Lungs:    Clear     Heart:    Regular rhythm and normal rate   Chest Wall:    No abnormalities observed   Abdomen:     Normal bowel sounds                Extremities:    SP TMT right foot, and 3 toe amputation of left foot                Pulses:   Pulses palpable   Skin:   Dry   Neurologic:  Psych:    No focal deficit.     AAOx3, appropriate mood, affect normal         Results Review:     I reviewed the patient's new clinical results.    Lab Results   Component Value Date    GLUCOSE 381 (H) 04/29/2020    BUN 32 (H) 04/29/2020    CREATININE 1.02 04/29/2020    EGFRIFNONA 79 04/29/2020    BCR 31.4 (H) 04/29/2020    K 3.9 04/29/2020    CO2 22.0 04/29/2020    CALCIUM 8.1 (L) 04/29/2020    ALBUMIN 1.90 (L) 04/29/2020    LABIL2 1.0 04/29/2019    AST 14 04/29/2020    ALT 20 04/29/2020       Lab Results   Component Value Date    HGBA1C 9.3 (H) 04/28/2020    HGBA1C 10.5 (H) 01/21/2020     Lab Results   Component Value Date    CREATININE 1.02 04/29/2020     Results from last 7 days   Lab Units 04/29/20  1954 04/29/20  1618 04/29/20  1107 04/29/20  0738 04/28/20  1648 04/28/20  1305   GLUCOSE mg/dL 430* 423* 416* 367* 254* 240*       Medication Review: Reviewed.       Current Facility-Administered Medications:   •  acetaminophen (TYLENOL) suppository 650 mg, 650 mg, Rectal, Q4H PRN, CROW Souza DPM, 650 mg at 04/28/20 0831  •  acetaminophen (TYLENOL) tablet 650 mg, 650 mg, Oral, Q6H PRN, CROW Souza DPM, 650 mg at 04/28/20 1859  •  bisacodyl (DULCOLAX) EC tablet 5 mg, 5 mg, Oral, Daily PRN, CROW Souza DPM  •  cefTRIAXone (ROCEPHIN) 2 g in sodium chloride 0.9 % 100 mL IVPB, 2 g, Intravenous, Q24H, Bailee Knight APRN  •  dextrose (D50W) 25 g/ 50mL Intravenous Solution 25 g, 25 g, Intravenous, Q15 Min PRN, CROW Souza DPM  •  dextrose (GLUTOSE) oral gel 15 g, 15 g, Oral, Q15 Min PRN, CROW Souza DPM  •  glucagon (human recombinant) (GLUCAGEN DIAGNOSTIC) injection 1 mg, 1 mg, Subcutaneous, Q15 Min PRN, CROW Souza DPM  •  ibuprofen (ADVIL,MOTRIN) tablet 600 mg, 600 mg, Oral, Q4H PRN, CROW Souza DPM, 600 mg at 04/29/20 0815  •  insulin glargine (LANTUS) injection 15 Units, 15 Units, Subcutaneous,  Nightly, CROW Souza DPM, 15 Units at 04/28/20 2022  •  insulin lispro (humaLOG) injection 0-9 Units, 0-9 Units, Subcutaneous, TID AC, 9 Units at 04/29/20 1720 **AND** insulin lispro (humaLOG) injection 0-9 Units, 0-9 Units, Subcutaneous, PRN, Clay Fonseca, DO, 9 Units at 04/29/20 2002  •  insulin lispro (humaLOG) injection 5 Units, 5 Units, Subcutaneous, TID With Meals, CROW Souza DPM, 5 Units at 04/29/20 1721  •  magnesium hydroxide (MILK OF MAGNESIA) suspension 2400 mg/10mL 10 mL, 10 mL, Oral, Daily PRN, CROW Souza DPM  •  melatonin tablet 5 mg, 5 mg, Oral, Nightly PRN, CROW Souza DPM  •  metoclopramide (REGLAN) injection 10 mg, 10 mg, Intravenous, 4x Daily AC & at Bedtime, CROW Souza DPM, 10 mg at 04/29/20 1724  •  nitroglycerin (NITROSTAT) ointment 0.5 inch, 0.5 inch, Topical, Q6H, CROW Souza DPM, 0.5 inch at 04/27/20 1815  •  ondansetron (ZOFRAN) tablet 4 mg, 4 mg, Oral, Q6H PRN **OR** ondansetron (ZOFRAN) injection 4 mg, 4 mg, Intravenous, Q6H PRN, CROW Souza DPM, 4 mg at 04/28/20 1021  •  pantoprazole (PROTONIX) injection 40 mg, 40 mg, Intravenous, Daily, CROW Souza DPM, 40 mg at 04/29/20 0814  •  [COMPLETED] Insert peripheral IV, , , Once **AND** sodium chloride 0.9 % flush 10 mL, 10 mL, Intravenous, PRN, CROW Souza DPM, 10 mL at 04/29/20 0815  •  sodium chloride 0.9 % infusion, 100 mL/hr, Intravenous, Continuous, CROW Souza DPM, Last Rate: 100 mL/hr at 04/28/20 0321    Assessment/Plan   1.  Diabetes mellitus type 1: Uncontrolled with exacerbation due to acute infection  2.  Right foot infection: Scheduled for right foot amputation tomorrow or later this week  3.  Diabetic nephropathy: Status post right transmetatarsal amputation with left 3 toes amputation    Plan:     At this time I will increase Basaglar/Lantus to 18 units subcu nightly and increase Humalog to 8 units with each meal and continue NovoLog sliding scale with  meals.  We will continue to follow blood sugars and make further adjustments as needed.    Thank you very much for the consultation.            Ezekiel Ron MD FACE.              Electronically signed by Ezekiel Ron MD at 04/29/20 2020

## 2020-05-01 NOTE — PROGRESS NOTES
Infectious Diseases Progress Note      LOS: 5 days   Patient Care Team:  Fred Lemons FNP as PCP - General (Family Medicine)    Chief Complaint: Right leg stump pain    Subjective       The patient has been afebrile for the last 24 hours.  The patient is on room air, hemodynamically stable, and is tolerating antimicrobial therapy.  He was complaining of right leg pain. He has also had some nausea and vomiting but attributes this to the food.       Review of Systems:   Review of Systems   Constitutional: Negative.    HENT: Negative.    Respiratory: Negative.    Cardiovascular: Negative.    Gastrointestinal: Positive for nausea.   Genitourinary: Negative.    Musculoskeletal: Positive for arthralgias.   Skin: Positive for wound.   Neurological: Negative.    Psychiatric/Behavioral: Negative.         Objective     Vital Signs  Temp:  [97.4 °F (36.3 °C)-98.3 °F (36.8 °C)] 97.9 °F (36.6 °C)  Heart Rate:  [72-87] 82  Resp:  [13-17] 13  BP: (106-142)/(63-72) 116/68    Physical Exam:  Physical Exam   Constitutional: He is oriented to person, place, and time. He appears well-developed and well-nourished.   HENT:   Head: Normocephalic and atraumatic.   Eyes: Pupils are equal, round, and reactive to light. Conjunctivae and EOM are normal.   Neck: Neck supple.   Cardiovascular: Normal rate, regular rhythm and normal heart sounds.   Pulmonary/Chest: Effort normal and breath sounds normal.   Abdominal: Soft. Bowel sounds are normal.   Musculoskeletal:   Previous left great toe amputation    Status post right leg amputation with surgical dressing on the right leg stump   Neurological: He is alert and oriented to person, place, and time.   Skin: Skin is warm and dry.   Psychiatric: He has a normal mood and affect.   Vitals reviewed.       Results Review:    I have reviewed all clinical data, test, lab, and imaging results.     Radiology  No Radiology Exams Resulted Within Past 24 Hours    Cardiology    Laboratory    Results from  last 7 days   Lab Units 05/01/20  0455 04/30/20  0548 04/29/20  0544 04/28/20  0632 04/27/20  0208 04/26/20 1939   WBC 10*3/mm3 10.80 13.40* 19.00* 18.10* 20.30* 19.30*   HEMOGLOBIN g/dL 9.9* 10.1* 11.2* 11.2* 11.8* 13.3   HEMATOCRIT % 28.5* 30.1* 33.9* 34.3* 33.7* 39.7   PLATELETS 10*3/mm3 312 330 332 296 348 405     Results from last 7 days   Lab Units 05/01/20  1551 05/01/20  0455 04/30/20  1109 04/30/20  0548 04/29/20  0544 04/28/20  0632 04/27/20  0208 04/26/20 1939   SODIUM mmol/L  --  133* 133* 129* 129* 129* 132* 131*   POTASSIUM mmol/L 3.7 3.2* 3.4* 3.3* 3.9 3.7 3.6 3.4*   CHLORIDE mmol/L  --  98 96* 96* 94* 90* 90* 87*   CO2 mmol/L  --  27.0 27.0 25.0 22.0 26.0 27.0 26.0   BUN mg/dL  --  18 31* 31* 32* 31* 18 16   CREATININE mg/dL  --  0.67* 0.92 0.85 1.02 1.26 0.76 0.96   GLUCOSE mg/dL  --  217* 327* 383* 381* 305* 294* 290*   ALBUMIN g/dL  --  2.00*  --   --  1.90*  --  2.40* 3.10*   BILIRUBIN mg/dL  --  0.3  --   --  0.5  --  0.9 1.1   ALK PHOS U/L  --  106  --   --  139*  --  139* 173*   AST (SGOT) U/L  --  33  --   --  14  --  21 24   ALT (SGPT) U/L  --  20  --   --  20  --  26 31   LIPASE U/L  --   --   --   --   --   --   --  13   CALCIUM mg/dL  --  7.8* 8.4* 7.9* 8.1* 8.3* 8.6 9.3                 Microbiology   Microbiology Results (last 10 days)     Procedure Component Value - Date/Time    Anaerobic Culture - Tissue, Foot, Right [[id number]] Collected:  04/28/20 1025    Lab Status:  Preliminary result Specimen:  Tissue from Foot, Right Updated:  05/01/20 1445     Anaerobic Culture Screening for Anaerobes    Narrative:       CO2 = Growth Detected 04/29/20 06:54 Edilia May      Tissue / Bone Culture - Tissue, Foot, Right [[id number]]  (Abnormal) Collected:  04/28/20 1025    Lab Status:  Final result Specimen:  Tissue from Foot, Right Updated:  04/30/20 0906     Tissue Culture Light growth (2+) Streptococcus agalactiae (Group B)     Comment: This organism is considered to be universally  susceptible to penicillin.  No further antibiotic testing will be performed. If Clindamycin or Erythromycin is the drug of choice, notify the laboratory within 7 days to request susceptibility testing.         Rare Normal Skin Juliet     Gram Stain Few (2+) WBCs per low power field      Many (4+) Mixed bacterial morphotypes seen on Gram Stain    AFB Culture - Tissue, Foot, Right [[id number]] Collected:  04/28/20 1025    Lab Status:  Preliminary result Specimen:  Tissue from Foot, Right Updated:  04/29/20 0904     AFB Stain No acid fast bacilli seen    Blood Culture - Blood, Hand, Right [[id number]] Collected:  04/27/20 2005    Lab Status:  Preliminary result Specimen:  Blood from Hand, Right Updated:  04/30/20 2015     Blood Culture No growth at 3 days    Blood Culture - Blood, Wrist, Right [[id number]] Collected:  04/27/20 2005    Lab Status:  Preliminary result Specimen:  Blood from Wrist, Right Updated:  04/30/20 2015     Blood Culture No growth at 3 days    Respiratory Panel, PCR - Swab, Nasopharynx [[id number]]  (Normal) Collected:  04/26/20 2032    Lab Status:  Final result Specimen:  Swab from Nasopharynx Updated:  04/26/20 2155     ADENOVIRUS, PCR Not Detected     Coronavirus 229E Not Detected     Coronavirus HKU1 Not Detected     Coronavirus NL63 Not Detected     Coronavirus OC43 Not Detected     Human Metapneumovirus Not Detected     Human Rhinovirus/Enterovirus Not Detected     Influenza B PCR Not Detected     Parainfluenza Virus 1 Not Detected     Parainfluenza Virus 2 Not Detected     Parainfluenza Virus 3 Not Detected     Parainfluenza Virus 4 Not Detected     Bordetella pertussis pcr Not Detected     Influenza A H1 2009 PCR Not Detected     Chlamydophila pneumoniae PCR Not Detected     Mycoplasma pneumo by PCR Not Detected     Influenza A PCR Not Detected     Influenza A H3 Not Detected     Influenza A H1 Not Detected     RSV, PCR Not Detected    Narrative:       The coronavirus on the RVP is NOT COVID-19  and is NOT indicative of infection with COVID-19.     Rapid Strep A Screen - Swab, Throat [[id number]]  (Normal) Collected:  04/26/20 2032    Lab Status:  Final result Specimen:  Swab from Throat Updated:  04/26/20 2049     Strep A Ag Negative    Wound Culture - Swab, Foot, Left [[id number]]  (Abnormal) Collected:  04/26/20 2032    Lab Status:  Final result Specimen:  Swab from Foot, Left Updated:  04/28/20 0719     Wound Culture Moderate growth (3+) Streptococcus agalactiae (Group B)     Comment: This organism is considered to be universally susceptible to penicillin.  No further antibiotic testing will be performed. If Clindamycin or Erythromycin is the drug of choice, notify the laboratory within 7 days to request susceptibility testing.         Light growth (2+) Normal Skin Juliet     Gram Stain Few (2+) WBCs seen      Few (2+) Gram positive cocci      Rare (1+) Gram negative bacilli    SARS-CoV-2 PCR ([place] IN-HOUSE PERFORMED), NP SWAB IN TRANSPORT MEDIA - Swab, Nasopharynx [[id number]]  (Normal) Collected:  04/26/20 2032    Lab Status:  Final result Specimen:  Swab from Nasopharynx Updated:  04/27/20 1203     COVID19 Not Detected    Blood Culture - Blood, Hand, Left [[id number]]  (Abnormal) Collected:  04/26/20 2004    Lab Status:  Final result Specimen:  Blood from Hand, Left Updated:  04/29/20 0608     Blood Culture Streptococcus agalactiae (Group B)     Comment: Refer to previous blood culture collected on 4/26/2020 at 1939 for KAILA's.          Gram Stain Aerobic Bottle Gram positive cocci in chains      Anaerobic Bottle Gram positive cocci in chains    Blood Culture - Blood, Arm, Right [[id number]]  (Abnormal)  (Susceptibility) Collected:  04/26/20 1939    Lab Status:  Final result Specimen:  Blood from Arm, Right Updated:  04/29/20 0607     Blood Culture Streptococcus agalactiae (Group B)     Gram Stain Anaerobic Bottle Gram positive cocci in chains    Susceptibility      Streptococcus agalactiae (Group B)      KAILA     Ceftriaxone Susceptible     Clindamycin Susceptible     Levofloxacin Susceptible     Penicillin G Susceptible     Vancomycin Susceptible                    Blood Culture ID, PCR - Blood, Arm, Right [[id number]]  (Abnormal) Collected:  04/26/20 1939    Lab Status:  Final result Specimen:  Blood from Arm, Right Updated:  04/27/20 1035     BCID, PCR Streptococcus agalactiae (Group B). Identification by BCID PCR.     BOTTLE TYPE Anaerobic Bottle          Medication Review:       Schedule Meds    aspirin 325 mg Oral Daily   cefTRIAXone 2 g Intravenous Q24H   insulin glargine 25 Units Subcutaneous Nightly   insulin lispro 0-9 Units Subcutaneous TID AC   insulin lispro 10 Units Subcutaneous TID With Meals   metoclopramide 10 mg Intravenous 4x Daily AC & at Bedtime   nitroglycerin 0.5 inch Topical Q6H   polyethylene glycol 17 g Oral Daily   vancomycin 1,750 mg Intravenous Q12H       Infusion Meds    Pharmacy to dose vancomycin     sodium chloride 75 mL/hr Last Rate: 75 mL/hr (04/30/20 2232)       PRN Meds  •  acetaminophen  •  dextrose  •  dextrose  •  glucagon (human recombinant)  •  HYDROcodone-acetaminophen  •  HYDROcodone-acetaminophen  •  ibuprofen  •  insulin lispro **AND** insulin lispro  •  magnesium hydroxide  •  magnesium sulfate **OR** magnesium sulfate **OR** magnesium sulfate  •  melatonin  •  Morphine **AND** naloxone  •  ondansetron **OR** ondansetron  •  Pharmacy to dose vancomycin  •  potassium & sodium phosphates **OR** potassium & sodium phosphates  •  potassium chloride  •  potassium chloride  •  sodium chloride        Assessment/Plan       Antimicrobial Therapy   1.        day  2.        day  3.  IV Rocephin    day  4.      Day  5.      Day      Assessment     Bacteremia-cultures are growing group B streptococcus  -Likely source is right foot wound     Right diabetic foot wound on both the lateral aspect of the foot by the great toe and the plantar aspect by the fifth toe  -MRI shows  osteomyelitis throughout the entire right foot  -Cultures are growing group B streptococcus  -Right partial first partial ray amputation on 2/27/2020-cultures from admission were negative but previous cultures grew methicillin-resistant Staphylococcus aureus, group B streptococcus and Prevotella species.   -Patient had 6 weeks of IV daptomycin IV Rocephin and p.o. Flagyl from the amputation date and was finished on 4/9/2020  -Patient states that he had fallen several times and is hit his right foot which is because the current wound  -4/28/2020-patient had a right TMA but the infection was extending up into his lower leg  The patient eventually underwent right below-knee amputation    Patient admitted with complaints of shortness of breath, body aches, nausea, vomiting  -COVID 19 screen was negative and chest CT was not indicative of a COVID infection   -Patient is on room air     Fever-101.3 degrees-likely related to bacteremia  -Resolving     Recent left big toe amputation secondary to left diabetic foot.  Wound healed completely     Type 1 diabetes with neuropathy     CAD, history of MI with cardiac catheterization           Plan     Continue Rocephin 2 g every 24 hours-patient will need 2 weeks total treatment for the bacteremia  Okay to continue IV vancomycin for short course, 3 days after surgery  Patient will need a midline before discharge-please remove when IV antibiotics are completed  Weekly labs CBC/creatinine/sed rate  Continue supportive care  OK to discharge when IV antibiotics are arranged    Please fax all post discharge lab results, imaging studies and correspondence to this fax number [phone]  For any question or concern please contact our service number [phone]    KAMILLE Molina  05/01/20  19:13     Note is dictated utilizing voice recognition software/Dragon

## 2020-05-01 NOTE — PLAN OF CARE
Problem: Patient Care Overview  Goal: Plan of Care Review  Outcome: Ongoing (interventions implemented as appropriate)  Flowsheets (Taken 5/1/2020 5156)  Plan of Care Reviewed With: patient  Outcome Summary: 46 yo male seen POD1 s/p R bka. Pt did not do well using cxs, as he thought he would. However, did well using rw for amb over level surfaces. Was able to amb 25 ft w/ cga using rw. Safe for home w/ home health when medically stable. Will follow daily for skilled PT. PPE worn: gloves, mask, face shield.

## 2020-05-01 NOTE — PLAN OF CARE
Problem: Patient Care Overview  Goal: Plan of Care Review  Flowsheets (Taken 5/1/2020 0132)  Plan of Care Reviewed With: patient  Note:   Pt has been given PRN pain medication about every two hours with relief. Pt tends to get demanding at times, will continue to monitor closely.

## 2020-05-01 NOTE — PROGRESS NOTES
"      [facility] Medicine Services Daily Progress Note      Hospitalist Team  LOS 5 days      Patient Care Team:  Fred Lemons FNP as PCP - General (Family Medicine)    Patient Location: 269/1      Subjective   Subjective     Chief Complaint / Subjective  Chief Complaint   Patient presents with   • Shortness of Breath         Brief Synopsis of Hospital Course/HPI    The patient is a 45 y.o. male with a history of hypertension, IDDM, CAD and right foot ulcer status post partial first metatarsal ray amputation 2/27/2020.  He was recently discharged on 2/29/2020 with 6 weeks course of Rocephin's/daptomycin/Flagyl.    The patient presented to the ED on 4/26/2020 complaining of 3 days of nausea, vomiting and body aches.  He had presented to his PCP on 4/24/2020 and influenza and strep throat were ruled out.  COVID-19 test was done but ended up coming to the emergency room because of intractable symptoms of vomiting.  He also complains of increased erythema of his right foot.    Date::    4/27/20: Examined in the morning 4/27/2020.  Afebrile. COVID19 ruled out.  4/28/20: ID and podiatry following.  Planned I&Dand TMA.  4/29/20: pain controlled.  Consulted endocrinology  4/30/20: s/p right BKA. Afebrile  5/1/20: Pain controlled.    Review of Systems   All other systems reviewed and are negative.        Objective   Objective      Vital Signs  Temp:  [97.4 °F (36.3 °C)-98.3 °F (36.8 °C)] 98.3 °F (36.8 °C)  Heart Rate:  [72-87] 87  Resp:  [14-17] 16  BP: ()/(48-73) 142/72  Oxygen Therapy  SpO2: 93 %  Pulse Oximetry Type: Intermittent  Device (Oxygen Therapy): room air  Device (Oxygen Therapy): room air  Flow (L/min): 6  Flowsheet Rows      First Filed Value   Admission Height  190.5 cm (75\") Documented at 04/26/2020 1907   Admission Weight  94.3 kg (208 lb) Documented at 04/26/2020 1907        Intake & Output (last 3 days)       04/28 0701 - 04/29 0700 04/29 0701 - 04/30 0700 04/30 0701 - 05/01 " 0700 05/01 0701 - 05/02 0700    P.O. 222 720 480     I.V. (mL/kg) 900 (9.1) 4055 (40.1) 1784 (17)     IV Piggyback  500 1200     Total Intake(mL/kg) 1122 (11.3) 5275 (52.2) 3464 (33)     Urine (mL/kg/hr) 950 (0.4) 1475 (0.6) 1350 (0.5)     Stool  0      Blood 50       Total Output 1000 1475 1350     Net +122 +3800 +2114             Stool Unmeasured Occurrence  1 x          Lines, Drains & Airways    Active LDAs     Name:   Placement date:   Placement time:   Site:   Days:    Peripheral IV 04/27/20 0830 Left;Posterior Hand   04/27/20    0830    Hand   less than 1                  Physical Exam:    Physical Exam   Constitutional: He is oriented to person, place, and time. He is cooperative.   Appears older than stated age.   HENT:   Head: Normocephalic.   Mouth/Throat: Oropharynx is clear and moist and mucous membranes are normal.   Eyes: Pupils are equal, round, and reactive to light. Conjunctivae and EOM are normal.   Neck: Trachea normal and full passive range of motion without pain.   Cardiovascular: Normal rate and regular rhythm.   Pulmonary/Chest: Effort normal and breath sounds normal.   Abdominal: Bowel sounds are normal. There is no tenderness.   Musculoskeletal:   Right BKA        Lymphadenopathy:     He has no cervical adenopathy.   Neurological: He is alert and oriented to person, place, and time. No cranial nerve deficit.   Skin: Skin is warm and dry. No rash noted.   Psychiatric: He has a normal mood and affect. His speech is normal. Cognition and memory are normal.            Wounds (last 24 hours)      LDA Wound     Row Name 05/01/20 1233 05/01/20 0801 05/01/20 0355       Wound 01/18/20 2244 Right heel Diabetic Ulcer    Wound - Properties Group Date first assessed: 01/18/20  - Time first assessed: 2244  -SS Present on Hospital Admission: Y  -SS Side: Right  -SS Location: heel  -SS Primary Wound Type: Diabetic [facility]  - Stage, Pressure Injury: unstageable  -SS Wound Outcome: Amputation  -MR        Wound Left foot Incision    Wound - Properties Group Side: Left  -HL Location: foot  -HL Primary Wound Type: Incision  -HL       Wound Right anterior;lower leg Amputation stump    Wound - Properties Group Side: Right  -MR Orientation: anterior;lower  -MR Location: leg  -MR Primary Wound Type: Amputation s  -MR Wound Outcome: Amputation  -MR    Dressing Appearance  dry;intact;no drainage  -LS  dry;intact;no drainage  -LS  dry;intact;no drainage  -MR    Closure  DORIS  -LS  DORIS  -LS  DORIS  -MR    Base  dressing in place, unable to visualize  -LS  dressing in place, unable to visualize  -LS  dressing in place, unable to visualize  -MR    Care, Wound  --  -- not be changed right now per surgeon  -LS  --       NPWT (Negative Pressure Wound Therapy) 01/22/20 1400 right foot    NPWT (Negative Pressure Wound Therapy) - Properties Group Placement Date: 01/22/20  -MB Placement Time: 1400  -MB Location: right foot  -MB    Row Name 04/30/20 2332 04/30/20 2017 04/30/20 1548       [REMOVED] Wound 02/28/20 0941 Right anterior foot Incision    Wound - Properties Group Date first assessed: 02/28/20  -AL Time first assessed: 0941  -AL Present on Hospital Admission: N  -AL Side: Right  -AL Orientation: anterior  -AL Location: foot  -AL Primary Wound Type: Incision  -AL Resolution Date: 04/30/20  -MR Resolution Time: 2326 -MR Wound Outcome: Amputation  -MR       Wound 01/18/20 2244 Right heel Diabetic Ulcer    Wound - Properties Group Date first assessed: 01/18/20  -SS Time first assessed: 2244  -SS Present on Hospital Admission: Y  -SS Side: Right  -SS Location: heel  -SS Primary Wound Type: Diabetic ulc  -SS Stage, Pressure Injury: unstageable  -SS Wound Outcome: Amputation  -MR       Wound Left foot Incision    Wound - Properties Group Side: Left  -HL Location: foot  -HL Primary Wound Type: Incision  -HL       [REMOVED] Wound 04/28/20 1037 Right anterior foot Incision    Wound - Properties Group Date first assessed: 04/28/20  -MK Time  first assessed: 1037  -MK Side: Right  -MK Orientation: anterior  -MK Location: foot  -MK Primary Wound Type: Incision  -MK Resolution Date: 04/30/20  -MR Wound Outcome: Amputation  -MR    Dressing Appearance  --  --  intact  -CL    Closure  --  --  DORIS  -CL    Base  --  --  dressing in place, unable to visualize  -CL    Drainage Amount  --  --  none  -CL       [REMOVED] Wound 04/30/20 0916 Right anterior;lower;proximal leg Incision    Wound - Properties Group Date first assessed: 04/30/20  -SP Time first assessed: 0916  -SP Present on Hospital Admission: N  -SP Side: Right  -SP Orientation: anterior;lower;proximal  -SP Location: leg  -SP Primary Wound Type: Incision  -SP Resolution Date: 04/30/20  -MR Resolution Time: 0930 -MR Wound Outcome: Amputation  -MR    Dressing Appearance  --  --  dry;intact  -CL    Closure  --  --  DORIS  -CL    Drainage Amount  --  --  none  -CL       Wound Right anterior;lower leg Amputation stump    Wound - Properties Group Side: Right  -MR Orientation: anterior;lower  -MR Location: leg  -MR Primary Wound Type: Amputation s  -MR Wound Outcome: Amputation  -MR    Dressing Appearance  dry;intact;no drainage  -MR  dry;intact;no drainage  -MR  --    Closure  DORIS  -MR  DORIS  -MR  --    Base  dressing in place, unable to visualize  -MR  dressing in place, unable to visualize  -MR  --       NPWT (Negative Pressure Wound Therapy) 01/22/20 1400 right foot    NPWT (Negative Pressure Wound Therapy) - Properties Group Placement Date: 01/22/20  -MB Placement Time: 1400  -MB Location: right foot  -MB      User Key  (r) = Recorded By, (t) = Taken By, (c) = Cosigned By    Initials Name Provider Type    Jess Macedo RN Registered Nurse    Rebeca Ying RN Registered Nurse    Jacqui Garcia RN Registered Nurse    Ami Kim RN Registered Nurse    Angelica Tatum RN Registered Nurse    Bryanna Albarran, RIGON Licensed Nurse    Becki Sheets, RN Registered Nurse    SP  "Suzan Batres, RNFA Registered Nurse    CL Ramona Cifuentes RN Registered Nurse          Procedures:    Procedure(s):  incision and drain right foot          Results Review:     I reviewed the patient's new clinical results.      Lab Results (last 24 hours)     Procedure Component Value Units Date/Time    Anaerobic Culture - Tissue, Foot, Right [[id number]] Collected:  04/28/20 1025    Specimen:  Tissue from Foot, Right Updated:  05/01/20 1326     Anaerobic Culture Screening for Anaerobes    Narrative:       CO2 = Growth Detected 04/29/20 06:54 Edilia May      POC Glucose Once [[id number]]  (Abnormal) Collected:  05/01/20 1152    Specimen:  Blood Updated:  05/01/20 1154     Glucose 225 mg/dL      Comment: Serial Number: [id number]:  [id number]       Tissue Pathology Exam [[id number]] Collected:  04/30/20 0858    Specimen:  Tissue from Leg, Right Updated:  05/01/20 1143     Case Report --     Surgical Pathology Report                         Case: [id number]                                  Authorizing Provider:  Donn Alcantara MD         Collected:           04/30/2020 08:58 AM          Ordering Location:     [facility] MAIN  Received:            04/30/2020 10:48 AM                                 OR                                                                           Pathologist:           Wm Flynn MD                                                            Specimen:    Leg, Right, right lower leg                                                                 Final Diagnosis --     Lower extremity, right, below-knee amputation:    Skin ulceration, soft tissue ischemic changes, and gangrenous necrosis    Severe atherosclerotic changes of small vessels    Skin, soft tissue and bone resection margin grossly viable    MONICA/sms        Gross Description --     Received without fixative in a red biohazard bag labeled \"Right lower leg\" is a right lower extremity amputated below the " knee which includes a foot where the distal portion including all toes have previously been amputated. The foot measures 16 cm from heel to amputated distal foot stump. It measures 28 cm from heel to amputated skin margin and there is an additional 6 to 7 cm length of tibia and fibula bone extending beyond the soft tissue and skin margin. The skin, soft tissue, muscle, and bone at the margin are all grossly viable. The skin is light pink-tan and hair bearing. Along the distal half of the lower leg is a linear recent incision measuring about 15 cm or so in length which is held together by three black sutures. The soft tissue and muscle here exhibits healing change and representative sections are submitted in A. This incision leads to a large open area at the distal stump measuring 10 x 9 cm. The soft tissues here exhibit granulation tissue and necrotic type change. Representative sections are submitted in B and C. The vessels of the ankle are of small caliber and representative sections are submitted in D. A few representative bits of bone from the distal stump are also included in D.     ARASELI/sms       POC Glucose Once [[id number]]  (Abnormal) Collected:  05/01/20 0744    Specimen:  Blood Updated:  05/01/20 0746     Glucose 181 mg/dL      Comment: Serial Number: [id number]:  [id number]       Comprehensive Metabolic Panel [[id number]]  (Abnormal) Collected:  05/01/20 0455    Specimen:  Blood Updated:  05/01/20 0542     Glucose 217 mg/dL      BUN 18 mg/dL      Creatinine 0.67 mg/dL      Sodium 133 mmol/L      Potassium 3.2 mmol/L      Chloride 98 mmol/L      CO2 27.0 mmol/L      Calcium 7.8 mg/dL      Total Protein 6.2 g/dL      Albumin 2.00 g/dL      ALT (SGPT) 20 U/L      AST (SGOT) 33 U/L      Alkaline Phosphatase 106 U/L      Total Bilirubin 0.3 mg/dL      eGFR Non African Amer 128 mL/min/1.73      Globulin 4.2 gm/dL      A/G Ratio 0.5 g/dL      BUN/Creatinine Ratio 26.9     Anion Gap 8.0 mmol/L     Narrative:        GFR Normal >60  Chronic Kidney Disease <60  Kidney Failure <15      CBC & Differential [[id number]] Collected:  05/01/20 0455    Specimen:  Blood Updated:  05/01/20 0515    Narrative:       The following orders were created for panel order CBC & Differential.  Procedure                               Abnormality         Status                     ---------                               -----------         ------                     CBC Auto Differential[[id number]]        Abnormal            Final result                 Please view results for these tests on the individual orders.    CBC Auto Differential [[id number]]  (Abnormal) Collected:  05/01/20 0455    Specimen:  Blood Updated:  05/01/20 0515     WBC 10.80 10*3/mm3      RBC 3.25 10*6/mm3      Hemoglobin 9.9 g/dL      Hematocrit 28.5 %      MCV 87.4 fL      MCH 30.5 pg      MCHC 34.9 g/dL      RDW 13.6 %      RDW-SD 42.4 fl      MPV 7.8 fL      Platelets 312 10*3/mm3      Neutrophil % 72.0 %      Lymphocyte % 17.0 %      Monocyte % 10.2 %      Eosinophil % 0.5 %      Basophil % 0.3 %      Neutrophils, Absolute 7.80 10*3/mm3      Lymphocytes, Absolute 1.80 10*3/mm3      Monocytes, Absolute 1.10 10*3/mm3      Eosinophils, Absolute 0.10 10*3/mm3      Basophils, Absolute 0.00 10*3/mm3      nRBC 0.1 /100 WBC     Blood Culture - Blood, Hand, Right [[id number]] Collected:  04/27/20 2005    Specimen:  Blood from Hand, Right Updated:  04/30/20 2015     Blood Culture No growth at 3 days    Blood Culture - Blood, Wrist, Right [[id number]] Collected:  04/27/20 2005    Specimen:  Blood from Wrist, Right Updated:  04/30/20 2015     Blood Culture No growth at 3 days    POC Glucose Once [[id number]]  (Abnormal) Collected:  04/30/20 1631    Specimen:  Blood Updated:  04/30/20 1633     Glucose 307 mg/dL      Comment: Serial Number: [id number]:  [id number]           No results found for: HGBA1C            Lab Results   Component Value Date    LIPASE 13 04/26/2020     Lab  Results   Component Value Date    CHOL 113 04/27/2020    TRIG 137 04/27/2020    HDL 25 (L) 04/27/2020    LDL 61 04/27/2020       Lab Results   Lab Value Date/Time    FINALDX  04/30/2020 0858     Lower extremity, right, below-knee amputation:    Skin ulceration, soft tissue ischemic changes, and gangrenous necrosis    Severe atherosclerotic changes of small vessels    Skin, soft tissue and bone resection margin grossly viable    MONICA/sms       FINALDX  04/28/2020 1023     Specimen #1 (Right foot tissue, debridement):    Gangrenous necrosis of soft tissue    Bone with acute osteomyelitis    Specimen #2 (Right lower extremity, distal foot, amputation):    Gangrenous necrosis of soft tissue    Bone with acute osteomyelitis    JPR/ARASELI/sms          Microbiology Results (last 10 days)     Procedure Component Value - Date/Time    Anaerobic Culture - Tissue, Foot, Right [[id number]] Collected:  04/28/20 1025    Lab Status:  Preliminary result Specimen:  Tissue from Foot, Right Updated:  05/01/20 1326     Anaerobic Culture Screening for Anaerobes    Narrative:       CO2 = Growth Detected 04/29/20 06:54 Edilia May      Tissue / Bone Culture - Tissue, Foot, Right [[id number]]  (Abnormal) Collected:  04/28/20 1025    Lab Status:  Final result Specimen:  Tissue from Foot, Right Updated:  04/30/20 0906     Tissue Culture Light growth (2+) Streptococcus agalactiae (Group B)     Comment: This organism is considered to be universally susceptible to penicillin.  No further antibiotic testing will be performed. If Clindamycin or Erythromycin is the drug of choice, notify the laboratory within 7 days to request susceptibility testing.         Rare Normal Skin Juliet     Gram Stain Few (2+) WBCs per low power field      Many (4+) Mixed bacterial morphotypes seen on Gram Stain    AFB Culture - Tissue, Foot, Right [[id number]] Collected:  04/28/20 1025    Lab Status:  Preliminary result Specimen:  Tissue from Foot, Right Updated:   04/29/20 0904     AFB Stain No acid fast bacilli seen    Blood Culture - Blood, Hand, Right [[id number]] Collected:  04/27/20 2005    Lab Status:  Preliminary result Specimen:  Blood from Hand, Right Updated:  04/30/20 2015     Blood Culture No growth at 3 days    Blood Culture - Blood, Wrist, Right [[id number]] Collected:  04/27/20 2005    Lab Status:  Preliminary result Specimen:  Blood from Wrist, Right Updated:  04/30/20 2015     Blood Culture No growth at 3 days    Respiratory Panel, PCR - Swab, Nasopharynx [[id number]]  (Normal) Collected:  04/26/20 2032    Lab Status:  Final result Specimen:  Swab from Nasopharynx Updated:  04/26/20 2155     ADENOVIRUS, PCR Not Detected     Coronavirus 229E Not Detected     Coronavirus HKU1 Not Detected     Coronavirus NL63 Not Detected     Coronavirus OC43 Not Detected     Human Metapneumovirus Not Detected     Human Rhinovirus/Enterovirus Not Detected     Influenza B PCR Not Detected     Parainfluenza Virus 1 Not Detected     Parainfluenza Virus 2 Not Detected     Parainfluenza Virus 3 Not Detected     Parainfluenza Virus 4 Not Detected     Bordetella pertussis pcr Not Detected     Influenza A H1 2009 PCR Not Detected     Chlamydophila pneumoniae PCR Not Detected     Mycoplasma pneumo by PCR Not Detected     Influenza A PCR Not Detected     Influenza A H3 Not Detected     Influenza A H1 Not Detected     RSV, PCR Not Detected    Narrative:       The coronavirus on the RVP is NOT COVID-19 and is NOT indicative of infection with COVID-19.     Rapid Strep A Screen - Swab, Throat [[id number]]  (Normal) Collected:  04/26/20 2032    Lab Status:  Final result Specimen:  Swab from Throat Updated:  04/26/20 2049     Strep A Ag Negative    Wound Culture - Swab, Foot, Left [[id number]]  (Abnormal) Collected:  04/26/20 2032    Lab Status:  Final result Specimen:  Swab from Foot, Left Updated:  04/28/20 0719     Wound Culture Moderate growth (3+) Streptococcus agalactiae (Group B)      Comment: This organism is considered to be universally susceptible to penicillin.  No further antibiotic testing will be performed. If Clindamycin or Erythromycin is the drug of choice, notify the laboratory within 7 days to request susceptibility testing.         Light growth (2+) Normal Skin Juliet     Gram Stain Few (2+) WBCs seen      Few (2+) Gram positive cocci      Rare (1+) Gram negative bacilli    SARS-CoV-2 PCR ([facility] IN-HOUSE PERFORMED), NP SWAB IN TRANSPORT MEDIA - Swab, Nasopharynx [[id number]]  (Normal) Collected:  04/26/20 2032    Lab Status:  Final result Specimen:  Swab from Nasopharynx Updated:  04/27/20 1203     COVID19 Not Detected    Blood Culture - Blood, Hand, Left [[id number]]  (Abnormal) Collected:  04/26/20 2004    Lab Status:  Final result Specimen:  Blood from Hand, Left Updated:  04/29/20 0608     Blood Culture Streptococcus agalactiae (Group B)     Comment: Refer to previous blood culture collected on 4/26/2020 at 1939 for KAILA's.          Gram Stain Aerobic Bottle Gram positive cocci in chains      Anaerobic Bottle Gram positive cocci in chains    Blood Culture - Blood, Arm, Right [[id number]]  (Abnormal)  (Susceptibility) Collected:  04/26/20 1939    Lab Status:  Final result Specimen:  Blood from Arm, Right Updated:  04/29/20 0607     Blood Culture Streptococcus agalactiae (Group B)     Gram Stain Anaerobic Bottle Gram positive cocci in chains    Susceptibility      Streptococcus agalactiae (Group B)     KAILA     Ceftriaxone Susceptible     Clindamycin Susceptible     Levofloxacin Susceptible     Penicillin G Susceptible     Vancomycin Susceptible                    Blood Culture ID, PCR - Blood, Arm, Right [[id number]]  (Abnormal) Collected:  04/26/20 1939    Lab Status:  Final result Specimen:  Blood from Arm, Right Updated:  04/27/20 1035     BCID, PCR Streptococcus agalactiae (Group B). Identification by BCID PCR.     BOTTLE TYPE Anaerobic Bottle          ECG/EMG Results (most  recent)     Procedure Component Value Units Date/Time    ECG 12 Lead [[id number]] Collected:  04/26/20 1910     Updated:  04/27/20 0831    Narrative:       HEART RATE= 126  bpm  RR Interval= 476  ms  [place] Interval= 134  ms  P Horizontal Axis= -12  deg  P Front Axis= 61  deg  QRSD Interval= 77  ms  QT Interval= 336  ms  QRS Axis= 38  deg  T Wave Axis= 32  deg  - OTHERWISE NORMAL ECG -  Sinus tachycardia  When compared with ECG of 24-Jan-2020 12:16:18,  Significant rate increase  Electronically Signed By: iRcki Mendoza (IRA) 27-Apr-2020 08:30:58  Date and Time of Study: 2020-04-26 19:10:46               Results for orders placed during the hospital encounter of 01/18/20   Adult Transthoracic Echo Complete W/ Cont if Necessary Per Protocol    Narrative · Left ventricular systolic function is normal.  · Mild mitral valve regurgitation is present  · Mild tricuspid valve regurgitation is present.  · Ejection fraction is about 65%  · No pericardial effusion noted          Xr Tibia Fibula 2 View Right    Result Date: 4/30/2020  1. Expected postoperative appearance status post right below the knee amputation.  Electronically Signed By-Magno Aagrwal On:4/30/2020 11:54 AM This report was finalized on [id number] by  Magno Agarwal, .    Xr Foot 3+ View Right    Result Date: 4/26/2020   1. Previous amputation procedure the right great toe and most of the first metatarsal bone. 2. Questionable osteomyelitis at the amputation site of the distal first metatarsal bone and also at the site of a slightly displaced oblique fracture through the base of the fifth metatarsal bone. There is soft tissue gas consistent with wounds at both locations which appear to extend to the underlying bone. 3. There is a healing nondisplaced fracture through the head of the second metatarsal bone.  Electronically Signed By-Gary Marie On:4/26/2020 7:53 PM This report was finalized on [id number] by  Gary Marie, .    Ct Chest Pulmonary  Embolism    Result Date: 4/26/2020  No CTA evidence of pulmonary embolism or acute aortic pathology. No acute cardiopulmonary process seen. Sequela of prior granulomatous disease. Electronically signed by:  Keiko Ann M.D.  4/26/2020 8:44 PM    Mri Foot Right With & Without Contrast    Result Date: 4/27/2020   1. Postoperative changes from amputation of the great toe at the level of mid metatarsal with abundant surrounding callus formation. There is T1 marrow placement, bone marrow edema like signal, and enhancement throughout the first metatarsal, consistent with osteomyelitis. 2. Soft tissue wound at the lateral midfoot/forefoot extending to the nondisplaced fracture at the base of the fifth metatarsal. T1 marrow placement, bone marrow edema like signal, and enhancement throughout the fifth metatarsal is consistent with osteomyelitis. 3. Irregular fluid collection extending from the soft tissue wound at the lateral midfoot/forefoot and surrounding the second through fifth MTP joints, suspicious for abscess. 4. Nondisplaced fracture of the second metatarsal head with suspected osteomyelitis of the second metatarsal and possible second MTP joint septic arthritis. 5. Patchy bone marrow edema like signal and enhancement multifocal areas of suspected marrow placement in the anterior process of the calcaneus, cuboid, cuneiforms, and the bases of the second, third, and fourth metatarsals, which could represent early osteomyelitis or stress marrow changes. 6. Diffuse soft tissue and muscular edema enhancement, consistent with cellulitis and myositis.    Electronically Signed By-Delbert Dinero On:4/27/2020 6:30 PM This report was finalized on [id number] by  Delbert Dinero, .    Xr Chest Ap    Result Date: 4/26/2020  No active disease.  Electronically Signed By-Gary Marie On:4/26/2020 7:53 PM This report was finalized on [id number] by  Gary Marie, .          Xrays, labs reviewed personally by  physician.    Medication Review:   I have reviewed the patient's current medication list      Scheduled Meds    aspirin 325 mg Oral Daily   cefTRIAXone 2 g Intravenous Q24H   insulin glargine 25 Units Subcutaneous Nightly   insulin lispro 0-9 Units Subcutaneous TID AC   insulin lispro 10 Units Subcutaneous TID With Meals   metoclopramide 10 mg Intravenous 4x Daily AC & at Bedtime   nitroglycerin 0.5 inch Topical Q6H   polyethylene glycol 17 g Oral Daily   vancomycin 1,750 mg Intravenous Q12H       Meds Infusions    Pharmacy to dose vancomycin     sodium chloride 75 mL/hr Last Rate: 75 mL/hr (04/30/20 2232)       Meds PRN  •  acetaminophen  •  dextrose  •  dextrose  •  glucagon (human recombinant)  •  HYDROcodone-acetaminophen  •  HYDROcodone-acetaminophen  •  ibuprofen  •  insulin lispro **AND** insulin lispro  •  magnesium hydroxide  •  magnesium sulfate **OR** magnesium sulfate **OR** magnesium sulfate  •  melatonin  •  Morphine **AND** naloxone  •  ondansetron **OR** ondansetron  •  Pharmacy to dose vancomycin  •  potassium & sodium phosphates **OR** potassium & sodium phosphates  •  potassium chloride  •  potassium chloride  •  sodium chloride      Assessment/Plan   Assessment/Plan     Active Hospital Problems    Diagnosis  POA   • **Sepsis, Gram positive (CMS/[facility]ly [facility]) [A41.89]  Yes     Priority: Medium   • Chest pain [R07.9]  Yes     Priority: High   • Suspected Covid-19 Virus Infection [R68.89]  Yes     Priority: Medium   • Osteomyelitis of right foot (CMS/[facility]) [M86.9]  Yes     Priority: Medium   • Dyspnea [R06.00]  Yes     Priority: Medium   • CAD (coronary artery disease) [I25.10]  Yes     Priority: Medium   • Chronic ulcer of right foot, with necrosis of bone (CMS/[facility]) [L97.514]  Unknown     Priority: Medium   • Diabetic peripheral neuropathy (CMS/[facility]) [E11.42]  Yes     Priority: Medium   • Type 1 diabetes mellitus with circulatory complication (CMS/[facility]) [E10.59]  Yes     Priority: Medium   • Nausea and  vomiting [R11.2]  Yes      Resolved Hospital Problems   No resolved problems to display.       MEDICAL DECISION MAKING COMPLEXITY BY PROBLEM:     Sepsis:  -Likely from right foot infection  -s/p right TMA 4/28/20  -s/p right BKA 4/30/20  -2/2 blood culture bottles with group B strep  -Continue vancomycin and rocephin  -Repeat blood cultures x2  -Consulted ID known to patient  -Orthopedics consulted for right BKA    Chest pain:  -Cardiac enzymes trended  -Likely noncardiac chest pain  -Patient with known CAD with 2 stents (2010)     Dyspnea:  -Resolved  -COVID 19 ruled out 4/27/2020  -C-reactive protein 30.35, Procalcitonin 0.68  -D-dimer 5.31--> CTA of chest--> no PE    Chronic osteomyelitis of the right foot:  -wound culture 1/20/2020-->MRSA, strep B, Prevotella--> treated with Rocephin/daptomycin/Flagyl  -s/p partial ray amputation 2/27/2020--> tissue/bone culture no growth  -DC on 2/29/2020 on Rocephin/daptomycin/Flagyl x6 weeks  -Patient last saw Dr. Souza 4/9/2020  -s/p right TMA 4/28/20  -Orthopedics consulted for right BKA      Diabetes type 1  -Continue long-acting home dose  -Accu-Cheks AC  -Sliding scale insulin as needed         VTE Prophylaxis -   Mechanical Order History:     None      Pharmalogical Order History:     Ordered     Dose Route Frequency Stop    04/27/20 0851  enoxaparin (LOVENOX) syringe 40 mg      40 mg SC Nightly --    04/26/20 2320  enoxaparin (LOVENOX) syringe 40 mg  Status:  Discontinued      40 mg SC Daily 04/27/20 0851            Code Status -   Code Status and Medical Interventions:   Ordered at: 04/30/20 1056     Level Of Support Discussed With:    Patient     Code Status:    CPR     Medical Interventions (Level of Support Prior to Arrest):    Full       This patient has been examined wearing appropriate Personal Protective Equipment and discussed with nursing. 05/01/20        Discharge Planning          Destination      Coordination has not been started for this encounter.       Durable Medical Equipment      Coordination has not been started for this encounter.      Dialysis/Infusion      Coordination has not been started for this encounter.      Home Medical Care      Coordination has not been started for this encounter.      Therapy      Coordination has not been started for this encounter.      Community Resources      Coordination has not been started for this encounter.            Electronically signed by Clay Fonseca DO, 05/01/20, 13:32.  [facility]ist Team

## 2020-05-01 NOTE — PROGRESS NOTES
Continued Stay Note   [facility]     Patient Name: Maynor Gregg  MRN: [id number]  Today's Date: 5/1/2020    Admit Date: 4/26/2020    Discharge Plan     Row Name 05/01/20 1347       Plan    Plan  Needs IV Rocephin 2 gm Q 24 x 2 weeks total. Pt requested [facility] for home infusion. CM to arrange.     Patient/Family in Agreement with Plan  yes    Plan Comments  SW working remotely due to COVID-19 pandemic precautions. SW called and spoke w/ pt over the phone regarding potential needs at d/c. Pt is aware that he will need 2 weeks of IV Abx at d/c. SW discussed the possibility of inpt rehab placement due to new BKA and IV Abx with pt, but pt does not want to go to inpatient rehab due to needing to care for his 9-year-old son. (Pt's son is currently staying with his grandparents). Pt requested to d/c home w/ home infusion services provided by [facility]. Pt has crutches and a shower chair at home, and plans to order a walker through [employer]. SW offered to assist with DME set up through pt's insurance provider, but pt declined. Pt denied any further needs at this time. SW notified CM (Angelique) of pt's preferences for home infusion services.         Sandra Rutledge, TLW, LSW  PRN   Phone: [phone]

## 2020-05-01 NOTE — PROGRESS NOTES
Continued Stay Note   [facility]     Patient Name: Maynor Gregg  MRN: [id number]  Today's Date: 5/1/2020    Admit Date: 4/26/2020    Discharge Plan     Row Name 05/01/20 1347       Plan    Plan  Needs IV Rocephin 2 gm Q 24 x 2 weeks total. Pt requested [facility] for home infusion. CM to arrange.     Patient/Family in Agreement with Plan  yes    Plan Comments  SW working remotely due to COVID-19 pandemic precautions. SW called and spoke w/ pt over the phone regarding potential needs at d/c. Pt is aware that he will need 2 weeks of IV Abx at d/c. SW discussed the possibility of inpt rehab placement due to new BKA and IV Abx with pt, but pt does not want to go to inpatient rehab due to needing to care for his 9-year-old son. (Pt's son is currently staying with his grandparents). Pt requested to d/c home w/ home infusion services provided by [facility] and [facility]. Pt has crutches and a shower chair at home, and plans to order a walker through [employer]. SW offered to assist with DME set up through pt's insurance provider, but pt declined. Pt denied any further needs at this time. SW notified CM (Angelique) of pt's preferences for home infusion services.       Informed per RASHAWN that patient does not want to go to rehab at [place]. PT recommends [facility]. Patient will need IV Rocephin 2 gm every 24 hours for a total of 2 weeks. [facility] as he has used them in the past. Called Gita from [facility] and patient information given. She will contact infusion company. They will determine if/what cost is to patient and discuss with him.Order placed and information sent per Epic.    Expected Discharge Date and Time     Expected Discharge Date Expected Discharge Time    Apr 30, 2020    ,         ALESSIA Aguirre RN, CM  Office Phone [phone]  Cell [phone]

## 2020-05-01 NOTE — PROGRESS NOTES
LOS: 5 days   Patient Care Team:  Fred Lemons FNP as PCP - General (Family Medicine)        Subjective     High pain status post right BKA April 30      Objective     Vital Signs  Vitals:    05/01/20 0500 05/01/20 0600 05/01/20 0613 05/01/20 0700   BP:   112/63    BP Location:   Right arm    Patient Position:   Lying    Pulse: 77 74 82 80   Resp:   17    Temp:   97.5 °F (36.4 °C)    TempSrc:   Oral    SpO2: 91% 95% 97% 94%   Weight:   105 kg (232 lb 5.8 oz)    Height:           Physical Exam:   Alert and oriented x3  Dressing clean dry and intact  Able to flex and extend knee  X-ray looks good of the tibia     Results Review:     I reviewed the patient's new clinical results.  I reviewed the patient's new imaging results    Lab Results (last 24 hours)     Procedure Component Value Units Date/Time    Comprehensive Metabolic Panel [[id number]]  (Abnormal) Collected:  05/01/20 0455    Specimen:  Blood Updated:  05/01/20 0542     Glucose 217 mg/dL      BUN 18 mg/dL      Creatinine 0.67 mg/dL      Sodium 133 mmol/L      Potassium 3.2 mmol/L      Chloride 98 mmol/L      CO2 27.0 mmol/L      Calcium 7.8 mg/dL      Total Protein 6.2 g/dL      Albumin 2.00 g/dL      ALT (SGPT) 20 U/L      AST (SGOT) 33 U/L      Alkaline Phosphatase 106 U/L      Total Bilirubin 0.3 mg/dL      eGFR Non African Amer 128 mL/min/1.73      Globulin 4.2 gm/dL      A/G Ratio 0.5 g/dL      BUN/Creatinine Ratio 26.9     Anion Gap 8.0 mmol/L     Narrative:       GFR Normal >60  Chronic Kidney Disease <60  Kidney Failure <15      CBC & Differential [[id number]] Collected:  05/01/20 0455    Specimen:  Blood Updated:  05/01/20 0515    Narrative:       The following orders were created for panel order CBC & Differential.  Procedure                               Abnormality         Status                     ---------                               -----------         ------                     CBC Auto Differential[[id number]]        Abnormal             Final result                 Please view results for these tests on the individual orders.    CBC Auto Differential [[id number]]  (Abnormal) Collected:  05/01/20 0455    Specimen:  Blood Updated:  05/01/20 0515     WBC 10.80 10*3/mm3      RBC 3.25 10*6/mm3      Hemoglobin 9.9 g/dL      Hematocrit 28.5 %      MCV 87.4 fL      MCH 30.5 pg      MCHC 34.9 g/dL      RDW 13.6 %      RDW-SD 42.4 fl      MPV 7.8 fL      Platelets 312 10*3/mm3      Neutrophil % 72.0 %      Lymphocyte % 17.0 %      Monocyte % 10.2 %      Eosinophil % 0.5 %      Basophil % 0.3 %      Neutrophils, Absolute 7.80 10*3/mm3      Lymphocytes, Absolute 1.80 10*3/mm3      Monocytes, Absolute 1.10 10*3/mm3      Eosinophils, Absolute 0.10 10*3/mm3      Basophils, Absolute 0.00 10*3/mm3      nRBC 0.1 /100 WBC     Blood Culture - Blood, Hand, Right [[id number]] Collected:  04/27/20 2005    Specimen:  Blood from Hand, Right Updated:  04/30/20 2015     Blood Culture No growth at 3 days    Blood Culture - Blood, Wrist, Right [[id number]] Collected:  04/27/20 2005    Specimen:  Blood from Wrist, Right Updated:  04/30/20 2015     Blood Culture No growth at 3 days    POC Glucose Once [[id number]]  (Abnormal) Collected:  04/30/20 1631    Specimen:  Blood Updated:  04/30/20 1633     Glucose 307 mg/dL      Comment: Serial Number: [id number]:  [id number]       Tissue Pathology Exam [[id number]] Collected:  04/28/20 1023    Specimen:  Tissue from Foot, Right; Tissue from Foot, Right Updated:  04/30/20 1202     Case Report --     Surgical Pathology Report                         Case: [id number]                                  Authorizing Provider:  CROW Souza DPM      Collected:           04/28/2020 10:23 AM          Ordering Location:     [facility] MAIN  Received:            04/29/2020 06:05 AM                                 OR                                                                           Pathologist:           Wm Blair MD  "                                                          Specimens:   1) - Foot, Right, right foot tissue                                                                 2) - Foot, Right, right foot                                                                Final Diagnosis --     Specimen #1 (Right foot tissue, debridement):    Gangrenous necrosis of soft tissue    Bone with acute osteomyelitis    Specimen #2 (Right lower extremity, distal foot, amputation):    Gangrenous necrosis of soft tissue    Bone with acute osteomyelitis    [facility] [facility]        Gross Description --     Part 1: Received in formalin designated \"Right foot tissue\" is an unoriented fragment of reddish gray somewhat friable and necrotic appearing tissue measuring 1.8 x 1.1 x 0.5 cm. Sectioning reveals a chalky cut surface consistent with bone. Submitted in one cassette after decalcification.      [facility]    Now received is an additional specimen in formalin.     Specimen #2 is received labeled \"Right foot.\" Received in formalin is a single distal amputation specimen of the right foot. The specimen measures 10.7 cm in length x 10 cm medial to lateral x 5.6 cm dorsal to plantar. The great toe has been previously amputated revealing a roughened focally ulcerated area in the area of the previous amputation. Ulcer is also present on the medial plantar aspect which measures 1.8 cm and is located a distance of 1.0 cm from the soft tissue resection margin. The other five toes appear intact with nails in fair repair. The bone in the area below the ulcer is softened. The specimen is submitted as follows:    2A        Section of the plantar ulcer  2B        Sections of bone for decalcification deep to the ulcer  2C        Representative sections of skin and soft tissue through the previous great toe amputation site      [facility]/[place] [facility]          Basic Metabolic Panel [[id number]]  (Abnormal) Collected:  04/30/20 1109    Specimen:  Blood Updated:  04/30/20 1158     " Glucose 327 mg/dL      BUN 31 mg/dL      Creatinine 0.92 mg/dL      Sodium 133 mmol/L      Potassium 3.4 mmol/L      Chloride 96 mmol/L      CO2 27.0 mmol/L      Calcium 8.4 mg/dL      eGFR Non African Amer 89 mL/min/1.73      BUN/Creatinine Ratio 33.7     Anion Gap 10.0 mmol/L     Narrative:       GFR Normal >60  Chronic Kidney Disease <60  Kidney Failure <15      POC Glucose Once [[id number]]  (Abnormal) Collected:  04/30/20 1054    Specimen:  Blood Updated:  04/30/20 1055     Glucose 300 mg/dL      Comment: Serial Number: [id number]:  [id number]       Tissue Pathology Exam [[id number]] Collected:  04/30/20 0858    Specimen:  Tissue from Leg, Right Updated:  04/30/20 1048    POC Glucose Once [[id number]]  (Abnormal) Collected:  04/30/20 0936    Specimen:  Blood Updated:  04/30/20 0938     Glucose 301 mg/dL      Comment: Serial Number: [id number]:  [id number]       Tissue / Bone Culture - Tissue, Foot, Right [[id number]]  (Abnormal) Collected:  04/28/20 1025    Specimen:  Tissue from Foot, Right Updated:  04/30/20 0906     Tissue Culture Light growth (2+) Streptococcus agalactiae (Group B)     Comment: This organism is considered to be universally susceptible to penicillin.  No further antibiotic testing will be performed. If Clindamycin or Erythromycin is the drug of choice, notify the laboratory within 7 days to request susceptibility testing.         Rare Normal Skin Juliet     Gram Stain Few (2+) WBCs per low power field      Many (4+) Mixed bacterial morphotypes seen on Gram Stain        Imaging Results (Last 24 Hours)     Procedure Component Value Units Date/Time    XR Tibia Fibula 2 View Right [[id number]] Collected:  04/30/20 1153     Updated:  04/30/20 1156    Narrative:       EXAMINATION: XR TIBIA FIBULA 2 VW RIGHT-     DATE OF EXAM: 4/30/2020 11:20 AM     INDICATION: Status post BKA; R06.00-Dyspnea, unspecified;  M86.9-Osteomyelitis, unspecified; D72.829-Elevated white blood cell  count,  unspecified; R11.2-Nausea with vomiting, unspecified;  R68.89-Other general symptoms and signs; L97.514-Non-pressure chronic  ulcer of other part of right foot with necrosis of bone.     COMPARISON: Right foot radiographs dated 04/26/2020     TECHNIQUE: Two views of the right tibia and fibula were obtained.     FINDINGS: There is a new right below the knee amputation. The osseous  margins of the stump appear sharp without evidence of erosion. There is  soft tissue edema involving the distal stump. There are cutaneous  staples present along the end of the stump. The knee joint remains in  anatomic alignment.       Impression:       1. Expected postoperative appearance status post right below the knee  amputation.     Electronically Signed By-Magno Agarwal On:4/30/2020 11:54 AM  This report was finalized on [id number] by  Magno Agarwal, .            Medication Review:   Scheduled Meds:  aspirin 325 mg Oral Daily   cefTRIAXone 2 g Intravenous Q24H   insulin glargine 22 Units Subcutaneous Nightly   insulin lispro 0-9 Units Subcutaneous TID AC   insulin lispro 10 Units Subcutaneous TID With Meals   metoclopramide 10 mg Intravenous 4x Daily AC & at Bedtime   nitroglycerin 0.5 inch Topical Q6H   polyethylene glycol 17 g Oral Daily   vancomycin 1,750 mg Intravenous Q12H     Continuous Infusions:  Pharmacy to dose vancomycin     sodium chloride 75 mL/hr Last Rate: 75 mL/hr (04/30/20 2232)     PRN Meds:.•  acetaminophen  •  dextrose  •  dextrose  •  glucagon (human recombinant)  •  HYDROcodone-acetaminophen  •  HYDROcodone-acetaminophen  •  ibuprofen  •  insulin lispro **AND** insulin lispro  •  magnesium hydroxide  •  magnesium sulfate **OR** magnesium sulfate **OR** magnesium sulfate  •  melatonin  •  Morphine **AND** naloxone  •  ondansetron **OR** ondansetron  •  Pharmacy to dose vancomycin  •  potassium & sodium phosphates **OR** potassium & sodium phosphates  •  potassium chloride  •  potassium chloride  •   sodium chloride      Assessment/Plan     Doing well status post right BKA    Plan for disposition: Would give 3 days postop IV antibiotics then may switch to p.o. doxycycline for 5 more days.  Remove dressing postop day 5  Work on range of motion  Return 2 weeks for staple removal    Donn Alcantara MD  05/01/20  07:44

## 2020-05-01 NOTE — PLAN OF CARE
Problem: Patient Care Overview  Goal: Plan of Care Review  Outcome: Ongoing (interventions implemented as appropriate)  Flowsheets (Taken 5/1/2020 7134)  Plan of Care Reviewed With: patient  Outcome Summary: patient is nauseas and in pain today. pain meds given as ordered. he refuses zofran. Refuses nutritional counseling. vital signs are stable, insulin given as ordered. will continue to monitor  Goal: Individualization and Mutuality  Outcome: Ongoing (interventions implemented as appropriate)  Goal: Discharge Needs Assessment  Outcome: Ongoing (interventions implemented as appropriate)  Goal: Interprofessional Rounds/Family Conf  Outcome: Ongoing (interventions implemented as appropriate)     Problem: Breathing Pattern Ineffective (Adult)  Goal: Identify Related Risk Factors and Signs and Symptoms  Outcome: Ongoing (interventions implemented as appropriate)  Goal: Effective Oxygenation/Ventilation  Outcome: Ongoing (interventions implemented as appropriate)  Goal: Anxiety/Fear Reduction  Outcome: Ongoing (interventions implemented as appropriate)     Problem: Infection, Risk/Actual (Adult)  Goal: Identify Related Risk Factors and Signs and Symptoms  Outcome: Ongoing (interventions implemented as appropriate)  Goal: Infection Prevention/Resolution  Outcome: Ongoing (interventions implemented as appropriate)     Problem: Sepsis/Septic Shock (Adult)  Goal: Signs and Symptoms of Listed Potential Problems Will be Absent, Minimized or Managed (Sepsis/Septic Shock)  Outcome: Ongoing (interventions implemented as appropriate)     Problem: Fall Risk (Adult)  Goal: Identify Related Risk Factors and Signs and Symptoms  Outcome: Ongoing (interventions implemented as appropriate)  Goal: Absence of Fall  Outcome: Ongoing (interventions implemented as appropriate)     Problem: Skin Injury Risk (Adult)  Goal: Identify Related Risk Factors and Signs and Symptoms  Outcome: Ongoing (interventions implemented as appropriate)  Goal:  Skin Health and Integrity  Outcome: Ongoing (interventions implemented as appropriate)

## 2020-05-01 NOTE — PROGRESS NOTES
Continued Stay Note  [facility]     Patient Name: Maynor Gregg  MRN: [id number]  Today's Date: 5/1/2020    Admit Date: 4/26/2020    Discharge Plan     Row Name 05/01/20 1502       Plan    Plan  Home with [facility] for IV ABX    Row Name 05/01/20 1347       Plan    Plan  Needs IV Rocephin 2 gm Q 24 x 2 weeks total. Pt requested [facility] for home infusion. CM to arrange.     Patient/Family in Agreement with Plan  yes    Plan Comments  SW working remotely due to COVID-19 pandemic precautions. SW called and spoke w/ pt over the phone regarding potential needs at d/c. Pt is aware that he will need 2 weeks of IV Abx at d/c. SW discussed the possibility of inpt rehab placement due to new BKA and IV Abx with pt, but pt does not want to go to inpatient rehab due to needing to care for his 9-year-old son. (Pt's son is currently staying with his grandparents). Pt requested to d/c home w/ home infusion services provided by [facility]. Pt has crutches and a shower chair at home, and plans to order a walker through [employer]. SW offered to assist with DME set up through pt's insurance provider, but pt declined. Pt denied any further needs at this time. SW notified CM (Angelique) of pt's preferences for home infusion services.       Received a call from Gita from [facility] stating that patient last had [facility] but patient states he wants [facility]. Referral made to them per Gita.       Expected Discharge Date and Time     Expected Discharge Date Expected Discharge Time    Apr 30, 2020             Angelique Elizabeth RN, CM  Office Phone [phone]  Cell [phone]

## 2020-05-01 NOTE — PROGRESS NOTES
Daily Progress Note    Patient Care Team:  Fred Lemons FNP as PCP - General (Family Medicine)    Chief Complaint: Follow-up type 1 diabetes    HPI: Patient seen and examined today.  Able to carry on conversation.  Blood sugars are improving.  Patient is eating well.  No complaints at this time.  ROS:   Constitutional:  Denies fatigue, tiredness.    Eyes:  Denies change in visual acuity   HENT:  Denies nasal congestion or sore throat   Respiratory: denies cough, shortness of breath.   Cardiovascular:  denies chest pain, edema   GI:  Denies abdominal pain, nausea, vomiting.   :  Denies polyuria and polydipsia  Musculoskeletal:  Denies back pain or joint pain   Integument:  Denies rash   Neurologic:  Denies headache, focal weakness or sensory changes   Endocrine:  Denies polyuria or polydipsia   Psychiatric:  Denies depression or anxiety       Vitals:    05/01/20 1016   BP: 142/72   Pulse: 87   Resp: 16   Temp: 98.3 °F (36.8 °C)   SpO2: 93%       Physical Exam:  GEN: NAD, conversant  CV: RRR  LUNG: CTA  MSK: Status post right below-knee amputation      Results Review:     I reviewed the patient's new clinical results.    Glucose   Date Value Ref Range Status   05/01/2020 217 (H) 65 - 99 mg/dL Final     Sodium   Date Value Ref Range Status   05/01/2020 133 (L) 136 - 145 mmol/L Final     Potassium   Date Value Ref Range Status   05/01/2020 3.2 (L) 3.5 - 5.2 mmol/L Final     CO2   Date Value Ref Range Status   05/01/2020 27.0 22.0 - 29.0 mmol/L Final     Chloride   Date Value Ref Range Status   05/01/2020 98 98 - 107 mmol/L Final     Anion Gap   Date Value Ref Range Status   05/01/2020 8.0 5.0 - 15.0 mmol/L Final     Creatinine   Date Value Ref Range Status   05/01/2020 0.67 (L) 0.76 - 1.27 mg/dL Final     BUN   Date Value Ref Range Status   05/01/2020 18 6 - 20 mg/dL Final     BUN/Creatinine Ratio   Date Value Ref Range Status   05/01/2020 26.9 (H) 7.0 - 25.0 Final     Calcium   Date Value Ref Range Status    05/01/2020 7.8 (L) 8.6 - 10.5 mg/dL Final     eGFR Non  Amer   Date Value Ref Range Status   05/01/2020 128 >60 mL/min/1.73 Final     Alkaline Phosphatase   Date Value Ref Range Status   05/01/2020 106 39 - 117 U/L Final     Total Protein   Date Value Ref Range Status   05/01/2020 6.2 6.0 - 8.5 g/dL Final     ALT (SGPT)   Date Value Ref Range Status   05/01/2020 20 1 - 41 U/L Final     AST (SGOT)   Date Value Ref Range Status   05/01/2020 33 1 - 40 U/L Final     Total Bilirubin   Date Value Ref Range Status   05/01/2020 0.3 0.2 - 1.2 mg/dL Final     Albumin   Date Value Ref Range Status   05/01/2020 2.00 (L) 3.50 - 5.20 g/dL Final     Globulin   Date Value Ref Range Status   05/01/2020 4.2 gm/dL Final     Lab Results   Component Value Date    HGBA1C 9.3 (H) 04/28/2020    HGBA1C 10.5 (H) 01/21/2020     No results found for: GLUF, MICROALBUR  Results from last 7 days   Lab Units 05/01/20  1152 05/01/20  0744 04/30/20  1631 04/30/20  1054 04/30/20  0936 04/30/20  0741   GLUCOSE mg/dL 225* 181* 307* 300* 301* 375*             Medication Review: Reviewed.       aspirin 325 mg Oral Daily   cefTRIAXone 2 g Intravenous Q24H   insulin glargine 22 Units Subcutaneous Nightly   insulin lispro 0-9 Units Subcutaneous TID AC   insulin lispro 10 Units Subcutaneous TID With Meals   metoclopramide 10 mg Intravenous 4x Daily AC & at Bedtime   nitroglycerin 0.5 inch Topical Q6H   polyethylene glycol 17 g Oral Daily   vancomycin 1,750 mg Intravenous Q12H         Assessment/Plan   1.  Diabetes mellitus type 1: Uncontrolled, blood sugars improving, will increase Lantus to 25 units subcu nightly, continue Humalog 10 with each meal along with sliding scale and will follow blood sugars and make further adjustments as needed.   2.  Status post right below-knee amputation on April 30, 2020.  3.  Diabetic neuropathy: Status post right below-knee amputation.             Ezekiel Ron MD. FACE

## 2020-05-01 NOTE — THERAPY EVALUATION
Patient Name: Maynor Gregg  : 1975    MRN: [id number]                              Today's Date: 2020       Admit Date: 2020    Visit Dx:     ICD-10-CM ICD-9-CM   1. Dyspnea, unspecified type R06.00 786.09   2. Osteomyelitis of right foot, unspecified type (CMS/HCC) M86.9 730.27   3. Leukocytosis, unspecified type D72.829 288.60   4. Nausea and vomiting, intractability of vomiting not specified, unspecified vomiting type R11.2 787.01   5. Suspected Covid-19 Virus Infection R68.89    6. Chronic ulcer of right foot, with necrosis of bone (CMS/HCC) L97.514 707.15     Patient Active Problem List   Diagnosis   • Diabetic peripheral neuropathy (CMS/HCC)   • Type 1 diabetes mellitus with circulatory complication (CMS/HCC)   • Open wound of second toe of left foot   • Chronic ulcer of right foot, with necrosis of bone (CMS/HCC)   • Chronic ulcer of great toe of right foot, with necrosis of muscle (CMS/HCC)   • Chronic osteomyelitis of right foot (CMS/HCC)   • CAD (coronary artery disease)   • Hypotension   • Dyspnea   • Suspected Covid-19 Virus Infection   • Osteomyelitis of right foot (CMS/HCC)   • Nausea and vomiting   • Chest pain   • Sepsis, Gram positive (CMS/HCC)     Past Medical History:   Diagnosis Date   • Coronary artery disease    • Diabetes mellitus (CMS/HCC)    • MI, old      Past Surgical History:   Procedure Laterality Date   • AMPUTATION DIGIT Right 2020    Procedure: PARTIAL FIRST RAY RESECTION RIGHT FOOT;  Surgeon: CROW Souza DPM;  Location: [facility] OR;  Service: Podiatry;  Laterality: Right;   • AMPUTATION FOOT / TOE     • CARDIAC CATHETERIZATION  2010     RCA artery   • CARDIAC CATHETERIZATION      LAD artery   • CARDIAC SURGERY     • INCISION AND DRAINAGE LEG Left 2020    Procedure: INCISION AND DRAINAGE LOWER EXTREMITY with second digit amputation;  Surgeon: CROW Souza DPM;  Location: [facility] OR;  Service: Podiatry   • INCISION AND  DRAINAGE LEG Right 4/28/2020    Procedure: incision and drainage ,transmetatarsal amputation, fasciotomy;  Surgeon: CROW Souza DPM;  Location: [facility] MAIN OR;  Service: Podiatry;  Laterality: Right;   • TOE AMPUTATION Left    • WOUND DEBRIDEMENT Right 1/21/2020    Procedure: DEBRIDEMENT with sesamoid excision;  Surgeon: CROW Souza DPM;  Location: [facility] MAIN OR;  Service: Podiatry     General Information     Row Name 05/01/20 1328          PT Evaluation Time/Intention    Document Type  evaluation  -CM     Mode of Treatment  physical therapy  -CM     Row Name 05/01/20 1328          General Information    Patient Profile Reviewed?  yes 46 yo male seen POD1 s/p R bka. Hx of toe amputations on LLE as well.   -CM     Prior Level of Function  independent:;community mobility;gait;work works as  catching shoplifters.  -CM     Existing Precautions/Restrictions  right;non-weight bearing  -CM     Barriers to Rehab  none identified  -CM     Row Name 05/01/20 1328          Relationship/Environment    Lives With  child(marcus), dependent;significant other has 10 yo son who is currently staying w/ his grandparents while pt is in hospital; girlfriend also available to assist  -CM     Row Name 05/01/20 1328          Resource/Environmental Concerns    Current Living Arrangements  home/apartment/condo  -CM     Row Name 05/01/20 1328          Home Main Entrance    Number of Stairs, Main Entrance  two pt reports he plans to bump up stairs on his bottom  -CM     Stair Railings, Main Entrance  none  -CM     Row Name 05/01/20 1328          Stairs Within Home, Primary    Number of Stairs, Within Home, Primary  none  -CM     Row Name 05/01/20 1328          Cognitive Assessment/Intervention- PT/OT    Orientation Status (Cognition)  oriented x 4  -CM     Cognitive Assessment/Intervention Comment  pleasant, cooperative  -CM     Row Name 05/01/20 1328          Safety Issues, Functional Mobility    Impairments Affecting  Function (Mobility)  balance;strength;endurance/activity tolerance;sensation/sensory awareness  -CM       User Key  (r) = Recorded By, (t) = Taken By, (c) = Cosigned By    Initials Name Provider Type    Aziza Hernandez, PT Physical Therapist        Mobility     Row Name 05/01/20 1330          Bed Mobility Assessment/Treatment    Bed Mobility Assessment/Treatment  bed mobility (all) activities  -CM     [place] Level (Bed Mobility)  independent  -CM     Row Name 05/01/20 1330          Bed-Chair Transfer    Bed-Chair [place] (Transfers)  not tested  -CM     Row Name 05/01/20 1330          Sit-Stand Transfer    Sit-Stand [place] (Transfers)  minimum assist (75% patient effort) initially stood w/ cxs at pt request; required min to mod assist to control cxs; had near fall when tried to return to sitting w/ cxs. When using rw, required min assist to stand & only cga to return to sitting  -CM     Row Name 05/01/20 1330          Gait/Stairs Assessment/Training    Gait/Stairs Assessment/Training  gait/ambulation independence;gait/ambulation assistive device  -CM     [place] Level (Gait)  minimum assist (75% patient effort);contact guard;1 person assist  -CM     Assistive Device (Gait)  walker, front-wheeled;crutches, axillary  -CM     Distance in Feet (Gait)  initially tried cxs at pt request; had multiple losses of balance, required min to mod assist to amb 25 ft; then changed to rw; able to come to stand w/ min assist, amb w/ cga only x 25 ft  -CM     Pattern (Gait)  step-to  -CM     Row Name 05/01/20 1330          Mobility Assessment/Intervention    Extremity Weight-bearing Status  right lower extremity  -CM     Right Lower Extremity (Weight-bearing Status)  non weight-bearing (NWB)  -CM       User Key  (r) = Recorded By, (t) = Taken By, (c) = Cosigned By    Initials Name Provider Type    Aziza Hernandez, PT Physical Therapist        Obj/Interventions     Row Name 05/01/20 3937           General ROM    GENERAL ROM COMMENTS  wnl  -CM     Row Name 05/01/20 1332          MMT (Manual Muscle Testing)    General MMT Comments  wnl  -CM     Row Name 05/01/20 1332          Static Sitting Balance    Level of [place] (Unsupported Sitting, Static Balance)  independent  -CM     Sitting Position (Unsupported Sitting, Static Balance)  sitting on edge of bed  -CM     Row Name 05/01/20 1332          Dynamic Sitting Balance    Level of [place], Reaches Outside Midline (Sitting, Dynamic Balance)  independent  -CM     Sitting Position, Reaches Outside Midline (Sitting, Dynamic Balance)  sitting on edge of bed  -CM     Row Name 05/01/20 1332          Static Standing Balance    Level of [place] (Supported Standing, Static Balance)  supervision  -CM     Assistive Device Utilized (Supported Standing, Static Balance)  walker, rolling  -CM     Row Name 05/01/20 1332          Dynamic Standing Balance    Level of [place], Reaches Outside Midline (Standing, Dynamic Balance)  minimal assist, 75% patient effort  -CM     Assistive Device Utilized (Supported Standing, Dynamic Balance)  walker, rolling  -CM     Row Name 05/01/20 1332          Sensory Assessment/Intervention    Sensory General Assessment  -- reports marked decrease in sensation in L foot, but does have some proprioceptive sensation per pt  -CM       User Key  (r) = Recorded By, (t) = Taken By, (c) = Cosigned By    Initials Name Provider Type    Aziza Hernandez, PT Physical Therapist        Goals/Plan     Row Name 05/01/20 1338          Transfer Goal 1 (PT)    Activity/Assistive Device (Transfer Goal 1, PT)  transfers, all;walker, rolling  -CM     [place] Level/Cues Needed (Transfer Goal 1, PT)  conditional independence  -CM     Time Frame (Transfer Goal 1, PT)  2 weeks  -CM     [facility] Name 05/01/20 1338          Gait Training Goal 1 (PT)    Activity/Assistive Device (Gait Training Goal 1, PT)  gait (walking locomotion);assistive device  use;walker, rolling  -CM     [place] Level (Gait Training Goal 1, PT)  conditional independence  -CM     Distance (Gait Goal 1, PT)  75 ft  -CM     Time Frame (Gait Training Goal 1, PT)  2 weeks  -CM     Row Name 05/01/20 2698          Stairs Goal 1 (PT)    Activity/Assistive Device (Stairs Goal 1, PT)  stairs, all skills  -CM     [place] Level/Cues Needed (Stairs Goal 1, PT)  conditional independence  -CM     Number of Stairs (Stairs Goal 1, PT)  2; pt wants to go up/down in sitting position  -CM     Time Frame (Stairs Goal 1, PT)  2 weeks  -CM       User Key  (r) = Recorded By, (t) = Taken By, (c) = Cosigned By    Initials Name Provider Type    Aziza Hernandez, PT Physical Therapist        Clinical Impression     Row Name 05/01/20 8502          Pain Assessment    Additional Documentation  Pain Scale: Numbers Pre/Post-Treatment (Group)  -CM     Row Name 05/01/20 3994          Pain Scale: Numbers Pre/Post-Treatment    Pain Scale: Numbers, Pretreatment  0/10 - no pain  -CM     Pain Scale: Numbers, Post-Treatment  5/10  -CM     Pain Location - Side  Right  -CM     Pain Location - Orientation  lower  -CM     Pain Location  extremity  -CM     Pain Intervention(s)  Medication (See MAR);Emotional support;Repositioned  -CM     Row Name 05/01/20 9300          Plan of Care Review    Plan of Care Reviewed With  patient  -CM     Outcome Summary  44 yo male seen POD1 s/p R bka. Pt did not do well using cxs, as he thought he would. However, did well using rw for amb over level surfaces. Was able to amb 25 ft w/ cga using rw. Safe for home w/ home health when medically stable. Will follow daily for skilled PT.   -CM     Row Name 05/01/20 2390          Physical Therapy Clinical Impression    Patient/Family Goals Statement (PT Clinical Impression)  wants home vs. rehab  -CM     Criteria for Skilled Interventions Met (PT Clinical Impression)  yes;treatment indicated  -CM     Rehab Potential (PT Clinical Summary)   good, to achieve stated therapy goals  -CM     Predicted Duration of Therapy (PT)  until d/c  -CM     Row Name 05/01/20 1336          Vital Signs    Pre Patient Position  Supine  -CM     Post Patient Position  Sitting  -CM     Row Name 05/01/20 1336          Positioning and Restraints    Pre-Treatment Position  in bed  -CM     Post Treatment Position  chair  -CM     In Chair  notified nsg;sitting;call light within reach;encouraged to call for assist;exit alarm on;legs elevated  -CM       User Key  (r) = Recorded By, (t) = Taken By, (c) = Cosigned By    Initials Name Provider Type    Aziza Hernandez, PT Physical Therapist        Outcome Measures    No documentation.         PT Recommendation and Plan  Planned Therapy Interventions (PT Eval): balance training, bed mobility training, gait training, home exercise program, patient/family education, strengthening, stair training, postural re-education, motor coordination training, transfer training  Outcome Summary/Treatment Plan (PT)  Anticipated Equipment Needs at Discharge (PT): front wheeled walker  Anticipated Discharge Disposition (PT): home with assist, home with home health  Plan of Care Reviewed With: patient  Outcome Summary: 44 yo male seen POD1 s/p R bka. Pt did not do well using cxs, as he thought he would. However, did well using rw for amb over level surfaces. Was able to amb 25 ft w/ cga using rw. Safe for home w/ home health when medically stable. Will follow daily for skilled PT.      Time Calculation:   PT Charges     Row Name 05/01/20 1340             Time Calculation    Start Time  0933  -CM      Stop Time  1005  -CM      Time Calculation (min)  32 min  -CM      PT Received On  05/01/20  -CM      PT - Next Appointment  05/02/20  -CM      PT Goal Re-Cert Due Date  05/15/20  -CM         Time Calculation- PT    Total Timed Code Minutes- PT  10 minute(s)  -CM        User Key  (r) = Recorded By, (t) = Taken By, (c) = Cosigned By    Initials Name  Provider Type     Aziza Henry, PT Physical Therapist        Therapy Charges for Today     Code Description Service Date Service Provider Modifiers Qty    15414199794 HC PT EVAL LOW COMPLEXITY 4 5/1/2020 Aziza Henry, PT GP 1    58315301897 HC GAIT TRAINING EA 15 MIN 5/1/2020 Aziza Henry, PT GP 1               Aziza Henry, PT  5/1/2020

## 2020-05-02 LAB
ANION GAP SERPL CALCULATED.3IONS-SCNC: 9 MMOL/L (ref 5–15)
BACTERIA SPEC AEROBE CULT: NORMAL
BACTERIA SPEC AEROBE CULT: NORMAL
BUN BLD-MCNC: 13 MG/DL (ref 6–20)
BUN/CREAT SERPL: 18.8 (ref 7–25)
CALCIUM SPEC-SCNC: 7.6 MG/DL (ref 8.6–10.5)
CHLORIDE SERPL-SCNC: 102 MMOL/L (ref 98–107)
CO2 SERPL-SCNC: 27 MMOL/L (ref 22–29)
CREAT BLD-MCNC: 0.69 MG/DL (ref 0.76–1.27)
DEPRECATED RDW RBC AUTO: 43.3 FL (ref 37–54)
EOSINOPHIL # BLD MANUAL: 0.09 10*3/MM3 (ref 0–0.4)
EOSINOPHIL NFR BLD MANUAL: 1 % (ref 0.3–6.2)
ERYTHROCYTE [DISTWIDTH] IN BLOOD BY AUTOMATED COUNT: 13.9 % (ref 12.3–15.4)
GFR SERPL CREATININE-BSD FRML MDRD: 124 ML/MIN/1.73
GLUCOSE BLD-MCNC: 87 MG/DL (ref 65–99)
GLUCOSE BLDC GLUCOMTR-MCNC: 127 MG/DL (ref 70–105)
GLUCOSE BLDC GLUCOMTR-MCNC: 143 MG/DL (ref 70–105)
GLUCOSE BLDC GLUCOMTR-MCNC: 68 MG/DL (ref 70–105)
GLUCOSE BLDC GLUCOMTR-MCNC: 89 MG/DL (ref 70–105)
HCT VFR BLD AUTO: 29.2 % (ref 37.5–51)
HGB BLD-MCNC: 9.7 G/DL (ref 13–17.7)
LYMPHOCYTES # BLD MANUAL: 2.07 10*3/MM3 (ref 0.7–3.1)
LYMPHOCYTES NFR BLD MANUAL: 12 % (ref 5–12)
LYMPHOCYTES NFR BLD MANUAL: 23 % (ref 19.6–45.3)
MCH RBC QN AUTO: 29.2 PG (ref 26.6–33)
MCHC RBC AUTO-ENTMCNC: 33.2 G/DL (ref 31.5–35.7)
MCV RBC AUTO: 87.9 FL (ref 79–97)
METAMYELOCYTES NFR BLD MANUAL: 1 % (ref 0–0)
MONOCYTES # BLD AUTO: 1.08 10*3/MM3 (ref 0.1–0.9)
MYELOCYTES NFR BLD MANUAL: 3 % (ref 0–0)
NEUTROPHILS # BLD AUTO: 5.13 10*3/MM3 (ref 1.7–7)
NEUTROPHILS NFR BLD MANUAL: 53 % (ref 42.7–76)
NEUTS BAND NFR BLD MANUAL: 4 % (ref 0–5)
NEUTS VAC BLD QL SMEAR: ABNORMAL
PLASMA CELL PREC NFR BLD MANUAL: 2 % (ref 0–0)
PLAT MORPH BLD: NORMAL
PLATELET # BLD AUTO: 396 10*3/MM3 (ref 140–450)
PMV BLD AUTO: 7.8 FL (ref 6–12)
POTASSIUM BLD-SCNC: 3.4 MMOL/L (ref 3.5–5.2)
POTASSIUM BLD-SCNC: 4.1 MMOL/L (ref 3.5–5.2)
RBC # BLD AUTO: 3.33 10*6/MM3 (ref 4.14–5.8)
RBC MORPH BLD: NORMAL
SCAN SLIDE: NORMAL
SODIUM BLD-SCNC: 138 MMOL/L (ref 136–145)
TOXIC GRANULATION: ABNORMAL
VANCOMYCIN TROUGH SERPL-MCNC: 16.7 MCG/ML (ref 5–20)
VARIANT LYMPHS NFR BLD MANUAL: 1 % (ref 0–5)
WBC NRBC COR # BLD: 9 10*3/MM3 (ref 3.4–10.8)

## 2020-05-02 PROCEDURE — 25010000002 METOCLOPRAMIDE PER 10 MG: Performed by: ORTHOPAEDIC SURGERY

## 2020-05-02 PROCEDURE — 25010000002 CEFTRIAXONE PER 250 MG: Performed by: ORTHOPAEDIC SURGERY

## 2020-05-02 PROCEDURE — 99024 POSTOP FOLLOW-UP VISIT: CPT | Performed by: PODIATRIST

## 2020-05-02 PROCEDURE — 25010000002 VANCOMYCIN 10 G RECONSTITUTED SOLUTION: Performed by: ORTHOPAEDIC SURGERY

## 2020-05-02 PROCEDURE — 97116 GAIT TRAINING THERAPY: CPT

## 2020-05-02 PROCEDURE — 25010000002 MORPHINE PER 10 MG: Performed by: ORTHOPAEDIC SURGERY

## 2020-05-02 PROCEDURE — 97530 THERAPEUTIC ACTIVITIES: CPT

## 2020-05-02 PROCEDURE — 84132 ASSAY OF SERUM POTASSIUM: CPT | Performed by: HOSPITALIST

## 2020-05-02 PROCEDURE — 63710000001 INSULIN GLARGINE PER 5 UNITS: Performed by: INTERNAL MEDICINE

## 2020-05-02 PROCEDURE — 80202 ASSAY OF VANCOMYCIN: CPT | Performed by: HOSPITALIST

## 2020-05-02 PROCEDURE — 85007 BL SMEAR W/DIFF WBC COUNT: CPT | Performed by: NURSE PRACTITIONER

## 2020-05-02 PROCEDURE — 99232 SBSQ HOSP IP/OBS MODERATE 35: CPT | Performed by: INTERNAL MEDICINE

## 2020-05-02 PROCEDURE — 63710000001 INSULIN LISPRO (HUMAN) PER 5 UNITS: Performed by: INTERNAL MEDICINE

## 2020-05-02 PROCEDURE — 82962 GLUCOSE BLOOD TEST: CPT

## 2020-05-02 PROCEDURE — 85025 COMPLETE CBC W/AUTO DIFF WBC: CPT | Performed by: NURSE PRACTITIONER

## 2020-05-02 PROCEDURE — 80048 BASIC METABOLIC PNL TOTAL CA: CPT | Performed by: ORTHOPAEDIC SURGERY

## 2020-05-02 PROCEDURE — 99231 SBSQ HOSP IP/OBS SF/LOW 25: CPT | Performed by: HOSPITALIST

## 2020-05-02 RX ORDER — INSULIN GLARGINE 100 [IU]/ML
24 INJECTION, SOLUTION SUBCUTANEOUS NIGHTLY
Status: DISCONTINUED | OUTPATIENT
Start: 2020-05-02 | End: 2020-05-03

## 2020-05-02 RX ADMIN — METOCLOPRAMIDE 10 MG: 5 INJECTION, SOLUTION INTRAMUSCULAR; INTRAVENOUS at 20:47

## 2020-05-02 RX ADMIN — VANCOMYCIN HYDROCHLORIDE 1750 MG: 10 INJECTION, POWDER, LYOPHILIZED, FOR SOLUTION INTRAVENOUS at 00:00

## 2020-05-02 RX ADMIN — MORPHINE SULFATE 4 MG: 4 INJECTION INTRAVENOUS at 20:47

## 2020-05-02 RX ADMIN — METOCLOPRAMIDE 10 MG: 5 INJECTION, SOLUTION INTRAMUSCULAR; INTRAVENOUS at 17:27

## 2020-05-02 RX ADMIN — POLYETHYLENE GLYCOL 3350 17 G: 17 POWDER, FOR SOLUTION ORAL at 09:12

## 2020-05-02 RX ADMIN — CEFTRIAXONE SODIUM 2 G: 2 INJECTION, POWDER, FOR SOLUTION INTRAMUSCULAR; INTRAVENOUS at 20:44

## 2020-05-02 RX ADMIN — MORPHINE SULFATE 4 MG: 4 INJECTION INTRAVENOUS at 12:10

## 2020-05-02 RX ADMIN — POTASSIUM CHLORIDE 40 MEQ: 1500 TABLET, EXTENDED RELEASE ORAL at 13:21

## 2020-05-02 RX ADMIN — MORPHINE SULFATE 4 MG: 4 INJECTION INTRAVENOUS at 04:49

## 2020-05-02 RX ADMIN — VANCOMYCIN HYDROCHLORIDE 1750 MG: 10 INJECTION, POWDER, LYOPHILIZED, FOR SOLUTION INTRAVENOUS at 12:53

## 2020-05-02 RX ADMIN — HYDROCODONE BITARTRATE AND ACETAMINOPHEN 1 TABLET: 7.5; 325 TABLET ORAL at 09:16

## 2020-05-02 RX ADMIN — MORPHINE SULFATE 4 MG: 4 INJECTION INTRAVENOUS at 00:00

## 2020-05-02 RX ADMIN — INSULIN GLARGINE 24 UNITS: 100 INJECTION, SOLUTION SUBCUTANEOUS at 20:51

## 2020-05-02 RX ADMIN — SODIUM CHLORIDE 75 ML/HR: 900 INJECTION, SOLUTION INTRAVENOUS at 04:49

## 2020-05-02 RX ADMIN — METOCLOPRAMIDE 10 MG: 5 INJECTION, SOLUTION INTRAMUSCULAR; INTRAVENOUS at 09:12

## 2020-05-02 RX ADMIN — ASPIRIN 325 MG: 325 TABLET ORAL at 09:12

## 2020-05-02 RX ADMIN — METOCLOPRAMIDE 10 MG: 5 INJECTION, SOLUTION INTRAMUSCULAR; INTRAVENOUS at 12:07

## 2020-05-02 RX ADMIN — INSULIN LISPRO 10 UNITS: 100 INJECTION, SOLUTION INTRAVENOUS; SUBCUTANEOUS at 12:07

## 2020-05-02 RX ADMIN — POTASSIUM CHLORIDE 40 MEQ: 1500 TABLET, EXTENDED RELEASE ORAL at 09:12

## 2020-05-02 RX ADMIN — SODIUM CHLORIDE, PRESERVATIVE FREE 10 ML: 5 INJECTION INTRAVENOUS at 20:47

## 2020-05-02 NOTE — PLAN OF CARE
Problem: Patient Care Overview  Goal: Plan of Care Review  Outcome: Ongoing (interventions implemented as appropriate)  Flowsheets (Taken 5/2/2020 8500)  Progress: improving  Plan of Care Reviewed With: patient  Outcome Summary: pt with independent bed mobility, transfers cga up to walker and amb about 30' with cga.  pt educated on proper positioning of rle and reviewed all exercises.  pt demonstrated good understanding.  Recommend home with family and HHPT to follow.  PPE use:  mask with faceshield and gloves

## 2020-05-02 NOTE — THERAPY TREATMENT NOTE
Patient Name: Maynor Gregg  : 1975    MRN: [id number]                              Today's Date: 2020       Admit Date: 2020    Visit Dx:     ICD-10-CM ICD-9-CM   1. Dyspnea, unspecified type R06.00 786.09   2. Osteomyelitis of right foot, unspecified type (CMS/HCC) M86.9 730.27   3. Leukocytosis, unspecified type D72.829 288.60   4. Nausea and vomiting, intractability of vomiting not specified, unspecified vomiting type R11.2 787.01   5. Suspected Covid-19 Virus Infection R68.89    6. Chronic ulcer of right foot, with necrosis of bone (CMS/HCC) L97.514 707.15     Patient Active Problem List   Diagnosis   • Diabetic peripheral neuropathy (CMS/HCC)   • Type 1 diabetes mellitus with circulatory complication (CMS/HCC)   • Open wound of second toe of left foot   • Chronic ulcer of right foot, with necrosis of bone (CMS/HCC)   • Chronic ulcer of great toe of right foot, with necrosis of muscle (CMS/HCC)   • Chronic osteomyelitis of right foot (CMS/HCC)   • CAD (coronary artery disease)   • Hypotension   • Dyspnea   • Suspected Covid-19 Virus Infection   • Osteomyelitis of right foot (CMS/HCC)   • Nausea and vomiting   • Chest pain   • Sepsis, Gram positive (CMS/HCC)     Past Medical History:   Diagnosis Date   • Coronary artery disease    • Diabetes mellitus (CMS/HCC)    • MI, old      Past Surgical History:   Procedure Laterality Date   • AMPUTATION DIGIT Right 2020    Procedure: PARTIAL FIRST RAY RESECTION RIGHT FOOT;  Surgeon: CROW Souza DPM;  Location: [facility] OR;  Service: Podiatry;  Laterality: Right;   • AMPUTATION FOOT / TOE     • CARDIAC CATHETERIZATION  2010     RCA artery   • CARDIAC CATHETERIZATION      LAD artery   • CARDIAC SURGERY     • INCISION AND DRAINAGE LEG Left 2020    Procedure: INCISION AND DRAINAGE LOWER EXTREMITY with second digit amputation;  Surgeon: CROW Souza DPM;  Location: [facility] OR;  Service: Podiatry   • INCISION AND  DRAINAGE LEG Right 4/28/2020    Procedure: incision and drainage ,transmetatarsal amputation, fasciotomy;  Surgeon: CROW Souza DPM;  Location: [facility] MAIN OR;  Service: Podiatry;  Laterality: Right;   • TOE AMPUTATION Left    • WOUND DEBRIDEMENT Right 1/21/2020    Procedure: DEBRIDEMENT with sesamoid excision;  Surgeon: CROW Souza DPM;  Location: [facility] MAIN OR;  Service: Podiatry     General Information     Row Name 05/02/20 1608          PT Evaluation Time/Intention    Document Type  therapy note (daily note)  -SC     Mode of Treatment  individual therapy;physical therapy  -SC     Row Name 05/02/20 1608          General Information    Existing Precautions/Restrictions  right;non-weight bearing  -SC     Row Name 05/02/20 1608          Cognitive Assessment/Intervention- PT/OT    Orientation Status (Cognition)  oriented x 4  -SC     Cognitive Assessment/Intervention Comment  pt very agreeable  -SC     Row Name 05/02/20 1608          Safety Issues, Functional Mobility    Impairments Affecting Function (Mobility)  balance;strength;endurance/activity tolerance;sensation/sensory awareness  -SC       User Key  (r) = Recorded By, (t) = Taken By, (c) = Cosigned By    Initials Name Provider Type    SC Naya Fernandez PTA Physical Therapy Assistant        Mobility     Row Name 05/02/20 1609          Bed Mobility Assessment/Treatment    Bed Mobility Assessment/Treatment  bed mobility (all) activities  -SC     [place] Level (Bed Mobility)  independent  -[facility] Name 05/02/20 1609          Sit-Stand Transfer    Sit-Stand [place] (Transfers)  contact [facility]  -SC     Assistive Device (Sit-Stand Transfers)  walker, front-wheeled  -[facility] Name 05/02/20 1609          Gait/Stairs Assessment/Training    Gait/Stairs Assessment/Training  gait/ambulation independence;gait/ambulation assistive device  -SC     [place] Level (Gait)  contact guard  -SC     Assistive Device (Gait)  walker, front-wheeled   -SC     Distance in Feet (Gait)  30'  -SC     Pattern (Gait)  step-to  -SC     Row Name 05/02/20 1609          Mobility Assessment/Intervention    Extremity Weight-bearing Status  right lower extremity  -SC     Right Lower Extremity (Weight-bearing Status)  non weight-bearing (NWB)  -SC       User Key  (r) = Recorded By, (t) = Taken By, (c) = Cosigned By    Initials Name Provider Type    SC Naya Fernandez PTA Physical Therapy Assistant        Obj/Interventions     Row Name 05/02/20 1610          Therapeutic Exercise    Exercise Type (Therapeutic Exercise)  AROM (active range of motion)  -SC     Sets/Reps (Therapeutic Exercise)  pt demonstrated what the PT instructed to him during yesterdays eval  -SC     Expected Outcome (Therapeutic Exercise)  facilitate normal movement patterns;improve functional stability  -[facility] Name 05/02/20 1610          Static Sitting Balance    Level of [place] (Unsupported Sitting, Static Balance)  independent  -SC     Sitting Position (Unsupported Sitting, Static Balance)  sitting on edge of bed  -[facility] Name 05/02/20 1610          Dynamic Sitting Balance    Level of [place], Reaches Outside Midline (Sitting, Dynamic Balance)  independent  -SC     Sitting Position, Reaches Outside Midline (Sitting, Dynamic Balance)  sitting on edge of bed  -[facility] Name 05/02/20 1610          Static Standing Balance    Level of [place] (Supported Standing, Static Balance)  supervision  -SC     Assistive Device Utilized (Supported Standing, Static Balance)  walker, rolling  -[facility] Name 05/02/20 1610          Dynamic Standing Balance    Level of [place], Reaches Outside Midline (Standing, Dynamic Balance)  contact guard assist  -SC     Assistive Device Utilized (Supported Standing, Dynamic Balance)  walker, rolling  -SC       User Key  (r) = Recorded By, (t) = Taken By, (c) = Cosigned By    Initials Name Provider Type    Naya Melvin PTA Physical Therapy  Assistant        Goals/Plan    No documentation.       Clinical Impression     [facility] Name 05/02/20 1612          Pain Assessment    Additional Documentation  Pain Scale: Numbers Pre/Post-Treatment (Group)  -[facility] Name 05/02/20 1612          Pain Scale: Numbers Pre/Post-Treatment    Pain Scale: Numbers, Pretreatment  0/10 - no pain  -SC     Pain Scale: Numbers, Post-Treatment  4/10  -SC     Pain Location - Side  Right  -SC     Pain Location - Orientation  lower  -SC     Pain Location  residual limb  -SC     Pain Intervention(s)  Rest;Repositioned  -[facility] Name 05/02/20 1612          Physical Therapy Clinical Impression    Rehab Potential (PT Clinical Summary)  good, to achieve stated therapy goals  -[facility] Name 05/02/20 1612          Vital Signs    O2 Delivery Pre Treatment  room air  -SC     O2 Delivery Intra Treatment  room air  -SC     O2 Delivery Post Treatment  room air  -[facility] 05/02/20 1612          Positioning and Restraints    Pre-Treatment Position  in bed  -SC     Post Treatment Position  bed  -SC     In Bed  notified nsg;call light within reach;supine  -SC       User Key  (r) = Recorded By, (t) = Taken By, (c) = Cosigned By    Initials Name Provider Type    Naya Melvin, JIM Physical Therapy Assistant        Outcome Measures    No documentation.         PT Recommendation and Plan     Outcome Summary/Treatment Plan (PT)  Anticipated Equipment Needs at Discharge (PT): front wheeled walker  Anticipated Discharge Disposition (PT): home with assist, home with home health  Plan of Care Reviewed With: patient  Progress: improving  Outcome Summary: pt with independent bed mobility, transfers cga up to walker and amb about 30' with cga.  pt educated on proper positioning of rle and reviewed all exercises.  pt demonstrated good understanding.  Recommend home with family and HHPT to follow.  PPE use:  mask with faceshield and gloves     Time Calculation:   PT Charges     [facility] Name 05/02/20  1625             Time Calculation    Start Time  1005  -SC      Stop Time  1025  -SC      Time Calculation (min)  20 min  -SC      PT Received On  05/02/20  -SC      PT - Next Appointment  05/03/20  -SC         Time Calculation- PT    Total Timed Code Minutes- PT  20 minute(s)  -SC         Timed Charges    42062 - Gait Training Minutes   8  -SC      40205 - PT Therapeutic Activity Minutes  12  -SC        User Key  (r) = Recorded By, (t) = Taken By, (c) = Cosigned By    Initials Name Provider Type    SC Naya Fernandez PTA Physical Therapy Assistant        Therapy Charges for Today     Code Description Service Date Service Provider Modifiers Qty    40443258222 HC GAIT TRAINING EA 15 MIN 5/2/2020 Naya Fernandez, JIM GP 1    64798693818 HC PT THERAPEUTIC ACT EA 15 MIN 5/2/2020 Naya Fernandez, JIM GP 1               Naya Fernandez PTA  5/2/2020

## 2020-05-02 NOTE — PROGRESS NOTES
Infectious Diseases Progress Note      LOS: 6 days   Patient Care Team:  Fred Lemons FNP as PCP - General (Family Medicine)    Chief Complaint: Right leg stump pain    Subjective       The patient has been afebrile for the last 24 hours.  The patient is on room air, hemodynamically stable, and is tolerating antimicrobial therapy.  He was complaining of right leg pain. He has also had some nausea and vomiting but attributes this to the food.       Review of Systems:   Review of Systems   Constitutional: Negative.    HENT: Negative.    Respiratory: Negative.    Cardiovascular: Negative.    Gastrointestinal: Positive for nausea.   Genitourinary: Negative.    Musculoskeletal: Positive for arthralgias.   Skin: Positive for wound.   Neurological: Negative.    Psychiatric/Behavioral: Negative.         Objective     Vital Signs  Temp:  [97.7 °F (36.5 °C)-98.4 °F (36.9 °C)] 98.4 °F (36.9 °C)  Heart Rate:  [78-89] 78  Resp:  [13-18] 16  BP: (116-123)/(62-73) 120/72    Physical Exam:  Physical Exam   Constitutional: He is oriented to person, place, and time. He appears well-developed and well-nourished.   HENT:   Head: Normocephalic and atraumatic.   Eyes: Pupils are equal, round, and reactive to light. Conjunctivae and EOM are normal.   Neck: Neck supple.   Cardiovascular: Normal rate, regular rhythm and normal heart sounds.   Pulmonary/Chest: Effort normal and breath sounds normal.   Abdominal: Soft. Bowel sounds are normal.   Musculoskeletal:   Previous left great toe amputation    Status post right leg amputation with surgical dressing on the right leg stump   Neurological: He is alert and oriented to person, place, and time.   Skin: Skin is warm and dry.   Psychiatric: He has a normal mood and affect.   Vitals reviewed.       Results Review:    I have reviewed all clinical data, test, lab, and imaging results.     Radiology  No Radiology Exams Resulted Within Past 24 Hours    Cardiology    Laboratory    Results from  last 7 days   Lab Units 05/02/20  0409 05/01/20  0455 04/30/20  0548 04/29/20  0544 04/28/20  0632 04/27/20  0208 04/26/20 1939   WBC 10*3/mm3 9.00 10.80 13.40* 19.00* 18.10* 20.30* 19.30*   HEMOGLOBIN g/dL 9.7* 9.9* 10.1* 11.2* 11.2* 11.8* 13.3   HEMATOCRIT % 29.2* 28.5* 30.1* 33.9* 34.3* 33.7* 39.7   PLATELETS 10*3/mm3 396 312 330 332 296 348 405     Results from last 7 days   Lab Units 05/02/20  0408 05/01/20  1551 05/01/20  0455 04/30/20  1109 04/30/20  0548 04/29/20  0544 04/28/20  0632 04/27/20  0208 04/26/20 1939   SODIUM mmol/L 138  --  133* 133* 129* 129* 129* 132* 131*   POTASSIUM mmol/L 3.4* 3.7 3.2* 3.4* 3.3* 3.9 3.7 3.6 3.4*   CHLORIDE mmol/L 102  --  98 96* 96* 94* 90* 90* 87*   CO2 mmol/L 27.0  --  27.0 27.0 25.0 22.0 26.0 27.0 26.0   BUN mg/dL 13  --  18 31* 31* 32* 31* 18 16   CREATININE mg/dL 0.69*  --  0.67* 0.92 0.85 1.02 1.26 0.76 0.96   GLUCOSE mg/dL 87  --  217* 327* 383* 381* 305* 294* 290*   ALBUMIN g/dL  --   --  2.00*  --   --  1.90*  --  2.40* 3.10*   BILIRUBIN mg/dL  --   --  0.3  --   --  0.5  --  0.9 1.1   ALK PHOS U/L  --   --  106  --   --  139*  --  139* 173*   AST (SGOT) U/L  --   --  33  --   --  14  --  21 24   ALT (SGPT) U/L  --   --  20  --   --  20  --  26 31   LIPASE U/L  --   --   --   --   --   --   --   --  13   CALCIUM mg/dL 7.6*  --  7.8* 8.4* 7.9* 8.1* 8.3* 8.6 9.3                 Microbiology   Microbiology Results (last 10 days)     Procedure Component Value - Date/Time    Anaerobic Culture - Tissue, Foot, Right [[id number]] Collected:  04/28/20 1025    Lab Status:  Preliminary result Specimen:  Tissue from Foot, Right Updated:  05/01/20 1445     Anaerobic Culture Screening for Anaerobes    Narrative:       CO2 = Growth Detected 04/29/20 06:54 Edilia May      Tissue / Bone Culture - Tissue, Foot, Right [[id number]]  (Abnormal) Collected:  04/28/20 1025    Lab Status:  Final result Specimen:  Tissue from Foot, Right Updated:  04/30/20 0906     Tissue Culture  Light growth (2+) Streptococcus agalactiae (Group B)     Comment: This organism is considered to be universally susceptible to penicillin.  No further antibiotic testing will be performed. If Clindamycin or Erythromycin is the drug of choice, notify the laboratory within 7 days to request susceptibility testing.         Rare Normal Skin Juliet     Gram Stain Few (2+) WBCs per low power field      Many (4+) Mixed bacterial morphotypes seen on Gram Stain    AFB Culture - Tissue, Foot, Right [[id number]] Collected:  04/28/20 1025    Lab Status:  Preliminary result Specimen:  Tissue from Foot, Right Updated:  04/29/20 0904     AFB Stain No acid fast bacilli seen    Blood Culture - Blood, Hand, Right [[id number]] Collected:  04/27/20 2005    Lab Status:  Preliminary result Specimen:  Blood from Hand, Right Updated:  05/01/20 2015     Blood Culture No growth at 4 days    Blood Culture - Blood, Wrist, Right [[id number]] Collected:  04/27/20 2005    Lab Status:  Preliminary result Specimen:  Blood from Wrist, Right Updated:  05/01/20 2015     Blood Culture No growth at 4 days    Respiratory Panel, PCR - Swab, Nasopharynx [[id number]]  (Normal) Collected:  04/26/20 2032    Lab Status:  Final result Specimen:  Swab from Nasopharynx Updated:  04/26/20 2155     ADENOVIRUS, PCR Not Detected     Coronavirus 229E Not Detected     Coronavirus HKU1 Not Detected     Coronavirus NL63 Not Detected     Coronavirus OC43 Not Detected     Human Metapneumovirus Not Detected     Human Rhinovirus/Enterovirus Not Detected     Influenza B PCR Not Detected     Parainfluenza Virus 1 Not Detected     Parainfluenza Virus 2 Not Detected     Parainfluenza Virus 3 Not Detected     Parainfluenza Virus 4 Not Detected     Bordetella pertussis pcr Not Detected     Influenza A H1 2009 PCR Not Detected     Chlamydophila pneumoniae PCR Not Detected     Mycoplasma pneumo by PCR Not Detected     Influenza A PCR Not Detected     Influenza A H3 Not Detected      Influenza A H1 Not Detected     RSV, PCR Not Detected    Narrative:       The coronavirus on the RVP is NOT COVID-19 and is NOT indicative of infection with COVID-19.     Rapid Strep A Screen - Swab, Throat [[id number]]  (Normal) Collected:  04/26/20 2032    Lab Status:  Final result Specimen:  Swab from Throat Updated:  04/26/20 2049     Strep A Ag Negative    Wound Culture - Swab, Foot, Left [[id number]]  (Abnormal) Collected:  04/26/20 2032    Lab Status:  Final result Specimen:  Swab from Foot, Left Updated:  04/28/20 0719     Wound Culture Moderate growth (3+) Streptococcus agalactiae (Group B)     Comment: This organism is considered to be universally susceptible to penicillin.  No further antibiotic testing will be performed. If Clindamycin or Erythromycin is the drug of choice, notify the laboratory within 7 days to request susceptibility testing.         Light growth (2+) Normal Skin Juliet     Gram Stain Few (2+) WBCs seen      Few (2+) Gram positive cocci      Rare (1+) Gram negative bacilli    SARS-CoV-2 PCR ([facility] IN-HOUSE PERFORMED), NP SWAB IN TRANSPORT MEDIA - Swab, Nasopharynx [[id number]]  (Normal) Collected:  04/26/20 2032    Lab Status:  Final result Specimen:  Swab from Nasopharynx Updated:  04/27/20 1203     COVID19 Not Detected    Blood Culture - Blood, Hand, Left [[id number]]  (Abnormal) Collected:  04/26/20 2004    Lab Status:  Final result Specimen:  Blood from Hand, Left Updated:  04/29/20 0608     Blood Culture Streptococcus agalactiae (Group B)     Comment: Refer to previous blood culture collected on 4/26/2020 at 1939 for KAILA's.          Gram Stain Aerobic Bottle Gram positive cocci in chains      Anaerobic Bottle Gram positive cocci in chains    Blood Culture - Blood, Arm, Right [[id number]]  (Abnormal)  (Susceptibility) Collected:  04/26/20 1939    Lab Status:  Final result Specimen:  Blood from Arm, Right Updated:  04/29/20 0607     Blood Culture Streptococcus agalactiae (Group B)      Gram Stain Anaerobic Bottle Gram positive cocci in chains    Susceptibility      Streptococcus agalactiae (Group B)     KAILA     Ceftriaxone Susceptible     Clindamycin Susceptible     Levofloxacin Susceptible     Penicillin G Susceptible     Vancomycin Susceptible                    Blood Culture ID, PCR - Blood, Arm, Right [[id number]]  (Abnormal) Collected:  04/26/20 1939    Lab Status:  Final result Specimen:  Blood from Arm, Right Updated:  04/27/20 1035     BCID, PCR Streptococcus agalactiae (Group B). Identification by BCID PCR.     BOTTLE TYPE Anaerobic Bottle          Medication Review:       Schedule Meds    aspirin 325 mg Oral Daily   cefTRIAXone 2 g Intravenous Q24H   insulin glargine 24 Units Subcutaneous Nightly   insulin lispro 0-9 Units Subcutaneous TID AC   insulin lispro 10 Units Subcutaneous TID With Meals   metoclopramide 10 mg Intravenous 4x Daily AC & at Bedtime   nitroglycerin 0.5 inch Topical Q6H   polyethylene glycol 17 g Oral Daily   vancomycin 1,750 mg Intravenous Q12H       Infusion Meds    Pharmacy to dose vancomycin     sodium chloride 75 mL/hr Last Rate: 75 mL/hr (05/02/20 1008)       PRN Meds  •  acetaminophen  •  dextrose  •  dextrose  •  glucagon (human recombinant)  •  HYDROcodone-acetaminophen  •  HYDROcodone-acetaminophen  •  ibuprofen  •  insulin lispro **AND** insulin lispro  •  magnesium hydroxide  •  magnesium sulfate **OR** magnesium sulfate **OR** magnesium sulfate  •  melatonin  •  Morphine **AND** naloxone  •  ondansetron **OR** ondansetron  •  Pharmacy to dose vancomycin  •  potassium & sodium phosphates **OR** potassium & sodium phosphates  •  potassium chloride  •  potassium chloride  •  sodium chloride        Assessment/Plan       Antimicrobial Therapy   1.        day  2.        day  3.  IV Rocephin    day  4.      Day  5.      Day      Assessment     Bacteremia-cultures are growing group B streptococcus  -Likely source is right foot wound     Right diabetic foot wound  on both the lateral aspect of the foot by the great toe and the plantar aspect by the fifth toe  -MRI shows osteomyelitis throughout the entire right foot  -Cultures are growing group B streptococcus  -Right partial first partial ray amputation on 2/27/2020-cultures from admission were negative but previous cultures grew methicillin-resistant Staphylococcus aureus, group B streptococcus and Prevotella species.   -Patient had 6 weeks of IV daptomycin IV Rocephin and p.o. Flagyl from the amputation date and was finished on 4/9/2020  -Patient states that he had fallen several times and is hit his right foot which is because the current wound  -4/28/2020-patient had a right TMA but the infection was extending up into his lower leg  The patient eventually underwent right below-knee amputation    Patient admitted with complaints of shortness of breath, body aches, nausea, vomiting  -COVID 19 screen was negative and chest CT was not indicative of a COVID infection   -Patient is on room air     Fever-101.3 degrees-likely related to bacteremia  -Resolving     Recent left big toe amputation secondary to left diabetic foot.  Wound healed completely     Type 1 diabetes with neuropathy     CAD, history of MI with cardiac catheterization           Plan     Continue Rocephin 2 g every 24 hours-patient will need 2 weeks total treatment for the bacteremia  Okay to continue IV vancomycin for short course, 3 days after surgery.  We will discontinue after tomorrow a.m.'s dose  Patient will need a midline before discharge-please remove when IV antibiotics are completed  Check CBC and creatinine 5 days after admission  Continue supportive care  OK to discharge when IV antibiotics are arranged    Please fax all post discharge lab results, imaging studies and correspondence to this fax number [phone]  For any question or concern please contact our service number [phone]    Bobo Garcia MD  05/02/20  14:52     Note is  dictated utilizing voice recognition software/Dragon

## 2020-05-02 NOTE — PROGRESS NOTES
"04/26/2020  Foot and Ankle Surgery - Inpatient Follow-up  Provider: Dr. Adan Souza DPM  Location: [facility]    Chief Complaint: s/p BKA POD#2; left foot check    Subjective:  Maynor Gregg is a 45 y.o. male.     Patient appears to be doing well and in good spirits.  Denies any issues involving the left lower extremity.    Allergies   Allergen Reactions   • Metformin Diarrhea and Nausea And Vomiting   • Oxycodone-Acetaminophen Nausea And Vomiting and Rash       No current facility-administered medications on file prior to encounter.      Current Outpatient Medications on File Prior to Encounter   Medication Sig Dispense Refill   • aspirin 325 MG tablet Take 325 mg by mouth Daily. Pt to stop 2/27 per Angelique     • Insulin Glargine (BASAGLAR KWIKPEN) 100 UNIT/ML injection pen Inject 15 Units under the skin into the appropriate area as directed Every Night.     • NOVOLOG 100 UNIT/ML injection INJECT BY SUBCUTANEOUS ROUTE 5 UNITS EVERY 8 HRS         Objective   /73 (BP Location: Right arm, Patient Position: Lying)   Pulse 83   Temp 97.8 °F (36.6 °C) (Axillary)   Resp 18   Ht 190.5 cm (75\")   Wt 105 kg (230 lb 13.2 oz)   SpO2 93%   BMI 28.85 kg/m²       Results from last 7 days   Lab Units 05/02/20  0409   WBC 10*3/mm3 9.00   HEMOGLOBIN g/dL 9.7*   HEMATOCRIT % 29.2*   PLATELETS 10*3/mm3 396       Assessment/Plan     Patient Active Problem List   Diagnosis   • Diabetic peripheral neuropathy (CMS/HCC)   • Type 1 diabetes mellitus with circulatory complication (CMS/HCC)   • Open wound of second toe of left foot   • Chronic ulcer of right foot, with necrosis of bone (CMS/HCC)   • Chronic ulcer of great toe of right foot, with necrosis of muscle (CMS/HCC)   • Chronic osteomyelitis of right foot (CMS/HCC)   • CAD (coronary artery disease)   • Hypotension   • Dyspnea   • Suspected Covid-19 Virus Infection   • Osteomyelitis of right foot (CMS/HCC)   • Nausea and vomiting   • Chest pain   • " Sepsis, Gram positive (CMS/HCC)       Patient is feeling much better and is doing well after the amputation.  Case was reviewed with Dr. Alcantara.  No current issues are present with his left lower extremity.  I have asked that he monitor his limb closely and call with any issues.  I would like to see him in 4 weeks after discharge for routine foot check.    Note is dictated utilizing voice recognition software. Unfortunately this leads to occasional typographical errors. I apologize in advance if the situation occurs. If questions occur please do not hesitate to call our office.

## 2020-05-02 NOTE — PROGRESS NOTES
Daily Progress Note    Patient Care Team:  Fred Lemons FNP as PCP - General (Family Medicine)    Chief Complaint: Follow-up type 1 diabetes    HPI: Patient seen and examined today.  Doing fairly well.  Eating well.  Did have low blood sugar this morning.  No other complaints at this time.    ROS:   Constitutional:  Denies fatigue, tiredness.    Eyes:  Denies change in visual acuity   HENT:  Denies nasal congestion or sore throat   Respiratory: denies cough, shortness of breath.   Cardiovascular:  denies chest pain, edema   GI:  Denies abdominal pain, nausea, vomiting.   :  Denies polyuria and polydipsia  Musculoskeletal:  Denies back pain or joint pain   Integument:  Denies rash   Neurologic:  Denies headache, focal weakness or sensory changes   Endocrine:  Denies polyuria or polydipsia   Psychiatric:  Denies depression or anxiety       Vitals:    05/02/20 1005   BP: 123/73   Pulse: 83   Resp: 18   Temp: 97.8 °F (36.6 °C)   SpO2:        Physical Exam:  GEN: NAD, conversant  CV: RRR  LUNG: CTA  MSK: Status post right below-knee amputation      Results Review:     I reviewed the patient's new clinical results.    Glucose   Date Value Ref Range Status   05/02/2020 87 65 - 99 mg/dL Final     Sodium   Date Value Ref Range Status   05/02/2020 138 136 - 145 mmol/L Final     Potassium   Date Value Ref Range Status   05/02/2020 3.4 (L) 3.5 - 5.2 mmol/L Final     CO2   Date Value Ref Range Status   05/02/2020 27.0 22.0 - 29.0 mmol/L Final     Chloride   Date Value Ref Range Status   05/02/2020 102 98 - 107 mmol/L Final     Anion Gap   Date Value Ref Range Status   05/02/2020 9.0 5.0 - 15.0 mmol/L Final     Creatinine   Date Value Ref Range Status   05/02/2020 0.69 (L) 0.76 - 1.27 mg/dL Final     BUN   Date Value Ref Range Status   05/02/2020 13 6 - 20 mg/dL Final     BUN/Creatinine Ratio   Date Value Ref Range Status   05/02/2020 18.8 7.0 - 25.0 Final     Calcium   Date Value Ref Range Status   05/02/2020 7.6  (L) 8.6 - 10.5 mg/dL Final     eGFR Non  Amer   Date Value Ref Range Status   05/02/2020 124 >60 mL/min/1.73 Final     Alkaline Phosphatase   Date Value Ref Range Status   05/01/2020 106 39 - 117 U/L Final     Total Protein   Date Value Ref Range Status   05/01/2020 6.2 6.0 - 8.5 g/dL Final     ALT (SGPT)   Date Value Ref Range Status   05/01/2020 20 1 - 41 U/L Final     AST (SGOT)   Date Value Ref Range Status   05/01/2020 33 1 - 40 U/L Final     Total Bilirubin   Date Value Ref Range Status   05/01/2020 0.3 0.2 - 1.2 mg/dL Final     Albumin   Date Value Ref Range Status   05/01/2020 2.00 (L) 3.50 - 5.20 g/dL Final     Globulin   Date Value Ref Range Status   05/01/2020 4.2 gm/dL Final     Lab Results   Component Value Date    HGBA1C 9.3 (H) 04/28/2020    HGBA1C 10.5 (H) 01/21/2020     No results found for: GLUF, MICROALBUR  Results from last 7 days   Lab Units 05/02/20  1107 05/02/20  0812 05/02/20  0719 05/01/20  2042 05/01/20  1625 05/01/20  1152   GLUCOSE mg/dL 143* 127* 68* 128* 154* 225*             Medication Review: Reviewed.       aspirin 325 mg Oral Daily   cefTRIAXone 2 g Intravenous Q24H   insulin glargine 25 Units Subcutaneous Nightly   insulin lispro 0-9 Units Subcutaneous TID AC   insulin lispro 10 Units Subcutaneous TID With Meals   metoclopramide 10 mg Intravenous 4x Daily AC & at Bedtime   nitroglycerin 0.5 inch Topical Q6H   polyethylene glycol 17 g Oral Daily   vancomycin 1,750 mg Intravenous Q12H         Assessment/Plan   1.  Diabetes mellitus type 1: Uncontrolled, had low blood sugar this morning, will decrease Lantus to 24 units at night and continue Humalog 10 with each meal along with sliding scale and follow blood sugars and make further adjustments as needed.   2.  Status post right below-knee amputation on April 30, 2020.  3.  Diabetic neuropathy: Status post right below-knee amputation.             Ezekiel Ron MD. FACE

## 2020-05-02 NOTE — PROGRESS NOTES
"Pharmacy Antimicrobial Dosing Service    Subjective:  Maynor Gregg is a 45 y.o.male admitted with sepsis, gram positive. Pharmacy has been consulted to dose Vancomycin for possible bone and/or joint infection post-op from [facility] on 4/30.      Assessment/Plan    1. Day #3 Vancomycin (restart): Pt previously on vancomycin this admission. Was therapeutic on 1750mg IV q12h. Vancomycin reordered for 3 days post surgery. Trough this AM 1142= 16.7 mcg/ml. Will continue current dose for the remaining therapy. No more levels needed since only 2 more doses to be given.     2. Day #4 Ceftriaxone: 2g IV q24h     Will continue to monitor drug levels, renal function, culture and sensitivities, and patient clinical status.       Objective:  Relevant clinical data and objective history reviewed:  190.5 cm (75\")   105 kg (230 lb 13.2 oz)   Ideal body weight: 84.5 kg (186 lb 4.6 oz)  Adjusted ideal body weight: 92.6 kg (204 lb 1.6 oz)  Body mass index is 28.85 kg/m².    Results from last 7 days   Lab Units 05/02/20  1142 04/28/20  0632 04/28/20  0047   VANCOMYCIN PK mcg/mL  --   --  23.40   VANCOMYCIN TR mcg/mL 16.70 15.80  --      Results from last 7 days   Lab Units 05/02/20  0408 05/01/20  0455 04/30/20  1109   CREATININE mg/dL 0.69* 0.67* 0.92     Estimated Creatinine Clearance: 177.3 mL/min (A) (by C-G formula based on SCr of 0.69 mg/dL (L)).  I/O last 3 completed shifts:  In: 5951.3 [P.O.:480; I.V.:3271.3; IV Piggyback:2200]  Out: 1700 [Urine:1700]    Results from last 7 days   Lab Units 05/02/20  0409 05/01/20  0455 04/30/20  0548   WBC 10*3/mm3 9.00 10.80 13.40*     Temperature    05/02/20 0215 05/02/20 0605 05/02/20 1005   Temp: 98.2 °F (36.8 °C) 98.4 °F (36.9 °C) 97.8 °F (36.6 °C)     Baseline culture/source/susceptibility:  Microbiology Results (last 10 days)       Procedure Component Value - Date/Time    Anaerobic Culture - Tissue, Foot, Right [[id number]] Collected:  04/28/20 1025    Lab Status:  Preliminary result " Specimen:  Tissue from Foot, Right Updated:  05/01/20 1445     Anaerobic Culture Screening for Anaerobes    Narrative:       CO2 = Growth Detected 04/29/20 06:54 Edilia May      Tissue / Bone Culture - Tissue, Foot, Right [[id number]]  (Abnormal) Collected:  04/28/20 1025    Lab Status:  Final result Specimen:  Tissue from Foot, Right Updated:  04/30/20 0906     Tissue Culture Light growth (2+) Streptococcus agalactiae (Group B)     Comment: This organism is considered to be universally susceptible to penicillin.  No further antibiotic testing will be performed. If Clindamycin or Erythromycin is the drug of choice, notify the laboratory within 7 days to request susceptibility testing.         Rare Normal Skin Juliet     Gram Stain Few (2+) WBCs per low power field      Many (4+) Mixed bacterial morphotypes seen on Gram Stain    AFB Culture - Tissue, Foot, Right [[id number]] Collected:  04/28/20 1025    Lab Status:  Preliminary result Specimen:  Tissue from Foot, Right Updated:  04/29/20 0904     AFB Stain No acid fast bacilli seen    Blood Culture - Blood, Hand, Right [[id number]] Collected:  04/27/20 2005    Lab Status:  Preliminary result Specimen:  Blood from Hand, Right Updated:  05/01/20 2015     Blood Culture No growth at 4 days    Blood Culture - Blood, Wrist, Right [[id number]] Collected:  04/27/20 2005    Lab Status:  Preliminary result Specimen:  Blood from Wrist, Right Updated:  05/01/20 2015     Blood Culture No growth at 4 days    Respiratory Panel, PCR - Swab, Nasopharynx [[id number]]  (Normal) Collected:  04/26/20 2032    Lab Status:  Final result Specimen:  Swab from Nasopharynx Updated:  04/26/20 2155     ADENOVIRUS, PCR Not Detected     Coronavirus 229E Not Detected     Coronavirus HKU1 Not Detected     Coronavirus NL63 Not Detected     Coronavirus OC43 Not Detected     Human Metapneumovirus Not Detected     Human Rhinovirus/Enterovirus Not Detected     Influenza B PCR Not Detected      Parainfluenza Virus 1 Not Detected     Parainfluenza Virus 2 Not Detected     Parainfluenza Virus 3 Not Detected     Parainfluenza Virus 4 Not Detected     Bordetella pertussis pcr Not Detected     Influenza A H1 2009 PCR Not Detected     Chlamydophila pneumoniae PCR Not Detected     Mycoplasma pneumo by PCR Not Detected     Influenza A PCR Not Detected     Influenza A H3 Not Detected     Influenza A H1 Not Detected     RSV, PCR Not Detected    Narrative:       The coronavirus on the RVP is NOT COVID-19 and is NOT indicative of infection with COVID-19.     Rapid Strep A Screen - Swab, Throat [[id number]]  (Normal) Collected:  04/26/20 2032    Lab Status:  Final result Specimen:  Swab from Throat Updated:  04/26/20 2049     Strep A Ag Negative    Wound Culture - Swab, Foot, Left [[id number]]  (Abnormal) Collected:  04/26/20 2032    Lab Status:  Final result Specimen:  Swab from Foot, Left Updated:  04/28/20 0719     Wound Culture Moderate growth (3+) Streptococcus agalactiae (Group B)     Comment: This organism is considered to be universally susceptible to penicillin.  No further antibiotic testing will be performed. If Clindamycin or Erythromycin is the drug of choice, notify the laboratory within 7 days to request susceptibility testing.         Light growth (2+) Normal Skin Juliet     Gram Stain Few (2+) WBCs seen      Few (2+) Gram positive cocci      Rare (1+) Gram negative bacilli    SARS-CoV-2 PCR ([place] IN-HOUSE PERFORMED), NP SWAB IN TRANSPORT MEDIA - Swab, Nasopharynx [[id number]]  (Normal) Collected:  04/26/20 2032    Lab Status:  Final result Specimen:  Swab from Nasopharynx Updated:  04/27/20 1203     COVID19 Not Detected    Blood Culture - Blood, Hand, Left [[id number]]  (Abnormal) Collected:  04/26/20 2004    Lab Status:  Final result Specimen:  Blood from Hand, Left Updated:  04/29/20 0608     Blood Culture Streptococcus agalactiae (Group B)     Comment: Refer to previous blood culture collected on  2020 at 1939 for KAILA's.          Gram Stain Aerobic Bottle Gram positive cocci in chains      Anaerobic Bottle Gram positive cocci in chains    Blood Culture - Blood, Arm, Right [[id number]]  (Abnormal)  (Susceptibility) Collected:  20    Lab Status:  Final result Specimen:  Blood from Arm, Right Updated:  20 0607     Blood Culture Streptococcus agalactiae (Group B)     Gram Stain Anaerobic Bottle Gram positive cocci in chains    Susceptibility        Streptococcus agalactiae (Group B)     KAILA     Ceftriaxone Susceptible     Clindamycin Susceptible     Levofloxacin Susceptible     Penicillin G Susceptible     Vancomycin Susceptible                      Blood Culture ID, PCR - Blood, Arm, Right [[id number]]  (Abnormal) Collected:  20    Lab Status:  Final result Specimen:  Blood from Arm, Right Updated:  20 1035     BCID, PCR Streptococcus agalactiae (Group B). Identification by BCID PCR.     BOTTLE TYPE Anaerobic Bottle             Anti-Infectives (From admission, onward)      Ordered     Dose/Rate Route Frequency Start Stop    20 1126  vancomycin IVPB 1750 mg in 0.9% Sodium Chloride (premix) 500 mL  Let    Ordering Provider:  Donn Alcantara MD    1,750 mg Intravenous Every 12 Hours 20 1200 20 1159    20 1056  Pharmacy to dose vancomycin  Let    Ordering Provider:  Donn Alcantara MD     Does not apply Continuous PRN 20 1055 20 1054    20 1637  cefTRIAXone (ROCEPHIN) 2 g in sodium chloride 0.9 % 100 mL IVPB  Review   Ordering Provider:  Donn Alcantara MD    2 g  200 mL/hr over 30 Minutes Intravenous Every 24 Hours 20 1959    20 0114  !Vancomycin Level Draw Needed     Ordering Provider:  Travis Garcia APRN     Does not apply Once 20 0100 20 0218    20 195  vancomycin 2000 mg/500 mL 0.9% NS IVPB ([facility])     Ordering Provider:  Miranda Quintana PA    20 mg/kg × 94.3 kg  Intravenous Once 04/26/20 2100 04/27/20 0108    04/26/20 1952  ceFEPime (MAXIPIME) in SWFI 2g/10ml IV PUSH syringe     Ordering Provider:  Miranda Quintana PA    2 g  over 5 Minutes Intravenous Once 04/26/20 2000 04/26/20 2016            Elisa Levi, PharmD, BCPS  05/02/20 12:43

## 2020-05-02 NOTE — PLAN OF CARE
Problem: Patient Care Overview  Goal: Plan of Care Review  Outcome: Ongoing (interventions implemented as appropriate)  Flowsheets (Taken 5/2/2020 7699)  Plan of Care Reviewed With: patient  Note:   No new changes, PRN medication given for pain.  Goal: Individualization and Mutuality  Outcome: Ongoing (interventions implemented as appropriate)  Goal: Discharge Needs Assessment  Outcome: Ongoing (interventions implemented as appropriate)  Goal: Interprofessional Rounds/Family Conf  Outcome: Ongoing (interventions implemented as appropriate)     Problem: Breathing Pattern Ineffective (Adult)  Goal: Identify Related Risk Factors and Signs and Symptoms  Outcome: Ongoing (interventions implemented as appropriate)  Goal: Effective Oxygenation/Ventilation  Outcome: Ongoing (interventions implemented as appropriate)  Goal: Anxiety/Fear Reduction  Outcome: Ongoing (interventions implemented as appropriate)     Problem: Infection, Risk/Actual (Adult)  Goal: Identify Related Risk Factors and Signs and Symptoms  Outcome: Ongoing (interventions implemented as appropriate)  Goal: Infection Prevention/Resolution  Outcome: Ongoing (interventions implemented as appropriate)     Problem: Sepsis/Septic Shock (Adult)  Goal: Signs and Symptoms of Listed Potential Problems Will be Absent, Minimized or Managed (Sepsis/Septic Shock)  Outcome: Ongoing (interventions implemented as appropriate)     Problem: Fall Risk (Adult)  Goal: Identify Related Risk Factors and Signs and Symptoms  Outcome: Ongoing (interventions implemented as appropriate)  Goal: Absence of Fall  Outcome: Ongoing (interventions implemented as appropriate)     Problem: Skin Injury Risk (Adult)  Goal: Identify Related Risk Factors and Signs and Symptoms  Outcome: Ongoing (interventions implemented as appropriate)  Goal: Skin Health and Integrity  Outcome: Ongoing (interventions implemented as appropriate)

## 2020-05-02 NOTE — PLAN OF CARE
Patients dressing to [facility] remains dry and intact, plans to discharge home tomorrow with midline for IV antibiotics with home health, will follow up with  for staple removal in 14 days, c/o phantom pain at times relieved with medications, will continue to monitor.

## 2020-05-02 NOTE — PROGRESS NOTES
Discharge Planning Assessment  [facility]     Patient Name: Maynor Gregg  MRN: [id number]  Today's Date: 5/2/2020    Admit Date: 4/26/2020      Discharge Plan     Row Name 05/02/20 1502       Plan    Plan  DC plan: home on IV Rocephin 2g every 24hrs x2 weeks. Last dose 5/11/20. [facility] to supply IV abx and [facility] to see pt for  and labs.    Plan Comments  S/W Marylu from [facility], [phone], to be sure that pt in on scheuduld to be seen and educated tomorrow after expected d/c 5/3/20. S/W Filemon from [facility], [phone] to notify of expected d/c needs for 5/3/20.           Dialysis/Infusion      Service Provider Request Status Selected Services Address Phone Number Fax Number    [facility] Accepted N/A [id number] [address] [phone]      Home Medical Care      Service Provider Request Status Selected Services Address Phone Number Fax Number    [facility] Selected Home Health Services [address] [facility] [place] [address] [phone]        Expected Discharge Date and Time     Expected Discharge Date Expected Discharge Time    Apr 30, 2020             Danica Cortes RN

## 2020-05-02 NOTE — PROGRESS NOTES
"      [facility] Medicine Services Daily Progress Note      Hospitalist Team  LOS 6 days      Patient Care Team:  Fred Lemons FNP as PCP - General (Family Medicine)    Patient Location: 269/1      Subjective   Subjective     Chief Complaint / Subjective  Chief Complaint   Patient presents with   • Shortness of Breath         Brief Synopsis of Hospital Course/HPI    The patient is a 45 y.o. male with a history of hypertension, IDDM, CAD and right foot ulcer status post partial first metatarsal ray amputation 2/27/2020.  He was recently discharged on 2/29/2020 with 6 weeks course of Rocephin's/daptomycin/Flagyl.    The patient presented to the ED on 4/26/2020 complaining of 3 days of nausea, vomiting and body aches.  He had presented to his PCP on 4/24/2020 and influenza and strep throat were ruled out.  COVID-19 test was done but ended up coming to the emergency room because of intractable symptoms of vomiting.  He also complains of increased erythema of his right foot.    Date::    4/27/20: Examined in the morning 4/27/2020.  Afebrile. COVID19 ruled out.  4/28/20: ID and podiatry following.  Planned I&Dand TMA.  4/29/20: pain controlled.  Consulted endocrinology  4/30/20: s/p right BKA. Afebrile  5/1/20: Pain controlled.  5/2/20: Feels better.  Fasting blood sugar low.  DC on home ABX 5/3/2020.    Review of Systems   All other systems reviewed and are negative.        Objective   Objective      Vital Signs  Temp:  [97.7 °F (36.5 °C)-98.4 °F (36.9 °C)] 97.8 °F (36.6 °C)  Heart Rate:  [79-89] 83  Resp:  [13-18] 18  BP: (106-123)/(62-73) 123/73  Oxygen Therapy  SpO2: 93 %  Pulse Oximetry Type: Intermittent  Device (Oxygen Therapy): room air  Device (Oxygen Therapy): room air  Flow (L/min): 6  Flowsheet Rows      First Filed Value   Admission Height  190.5 cm (75\") Documented at 04/26/2020 1907   Admission Weight  94.3 kg (208 lb) Documented at 04/26/2020 1907        Intake & Output (last 3 days) "       04/29 0701 - 04/30 0700 04/30 0701 - 05/01 0700 05/01 0701 - 05/02 0700 05/02 0701 - 05/03 0700    P.O. 720 480 240 360    I.V. (mL/kg) 4055 (40.1) 1784 (17) 2271.3 (21.6) 398.8 (3.8)    IV Piggyback 500 1200 1000     Total Intake(mL/kg) 5275 (52.2) 3464 (33) 3511.3 (33.4) 758.8 (7.2)    Urine (mL/kg/hr) 1475 (0.6) 1350 (0.5) 1100 (0.4) 625 (1)    Stool 0   0    Blood        Total Output 1475 1350 1100 625    Net +3800 +2114 +2411.3 +133.8            Stool Unmeasured Occurrence 1 x   1 x        Lines, Drains & Airways    Active LDAs     Name:   Placement date:   Placement time:   Site:   Days:    Peripheral IV 04/27/20 0830 Left;Posterior Hand   04/27/20    0830    Hand   less than 1                  Physical Exam:    Physical Exam   Constitutional: He is oriented to person, place, and time. He is cooperative.   Appears older than stated age.   HENT:   Head: Normocephalic.   Mouth/Throat: Oropharynx is clear and moist and mucous membranes are normal.   Eyes: Pupils are equal, round, and reactive to light. Conjunctivae and EOM are normal.   Neck: Trachea normal and full passive range of motion without pain.   Cardiovascular: Normal rate and regular rhythm.   Pulmonary/Chest: Effort normal and breath sounds normal.   Abdominal: Bowel sounds are normal. There is no tenderness.   Musculoskeletal:   Right BKA        Lymphadenopathy:     He has no cervical adenopathy.   Neurological: He is alert and oriented to person, place, and time. No cranial nerve deficit.   Skin: Skin is warm and dry. No rash noted.   Psychiatric: He has a normal mood and affect. His speech is normal. Cognition and memory are normal.            Wounds (last 24 hours)      LDA Wound     Row Name 05/02/20 0804 05/02/20 0459 05/01/20 2335       Wound 01/18/20 2244 Right heel Diabetic Ulcer    Wound - Properties Group Date first assessed: 01/18/20  - Time first assessed: 2244  -SS Present on Hospital Admission: Y  -SS Side: Right  -SS Location:  heel  -SS Primary Wound Type: Diabetic ulc  -SS Stage, Pressure Injury: unstageable  -SS Wound Outcome: Amputation  -MR       Wound Left foot Incision    Wound - Properties Group Side: Left  -HL Location: foot  -HL Primary Wound Type: Incision  -HL       Wound Right anterior;lower leg Amputation stump    Wound - Properties Group Side: Right  -MR Orientation: anterior;lower  -MR Location: leg  -MR Primary Wound Type: Amputation s  -MR Wound Outcome: Amputation  -MR    Dressing Appearance  dry;intact;no drainage  -AYSHA  dry;intact;no drainage  -MR  dry;intact;no drainage  -MR    Closure  DORIS  -AYSHA  DORIS  -MR  DORIS  -MR    Base  dressing in place, unable to visualize  -AYSHA  dressing in place, unable to visualize  -MR  dressing in place, unable to visualize  -MR       NPWT (Negative Pressure Wound Therapy) 01/22/20 1400 right foot    NPWT (Negative Pressure Wound Therapy) - Properties Group Placement Date: 01/22/20  -MB Placement Time: 1400  -MB Location: right foot  -MB    Row Name 05/01/20 2028 05/01/20 1615          Wound 01/18/20 2244 Right heel Diabetic Ulcer    Wound - Properties Group Date first assessed: 01/18/20  - Time first assessed: 2244  -SS Present on Hospital Admission: Y  -SS Side: Right  -SS Location: heel  -SS Primary Wound Type: Diabetic ulc  -SS Stage, Pressure Injury: unstageable  -SS Wound Outcome: Amputation  -MR       Wound Left foot Incision    Wound - Properties Group Side: Left  -HL Location: foot  -HL Primary Wound Type: Incision  -HL       Wound Right anterior;lower leg Amputation stump    Wound - Properties Group Side: Right  -MR Orientation: anterior;lower  -MR Location: leg  -MR Primary Wound Type: Amputation s  -MR Wound Outcome: Amputation  -MR    Dressing Appearance  dry;intact;no drainage  -MR  dry;intact;no drainage  -RB     Closure  DORIS  -MR  DORIS  -RB     Base  dressing in place, unable to visualize  -MR  dressing in place, unable to visualize  -RB        NPWT (Negative Pressure Wound  Therapy) 01/22/20 1400 right foot    NPWT (Negative Pressure Wound Therapy) - Properties Group Placement Date: 01/22/20  -MB Placement Time: 1400  -MB Location: right foot  -MB      User Key  (r) = Recorded By, (t) = Taken By, (c) = Cosigned By    Initials Name Provider Type    Jess Macedo, RN Registered Nurse    MR Mccauley Ami GONZALEZ, RN Registered Nurse    Bryanna Albarran LPN Licensed Nurse    Wilman Mckeon RN Registered Nurse    Becki Sheets, RN Registered Nurse    Yahaira De La Rosa RN Registered Nurse          Procedures:    Procedure(s):  incision and drain right foot          Results Review:     I reviewed the patient's new clinical results.      Lab Results (last 24 hours)     Procedure Component Value Units Date/Time    Vancomycin, Trough [[id number]]  (Normal) Collected:  05/02/20 1142    Specimen:  Blood Updated:  05/02/20 1228     Vancomycin Trough 16.70 mcg/mL     POC Glucose Once [[id number]]  (Abnormal) Collected:  05/02/20 1107    Specimen:  Blood Updated:  05/02/20 1108     Glucose 143 mg/dL      Comment: Serial Number: [id number]:  [id number]       POC Glucose Once [[id number]]  (Abnormal) Collected:  05/02/20 0812    Specimen:  Blood Updated:  05/02/20 0814     Glucose 127 mg/dL      Comment: Serial Number: [id number]:  [id number]       POC Glucose Once [[id number]]  (Abnormal) Collected:  05/02/20 0719    Specimen:  Blood Updated:  05/02/20 0721     Glucose 68 mg/dL      Comment: Serial Number: [id number]:  [id number]       CBC & Differential [[id number]] Collected:  05/02/20 0409    Specimen:  Blood Updated:  05/02/20 0631    Narrative:       The following orders were created for panel order CBC & Differential.  Procedure                               Abnormality         Status                     ---------                               -----------         ------                     CBC Auto Differential[[id number]]        Abnormal            Final result                  Please view results for these tests on the individual orders.    CBC Auto Differential [[id number]]  (Abnormal) Collected:  05/02/20 0409    Specimen:  Blood Updated:  05/02/20 0631     WBC 9.00 10*3/mm3      RBC 3.33 10*6/mm3      Hemoglobin 9.7 g/dL      Hematocrit 29.2 %      MCV 87.9 fL      MCH 29.2 pg      MCHC 33.2 g/dL      RDW 13.9 %      RDW-SD 43.3 fl      MPV 7.8 fL      Platelets 396 10*3/mm3     Scan Slide [[id number]] Collected:  05/02/20 0409    Specimen:  Blood Updated:  05/02/20 0631     Scan Slide --     Comment: See Manual Differential Results       Manual Differential [[id number]]  (Abnormal) Collected:  05/02/20 0409    Specimen:  Blood Updated:  05/02/20 0631     Neutrophil % 53.0 %      Lymphocyte % 23.0 %      Monocyte % 12.0 %      Eosinophil % 1.0 %      Bands %  4.0 %      Metamyelocyte % 1.0 %      Myelocyte % 3.0 %      Atypical Lymphocyte % 1.0 %      Plasma Cells % 2.0 %      Neutrophils Absolute 5.13 10*3/mm3      Lymphocytes Absolute 2.07 10*3/mm3      Monocytes Absolute 1.08 10*3/mm3      Eosinophils Absolute 0.09 10*3/mm3      RBC Morphology Normal     Toxic Granulation Slight/1+     Vacuolated Neutrophils Slight/1+     Platelet Morphology Normal    Basic Metabolic Panel [[id number]]  (Abnormal) Collected:  05/02/20 0408    Specimen:  Blood Updated:  05/02/20 0536     Glucose 87 mg/dL      BUN 13 mg/dL      Creatinine 0.69 mg/dL      Sodium 138 mmol/L      Potassium 3.4 mmol/L      Chloride 102 mmol/L      CO2 27.0 mmol/L      Calcium 7.6 mg/dL      eGFR Non African Amer 124 mL/min/1.73      BUN/Creatinine Ratio 18.8     Anion Gap 9.0 mmol/L     Narrative:       GFR Normal >60  Chronic Kidney Disease <60  Kidney Failure <15      POC Glucose Once [[id number]]  (Abnormal) Collected:  05/01/20 2042    Specimen:  Blood Updated:  05/01/20 2043     Glucose 128 mg/dL      Comment: Serial Number: [id number]:  [id number]       Blood Culture - Blood, Hand, Right [[id number]]  Collected:  04/27/20 2005    Specimen:  Blood from Hand, Right Updated:  05/01/20 2015     Blood Culture No growth at 4 days    Blood Culture - Blood, Wrist, Right [[id number]] Collected:  04/27/20 2005    Specimen:  Blood from Wrist, Right Updated:  05/01/20 2015     Blood Culture No growth at 4 days    Potassium [[id number]]  (Normal) Collected:  05/01/20 1551    Specimen:  Blood Updated:  05/01/20 1700     Potassium 3.7 mmol/L     POC Glucose Once [[id number]]  (Abnormal) Collected:  05/01/20 1625    Specimen:  Blood Updated:  05/01/20 1626     Glucose 154 mg/dL      Comment: Serial Number: [id number]:  [id number]       Anaerobic Culture - Tissue, Foot, Right [[id number]] Collected:  04/28/20 1025    Specimen:  Tissue from Foot, Right Updated:  05/01/20 1445     Anaerobic Culture Screening for Anaerobes    Narrative:       CO2 = Growth Detected 04/29/20 06:54 Edilia May          No results found for: HGBA1C            Lab Results   Component Value Date    LIPASE 13 04/26/2020     Lab Results   Component Value Date    CHOL 113 04/27/2020    TRIG 137 04/27/2020    HDL 25 (L) 04/27/2020    LDL 61 04/27/2020       Lab Results   Lab Value Date/Time    FINALDX  04/30/2020 0858     Lower extremity, right, below-knee amputation:    Skin ulceration, soft tissue ischemic changes, and gangrenous necrosis    Severe atherosclerotic changes of small vessels    Skin, soft tissue and bone resection margin grossly viable    MONICA/sms       FINALDX  04/28/2020 1023     Specimen #1 (Right foot tissue, debridement):    Gangrenous necrosis of soft tissue    Bone with acute osteomyelitis    Specimen #2 (Right lower extremity, distal foot, amputation):    Gangrenous necrosis of soft tissue    Bone with acute osteomyelitis    JPR/ARASELI/sms          Microbiology Results (last 10 days)     Procedure Component Value - Date/Time    Anaerobic Culture - Tissue, Foot, Right [[id number]] Collected:  04/28/20 1025    Lab Status:  Preliminary  result Specimen:  Tissue from Foot, Right Updated:  05/01/20 1445     Anaerobic Culture Screening for Anaerobes    Narrative:       CO2 = Growth Detected 04/29/20 06:54 Edilia May      Tissue / Bone Culture - Tissue, Foot, Right [[id number]]  (Abnormal) Collected:  04/28/20 1025    Lab Status:  Final result Specimen:  Tissue from Foot, Right Updated:  04/30/20 0906     Tissue Culture Light growth (2+) Streptococcus agalactiae (Group B)     Comment: This organism is considered to be universally susceptible to penicillin.  No further antibiotic testing will be performed. If Clindamycin or Erythromycin is the drug of choice, notify the laboratory within 7 days to request susceptibility testing.         Rare Normal Skin Juliet     Gram Stain Few (2+) WBCs per low power field      Many (4+) Mixed bacterial morphotypes seen on Gram Stain    AFB Culture - Tissue, Foot, Right [[id number]] Collected:  04/28/20 1025    Lab Status:  Preliminary result Specimen:  Tissue from Foot, Right Updated:  04/29/20 0904     AFB Stain No acid fast bacilli seen    Blood Culture - Blood, Hand, Right [[id number]] Collected:  04/27/20 2005    Lab Status:  Preliminary result Specimen:  Blood from Hand, Right Updated:  05/01/20 2015     Blood Culture No growth at 4 days    Blood Culture - Blood, Wrist, Right [[id number]] Collected:  04/27/20 2005    Lab Status:  Preliminary result Specimen:  Blood from Wrist, Right Updated:  05/01/20 2015     Blood Culture No growth at 4 days    Respiratory Panel, PCR - Swab, Nasopharynx [[id number]]  (Normal) Collected:  04/26/20 2032    Lab Status:  Final result Specimen:  Swab from Nasopharynx Updated:  04/26/20 2155     ADENOVIRUS, PCR Not Detected     Coronavirus 229E Not Detected     Coronavirus HKU1 Not Detected     Coronavirus NL63 Not Detected     Coronavirus OC43 Not Detected     Human Metapneumovirus Not Detected     Human Rhinovirus/Enterovirus Not Detected     Influenza B PCR Not Detected      Parainfluenza Virus 1 Not Detected     Parainfluenza Virus 2 Not Detected     Parainfluenza Virus 3 Not Detected     Parainfluenza Virus 4 Not Detected     Bordetella pertussis pcr Not Detected     Influenza A H1 2009 PCR Not Detected     Chlamydophila pneumoniae PCR Not Detected     Mycoplasma pneumo by PCR Not Detected     Influenza A PCR Not Detected     Influenza A H3 Not Detected     Influenza A H1 Not Detected     RSV, PCR Not Detected    Narrative:       The coronavirus on the RVP is NOT COVID-19 and is NOT indicative of infection with COVID-19.     Rapid Strep A Screen - Swab, Throat [[id number]]  (Normal) Collected:  04/26/20 2032    Lab Status:  Final result Specimen:  Swab from Throat Updated:  04/26/20 2049     Strep A Ag Negative    Wound Culture - Swab, Foot, Left [[id number]]  (Abnormal) Collected:  04/26/20 2032    Lab Status:  Final result Specimen:  Swab from Foot, Left Updated:  04/28/20 0719     Wound Culture Moderate growth (3+) Streptococcus agalactiae (Group B)     Comment: This organism is considered to be universally susceptible to penicillin.  No further antibiotic testing will be performed. If Clindamycin or Erythromycin is the drug of choice, notify the laboratory within 7 days to request susceptibility testing.         Light growth (2+) Normal Skin Juliet     Gram Stain Few (2+) WBCs seen      Few (2+) Gram positive cocci      Rare (1+) Gram negative bacilli    SARS-CoV-2 PCR ([facility] IN-HOUSE PERFORMED), NP SWAB IN TRANSPORT MEDIA - Swab, Nasopharynx [[id number]]  (Normal) Collected:  04/26/20 2032    Lab Status:  Final result Specimen:  Swab from Nasopharynx Updated:  04/27/20 1203     COVID19 Not Detected    Blood Culture - Blood, Hand, Left [[id number]]  (Abnormal) Collected:  04/26/20 2004    Lab Status:  Final result Specimen:  Blood from Hand, Left Updated:  04/29/20 0608     Blood Culture Streptococcus agalactiae (Group B)     Comment: Refer to previous blood culture collected on  4/26/2020 at 1939 for KAILA's.          Gram Stain Aerobic Bottle Gram positive cocci in chains      Anaerobic Bottle Gram positive cocci in chains    Blood Culture - Blood, Arm, Right [[id number]]  (Abnormal)  (Susceptibility) Collected:  04/26/20 1939    Lab Status:  Final result Specimen:  Blood from Arm, Right Updated:  04/29/20 0607     Blood Culture Streptococcus agalactiae (Group B)     Gram Stain Anaerobic Bottle Gram positive cocci in chains    Susceptibility      Streptococcus agalactiae (Group B)     KAILA     Ceftriaxone Susceptible     Clindamycin Susceptible     Levofloxacin Susceptible     Penicillin G Susceptible     Vancomycin Susceptible                    Blood Culture ID, PCR - Blood, Arm, Right [[id number]]  (Abnormal) Collected:  04/26/20 1939    Lab Status:  Final result Specimen:  Blood from Arm, Right Updated:  04/27/20 1035     BCID, PCR Streptococcus agalactiae (Group B). Identification by BCID PCR.     BOTTLE TYPE Anaerobic Bottle          ECG/EMG Results (most recent)     Procedure Component Value Units Date/Time    ECG 12 Lead [[id number]] Collected:  04/26/20 1910     Updated:  04/27/20 0831    Narrative:       HEART RATE= 126  bpm  RR Interval= 476  ms  NV Interval= 134  ms  P Horizontal Axis= -12  deg  P Front Axis= 61  deg  QRSD Interval= 77  ms  QT Interval= 336  ms  QRS Axis= 38  deg  T Wave Axis= 32  deg  - OTHERWISE NORMAL ECG -  Sinus tachycardia  When compared with ECG of 24-Jan-2020 12:16:18,  Significant rate increase  Electronically Signed By: Ricki Mendoza) 27-Apr-2020 08:30:58  Date and Time of Study: 2020-04-26 19:10:46               Results for orders placed during the hospital encounter of 01/18/20   Adult Transthoracic Echo Complete W/ Cont if Necessary Per Protocol    Narrative · Left ventricular systolic function is normal.  · Mild mitral valve regurgitation is present  · Mild tricuspid valve regurgitation is present.  · Ejection fraction is about 65%  · No  pericardial effusion noted          Xr Tibia Fibula 2 View Right    Result Date: 4/30/2020  1. Expected postoperative appearance status post right below the knee amputation.  Electronically Signed By-Magno Agarwal On:4/30/2020 11:54 AM This report was finalized on [id number] by  Magno Agarwal, .    Xr Foot 3+ View Right    Result Date: 4/26/2020   1. Previous amputation procedure the right great toe and most of the first metatarsal bone. 2. Questionable osteomyelitis at the amputation site of the distal first metatarsal bone and also at the site of a slightly displaced oblique fracture through the base of the fifth metatarsal bone. There is soft tissue gas consistent with wounds at both locations which appear to extend to the underlying bone. 3. There is a healing nondisplaced fracture through the head of the second metatarsal bone.  Electronically Signed By-Gary Marie On:4/26/2020 7:53 PM This report was finalized on [id number] by  Gary Marie, .    Ct Chest Pulmonary Embolism    Result Date: 4/26/2020  No CTA evidence of pulmonary embolism or acute aortic pathology. No acute cardiopulmonary process seen. Sequela of prior granulomatous disease. Electronically signed by:  Keiko Ann M.D.  4/26/2020 8:44 PM    Mri Foot Right With & Without Contrast    Result Date: 4/27/2020   1. Postoperative changes from amputation of the great toe at the level of mid metatarsal with abundant surrounding callus formation. There is T1 marrow placement, bone marrow edema like signal, and enhancement throughout the first metatarsal, consistent with osteomyelitis. 2. Soft tissue wound at the lateral midfoot/forefoot extending to the nondisplaced fracture at the base of the fifth metatarsal. T1 marrow placement, bone marrow edema like signal, and enhancement throughout the fifth metatarsal is consistent with osteomyelitis. 3. Irregular fluid collection extending from the soft tissue wound at the lateral midfoot/forefoot  and surrounding the second through fifth MTP joints, suspicious for abscess. 4. Nondisplaced fracture of the second metatarsal head with suspected osteomyelitis of the second metatarsal and possible second MTP joint septic arthritis. 5. Patchy bone marrow edema like signal and enhancement multifocal areas of suspected marrow placement in the anterior process of the calcaneus, cuboid, cuneiforms, and the bases of the second, third, and fourth metatarsals, which could represent early osteomyelitis or stress marrow changes. 6. Diffuse soft tissue and muscular edema enhancement, consistent with cellulitis and myositis.    Electronically Signed By-Delbert Dinero On:4/27/2020 6:30 PM This report was finalized on [id number] by  Delbert Dinero, .    Xr Chest Ap    Result Date: 4/26/2020  No active disease.  Electronically Signed By-Gary Marie On:4/26/2020 7:53 PM This report was finalized on [id number] by  Gary Marie, .          Xrays, labs reviewed personally by physician.    Medication Review:   I have reviewed the patient's current medication list      Scheduled Meds    aspirin 325 mg Oral Daily   cefTRIAXone 2 g Intravenous Q24H   insulin glargine 25 Units Subcutaneous Nightly   insulin lispro 0-9 Units Subcutaneous TID AC   insulin lispro 10 Units Subcutaneous TID With Meals   metoclopramide 10 mg Intravenous 4x Daily AC & at Bedtime   nitroglycerin 0.5 inch Topical Q6H   polyethylene glycol 17 g Oral Daily   vancomycin 1,750 mg Intravenous Q12H       Meds Infusions    Pharmacy to dose vancomycin     sodium chloride 75 mL/hr Last Rate: 75 mL/hr (05/02/20 1008)       Meds PRN  •  acetaminophen  •  dextrose  •  dextrose  •  glucagon (human recombinant)  •  HYDROcodone-acetaminophen  •  HYDROcodone-acetaminophen  •  ibuprofen  •  insulin lispro **AND** insulin lispro  •  magnesium hydroxide  •  magnesium sulfate **OR** magnesium sulfate **OR** magnesium sulfate  •  melatonin  •  Morphine **AND** naloxone  •  ondansetron  **OR** ondansetron  •  Pharmacy to dose vancomycin  •  potassium & sodium phosphates **OR** potassium & sodium phosphates  •  potassium chloride  •  potassium chloride  •  sodium chloride      Assessment/Plan   Assessment/Plan     Active Hospital Problems    Diagnosis  POA   • **Sepsis, Gram positive (CMS/[facility]) [A41.89]  Yes     Priority: Medium   • Chest pain [R07.9]  Yes     Priority: High   • Suspected Covid-19 Virus Infection [R68.89]  Yes     Priority: Medium   • Osteomyelitis of right foot (CMS/[facility]) [M86.9]  Yes     Priority: Medium   • Dyspnea [R06.00]  Yes     Priority: Medium   • CAD (coronary artery disease) [I25.10]  Yes     Priority: Medium   • Chronic ulcer of right foot, with necrosis of bone (CMS/[facility]) [L97.514]  Unknown     Priority: Medium   • Diabetic peripheral neuropathy (CMS/[facility]) [E11.42]  Yes     Priority: Medium   • Type 1 diabetes mellitus with circulatory complication (CMS/[facility]) [E10.59]  Yes     Priority: Medium   • Nausea and vomiting [R11.2]  Yes      Resolved Hospital Problems   No resolved problems to display.       MEDICAL DECISION MAKING COMPLEXITY BY PROBLEM:     Sepsis:  -Likely from right foot infection  -s/p right TMA 4/28/20  -s/p right BKA 4/30/20  -2/2 blood culture bottles with group B strep  -Continue vancomycin and rocephin  -Repeat blood cultures x2  -Consulted ID known to patient  -Orthopedics consulted for right BKA  -Plan DC home 5/3/2020 with 2 weeks of Rocephin    Chest pain:  -Cardiac enzymes trended  -Likely noncardiac chest pain  -Patient with known CAD with 2 stents (2010)     Dyspnea:  -Resolved  -COVID 19 ruled out 4/27/2020  -C-reactive protein 30.35, Procalcitonin 0.68  -D-dimer 5.31--> CTA of chest--> no PE    Chronic osteomyelitis of the right foot:  -wound culture 1/20/2020-->MRSA, strep B, Prevotella--> treated with Rocephin/daptomycin/Flagyl  -s/p partial ray amputation 2/27/2020--> tissue/bone culture no growth  -DC on 2/29/2020 on Rocephin/daptomycin/Flagyl  x6 weeks  -Patient last saw Dr. Souza 4/9/2020  -s/p right TMA 4/28/20  -Orthopedics consulted for right BKA      Diabetes type 1  -Continue long-acting home dose  -Accu-Cheks AC  -Sliding scale insulin as needed         VTE Prophylaxis -   Mechanical Order History:     None      Pharmalogical Order History:     Ordered     Dose Route Frequency Stop    04/27/20 0851  enoxaparin (LOVENOX) syringe 40 mg      40 mg SC Nightly --    04/26/20 2320  enoxaparin (LOVENOX) syringe 40 mg  Status:  Discontinued      40 mg SC Daily 04/27/20 0851            Code Status -   Code Status and Medical Interventions:   Ordered at: 04/30/20 1056     Level Of Support Discussed With:    Patient     Code Status:    CPR     Medical Interventions (Level of Support Prior to Arrest):    Full       This patient has been examined wearing appropriate Personal Protective Equipment and discussed with nursing. 05/02/20        Discharge Planning      SLP OP Goals     Row Name 05/01/20 1340          Time Calculation    PT Goal Re-Cert Due Date  05/15/20  -CM       User Key  (r) = Recorded By, (t) = Taken By, (c) = Cosigned By    Initials Name Provider Type    Aziza Hernandez, PT Physical Therapist          Destination      Coordination has not been started for this encounter.      Durable Medical Equipment      Coordination has not been started for this encounter.      Dialysis/Infusion      Coordination has not been started for this encounter.      Home Medical Care      Coordination has not been started for this encounter.      Therapy      Coordination has not been started for this encounter.      Community Resources      Coordination has not been started for this encounter.            Electronically signed by Clay Fonseca DO, 05/02/20, 13:02.  [facility] Hospitalist Team

## 2020-05-03 ENCOUNTER — READMISSION MANAGEMENT (OUTPATIENT)
Dept: CALL CENTER | Facility: HOSPITAL | Age: 45
End: 2020-05-03

## 2020-05-03 VITALS
RESPIRATION RATE: 19 BRPM | WEIGHT: 230.82 LBS | HEART RATE: 77 BPM | OXYGEN SATURATION: 95 % | HEIGHT: 75 IN | SYSTOLIC BLOOD PRESSURE: 141 MMHG | BODY MASS INDEX: 28.7 KG/M2 | DIASTOLIC BLOOD PRESSURE: 84 MMHG | TEMPERATURE: 98.7 F

## 2020-05-03 PROBLEM — Z89.511 S/P BKA (BELOW KNEE AMPUTATION), RIGHT (HCC): Status: ACTIVE | Noted: 2020-05-03

## 2020-05-03 PROBLEM — M86.171 ACUTE OSTEOMYELITIS OF RIGHT FOOT (HCC): Status: ACTIVE | Noted: 2020-05-03

## 2020-05-03 PROBLEM — Z20.822 COVID-19 VIRUS NOT DETECTED: Status: ACTIVE | Noted: 2020-05-03

## 2020-05-03 LAB
ANION GAP SERPL CALCULATED.3IONS-SCNC: 7 MMOL/L (ref 5–15)
BACTERIA SPEC ANAEROBE CULT: ABNORMAL
BASOPHILS # BLD AUTO: 0.1 10*3/MM3 (ref 0–0.2)
BASOPHILS NFR BLD AUTO: 0.6 % (ref 0–1.5)
BUN BLD-MCNC: 10 MG/DL (ref 6–20)
BUN/CREAT SERPL: 13.9 (ref 7–25)
CALCIUM SPEC-SCNC: 7.7 MG/DL (ref 8.6–10.5)
CHLORIDE SERPL-SCNC: 103 MMOL/L (ref 98–107)
CO2 SERPL-SCNC: 28 MMOL/L (ref 22–29)
CREAT BLD-MCNC: 0.72 MG/DL (ref 0.76–1.27)
DEPRECATED RDW RBC AUTO: 43.8 FL (ref 37–54)
EOSINOPHIL # BLD AUTO: 0.2 10*3/MM3 (ref 0–0.4)
EOSINOPHIL NFR BLD AUTO: 1.9 % (ref 0.3–6.2)
ERYTHROCYTE [DISTWIDTH] IN BLOOD BY AUTOMATED COUNT: 14 % (ref 12.3–15.4)
GFR SERPL CREATININE-BSD FRML MDRD: 118 ML/MIN/1.73
GLUCOSE BLD-MCNC: 76 MG/DL (ref 65–99)
GLUCOSE BLDC GLUCOMTR-MCNC: 134 MG/DL (ref 70–105)
GLUCOSE BLDC GLUCOMTR-MCNC: 73 MG/DL (ref 70–105)
HCT VFR BLD AUTO: 29 % (ref 37.5–51)
HGB BLD-MCNC: 9.8 G/DL (ref 13–17.7)
LYMPHOCYTES # BLD AUTO: 3.3 10*3/MM3 (ref 0.7–3.1)
LYMPHOCYTES NFR BLD AUTO: 29.1 % (ref 19.6–45.3)
MAGNESIUM SERPL-MCNC: 2.1 MG/DL (ref 1.6–2.6)
MCH RBC QN AUTO: 29.6 PG (ref 26.6–33)
MCHC RBC AUTO-ENTMCNC: 33.9 G/DL (ref 31.5–35.7)
MCV RBC AUTO: 87.4 FL (ref 79–97)
MONOCYTES # BLD AUTO: 1.3 10*3/MM3 (ref 0.1–0.9)
MONOCYTES NFR BLD AUTO: 11.3 % (ref 5–12)
NEUTROPHILS # BLD AUTO: 6.4 10*3/MM3 (ref 1.7–7)
NEUTROPHILS NFR BLD AUTO: 57.1 % (ref 42.7–76)
NRBC BLD AUTO-RTO: 0.1 /100 WBC (ref 0–0.2)
PLATELET # BLD AUTO: 417 10*3/MM3 (ref 140–450)
PMV BLD AUTO: 8.2 FL (ref 6–12)
POTASSIUM BLD-SCNC: 3.9 MMOL/L (ref 3.5–5.2)
RBC # BLD AUTO: 3.32 10*6/MM3 (ref 4.14–5.8)
SODIUM BLD-SCNC: 138 MMOL/L (ref 136–145)
WBC NRBC COR # BLD: 11.2 10*3/MM3 (ref 3.4–10.8)

## 2020-05-03 PROCEDURE — 25010000002 CEFTRIAXONE PER 250 MG: Performed by: ORTHOPAEDIC SURGERY

## 2020-05-03 PROCEDURE — 99238 HOSP IP/OBS DSCHRG MGMT 30/<: CPT | Performed by: HOSPITALIST

## 2020-05-03 PROCEDURE — 36410 VNPNXR 3YR/> PHY/QHP DX/THER: CPT

## 2020-05-03 PROCEDURE — 85025 COMPLETE CBC W/AUTO DIFF WBC: CPT | Performed by: HOSPITALIST

## 2020-05-03 PROCEDURE — 82962 GLUCOSE BLOOD TEST: CPT

## 2020-05-03 PROCEDURE — 83735 ASSAY OF MAGNESIUM: CPT | Performed by: HOSPITALIST

## 2020-05-03 PROCEDURE — 25010000002 VANCOMYCIN 10 G RECONSTITUTED SOLUTION: Performed by: ORTHOPAEDIC SURGERY

## 2020-05-03 PROCEDURE — 80048 BASIC METABOLIC PNL TOTAL CA: CPT | Performed by: HOSPITALIST

## 2020-05-03 PROCEDURE — 25010000002 MORPHINE PER 10 MG: Performed by: ORTHOPAEDIC SURGERY

## 2020-05-03 PROCEDURE — 25010000002 METOCLOPRAMIDE PER 10 MG: Performed by: ORTHOPAEDIC SURGERY

## 2020-05-03 PROCEDURE — 97110 THERAPEUTIC EXERCISES: CPT

## 2020-05-03 PROCEDURE — 99232 SBSQ HOSP IP/OBS MODERATE 35: CPT | Performed by: INTERNAL MEDICINE

## 2020-05-03 PROCEDURE — C1751 CATH, INF, PER/CENT/MIDLINE: HCPCS

## 2020-05-03 RX ORDER — SODIUM CHLORIDE 0.9 % (FLUSH) 0.9 %
10 SYRINGE (ML) INJECTION EVERY 12 HOURS SCHEDULED
Status: DISCONTINUED | OUTPATIENT
Start: 2020-05-03 | End: 2020-05-03 | Stop reason: HOSPADM

## 2020-05-03 RX ORDER — INSULIN GLARGINE 100 [IU]/ML
21 INJECTION, SOLUTION SUBCUTANEOUS NIGHTLY
Status: DISCONTINUED | OUTPATIENT
Start: 2020-05-03 | End: 2020-05-03 | Stop reason: HOSPADM

## 2020-05-03 RX ORDER — DOXYCYCLINE 100 MG/1
100 CAPSULE ORAL 2 TIMES DAILY
Qty: 10 CAPSULE | Refills: 0 | Status: SHIPPED | OUTPATIENT
Start: 2020-05-03 | End: 2020-07-20

## 2020-05-03 RX ORDER — INSULIN GLARGINE 100 [IU]/ML
24 INJECTION, SOLUTION SUBCUTANEOUS NIGHTLY
Qty: 10 ML | Refills: 3 | Status: SHIPPED | OUTPATIENT
Start: 2020-05-03 | End: 2020-05-03 | Stop reason: HOSPADM

## 2020-05-03 RX ORDER — SODIUM CHLORIDE 0.9 % (FLUSH) 0.9 %
10 SYRINGE (ML) INJECTION AS NEEDED
Status: DISCONTINUED | OUTPATIENT
Start: 2020-05-03 | End: 2020-05-03 | Stop reason: HOSPADM

## 2020-05-03 RX ORDER — HYDROCODONE BITARTRATE AND ACETAMINOPHEN 10; 325 MG/1; MG/1
TABLET ORAL
Qty: 40 TABLET | Refills: 0 | Status: SHIPPED | OUTPATIENT
Start: 2020-05-03 | End: 2020-07-20

## 2020-05-03 RX ADMIN — METOCLOPRAMIDE 10 MG: 5 INJECTION, SOLUTION INTRAMUSCULAR; INTRAVENOUS at 09:29

## 2020-05-03 RX ADMIN — MORPHINE SULFATE 4 MG: 4 INJECTION INTRAVENOUS at 12:08

## 2020-05-03 RX ADMIN — ASPIRIN 325 MG: 325 TABLET ORAL at 09:30

## 2020-05-03 RX ADMIN — METOCLOPRAMIDE 10 MG: 5 INJECTION, SOLUTION INTRAMUSCULAR; INTRAVENOUS at 12:11

## 2020-05-03 RX ADMIN — CEFTRIAXONE SODIUM 2 G: 2 INJECTION, POWDER, FOR SOLUTION INTRAMUSCULAR; INTRAVENOUS at 14:31

## 2020-05-03 RX ADMIN — MORPHINE SULFATE 4 MG: 4 INJECTION INTRAVENOUS at 02:43

## 2020-05-03 RX ADMIN — SODIUM CHLORIDE, PRESERVATIVE FREE 10 ML: 5 INJECTION INTRAVENOUS at 09:29

## 2020-05-03 RX ADMIN — VANCOMYCIN HYDROCHLORIDE 1750 MG: 10 INJECTION, POWDER, LYOPHILIZED, FOR SOLUTION INTRAVENOUS at 00:00

## 2020-05-03 RX ADMIN — HYDROCODONE BITARTRATE AND ACETAMINOPHEN 0.5 TABLET: 10; 325 TABLET ORAL at 00:03

## 2020-05-03 NOTE — CONSULTS
PICC TEAM CONSULT:  Powerglide midline placed for outpatient antibiotics.  Pt tolerated well.  Flushes freely with blood return noted.  RN aware ok to use now

## 2020-05-03 NOTE — PLAN OF CARE
Problem: Patient Care Overview  Goal: Plan of Care Review  Outcome: Ongoing (interventions implemented as appropriate)  Flowsheets  Taken 5/3/2020 1408  Progress: improving  Taken 5/3/2020 1404  Plan of Care Reviewed With: patient  Outcome Summary: Pt is modified independent with transfers in/out of bed and to ambulate to/from bathroom using rw. Issued HEP and able to return demonstrate correctly. Pt should do well with home d/c , will need follow up stump care/pre prosthetic training. PPE used gloves, mask, face shield

## 2020-05-03 NOTE — DISCHARGE SUMMARY
[facility] Medicine Services  DISCHARGE SUMMARY        Prepared For PCP:  Fred Lemons FNP    Patient Name: Maynor Gregg  : 1975  MRN: [id number]      Date of Admission:   2020    Date of Discharge:  5/3/2020    Length of stay:  LOS: 7 days     Hospital Course     Presenting Problem:   Leukocytosis, unspecified type [D72.829]  Dyspnea, unspecified type [R06.00]  Nausea and vomiting, intractability of vomiting not specified, unspecified vomiting type [R11.2]  Osteomyelitis of right foot, unspecified type (CMS/[facility]) [M86.9]  Suspected Covid-19 Virus Infection [R68.89]  Dyspnea, unspecified type [R06.00]  Below-knee amputation of right lower extremity (CMS/[facility]) [S88.111A]      Active Hospital Problems    Diagnosis  POA   • **Sepsis, Gram positive (CMS/[facility]) [A41.89]  Yes     Priority: Medium   • Acute osteomyelitis of right foot (CMS/[facility]) [M86.171]  Yes     Priority: High   • Covid-19 Virus not Detected [AYG5692]  Yes     Priority: High   • S/P BKA (below knee amputation), right (CMS/[facility]) [Z89.511]  Not Applicable     Priority: High   • Osteomyelitis of right foot (CMS/[facility]) [M86.9]  Yes     Priority: High   • Chest pain [R07.9]  Yes     Priority: High   • Chronic ulcer of right foot, with necrosis of bone (CMS/[facility]) [L97.514]  Unknown     Priority: High   • Nausea and vomiting [R11.2]  Yes     Priority: Medium   • Dyspnea [R06.00]  Yes     Priority: Medium   • CAD (coronary artery disease) [I25.10]  Yes     Priority: Medium   • Diabetic peripheral neuropathy (CMS/[facility]) [E11.42]  Yes     Priority: Medium   • 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           Hospital Course:    The patient was initially admitted to the PCU to rule out COVID 19.  Empiric antibiotics were given for right foot cellulitis.  MRI of the right foot on 2020 showed  osteomyelitis of right foot.  Podiatry evaluated the patient and underwent incision and drainage and right TMA on 4/28/2020.  Infectious disease was consulted after COVID-19 was ruled out.  The patient was having persistent fevers and evidence of tissue devitalization thus orthopedic surgeon was consulted and right BKA was done on 4/30/2020.  Blood cultures from 4/26/2020 grew group B streptococcus which was treated with Rocephin.  Repeat blood cultures on 4/27/2020 were negative for growth.  The patient was also seen by endocrinologist to manage uncontrolled diabetes mellitus.  The patient will be discharged home on 5/3/2020 after PICC line placement with plans to complete 2 weeks of Rocephin therapy.    List of problems during hospitalization:      Sepsis with group B Streptococcus bacteremia:  -Secondary to right foot infection as source  -s/p right TMA 4/28/20  -Patient continued to have devitalized tissue and drainage thus s/p right BKA 4/30/20 by orthopedics  -2/2 blood culture bottles on 4/26/2020--> grew group B strep--> will need 2 weeks of Rocephin on  -Repeat blood cultures x2 on 4/27/2020--> no growth  -Consulted ID known to patient  -Plan DC home 5/3/2020 with 2 weeks of Rocephin and oral doxycycline     Chest pain:  -Cardiac enzymes trended and negative--> MI ruled out  -Atypical for angina or cardiac chest pain  -Patient with known CAD with 2 stents (2010)     Dyspnea (POA):  -Resolved.  -Likely from sepsis  -COVID 19 ruled out 4/27/2020  -C-reactive protein 30.35, Procalcitonin 0.68  -D-dimer 5.31--> CTA of chest--> no PE     Osteomyelitis of the right foot:  -s/p MRI of right foot 4/26/2020  -wound culture 1/20/2020-->MRSA, strep B, Prevotella--> treated with Rocephin/daptomycin/Flagyl  -s/p partial ray amputation 2/27/2020--> tissue/bone culture no growth  -DC on 2/29/2020 on Rocephin/daptomycin/Flagyl x6 weeks  -s/p right TMA 4/28/20  -s/p right BKA 4/30/2020       Diabetes type 1 with peripheral  neuropathy  -Discharged on Lantus 24 units nightly and lispro 10 units with meals       Recommendation for Outpatient Providers:             Reasons For Change In Medications and Indications for New Medications:        Day of Discharge     HPI:     The patient is a 45 y.o. male with a history of hypertension, IDDM, CAD and right foot ulcer s/p partial first metatarsal ray amputation 2/27/2020.  He was recently discharged on 2/29/2020 with 6 weeks course of Rocephin's/daptomycin/Flagyl.     The patient presented to the ED on 4/26/2020 complaining of 3 days of nausea, vomiting and body aches.  He had presented to his PCP on 4/24/2020 and influenza and strep throat were ruled out.  COVID-19 test was done but ended up coming to the emergency room because of intractable vomiting.  He also complained of increased erythema of his right foot.     Vital Signs:   Temp:  [98.1 °F (36.7 °C)-98.7 °F (37.1 °C)] 98.7 °F (37.1 °C)  Heart Rate:  [74-92] 77  Resp:  [13-19] 19  BP: (125-147)/(67-86) 141/84     Physical Exam:  Physical Exam   Constitutional: He is oriented to person, place, and time. He appears well-developed.   HENT:   Head: Normocephalic and atraumatic.   Eyes: Pupils are equal, round, and reactive to light. EOM are normal.   Neck: Normal range of motion. Neck supple.   Cardiovascular: Normal rate and regular rhythm.   Pulmonary/Chest: Effort normal and breath sounds normal.   Abdominal: Soft. Bowel sounds are normal.   Musculoskeletal:   Right AKA stump with clean and dry dressing.   Lymphadenopathy:     He has no cervical adenopathy.   Neurological: He is alert and oriented to person, place, and time.   Skin: Skin is warm.   Psychiatric: He has a normal mood and affect. His speech is normal.       Pertinent  and/or Most Recent Results     Results from last 7 days   Lab Units 05/03/20  0414 05/02/20  1824 05/02/20  0409 05/02/20  0408 05/01/20  1551 05/01/20  0455 04/30/20  1109 04/30/20  0548 04/29/20  0544  04/28/20  0632 04/27/20  0208   WBC 10*3/mm3 11.20*  --  9.00  --   --  10.80  --  13.40* 19.00* 18.10* 20.30*   HEMOGLOBIN g/dL 9.8*  --  9.7*  --   --  9.9*  --  10.1* 11.2* 11.2* 11.8*   HEMATOCRIT % 29.0*  --  29.2*  --   --  28.5*  --  30.1* 33.9* 34.3* 33.7*   PLATELETS 10*3/mm3 417  --  396  --   --  312  --  330 332 296 348   SODIUM mmol/L 138  --   --  138  --  133* 133* 129* 129* 129* 132*   POTASSIUM mmol/L 3.9 4.1  --  3.4* 3.7 3.2* 3.4* 3.3* 3.9 3.7 3.6   CHLORIDE mmol/L 103  --   --  102  --  98 96* 96* 94* 90* 90*   CO2 mmol/L 28.0  --   --  27.0  --  27.0 27.0 25.0 22.0 26.0 27.0   BUN mg/dL 10  --   --  13  --  18 31* 31* 32* 31* 18   CREATININE mg/dL 0.72*  --   --  0.69*  --  0.67* 0.92 0.85 1.02 1.26 0.76   GLUCOSE mg/dL 76  --   --  87  --  217* 327* 383* 381* 305* 294*   CALCIUM mg/dL 7.7*  --   --  7.6*  --  7.8* 8.4* 7.9* 8.1* 8.3* 8.6     Results from last 7 days   Lab Units 05/01/20  0455 04/29/20  0544 04/27/20  0208 04/26/20  1939   BILIRUBIN mg/dL 0.3 0.5 0.9 1.1   ALK PHOS U/L 106 139* 139* 173*   ALT (SGPT) U/L 20 20 26 31   AST (SGOT) U/L 33 14 21 24     Results from last 7 days   Lab Units 04/27/20  0208   CHOLESTEROL mg/dL 113   TRIGLYCERIDES mg/dL 137   HDL CHOL mg/dL 25*     Results from last 7 days   Lab Units 04/30/20  0022 04/28/20  0632 04/27/20  0815 04/27/20  0208 04/26/20  1950 04/26/20  1939   TSH uIU/mL  --  1.040  --  0.421  --   --    HEMOGLOBIN A1C %  --  9.3*  --   --   --   --    PROBNP pg/mL  --   --   --   --   --  500.3*   TROPONIN T ng/mL  --   --  <0.010 0.019  --  <0.010   PROCALCITONIN ng/mL  --  31.80*  --   --   --  0.68*   LACTATE mmol/L 1.3  --   --  1.4 3.0*  --        Brief Urine Lab Results  (Last result in the past 365 days)      Color   Clarity   Blood   Leuk Est   Nitrite   Protein   CREAT   Urine HCG        01/24/20 1445 Dark Yellow  Comment:  Result checked  Clear Negative Negative Negative Negative               Microbiology Results Abnormal      Procedure Component Value - Date/Time    Anaerobic Culture - Tissue, Foot, Right [[id number]]  (Abnormal) Collected:  04/28/20 1025    Lab Status:  Final result Specimen:  Tissue from Foot, Right Updated:  05/03/20 1259     Anaerobic Culture Bacteroides species     Comment: Bacteroides pyogenes         Narrative:             AFB Culture - Tissue, Foot, Right [[id number]] Collected:  04/28/20 1025    Lab Status:  Preliminary result Specimen:  Tissue from Foot, Right Updated:  05/03/20 1045     AFB Culture No AFB isolated at less than 1 week     AFB Stain No acid fast bacilli seen    Blood Culture - Blood, Hand, Right [[id number]] Collected:  04/27/20 2005    Lab Status:  Final result Specimen:  Blood from Hand, Right Updated:  05/02/20 2015     Blood Culture No growth at 5 days    Blood Culture - Blood, Wrist, Right [[id number]] Collected:  04/27/20 2005    Lab Status:  Final result Specimen:  Blood from Wrist, Right Updated:  05/02/20 2015     Blood Culture No growth at 5 days    Tissue / Bone Culture - Tissue, Foot, Right [[id number]]  (Abnormal) Collected:  04/28/20 1025    Lab Status:  Final result Specimen:  Tissue from Foot, Right Updated:  04/30/20 0906     Tissue Culture Light growth (2+) Streptococcus agalactiae (Group B)     Comment: This organism is considered to be universally susceptible to penicillin.  No further antibiotic testing will be performed. If Clindamycin or Erythromycin is the drug of choice, notify the laboratory within 7 days to request susceptibility testing.         Rare Normal Skin Juliet     Gram Stain Few (2+) WBCs per low power field      Many (4+) Mixed bacterial morphotypes seen on Gram Stain    Blood Culture - Blood, Hand, Left [[id number]]  (Abnormal) Collected:  04/26/20 2004    Lab Status:  Final result Specimen:  Blood from Hand, Left Updated:  04/29/20 0608     Blood Culture Streptococcus agalactiae (Group B)     Comment: Refer to previous blood culture collected on 4/26/2020 at 1939  for KAILA's.          Gram Stain Aerobic Bottle Gram positive cocci in chains      Anaerobic Bottle Gram positive cocci in chains    Blood Culture - Blood, Arm, Right [[id number]]  (Abnormal)  (Susceptibility) Collected:  04/26/20 1939    Lab Status:  Final result Specimen:  Blood from Arm, Right Updated:  04/29/20 0607     Blood Culture Streptococcus agalactiae (Group B)     Gram Stain Anaerobic Bottle Gram positive cocci in chains    Susceptibility      Streptococcus agalactiae (Group B)     KAILA     Ceftriaxone Susceptible     Clindamycin Susceptible     Levofloxacin Susceptible     Penicillin G Susceptible     Vancomycin Susceptible                    Wound Culture - Swab, Foot, Left [[id number]]  (Abnormal) Collected:  04/26/20 2032    Lab Status:  Final result Specimen:  Swab from Foot, Left Updated:  04/28/20 0719     Wound Culture Moderate growth (3+) Streptococcus agalactiae (Group B)     Comment: This organism is considered to be universally susceptible to penicillin.  No further antibiotic testing will be performed. If Clindamycin or Erythromycin is the drug of choice, notify the laboratory within 7 days to request susceptibility testing.         Light growth (2+) Normal Skin Juliet     Gram Stain Few (2+) WBCs seen      Few (2+) Gram positive cocci      Rare (1+) Gram negative bacilli    SARS-CoV-2 PCR ([place] IN-HOUSE PERFORMED), NP SWAB IN TRANSPORT MEDIA - Swab, Nasopharynx [[id number]]  (Normal) Collected:  04/26/20 2032    Lab Status:  Final result Specimen:  Swab from Nasopharynx Updated:  04/27/20 1203     COVID19 Not Detected    Blood Culture ID, PCR - Blood, Arm, Right [[id number]]  (Abnormal) Collected:  04/26/20 1939    Lab Status:  Final result Specimen:  Blood from Arm, Right Updated:  04/27/20 1035     BCID, PCR Streptococcus agalactiae (Group B). Identification by BCID PCR.     BOTTLE TYPE Anaerobic Bottle    Respiratory Panel, PCR - Swab, Nasopharynx [[id number]]  (Normal) Collected:  04/26/20  2032    Lab Status:  Final result Specimen:  Swab from Nasopharynx Updated:  04/26/20 2155     ADENOVIRUS, PCR Not Detected     Coronavirus 229E Not Detected     Coronavirus HKU1 Not Detected     Coronavirus NL63 Not Detected     Coronavirus OC43 Not Detected     Human Metapneumovirus Not Detected     Human Rhinovirus/Enterovirus Not Detected     Influenza B PCR Not Detected     Parainfluenza Virus 1 Not Detected     Parainfluenza Virus 2 Not Detected     Parainfluenza Virus 3 Not Detected     Parainfluenza Virus 4 Not Detected     Bordetella pertussis pcr Not Detected     Influenza A H1 2009 PCR Not Detected     Chlamydophila pneumoniae PCR Not Detected     Mycoplasma pneumo by PCR Not Detected     Influenza A PCR Not Detected     Influenza A H3 Not Detected     Influenza A H1 Not Detected     RSV, PCR Not Detected    Narrative:       The coronavirus on the RVP is NOT COVID-19 and is NOT indicative of infection with COVID-19.     Rapid Strep A Screen - Swab, Throat [[id number]]  (Normal) Collected:  04/26/20 2032    Lab Status:  Final result Specimen:  Swab from Throat Updated:  04/26/20 2049     Strep A Ag Negative          Xr Tibia Fibula 2 View Right    Result Date: 4/30/2020  Impression: 1. Expected postoperative appearance status post right below the knee amputation.  Electronically Signed By-Magno Agarwal On:4/30/2020 11:54 AM This report was finalized on [id number] by  Magno Agarwal, .    Xr Foot 3+ View Right    Result Date: 4/26/2020  Impression:  1. Previous amputation procedure the right great toe and most of the first metatarsal bone. 2. Questionable osteomyelitis at the amputation site of the distal first metatarsal bone and also at the site of a slightly displaced oblique fracture through the base of the fifth metatarsal bone. There is soft tissue gas consistent with wounds at both locations which appear to extend to the underlying bone. 3. There is a healing nondisplaced fracture through the  head of the second metatarsal bone.  Electronically Signed By-Gary Marie On:4/26/2020 7:53 PM This report was finalized on [id number] by  Gary Marie, .    Ct Chest Pulmonary Embolism    Result Date: 4/26/2020  Impression: No CTA evidence of pulmonary embolism or acute aortic pathology. No acute cardiopulmonary process seen. Sequela of prior granulomatous disease. Electronically signed by:  Keiko Ann M.D.  4/26/2020 8:44 PM    Mri Foot Right With & Without Contrast    Result Date: 4/27/2020  Impression:  1. Postoperative changes from amputation of the great toe at the level of mid metatarsal with abundant surrounding callus formation. There is T1 marrow placement, bone marrow edema like signal, and enhancement throughout the first metatarsal, consistent with osteomyelitis. 2. Soft tissue wound at the lateral midfoot/forefoot extending to the nondisplaced fracture at the base of the fifth metatarsal. T1 marrow placement, bone marrow edema like signal, and enhancement throughout the fifth metatarsal is consistent with osteomyelitis. 3. Irregular fluid collection extending from the soft tissue wound at the lateral midfoot/forefoot and surrounding the second through fifth MTP joints, suspicious for abscess. 4. Nondisplaced fracture of the second metatarsal head with suspected osteomyelitis of the second metatarsal and possible second MTP joint septic arthritis. 5. Patchy bone marrow edema like signal and enhancement multifocal areas of suspected marrow placement in the anterior process of the calcaneus, cuboid, cuneiforms, and the bases of the second, third, and fourth metatarsals, which could represent early osteomyelitis or stress marrow changes. 6. Diffuse soft tissue and muscular edema enhancement, consistent with cellulitis and myositis.    Electronically Signed ByJillian Dinero On:4/27/2020 6:30 PM This report was finalized on [id number] by  Delbert Dinero, .    Xr Chest Ap    Result Date:  4/26/2020  Impression: No active disease.  Electronically Signed By-Gary Marie On:4/26/2020 7:53 PM This report was finalized on [id number] by  Gary Marie, .                Results for orders placed during the hospital encounter of 01/18/20   Adult Transthoracic Echo Complete W/ Cont if Necessary Per Protocol    Narrative · Left ventricular systolic function is normal.  · Mild mitral valve regurgitation is present  · Mild tricuspid valve regurgitation is present.  · Ejection fraction is about 65%  · No pericardial effusion noted                  Test Results Pending at Discharge   Order Current Status    AFB Culture - Tissue, Foot, Right Preliminary result            Procedures Performed  Procedure(s):  AMPUTATION BELOW KNEE         Consults:   Consults     Date and Time Order Name Status Description    4/28/2020 1609 Inpatient Orthopedic Surgery Consult Completed     4/27/2020 1713 Inpatient Infectious Diseases Consult Completed     4/27/2020 0244 Inpatient Podiatry Consult Completed             Discharge Details        Discharge Medications      New Medications      Instructions Start Date   cefTRIAXone 2 g in sodium chloride 0.9 % 100 mL IVPB   2 g, Intravenous, Every 24 Hours      doxycycline 100 MG capsule  Commonly known as:  MONODOX   100 mg, Oral, 2 Times Daily      HYDROcodone-acetaminophen  MG per tablet  Commonly known as:  Norco   1 or 2 p.o. every 4 hours as needed      insulin detemir 100 UNIT/ML injection  Commonly known as:  Levemir   21 Units, Subcutaneous, Nightly      insulin lispro 100 UNIT/ML injection  Commonly known as:  humaLOG   7 Units, Subcutaneous, 3 Times Daily With Meals         Continue These Medications      Instructions Start Date   aspirin 325 MG tablet   325 mg, Oral, Daily, Pt to stop 2/27 per Angelique         Stop These Medications    Insulin Glargine 100 UNIT/ML injection pen  Commonly known as:  KYAAGLAR KWIKPEN     NovoLOG 100 UNIT/ML injection  Generic drug:  insulin  aspart            Allergies   Allergen Reactions   • Metformin Diarrhea and Nausea And Vomiting   • Oxycodone-Acetaminophen Nausea And Vomiting and Rash         Discharge Disposition:  Home or Self Care    Diet:  Hospital:  Diet Order   Procedures   • Diet Diabetic/Consistent Carbs; Diabetic - Consistent Carb         Discharge Activity:   Activity Instructions     Up ad isi with rolling walker                 CODE STATUS:    Code Status and Medical Interventions:   Ordered at: 04/30/20 1056     Level Of Support Discussed With:    Patient     Code Status:    CPR     Medical Interventions (Level of Support Prior to Arrest):    Full         Follow-up Appointments  No future appointments.    Additional Instructions for the Follow-ups that You Need to Schedule     Ambulatory Referral to Home Health   As directed      Face to Face Visit Date:  5/1/2020    Follow-up provider for Plan of Care?:  I treated the patient in an acute care facility and will not continue treatment after discharge.    Follow-up provider:  FRED LEMONS [[id number]]    Reason/Clinical Findings:  Bacteremia/BKA   -   IV ABX    Describe mobility limitations that make leaving home difficult:  Bacteremia/BKA   -   IV ABX    Nursing/Therapeutic Services Requested:  Other (Eval and Treat )    Frequency:  1 Week 1         Discharge Follow-up with PCP   As directed       Currently Documented PCP:    Fred Lemons FNP    PCP Phone Number:    [phone]     Follow Up Details:  2 weeks               Condition on Discharge:      Stable    Electronically signed by Clay Fonseca DO, 05/03/20, 11:37 AM.    Time: I spent  20 minutes on this discharge activity which included face-to-face encounter with the patient/reviewing the data in the system/coordination of the care with the nursing staff as well as consultants/documentation/entering orders.

## 2020-05-03 NOTE — DISCHARGE PLACEMENT REQUEST
"Ebenezer Gregg (45 y.o. Male)     Date of Birth Social Security Number Address Home Phone MRN    1975  [address] [address] [phone] [id number]    Zoroastrian Marital Status          Hindu Single       Admission Date Admission Type Admitting Provider Attending Provider Department, Room/Bed    4/26/20 Emergency Clay Fonseca, Clay Franco DO [facility] NEURO HEART, 269/1    Discharge Date Discharge Disposition Discharge Destination         Home or Self Care              Attending Provider:  Clay Fonseca DO    Allergies:  Metformin, Oxycodone-acetaminophen    Isolation:  None   Infection:  None   Code Status:  CPR    Ht:  190.5 cm (75\")   Wt:  105 kg (230 lb 13.2 oz)    Admission Cmt:  None   Principal Problem:  Sepsis, Gram positive (CMS/[facility]) [A41.89]                 Active Insurance as of 4/26/2020     Primary Coverage     Payor Plan Insurance Group Employer/Plan Group    [employer] [facility] [employer] [facility] PPO [id number]     Payor Plan Address Payor Plan Phone Number Payor Plan Fax Number Effective Dates    [address] [phone]  10/13/2019 - None Entered    [facility] [id number]       Subscriber Name Subscriber Birth Date Member ID       EBENEZER GREGG 1975 [id number]                 Emergency Contacts      (Rel.) Home Phone Work Phone Mobile Phone    Marsha Garza (Relative) -- -- [phone]            sodium chloride 0.9 % flush 10 mL [[id number]] (Order [id number])   Order   Date: 5/3/2020 Department: [facility] NEURO HEART Ordering/Authorizing: Bobo Garcia MD   Medication   sodium chloride 0.9 % flush 10 mL [[id number]]   sodium chloride 0.9 % flush 10 mL   [[id number]]   Order Details   Ordered Dose: 10 mL Route: Intravenous Frequency: As Needed for Line Care, After Medication Administration   Administration Dose: 10 mL      Scheduled Start Date/Time: 05/03/20 1509 End Date/Time: " --        Order Status: Active   Ordering User: Janey Garsia RN Ordering Date/Time: Sun May 3, 2020 1512   Ordering Provider: Bobo Garcia MD Authorizing Provider: Bobo Garcia MD         Order part of panel:  Midline Care / Maintenance - 1 [place]   Order History   Inpatient   Date/Time Action Taken User Additional Information   05/03/20 1512 Sign Janey Garsia RN Ordering Mode: Per protocol: no cosign required   05/03/20 1512 Rx Verify  Auto Verification   05/03/20 1512 Acknowledge Yahaira Rowley RN New Order   Outpatient Morphine Equivalent Daily Dose (MEDD)   Expand All  Collapse All   5/3/20 and after   Unknown                         Calculation Information            Orders with any of the following pharmaceutical classes: Minerals & Electrolytes     Name Dose Frequency Start Date End Date Medication Warnings Interventions? Order Mode    sodium chloride 0.9 % flush 10 mL 10 mL Every 12 Hours Scheduled 05/03/20 2100    Inpatient    potassium & sodium phosphates (PHOS-NAK) 280-160-250 MG packet - for Phosphorus 1.25 - 2.5 mg/dL 2 packet Every 6 Hours PRN 05/01/20 0741    Inpatient    potassium & sodium phosphates (PHOS-NAK) 280-160-250 MG packet - for Phosphorus less than 1.25 mg/dL 2 packet Every 6 Hours PRN 05/01/20 0741    Inpatient    Magnesium Sulfate 4 gram infusion- Mg 1.6-1.9 mg/dL 4 g As Needed 05/01/20 0741    Inpatient    Magnesium Sulfate 2 gram / 50mL Infusion (GIVE X 3 BAGS TO EQUAL 6GM TOTAL DOSE) - Mg 1.1 - 1.5 mg/dl 2 g As Needed 05/01/20 0741    Inpatient    Verbal Order Info     Action Created on Order Mode Entered by Responsible Provider Signed by Signed on   Ordering 05/03/20 1512 Per protocol: no cosign required Janey Garsia RN      Most Recent Dispense Information     Action User: Janey Garsia RN Action Type: Auto Verification    Dispense Pharmacy:  [facility] Dispense Pharmacy:  [facility]   Dispense Code: Unit Dose Cart Group: Unit Dose  Dispense Interval: --   Triggered Fill: No Dispense Once: No Do Not Dispense: No   Patient Supplied Medication: No Self Administered: No    Dispense Individual Ingredients: No     Patient Class: Inpatient        Pharmacy Actions     Date/Time Type User Pharmacy   [place] May 3, 2020 1512 Dispense Janey Garsia, ALESSIA  [facility]   [place] May 3, 2020 1512 Verify Janey Garsia RN  [facility]   Acknowledgement Info     For At Acknowledged By Acknowledged On   Placing Order 20 Yahaira Rowley RN 20   Unread messages by nursing     No unread messages for this order.   Most Recent Administration     No Administrations Recorded   Warnings History     No Interaction Warnings Shown    Order Audit Trail     Number of times this order has been changed since signin   Order Audit [place]   E-Prescribing Status     Hospital Medication Detail      Dose Frequency     sodium chloride 0.9 % flush 10 mL 10 mL As Needed     Class: Normal     Route: Intravenous     Ordered from Order Set:  IP NURSING LINE CARE STANDING ORDERS     Other Panel Orders     Hospital Medications      Dose Frequency     sodium chloride 0.9 % flush 10 mL 10 mL Every 12 Hours Scheduled     Class: Normal     Route: Intravenous     Ordered from Order Set:  IP NURSING LINE CARE STANDING ORDERS     Procedure Orders     MIDLINE CARE - Change CHG Dressing or Transparent Dressing With CHG Disk and Securement Device Every 7 Days   Weekly, First occurrence on Sun 5/3/20 at 1510   Event History        Event History   Tracking Reports     Cosign Tracking Order Transmittal Tracking     sodium chloride 0.9 % flush 10 mL [[id number]] (Order [id number])   Order   Date: 5/3/2020 Department: [facility] NEURO HEART Ordering/Authorizing: Boob Garcia MD   Medication   sodium chloride 0.9 % flush 10 mL [[id number]]   sodium chloride 0.9 % flush 10 mL   [[id number]]   Order Details   Ordered Dose: 10 mL Route: Intravenous Frequency: Every 12  Hours Scheduled   Administration Dose: 10 mL      Scheduled Start Date/Time: 05/03/20 2100 End Date/Time: -- First Dose: As Scheduled      Order Status: Active   Ordering User: Janey Garsia RN Ordering Date/Time: Sun May 3, 2020 1512   Ordering Provider: Bobo Garcia MD Authorizing Provider: Bobo Garcia MD         Order part of panel:  Midline Care / Maintenance - 1 [place]   Order History   Inpatient   Date/Time Action Taken User Additional Information   05/03/20 1512 Sign Janey Garsia RN Ordering Mode: Per protocol: no cosign required   05/03/20 1512 Rx Verify  Auto Verification   05/03/20 1512 Acknowledge Yahaira Rowley RN New Order   Outpatient Morphine Equivalent Daily Dose (MEDD)   Expand All  Collapse All   5/3/20 and after   Unknown                         Calculation Information            Orders with any of the following pharmaceutical classes: Minerals & Electrolytes     Name Dose Frequency Start Date End Date Medication Warnings Interventions? Order Mode    sodium chloride 0.9 % flush 10 mL 10 mL As Needed 05/03/20 1509    Inpatient    potassium & sodium phosphates (PHOS-NAK) 280-160-250 MG packet - for Phosphorus 1.25 - 2.5 mg/dL 2 packet Every 6 Hours PRN 05/01/20 0741    Inpatient    potassium & sodium phosphates (PHOS-NAK) 280-160-250 MG packet - for Phosphorus less than 1.25 mg/dL 2 packet Every 6 Hours PRN 05/01/20 0741    Inpatient    Magnesium Sulfate 4 gram infusion- Mg 1.6-1.9 mg/dL 4 g As Needed 05/01/20 0741    Inpatient    Magnesium Sulfate 2 gram / 50mL Infusion (GIVE X 3 BAGS TO EQUAL 6GM TOTAL DOSE) - Mg 1.1 - 1.5 mg/dl 2 g As Needed 05/01/20 0741    Inpatient    Verbal Order Info     Action Created on Order Mode Entered by Responsible Provider Signed by Signed on   Ordering 05/03/20 1512 Per protocol: no cosign required Janey Garsia RN      Most Recent Dispense Information     Action User: Janey Garsia RN Action Type: Auto Verification    Dispense Pharmacy: [facility]  FLO[facility] STOCK [facility] Dispense Pharmacy: [facility] ST[facility]   Dispense Code: Unit Dose Cart Group: Unit Dose Dispense Interval: --   Triggered Fill: Yes Dispense Once: No Do Not Dispense: No   Patient Supplied Medication: No Self Administered: No    Dispense Individual Ingredients: No     Patient Class: Inpatient        Pharmacy Actions     Date/Time Type User Pharmacy   [place] May 3, 2020 1512 Dispense Janey Garsia RN [facility]LO[facility] ST[facility]   [place] May 3, 2020 1512 Verify Janey Garsia RN [facility]   Acknowledgement Info     For At Acknowledged By Acknowledged On   Placing Order 20 Yahaira Rowley RN 20   Unread messages by nursing     No unread messages for this order.   Most Recent Administration     No Administrations Recorded   Warnings History     No Interaction Warnings Shown    Order Audit Trail     Number of times this order has been changed since signin   Order Audit [place]   E-Prescribing Status     Hospital Medication Detail      Dose Frequency     sodium chloride 0.9 % flush 10 mL 10 mL Every 12 Hours Scheduled     Class: Normal     Route: Intravenous     Ordered from Order Set:  IP NURSING LINE CARE STANDING ORDERS     Other Panel Orders     Hospital Medications      Dose Frequency     sodium chloride 0.9 % flush 10 mL 10 mL As Needed     Class: Normal     Route: Intravenous     Ordered from Order Set:  IP NURSING LINE CARE STANDING ORDERS     Procedure Orders     MIDLINE CARE - Change CHG Dressing or Transparent Dressing With CHG Disk and Securement Device Every 7 Days   Weekly, First occurrence on Sun 5/3/20 at 1510   Event History        Event History   Tracking Reports     Cosign Tracking Order Transmittal Tracking            Discharge Summary      Clay Fonseca DO at 20 1137                [facility] Medicine Services  DISCHARGE SUMMARY        Prepared For PCP:  Fred Lemons FNP    Patient Name: Maynor Gregg  :  1975  MRN: [id number]      Date of Admission:   4/26/2020    Date of Discharge:  5/3/2020    Length of stay:  LOS: 7 days     Hospital Course     Presenting Problem:   Leukocytosis, unspecified type [D72.829]  Dyspnea, unspecified type [R06.00]  Nausea and vomiting, intractability of vomiting not specified, unspecified vomiting type [R11.2]  Osteomyelitis of right foot, unspecified type (CMS/[facility]) [M86.9]  Suspected Covid-19 Virus Infection [R68.89]  Dyspnea, unspecified type [R06.00]  Below-knee amputation of right lower extremity (CMS/[facility]) [S88.111A]      Active Hospital Problems    Diagnosis  POA   • **Sepsis, Gram positive (CMS/[facility]) [A41.89]  Yes     Priority: Medium   • Acute osteomyelitis of right foot (CMS/[facility]) [M86.171]  Yes     Priority: High   • Covid-19 Virus not Detected [UHC6841]  Yes     Priority: High   • S/P BKA (below knee amputation), right (CMS/[facility]) [Z89.511]  Not Applicable     Priority: High   • Osteomyelitis of right foot (CMS/[facility]) [M86.9]  Yes     Priority: High   • Chest pain [R07.9]  Yes     Priority: High   • Chronic ulcer of right foot, with necrosis of bone (CMS/[facility]) [L97.514]  Unknown     Priority: High   • Nausea and vomiting [R11.2]  Yes     Priority: Medium   • Dyspnea [R06.00]  Yes     Priority: Medium   • CAD (coronary artery disease) [I25.10]  Yes     Priority: Medium   • Diabetic peripheral neuropathy (CMS/[facility]) [E11.42]  Yes     Priority: Medium   • 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           Hospital Course:    The patient was initially admitted to the PCU to rule out COVID 19.  Empiric antibiotics were given for right foot cellulitis.  MRI of the right foot on 4/26/2020 showed osteomyelitis of right foot.  Podiatry evaluated the patient and underwent incision and drainage and right TMA on 4/28/2020.  Infectious disease was consulted after  COVID-19 was ruled out.  The patient was having persistent fevers and evidence of tissue devitalization thus orthopedic surgeon was consulted and right BKA was done on 4/30/2020.  Blood cultures from 4/26/2020 grew group B streptococcus which was treated with Rocephin.  Repeat blood cultures on 4/27/2020 were negative for growth.  The patient was also seen by endocrinologist to manage uncontrolled diabetes mellitus.  The patient will be discharged home on 5/3/2020 after PICC line placement with plans to complete 2 weeks of Rocephin therapy.    List of problems during hospitalization:      Sepsis with group B Streptococcus bacteremia:  -Secondary to right foot infection as source  -s/p right TMA 4/28/20  -Patient continued to have devitalized tissue and drainage thus s/p right BKA 4/30/20 by orthopedics  -2/2 blood culture bottles on 4/26/2020--> grew group B strep--> will need 2 weeks of Rocephin on  -Repeat blood cultures x2 on 4/27/2020--> no growth  -Consulted ID known to patient  -Plan DC home 5/3/2020 with 2 weeks of Rocephin     Chest pain:  -Cardiac enzymes trended and negative--> MI ruled out  -Atypical for angina or cardiac chest pain  -Patient with known CAD with 2 stents (2010)     Dyspnea (POA):  -Resolved.  -Likely from sepsis  -COVID 19 ruled out 4/27/2020  -C-reactive protein 30.35, Procalcitonin 0.68  -D-dimer 5.31--> CTA of chest--> no PE     Osteomyelitis of the right foot:  -s/p MRI of right foot 4/26/2020  -wound culture 1/20/2020-->MRSA, strep B, Prevotella--> treated with Rocephin/daptomycin/Flagyl  -s/p partial ray amputation 2/27/2020--> tissue/bone culture no growth  -DC on 2/29/2020 on Rocephin/daptomycin/Flagyl x6 weeks  -s/p right TMA 4/28/20  -s/p right BKA 4/30/2020       Diabetes type 1 with peripheral neuropathy  -Discharged on Lantus 24 units nightly and lispro 10 units with meals       Recommendation for Outpatient Providers:             Reasons For Change In Medications and  Indications for New Medications:        Day of Discharge     HPI:     The patient is a 45 y.o. male with a history of hypertension, IDDM, CAD and right foot ulcer s/p partial first metatarsal ray amputation 2/27/2020.  He was recently discharged on 2/29/2020 with 6 weeks course of Rocephin's/daptomycin/Flagyl.     The patient presented to the ED on 4/26/2020 complaining of 3 days of nausea, vomiting and body aches.  He had presented to his PCP on 4/24/2020 and influenza and strep throat were ruled out.  COVID-19 test was done but ended up coming to the emergency room because of intractable vomiting.  He also complained of increased erythema of his right foot.     Vital Signs:   Temp:  [98.1 °F (36.7 °C)-98.7 °F (37.1 °C)] 98.7 °F (37.1 °C)  Heart Rate:  [74-92] 76  Resp:  [13-19] 19  BP: (120-147)/(67-86) 147/86     Physical Exam:  Physical Exam   Constitutional: He is oriented to person, place, and time. He appears well-developed.   HENT:   Head: Normocephalic and atraumatic.   Eyes: Pupils are equal, round, and reactive to light. EOM are normal.   Neck: Normal range of motion. Neck supple.   Cardiovascular: Normal rate and regular rhythm.   Pulmonary/Chest: Effort normal and breath sounds normal.   Abdominal: Soft. Bowel sounds are normal.   Musculoskeletal:   Right AKA stump with clean and dry dressing.   Lymphadenopathy:     He has no cervical adenopathy.   Neurological: He is alert and oriented to person, place, and time.   Skin: Skin is warm.   Psychiatric: He has a normal mood and affect. His speech is normal.       Pertinent  and/or Most Recent Results     Results from last 7 days   Lab Units 05/03/20  0414 05/02/20  1824 05/02/20  0409 05/02/20  0408 05/01/20  1551 05/01/20  0455 04/30/20  1109 04/30/20  0548 04/29/20  0544 04/28/20  0632 04/27/20  0208   WBC 10*3/mm3 11.20*  --  9.00  --   --  10.80  --  13.40* 19.00* 18.10* 20.30*   HEMOGLOBIN g/dL 9.8*  --  9.7*  --   --  9.9*  --  10.1* 11.2* 11.2*  11.8*   HEMATOCRIT % 29.0*  --  29.2*  --   --  28.5*  --  30.1* 33.9* 34.3* 33.7*   PLATELETS 10*3/mm3 417  --  396  --   --  312  --  330 332 296 348   SODIUM mmol/L 138  --   --  138  --  133* 133* 129* 129* 129* 132*   POTASSIUM mmol/L 3.9 4.1  --  3.4* 3.7 3.2* 3.4* 3.3* 3.9 3.7 3.6   CHLORIDE mmol/L 103  --   --  102  --  98 96* 96* 94* 90* 90*   CO2 mmol/L 28.0  --   --  27.0  --  27.0 27.0 25.0 22.0 26.0 27.0   BUN mg/dL 10  --   --  13  --  18 31* 31* 32* 31* 18   CREATININE mg/dL 0.72*  --   --  0.69*  --  0.67* 0.92 0.85 1.02 1.26 0.76   GLUCOSE mg/dL 76  --   --  87  --  217* 327* 383* 381* 305* 294*   CALCIUM mg/dL 7.7*  --   --  7.6*  --  7.8* 8.4* 7.9* 8.1* 8.3* 8.6     Results from last 7 days   Lab Units 05/01/20  0455 04/29/20  0544 04/27/20  0208 04/26/20  1939   BILIRUBIN mg/dL 0.3 0.5 0.9 1.1   ALK PHOS U/L 106 139* 139* 173*   ALT (SGPT) U/L 20 20 26 31   AST (SGOT) U/L 33 14 21 24     Results from last 7 days   Lab Units 04/27/20  0208   CHOLESTEROL mg/dL 113   TRIGLYCERIDES mg/dL 137   HDL CHOL mg/dL 25*     Results from last 7 days   Lab Units 04/30/20  0022 04/28/20  0632 04/27/20  0815 04/27/20  0208 04/26/20  1950 04/26/20  1939   TSH uIU/mL  --  1.040  --  0.421  --   --    HEMOGLOBIN A1C %  --  9.3*  --   --   --   --    PROBNP pg/mL  --   --   --   --   --  500.3*   TROPONIN T ng/mL  --   --  <0.010 0.019  --  <0.010   PROCALCITONIN ng/mL  --  31.80*  --   --   --  0.68*   LACTATE mmol/L 1.3  --   --  1.4 3.0*  --        Brief Urine Lab Results  (Last result in the past 365 days)      Color   Clarity   Blood   Leuk Est   Nitrite   Protein   CREAT   Urine HCG        01/24/20 1445 Dark Yellow  Comment:  Result checked  Clear Negative Negative Negative Negative               Microbiology Results Abnormal     Procedure Component Value - Date/Time    Anaerobic Culture - Tissue, Foot, Right [[id number]]  (Abnormal) Collected:  04/28/20 1025    Lab Status:  Final result Specimen:  Tissue from  Foot, Right Updated:  05/03/20 1259     Anaerobic Culture Bacteroides species     Comment: Bacteroides pyogenes         Narrative:             AFB Culture - Tissue, Foot, Right [[id number]] Collected:  04/28/20 1025    Lab Status:  Preliminary result Specimen:  Tissue from Foot, Right Updated:  05/03/20 1045     AFB Culture No AFB isolated at less than 1 week     AFB Stain No acid fast bacilli seen    Blood Culture - Blood, Hand, Right [[id number]] Collected:  04/27/20 2005    Lab Status:  Final result Specimen:  Blood from Hand, Right Updated:  05/02/20 2015     Blood Culture No growth at 5 days    Blood Culture - Blood, Wrist, Right [[id number]] Collected:  04/27/20 2005    Lab Status:  Final result Specimen:  Blood from Wrist, Right Updated:  05/02/20 2015     Blood Culture No growth at 5 days    Tissue / Bone Culture - Tissue, Foot, Right [[id number]]  (Abnormal) Collected:  04/28/20 1025    Lab Status:  Final result Specimen:  Tissue from Foot, Right Updated:  04/30/20 0906     Tissue Culture Light growth (2+) Streptococcus agalactiae (Group B)     Comment: This organism is considered to be universally susceptible to penicillin.  No further antibiotic testing will be performed. If Clindamycin or Erythromycin is the drug of choice, notify the laboratory within 7 days to request susceptibility testing.         Rare Normal Skin Juliet     Gram Stain Few (2+) WBCs per low power field      Many (4+) Mixed bacterial morphotypes seen on Gram Stain    Blood Culture - Blood, Hand, Left [[id number]]  (Abnormal) Collected:  04/26/20 2004    Lab Status:  Final result Specimen:  Blood from Hand, Left Updated:  04/29/20 0608     Blood Culture Streptococcus agalactiae (Group B)     Comment: Refer to previous blood culture collected on 4/26/2020 at 1939 for KAILA's.          Gram Stain Aerobic Bottle Gram positive cocci in chains      Anaerobic Bottle Gram positive cocci in chains    Blood Culture - Blood, Arm, Right [[id number]]   (Abnormal)  (Susceptibility) Collected:  04/26/20 1939    Lab Status:  Final result Specimen:  Blood from Arm, Right Updated:  04/29/20 0607     Blood Culture Streptococcus agalactiae (Group B)     Gram Stain Anaerobic Bottle Gram positive cocci in chains    Susceptibility      Streptococcus agalactiae (Group B)     KAILA     Ceftriaxone Susceptible     Clindamycin Susceptible     Levofloxacin Susceptible     Penicillin G Susceptible     Vancomycin Susceptible                    Wound Culture - Swab, Foot, Left [[id number]]  (Abnormal) Collected:  04/26/20 2032    Lab Status:  Final result Specimen:  Swab from Foot, Left Updated:  04/28/20 0719     Wound Culture Moderate growth (3+) Streptococcus agalactiae (Group B)     Comment: This organism is considered to be universally susceptible to penicillin.  No further antibiotic testing will be performed. If Clindamycin or Erythromycin is the drug of choice, notify the laboratory within 7 days to request susceptibility testing.         Light growth (2+) Normal Skin Juliet     Gram Stain Few (2+) WBCs seen      Few (2+) Gram positive cocci      Rare (1+) Gram negative bacilli    SARS-CoV-2 PCR ([place] IN-HOUSE PERFORMED), NP SWAB IN TRANSPORT MEDIA - Swab, Nasopharynx [[id number]]  (Normal) Collected:  04/26/20 2032    Lab Status:  Final result Specimen:  Swab from Nasopharynx Updated:  04/27/20 1203     COVID19 Not Detected    Blood Culture ID, PCR - Blood, Arm, Right [[id number]]  (Abnormal) Collected:  04/26/20 1939    Lab Status:  Final result Specimen:  Blood from Arm, Right Updated:  04/27/20 1035     BCID, PCR Streptococcus agalactiae (Group B). Identification by BCID PCR.     BOTTLE TYPE Anaerobic Bottle    Respiratory Panel, PCR - Swab, Nasopharynx [[id number]]  (Normal) Collected:  04/26/20 2032    Lab Status:  Final result Specimen:  Swab from Nasopharynx Updated:  04/26/20 2155     ADENOVIRUS, PCR Not Detected     Coronavirus 229E Not Detected     Coronavirus  HKU1 Not Detected     Coronavirus NL63 Not Detected     Coronavirus OC43 Not Detected     Human Metapneumovirus Not Detected     Human Rhinovirus/Enterovirus Not Detected     Influenza B PCR Not Detected     Parainfluenza Virus 1 Not Detected     Parainfluenza Virus 2 Not Detected     Parainfluenza Virus 3 Not Detected     Parainfluenza Virus 4 Not Detected     Bordetella pertussis pcr Not Detected     Influenza A H1 2009 PCR Not Detected     Chlamydophila pneumoniae PCR Not Detected     Mycoplasma pneumo by PCR Not Detected     Influenza A PCR Not Detected     Influenza A H3 Not Detected     Influenza A H1 Not Detected     RSV, PCR Not Detected    Narrative:       The coronavirus on the RVP is NOT COVID-19 and is NOT indicative of infection with COVID-19.     Rapid Strep A Screen - Swab, Throat [[id number]]  (Normal) Collected:  04/26/20 2032    Lab Status:  Final result Specimen:  Swab from Throat Updated:  04/26/20 2049     Strep A Ag Negative          Xr Tibia Fibula 2 View Right    Result Date: 4/30/2020  Impression: 1. Expected postoperative appearance status post right below the knee amputation.  Electronically Signed By-Magno Agarwal On:4/30/2020 11:54 AM This report was finalized on [id number] by  Ynes Riddle    Xr Foot 3+ View Right    Result Date: 4/26/2020  Impression:  1. Previous amputation procedure the right great toe and most of the first metatarsal bone. 2. Questionable osteomyelitis at the amputation site of the distal first metatarsal bone and also at the site of a slightly displaced oblique fracture through the base of the fifth metatarsal bone. There is soft tissue gas consistent with wounds at both locations which appear to extend to the underlying bone. 3. There is a healing nondisplaced fracture through the head of the second metatarsal bone.  Electronically Signed By-Gary Marie On:4/26/2020 7:53 PM This report was finalized on [id number] by  Gary Marie, .    Ct Chest  Pulmonary Embolism    Result Date: 4/26/2020  Impression: No CTA evidence of pulmonary embolism or acute aortic pathology. No acute cardiopulmonary process seen. Sequela of prior granulomatous disease. Electronically signed by:  Keiko Ann M.D.  4/26/2020 8:44 PM    Mri Foot Right With & Without Contrast    Result Date: 4/27/2020  Impression:  1. Postoperative changes from amputation of the great toe at the level of mid metatarsal with abundant surrounding callus formation. There is T1 marrow placement, bone marrow edema like signal, and enhancement throughout the first metatarsal, consistent with osteomyelitis. 2. Soft tissue wound at the lateral midfoot/forefoot extending to the nondisplaced fracture at the base of the fifth metatarsal. T1 marrow placement, bone marrow edema like signal, and enhancement throughout the fifth metatarsal is consistent with osteomyelitis. 3. Irregular fluid collection extending from the soft tissue wound at the lateral midfoot/forefoot and surrounding the second through fifth MTP joints, suspicious for abscess. 4. Nondisplaced fracture of the second metatarsal head with suspected osteomyelitis of the second metatarsal and possible second MTP joint septic arthritis. 5. Patchy bone marrow edema like signal and enhancement multifocal areas of suspected marrow placement in the anterior process of the calcaneus, cuboid, cuneiforms, and the bases of the second, third, and fourth metatarsals, which could represent early osteomyelitis or stress marrow changes. 6. Diffuse soft tissue and muscular edema enhancement, consistent with cellulitis and myositis.    Electronically Signed By-Delbert Dinero On:4/27/2020 6:30 PM This report was finalized on [id number] by  Delbert Dinero, .    Xr Chest Ap    Result Date: 4/26/2020  Impression: No active disease.  Electronically Signed By-Gary Marie On:4/26/2020 7:53 PM This report was finalized on [id number] by  Gary Marie, .                     Results for orders placed during the hospital encounter of 01/18/20   Adult Transthoracic Echo Complete W/ Cont if Necessary Per Protocol    Narrative · Left ventricular systolic function is normal.  · Mild mitral valve regurgitation is present  · Mild tricuspid valve regurgitation is present.  · Ejection fraction is about 65%  · No pericardial effusion noted                  Test Results Pending at Discharge   Order Current Status    AFB Culture - Tissue, Foot, Right Preliminary result            Procedures Performed  Procedure(s):  AMPUTATION BELOW KNEE         Consults:   Consults     Date and Time Order Name Status Description    4/28/2020 1609 Inpatient Orthopedic Surgery Consult Completed     4/27/2020 1713 Inpatient Infectious Diseases Consult Completed     4/27/2020 0244 Inpatient Podiatry Consult Completed             Discharge Details        Discharge Medications      New Medications      Instructions Start Date   cefTRIAXone 2 g in sodium chloride 0.9 % 100 mL IVPB   2 g, Intravenous, Every 24 Hours      insulin detemir 100 UNIT/ML injection  Commonly known as:  Levemir   24 Units, Subcutaneous, Nightly      insulin lispro 100 UNIT/ML injection  Commonly known as:  humaLOG  Notes to patient:  Hold for blood glucose less then 100   10 Units, Subcutaneous, 3 Times Daily With Meals         Continue These Medications      Instructions Start Date   aspirin 325 MG tablet   325 mg, Oral, Daily, Pt to stop 2/27 per Angelique         Stop These Medications    Insulin Glargine 100 UNIT/ML injection pen  Commonly known as:  BASAGLAR KWIKPEN     NovoLOG 100 UNIT/ML injection  Generic drug:  insulin aspart            Allergies   Allergen Reactions   • Metformin Diarrhea and Nausea And Vomiting   • Oxycodone-Acetaminophen Nausea And Vomiting and Rash         Discharge Disposition:  Home or Self Care    Diet:  Hospital:  Diet Order   Procedures   • Diet Diabetic/Consistent Carbs; Diabetic - Consistent Carb          Discharge Activity:   Activity Instructions     Up ad isi with rolling walker                 CODE STATUS:    Code Status and Medical Interventions:   Ordered at: 04/30/20 1056     Level Of Support Discussed With:    Patient     Code Status:    CPR     Medical Interventions (Level of Support Prior to Arrest):    Full         Follow-up Appointments  No future appointments.    Additional Instructions for the Follow-ups that You Need to Schedule     Ambulatory Referral to Home Health   As directed      Face to Face Visit Date:  5/1/2020    Follow-up provider for Plan of Care?:  I treated the patient in an acute care facility and will not continue treatment after discharge.    Follow-up provider:  FRED LEMONS [[id number]]    Reason/Clinical Findings:  Bacteremia/BKA   -   IV ABX    Describe mobility limitations that make leaving home difficult:  Bacteremia/BKA   -   IV ABX    Nursing/Therapeutic Services Requested:  Other (Eval and Treat )    Frequency:  1 Week 1         Discharge Follow-up with PCP   As directed       Currently Documented PCP:    Fred Lemons FNP    PCP Phone Number:    [phone]     Follow Up Details:  2 weeks               Condition on Discharge:      Stable    Electronically signed by Clay Fonseca DO, 05/03/20, 11:37 AM.    Time: I spent  20 minutes on this discharge activity which included face-to-face encounter with the patient/reviewing the data in the system/coordination of the care with the nursing staff as well as consultants/documentation/entering orders.      Electronically signed by Clay Fonseca DO at 05/03/20 5678

## 2020-05-03 NOTE — PROGRESS NOTES
Discharge Planning Assessment  [facility]     Patient Name: Maynor Gregg  MRN: [id number]  Today's Date: 5/3/2020    Admit Date: 4/26/2020      Discharge Plan     Row Name 05/03/20 1526       Plan    Plan DC plan: home with [facility]. [facility] to provided IV Rocephin. Rolling walker supplied by [employer] and delivered to room.    Plan Comments Verified order and delivered rolling walker sto nurses station from onsite DME closet. Primary nurse to deliver to pt's room.  Faxed all abx and saline flush orders to [facility] [phone]. S/W [place] from [facility] [phone] to verify delivery of Rocephin. [employer] to call pt in room to arrange delivery to pt's home. S/W [place] [facility], 3985. Informed her that pt has received IV abx dose for 5/3/20 and will need to be seen on 5/4/20. CM called [facility] and pharmacist reported that Lantus is not covered by insurance. Levimir is covered. Secure chat message sent to Dr. Jaclyn MAR to change insulin order.            Dialysis/Infusion      Service Provider Request Status Selected Services Address Phone Number Fax Number    [facility] Accepted N/A [address] [address] [phone]      Home Medical Care      Service Provider Request Status Selected Services Address Phone Number Fax Number    [facility] Selected Home Health Services [facility] [address] [phone]        Expected Discharge Date and Time     Expected Discharge Date Expected Discharge Time    May 3, 2020               Danica Cortes RN

## 2020-05-03 NOTE — THERAPY TREATMENT NOTE
Patient Name: Maynor Gregg  : 1975    MRN: [id number]                              Today's Date: 5/3/2020       Admit Date: 2020    Visit Dx:     ICD-10-CM ICD-9-CM   1. Dyspnea, unspecified type R06.00 786.09   2. Osteomyelitis of right foot, unspecified type (CMS/HCC) M86.9 730.27   3. Leukocytosis, unspecified type D72.829 288.60   4. Nausea and vomiting, intractability of vomiting not specified, unspecified vomiting type R11.2 787.01   5. Suspected Covid-19 Virus Infection R68.89    6. Chronic ulcer of right foot, with necrosis of bone (CMS/[facility]) L97.514 707.15     Patient Active Problem List   Diagnosis   • Diabetic peripheral neuropathy (CMS/HCC)   • Type 1 diabetes mellitus with circulatory complication (CMS/HCC)   • Open wound of second toe of left foot   • Chronic ulcer of right foot, with necrosis of bone (CMS/HCC)   • Chronic ulcer of great toe of right foot, with necrosis of muscle (CMS/[facility])   • Chronic osteomyelitis of right foot (CMS/[facility])   • CAD (coronary artery disease)   • Hypotension   • Dyspnea   • Osteomyelitis of right foot (CMS/HCC)   • Nausea and vomiting   • Chest pain   • Sepsis, Gram positive (CMS/HCC)   • Acute osteomyelitis of right foot (CMS/HCC)   • Covid-19 Virus not Detected   • S/P BKA (below knee amputation), right (CMS/HCC)     Past Medical History:   Diagnosis Date   • Coronary artery disease    • Diabetes mellitus (CMS/[facility])    • MI, old      Past Surgical History:   Procedure Laterality Date   • AMPUTATION DIGIT Right 2020    Procedure: PARTIAL FIRST RAY RESECTION RIGHT FOOT;  Surgeon: CROW Souza DPM;  Location: [facility];  Service: Podiatry;  Laterality: Right;   • AMPUTATION FOOT / TOE     • CARDIAC CATHETERIZATION  2010     RCA artery   • CARDIAC CATHETERIZATION      LAD artery   • CARDIAC SURGERY     • INCISION AND DRAINAGE LEG Left 2020    Procedure: INCISION AND DRAINAGE LOWER EXTREMITY with second digit amputation;   Surgeon: CROW Souza DPM;  Location: [facility] MAIN OR;  Service: Podiatry   • INCISION AND DRAINAGE LEG Right 4/28/2020    Procedure: incision and drainage ,transmetatarsal amputation, fasciotomy;  Surgeon: CROW Souza DPM;  Location: [facility] OR;  Service: Podiatry;  Laterality: Right;   • TOE AMPUTATION Left    • WOUND DEBRIDEMENT Right 1/21/2020    Procedure: DEBRIDEMENT with sesamoid excision;  Surgeon: CROW Souza DPM;  Location: [facility] OR;  Service: Podiatry     General Information     Row Name 05/03/20 1402          PT Evaluation Time/Intention    Document Type  therapy note (daily note)  -CR     Mode of Treatment  physical therapy  -CR     Row Name 05/03/20 1402          General Information    Patient Profile Reviewed?  yes  -CR     Row Name 05/03/20 1402          Cognitive Assessment/Intervention- PT/OT    Orientation Status (Cognition)  oriented x 4  -CR       User Key  (r) = Recorded By, (t) = Taken By, (c) = Cosigned By    Initials Name Provider Type    CR Reyes, Carmela, PT Physical Therapist        Mobility     Row Name 05/03/20 1402          Bed Mobility Assessment/Treatment    Bed Mobility Assessment/Treatment  supine-sit  -CR     Supine-Sit Jack (Bed Mobility)  conditional independence  -CR     Row Name 05/03/20 1402          Sit-Stand Transfer    Sit-Stand Jack (Transfers)  conditional independence  -CR     Assistive Device (Sit-Stand Transfers)  walker, front-wheeled  -CR     Row Name 05/03/20 1402          Gait/Stairs Assessment/Training    Gait/Stairs Assessment/Training  gait/ambulation assistive device  -CR     Jack Level (Gait)  conditional independence  -CR     Assistive Device (Gait)  walker, front-wheeled  -CR     Distance in Feet (Gait)  10 ft x 2  -CR     Pattern (Gait)  step-to  -CR     Deviations/Abnormal Patterns (Gait)  gait speed decreased  -CR       User Key  (r) = Recorded By, (t) = Taken By, (c) = Cosigned By    Initials Name  Provider Type    CR Reyes, Carmela, JOLENE Physical Therapist        Obj/Interventions     Row Name 05/03/20 1403          Therapeutic Exercise    Position (Therapeutic Exercise)  supine  -CR     Sets/Reps (Therapeutic Exercise)  stump exercises   -CR     Row Name 05/03/20 1403          Static Sitting Balance    Level of Glades (Unsupported Sitting, Static Balance)  independent  -CR     Sitting Position (Unsupported Sitting, Static Balance)  sitting on edge of bed  -CR     Time Able to Maintain Position (Unsupported Sitting, Static Balance)  45 to 60 seconds  -CR     Row Name 05/03/20 1403          Dynamic Sitting Balance    Level of [place], Reaches Outside Midline (Sitting, Dynamic Balance)  independent  -CR     Sitting Position, Reaches Outside Midline (Sitting, Dynamic Balance)  sitting on edge of bed  -CR     Row Name 05/03/20 1403          Static Standing Balance    Level of [place] (Supported Standing, Static Balance)  conditional independence  -CR     Time Able to Maintain Position (Supported Standing, Static Balance)  3 to 4 minutes  -CR     Assistive Device Utilized (Supported Standing, Static Balance)  walker, rolling  -CR     Row Name 05/03/20 1403          Dynamic Standing Balance    Level of [place], Reaches Outside Midline (Standing, Dynamic Balance)  conditional independence  -CR     Time Able to Maintain Position, Reaches Outside Midline (Standing, Dynamic Balance)  3 to 4 minutes  -CR     Assistive Device Utilized (Supported Standing, Dynamic Balance)  walker, rolling  -CR       User Key  (r) = Recorded By, (t) = Taken By, (c) = Cosigned By    Initials Name Provider Type    CR Reyes, Carmela, PT Physical Therapist        Goals/Plan     Row Name 05/03/20 1406          Transfer Goal 1 (PT)    Progress/Outcome (Transfer Goal 1, PT)  goal met  -CR     Row Name 05/03/20 1406          Gait Training Goal 1 (PT)    Progress/Outcome (Gait Training Goal 1, PT)  continuing progress toward  goal  -CR       User Key  (r) = Recorded By, (t) = Taken By, (c) = Cosigned By    Initials Name Provider Type    CR Reyes, Carmela, PT Physical Therapist        Clinical Impression     Row Name 05/03/20 140          Pain Assessment    Additional Documentation  Pain Scale: Numbers Pre/Post-Treatment (Group)  -CR     Row Name 05/03/20 1405          Pain Scale: Numbers Pre/Post-Treatment    Pain Scale: Numbers, Pretreatment  0/10 - no pain  -CR     Pain Scale: Numbers, Post-Treatment  0/10 - no pain  -CR     Row Name 05/03/20 1409          Plan of Care Review    Plan of Care Reviewed With  patient  -CR     Outcome Summary  Pt is modified independent with transfers in/out of bed and to ambulate to/from bathroom using rw. Issued HEP and able to return demonstrate correctly. Pt should do well with home d/c , will need follow up stump care/pre prosthetic training. PPE used gloves, mask, face shield  -CR     Row Name 05/03/20 1407          Positioning and Restraints    Pre-Treatment Position  in bed  -CR     Post Treatment Position  bathroom  -CR     Bathroom  notified nsg;sitting;call light within reach  -CR       User Key  (r) = Recorded By, (t) = Taken By, (c) = Cosigned By    Initials Name Provider Type    CR Reyes, Carmela, PT Physical Therapist        Outcome Measures    No documentation.         PT Recommendation and Plan     Plan of Care Reviewed With: patient  Progress: improving  Outcome Summary: Pt is modified independent with transfers in/out of bed and to ambulate to/from bathroom using rw. Issued HEP and able to return demonstrate correctly. Pt should do well with home d/c , will need follow up stump care/pre prosthetic training. PPE used gloves, mask, face shield     Time Calculation:   PT Charges     Row Name 05/03/20 1402             Time Calculation    Start Time  0943  -CR      Stop Time  0951  -CR      Time Calculation (min)  8 min  -CR      PT Received On  05/03/20  -CR      PT - Next Appointment   05/04/20  -CR         Time Calculation- PT    Total Timed Code Minutes- PT  8 minute(s)  -CR        User Key  (r) = Recorded By, (t) = Taken By, (c) = Cosigned By    Initials Name Provider Type    CR Reyes, Carmela, PT Physical Therapist        Therapy Charges for Today     Code Description Service Date Service Provider Modifiers Qty    56440311422 HC PT THER PROC EA 15 MIN 5/3/2020 Reyes, Carmela, PT GP 1               Carmela Reyes, PT  5/3/2020

## 2020-05-03 NOTE — PROGRESS NOTES
Infectious Diseases Progress Note      LOS: 7 days   Patient Care Team:  Fred Lemons FNP as PCP - General (Family Medicine)    Chief Complaint: Right leg stump pain    Subjective       The patient has been afebrile for the last 24 hours.  The patient is on room air, hemodynamically stable, and is tolerating antimicrobial therapy.  He was complaining of right leg pain.       Review of Systems:   Review of Systems   Constitutional: Negative.    HENT: Negative.    Respiratory: Negative.    Cardiovascular: Negative.    Gastrointestinal: Negative.    Genitourinary: Negative.    Musculoskeletal: Positive for arthralgias.   Skin: Positive for wound.   Neurological: Negative.    Psychiatric/Behavioral: Negative.         Objective     Vital Signs  Temp:  [98.1 °F (36.7 °C)-98.7 °F (37.1 °C)] 98.7 °F (37.1 °C)  Heart Rate:  [74-92] 76  Resp:  [13-19] 19  BP: (120-147)/(67-86) 147/86    Physical Exam:  Physical Exam   Constitutional: He is oriented to person, place, and time. He appears well-developed and well-nourished.   HENT:   Head: Normocephalic and atraumatic.   Eyes: Pupils are equal, round, and reactive to light. Conjunctivae and EOM are normal.   Neck: Neck supple.   Cardiovascular: Normal rate, regular rhythm and normal heart sounds.   Pulmonary/Chest: Effort normal and breath sounds normal.   Abdominal: Soft. Bowel sounds are normal.   Musculoskeletal:   Previous left great toe amputation    Status post right leg amputation with surgical dressing on the right leg stump   Neurological: He is alert and oriented to person, place, and time.   Skin: Skin is warm and dry.   Psychiatric: He has a normal mood and affect.   Vitals reviewed.       Results Review:    I have reviewed all clinical data, test, lab, and imaging results.     Radiology  No Radiology Exams Resulted Within Past 24 Hours    Cardiology    Laboratory    Results from last 7 days   Lab Units 05/03/20  0414 05/02/20  0409 05/01/20  0455 04/30/20  0548  04/29/20  0544 04/28/20  0632 04/27/20  0208   WBC 10*3/mm3 11.20* 9.00 10.80 13.40* 19.00* 18.10* 20.30*   HEMOGLOBIN g/dL 9.8* 9.7* 9.9* 10.1* 11.2* 11.2* 11.8*   HEMATOCRIT % 29.0* 29.2* 28.5* 30.1* 33.9* 34.3* 33.7*   PLATELETS 10*3/mm3 417 396 312 330 332 296 348     Results from last 7 days   Lab Units 05/03/20  0414 05/02/20  1824 05/02/20  0408 05/01/20  1551 05/01/20  0455 04/30/20  1109 04/30/20  0548 04/29/20  0544 04/28/20  0632 04/27/20  0208 04/26/20  1939   SODIUM mmol/L 138  --  138  --  133* 133* 129* 129* 129* 132* 131*   POTASSIUM mmol/L 3.9 4.1 3.4* 3.7 3.2* 3.4* 3.3* 3.9 3.7 3.6 3.4*   CHLORIDE mmol/L 103  --  102  --  98 96* 96* 94* 90* 90* 87*   CO2 mmol/L 28.0  --  27.0  --  27.0 27.0 25.0 22.0 26.0 27.0 26.0   BUN mg/dL 10  --  13  --  18 31* 31* 32* 31* 18 16   CREATININE mg/dL 0.72*  --  0.69*  --  0.67* 0.92 0.85 1.02 1.26 0.76 0.96   GLUCOSE mg/dL 76  --  87  --  217* 327* 383* 381* 305* 294* 290*   ALBUMIN g/dL  --   --   --   --  2.00*  --   --  1.90*  --  2.40* 3.10*   BILIRUBIN mg/dL  --   --   --   --  0.3  --   --  0.5  --  0.9 1.1   ALK PHOS U/L  --   --   --   --  106  --   --  139*  --  139* 173*   AST (SGOT) U/L  --   --   --   --  33  --   --  14  --  21 24   ALT (SGPT) U/L  --   --   --   --  20  --   --  20  --  26 31   LIPASE U/L  --   --   --   --   --   --   --   --   --   --  13   CALCIUM mg/dL 7.7*  --  7.6*  --  7.8* 8.4* 7.9* 8.1* 8.3* 8.6 9.3                 Microbiology   Microbiology Results (last 10 days)     Procedure Component Value - Date/Time    Anaerobic Culture - Tissue, Foot, Right [[id number]]  (Abnormal) Collected:  04/28/20 1025    Lab Status:  Final result Specimen:  Tissue from Foot, Right Updated:  05/03/20 1259     Anaerobic Culture Bacteroides species     Comment: Bacteroides pyogenes         Narrative:             Tissue / Bone Culture - Tissue, Foot, Right [[id number]]  (Abnormal) Collected:  04/28/20 1025    Lab Status:  Final result Specimen:   Tissue from Foot, Right Updated:  04/30/20 0906     Tissue Culture Light growth (2+) Streptococcus agalactiae (Group B)     Comment: This organism is considered to be universally susceptible to penicillin.  No further antibiotic testing will be performed. If Clindamycin or Erythromycin is the drug of choice, notify the laboratory within 7 days to request susceptibility testing.         Rare Normal Skin Juliet     Gram Stain Few (2+) WBCs per low power field      Many (4+) Mixed bacterial morphotypes seen on Gram Stain    AFB Culture - Tissue, Foot, Right [[id number]] Collected:  04/28/20 1025    Lab Status:  Preliminary result Specimen:  Tissue from Foot, Right Updated:  05/03/20 1045     AFB Culture No AFB isolated at less than 1 week     AFB Stain No acid fast bacilli seen    Blood Culture - Blood, Hand, Right [[id number]] Collected:  04/27/20 2005    Lab Status:  Final result Specimen:  Blood from Hand, Right Updated:  05/02/20 2015     Blood Culture No growth at 5 days    Blood Culture - Blood, Wrist, Right [[id number]] Collected:  04/27/20 2005    Lab Status:  Final result Specimen:  Blood from Wrist, Right Updated:  05/02/20 2015     Blood Culture No growth at 5 days    Respiratory Panel, PCR - Swab, Nasopharynx [[id number]]  (Normal) Collected:  04/26/20 2032    Lab Status:  Final result Specimen:  Swab from Nasopharynx Updated:  04/26/20 2155     ADENOVIRUS, PCR Not Detected     Coronavirus 229E Not Detected     Coronavirus HKU1 Not Detected     Coronavirus NL63 Not Detected     Coronavirus OC43 Not Detected     Human Metapneumovirus Not Detected     Human Rhinovirus/Enterovirus Not Detected     Influenza B PCR Not Detected     Parainfluenza Virus 1 Not Detected     Parainfluenza Virus 2 Not Detected     Parainfluenza Virus 3 Not Detected     Parainfluenza Virus 4 Not Detected     Bordetella pertussis pcr Not Detected     Influenza A H1 2009 PCR Not Detected     Chlamydophila pneumoniae PCR Not Detected      Mycoplasma pneumo by PCR Not Detected     Influenza A PCR Not Detected     Influenza A H3 Not Detected     Influenza A H1 Not Detected     RSV, PCR Not Detected    Narrative:       The coronavirus on the RVP is NOT COVID-19 and is NOT indicative of infection with COVID-19.     Rapid Strep A Screen - Swab, Throat [[id number]]  (Normal) Collected:  04/26/20 2032    Lab Status:  Final result Specimen:  Swab from Throat Updated:  04/26/20 2049     Strep A Ag Negative    Wound Culture - Swab, Foot, Left [[id number]]  (Abnormal) Collected:  04/26/20 2032    Lab Status:  Final result Specimen:  Swab from Foot, Left Updated:  04/28/20 0719     Wound Culture Moderate growth (3+) Streptococcus agalactiae (Group B)     Comment: This organism is considered to be universally susceptible to penicillin.  No further antibiotic testing will be performed. If Clindamycin or Erythromycin is the drug of choice, notify the laboratory within 7 days to request susceptibility testing.         Light growth (2+) Normal Skin Juliet     Gram Stain Few (2+) WBCs seen      Few (2+) Gram positive cocci      Rare (1+) Gram negative bacilli    SARS-CoV-2 PCR ([place] IN-HOUSE PERFORMED), NP SWAB IN TRANSPORT MEDIA - Swab, Nasopharynx [[id number]]  (Normal) Collected:  04/26/20 2032    Lab Status:  Final result Specimen:  Swab from Nasopharynx Updated:  04/27/20 1203     COVID19 Not Detected    Blood Culture - Blood, Hand, Left [[id number]]  (Abnormal) Collected:  04/26/20 2004    Lab Status:  Final result Specimen:  Blood from Hand, Left Updated:  04/29/20 0608     Blood Culture Streptococcus agalactiae (Group B)     Comment: Refer to previous blood culture collected on 4/26/2020 at 1939 for KAILA's.          Gram Stain Aerobic Bottle Gram positive cocci in chains      Anaerobic Bottle Gram positive cocci in chains    Blood Culture - Blood, Arm, Right [[id number]]  (Abnormal)  (Susceptibility) Collected:  04/26/20 1939    Lab Status:  Final result  Specimen:  Blood from Arm, Right Updated:  04/29/20 0607     Blood Culture Streptococcus agalactiae (Group B)     Gram Stain Anaerobic Bottle Gram positive cocci in chains    Susceptibility      Streptococcus agalactiae (Group B)     KAILA     Ceftriaxone Susceptible     Clindamycin Susceptible     Levofloxacin Susceptible     Penicillin G Susceptible     Vancomycin Susceptible                    Blood Culture ID, PCR - Blood, Arm, Right [[id number]]  (Abnormal) Collected:  04/26/20 1939    Lab Status:  Final result Specimen:  Blood from Arm, Right Updated:  04/27/20 1035     BCID, PCR Streptococcus agalactiae (Group B). Identification by BCID PCR.     BOTTLE TYPE Anaerobic Bottle          Medication Review:       Schedule Meds    aspirin 325 mg Oral Daily   cefTRIAXone 2 g Intravenous Q24H   insulin glargine 21 Units Subcutaneous Nightly   insulin lispro 0-9 Units Subcutaneous TID AC   insulin lispro 7 Units Subcutaneous TID With Meals   metoclopramide 10 mg Intravenous 4x Daily AC & at Bedtime   nitroglycerin 0.5 inch Topical Q6H   polyethylene glycol 17 g Oral Daily       Infusion Meds    sodium chloride 75 mL/hr Last Rate: 75 mL/hr (05/02/20 1008)       PRN Meds  •  acetaminophen  •  dextrose  •  dextrose  •  glucagon (human recombinant)  •  HYDROcodone-acetaminophen  •  HYDROcodone-acetaminophen  •  ibuprofen  •  insulin lispro **AND** insulin lispro  •  magnesium hydroxide  •  magnesium sulfate **OR** magnesium sulfate **OR** magnesium sulfate  •  melatonin  •  Morphine **AND** naloxone  •  ondansetron **OR** ondansetron  •  potassium & sodium phosphates **OR** potassium & sodium phosphates  •  potassium chloride  •  potassium chloride  •  sodium chloride        Assessment/Plan       Antimicrobial Therapy   1.        day  2.        day  3.  IV Rocephin    day  4.      Day  5.      Day      Assessment     Bacteremia-cultures are growing group B streptococcus  -Likely source is right foot wound     Right diabetic  foot wound on both the lateral aspect of the foot by the great toe and the plantar aspect by the fifth toe  -MRI shows osteomyelitis throughout the entire right foot  -Cultures are growing group B streptococcus  -Right partial first partial ray amputation on 2/27/2020-cultures from admission were negative but previous cultures grew methicillin-resistant Staphylococcus aureus, group B streptococcus and Prevotella species.   -Patient had 6 weeks of IV daptomycin IV Rocephin and p.o. Flagyl from the amputation date and was finished on 4/9/2020  -Patient states that he had fallen several times and is hit his right foot which is because the current wound  -4/28/2020-patient had a right TMA but the infection was extending up into his lower leg  The patient eventually underwent right below-knee amputation    Patient admitted with complaints of shortness of breath, body aches, nausea, vomiting  -COVID 19 screen was negative and chest CT was not indicative of a COVID infection   -Patient is on room air     Fever-101.3 degrees-likely related to bacteremia  -Resolving     Recent left big toe amputation secondary to left diabetic foot.  Wound healed completely     Type 1 diabetes with neuropathy     CAD, history of MI with cardiac catheterization           Plan     Continue Rocephin 2 g every 24 hours-patient will need 2 weeks total treatment for the bacteremia  Discontinue IV vancomycin   Patient will need a midline before discharge-please remove when IV antibiotics are completed  Check CBC and creatinine 5 days after admission  Continue supportive care  OK to discharge when IV antibiotics are arranged    Please fax all post discharge lab results, imaging studies and correspondence to this fax number [phone]  For any question or concern please contact our service number [phone]    Bailee Knight, KAMILLE  05/03/20  13:34     Note is dictated utilizing voice recognition software/Dragon

## 2020-05-03 NOTE — PLAN OF CARE
Patient discharging today, awaiting midline placement, will give todays dose of antibiotics before discharge, going home on IV rocephin, follow up with  r/t CANDELARIA

## 2020-05-03 NOTE — PROGRESS NOTES
Daily Progress Note    Patient Care Team:  Fred Lemons FNP as PCP - General (Family Medicine)    Chief Complaint: Follow-up type 1 diabetes    HPI:  Patient seen and examined today.  Doing fairly well.  Eating well.  Blood sugars on the low normal side. No other complaints at this time.  He has not been receiving Humalog with the meals on a consistent basis.    ROS:   Constitutional:  Denies fatigue, tiredness.    Eyes:  Denies change in visual acuity   HENT:  Denies nasal congestion or sore throat   Respiratory: denies cough, shortness of breath.   Cardiovascular:  denies chest pain, edema   GI:  Denies abdominal pain, nausea, vomiting.   :  Denies polyuria and polydipsia  Musculoskeletal:  Denies back pain or joint pain   Integument:  Denies rash   Neurologic:  Denies headache, focal weakness or sensory changes   Endocrine:  Denies polyuria or polydipsia   Psychiatric:  Denies depression or anxiety       Vitals:    05/03/20 1028   BP: 147/86   Pulse: 76   Resp: 19   Temp: 98.7 °F (37.1 °C)   SpO2:        Physical Exam:  GEN: NAD, conversant  CV: RRR  LUNG: CTA  MSK: Status post right below-knee amputation    Results Review:     I reviewed the patient's new clinical results.    Glucose   Date Value Ref Range Status   05/03/2020 76 65 - 99 mg/dL Final     Sodium   Date Value Ref Range Status   05/03/2020 138 136 - 145 mmol/L Final     Potassium   Date Value Ref Range Status   05/03/2020 3.9 3.5 - 5.2 mmol/L Final     CO2   Date Value Ref Range Status   05/03/2020 28.0 22.0 - 29.0 mmol/L Final     Chloride   Date Value Ref Range Status   05/03/2020 103 98 - 107 mmol/L Final     Anion Gap   Date Value Ref Range Status   05/03/2020 7.0 5.0 - 15.0 mmol/L Final     Creatinine   Date Value Ref Range Status   05/03/2020 0.72 (L) 0.76 - 1.27 mg/dL Final     BUN   Date Value Ref Range Status   05/03/2020 10 6 - 20 mg/dL Final     BUN/Creatinine Ratio   Date Value Ref Range Status   05/03/2020 13.9 7.0 - 25.0  Final     Calcium   Date Value Ref Range Status   05/03/2020 7.7 (L) 8.6 - 10.5 mg/dL Final     eGFR Non  Amer   Date Value Ref Range Status   05/03/2020 118 >60 mL/min/1.73 Final     Alkaline Phosphatase   Date Value Ref Range Status   05/01/2020 106 39 - 117 U/L Final     Total Protein   Date Value Ref Range Status   05/01/2020 6.2 6.0 - 8.5 g/dL Final     ALT (SGPT)   Date Value Ref Range Status   05/01/2020 20 1 - 41 U/L Final     AST (SGOT)   Date Value Ref Range Status   05/01/2020 33 1 - 40 U/L Final     Total Bilirubin   Date Value Ref Range Status   05/01/2020 0.3 0.2 - 1.2 mg/dL Final     Albumin   Date Value Ref Range Status   05/01/2020 2.00 (L) 3.50 - 5.20 g/dL Final     Globulin   Date Value Ref Range Status   05/01/2020 4.2 gm/dL Final     Magnesium   Date Value Ref Range Status   05/03/2020 2.1 1.6 - 2.6 mg/dL Final     Lab Results   Component Value Date    HGBA1C 9.3 (H) 04/28/2020    HGBA1C 10.5 (H) 01/21/2020     No results found for: GLUF, MICROALBUR  Results from last 7 days   Lab Units 05/03/20  1114 05/03/20  0723 05/02/20  1631 05/02/20  1107 05/02/20  0812 05/02/20  0719   GLUCOSE mg/dL 134* 73 89 143* 127* 68*             Medication Review: Reviewed.       aspirin 325 mg Oral Daily   cefTRIAXone 2 g Intravenous Q24H   insulin glargine 24 Units Subcutaneous Nightly   insulin lispro 0-9 Units Subcutaneous TID AC   insulin lispro 10 Units Subcutaneous TID With Meals   metoclopramide 10 mg Intravenous 4x Daily AC & at Bedtime   nitroglycerin 0.5 inch Topical Q6H   polyethylene glycol 17 g Oral Daily         Assessment/Plan   1.  Diabetes mellitus type 1: Uncontrolled,  blood sugars on the low normal side, will decrease Lantus to 21 units subcu nightly and decrease Humalog to 7 units with each meal.  Will follow follow blood sugars and adjust doses as needed.  2.  Status post right below-knee amputation on April 30, 2020.  3.  Diabetic neuropathy: Status post right below-knee  amputation.             Ezekiel Ron MD. FACE

## 2020-05-03 NOTE — PLAN OF CARE
Problem: Patient Care Overview  Goal: Plan of Care Review  Outcome: Ongoing (interventions implemented as appropriate)  Flowsheets (Taken 5/3/2020 7487)  Plan of Care Reviewed With: patient  Note:   Alert. Patient reports pain in leg. Prn meds adm. And eff. For periods of time. Patient concerned with pain control when DC tomorrow. Enc. To speak with MD prior to DC on rounds in AM re: pain management.

## 2020-05-03 NOTE — OUTREACH NOTE
Prep Survey      Responses   [facility] facility patient discharged from?  [facility]   Is LACE score < 7 ?  No   Eligibility  Readm Mgmt   Discharge diagnosis  Sepsis,  Acute osteomyelitis of right foot,   s/p right BKA    COVID-19 Test Status  Negative   Does the patient have one of the following disease processes/diagnoses(primary or secondary)?  Sepsis   Does the patient have Home health ordered?  Yes   What is the Home health agency?   [facility]    Is there a DME ordered?  Yes   What DME was ordered?  Rolling walker - [employer]    Prep survey completed?  Yes          Nancy Aparicio RN

## 2020-05-04 ENCOUNTER — READMISSION MANAGEMENT (OUTPATIENT)
Dept: CALL CENTER | Facility: HOSPITAL | Age: 45
End: 2020-05-04

## 2020-05-04 NOTE — PAYOR COMM NOTE
"AUTHORIZATION PENDING:   PLEASE CALL OR FAX DETERMINATION TO CONTACT BELOW. THANK YOU.        Lauren Nair, RN MSN  /UR  [facility]  [phone] office  [phone] fax  ana [email or web address]    [facility]  NPI: [id number]  Tax: [id number]      Ebenezer Gregg (45 y.o. Male)   1975  Auth # [id number]        Patient discharged to home on 05/03/2020.        Date of Birth Social Security Number Address Home Phone MRN    1975  [address] [address] [phone] [id number]    Ep[employer] Marital Status          Latter-day Single       Admission Date Admission Type Admitting Provider Attending Provider Department, Room/Bed    4/26/20 Emergency Clay Fonseca DO  [facility] NEURO HEART, 269/1    Discharge Date Discharge Disposition Discharge Destination        5/3/2020 Home or Self Care              Attending Provider:  (none)   Allergies:  Metformin, Oxycodone-acetaminophen    Isolation:  None   Infection:  None   Code Status:  Prior    Ht:  190.5 cm (75\")   Wt:  105 kg (230 lb 13.2 oz)    Admission Cmt:  None   Principal Problem:  Sepsis, Gram positive (CMS/[facility]) [A41.89]                 Active Insurance as of 4/26/2020     Primary Coverage     Payor Plan Insurance Group Employer/Plan Group    [facility] [employer] [facility] [employer] - innovate | mentor | invest [facility] PPO [id number]     Payor Plan Address Payor Plan Phone Number Payor Plan Fax Number Effective Dates    [address] [phone]  10/13/2019 - None Entered    [facility] [id number]       Subscriber Name Subscriber Birth Date Member ID       EBENEZER GREGG 1975 [id number]                 Emergency Contacts      (Rel.) Home Phone Work Phone Mobile Phone    Marsha Garza (Relative) -- -- [phone]               Discharge Summary      Clay Fonseca DO at 05/03/20 1137                [facility] Medicine Services  DISCHARGE " SUMMARY        Prepared For PCP:  Fred Lemons FNP    Patient Name: Maynor Gregg  : 1975  MRN: [id number]      Date of Admission:   2020    Date of Discharge:  5/3/2020    Length of stay:  LOS: 7 days     Hospital Course     Presenting Problem:   Leukocytosis, unspecified type [D72.829]  Dyspnea, unspecified type [R06.00]  Nausea and vomiting, intractability of vomiting not specified, unspecified vomiting type [R11.2]  Osteomyelitis of right foot, unspecified type (CMS/[facility]) [M86.9]  Suspected Covid-19 Virus Infection [R68.89]  Dyspnea, unspecified type [R06.00]  Below-knee amputation of right lower extremity (CMS/[facility]) [S88.111A]      Active Hospital Problems    Diagnosis  POA   • **Sepsis, Gram positive (CMS/[facility]) [A41.89]  Yes     Priority: Medium   • Acute osteomyelitis of right foot (CMS/[facility]) [M86.171]  Yes     Priority: High   • Covid-19 Virus not Detected [MME8458]  Yes     Priority: High   • S/P BKA (below knee amputation), right (CMS/[facility]) [Z89.511]  Not Applicable     Priority: High   • Osteomyelitis of right foot (CMS/[facility]) [M86.9]  Yes     Priority: High   • Chest pain [R07.9]  Yes     Priority: High   • Chronic ulcer of right foot, with necrosis of bone (CMS/[facility]) [L97.514]  Unknown     Priority: High   • Nausea and vomiting [R11.2]  Yes     Priority: Medium   • Dyspnea [R06.00]  Yes     Priority: Medium   • CAD (coronary artery disease) [I25.10]  Yes     Priority: Medium   • Diabetic peripheral neuropathy (CMS/[facility]) [E11.42]  Yes     Priority: Medium   • 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           Hospital Course:    The patient was initially admitted to the PCU to rule out COVID 19.  Empiric antibiotics were given for right foot cellulitis.  MRI of the right foot on 2020 showed osteomyelitis of right foot.  Podiatry evaluated the patient and underwent  incision and drainage and right TMA on 4/28/2020.  Infectious disease was consulted after COVID-19 was ruled out.  The patient was having persistent fevers and evidence of tissue devitalization thus orthopedic surgeon was consulted and right BKA was done on 4/30/2020.  Blood cultures from 4/26/2020 grew group B streptococcus which was treated with Rocephin.  Repeat blood cultures on 4/27/2020 were negative for growth.  The patient was also seen by endocrinologist to manage uncontrolled diabetes mellitus.  The patient will be discharged home on 5/3/2020 after PICC line placement with plans to complete 2 weeks of Rocephin therapy.    List of problems during hospitalization:      Sepsis with group B Streptococcus bacteremia:  -Secondary to right foot infection as source  -s/p right TMA 4/28/20  -Patient continued to have devitalized tissue and drainage thus s/p right BKA 4/30/20 by orthopedics  -2/2 blood culture bottles on 4/26/2020--> grew group B strep--> will need 2 weeks of Rocephin on  -Repeat blood cultures x2 on 4/27/2020--> no growth  -Consulted ID known to patient  -Plan DC home 5/3/2020 with 2 weeks of Rocephin  and oral doxycycline     Chest pain:  -Cardiac enzymes trended and negative--> MI ruled out  -Atypical for angina or cardiac chest pain  -Patient with known CAD with 2 stents (2010)     Dyspnea (POA):  -Resolved.  -Likely from sepsis  -COVID 19 ruled out 4/27/2020  -C-reactive protein 30.35, Procalcitonin 0.68  -D-dimer 5.31--> CTA of chest--> no PE     Osteomyelitis of the right foot:  -s/p MRI of right foot 4/26/2020  -wound culture 1/20/2020-->MRSA, strep B, Prevotella--> treated with Rocephin/daptomycin/Flagyl  -s/p partial ray amputation 2/27/2020--> tissue/bone culture no growth  -DC on 2/29/2020 on Rocephin/daptomycin/Flagyl x6 weeks  -s/p right TMA 4/28/20  -s/p right BKA 4/30/2020       Diabetes type 1 with peripheral neuropathy  -Discharged on Lantus 24 units nightly and lispro 10 units  with meals       Recommendation for Outpatient Providers:             Reasons For Change In Medications and Indications for New Medications:        Day of Discharge     HPI:     The patient is a 45 y.o. male with a history of hypertension, IDDM, CAD and right foot ulcer s/p partial first metatarsal ray amputation 2/27/2020.  He was recently discharged on 2/29/2020 with 6 weeks course of Rocephin's/daptomycin/Flagyl.     The patient presented to the ED on 4/26/2020 complaining of 3 days of nausea, vomiting and body aches.  He had presented to his PCP on 4/24/2020 and influenza and strep throat were ruled out.  COVID-19 test was done but ended up coming to the emergency room because of intractable vomiting.  He also complained of increased erythema of his right foot.     Vital Signs:   Temp:  [98.1 °F (36.7 °C)-98.7 °F (37.1 °C)] 98.7 °F (37.1 °C)  Heart Rate:  [74-92] 77  Resp:  [13-19] 19  BP: (125-147)/(67-86) 141/84     Physical Exam:  Physical Exam   Constitutional: He is oriented to person, place, and time. He appears well-developed.   HENT:   Head: Normocephalic and atraumatic.   Eyes: Pupils are equal, round, and reactive to light. EOM are normal.   Neck: Normal range of motion. Neck supple.   Cardiovascular: Normal rate and regular rhythm.   Pulmonary/Chest: Effort normal and breath sounds normal.   Abdominal: Soft. Bowel sounds are normal.   Musculoskeletal:   Right AKA stump with clean and dry dressing.   Lymphadenopathy:     He has no cervical adenopathy.   Neurological: He is alert and oriented to person, place, and time.   Skin: Skin is warm.   Psychiatric: He has a normal mood and affect. His speech is normal.       Pertinent  and/or Most Recent Results     Results from last 7 days   Lab Units 05/03/20  0414 05/02/20  1824 05/02/20  0409 05/02/20  0408 05/01/20  1551 05/01/20  0455 04/30/20  1109 04/30/20  0548 04/29/20  0544 04/28/20  0632 04/27/20  0208   WBC 10*3/mm3 11.20*  --  9.00  --   --  10.80   --  13.40* 19.00* 18.10* 20.30*   HEMOGLOBIN g/dL 9.8*  --  9.7*  --   --  9.9*  --  10.1* 11.2* 11.2* 11.8*   HEMATOCRIT % 29.0*  --  29.2*  --   --  28.5*  --  30.1* 33.9* 34.3* 33.7*   PLATELETS 10*3/mm3 417  --  396  --   --  312  --  330 332 296 348   SODIUM mmol/L 138  --   --  138  --  133* 133* 129* 129* 129* 132*   POTASSIUM mmol/L 3.9 4.1  --  3.4* 3.7 3.2* 3.4* 3.3* 3.9 3.7 3.6   CHLORIDE mmol/L 103  --   --  102  --  98 96* 96* 94* 90* 90*   CO2 mmol/L 28.0  --   --  27.0  --  27.0 27.0 25.0 22.0 26.0 27.0   BUN mg/dL 10  --   --  13  --  18 31* 31* 32* 31* 18   CREATININE mg/dL 0.72*  --   --  0.69*  --  0.67* 0.92 0.85 1.02 1.26 0.76   GLUCOSE mg/dL 76  --   --  87  --  217* 327* 383* 381* 305* 294*   CALCIUM mg/dL 7.7*  --   --  7.6*  --  7.8* 8.4* 7.9* 8.1* 8.3* 8.6     Results from last 7 days   Lab Units 05/01/20  0455 04/29/20  0544 04/27/20  0208 04/26/20  1939   BILIRUBIN mg/dL 0.3 0.5 0.9 1.1   ALK PHOS U/L 106 139* 139* 173*   ALT (SGPT) U/L 20 20 26 31   AST (SGOT) U/L 33 14 21 24     Results from last 7 days   Lab Units 04/27/20  0208   CHOLESTEROL mg/dL 113   TRIGLYCERIDES mg/dL 137   HDL CHOL mg/dL 25*     Results from last 7 days   Lab Units 04/30/20  0022 04/28/20  0632 04/27/20  0815 04/27/20  0208 04/26/20  1950 04/26/20  1939   TSH uIU/mL  --  1.040  --  0.421  --   --    HEMOGLOBIN A1C %  --  9.3*  --   --   --   --    PROBNP pg/mL  --   --   --   --   --  500.3*   TROPONIN T ng/mL  --   --  <0.010 0.019  --  <0.010   PROCALCITONIN ng/mL  --  31.80*  --   --   --  0.68*   LACTATE mmol/L 1.3  --   --  1.4 3.0*  --        Brief Urine Lab Results  (Last result in the past 365 days)      Color   Clarity   Blood   Leuk Est   Nitrite   Protein   CREAT   Urine HCG        01/24/20 1445 Dark Yellow  Comment:  Result checked  Clear Negative Negative Negative Negative               Microbiology Results Abnormal     Procedure Component Value - Date/Time    Anaerobic Culture - Tissue, Foot, Right  [[id number]]  (Abnormal) Collected:  04/28/20 1025    Lab Status:  Final result Specimen:  Tissue from Foot, Right Updated:  05/03/20 1259     Anaerobic Culture Bacteroides species     Comment: Bacteroides pyogenes         Narrative:             AFB Culture - Tissue, Foot, Right [[id number]] Collected:  04/28/20 1025    Lab Status:  Preliminary result Specimen:  Tissue from Foot, Right Updated:  05/03/20 1045     AFB Culture No AFB isolated at less than 1 week     AFB Stain No acid fast bacilli seen    Blood Culture - Blood, Hand, Right [[id number]] Collected:  04/27/20 2005    Lab Status:  Final result Specimen:  Blood from Hand, Right Updated:  05/02/20 2015     Blood Culture No growth at 5 days    Blood Culture - Blood, Wrist, Right [[id number]] Collected:  04/27/20 2005    Lab Status:  Final result Specimen:  Blood from Wrist, Right Updated:  05/02/20 2015     Blood Culture No growth at 5 days    Tissue / Bone Culture - Tissue, Foot, Right [[id number]]  (Abnormal) Collected:  04/28/20 1025    Lab Status:  Final result Specimen:  Tissue from Foot, Right Updated:  04/30/20 0906     Tissue Culture Light growth (2+) Streptococcus agalactiae (Group B)     Comment: This organism is considered to be universally susceptible to penicillin.  No further antibiotic testing will be performed. If Clindamycin or Erythromycin is the drug of choice, notify the laboratory within 7 days to request susceptibility testing.         Rare Normal Skin Juliet     Gram Stain Few (2+) WBCs per low power field      Many (4+) Mixed bacterial morphotypes seen on Gram Stain    Blood Culture - Blood, Hand, Left [[id number]]  (Abnormal) Collected:  04/26/20 2004    Lab Status:  Final result Specimen:  Blood from Hand, Left Updated:  04/29/20 0608     Blood Culture Streptococcus agalactiae (Group B)     Comment: Refer to previous blood culture collected on 4/26/2020 at 1939 for KAILA's.          Gram Stain Aerobic Bottle Gram positive cocci in chains       Anaerobic Bottle Gram positive cocci in chains    Blood Culture - Blood, Arm, Right [[id number]]  (Abnormal)  (Susceptibility) Collected:  04/26/20 1939    Lab Status:  Final result Specimen:  Blood from Arm, Right Updated:  04/29/20 0607     Blood Culture Streptococcus agalactiae (Group B)     Gram Stain Anaerobic Bottle Gram positive cocci in chains    Susceptibility      Streptococcus agalactiae (Group B)     KAILA     Ceftriaxone Susceptible     Clindamycin Susceptible     Levofloxacin Susceptible     Penicillin G Susceptible     Vancomycin Susceptible                    Wound Culture - Swab, Foot, Left [[id number]]  (Abnormal) Collected:  04/26/20 2032    Lab Status:  Final result Specimen:  Swab from Foot, Left Updated:  04/28/20 0719     Wound Culture Moderate growth (3+) Streptococcus agalactiae (Group B)     Comment: This organism is considered to be universally susceptible to penicillin.  No further antibiotic testing will be performed. If Clindamycin or Erythromycin is the drug of choice, notify the laboratory within 7 days to request susceptibility testing.         Light growth (2+) Normal Skin Juliet     Gram Stain Few (2+) WBCs seen      Few (2+) Gram positive cocci      Rare (1+) Gram negative bacilli    SARS-CoV-2 PCR ([place] IN-HOUSE PERFORMED), NP SWAB IN TRANSPORT MEDIA - Swab, Nasopharynx [[id number]]  (Normal) Collected:  04/26/20 2032    Lab Status:  Final result Specimen:  Swab from Nasopharynx Updated:  04/27/20 1203     COVID19 Not Detected    Blood Culture ID, PCR - Blood, Arm, Right [[id number]]  (Abnormal) Collected:  04/26/20 1939    Lab Status:  Final result Specimen:  Blood from Arm, Right Updated:  04/27/20 1035     BCID, PCR Streptococcus agalactiae (Group B). Identification by BCID PCR.     BOTTLE TYPE Anaerobic Bottle    Respiratory Panel, PCR - Swab, Nasopharynx [[id number]]  (Normal) Collected:  04/26/20 2032    Lab Status:  Final result Specimen:  Swab from Nasopharynx Updated:   04/26/20 2155     ADENOVIRUS, PCR Not Detected     Coronavirus 229E Not Detected     Coronavirus HKU1 Not Detected     Coronavirus NL63 Not Detected     Coronavirus OC43 Not Detected     Human Metapneumovirus Not Detected     Human Rhinovirus/Enterovirus Not Detected     Influenza B PCR Not Detected     Parainfluenza Virus 1 Not Detected     Parainfluenza Virus 2 Not Detected     Parainfluenza Virus 3 Not Detected     Parainfluenza Virus 4 Not Detected     Bordetella pertussis pcr Not Detected     Influenza A H1 2009 PCR Not Detected     Chlamydophila pneumoniae PCR Not Detected     Mycoplasma pneumo by PCR Not Detected     Influenza A PCR Not Detected     Influenza A H3 Not Detected     Influenza A H1 Not Detected     RSV, PCR Not Detected    Narrative:       The coronavirus on the RVP is NOT COVID-19 and is NOT indicative of infection with COVID-19.     Rapid Strep A Screen - Swab, Throat [[id number]]  (Normal) Collected:  04/26/20 2032    Lab Status:  Final result Specimen:  Swab from Throat Updated:  04/26/20 2049     Strep A Ag Negative          Xr Tibia Fibula 2 View Right    Result Date: 4/30/2020  Impression: 1. Expected postoperative appearance status post right below the knee amputation.  Electronically Signed By-Magno Agarwal On:4/30/2020 11:54 AM This report was finalized on [id number] by  Magno Agarwal, .    Xr Foot 3+ View Right    Result Date: 4/26/2020  Impression:  1. Previous amputation procedure the right great toe and most of the first metatarsal bone. 2. Questionable osteomyelitis at the amputation site of the distal first metatarsal bone and also at the site of a slightly displaced oblique fracture through the base of the fifth metatarsal bone. There is soft tissue gas consistent with wounds at both locations which appear to extend to the underlying bone. 3. There is a healing nondisplaced fracture through the head of the second metatarsal bone.  Electronically Signed By-Gary Marie  On:4/26/2020 7:53 PM This report was finalized on [id number] by  Gary Marie, .    Ct Chest Pulmonary Embolism    Result Date: 4/26/2020  Impression: No CTA evidence of pulmonary embolism or acute aortic pathology. No acute cardiopulmonary process seen. Sequela of prior granulomatous disease. Electronically signed by:  Keiko Ann M.D.  4/26/2020 8:44 PM    Mri Foot Right With & Without Contrast    Result Date: 4/27/2020  Impression:  1. Postoperative changes from amputation of the great toe at the level of mid metatarsal with abundant surrounding callus formation. There is T1 marrow placement, bone marrow edema like signal, and enhancement throughout the first metatarsal, consistent with osteomyelitis. 2. Soft tissue wound at the lateral midfoot/forefoot extending to the nondisplaced fracture at the base of the fifth metatarsal. T1 marrow placement, bone marrow edema like signal, and enhancement throughout the fifth metatarsal is consistent with osteomyelitis. 3. Irregular fluid collection extending from the soft tissue wound at the lateral midfoot/forefoot and surrounding the second through fifth MTP joints, suspicious for abscess. 4. Nondisplaced fracture of the second metatarsal head with suspected osteomyelitis of the second metatarsal and possible second MTP joint septic arthritis. 5. Patchy bone marrow edema like signal and enhancement multifocal areas of suspected marrow placement in the anterior process of the calcaneus, cuboid, cuneiforms, and the bases of the second, third, and fourth metatarsals, which could represent early osteomyelitis or stress marrow changes. 6. Diffuse soft tissue and muscular edema enhancement, consistent with cellulitis and myositis.    Electronically Signed By-Delbert Dinero On:4/27/2020 6:30 PM This report was finalized on [id number] by  Delbert Dinero, .    Xr Chest Ap    Result Date: 4/26/2020  Impression: No active disease.  Electronically Signed By-Gary Marie  On:4/26/2020 7:53 PM This report was finalized on [id number] by  Gary Marie, .                Results for orders placed during the hospital encounter of 01/18/20   Adult Transthoracic Echo Complete W/ Cont if Necessary Per Protocol    Narrative · Left ventricular systolic function is normal.  · Mild mitral valve regurgitation is present  · Mild tricuspid valve regurgitation is present.  · Ejection fraction is about 65%  · No pericardial effusion noted                  Test Results Pending at Discharge   Order Current Status    AFB Culture - Tissue, Foot, Right Preliminary result            Procedures Performed  Procedure(s):  AMPUTATION BELOW KNEE         Consults:   Consults     Date and Time Order Name Status Description    4/28/2020 1609 Inpatient Orthopedic Surgery Consult Completed     4/27/2020 1713 Inpatient Infectious Diseases Consult Completed     4/27/2020 0244 Inpatient Podiatry Consult Completed             Discharge Details        Discharge Medications      New Medications      Instructions Start Date   cefTRIAXone 2 g in sodium chloride 0.9 % 100 mL IVPB   2 g, Intravenous, Every 24 Hours      doxycycline 100 MG capsule  Commonly known as:  MONODOX   100 mg, Oral, 2 Times Daily      HYDROcodone-acetaminophen  MG per tablet  Commonly known as:  Norco   1 or 2 p.o. every 4 hours as needed      insulin detemir 100 UNIT/ML injection  Commonly known as:  Levemir   21 Units, Subcutaneous, Nightly      insulin lispro 100 UNIT/ML injection  Commonly known as:  humaLOG   7 Units, Subcutaneous, 3 Times Daily With Meals         Continue These Medications      Instructions Start Date   aspirin 325 MG tablet   325 mg, Oral, Daily, Pt to stop 2/27 per Angelique         Stop These Medications    Insulin Glargine 100 UNIT/ML injection pen  Commonly known as:  BASAGLAR KWIKPEN     NovoLOG 100 UNIT/ML injection  Generic drug:  insulin aspart            Allergies   Allergen Reactions   • Metformin Diarrhea and  Nausea And Vomiting   • Oxycodone-Acetaminophen Nausea And Vomiting and Rash         Discharge Disposition:  Home or Self Care    Diet:  Hospital:  Diet Order   Procedures   • Diet Diabetic/Consistent Carbs; Diabetic - Consistent Carb         Discharge Activity:   Activity Instructions     Up ad isi with rolling walker                 CODE STATUS:    Code Status and Medical Interventions:   Ordered at: 04/30/20 1056     Level Of Support Discussed With:    Patient     Code Status:    CPR     Medical Interventions (Level of Support Prior to Arrest):    Full         Follow-up Appointments  No future appointments.    Additional Instructions for the Follow-ups that You Need to Schedule     Ambulatory Referral to Home Health   As directed      Face to Face Visit Date:  5/1/2020    Follow-up provider for Plan of Care?:  I treated the patient in an acute care facility and will not continue treatment after discharge.    Follow-up provider:  FRED LEMONS [[id number]]    Reason/Clinical Findings:  Bacteremia/BKA   -   IV ABX    Describe mobility limitations that make leaving home difficult:  Bacteremia/BKA   -   IV ABX    Nursing/Therapeutic Services Requested:  Other (Eval and Treat )    Frequency:  1 Week 1         Discharge Follow-up with PCP   As directed       Currently Documented PCP:    Fred Lemons FNP    PCP Phone Number:    [phone]     Follow Up Details:  2 weeks               Condition on Discharge:      Stable    Electronically signed by Clay Fonseca DO, 05/03/20, 11:37 AM.    Time: I spent  20 minutes on this discharge activity which included face-to-face encounter with the patient/reviewing the data in the system/coordination of the care with the nursing staff as well as consultants/documentation/entering orders.      Electronically signed by Clay Fonseca DO at 05/03/20 9663

## 2020-05-04 NOTE — PROGRESS NOTES
Case Management Discharge Note      Final Note: d/c home with [facility]           Final Discharge Disposition Code: 06 - home with home health care

## 2020-05-04 NOTE — OUTREACH NOTE
Sepsis Week 1 Survey      Responses   [facility] patient discharged from?  [facility]   COVID-19 Test Status  Negative   Does the patient have one of the following disease processes/diagnoses(primary or secondary)?  Sepsis   Is there a successful TCM telephone encounter documented?  No   Week 1 attempt successful?  Yes   Call start time  1547   Call end time  1555   Discharge diagnosis  Sepsis,  Acute osteomyelitis of right foot,   s/p right BKA    Is patient permission given to speak with other caregiver?  No   Medication alerts for this patient  IV ceftriaxone x6wks and PO Doxycycline x5days   Meds reviewed with patient/caregiver?  Yes   Is the patient having any side effects they believe may be caused by any medication additions or changes?  No   Does the patient have all medications related to this admission filled (includes all antibiotics, inhalers, nebulizers,steroids,etc.)  No   What is keeping the patient from filling the prescriptions?  Financial   Nursing Interventions  No intervention needed   Notified Case Management  Medication reconciliation issues   Prescription comments  Pt reports that his insurance will not cover the Levemir so he will continue his Basaglar Kwikpen and the ins won't cover the Humalog so he will continue the Novalog insulin   Is the patient taking all medications as directed (includes completed medication regime)?  No   What is preventing the patient from taking all medications as directed?  Other   Does the patient have a primary care provider?   Yes   Does the patient have an appointment with their PCP within 7 days of discharge?  Greater than 7 days   What is preventing the patient from scheduling follow up appointments within 7 days of discharge?  Earlier appointment not available   Has the patient kept scheduled appointments due by today?  N/A   What is the Home health agency?   [facility]    Has home health visited the patient within 72 hours of discharge?  Yes    Pulse Ox monitoring  None   Psychosocial issues?  No   Comments  Pt is monitoring temp today 99.6. He is taking NORCO which does have Tylenol in it.    Did the patient receive a copy of their discharge instructions?  Yes   Nursing interventions  Reviewed instructions with patient   Week 1 call completed?  Yes   Graduated/Revoked comments  Pt ended call quickly           Mae Gauthier RN

## 2020-05-05 ENCOUNTER — READMISSION MANAGEMENT (OUTPATIENT)
Dept: CALL CENTER | Facility: HOSPITAL | Age: 45
End: 2020-05-05

## 2020-05-05 NOTE — OUTREACH NOTE
"Sepsis Week 1 Survey      Responses   [facility] patient discharged from?  [facility]   COVID-19 Test Status  Negative   Does the patient have one of the following disease processes/diagnoses(primary or secondary)?  Sepsis   Is there a successful TCM telephone encounter documented?  No   Week 1 attempt successful?  Yes   Call start time  1519   Call end time  1522   Medication alerts for this patient  IV ceftriaxone x6wks and PO Doxycycline x5days   Meds reviewed with patient/caregiver?  Yes   Is the patient having any side effects they believe may be caused by any medication additions or changes?  No   Does the patient have all medications related to this admission filled (includes all antibiotics, inhalers, nebulizers,steroids,etc.)  No   What is keeping the patient from filling the prescriptions?  Financial   Prescription comments  Pt reports that his insurance will not cover the Levemir so he will continue his Basaglar Kwikpen and the ins won't cover the Humalog so he will continue the Novalog insulin   Does the patient have a primary care provider?   Yes   Comments regarding PCP  PATIENT STATES HE WAS TOLD HIS PROVIDERS OFFICES WILL CALL HIM TO SCHEDULE FOLLOW UP APPOINTMENTS   Does the patient have an appointment with their PCP within 7 days of discharge?  No   What is preventing the patient from scheduling follow up appointments within 7 days of discharge?  Waiting on return call   Nursing Interventions  Educated patient on importance of making appointment   Has the patient kept scheduled appointments due by today?  N/A   What is the Home health agency?   [facility]    Has home health visited the patient within 72 hours of discharge?  Yes   Pulse Ox monitoring  None   Comments  PATIENT STATES HE \"FELL AGAI,\" BUT REPORTS HE TOLD HIS HOME HEALTH NURSE HE ISN'T READY FOR PT YET. HE WANTS TO HEAL MORE FIRST.    Did the patient receive a copy of their discharge instructions?  Yes   Nursing " interventions  Reviewed instructions with patient   What is the patient's perception of their health status since discharge?  Improving   Nursing interventions  Nurse provided patient education   Is the patient/caregiver able to teach back Sepsis?  S - Shivering,fever or very cold, S - Sleepy, difficult to arouse,confused, I -   I feel like I might die-a feeling of hopelessness, S - Short of breath, P - Pale or discolored skin, E - Extreme pain or generalized discomfort (worst ever,especially abdomen)   Nursing interventions  Nurse provided reassurance to patient [PATIENT HAD A SOLEMN TONE DURING THIS PHONE CONVERSTATION. ]   Is patient/caregiver able to teach back steps to recovery at home?  Set small, achievable goals for return to baseline health, Rest and regain strength, Make a list of questions for PCP appoinment   Is the patient/caregiver able to teach back signs and symptoms of worsening condition:  Fever, Hyperthermia, Rapid heart rate (>90), Shortness of breath/rapid respiratory rate, Altered mental status(confusion/coma), Edema, High blood glucose without diabetes   Is the patient/caregiver able to teach back the hierarchy of who to call/visit for symptoms/problems? PCP, Specialist, Home health nurse, Urgent Care, ED, 911  Yes   Week 1 call completed?  Yes          Sarita Haskins LPN

## 2020-05-06 ENCOUNTER — READMISSION MANAGEMENT (OUTPATIENT)
Dept: CALL CENTER | Facility: HOSPITAL | Age: 45
End: 2020-05-06

## 2020-05-06 NOTE — OUTREACH NOTE
"Sepsis Week 1 Survey      Responses   [facility] patient discharged from?  [facility]   COVID-19 Test Status  Negative   Does the patient have one of the following disease processes/diagnoses(primary or secondary)?  Sepsis   Is there a successful TCM telephone encounter documented?  No   Week 1 attempt successful?  Yes   Call start time  1026   Call end time  1034   Discharge diagnosis  Sepsis,  Acute osteomyelitis of right foot,   s/p right BKA    Prescription comments  Pt reports that his insurance will not cover the Levemir so he will continue his Basaglar Kwikpen and the ins won't cover the Humalog so he will continue the Novalog insulin   Is the patient taking all medications as directed (includes completed medication regime)?  No   What is preventing the patient from taking all medications as directed?  Other   Medication comments  see above   Has the patient kept scheduled appointments due by today?  N/A   Comments  States he will schedule them at some point.    Has home health visited the patient within 72 hours of discharge?  Yes   Pulse Ox monitoring  None   Psychosocial issues?  Yes   Psychosocial comments  pt    Comments  Pt reports that he fell yesterday on his hip and bruised it bc he is had his \"leg cut off and I am trying to do everything on one leg\". Pt reports he does not want physical therapy w/ HH right now bc he is trying to heal. . Pt denies fever.    What is the patient's perception of their health status since discharge?  Same   Week 1 call completed?  Yes   Graduated/Revoked comments  Pt very verbally aggressive and difficult to hold conversation with this pt. Call ended quickly again.           Mae Gauthier RN  "

## 2020-05-08 ENCOUNTER — LAB REQUISITION (OUTPATIENT)
Dept: LAB | Facility: HOSPITAL | Age: 45
End: 2020-05-08

## 2020-05-08 DIAGNOSIS — Z45.2 ENCOUNTER FOR ADJUSTMENT AND MANAGEMENT OF VASCULAR ACCESS DEVICE: ICD-10-CM

## 2020-05-08 DIAGNOSIS — A40.1: ICD-10-CM

## 2020-05-08 LAB
BASOPHILS # BLD AUTO: 0.1 10*3/MM3 (ref 0–0.2)
BASOPHILS NFR BLD AUTO: 1 % (ref 0–1.5)
DEPRECATED RDW RBC AUTO: 43.3 FL (ref 37–54)
EOSINOPHIL # BLD AUTO: 0.2 10*3/MM3 (ref 0–0.4)
EOSINOPHIL NFR BLD AUTO: 1.6 % (ref 0.3–6.2)
ERYTHROCYTE [DISTWIDTH] IN BLOOD BY AUTOMATED COUNT: 14 % (ref 12.3–15.4)
ERYTHROCYTE [SEDIMENTATION RATE] IN BLOOD: 91 MM/HR (ref 0–15)
HCT VFR BLD AUTO: 32.2 % (ref 37.5–51)
HGB BLD-MCNC: 11.7 G/DL (ref 13–17.7)
LYMPHOCYTES # BLD AUTO: 2.8 10*3/MM3 (ref 0.7–3.1)
LYMPHOCYTES NFR BLD AUTO: 28.4 % (ref 19.6–45.3)
MCH RBC QN AUTO: 31.4 PG (ref 26.6–33)
MCHC RBC AUTO-ENTMCNC: 36.3 G/DL (ref 31.5–35.7)
MCV RBC AUTO: 86.5 FL (ref 79–97)
MONOCYTES # BLD AUTO: 0.7 10*3/MM3 (ref 0.1–0.9)
MONOCYTES NFR BLD AUTO: 7.3 % (ref 5–12)
NEUTROPHILS # BLD AUTO: 6.2 10*3/MM3 (ref 1.7–7)
NEUTROPHILS NFR BLD AUTO: 61.7 % (ref 42.7–76)
NRBC BLD AUTO-RTO: 0.1 /100 WBC (ref 0–0.2)
PLATELET # BLD AUTO: 570 10*3/MM3 (ref 140–450)
PMV BLD AUTO: 7.2 FL (ref 6–12)
RBC # BLD AUTO: 3.73 10*6/MM3 (ref 4.14–5.8)
WBC NRBC COR # BLD: 10 10*3/MM3 (ref 3.4–10.8)

## 2020-05-08 PROCEDURE — 85652 RBC SED RATE AUTOMATED: CPT | Performed by: INTERNAL MEDICINE

## 2020-05-08 PROCEDURE — 85025 COMPLETE CBC W/AUTO DIFF WBC: CPT | Performed by: INTERNAL MEDICINE

## 2020-05-09 ENCOUNTER — READMISSION MANAGEMENT (OUTPATIENT)
Dept: CALL CENTER | Facility: HOSPITAL | Age: 45
End: 2020-05-09

## 2020-05-09 NOTE — OUTREACH NOTE
Sepsis Week 1 Survey      Responses   [facility] patient discharged from?  [facility]   COVID-19 Test Status  Negative   Does the patient have one of the following disease processes/diagnoses(primary or secondary)?  Sepsis   Is there a successful TCM telephone encounter documented?  No   Week 1 attempt successful?  Yes   Call start time  1442   Call end time  1457   Discharge diagnosis  Sepsis,  Acute osteomyelitis of right foot,   s/p right BKA    Medication alerts for this patient  IV ceftriaxone x6wks and PO Doxycycline x5days   Meds reviewed with patient/caregiver?  Yes   Is the patient having any side effects they believe may be caused by any medication additions or changes?  No   Does the patient have all medications related to this admission filled (includes all antibiotics, inhalers, nebulizers,steroids,etc.)  No   Notified Case Management  Medication reconciliation issues   Prescription comments  Pt reports that his insurance will not cover the Levemir so he will continue his Basaglar Kwikpen and the ins won't cover the Humalog so he will continue the Novalog insulin   Is the patient taking all medications as directed (includes completed medication regime)?  No   Medication comments  BS is running 120's to 147   Does the patient have a primary care provider?   Yes   Does the patient have an appointment with their PCP within 7 days of discharge?  No   What is preventing the patient from scheduling follow up appointments within 7 days of discharge?  Waiting on return call   Nursing Interventions  Educated patient on importance of making appointment   Has the patient kept scheduled appointments due by today?  N/A   Comments  He will see Dr. Colin anders.   What is the Home health agency?   [facility]    Has home health visited the patient within 72 hours of discharge?  Yes   What DME was ordered?  Rolling walker - [employer]    Psychosocial issues?  Yes   Psychosocial comments  Depression, long talk  about what he can still do after Right BKA   Did the patient receive a copy of their discharge instructions?  Yes   Nursing interventions  Reviewed instructions with patient   What is the patient's perception of their health status since discharge?  Improving   Nursing interventions  Nurse provided patient education   Nursing interventions  Nurse provided reassurance to patient   Is patient/caregiver able to teach back steps to recovery at home?  Set small, achievable goals for return to baseline health, Rest and regain strength, Make a list of questions for PCP appoinment   Is the patient/caregiver able to teach back signs and symptoms of worsening condition:  Fever, Shortness of breath/rapid respiratory rate   Is the patient/caregiver able to teach back the hierarchy of who to call/visit for symptoms/problems? PCP, Specialist, Home health nurse, Urgent Care, ED, 911  Yes   Additional teach back comments  Encouraged daily improvement and fighting negative thoughts of what he can't do versus what he can.   Week 1 call completed?  Yes          Cora Vilchis RN

## 2020-05-15 ENCOUNTER — READMISSION MANAGEMENT (OUTPATIENT)
Dept: CALL CENTER | Facility: HOSPITAL | Age: 45
End: 2020-05-15

## 2020-05-15 NOTE — OUTREACH NOTE
Sepsis Week 2 Survey      Responses   [facility] patient discharged from?  [facility]   COVID-19 Test Status  Negative   Does the patient have one of the following disease processes/diagnoses(primary or secondary)?  Sepsis   Week 2 attempt successful?  Yes   Call start time  1209   Rescheduled  Rescheduled-pt requested   Call end time  1209          Shea Jj RN

## 2020-05-22 ENCOUNTER — READMISSION MANAGEMENT (OUTPATIENT)
Dept: CALL CENTER | Facility: HOSPITAL | Age: 45
End: 2020-05-22

## 2020-05-28 ENCOUNTER — READMISSION MANAGEMENT (OUTPATIENT)
Dept: CALL CENTER | Facility: HOSPITAL | Age: 45
End: 2020-05-28

## 2020-05-28 NOTE — OUTREACH NOTE
Sepsis Week 3 Survey      Responses   [facility] patient discharged from?  [facility]   COVID-19 Test Status  Negative   Does the patient have one of the following disease processes/diagnoses(primary or secondary)?  Sepsis   Week 3 attempt successful?  Yes   Call start time  1259   Call end time  1301   Discharge diagnosis  Sepsis,  Acute osteomyelitis of right foot,   s/p right BKA    Person spoke with today (if not patient) and relationship  Teo-relative   Meds reviewed with patient/caregiver?  Yes   Is the patient taking all medications as directed (includes completed medication regime)?  N/A [Teo is unsure. She reports he was not taking meds prior to hospital admission. ]   Has the patient kept scheduled appointments due by today?  Yes   What is the Home health agency?   [facility]    What DME was ordered?  Rolling walker - [employer]    Pulse Ox monitoring  None   What is the patient's perception of their health status since discharge?  Improving [Last teo heard he was better]   Nursing interventions  Nurse provided patient education   Is the patient/caregiver able to teach back Sepsis?  S - Shivering,fever or very cold   Nursing interventions  Nurse provided reassurance to patient   Is the patient/caregiver able to teach back signs and symptoms of worsening condition:  Fever   Week 3 call completed?  Yes   Wrap up additional comments  Teo was quick.           Brenda Cui RN

## 2020-06-05 ENCOUNTER — READMISSION MANAGEMENT (OUTPATIENT)
Dept: CALL CENTER | Facility: HOSPITAL | Age: 45
End: 2020-06-05

## 2020-06-05 NOTE — OUTREACH NOTE
Sepsis Week 4 Survey      Responses   [facility] [place] patient discharged from?  [facility]   COVID-19 Test Status  Negative   Does the patient have one of the following disease processes/diagnoses(primary or secondary)?  Sepsis   Week 4 attempt successful?  Yes   Call start time  1427   Call end time  1434   Discharge diagnosis  Sepsis,  Acute osteomyelitis of right foot,   s/p right BKA    Prescription comments  Pt reports that his insurance will not cover the Levemir so he will continue his Basaglar Kwikpen and the ins won't cover the Humalog so he will continue the Novalog insulin   Is the patient taking all medications as directed (includes completed medication regime)?  No   Medication comments  BS is running 130-160    Has the patient kept scheduled appointments due by today?  Yes   Pulse Ox monitoring  None   Comments  Pt reports last fall was 5/31/2020. He reports his stump is painful to touch & edematous from earlier trauma. They still have not been able to cast his prosthetic.    What is the patient's perception of their health status since discharge?  Improving   Week 4 call completed?  Yes   Would the patient like one additional call?  No   Graduated  Yes   Did the patient feel the follow up calls were helpful during their recovery period?  Yes   Was the number of calls appropriate?  Yes          Mae Gauthier RN

## 2020-06-09 LAB
MYCOBACTERIUM SPEC CULT: NORMAL
NIGHT BLUE STAIN TISS: NORMAL

## 2020-07-20 ENCOUNTER — APPOINTMENT (OUTPATIENT)
Dept: GENERAL RADIOLOGY | Facility: HOSPITAL | Age: 45
End: 2020-07-20

## 2020-07-20 ENCOUNTER — OFFICE VISIT (OUTPATIENT)
Dept: PODIATRY | Facility: CLINIC | Age: 45
End: 2020-07-20

## 2020-07-20 ENCOUNTER — HOSPITAL ENCOUNTER (INPATIENT)
Facility: HOSPITAL | Age: 45
LOS: 4 days | Discharge: HOME HEALTH | End: 2020-07-24
Attending: HOSPITALIST | Admitting: HOSPITALIST

## 2020-07-20 VITALS
DIASTOLIC BLOOD PRESSURE: 84 MMHG | HEIGHT: 75 IN | BODY MASS INDEX: 27.23 KG/M2 | SYSTOLIC BLOOD PRESSURE: 158 MMHG | HEART RATE: 84 BPM | WEIGHT: 219 LBS

## 2020-07-20 DIAGNOSIS — E11.42 DM TYPE 2 WITH DIABETIC PERIPHERAL NEUROPATHY (HCC): ICD-10-CM

## 2020-07-20 DIAGNOSIS — M14.672 CHARCOT'S JOINT OF FOOT, LEFT: ICD-10-CM

## 2020-07-20 DIAGNOSIS — S91.309A WOUND OF FOOT: Primary | ICD-10-CM

## 2020-07-20 DIAGNOSIS — L97.522 CHRONIC ULCER OF GREAT TOE OF LEFT FOOT WITH FAT LAYER EXPOSED (HCC): ICD-10-CM

## 2020-07-20 DIAGNOSIS — L03.116 CELLULITIS OF LEFT FOOT: Primary | ICD-10-CM

## 2020-07-20 LAB
ABO GROUP BLD: NORMAL
ALBUMIN SERPL-MCNC: 3.1 G/DL (ref 3.5–5.2)
ALBUMIN/GLOB SERPL: 0.8 G/DL
ALP SERPL-CCNC: 124 U/L (ref 39–117)
ALT SERPL W P-5'-P-CCNC: 48 U/L (ref 1–41)
ANION GAP SERPL CALCULATED.3IONS-SCNC: 10 MMOL/L (ref 5–15)
AST SERPL-CCNC: 24 U/L (ref 1–40)
BASOPHILS # BLD AUTO: 0.1 10*3/MM3 (ref 0–0.2)
BASOPHILS NFR BLD AUTO: 0.9 % (ref 0–1.5)
BILIRUB SERPL-MCNC: 0.3 MG/DL (ref 0–1.2)
BLD GP AB SCN SERPL QL: NEGATIVE
BUN SERPL-MCNC: 19 MG/DL (ref 6–20)
BUN SERPL-MCNC: ABNORMAL MG/DL
BUN/CREAT SERPL: ABNORMAL
CALCIUM SPEC-SCNC: 8.2 MG/DL (ref 8.6–10.5)
CHLORIDE SERPL-SCNC: 105 MMOL/L (ref 98–107)
CO2 SERPL-SCNC: 25 MMOL/L (ref 22–29)
CREAT SERPL-MCNC: 0.98 MG/DL (ref 0.76–1.27)
DEPRECATED RDW RBC AUTO: 44.2 FL (ref 37–54)
EOSINOPHIL # BLD AUTO: 0.2 10*3/MM3 (ref 0–0.4)
EOSINOPHIL NFR BLD AUTO: 2.2 % (ref 0.3–6.2)
ERYTHROCYTE [DISTWIDTH] IN BLOOD BY AUTOMATED COUNT: 14.2 % (ref 12.3–15.4)
GFR SERPL CREATININE-BSD FRML MDRD: 83 ML/MIN/1.73
GLOBULIN UR ELPH-MCNC: 4 GM/DL
GLUCOSE BLDC GLUCOMTR-MCNC: 256 MG/DL (ref 70–105)
GLUCOSE SERPL-MCNC: 220 MG/DL (ref 65–99)
HCT VFR BLD AUTO: 31.7 % (ref 37.5–51)
HGB BLD-MCNC: 11 G/DL (ref 13–17.7)
LYMPHOCYTES # BLD AUTO: 2.4 10*3/MM3 (ref 0.7–3.1)
LYMPHOCYTES NFR BLD AUTO: 32.3 % (ref 19.6–45.3)
MCH RBC QN AUTO: 30.4 PG (ref 26.6–33)
MCHC RBC AUTO-ENTMCNC: 34.8 G/DL (ref 31.5–35.7)
MCV RBC AUTO: 87.5 FL (ref 79–97)
MONOCYTES # BLD AUTO: 0.7 10*3/MM3 (ref 0.1–0.9)
MONOCYTES NFR BLD AUTO: 9.4 % (ref 5–12)
NEUTROPHILS NFR BLD AUTO: 4.1 10*3/MM3 (ref 1.7–7)
NEUTROPHILS NFR BLD AUTO: 55.2 % (ref 42.7–76)
NRBC BLD AUTO-RTO: 0 /100 WBC (ref 0–0.2)
PLATELET # BLD AUTO: 295 10*3/MM3 (ref 140–450)
PMV BLD AUTO: 8.3 FL (ref 6–12)
POTASSIUM SERPL-SCNC: 3.8 MMOL/L (ref 3.5–5.2)
PROT SERPL-MCNC: 7.1 G/DL (ref 6–8.5)
RBC # BLD AUTO: 3.62 10*6/MM3 (ref 4.14–5.8)
RH BLD: POSITIVE
SODIUM SERPL-SCNC: 140 MMOL/L (ref 136–145)
T&S EXPIRATION DATE: NORMAL
WBC # BLD AUTO: 7.5 10*3/MM3 (ref 3.4–10.8)

## 2020-07-20 PROCEDURE — 87070 CULTURE OTHR SPECIMN AEROBIC: CPT | Performed by: NURSE PRACTITIONER

## 2020-07-20 PROCEDURE — 87040 BLOOD CULTURE FOR BACTERIA: CPT | Performed by: NURSE PRACTITIONER

## 2020-07-20 PROCEDURE — 71045 X-RAY EXAM CHEST 1 VIEW: CPT

## 2020-07-20 PROCEDURE — 25010000002 VANCOMYCIN 10 G RECONSTITUTED SOLUTION: Performed by: HOSPITALIST

## 2020-07-20 PROCEDURE — 87147 CULTURE TYPE IMMUNOLOGIC: CPT | Performed by: NURSE PRACTITIONER

## 2020-07-20 PROCEDURE — 86901 BLOOD TYPING SEROLOGIC RH(D): CPT

## 2020-07-20 PROCEDURE — 86900 BLOOD TYPING SEROLOGIC ABO: CPT | Performed by: NURSE PRACTITIONER

## 2020-07-20 PROCEDURE — 83036 HEMOGLOBIN GLYCOSYLATED A1C: CPT | Performed by: NURSE PRACTITIONER

## 2020-07-20 PROCEDURE — 86900 BLOOD TYPING SEROLOGIC ABO: CPT

## 2020-07-20 PROCEDURE — 86850 RBC ANTIBODY SCREEN: CPT | Performed by: NURSE PRACTITIONER

## 2020-07-20 PROCEDURE — 99222 1ST HOSP IP/OBS MODERATE 55: CPT | Performed by: NURSE PRACTITIONER

## 2020-07-20 PROCEDURE — 85025 COMPLETE CBC W/AUTO DIFF WBC: CPT | Performed by: NURSE PRACTITIONER

## 2020-07-20 PROCEDURE — 93005 ELECTROCARDIOGRAM TRACING: CPT | Performed by: NURSE PRACTITIONER

## 2020-07-20 PROCEDURE — 99213 OFFICE O/P EST LOW 20 MIN: CPT | Performed by: PODIATRIST

## 2020-07-20 PROCEDURE — 87205 SMEAR GRAM STAIN: CPT | Performed by: NURSE PRACTITIONER

## 2020-07-20 PROCEDURE — 86901 BLOOD TYPING SEROLOGIC RH(D): CPT | Performed by: NURSE PRACTITIONER

## 2020-07-20 PROCEDURE — 80053 COMPREHEN METABOLIC PANEL: CPT | Performed by: NURSE PRACTITIONER

## 2020-07-20 PROCEDURE — 73630 X-RAY EXAM OF FOOT: CPT

## 2020-07-20 PROCEDURE — 82962 GLUCOSE BLOOD TEST: CPT

## 2020-07-20 PROCEDURE — 63710000001 INSULIN GLARGINE PER 5 UNITS: Performed by: NURSE PRACTITIONER

## 2020-07-20 PROCEDURE — 25010000002 CEFEPIME PER 500 MG: Performed by: HOSPITALIST

## 2020-07-20 RX ORDER — BISACODYL 10 MG
10 SUPPOSITORY, RECTAL RECTAL DAILY PRN
Status: DISCONTINUED | OUTPATIENT
Start: 2020-07-20 | End: 2020-07-24 | Stop reason: HOSPADM

## 2020-07-20 RX ORDER — BISACODYL 5 MG/1
5 TABLET, DELAYED RELEASE ORAL DAILY PRN
Status: DISCONTINUED | OUTPATIENT
Start: 2020-07-20 | End: 2020-07-24 | Stop reason: HOSPADM

## 2020-07-20 RX ORDER — AMOXICILLIN AND CLAVULANATE POTASSIUM 875; 125 MG/1; MG/1
1 TABLET, FILM COATED ORAL EVERY 12 HOURS
COMMUNITY
Start: 2020-07-17 | End: 2020-07-24 | Stop reason: HOSPADM

## 2020-07-20 RX ORDER — INSULIN GLARGINE 100 [IU]/ML
30 INJECTION, SOLUTION SUBCUTANEOUS NIGHTLY
COMMUNITY
Start: 2020-06-23

## 2020-07-20 RX ORDER — VANCOMYCIN 1.75 GRAM/500 ML IN 0.9 % SODIUM CHLORIDE INTRAVENOUS
1750 EVERY 12 HOURS
Status: DISCONTINUED | OUTPATIENT
Start: 2020-07-21 | End: 2020-07-22

## 2020-07-20 RX ORDER — TRAMADOL HYDROCHLORIDE 50 MG/1
50 TABLET ORAL EVERY 6 HOURS PRN
Status: DISCONTINUED | OUTPATIENT
Start: 2020-07-20 | End: 2020-07-24 | Stop reason: HOSPADM

## 2020-07-20 RX ORDER — SODIUM CHLORIDE 9 MG/ML
100 INJECTION, SOLUTION INTRAVENOUS CONTINUOUS
Status: DISCONTINUED | OUTPATIENT
Start: 2020-07-20 | End: 2020-07-24 | Stop reason: HOSPADM

## 2020-07-20 RX ORDER — ACETAMINOPHEN 325 MG/1
650 TABLET ORAL EVERY 4 HOURS PRN
Status: DISCONTINUED | OUTPATIENT
Start: 2020-07-20 | End: 2020-07-24 | Stop reason: HOSPADM

## 2020-07-20 RX ORDER — ACETAMINOPHEN 650 MG/1
650 SUPPOSITORY RECTAL EVERY 4 HOURS PRN
Status: DISCONTINUED | OUTPATIENT
Start: 2020-07-20 | End: 2020-07-24 | Stop reason: HOSPADM

## 2020-07-20 RX ORDER — ONDANSETRON 2 MG/ML
4 INJECTION INTRAMUSCULAR; INTRAVENOUS EVERY 6 HOURS PRN
Status: DISCONTINUED | OUTPATIENT
Start: 2020-07-20 | End: 2020-07-24 | Stop reason: HOSPADM

## 2020-07-20 RX ORDER — ACETAMINOPHEN 160 MG/5ML
650 SOLUTION ORAL EVERY 4 HOURS PRN
Status: DISCONTINUED | OUTPATIENT
Start: 2020-07-20 | End: 2020-07-24 | Stop reason: HOSPADM

## 2020-07-20 RX ORDER — DEXTROSE MONOHYDRATE 25 G/50ML
25 INJECTION, SOLUTION INTRAVENOUS
Status: DISCONTINUED | OUTPATIENT
Start: 2020-07-20 | End: 2020-07-24 | Stop reason: HOSPADM

## 2020-07-20 RX ORDER — CHOLECALCIFEROL (VITAMIN D3) 125 MCG
5 CAPSULE ORAL NIGHTLY PRN
Status: DISCONTINUED | OUTPATIENT
Start: 2020-07-20 | End: 2020-07-24 | Stop reason: HOSPADM

## 2020-07-20 RX ORDER — ONDANSETRON 4 MG/1
4 TABLET, FILM COATED ORAL EVERY 6 HOURS PRN
Status: DISCONTINUED | OUTPATIENT
Start: 2020-07-20 | End: 2020-07-24 | Stop reason: HOSPADM

## 2020-07-20 RX ORDER — INSULIN GLARGINE 100 [IU]/ML
12 INJECTION, SOLUTION SUBCUTANEOUS NIGHTLY
Status: DISCONTINUED | OUTPATIENT
Start: 2020-07-20 | End: 2020-07-23

## 2020-07-20 RX ORDER — SODIUM CHLORIDE 0.9 % (FLUSH) 0.9 %
10 SYRINGE (ML) INJECTION AS NEEDED
Status: DISCONTINUED | OUTPATIENT
Start: 2020-07-20 | End: 2020-07-24 | Stop reason: HOSPADM

## 2020-07-20 RX ORDER — NICOTINE POLACRILEX 4 MG
15 LOZENGE BUCCAL
Status: DISCONTINUED | OUTPATIENT
Start: 2020-07-20 | End: 2020-07-24 | Stop reason: HOSPADM

## 2020-07-20 RX ORDER — SODIUM CHLORIDE 0.9 % (FLUSH) 0.9 %
10 SYRINGE (ML) INJECTION EVERY 12 HOURS SCHEDULED
Status: DISCONTINUED | OUTPATIENT
Start: 2020-07-20 | End: 2020-07-24 | Stop reason: HOSPADM

## 2020-07-20 RX ADMIN — VANCOMYCIN HYDROCHLORIDE 2000 MG: 10 INJECTION, POWDER, LYOPHILIZED, FOR SOLUTION INTRAVENOUS at 23:40

## 2020-07-20 RX ADMIN — Medication 10 ML: at 21:42

## 2020-07-20 RX ADMIN — SODIUM CHLORIDE 100 ML/HR: 900 INJECTION, SOLUTION INTRAVENOUS at 21:41

## 2020-07-20 RX ADMIN — CEFEPIME HYDROCHLORIDE 2 G: 2 INJECTION, POWDER, FOR SOLUTION INTRAVENOUS at 22:19

## 2020-07-20 RX ADMIN — INSULIN GLARGINE 12 UNITS: 100 INJECTION, SOLUTION SUBCUTANEOUS at 21:39

## 2020-07-21 ENCOUNTER — APPOINTMENT (OUTPATIENT)
Dept: MRI IMAGING | Facility: HOSPITAL | Age: 45
End: 2020-07-21

## 2020-07-21 LAB
ANION GAP SERPL CALCULATED.3IONS-SCNC: 8 MMOL/L (ref 5–15)
BASOPHILS # BLD AUTO: 0.1 10*3/MM3 (ref 0–0.2)
BASOPHILS NFR BLD AUTO: 0.8 % (ref 0–1.5)
BILIRUB UR QL STRIP: NEGATIVE
BUN SERPL-MCNC: 17 MG/DL (ref 6–20)
BUN SERPL-MCNC: ABNORMAL MG/DL
BUN/CREAT SERPL: ABNORMAL
CALCIUM SPEC-SCNC: 7.8 MG/DL (ref 8.6–10.5)
CHLORIDE SERPL-SCNC: 105 MMOL/L (ref 98–107)
CHOLEST SERPL-MCNC: 115 MG/DL (ref 0–200)
CLARITY UR: CLEAR
CO2 SERPL-SCNC: 25 MMOL/L (ref 22–29)
COLOR UR: YELLOW
CREAT SERPL-MCNC: 0.78 MG/DL (ref 0.76–1.27)
CRP SERPL-MCNC: 3.54 MG/DL (ref 0–0.5)
DEPRECATED RDW RBC AUTO: 44.6 FL (ref 37–54)
EOSINOPHIL # BLD AUTO: 0.2 10*3/MM3 (ref 0–0.4)
EOSINOPHIL NFR BLD AUTO: 2.8 % (ref 0.3–6.2)
ERYTHROCYTE [DISTWIDTH] IN BLOOD BY AUTOMATED COUNT: 14.3 % (ref 12.3–15.4)
ERYTHROCYTE [SEDIMENTATION RATE] IN BLOOD: 63 MM/HR (ref 0–15)
GFR SERPL CREATININE-BSD FRML MDRD: 108 ML/MIN/1.73
GLUCOSE BLDC GLUCOMTR-MCNC: 142 MG/DL (ref 70–105)
GLUCOSE BLDC GLUCOMTR-MCNC: 156 MG/DL (ref 70–105)
GLUCOSE BLDC GLUCOMTR-MCNC: 173 MG/DL (ref 70–105)
GLUCOSE BLDC GLUCOMTR-MCNC: 179 MG/DL (ref 70–105)
GLUCOSE SERPL-MCNC: 247 MG/DL (ref 65–99)
GLUCOSE UR STRIP-MCNC: ABNORMAL MG/DL
HBA1C MFR BLD: 9.4 % (ref 3.5–5.6)
HCT VFR BLD AUTO: 30.5 % (ref 37.5–51)
HDLC SERPL-MCNC: 28 MG/DL (ref 40–60)
HGB BLD-MCNC: 10.6 G/DL (ref 13–17.7)
HGB UR QL STRIP.AUTO: NEGATIVE
KETONES UR QL STRIP: NEGATIVE
LDLC SERPL CALC-MCNC: 65 MG/DL (ref 0–100)
LDLC/HDLC SERPL: 2.34 {RATIO}
LEUKOCYTE ESTERASE UR QL STRIP.AUTO: NEGATIVE
LYMPHOCYTES # BLD AUTO: 2.9 10*3/MM3 (ref 0.7–3.1)
LYMPHOCYTES NFR BLD AUTO: 38.7 % (ref 19.6–45.3)
MCH RBC QN AUTO: 30.8 PG (ref 26.6–33)
MCHC RBC AUTO-ENTMCNC: 34.9 G/DL (ref 31.5–35.7)
MCV RBC AUTO: 88.4 FL (ref 79–97)
MONOCYTES # BLD AUTO: 0.8 10*3/MM3 (ref 0.1–0.9)
MONOCYTES NFR BLD AUTO: 10.2 % (ref 5–12)
NEUTROPHILS NFR BLD AUTO: 3.5 10*3/MM3 (ref 1.7–7)
NEUTROPHILS NFR BLD AUTO: 47.5 % (ref 42.7–76)
NITRITE UR QL STRIP: NEGATIVE
NRBC BLD AUTO-RTO: 0.1 /100 WBC (ref 0–0.2)
PH UR STRIP.AUTO: 5.5 [PH] (ref 5–8)
PLATELET # BLD AUTO: 263 10*3/MM3 (ref 140–450)
PMV BLD AUTO: 7.9 FL (ref 6–12)
POTASSIUM SERPL-SCNC: 3.7 MMOL/L (ref 3.5–5.2)
PROT UR QL STRIP: NEGATIVE
RBC # BLD AUTO: 3.45 10*6/MM3 (ref 4.14–5.8)
SODIUM SERPL-SCNC: 138 MMOL/L (ref 136–145)
SP GR UR STRIP: 1.03 (ref 1–1.03)
TRIGL SERPL-MCNC: 108 MG/DL (ref 0–150)
UROBILINOGEN UR QL STRIP: ABNORMAL
VLDLC SERPL-MCNC: 21.6 MG/DL
WBC # BLD AUTO: 7.4 10*3/MM3 (ref 3.4–10.8)

## 2020-07-21 PROCEDURE — 25010000002 CEFEPIME PER 500 MG: Performed by: HOSPITALIST

## 2020-07-21 PROCEDURE — 99232 SBSQ HOSP IP/OBS MODERATE 35: CPT | Performed by: PODIATRIST

## 2020-07-21 PROCEDURE — 63710000001 INSULIN GLARGINE PER 5 UNITS: Performed by: NURSE PRACTITIONER

## 2020-07-21 PROCEDURE — 25010000002 VANCOMYCIN 10 G RECONSTITUTED SOLUTION: Performed by: HOSPITALIST

## 2020-07-21 PROCEDURE — 73718 MRI LOWER EXTREMITY W/O DYE: CPT

## 2020-07-21 PROCEDURE — 81003 URINALYSIS AUTO W/O SCOPE: CPT | Performed by: NURSE PRACTITIONER

## 2020-07-21 PROCEDURE — 85025 COMPLETE CBC W/AUTO DIFF WBC: CPT | Performed by: NURSE PRACTITIONER

## 2020-07-21 PROCEDURE — 93010 ELECTROCARDIOGRAM REPORT: CPT | Performed by: INTERNAL MEDICINE

## 2020-07-21 PROCEDURE — 63710000001 INSULIN LISPRO (HUMAN) PER 5 UNITS: Performed by: NURSE PRACTITIONER

## 2020-07-21 PROCEDURE — 85652 RBC SED RATE AUTOMATED: CPT | Performed by: HOSPITALIST

## 2020-07-21 PROCEDURE — 82962 GLUCOSE BLOOD TEST: CPT

## 2020-07-21 PROCEDURE — 86140 C-REACTIVE PROTEIN: CPT | Performed by: HOSPITALIST

## 2020-07-21 PROCEDURE — 80061 LIPID PANEL: CPT | Performed by: NURSE PRACTITIONER

## 2020-07-21 PROCEDURE — 99232 SBSQ HOSP IP/OBS MODERATE 35: CPT | Performed by: HOSPITALIST

## 2020-07-21 PROCEDURE — 80048 BASIC METABOLIC PNL TOTAL CA: CPT | Performed by: NURSE PRACTITIONER

## 2020-07-21 RX ADMIN — ACETAMINOPHEN 650 MG: 325 TABLET, FILM COATED ORAL at 23:54

## 2020-07-21 RX ADMIN — Medication 10 ML: at 08:29

## 2020-07-21 RX ADMIN — ACETAMINOPHEN 650 MG: 325 TABLET, FILM COATED ORAL at 09:31

## 2020-07-21 RX ADMIN — SODIUM CHLORIDE 1750 MG: 9 INJECTION, SOLUTION INTRAVENOUS at 10:52

## 2020-07-21 RX ADMIN — CEFEPIME HYDROCHLORIDE 2 G: 2 INJECTION, POWDER, FOR SOLUTION INTRAVENOUS at 16:29

## 2020-07-21 RX ADMIN — INSULIN LISPRO 2 UNITS: 100 INJECTION, SOLUTION INTRAVENOUS; SUBCUTANEOUS at 11:34

## 2020-07-21 RX ADMIN — TRAMADOL HYDROCHLORIDE 50 MG: 50 TABLET, FILM COATED ORAL at 20:48

## 2020-07-21 RX ADMIN — INSULIN LISPRO 7 UNITS: 100 INJECTION, SOLUTION INTRAVENOUS; SUBCUTANEOUS at 11:35

## 2020-07-21 RX ADMIN — INSULIN LISPRO 2 UNITS: 100 INJECTION, SOLUTION INTRAVENOUS; SUBCUTANEOUS at 08:29

## 2020-07-21 RX ADMIN — INSULIN GLARGINE 12 UNITS: 100 INJECTION, SOLUTION SUBCUTANEOUS at 20:48

## 2020-07-21 RX ADMIN — INSULIN LISPRO 7 UNITS: 100 INJECTION, SOLUTION INTRAVENOUS; SUBCUTANEOUS at 17:19

## 2020-07-21 RX ADMIN — CEFEPIME HYDROCHLORIDE 2 G: 2 INJECTION, POWDER, FOR SOLUTION INTRAVENOUS at 05:15

## 2020-07-21 RX ADMIN — INSULIN LISPRO 7 UNITS: 100 INJECTION, SOLUTION INTRAVENOUS; SUBCUTANEOUS at 08:29

## 2020-07-21 RX ADMIN — SODIUM CHLORIDE 100 ML/HR: 900 INJECTION, SOLUTION INTRAVENOUS at 18:04

## 2020-07-21 RX ADMIN — COLLAGENASE SANTYL: 250 OINTMENT TOPICAL at 16:29

## 2020-07-21 RX ADMIN — Medication 10 ML: at 20:48

## 2020-07-21 RX ADMIN — SODIUM CHLORIDE 1750 MG: 9 INJECTION, SOLUTION INTRAVENOUS at 22:07

## 2020-07-21 NOTE — PROGRESS NOTES
"07/20/2020  Foot and Ankle Surgery - Established Patient/Follow-up  Provider: Dr. Adan Souza DPM  Location: [facility]    Subjective:  Maynor Gregg is a 45 y.o. male.     Chief Complaint   Patient presents with   • Left Foot - Follow-up, Wound Check       HPI: Patient returns with progressive issue involving his left foot.  He states that symptoms started last week and have gradually progressed.  He noticed a blister to the bottom of his foot.  He was unaware of any progressive deformity but states that ambulating has become quite difficult given current issues involving the left lower extremity and his right BKA.  Patient denies any constitutional signs of infection but does state that he has noticed progressive redness, swelling, and drainage to the left foot    Allergies   Allergen Reactions   • Metformin Diarrhea and Nausea And Vomiting   • Oxycodone-Acetaminophen Nausea And Vomiting and Rash       No current facility-administered medications on file prior to visit.      Current Outpatient Medications on File Prior to Visit   Medication Sig Dispense Refill   • amoxicillin-clavulanate (AUGMENTIN) 875-125 MG per tablet Take 1 tablet by mouth Every 12 (Twelve) Hours.     • Insulin Glargine (BASAGLAR KWIKPEN) 100 UNIT/ML injection pen INJECT 12 UNITS BY SUBCUTANEOUS ROUTE EVERY NIGHT     • insulin lispro (humaLOG) 100 UNIT/ML injection Inject 7 Units under the skin into the appropriate area as directed 3 (Three) Times a Day With Meals. 10 mL 3       Objective   /84   Pulse 84   Ht 190.5 cm (75\")   Wt 99.3 kg (219 lb)   BMI 27.37 kg/m²     Foot/Ankle Exam:       General:   Appearance: malnourished    Orientation: AAOx3    Affect: appropriate    Gait: antalgic      VASCULAR      Left Foot Vascularity   Normal vascular exam    Dorsalis pedis:  2+  Posterior tibial:  2+  Skin Temperature: warm    Edema Grading:  None  CFT:  < 3 seconds  Pedal Hair Growth:  Present  Varicosities: none     "    NEUROLOGIC     Left Foot Neurologic   Light touch sensation:  Diminished  Hot/cold sensation: diminished    Protective Sensation using Catherine-Stephon Monofilament:  Diminished  Achilles reflex:  1+     MUSCULOSKELETAL      Right Foot Musculoskeletal    Amputation   Below right knee: Yes       Left Foot Musculoskeletal    Amputation   Left toes amputated: Yes    Ecchymosis:  None  Tenderness: arch and dorsal foot    Arch:  Charcot (Acute collapse of the midfoot with prominent instability)     DERMATOLOGIC     Left Foot Dermatologic   Skin: blister, cellulitis, maceration, skin changes, ulcer and atrophic    Skin: left foot skin not intact       Image:       Left Foot Additional Comments: Acute changes noted to the left foot consistent with Charcot arthropathy.  Prominent instability to the midfoot.      Assessment/Plan   Maynor was seen today for follow-up and wound check.    Diagnoses and all orders for this visit:    Cellulitis of left foot    Chronic ulcer of great toe of left foot with fat layer exposed (CMS/HCC)    Charcot's joint of foot, left    DM type 2 with diabetic peripheral neuropathy (CMS/HCC)      Patient presents with new issue involving the plantar aspect of the left foot.  He states that symptoms started approximately 1 week ago and have progressed.  Clinically, he has a large ulceration involving the plantar aspect of the foot with moderate erythema concerning for cellulitis.  He does have acute changes to his left foot consistent with Charcot arthropathy.  I have discussed the finding and options with him.  Given the wound and concern for infection, I have recommended that we proceed with hospital admission for IV antibiotics and further work-up.  Patient understands and agrees.  Plan was discussed with hospitalist and patient was sent over to registration for admission.  Further planning pending inpatient work-up    No orders of the defined types were placed in this encounter.         Note  is dictated utilizing voice recognition software. Unfortunately this leads to occasional typographical errors. I apologize in advance if the situation occurs. If questions occur please do not hesitate to call our office.

## 2020-07-22 LAB
ALBUMIN SERPL-MCNC: 2.4 G/DL (ref 3.5–5.2)
ALBUMIN/GLOB SERPL: 0.6 G/DL
ALP SERPL-CCNC: 99 U/L (ref 39–117)
ALT SERPL W P-5'-P-CCNC: 42 U/L (ref 1–41)
ANION GAP SERPL CALCULATED.3IONS-SCNC: 8 MMOL/L (ref 5–15)
AST SERPL-CCNC: 21 U/L (ref 1–40)
BASOPHILS # BLD AUTO: 0.1 10*3/MM3 (ref 0–0.2)
BASOPHILS NFR BLD AUTO: 0.7 % (ref 0–1.5)
BILIRUB SERPL-MCNC: 0.2 MG/DL (ref 0–1.2)
BUN SERPL-MCNC: 14 MG/DL (ref 6–20)
BUN SERPL-MCNC: ABNORMAL MG/DL
BUN/CREAT SERPL: ABNORMAL
CALCIUM SPEC-SCNC: 8 MG/DL (ref 8.6–10.5)
CHLORIDE SERPL-SCNC: 104 MMOL/L (ref 98–107)
CO2 SERPL-SCNC: 25 MMOL/L (ref 22–29)
CREAT SERPL-MCNC: 0.83 MG/DL (ref 0.76–1.27)
DEPRECATED RDW RBC AUTO: 43.8 FL (ref 37–54)
EOSINOPHIL # BLD AUTO: 0.2 10*3/MM3 (ref 0–0.4)
EOSINOPHIL NFR BLD AUTO: 2.6 % (ref 0.3–6.2)
ERYTHROCYTE [DISTWIDTH] IN BLOOD BY AUTOMATED COUNT: 14.2 % (ref 12.3–15.4)
GFR SERPL CREATININE-BSD FRML MDRD: 100 ML/MIN/1.73
GLOBULIN UR ELPH-MCNC: 3.9 GM/DL
GLUCOSE BLDC GLUCOMTR-MCNC: 149 MG/DL (ref 70–105)
GLUCOSE BLDC GLUCOMTR-MCNC: 151 MG/DL (ref 70–105)
GLUCOSE BLDC GLUCOMTR-MCNC: 187 MG/DL (ref 70–105)
GLUCOSE BLDC GLUCOMTR-MCNC: 244 MG/DL (ref 70–105)
GLUCOSE SERPL-MCNC: 229 MG/DL (ref 65–99)
HCT VFR BLD AUTO: 29.5 % (ref 37.5–51)
HGB BLD-MCNC: 10 G/DL (ref 13–17.7)
LYMPHOCYTES # BLD AUTO: 2.7 10*3/MM3 (ref 0.7–3.1)
LYMPHOCYTES NFR BLD AUTO: 37.8 % (ref 19.6–45.3)
MCH RBC QN AUTO: 29.8 PG (ref 26.6–33)
MCHC RBC AUTO-ENTMCNC: 33.9 G/DL (ref 31.5–35.7)
MCV RBC AUTO: 87.8 FL (ref 79–97)
MONOCYTES # BLD AUTO: 0.6 10*3/MM3 (ref 0.1–0.9)
MONOCYTES NFR BLD AUTO: 8.9 % (ref 5–12)
NEUTROPHILS NFR BLD AUTO: 3.6 10*3/MM3 (ref 1.7–7)
NEUTROPHILS NFR BLD AUTO: 50 % (ref 42.7–76)
NRBC BLD AUTO-RTO: 0.1 /100 WBC (ref 0–0.2)
PLATELET # BLD AUTO: 259 10*3/MM3 (ref 140–450)
PMV BLD AUTO: 7.9 FL (ref 6–12)
POTASSIUM SERPL-SCNC: 3.6 MMOL/L (ref 3.5–5.2)
PROT SERPL-MCNC: 6.3 G/DL (ref 6–8.5)
RBC # BLD AUTO: 3.36 10*6/MM3 (ref 4.14–5.8)
SODIUM SERPL-SCNC: 137 MMOL/L (ref 136–145)
VANCOMYCIN PEAK SERPL-MCNC: 16.6 MCG/ML (ref 20–40)
VANCOMYCIN TROUGH SERPL-MCNC: 10.6 MCG/ML (ref 5–20)
WBC # BLD AUTO: 7.1 10*3/MM3 (ref 3.4–10.8)

## 2020-07-22 PROCEDURE — 80202 ASSAY OF VANCOMYCIN: CPT | Performed by: HOSPITALIST

## 2020-07-22 PROCEDURE — 25010000002 VANCOMYCIN 10 G RECONSTITUTED SOLUTION: Performed by: INTERNAL MEDICINE

## 2020-07-22 PROCEDURE — 25010000002 CEFEPIME PER 500 MG: Performed by: HOSPITALIST

## 2020-07-22 PROCEDURE — 25010000002 VANCOMYCIN 10 G RECONSTITUTED SOLUTION: Performed by: HOSPITALIST

## 2020-07-22 PROCEDURE — 85025 COMPLETE CBC W/AUTO DIFF WBC: CPT | Performed by: INTERNAL MEDICINE

## 2020-07-22 PROCEDURE — 80053 COMPREHEN METABOLIC PANEL: CPT | Performed by: INTERNAL MEDICINE

## 2020-07-22 PROCEDURE — 99232 SBSQ HOSP IP/OBS MODERATE 35: CPT | Performed by: HOSPITALIST

## 2020-07-22 PROCEDURE — 99232 SBSQ HOSP IP/OBS MODERATE 35: CPT | Performed by: PODIATRIST

## 2020-07-22 PROCEDURE — 82962 GLUCOSE BLOOD TEST: CPT

## 2020-07-22 PROCEDURE — 63710000001 INSULIN GLARGINE PER 5 UNITS: Performed by: NURSE PRACTITIONER

## 2020-07-22 PROCEDURE — 63710000001 INSULIN LISPRO (HUMAN) PER 5 UNITS: Performed by: NURSE PRACTITIONER

## 2020-07-22 RX ADMIN — CEFEPIME HYDROCHLORIDE 2 G: 2 INJECTION, POWDER, FOR SOLUTION INTRAVENOUS at 08:29

## 2020-07-22 RX ADMIN — Medication 10 ML: at 20:44

## 2020-07-22 RX ADMIN — COLLAGENASE SANTYL: 250 OINTMENT TOPICAL at 08:31

## 2020-07-22 RX ADMIN — SODIUM CHLORIDE 1750 MG: 9 INJECTION, SOLUTION INTRAVENOUS at 10:28

## 2020-07-22 RX ADMIN — INSULIN LISPRO 2 UNITS: 100 INJECTION, SOLUTION INTRAVENOUS; SUBCUTANEOUS at 08:30

## 2020-07-22 RX ADMIN — CEFEPIME HYDROCHLORIDE 2 G: 2 INJECTION, POWDER, FOR SOLUTION INTRAVENOUS at 16:32

## 2020-07-22 RX ADMIN — TRAMADOL HYDROCHLORIDE 50 MG: 50 TABLET, FILM COATED ORAL at 21:43

## 2020-07-22 RX ADMIN — INSULIN LISPRO 7 UNITS: 100 INJECTION, SOLUTION INTRAVENOUS; SUBCUTANEOUS at 13:00

## 2020-07-22 RX ADMIN — CEFEPIME HYDROCHLORIDE 2 G: 2 INJECTION, POWDER, FOR SOLUTION INTRAVENOUS at 00:15

## 2020-07-22 RX ADMIN — Medication: at 03:08

## 2020-07-22 RX ADMIN — INSULIN LISPRO 2 UNITS: 100 INJECTION, SOLUTION INTRAVENOUS; SUBCUTANEOUS at 12:59

## 2020-07-22 RX ADMIN — Medication 10 ML: at 08:30

## 2020-07-22 RX ADMIN — ACETAMINOPHEN 650 MG: 325 TABLET, FILM COATED ORAL at 16:45

## 2020-07-22 RX ADMIN — INSULIN GLARGINE 12 UNITS: 100 INJECTION, SOLUTION SUBCUTANEOUS at 20:44

## 2020-07-22 RX ADMIN — CEFEPIME HYDROCHLORIDE 2 G: 2 INJECTION, POWDER, FOR SOLUTION INTRAVENOUS at 23:51

## 2020-07-22 RX ADMIN — INSULIN LISPRO 7 UNITS: 100 INJECTION, SOLUTION INTRAVENOUS; SUBCUTANEOUS at 08:31

## 2020-07-22 RX ADMIN — VANCOMYCIN HYDROCHLORIDE 2000 MG: 10 INJECTION, POWDER, LYOPHILIZED, FOR SOLUTION INTRAVENOUS at 21:43

## 2020-07-22 RX ADMIN — INSULIN LISPRO 7 UNITS: 100 INJECTION, SOLUTION INTRAVENOUS; SUBCUTANEOUS at 17:09

## 2020-07-23 LAB
ANION GAP SERPL CALCULATED.3IONS-SCNC: 6 MMOL/L (ref 5–15)
BUN SERPL-MCNC: 12 MG/DL (ref 6–20)
BUN SERPL-MCNC: ABNORMAL MG/DL
BUN/CREAT SERPL: ABNORMAL
CALCIUM SPEC-SCNC: 8.1 MG/DL (ref 8.6–10.5)
CHLORIDE SERPL-SCNC: 104 MMOL/L (ref 98–107)
CO2 SERPL-SCNC: 29 MMOL/L (ref 22–29)
CREAT SERPL-MCNC: 0.74 MG/DL (ref 0.76–1.27)
DEPRECATED RDW RBC AUTO: 42.4 FL (ref 37–54)
ERYTHROCYTE [DISTWIDTH] IN BLOOD BY AUTOMATED COUNT: 13.9 % (ref 12.3–15.4)
GFR SERPL CREATININE-BSD FRML MDRD: 114 ML/MIN/1.73
GLUCOSE BLDC GLUCOMTR-MCNC: 127 MG/DL (ref 70–105)
GLUCOSE BLDC GLUCOMTR-MCNC: 128 MG/DL (ref 70–105)
GLUCOSE BLDC GLUCOMTR-MCNC: 153 MG/DL (ref 70–105)
GLUCOSE BLDC GLUCOMTR-MCNC: 153 MG/DL (ref 70–105)
GLUCOSE SERPL-MCNC: 221 MG/DL (ref 65–99)
HCT VFR BLD AUTO: 31.7 % (ref 37.5–51)
HGB BLD-MCNC: 11 G/DL (ref 13–17.7)
MCH RBC QN AUTO: 30 PG (ref 26.6–33)
MCHC RBC AUTO-ENTMCNC: 34.6 G/DL (ref 31.5–35.7)
MCV RBC AUTO: 86.8 FL (ref 79–97)
PLATELET # BLD AUTO: 283 10*3/MM3 (ref 140–450)
PMV BLD AUTO: 7.6 FL (ref 6–12)
POTASSIUM SERPL-SCNC: 3.7 MMOL/L (ref 3.5–5.2)
RBC # BLD AUTO: 3.66 10*6/MM3 (ref 4.14–5.8)
SODIUM SERPL-SCNC: 139 MMOL/L (ref 136–145)
WBC # BLD AUTO: 5.9 10*3/MM3 (ref 3.4–10.8)

## 2020-07-23 PROCEDURE — 99232 SBSQ HOSP IP/OBS MODERATE 35: CPT | Performed by: HOSPITALIST

## 2020-07-23 PROCEDURE — 25010000002 VANCOMYCIN 10 G RECONSTITUTED SOLUTION: Performed by: INTERNAL MEDICINE

## 2020-07-23 PROCEDURE — 63710000001 INSULIN GLARGINE PER 5 UNITS: Performed by: HOSPITALIST

## 2020-07-23 PROCEDURE — 80048 BASIC METABOLIC PNL TOTAL CA: CPT | Performed by: HOSPITALIST

## 2020-07-23 PROCEDURE — 63710000001 INSULIN LISPRO (HUMAN) PER 5 UNITS: Performed by: NURSE PRACTITIONER

## 2020-07-23 PROCEDURE — 25010000002 CEFTRIAXONE PER 250 MG: Performed by: INTERNAL MEDICINE

## 2020-07-23 PROCEDURE — 85027 COMPLETE CBC AUTOMATED: CPT | Performed by: HOSPITALIST

## 2020-07-23 PROCEDURE — 25010000002 CEFEPIME PER 500 MG: Performed by: HOSPITALIST

## 2020-07-23 PROCEDURE — 82962 GLUCOSE BLOOD TEST: CPT

## 2020-07-23 RX ORDER — INSULIN GLARGINE 100 [IU]/ML
15 INJECTION, SOLUTION SUBCUTANEOUS NIGHTLY
Status: DISCONTINUED | OUTPATIENT
Start: 2020-07-23 | End: 2020-07-24 | Stop reason: HOSPADM

## 2020-07-23 RX ADMIN — INSULIN LISPRO 7 UNITS: 100 INJECTION, SOLUTION INTRAVENOUS; SUBCUTANEOUS at 17:23

## 2020-07-23 RX ADMIN — COLLAGENASE SANTYL: 250 OINTMENT TOPICAL at 09:12

## 2020-07-23 RX ADMIN — INSULIN GLARGINE 15 UNITS: 100 INJECTION, SOLUTION SUBCUTANEOUS at 21:04

## 2020-07-23 RX ADMIN — ACETAMINOPHEN 650 MG: 325 TABLET, FILM COATED ORAL at 21:15

## 2020-07-23 RX ADMIN — CEFTRIAXONE SODIUM 2 G: 2 INJECTION, POWDER, FOR SOLUTION INTRAMUSCULAR; INTRAVENOUS at 17:23

## 2020-07-23 RX ADMIN — Medication 10 ML: at 09:12

## 2020-07-23 RX ADMIN — INSULIN LISPRO 2 UNITS: 100 INJECTION, SOLUTION INTRAVENOUS; SUBCUTANEOUS at 09:11

## 2020-07-23 RX ADMIN — CEFEPIME HYDROCHLORIDE 2 G: 2 INJECTION, POWDER, FOR SOLUTION INTRAVENOUS at 09:10

## 2020-07-23 RX ADMIN — INSULIN LISPRO 7 UNITS: 100 INJECTION, SOLUTION INTRAVENOUS; SUBCUTANEOUS at 09:12

## 2020-07-23 RX ADMIN — SODIUM CHLORIDE 100 ML/HR: 900 INJECTION, SOLUTION INTRAVENOUS at 21:05

## 2020-07-23 RX ADMIN — VANCOMYCIN HYDROCHLORIDE 2000 MG: 10 INJECTION, POWDER, LYOPHILIZED, FOR SOLUTION INTRAVENOUS at 10:52

## 2020-07-23 RX ADMIN — Medication 10 ML: at 21:05

## 2020-07-23 RX ADMIN — INSULIN LISPRO 7 UNITS: 100 INJECTION, SOLUTION INTRAVENOUS; SUBCUTANEOUS at 12:34

## 2020-07-23 RX ADMIN — TRAMADOL HYDROCHLORIDE 50 MG: 50 TABLET, FILM COATED ORAL at 09:25

## 2020-07-24 VITALS
DIASTOLIC BLOOD PRESSURE: 69 MMHG | TEMPERATURE: 97.7 F | RESPIRATION RATE: 18 BRPM | OXYGEN SATURATION: 99 % | SYSTOLIC BLOOD PRESSURE: 156 MMHG | BODY MASS INDEX: 26.15 KG/M2 | HEIGHT: 76 IN | HEART RATE: 75 BPM | WEIGHT: 214.7 LBS

## 2020-07-24 LAB
BACTERIA SPEC AEROBE CULT: ABNORMAL
BACTERIA SPEC AEROBE CULT: ABNORMAL
GLUCOSE BLDC GLUCOMTR-MCNC: 117 MG/DL (ref 70–105)
GRAM STN SPEC: ABNORMAL
STREP GROUPING: ABNORMAL

## 2020-07-24 PROCEDURE — 82962 GLUCOSE BLOOD TEST: CPT

## 2020-07-24 PROCEDURE — C1751 CATH, INF, PER/CENT/MIDLINE: HCPCS

## 2020-07-24 PROCEDURE — 05HY33Z INSERTION OF INFUSION DEVICE INTO UPPER VEIN, PERCUTANEOUS APPROACH: ICD-10-PCS | Performed by: PODIATRIST

## 2020-07-24 PROCEDURE — 63710000001 INSULIN LISPRO (HUMAN) PER 5 UNITS: Performed by: NURSE PRACTITIONER

## 2020-07-24 PROCEDURE — 99239 HOSP IP/OBS DSCHRG MGMT >30: CPT | Performed by: HOSPITALIST

## 2020-07-24 RX ADMIN — Medication 10 ML: at 08:48

## 2020-07-24 RX ADMIN — COLLAGENASE SANTYL: 250 OINTMENT TOPICAL at 08:49

## 2020-07-24 RX ADMIN — INSULIN LISPRO 7 UNITS: 100 INJECTION, SOLUTION INTRAVENOUS; SUBCUTANEOUS at 08:49

## 2020-07-25 ENCOUNTER — READMISSION MANAGEMENT (OUTPATIENT)
Dept: CALL CENTER | Facility: HOSPITAL | Age: 45
End: 2020-07-25

## 2020-07-25 LAB
BACTERIA SPEC AEROBE CULT: NORMAL
BACTERIA SPEC AEROBE CULT: NORMAL

## 2020-07-25 NOTE — OUTREACH NOTE
Prep Survey      Responses   [facility]   Is LACE score < 7 ?  No   Eligibility  Readm Mgmt   Discharge diagnosis  Left heel ulcer/cellulitis   COVID-19 Test Status  Not tested   Does the patient have one of the following disease processes/diagnoses(primary or secondary)?  Other   Does the patient have Home health ordered?  Yes   What is the Home health agency?   [facility] for [facility]   Is there a DME ordered?  No   Comments regarding appointments  Follow up with RITA Moses   Prep survey completed?  Yes          Nisha Mariee RN

## 2020-07-27 ENCOUNTER — READMISSION MANAGEMENT (OUTPATIENT)
Dept: CALL CENTER | Facility: HOSPITAL | Age: 45
End: 2020-07-27

## 2020-07-27 NOTE — OUTREACH NOTE
"Medical Week 1 Survey      Responses   [facility]   COVID-19 Test Status  Not tested   Does the patient have one of the following disease processes/diagnoses(primary or secondary)?  Other   Is there a successful TCM telephone encounter documented?  No   Week 1 attempt successful?  Yes   Call start time  1643   Call end time  1644   Discharge diagnosis  Left heel ulcer/cellulitis   Meds reviewed with patient/caregiver?  Yes   Is the patient having any side effects they believe may be caused by any medication additions or changes?  No   Does the patient have all medications ordered at discharge?  Yes   Is the patient taking all medications as directed (includes completed medication regime)?  Yes   Does the patient have a primary care provider?   Yes   Does the patient have an appointment with their PCP within 7 days of discharge?  No   Nursing Interventions  Educated patient on importance of making appointment, Advised patient to make appointment   Has the patient kept scheduled appointments due by today?  N/A   Comments  Patient declined assistance to make appt and stated he will make the appts.   What is the [facility] health agency[facility]   Has home health visited the patient within 72 hours of discharge?  Yes   Pulse Ox monitoring  None   What is the patient's perception of their health status since discharge?  Improving   Additional teach back comments  Very brief call.  States he can make his own appts and he is doing \"fine\"   Week 1 call completed?  Yes   Wrap up additional comments  Denies questions or needs at this time          Lora Turcios LPN  "

## 2020-07-31 ENCOUNTER — LAB REQUISITION (OUTPATIENT)
Dept: LAB | Facility: HOSPITAL | Age: 45
End: 2020-07-31

## 2020-07-31 DIAGNOSIS — E11.621 TYPE 2 DIABETES MELLITUS WITH FOOT ULCER (CODE) (HCC): ICD-10-CM

## 2020-07-31 LAB
BASOPHILS # BLD AUTO: 0.1 10*3/MM3 (ref 0–0.2)
BASOPHILS NFR BLD AUTO: 0.9 % (ref 0–1.5)
CREAT SERPL-MCNC: 0.78 MG/DL (ref 0.76–1.27)
DEPRECATED RDW RBC AUTO: 45.1 FL (ref 37–54)
EOSINOPHIL # BLD AUTO: 0.3 10*3/MM3 (ref 0–0.4)
EOSINOPHIL NFR BLD AUTO: 3.8 % (ref 0.3–6.2)
ERYTHROCYTE [DISTWIDTH] IN BLOOD BY AUTOMATED COUNT: 14.5 % (ref 12.3–15.4)
ERYTHROCYTE [SEDIMENTATION RATE] IN BLOOD: 54 MM/HR (ref 0–15)
GFR SERPL CREATININE-BSD FRML MDRD: 108 ML/MIN/1.73
HCT VFR BLD AUTO: 37.1 % (ref 37.5–51)
HGB BLD-MCNC: 12.5 G/DL (ref 13–17.7)
LYMPHOCYTES # BLD AUTO: 2.9 10*3/MM3 (ref 0.7–3.1)
LYMPHOCYTES NFR BLD AUTO: 33.8 % (ref 19.6–45.3)
MCH RBC QN AUTO: 29.4 PG (ref 26.6–33)
MCHC RBC AUTO-ENTMCNC: 33.8 G/DL (ref 31.5–35.7)
MCV RBC AUTO: 87.1 FL (ref 79–97)
MONOCYTES # BLD AUTO: 0.6 10*3/MM3 (ref 0.1–0.9)
MONOCYTES NFR BLD AUTO: 7.4 % (ref 5–12)
NEUTROPHILS NFR BLD AUTO: 4.6 10*3/MM3 (ref 1.7–7)
NEUTROPHILS NFR BLD AUTO: 54.1 % (ref 42.7–76)
NRBC BLD AUTO-RTO: 0 /100 WBC (ref 0–0.2)
PLATELET # BLD AUTO: 262 10*3/MM3 (ref 140–450)
PMV BLD AUTO: 7.8 FL (ref 6–12)
RBC # BLD AUTO: 4.25 10*6/MM3 (ref 4.14–5.8)
WBC # BLD AUTO: 8.5 10*3/MM3 (ref 3.4–10.8)

## 2020-07-31 PROCEDURE — 85652 RBC SED RATE AUTOMATED: CPT | Performed by: INTERNAL MEDICINE

## 2020-07-31 PROCEDURE — 85025 COMPLETE CBC W/AUTO DIFF WBC: CPT | Performed by: INTERNAL MEDICINE

## 2020-07-31 PROCEDURE — 82565 ASSAY OF CREATININE: CPT | Performed by: INTERNAL MEDICINE

## 2020-08-04 ENCOUNTER — READMISSION MANAGEMENT (OUTPATIENT)
Dept: CALL CENTER | Facility: HOSPITAL | Age: 45
End: 2020-08-04

## 2020-08-04 NOTE — OUTREACH NOTE
Medical Week 2 Survey      Responses   [facility] patient discharged from?  [facility]   COVID-19 Test Status  Not tested   Does the patient have one of the following disease processes/diagnoses(primary or secondary)?  Other   Week 2 attempt successful?  Yes   Call start time  1130   Call end time  1133   Meds reviewed with patient/caregiver?  Yes   Is the patient having any side effects they believe may be caused by any medication additions or changes?  No   Does the patient have all medications ordered at discharge?  Yes   Is the patient taking all medications as directed (includes completed medication regime)?  Yes   Does the patient have a primary care provider?   Yes   Does the patient have an appointment with their PCP within 7 days of discharge?  No   Comments regarding PCP  PATIENT STATES HE SEES DR. DICKENS TOMORROW AND AFTER THAT APPOINTMENT IS COMPLETED, HE WILL MAKE A FOLLOW UP APPOINTMENT WITH KACIE RIVERA.   What is preventing the patient from scheduling follow up appointments within 7 days of discharge?  Haven't had time   Nursing Interventions  Educated patient on importance of making appointment, Advised patient to make appointment   Has the patient kept scheduled appointments due by today?  N/A   What is the Home health agency?   [facility]   Has home health visited the patient within 72 hours of discharge?  Yes   Pulse Ox monitoring  None   Did the patient receive a copy of their discharge instructions?  Yes   Nursing interventions  Reviewed instructions with patient   What is the patient's perception of their health status since discharge?  Improving   Is the patient/caregiver able to teach back signs and symptoms related to disease process for when to call PCP?  Yes   Is the patient/caregiver able to teach back signs and symptoms related to disease process for when to call 911?  Yes   Is the patient/caregiver able to teach back the hierarchy of who  to call/visit for symptoms/problems? PCP, Specialist, Home health nurse, Urgent Care, ED, 911  Yes   Week 2 Call Completed?  Yes          Sarita Hasikns LPN

## 2020-08-05 ENCOUNTER — OFFICE VISIT (OUTPATIENT)
Dept: PODIATRY | Facility: CLINIC | Age: 45
End: 2020-08-05

## 2020-08-05 VITALS
HEART RATE: 83 BPM | DIASTOLIC BLOOD PRESSURE: 88 MMHG | SYSTOLIC BLOOD PRESSURE: 152 MMHG | BODY MASS INDEX: 26.06 KG/M2 | WEIGHT: 214 LBS | HEIGHT: 76 IN

## 2020-08-05 DIAGNOSIS — L97.522 CHRONIC ULCER OF LEFT FOOT WITH FAT LAYER EXPOSED (HCC): Primary | ICD-10-CM

## 2020-08-05 DIAGNOSIS — E11.42 DM TYPE 2 WITH DIABETIC PERIPHERAL NEUROPATHY (HCC): ICD-10-CM

## 2020-08-05 DIAGNOSIS — M14.672 CHARCOT'S JOINT OF FOOT, LEFT: ICD-10-CM

## 2020-08-05 PROCEDURE — 99213 OFFICE O/P EST LOW 20 MIN: CPT | Performed by: PODIATRIST

## 2020-08-06 NOTE — PROGRESS NOTES
"08/05/2020  Foot and Ankle Surgery - Established Patient/Follow-up  Provider: Dr. Aadn Souza DPM  Location: [facility]    Subjective:  Maynor Gregg is a 45 y.o. male.     Chief Complaint   Patient presents with   • Left Foot - Follow-up, Wound Check       HPI: Patient returns after recent hospitalization.  He states that he has been trying to stay off his left foot but he continues to ambulate when needed.  He feels that the wound is looking somewhat better.  He has been using the Santyl daily.  He does have home health visiting him.  Denies any other issues today.    Allergies   Allergen Reactions   • Metformin Diarrhea and Nausea And Vomiting   • Oxycodone-Acetaminophen Nausea And Vomiting and Rash       Current Outpatient Medications on File Prior to Visit   Medication Sig Dispense Refill   • cefTRIAXone 2 g in sodium chloride 0.9 % 100 mL IVPB Infuse 2 g into a venous catheter Daily for 41 doses. Indications: Bone and/or Joint Infection     • Insulin Glargine (BASAGLAR KWIKPEN) 100 UNIT/ML injection pen INJECT 12 UNITS BY SUBCUTANEOUS ROUTE EVERY NIGHT     • insulin lispro (humaLOG) 100 UNIT/ML injection Inject 7 Units under the skin into the appropriate area as directed 3 (Three) Times a Day With Meals. 10 mL 3     No current facility-administered medications on file prior to visit.        Objective   /88   Pulse 83   Ht 191.8 cm (75.5\")   Wt 97.1 kg (214 lb)   BMI 26.40 kg/m²     Foot/Ankle Exam:       General:   Appearance: malnourished    Orientation: AAOx3    Affect: appropriate    Gait: antalgic       VASCULAR       Left Foot Vascularity   Normal vascular exam    Dorsalis pedis:  2+  Posterior tibial:  2+  Skin Temperature: warm    Edema Grading:  None  CFT:  < 3 seconds  Pedal Hair Growth:  Present  Varicosities: none        NEUROLOGIC      Left Foot Neurologic   Light touch sensation:  Diminished  Hot/cold sensation: diminished    Protective Sensation using " [place]-Stephon Monofilament:  Diminished  Achilles reflex:  1+      MUSCULOSKELETAL       Right Foot Musculoskeletal    Amputation   Below right knee: Yes        Left Foot Musculoskeletal    Amputation   Left toes amputated: Yes    Ecchymosis:  None  Tenderness: arch and dorsal foot    Arch:  Charcot (Acute collapse of the midfoot with prominent instability)      DERMATOLOGIC      Left Foot Dermatologic   Skin:  Large ulceration to the plantar aspect of the left foot is granular.  No deep extension or obvious signs of infection.  Lesion measures approximately 3.0 x 2.0 x 0.3 cm     Left Foot Additional Comments: Charcot arthropathy remains without any obvious progression.  Prominent instability to the midfoot    Assessment/Plan   Maynor was seen today for follow-up and wound check.    Diagnoses and all orders for this visit:    Chronic ulcer of left foot with fat layer exposed (CMS/HCC)    Charcot's joint of foot, left    DM type 2 with diabetic peripheral neuropathy (CMS/HCC)      Patient presents after recent hospitalization.  The wound does appear to be quite granular but remains large.  I did discuss the importance of strict off weightbearing activity.  I would like him to discontinue the Santyl at this time and start collagen with Aquasol dressing every other day.  I have recommended that he obtain a knee scooter.  I would like him to try to little off weightbearing prior to total contact casting.  He is to monitor the wound closely and call with any additional issues or concerns.  I will see him in 2 weeks for reevaluation.    No orders of the defined types were placed in this encounter.         Note is dictated utilizing voice recognition software. Unfortunately this leads to occasional typographical errors. I apologize in advance if the situation occurs. If questions occur please do not hesitate to call our office.

## 2020-08-07 ENCOUNTER — LAB REQUISITION (OUTPATIENT)
Dept: LAB | Facility: HOSPITAL | Age: 45
End: 2020-08-07

## 2020-08-07 DIAGNOSIS — E11.621 TYPE 2 DIABETES MELLITUS WITH FOOT ULCER (CODE) (HCC): ICD-10-CM

## 2020-08-07 LAB
BASOPHILS # BLD AUTO: 0.11 10*3/MM3 (ref 0–0.2)
BASOPHILS NFR BLD AUTO: 1.6 % (ref 0–1.5)
CREAT SERPL-MCNC: 0.86 MG/DL (ref 0.76–1.27)
DEPRECATED RDW RBC AUTO: 42 FL (ref 37–54)
EOSINOPHIL # BLD AUTO: 0.29 10*3/MM3 (ref 0–0.4)
EOSINOPHIL NFR BLD AUTO: 4.1 % (ref 0.3–6.2)
ERYTHROCYTE [DISTWIDTH] IN BLOOD BY AUTOMATED COUNT: 13.1 % (ref 12.3–15.4)
ERYTHROCYTE [SEDIMENTATION RATE] IN BLOOD: 55 MM/HR (ref 0–15)
GFR SERPL CREATININE-BSD FRML MDRD: 96 ML/MIN/1.73
HCT VFR BLD AUTO: 36.5 % (ref 37.5–51)
HGB BLD-MCNC: 12.2 G/DL (ref 13–17.7)
IMM GRANULOCYTES # BLD AUTO: 0.02 10*3/MM3 (ref 0–0.05)
IMM GRANULOCYTES NFR BLD AUTO: 0.3 % (ref 0–0.5)
LYMPHOCYTES # BLD AUTO: 2.68 10*3/MM3 (ref 0.7–3.1)
LYMPHOCYTES NFR BLD AUTO: 37.9 % (ref 19.6–45.3)
MCH RBC QN AUTO: 29.3 PG (ref 26.6–33)
MCHC RBC AUTO-ENTMCNC: 33.4 G/DL (ref 31.5–35.7)
MCV RBC AUTO: 87.7 FL (ref 79–97)
MONOCYTES # BLD AUTO: 0.68 10*3/MM3 (ref 0.1–0.9)
MONOCYTES NFR BLD AUTO: 9.6 % (ref 5–12)
NEUTROPHILS NFR BLD AUTO: 3.29 10*3/MM3 (ref 1.7–7)
NEUTROPHILS NFR BLD AUTO: 46.5 % (ref 42.7–76)
NRBC BLD AUTO-RTO: 0 /100 WBC (ref 0–0.2)
PLATELET # BLD AUTO: 305 10*3/MM3 (ref 140–450)
PMV BLD AUTO: 10.3 FL (ref 6–12)
RBC # BLD AUTO: 4.16 10*6/MM3 (ref 4.14–5.8)
WBC # BLD AUTO: 7.07 10*3/MM3 (ref 3.4–10.8)

## 2020-08-07 PROCEDURE — 85652 RBC SED RATE AUTOMATED: CPT | Performed by: INTERNAL MEDICINE

## 2020-08-07 PROCEDURE — 82565 ASSAY OF CREATININE: CPT | Performed by: INTERNAL MEDICINE

## 2020-08-07 PROCEDURE — 85025 COMPLETE CBC W/AUTO DIFF WBC: CPT | Performed by: INTERNAL MEDICINE

## 2020-08-11 ENCOUNTER — READMISSION MANAGEMENT (OUTPATIENT)
Dept: CALL CENTER | Facility: HOSPITAL | Age: 45
End: 2020-08-11

## 2020-08-11 NOTE — OUTREACH NOTE
Medical Week 3 Survey      Responses   [facility] patient discharged from?  [facility]   COVID-19 Test Status  Not tested   Does the patient have one of the following disease processes/diagnoses(primary or secondary)?  Other   Week 3 attempt successful?  Yes   Call start time  1352   Call end time  1354   Revoked  No further contact(revokes)-requires comment   Wrap up additional comments  Patient correct how I said his name and said he would have to call me back and hung up.          Lora Turcios LPN

## 2020-08-14 ENCOUNTER — LAB REQUISITION (OUTPATIENT)
Dept: LAB | Facility: HOSPITAL | Age: 45
End: 2020-08-14

## 2020-08-14 DIAGNOSIS — E11.621 TYPE 2 DIABETES MELLITUS WITH FOOT ULCER (CODE) (HCC): ICD-10-CM

## 2020-08-14 LAB
BASOPHILS # BLD AUTO: 0.07 10*3/MM3 (ref 0–0.2)
BASOPHILS NFR BLD AUTO: 1 % (ref 0–1.5)
CREAT SERPL-MCNC: 0.85 MG/DL (ref 0.76–1.27)
DEPRECATED RDW RBC AUTO: 41 FL (ref 37–54)
EOSINOPHIL # BLD AUTO: 0.2 10*3/MM3 (ref 0–0.4)
EOSINOPHIL NFR BLD AUTO: 2.8 % (ref 0.3–6.2)
ERYTHROCYTE [DISTWIDTH] IN BLOOD BY AUTOMATED COUNT: 12.7 % (ref 12.3–15.4)
ERYTHROCYTE [SEDIMENTATION RATE] IN BLOOD: 44 MM/HR (ref 0–15)
GFR SERPL CREATININE-BSD FRML MDRD: 97 ML/MIN/1.73
HCT VFR BLD AUTO: 36 % (ref 37.5–51)
HGB BLD-MCNC: 12.1 G/DL (ref 13–17.7)
IMM GRANULOCYTES # BLD AUTO: 0.01 10*3/MM3 (ref 0–0.05)
IMM GRANULOCYTES NFR BLD AUTO: 0.1 % (ref 0–0.5)
LYMPHOCYTES # BLD AUTO: 2.33 10*3/MM3 (ref 0.7–3.1)
LYMPHOCYTES NFR BLD AUTO: 32 % (ref 19.6–45.3)
MCH RBC QN AUTO: 29.7 PG (ref 26.6–33)
MCHC RBC AUTO-ENTMCNC: 33.6 G/DL (ref 31.5–35.7)
MCV RBC AUTO: 88.5 FL (ref 79–97)
MONOCYTES # BLD AUTO: 0.63 10*3/MM3 (ref 0.1–0.9)
MONOCYTES NFR BLD AUTO: 8.7 % (ref 5–12)
NEUTROPHILS NFR BLD AUTO: 4.03 10*3/MM3 (ref 1.7–7)
NEUTROPHILS NFR BLD AUTO: 55.4 % (ref 42.7–76)
NRBC BLD AUTO-RTO: 0 /100 WBC (ref 0–0.2)
PLATELET # BLD AUTO: 253 10*3/MM3 (ref 140–450)
PMV BLD AUTO: 10.4 FL (ref 6–12)
RBC # BLD AUTO: 4.07 10*6/MM3 (ref 4.14–5.8)
WBC # BLD AUTO: 7.27 10*3/MM3 (ref 3.4–10.8)

## 2020-08-14 PROCEDURE — 85652 RBC SED RATE AUTOMATED: CPT | Performed by: INTERNAL MEDICINE

## 2020-08-14 PROCEDURE — 85025 COMPLETE CBC W/AUTO DIFF WBC: CPT | Performed by: INTERNAL MEDICINE

## 2020-08-14 PROCEDURE — 82565 ASSAY OF CREATININE: CPT | Performed by: INTERNAL MEDICINE

## 2020-08-19 ENCOUNTER — OFFICE VISIT (OUTPATIENT)
Dept: PODIATRY | Facility: CLINIC | Age: 45
End: 2020-08-19

## 2020-08-19 VITALS
HEIGHT: 76 IN | DIASTOLIC BLOOD PRESSURE: 79 MMHG | HEART RATE: 93 BPM | WEIGHT: 214 LBS | SYSTOLIC BLOOD PRESSURE: 131 MMHG | BODY MASS INDEX: 26.06 KG/M2

## 2020-08-19 DIAGNOSIS — E11.42 DM TYPE 2 WITH DIABETIC PERIPHERAL NEUROPATHY (HCC): ICD-10-CM

## 2020-08-19 DIAGNOSIS — M14.672 CHARCOT'S JOINT OF FOOT, LEFT: ICD-10-CM

## 2020-08-19 DIAGNOSIS — L97.522 CHRONIC ULCER OF LEFT FOOT WITH FAT LAYER EXPOSED (HCC): Primary | ICD-10-CM

## 2020-08-19 PROCEDURE — 29445 APPL RIGID TOT CNTC LEG CAST: CPT | Performed by: PODIATRIST

## 2020-08-19 PROCEDURE — 99213 OFFICE O/P EST LOW 20 MIN: CPT | Performed by: PODIATRIST

## 2020-08-19 NOTE — PROGRESS NOTES
"08/19/2020  Foot and Ankle Surgery - Established Patient/Follow-up  Provider: Dr. Adan Souza DPM  Location: [facility]    Subjective:  Maynor Gregg is a 45 y.o. male.     Chief Complaint   Patient presents with   • Left Foot - Follow-up, Wound Check, Foot Ulcer       HPI: Patient returns for follow-up regarding his left foot wound.  He states that he has remained relatively active.  He has been performing dressing care as directed.  He states that it is very difficult for him to remain nonweightbearing.  He denies any progressive issues or symptoms of infection.  He is receiving the IV antibiotics.    Allergies   Allergen Reactions   • Metformin Diarrhea and Nausea And Vomiting   • Oxycodone-Acetaminophen Nausea And Vomiting and Rash       Current Outpatient Medications on File Prior to Visit   Medication Sig Dispense Refill   • cefTRIAXone 2 g in sodium chloride 0.9 % 100 mL IVPB Infuse 2 g into a venous catheter Daily for 41 doses. Indications: Bone and/or Joint Infection     • Insulin Glargine (BASAGLAR KWIKPEN) 100 UNIT/ML injection pen INJECT 12 UNITS BY SUBCUTANEOUS ROUTE EVERY NIGHT     • insulin lispro (humaLOG) 100 UNIT/ML injection Inject 7 Units under the skin into the appropriate area as directed 3 (Three) Times a Day With Meals. 10 mL 3     No current facility-administered medications on file prior to visit.        Objective   /79   Pulse 93   Ht 191.8 cm (75.5\")   Wt 97.1 kg (214 lb)   BMI 26.40 kg/m²     General:   Appearance: malnourished    Orientation: AAOx3    Affect: appropriate    Gait: antalgic       VASCULAR       Left Foot Vascularity   Normal vascular exam    Dorsalis pedis:  2+  Posterior tibial:  2+  Skin Temperature: warm    Edema Grading:  None  CFT:  < 3 seconds  Pedal Hair Growth:  Present  Varicosities: none        NEUROLOGIC      Left Foot Neurologic   Light touch sensation:  Diminished  Hot/cold sensation: diminished    Protective Sensation using " [place]-Stephon Monofilament:  Diminished  Achilles reflex:  1+      MUSCULOSKELETAL       Right Foot Musculoskeletal    Amputation   Below right knee: Yes        Left Foot Musculoskeletal    Amputation   Left toes amputated: Yes    Ecchymosis:  None  Tenderness: arch and dorsal foot    Arch:  Charcot (Acute collapse of the midfoot with prominent instability)      DERMATOLOGIC      Left Foot Dermatologic   Skin:   Ulceration is relatively unchanged and possibly slightly larger today.  Wound is granular with macerated hyperkeratotic margin.  No deep extension, purulence, or luis alfredo necrosis.  Measurements are 3.0 x 2.5 x 0.3 cm      Left Foot Additional Comments: Charcot arthropathy remains without any obvious progression.  Prominent instability to the midfoot    Assessment/Plan   Maynor was seen today for follow-up, wound check and foot ulcer.    Diagnoses and all orders for this visit:    Chronic ulcer of left foot with fat layer exposed (CMS/HCC)    Charcot's joint of foot, left    DM type 2 with diabetic peripheral neuropathy (CMS/HCC)      Patient returns with continued ulceration involving the plantar aspect of the left foot.  Patient has remained active and is unable to remain off weightbearing.  I explained that the wound will not heal with increased activity given the deformity and instability to the midfoot.  I have recommended that we proceed with a total contact cast for offloading.  We discussed risks and benefits of the cast.  We also discussed cast care instructions.  Cast was applied without complication.  I have asked that he follow-up with me next week for reevaluation and possible reapplication.    Total contact cast application: Left lower extremity     The wound was dressed with a silver alginate and adhesive dressing.  The limb was positioned and padded in the standard fashion.  Total contact cast was rolled and fit to patient.  No concerning features noted.  Patient was placed into an  appropriate sized boot.      No orders of the defined types were placed in this encounter.         Note is dictated utilizing voice recognition software. Unfortunately this leads to occasional typographical errors. I apologize in advance if the situation occurs. If questions occur please do not hesitate to call our office.

## 2020-08-21 ENCOUNTER — LAB REQUISITION (OUTPATIENT)
Dept: LAB | Facility: HOSPITAL | Age: 45
End: 2020-08-21

## 2020-08-21 DIAGNOSIS — E11.621 TYPE 2 DIABETES MELLITUS WITH FOOT ULCER (CODE) (HCC): ICD-10-CM

## 2020-08-21 LAB
BASOPHILS # BLD AUTO: 0.07 10*3/MM3 (ref 0–0.2)
BASOPHILS NFR BLD AUTO: 1 % (ref 0–1.5)
CREAT SERPL-MCNC: 0.88 MG/DL (ref 0.76–1.27)
DEPRECATED RDW RBC AUTO: 39.6 FL (ref 37–54)
EOSINOPHIL # BLD AUTO: 0.32 10*3/MM3 (ref 0–0.4)
EOSINOPHIL NFR BLD AUTO: 4.4 % (ref 0.3–6.2)
ERYTHROCYTE [DISTWIDTH] IN BLOOD BY AUTOMATED COUNT: 12.6 % (ref 12.3–15.4)
ERYTHROCYTE [SEDIMENTATION RATE] IN BLOOD: 53 MM/HR (ref 0–15)
GFR SERPL CREATININE-BSD FRML MDRD: 94 ML/MIN/1.73
HCT VFR BLD AUTO: 36.1 % (ref 37.5–51)
HGB BLD-MCNC: 12.2 G/DL (ref 13–17.7)
IMM GRANULOCYTES # BLD AUTO: 0.03 10*3/MM3 (ref 0–0.05)
IMM GRANULOCYTES NFR BLD AUTO: 0.4 % (ref 0–0.5)
LYMPHOCYTES # BLD AUTO: 2.35 10*3/MM3 (ref 0.7–3.1)
LYMPHOCYTES NFR BLD AUTO: 32.3 % (ref 19.6–45.3)
MCH RBC QN AUTO: 29.3 PG (ref 26.6–33)
MCHC RBC AUTO-ENTMCNC: 33.8 G/DL (ref 31.5–35.7)
MCV RBC AUTO: 86.6 FL (ref 79–97)
MONOCYTES # BLD AUTO: 0.65 10*3/MM3 (ref 0.1–0.9)
MONOCYTES NFR BLD AUTO: 8.9 % (ref 5–12)
NEUTROPHILS NFR BLD AUTO: 3.86 10*3/MM3 (ref 1.7–7)
NEUTROPHILS NFR BLD AUTO: 53 % (ref 42.7–76)
NRBC BLD AUTO-RTO: 0 /100 WBC (ref 0–0.2)
PLATELET # BLD AUTO: 246 10*3/MM3 (ref 140–450)
PMV BLD AUTO: 10.1 FL (ref 6–12)
RBC # BLD AUTO: 4.17 10*6/MM3 (ref 4.14–5.8)
WBC # BLD AUTO: 7.28 10*3/MM3 (ref 3.4–10.8)

## 2020-08-21 PROCEDURE — 85025 COMPLETE CBC W/AUTO DIFF WBC: CPT | Performed by: INTERNAL MEDICINE

## 2020-08-21 PROCEDURE — 82565 ASSAY OF CREATININE: CPT | Performed by: INTERNAL MEDICINE

## 2020-08-21 PROCEDURE — 85652 RBC SED RATE AUTOMATED: CPT | Performed by: INTERNAL MEDICINE

## 2020-08-25 ENCOUNTER — TELEPHONE (OUTPATIENT)
Dept: PODIATRY | Facility: CLINIC | Age: 45
End: 2020-08-25

## 2020-08-28 ENCOUNTER — LAB REQUISITION (OUTPATIENT)
Dept: LAB | Facility: HOSPITAL | Age: 45
End: 2020-08-28

## 2020-08-28 DIAGNOSIS — E11.621 TYPE 2 DIABETES MELLITUS WITH FOOT ULCER (CODE) (HCC): ICD-10-CM

## 2020-08-28 LAB
BASOPHILS # BLD AUTO: 0.05 10*3/MM3 (ref 0–0.2)
BASOPHILS NFR BLD AUTO: 0.8 % (ref 0–1.5)
CREAT SERPL-MCNC: 1.09 MG/DL (ref 0.76–1.27)
DEPRECATED RDW RBC AUTO: 39.1 FL (ref 37–54)
EOSINOPHIL # BLD AUTO: 0.23 10*3/MM3 (ref 0–0.4)
EOSINOPHIL NFR BLD AUTO: 3.5 % (ref 0.3–6.2)
ERYTHROCYTE [DISTWIDTH] IN BLOOD BY AUTOMATED COUNT: 12.1 % (ref 12.3–15.4)
ERYTHROCYTE [SEDIMENTATION RATE] IN BLOOD: 62 MM/HR (ref 0–15)
GFR SERPL CREATININE-BSD FRML MDRD: 73 ML/MIN/1.73
HCT VFR BLD AUTO: 35.6 % (ref 37.5–51)
HGB BLD-MCNC: 11.7 G/DL (ref 13–17.7)
IMM GRANULOCYTES # BLD AUTO: 0.02 10*3/MM3 (ref 0–0.05)
IMM GRANULOCYTES NFR BLD AUTO: 0.3 % (ref 0–0.5)
LYMPHOCYTES # BLD AUTO: 1.98 10*3/MM3 (ref 0.7–3.1)
LYMPHOCYTES NFR BLD AUTO: 29.7 % (ref 19.6–45.3)
MCH RBC QN AUTO: 29.1 PG (ref 26.6–33)
MCHC RBC AUTO-ENTMCNC: 32.9 G/DL (ref 31.5–35.7)
MCV RBC AUTO: 88.6 FL (ref 79–97)
MONOCYTES # BLD AUTO: 0.56 10*3/MM3 (ref 0.1–0.9)
MONOCYTES NFR BLD AUTO: 8.4 % (ref 5–12)
NEUTROPHILS NFR BLD AUTO: 3.82 10*3/MM3 (ref 1.7–7)
NEUTROPHILS NFR BLD AUTO: 57.3 % (ref 42.7–76)
NRBC BLD AUTO-RTO: 0 /100 WBC (ref 0–0.2)
PLATELET # BLD AUTO: 256 10*3/MM3 (ref 140–450)
PMV BLD AUTO: 10.1 FL (ref 6–12)
RBC # BLD AUTO: 4.02 10*6/MM3 (ref 4.14–5.8)
WBC # BLD AUTO: 6.66 10*3/MM3 (ref 3.4–10.8)

## 2020-08-28 PROCEDURE — 85025 COMPLETE CBC W/AUTO DIFF WBC: CPT | Performed by: FAMILY MEDICINE

## 2020-08-28 PROCEDURE — 82565 ASSAY OF CREATININE: CPT | Performed by: FAMILY MEDICINE

## 2020-08-28 PROCEDURE — 85652 RBC SED RATE AUTOMATED: CPT | Performed by: FAMILY MEDICINE

## 2020-09-01 ENCOUNTER — HOSPITAL ENCOUNTER (INPATIENT)
Facility: HOSPITAL | Age: 45
LOS: 1 days | Discharge: HOME HEALTH | End: 2020-09-03
Attending: INTERNAL MEDICINE | Admitting: HOSPITALIST

## 2020-09-01 ENCOUNTER — APPOINTMENT (OUTPATIENT)
Dept: GENERAL RADIOLOGY | Facility: HOSPITAL | Age: 45
End: 2020-09-01

## 2020-09-01 DIAGNOSIS — Z89.511 S/P BKA (BELOW KNEE AMPUTATION), RIGHT (HCC): Primary | ICD-10-CM

## 2020-09-01 DIAGNOSIS — S91.302A WOUND OF LEFT FOOT: ICD-10-CM

## 2020-09-01 LAB
ALBUMIN SERPL-MCNC: 3.1 G/DL (ref 3.5–5.2)
ALBUMIN/GLOB SERPL: 0.7 G/DL
ALP SERPL-CCNC: 152 U/L (ref 39–117)
ALT SERPL W P-5'-P-CCNC: 31 U/L (ref 1–41)
ANION GAP SERPL CALCULATED.3IONS-SCNC: 12 MMOL/L (ref 5–15)
AST SERPL-CCNC: 15 U/L (ref 1–40)
BASOPHILS # BLD AUTO: 0.1 10*3/MM3 (ref 0–0.2)
BASOPHILS NFR BLD AUTO: 0.4 % (ref 0–1.5)
BILIRUB SERPL-MCNC: 0.9 MG/DL (ref 0–1.2)
BUN SERPL-MCNC: 23 MG/DL (ref 6–20)
BUN SERPL-MCNC: ABNORMAL MG/DL
BUN/CREAT SERPL: ABNORMAL
CALCIUM SPEC-SCNC: 8.3 MG/DL (ref 8.6–10.5)
CHLORIDE SERPL-SCNC: 93 MMOL/L (ref 98–107)
CO2 SERPL-SCNC: 24 MMOL/L (ref 22–29)
CREAT SERPL-MCNC: 1.1 MG/DL (ref 0.76–1.27)
CRP SERPL-MCNC: 12.91 MG/DL (ref 0–0.5)
D-LACTATE SERPL-SCNC: 1.7 MMOL/L (ref 0.5–2)
DEPRECATED RDW RBC AUTO: 38.9 FL (ref 37–54)
EOSINOPHIL # BLD AUTO: 0 10*3/MM3 (ref 0–0.4)
EOSINOPHIL NFR BLD AUTO: 0.1 % (ref 0.3–6.2)
ERYTHROCYTE [DISTWIDTH] IN BLOOD BY AUTOMATED COUNT: 12.7 % (ref 12.3–15.4)
ERYTHROCYTE [SEDIMENTATION RATE] IN BLOOD: 103 MM/HR (ref 0–15)
GFR SERPL CREATININE-BSD FRML MDRD: 72 ML/MIN/1.73
GLOBULIN UR ELPH-MCNC: 4.3 GM/DL
GLUCOSE BLDC GLUCOMTR-MCNC: 265 MG/DL (ref 70–105)
GLUCOSE BLDC GLUCOMTR-MCNC: 324 MG/DL (ref 70–105)
GLUCOSE SERPL-MCNC: 330 MG/DL (ref 65–99)
HCT VFR BLD AUTO: 33.5 % (ref 37.5–51)
HGB BLD-MCNC: 11.4 G/DL (ref 13–17.7)
HOLD SPECIMEN: NORMAL
HOLD SPECIMEN: NORMAL
LIPASE SERPL-CCNC: 16 U/L (ref 13–60)
LYMPHOCYTES # BLD AUTO: 1.4 10*3/MM3 (ref 0.7–3.1)
LYMPHOCYTES NFR BLD AUTO: 10.3 % (ref 19.6–45.3)
MCH RBC QN AUTO: 29.6 PG (ref 26.6–33)
MCHC RBC AUTO-ENTMCNC: 34 G/DL (ref 31.5–35.7)
MCV RBC AUTO: 87.1 FL (ref 79–97)
MONOCYTES # BLD AUTO: 1.4 10*3/MM3 (ref 0.1–0.9)
MONOCYTES NFR BLD AUTO: 10.5 % (ref 5–12)
NEUTROPHILS NFR BLD AUTO: 10.3 10*3/MM3 (ref 1.7–7)
NEUTROPHILS NFR BLD AUTO: 78.7 % (ref 42.7–76)
NRBC BLD AUTO-RTO: 0 /100 WBC (ref 0–0.2)
PLATELET # BLD AUTO: 316 10*3/MM3 (ref 140–450)
PMV BLD AUTO: 7.8 FL (ref 6–12)
POTASSIUM SERPL-SCNC: 4 MMOL/L (ref 3.5–5.2)
PROT SERPL-MCNC: 7.4 G/DL (ref 6–8.5)
RBC # BLD AUTO: 3.85 10*6/MM3 (ref 4.14–5.8)
SODIUM SERPL-SCNC: 129 MMOL/L (ref 136–145)
WBC # BLD AUTO: 13.1 10*3/MM3 (ref 3.4–10.8)
WHOLE BLOOD HOLD SPECIMEN: NORMAL

## 2020-09-01 PROCEDURE — 87147 CULTURE TYPE IMMUNOLOGIC: CPT | Performed by: NURSE PRACTITIONER

## 2020-09-01 PROCEDURE — 83605 ASSAY OF LACTIC ACID: CPT

## 2020-09-01 PROCEDURE — 87070 CULTURE OTHR SPECIMN AEROBIC: CPT | Performed by: NURSE PRACTITIONER

## 2020-09-01 PROCEDURE — 25010000002 VANCOMYCIN 10 G RECONSTITUTED SOLUTION: Performed by: NURSE PRACTITIONER

## 2020-09-01 PROCEDURE — 83690 ASSAY OF LIPASE: CPT | Performed by: NURSE PRACTITIONER

## 2020-09-01 PROCEDURE — 87641 MR-STAPH DNA AMP PROBE: CPT | Performed by: ORTHOPAEDIC SURGERY

## 2020-09-01 PROCEDURE — G0378 HOSPITAL OBSERVATION PER HR: HCPCS

## 2020-09-01 PROCEDURE — 82962 GLUCOSE BLOOD TEST: CPT

## 2020-09-01 PROCEDURE — 87205 SMEAR GRAM STAIN: CPT | Performed by: NURSE PRACTITIONER

## 2020-09-01 PROCEDURE — 99284 EMERGENCY DEPT VISIT MOD MDM: CPT

## 2020-09-01 PROCEDURE — 85652 RBC SED RATE AUTOMATED: CPT | Performed by: NURSE PRACTITIONER

## 2020-09-01 PROCEDURE — 80053 COMPREHEN METABOLIC PANEL: CPT | Performed by: NURSE PRACTITIONER

## 2020-09-01 PROCEDURE — 36415 COLL VENOUS BLD VENIPUNCTURE: CPT

## 2020-09-01 PROCEDURE — 25010000002 CEFTRIAXONE PER 250 MG: Performed by: NURSE PRACTITIONER

## 2020-09-01 PROCEDURE — 73630 X-RAY EXAM OF FOOT: CPT

## 2020-09-01 PROCEDURE — 86140 C-REACTIVE PROTEIN: CPT | Performed by: NURSE PRACTITIONER

## 2020-09-01 PROCEDURE — 87077 CULTURE AEROBIC IDENTIFY: CPT | Performed by: NURSE PRACTITIONER

## 2020-09-01 PROCEDURE — 87040 BLOOD CULTURE FOR BACTERIA: CPT | Performed by: NURSE PRACTITIONER

## 2020-09-01 PROCEDURE — 85025 COMPLETE CBC W/AUTO DIFF WBC: CPT | Performed by: NURSE PRACTITIONER

## 2020-09-01 PROCEDURE — 99222 1ST HOSP IP/OBS MODERATE 55: CPT | Performed by: NURSE PRACTITIONER

## 2020-09-01 PROCEDURE — 63710000001 INSULIN GLARGINE PER 5 UNITS: Performed by: NURSE PRACTITIONER

## 2020-09-01 PROCEDURE — 0202U NFCT DS 22 TRGT SARS-COV-2: CPT | Performed by: INTERNAL MEDICINE

## 2020-09-01 PROCEDURE — 87150 DNA/RNA AMPLIFIED PROBE: CPT | Performed by: NURSE PRACTITIONER

## 2020-09-01 PROCEDURE — 63710000001 INSULIN REGULAR HUMAN PER 5 UNITS: Performed by: NURSE PRACTITIONER

## 2020-09-01 PROCEDURE — 87186 SC STD MICRODIL/AGAR DIL: CPT | Performed by: NURSE PRACTITIONER

## 2020-09-01 RX ORDER — INSULIN GLARGINE 100 [IU]/ML
15 INJECTION, SOLUTION SUBCUTANEOUS NIGHTLY
Status: DISCONTINUED | OUTPATIENT
Start: 2020-09-01 | End: 2020-09-03 | Stop reason: HOSPADM

## 2020-09-01 RX ORDER — DEXTROSE MONOHYDRATE 25 G/50ML
25 INJECTION, SOLUTION INTRAVENOUS
Status: DISCONTINUED | OUTPATIENT
Start: 2020-09-01 | End: 2020-09-03 | Stop reason: HOSPADM

## 2020-09-01 RX ORDER — SODIUM CHLORIDE 0.9 % (FLUSH) 0.9 %
10 SYRINGE (ML) INJECTION AS NEEDED
Status: DISCONTINUED | OUTPATIENT
Start: 2020-09-01 | End: 2020-09-02 | Stop reason: HOSPADM

## 2020-09-01 RX ORDER — NICOTINE POLACRILEX 4 MG
15 LOZENGE BUCCAL
Status: DISCONTINUED | OUTPATIENT
Start: 2020-09-01 | End: 2020-09-03 | Stop reason: HOSPADM

## 2020-09-01 RX ORDER — ACETAMINOPHEN 325 MG/1
650 TABLET ORAL EVERY 4 HOURS PRN
Status: CANCELLED | OUTPATIENT
Start: 2020-09-01

## 2020-09-01 RX ORDER — SODIUM CHLORIDE 0.9 % (FLUSH) 0.9 %
10 SYRINGE (ML) INJECTION EVERY 12 HOURS SCHEDULED
Status: DISCONTINUED | OUTPATIENT
Start: 2020-09-01 | End: 2020-09-02 | Stop reason: HOSPADM

## 2020-09-01 RX ORDER — ACETAMINOPHEN 160 MG/5ML
650 SOLUTION ORAL EVERY 4 HOURS PRN
Status: CANCELLED | OUTPATIENT
Start: 2020-09-01

## 2020-09-01 RX ORDER — ACETAMINOPHEN 650 MG/1
650 SUPPOSITORY RECTAL EVERY 4 HOURS PRN
Status: CANCELLED | OUTPATIENT
Start: 2020-09-01

## 2020-09-01 RX ORDER — CHOLECALCIFEROL (VITAMIN D3) 125 MCG
5 CAPSULE ORAL NIGHTLY PRN
Status: DISCONTINUED | OUTPATIENT
Start: 2020-09-01 | End: 2020-09-03 | Stop reason: HOSPADM

## 2020-09-01 RX ORDER — ONDANSETRON 2 MG/ML
4 INJECTION INTRAMUSCULAR; INTRAVENOUS EVERY 6 HOURS PRN
Status: DISCONTINUED | OUTPATIENT
Start: 2020-09-01 | End: 2020-09-02 | Stop reason: HOSPADM

## 2020-09-01 RX ORDER — BISACODYL 5 MG/1
5 TABLET, DELAYED RELEASE ORAL DAILY PRN
Status: DISCONTINUED | OUTPATIENT
Start: 2020-09-01 | End: 2020-09-02 | Stop reason: HOSPADM

## 2020-09-01 RX ORDER — ONDANSETRON 4 MG/1
4 TABLET, FILM COATED ORAL EVERY 6 HOURS PRN
Status: DISCONTINUED | OUTPATIENT
Start: 2020-09-01 | End: 2020-09-02 | Stop reason: HOSPADM

## 2020-09-01 RX ORDER — SODIUM CHLORIDE 9 MG/ML
75 INJECTION, SOLUTION INTRAVENOUS CONTINUOUS
Status: DISCONTINUED | OUTPATIENT
Start: 2020-09-01 | End: 2020-09-02 | Stop reason: HOSPADM

## 2020-09-01 RX ADMIN — Medication 10 ML: at 22:27

## 2020-09-01 RX ADMIN — INSULIN GLARGINE 15 UNITS: 100 INJECTION, SOLUTION SUBCUTANEOUS at 22:20

## 2020-09-01 RX ADMIN — SODIUM CHLORIDE 1000 ML: 900 INJECTION, SOLUTION INTRAVENOUS at 19:30

## 2020-09-01 RX ADMIN — WATER 2 G: 1000 INJECTION, SOLUTION INTRAVENOUS at 20:17

## 2020-09-01 RX ADMIN — INSULIN HUMAN 6 UNITS: 100 INJECTION, SOLUTION PARENTERAL at 19:30

## 2020-09-01 RX ADMIN — VANCOMYCIN HYDROCHLORIDE 2000 MG: 10 INJECTION, POWDER, LYOPHILIZED, FOR SOLUTION INTRAVENOUS at 20:17

## 2020-09-01 NOTE — ED PROVIDER NOTES
Subjective   Patient is a 45-year-old male with history of diabetes who presents emergency department complaint of fever, nausea, vomiting, and a wound to his left foot.  He has been treated for this left foot wound with Dr. simons in the past, has been undergoing IV antibiotic treatments at home through a PICC line.  He is previously been on vancomycin in the past, currently doing Rocephin at home.  He reports he was due to see Dr. Singleton, orthopedics tomorrow regarding his foot wound, but he felt more ill today.          Review of Systems   Constitutional: Positive for chills and fever.   Eyes: Negative for photophobia and visual disturbance.   Respiratory: Negative for cough, chest tightness and shortness of breath.    Cardiovascular: Negative for chest pain, palpitations and leg swelling.   Gastrointestinal: Positive for nausea and vomiting. Negative for abdominal pain and diarrhea.   Genitourinary: Negative for dysuria.   Musculoskeletal: Negative for back pain, myalgias, neck pain and neck stiffness.   Skin: Positive for wound.   Neurological: Positive for weakness (Generalized).       Past Medical History:   Diagnosis Date   • Coronary artery disease    • Diabetes mellitus (CMS/HCC)    • MI, old 2010       Allergies   Allergen Reactions   • Metformin Diarrhea and Nausea And Vomiting   • Oxycodone-Acetaminophen Nausea And Vomiting and Rash       Past Surgical History:   Procedure Laterality Date   • AMPUTATION DIGIT Right 2/28/2020    Procedure: PARTIAL FIRST RAY RESECTION RIGHT FOOT;  Surgeon: CROW Simons DPM;  Location: [facility];  Service: Podiatry;  Laterality: Right;   • AMPUTATION FOOT / TOE     • BELOW KNEE AMPUTATION Right 4/30/2020    Procedure: AMPUTATION BELOW KNEE;  Surgeon: Donn Alcantara MD;  Location: [facility] MAIN OR;  Service: Orthopedics;  Laterality: Right;   • CARDIAC CATHETERIZATION  11/2010     RCA artery   • CARDIAC CATHETERIZATION  2015    LAD artery   • CARDIAC SURGERY     •  INCISION AND DRAINAGE LEG Left 2020    Procedure: INCISION AND DRAINAGE LOWER EXTREMITY with second digit amputation;  Surgeon: CROW Souza DPM;  Location: [facility] MAIN OR;  Service: Podiatry   • INCISION AND DRAINAGE LEG Right 2020    Procedure: incision and drainage ,transmetatarsal amputation, fasciotomy;  Surgeon: CROW Souza DPM;  Location: [facility] MA[facility] OR;  Service: Podiatry;  Laterality: Right;   • TOE AMPUTATION Left    • WOUND DEBRIDEMENT Right 2020    Procedure: DEBRIDEMENT with sesamoid excision;  Surgeon: CROW Souza DPM;  Location: [facility] MAIN OR;  Service: Podiatry       Family History   Problem Relation Age of Onset   • Diabetes Father    • Heart failure Father    • Diabetes Paternal Grandfather        Social History     Socioeconomic History   • Marital status: Single     Spouse name: Not on file   • Number of children: Not on file   • Years of education: Not on file   • Highest education level: Not on file   Tobacco Use   • Smoking status: Former Smoker     Packs/day: 0.50     Last attempt to quit: 2019     Years since quittin.3   • Smokeless tobacco: Never Used   Substance and Sexual Activity   • Alcohol use: No     Frequency: Never   • Drug use: No   • Sexual activity: Defer           Objective   Physical Exam   Constitutional: He is oriented to person, place, and time. He appears well-developed and well-nourished.   HENT:   Head: Normocephalic and atraumatic.   Eyes: Pupils are equal, round, and reactive to light. Conjunctivae and EOM are normal.   Neck: Normal range of motion. Neck supple.   Cardiovascular: Normal rate and regular rhythm.   Pulmonary/Chest: Effort normal and breath sounds normal.   Abdominal: Soft. Bowel sounds are normal.   Musculoskeletal: Normal range of motion.   Wound noted to the plantar aspect of the left foot, approximately 5 cm in diameter  Right BKA noted.   Left foot with 3 toes amputated.    Neurological: He is alert and oriented  "to person, place, and time.   Skin: Skin is warm and dry.   Psychiatric: He has a normal mood and affect. His behavior is normal. Judgment and thought content normal.   Nursing note and vitals reviewed.      Procedures           ED Course  BP 99/49   Pulse 94   Temp 99.6 °F (37.6 °C)   Resp 16   Ht 190.5 cm (75\")   Wt 94.3 kg (208 lb)   SpO2 97%   BMI 26.00 kg/m²   Labs Reviewed   COMPREHENSIVE METABOLIC PANEL - Abnormal; Notable for the following components:       Result Value    Glucose 330 (*)     Sodium 129 (*)     Chloride 93 (*)     Calcium 8.3 (*)     Albumin 3.10 (*)     Alkaline Phosphatase 152 (*)     All other components within normal limits    Narrative:     GFR Normal >60  Chronic Kidney Disease <60  Kidney Failure <15     C-REACTIVE PROTEIN - Abnormal; Notable for the following components:    C-Reactive Protein 12.91 (*)     All other components within normal limits   SEDIMENTATION RATE - Abnormal; Notable for the following components:    Sed Rate 103 (*)     All other components within normal limits   CBC WITH AUTO DIFFERENTIAL - Abnormal; Notable for the following components:    WBC 13.10 (*)     RBC 3.85 (*)     Hemoglobin 11.4 (*)     Hematocrit 33.5 (*)     Neutrophil % 78.7 (*)     Lymphocyte % 10.3 (*)     Eosinophil % 0.1 (*)     Neutrophils, Absolute 10.30 (*)     Monocytes, Absolute 1.40 (*)     All other components within normal limits   BUN - Abnormal; Notable for the following components:    BUN 23 (*)     All other components within normal limits   POCT GLUCOSE FINGERSTICK - Abnormal; Notable for the following components:    Glucose 324 (*)     All other components within normal limits   LIPASE - Normal   POC LACTATE - Normal   BLOOD CULTURE   BLOOD CULTURE   WOUND CULTURE   POC LACTATE   CBC AND DIFFERENTIAL    Narrative:     The following orders were created for panel order CBC & Differential.  Procedure                               Abnormality         Status                   "   ---------                               -----------         ------                     CBC Auto Differential[[id number]]        Abnormal            Final result                 Please view results for these tests on the individual orders.   EXTRA TUBES    Narrative:     The following orders were created for panel order Extra Tubes.  Procedure                               Abnormality         Status                     ---------                               -----------         ------                     Light Blue Top[[id number]]                                   Final result               Gold Top - SST[[id number]]                                   Final result               Green Top (Gel)[[id number]]                                  Final result                 Please view results for these tests on the individual orders.   LIGHT BLUE TOP   GOLD TOP - SST   GREEN TOP     Medications   sodium chloride 0.9 % bolus 1,000 mL (1,000 mL Intravenous New Bag 9/1/20 1930)   insulin regular (humuLIN R,novoLIN R) injection 6 Units (6 Units Intravenous Given 9/1/20 1930)   vancomycin 2000 mg/500 mL 0.9% NS IVPB ([facility]) (2,000 mg Intravenous New Bag 9/1/20 2017)   cefTRIAXone (ROCEPHIN) in SWFI 2 GRAMS/20ml IV PUSH syringe (2 g Intravenous Given 9/1/20 2017)     Xr Foot 3+ View Left    Result Date: 9/1/2020  1. There is severe derangement of the midfoot consistent with neuropathic disease. This is unchanged except that there has been some widening of some of the tarsal-metatarsal joints. 2. Negative for plain film evidence of osteomyelitis. 3. Diffuse soft tissue swelling. 4. Multiple prior amputations.  Electronically Signed By-Arabella Mendez On:9/1/2020 6:44 PM This report was finalized on [id number] by  Arabella Mendez, .      ED Course as of Sep 01 2034   Tue Sep 01, 2020   1947 Spoke with Dr. Souza, he will be contacting Dr. Naranjo regarding the patient.     [LB]   2024 Spoke with KAMILLE Robles, patient will be admitted to  Dr. Pace.     [LB]      ED Course User Index  [LB] Estrella Calix, APRN      Appropriate PPE was worn during the duration of the care for this patient while in the emergency department per [facility] guidelines                                     MDM  Number of Diagnoses or Management Options  Wound of left foot:   Diagnosis management comments: Differentials: Cellulitis, osteomyelitis  This list is not all inclusive and does not constitute the entireity of considered causes.     Labs reviewed by me and significant for the following: Glucose 330, sodium 129, hemoglobin 11.4, hematocrit 33.5 consistent with previous, CRP 2.9 sed rate 103, lipase normal, lactate 1.7, blood cultures pending, wound culture pending    Imaging, Interpreted per radiologist, independently viewed by myself: Plain film x-ray left foot, severe derangement of the midfoot consistent with neuropathic disease.  Negative plain film evidence for osteomyelitis.    Patient was brought back to the emergency department room for evaluation and  placed on appropriate monitoring.  The above work-up was completed.    Patient had blood work obtained, patient does already have a PICC line in his right upper extremity.  He is remained afebrile while here in the emergency department, he is nontoxic in appearance.  He did trigger for sepsis, he is not required the 30 mL/kg bolus of IV fluids.  He was given insulin here in the ED, 1 L normal saline, vancomycin and Rocephin.  I did consult with Dr. simons, with podiatry, the patient has seen him in the past for this wound.  Patient was wanting to see Dr. Singleton in regards to possible amputation, and Dr. simons will be contacting Dr. Naranjo for consult.  Patient admitted to the hospitalist for further evaluation and management    Plan and Disposition: Admission    I discussed with the patient their test results, work-up here in the emergency department, and need for admission and further evaluation.   Patient is agreeable to the plan of care.  Opportunity was provided for questions at the bedside, all questions and concerns were addressed.           Amount and/or Complexity of Data Reviewed  Clinical lab tests: reviewed  Tests in the radiology section of CPT®: reviewed  Decide to obtain previous medical records or to obtain history from someone other than the patient: yes  Review and summarize past medical records: yes  Discuss the patient with other providers: yes (Podiatry, hospitalist)  Independent visualization of images, tracings, or specimens: yes (Imaging was independently viewed by me, and interpreted by the radiologist)    Patient Progress  Patient progress: stable      Final diagnoses:   Wound of left foot            Estrella Calix, APRN  09/01/20 2034

## 2020-09-01 NOTE — ED NOTES
Pt c/o wound to left foot that has been nonhealing.  Pt on Rocephin IV daily.        Eunice Crawley, RIGON  09/01/20 1813

## 2020-09-01 NOTE — ED NOTES
Pt asking about visitors in ED.  Nurse explained to pt that we are having to follow covid policies and procedures at this time and there are no visitors allowed in ED or upstairs for admitted pts unless it is end of life.  Pt argumentative about situation.  I explained to him that we as nurses do not make the rules but we are expected to follow them and they are made by administration.        Eunice Crawley, TOBY  09/01/20 1817

## 2020-09-02 ENCOUNTER — APPOINTMENT (OUTPATIENT)
Dept: GENERAL RADIOLOGY | Facility: HOSPITAL | Age: 45
End: 2020-09-02

## 2020-09-02 ENCOUNTER — ANESTHESIA (OUTPATIENT)
Dept: PERIOP | Facility: HOSPITAL | Age: 45
End: 2020-09-02

## 2020-09-02 ENCOUNTER — ANESTHESIA EVENT (OUTPATIENT)
Dept: PERIOP | Facility: HOSPITAL | Age: 45
End: 2020-09-02

## 2020-09-02 PROBLEM — M86.9 FOOT OSTEOMYELITIS, LEFT (HCC): Status: ACTIVE | Noted: 2020-09-02

## 2020-09-02 LAB
ABO GROUP BLD: NORMAL
ANION GAP SERPL CALCULATED.3IONS-SCNC: 7 MMOL/L (ref 5–15)
ANION GAP SERPL CALCULATED.3IONS-SCNC: 8 MMOL/L (ref 5–15)
APTT PPP: 24.2 SECONDS (ref 24–31)
B PARAPERT DNA SPEC QL NAA+PROBE: NOT DETECTED
B PERT DNA SPEC QL NAA+PROBE: NOT DETECTED
BACTERIA BLD CULT: ABNORMAL
BASOPHILS # BLD AUTO: 0.1 10*3/MM3 (ref 0–0.2)
BASOPHILS NFR BLD AUTO: 0.8 % (ref 0–1.5)
BLD GP AB SCN SERPL QL: NEGATIVE
BOTTLE TYPE: ABNORMAL
BUN SERPL-MCNC: 22 MG/DL (ref 6–20)
BUN SERPL-MCNC: 24 MG/DL (ref 6–20)
BUN SERPL-MCNC: ABNORMAL MG/DL
BUN SERPL-MCNC: ABNORMAL MG/DL
BUN/CREAT SERPL: ABNORMAL
BUN/CREAT SERPL: ABNORMAL
C PNEUM DNA NPH QL NAA+NON-PROBE: NOT DETECTED
CALCIUM SPEC-SCNC: 7.8 MG/DL (ref 8.6–10.5)
CALCIUM SPEC-SCNC: 8 MG/DL (ref 8.6–10.5)
CHLORIDE SERPL-SCNC: 100 MMOL/L (ref 98–107)
CHLORIDE SERPL-SCNC: 101 MMOL/L (ref 98–107)
CO2 SERPL-SCNC: 26 MMOL/L (ref 22–29)
CO2 SERPL-SCNC: 27 MMOL/L (ref 22–29)
CREAT SERPL-MCNC: 0.86 MG/DL (ref 0.76–1.27)
CREAT SERPL-MCNC: 0.89 MG/DL (ref 0.76–1.27)
DEPRECATED RDW RBC AUTO: 39.8 FL (ref 37–54)
EOSINOPHIL # BLD AUTO: 0.2 10*3/MM3 (ref 0–0.4)
EOSINOPHIL NFR BLD AUTO: 2.2 % (ref 0.3–6.2)
ERYTHROCYTE [DISTWIDTH] IN BLOOD BY AUTOMATED COUNT: 12.9 % (ref 12.3–15.4)
FLUAV H1 2009 PAND RNA NPH QL NAA+PROBE: NOT DETECTED
FLUAV H1 HA GENE NPH QL NAA+PROBE: NOT DETECTED
FLUAV H3 RNA NPH QL NAA+PROBE: NOT DETECTED
FLUAV SUBTYP SPEC NAA+PROBE: NOT DETECTED
FLUBV RNA ISLT QL NAA+PROBE: NOT DETECTED
GFR SERPL CREATININE-BSD FRML MDRD: 92 ML/MIN/1.73
GFR SERPL CREATININE-BSD FRML MDRD: 96 ML/MIN/1.73
GLUCOSE BLDC GLUCOMTR-MCNC: 145 MG/DL (ref 70–105)
GLUCOSE BLDC GLUCOMTR-MCNC: 171 MG/DL (ref 70–105)
GLUCOSE BLDC GLUCOMTR-MCNC: 183 MG/DL (ref 70–105)
GLUCOSE BLDC GLUCOMTR-MCNC: 258 MG/DL (ref 70–105)
GLUCOSE BLDC GLUCOMTR-MCNC: 270 MG/DL (ref 70–105)
GLUCOSE BLDC GLUCOMTR-MCNC: 287 MG/DL (ref 70–105)
GLUCOSE SERPL-MCNC: 216 MG/DL (ref 65–99)
GLUCOSE SERPL-MCNC: 328 MG/DL (ref 65–99)
HADV DNA SPEC NAA+PROBE: NOT DETECTED
HCOV 229E RNA SPEC QL NAA+PROBE: NOT DETECTED
HCOV HKU1 RNA SPEC QL NAA+PROBE: NOT DETECTED
HCOV NL63 RNA SPEC QL NAA+PROBE: NOT DETECTED
HCOV OC43 RNA SPEC QL NAA+PROBE: NOT DETECTED
HCT VFR BLD AUTO: 31.3 % (ref 37.5–51)
HGB BLD-MCNC: 10.6 G/DL (ref 13–17.7)
HMPV RNA NPH QL NAA+NON-PROBE: NOT DETECTED
HPIV1 RNA SPEC QL NAA+PROBE: NOT DETECTED
HPIV2 RNA SPEC QL NAA+PROBE: NOT DETECTED
HPIV3 RNA NPH QL NAA+PROBE: NOT DETECTED
HPIV4 P GENE NPH QL NAA+PROBE: NOT DETECTED
INR PPP: 1.09 (ref 0.93–1.1)
LYMPHOCYTES # BLD AUTO: 1.1 10*3/MM3 (ref 0.7–3.1)
LYMPHOCYTES NFR BLD AUTO: 15 % (ref 19.6–45.3)
M PNEUMO IGG SER IA-ACNC: NOT DETECTED
MCH RBC QN AUTO: 29.5 PG (ref 26.6–33)
MCHC RBC AUTO-ENTMCNC: 33.7 G/DL (ref 31.5–35.7)
MCV RBC AUTO: 87.4 FL (ref 79–97)
MONOCYTES # BLD AUTO: 0.8 10*3/MM3 (ref 0.1–0.9)
MONOCYTES NFR BLD AUTO: 10.2 % (ref 5–12)
MRSA DNA SPEC QL NAA+PROBE: NORMAL
NEUTROPHILS NFR BLD AUTO: 5.5 10*3/MM3 (ref 1.7–7)
NEUTROPHILS NFR BLD AUTO: 71.8 % (ref 42.7–76)
NRBC BLD AUTO-RTO: 0 /100 WBC (ref 0–0.2)
PLATELET # BLD AUTO: 262 10*3/MM3 (ref 140–450)
PMV BLD AUTO: 7.6 FL (ref 6–12)
POTASSIUM SERPL-SCNC: 3.9 MMOL/L (ref 3.5–5.2)
POTASSIUM SERPL-SCNC: 4.4 MMOL/L (ref 3.5–5.2)
PROTHROMBIN TIME: 11.7 SECONDS (ref 9.6–11.7)
RBC # BLD AUTO: 3.58 10*6/MM3 (ref 4.14–5.8)
RH BLD: POSITIVE
RHINOVIRUS RNA SPEC NAA+PROBE: NOT DETECTED
RSV RNA NPH QL NAA+NON-PROBE: NOT DETECTED
SARS-COV-2 RNA NPH QL NAA+NON-PROBE: NOT DETECTED
SODIUM SERPL-SCNC: 134 MMOL/L (ref 136–145)
SODIUM SERPL-SCNC: 135 MMOL/L (ref 136–145)
T&S EXPIRATION DATE: NORMAL
WBC # BLD AUTO: 7.6 10*3/MM3 (ref 3.4–10.8)

## 2020-09-02 PROCEDURE — 25010000002 ONDANSETRON PER 1 MG: Performed by: NURSE PRACTITIONER

## 2020-09-02 PROCEDURE — 85730 THROMBOPLASTIN TIME PARTIAL: CPT | Performed by: INTERNAL MEDICINE

## 2020-09-02 PROCEDURE — 86900 BLOOD TYPING SEROLOGIC ABO: CPT | Performed by: ORTHOPAEDIC SURGERY

## 2020-09-02 PROCEDURE — 76942 ECHO GUIDE FOR BIOPSY: CPT | Performed by: ORTHOPAEDIC SURGERY

## 2020-09-02 PROCEDURE — 25010000002 VANCOMYCIN 10 G RECONSTITUTED SOLUTION: Performed by: ORTHOPAEDIC SURGERY

## 2020-09-02 PROCEDURE — 25010000002 PHENYLEPHRINE 10 MG/ML SOLUTION 5 ML VIAL: Performed by: ANESTHESIOLOGY

## 2020-09-02 PROCEDURE — 88311 DECALCIFY TISSUE: CPT | Performed by: ORTHOPAEDIC SURGERY

## 2020-09-02 PROCEDURE — 25010000002 CEFTRIAXONE PER 250 MG: Performed by: ORTHOPAEDIC SURGERY

## 2020-09-02 PROCEDURE — 85025 COMPLETE CBC W/AUTO DIFF WBC: CPT | Performed by: NURSE PRACTITIONER

## 2020-09-02 PROCEDURE — 99232 SBSQ HOSP IP/OBS MODERATE 35: CPT | Performed by: HOSPITALIST

## 2020-09-02 PROCEDURE — 80048 BASIC METABOLIC PNL TOTAL CA: CPT | Performed by: ORTHOPAEDIC SURGERY

## 2020-09-02 PROCEDURE — 25010000002 FENTANYL CITRATE (PF) 100 MCG/2ML SOLUTION: Performed by: ANESTHESIOLOGY

## 2020-09-02 PROCEDURE — 63710000001 INSULIN LISPRO (HUMAN) PER 5 UNITS: Performed by: INTERNAL MEDICINE

## 2020-09-02 PROCEDURE — 80048 BASIC METABOLIC PNL TOTAL CA: CPT | Performed by: NURSE PRACTITIONER

## 2020-09-02 PROCEDURE — 25010000002 MIDAZOLAM PER 1 MG: Performed by: ANESTHESIOLOGY

## 2020-09-02 PROCEDURE — 86901 BLOOD TYPING SEROLOGIC RH(D): CPT | Performed by: ORTHOPAEDIC SURGERY

## 2020-09-02 PROCEDURE — 63710000001 INSULIN LISPRO (HUMAN) PER 5 UNITS: Performed by: ORTHOPAEDIC SURGERY

## 2020-09-02 PROCEDURE — 86850 RBC ANTIBODY SCREEN: CPT | Performed by: ORTHOPAEDIC SURGERY

## 2020-09-02 PROCEDURE — L1830 KO IMMOB CANVAS LONG PRE OTS: HCPCS | Performed by: ORTHOPAEDIC SURGERY

## 2020-09-02 PROCEDURE — 82962 GLUCOSE BLOOD TEST: CPT

## 2020-09-02 PROCEDURE — 73590 X-RAY EXAM OF LOWER LEG: CPT

## 2020-09-02 PROCEDURE — 25010000002 DEXAMETHASONE PER 1 MG: Performed by: ANESTHESIOLOGY

## 2020-09-02 PROCEDURE — 25010000002 PROPOFOL 10 MG/ML EMULSION: Performed by: ANESTHESIOLOGY

## 2020-09-02 PROCEDURE — 85610 PROTHROMBIN TIME: CPT | Performed by: ORTHOPAEDIC SURGERY

## 2020-09-02 PROCEDURE — 25010000002 ONDANSETRON PER 1 MG: Performed by: ANESTHESIOLOGY

## 2020-09-02 PROCEDURE — 0Y6J0Z1 DETACHMENT AT LEFT LOWER LEG, HIGH, OPEN APPROACH: ICD-10-PCS | Performed by: ORTHOPAEDIC SURGERY

## 2020-09-02 PROCEDURE — 88307 TISSUE EXAM BY PATHOLOGIST: CPT | Performed by: ORTHOPAEDIC SURGERY

## 2020-09-02 PROCEDURE — 25010000002 VANCOMYCIN 750 MG RECONSTITUTED SOLUTION: Performed by: ANESTHESIOLOGY

## 2020-09-02 PROCEDURE — 25010000002 ROPIVACAINE PER 1 MG: Performed by: ANESTHESIOLOGY

## 2020-09-02 PROCEDURE — 63710000001 INSULIN GLARGINE PER 5 UNITS: Performed by: ORTHOPAEDIC SURGERY

## 2020-09-02 PROCEDURE — 63710000001 INSULIN LISPRO (HUMAN) PER 5 UNITS: Performed by: HOSPITALIST

## 2020-09-02 RX ORDER — ROPIVACAINE HYDROCHLORIDE 5 MG/ML
INJECTION, SOLUTION EPIDURAL; INFILTRATION; PERINEURAL
Status: COMPLETED | OUTPATIENT
Start: 2020-09-02 | End: 2020-09-02

## 2020-09-02 RX ORDER — DIPHENHYDRAMINE HYDROCHLORIDE 50 MG/ML
12.5 INJECTION INTRAMUSCULAR; INTRAVENOUS
Status: DISCONTINUED | OUTPATIENT
Start: 2020-09-02 | End: 2020-09-02 | Stop reason: HOSPADM

## 2020-09-02 RX ORDER — MIDAZOLAM HYDROCHLORIDE 1 MG/ML
1 INJECTION INTRAMUSCULAR; INTRAVENOUS
Status: DISCONTINUED | OUTPATIENT
Start: 2020-09-02 | End: 2020-09-03 | Stop reason: HOSPADM

## 2020-09-02 RX ORDER — SODIUM CHLORIDE 9 MG/ML
125 INJECTION, SOLUTION INTRAVENOUS CONTINUOUS
Status: DISPENSED | OUTPATIENT
Start: 2020-09-02 | End: 2020-09-03

## 2020-09-02 RX ORDER — ASPIRIN 325 MG
325 TABLET, DELAYED RELEASE (ENTERIC COATED) ORAL DAILY
Status: DISCONTINUED | OUTPATIENT
Start: 2020-09-03 | End: 2020-09-03 | Stop reason: HOSPADM

## 2020-09-02 RX ORDER — NALBUPHINE HCL 10 MG/ML
2 AMPUL (ML) INJECTION EVERY 4 HOURS PRN
Status: DISCONTINUED | OUTPATIENT
Start: 2020-09-02 | End: 2020-09-02 | Stop reason: HOSPADM

## 2020-09-02 RX ORDER — POLYETHYLENE GLYCOL 3350 17 G/17G
17 POWDER, FOR SOLUTION ORAL DAILY
Status: DISCONTINUED | OUTPATIENT
Start: 2020-09-02 | End: 2020-09-03 | Stop reason: HOSPADM

## 2020-09-02 RX ORDER — ACETAMINOPHEN 325 MG/1
325 TABLET ORAL EVERY 4 HOURS PRN
Status: DISCONTINUED | OUTPATIENT
Start: 2020-09-02 | End: 2020-09-03 | Stop reason: HOSPADM

## 2020-09-02 RX ORDER — MEPERIDINE HYDROCHLORIDE 25 MG/ML
12.5 INJECTION INTRAMUSCULAR; INTRAVENOUS; SUBCUTANEOUS
Status: DISCONTINUED | OUTPATIENT
Start: 2020-09-02 | End: 2020-09-02 | Stop reason: HOSPADM

## 2020-09-02 RX ORDER — ONDANSETRON 4 MG/1
4 TABLET, FILM COATED ORAL EVERY 6 HOURS PRN
Status: DISCONTINUED | OUTPATIENT
Start: 2020-09-02 | End: 2020-09-03 | Stop reason: HOSPADM

## 2020-09-02 RX ORDER — SODIUM CHLORIDE 0.9 % (FLUSH) 0.9 %
1-10 SYRINGE (ML) INJECTION AS NEEDED
Status: DISCONTINUED | OUTPATIENT
Start: 2020-09-02 | End: 2020-09-03 | Stop reason: HOSPADM

## 2020-09-02 RX ORDER — TRANEXAMIC ACID 100 MG/ML
INJECTION, SOLUTION INTRAVENOUS AS NEEDED
Status: DISCONTINUED | OUTPATIENT
Start: 2020-09-02 | End: 2020-09-02 | Stop reason: SURG

## 2020-09-02 RX ORDER — ONDANSETRON 2 MG/ML
4 INJECTION INTRAMUSCULAR; INTRAVENOUS ONCE AS NEEDED
Status: COMPLETED | OUTPATIENT
Start: 2020-09-02 | End: 2020-09-02

## 2020-09-02 RX ORDER — HYDRALAZINE HYDROCHLORIDE 20 MG/ML
5 INJECTION INTRAMUSCULAR; INTRAVENOUS
Status: DISCONTINUED | OUTPATIENT
Start: 2020-09-02 | End: 2020-09-02 | Stop reason: HOSPADM

## 2020-09-02 RX ORDER — MIDAZOLAM HYDROCHLORIDE 1 MG/ML
INJECTION INTRAMUSCULAR; INTRAVENOUS AS NEEDED
Status: DISCONTINUED | OUTPATIENT
Start: 2020-09-02 | End: 2020-09-02 | Stop reason: SURG

## 2020-09-02 RX ORDER — ALBUTEROL SULFATE 2.5 MG/3ML
2.5 SOLUTION RESPIRATORY (INHALATION) ONCE AS NEEDED
Status: DISCONTINUED | OUTPATIENT
Start: 2020-09-02 | End: 2020-09-03 | Stop reason: HOSPADM

## 2020-09-02 RX ORDER — PHENYLEPHRINE HCL IN 0.9% NACL 0.5 MG/5ML
SYRINGE (ML) INTRAVENOUS AS NEEDED
Status: DISCONTINUED | OUTPATIENT
Start: 2020-09-02 | End: 2020-09-02 | Stop reason: SURG

## 2020-09-02 RX ORDER — PROPOFOL 10 MG/ML
VIAL (ML) INTRAVENOUS AS NEEDED
Status: DISCONTINUED | OUTPATIENT
Start: 2020-09-02 | End: 2020-09-02 | Stop reason: SURG

## 2020-09-02 RX ORDER — LIDOCAINE HYDROCHLORIDE 20 MG/ML
INJECTION, SOLUTION EPIDURAL; INFILTRATION; INTRACAUDAL; PERINEURAL AS NEEDED
Status: DISCONTINUED | OUTPATIENT
Start: 2020-09-02 | End: 2020-09-02 | Stop reason: SURG

## 2020-09-02 RX ORDER — DEXAMETHASONE SODIUM PHOSPHATE 4 MG/ML
INJECTION, SOLUTION INTRA-ARTICULAR; INTRALESIONAL; INTRAMUSCULAR; INTRAVENOUS; SOFT TISSUE
Status: COMPLETED | OUTPATIENT
Start: 2020-09-02 | End: 2020-09-02

## 2020-09-02 RX ORDER — CEFAZOLIN SODIUM IN 0.9 % NACL 3 G/100 ML
3 INTRAVENOUS SOLUTION, PIGGYBACK (ML) INTRAVENOUS ONCE
Status: DISCONTINUED | OUTPATIENT
Start: 2020-09-02 | End: 2020-09-02

## 2020-09-02 RX ORDER — FENTANYL CITRATE 50 UG/ML
INJECTION, SOLUTION INTRAMUSCULAR; INTRAVENOUS AS NEEDED
Status: DISCONTINUED | OUTPATIENT
Start: 2020-09-02 | End: 2020-09-02 | Stop reason: SURG

## 2020-09-02 RX ORDER — HYDROMORPHONE HCL 110MG/55ML
0.5 PATIENT CONTROLLED ANALGESIA SYRINGE INTRAVENOUS
Status: DISCONTINUED | OUTPATIENT
Start: 2020-09-02 | End: 2020-09-03 | Stop reason: HOSPADM

## 2020-09-02 RX ORDER — NALOXONE HCL 0.4 MG/ML
0.4 VIAL (ML) INJECTION AS NEEDED
Status: DISCONTINUED | OUTPATIENT
Start: 2020-09-02 | End: 2020-09-02 | Stop reason: HOSPADM

## 2020-09-02 RX ORDER — LORAZEPAM 2 MG/ML
1 INJECTION INTRAMUSCULAR
Status: DISCONTINUED | OUTPATIENT
Start: 2020-09-02 | End: 2020-09-02 | Stop reason: HOSPADM

## 2020-09-02 RX ORDER — FLUMAZENIL 0.1 MG/ML
0.2 INJECTION INTRAVENOUS AS NEEDED
Status: DISCONTINUED | OUTPATIENT
Start: 2020-09-02 | End: 2020-09-03 | Stop reason: HOSPADM

## 2020-09-02 RX ORDER — LABETALOL HYDROCHLORIDE 5 MG/ML
5 INJECTION, SOLUTION INTRAVENOUS
Status: DISCONTINUED | OUTPATIENT
Start: 2020-09-02 | End: 2020-09-02 | Stop reason: HOSPADM

## 2020-09-02 RX ORDER — VANCOMYCIN HYDROCHLORIDE 750 MG/15ML
INJECTION, POWDER, LYOPHILIZED, FOR SOLUTION INTRAVENOUS AS NEEDED
Status: DISCONTINUED | OUTPATIENT
Start: 2020-09-02 | End: 2020-09-02 | Stop reason: SURG

## 2020-09-02 RX ORDER — SODIUM CHLORIDE, SODIUM LACTATE, POTASSIUM CHLORIDE, CALCIUM CHLORIDE 600; 310; 30; 20 MG/100ML; MG/100ML; MG/100ML; MG/100ML
1000 INJECTION, SOLUTION INTRAVENOUS CONTINUOUS
Status: DISCONTINUED | OUTPATIENT
Start: 2020-09-02 | End: 2020-09-02 | Stop reason: HOSPADM

## 2020-09-02 RX ORDER — NALBUPHINE HCL 10 MG/ML
10 AMPUL (ML) INJECTION EVERY 4 HOURS PRN
Status: DISCONTINUED | OUTPATIENT
Start: 2020-09-02 | End: 2020-09-02 | Stop reason: HOSPADM

## 2020-09-02 RX ORDER — HYDROCODONE BITARTRATE AND ACETAMINOPHEN 10; 325 MG/1; MG/1
0.5 TABLET ORAL EVERY 4 HOURS PRN
Status: DISCONTINUED | OUTPATIENT
Start: 2020-09-02 | End: 2020-09-03 | Stop reason: HOSPADM

## 2020-09-02 RX ORDER — ONDANSETRON 2 MG/ML
4 INJECTION INTRAMUSCULAR; INTRAVENOUS EVERY 6 HOURS PRN
Status: DISCONTINUED | OUTPATIENT
Start: 2020-09-02 | End: 2020-09-03 | Stop reason: HOSPADM

## 2020-09-02 RX ORDER — NALOXONE HCL 0.4 MG/ML
0.4 VIAL (ML) INJECTION
Status: DISCONTINUED | OUTPATIENT
Start: 2020-09-02 | End: 2020-09-03 | Stop reason: HOSPADM

## 2020-09-02 RX ORDER — MORPHINE SULFATE 4 MG/ML
4 INJECTION, SOLUTION INTRAMUSCULAR; INTRAVENOUS
Status: DISCONTINUED | OUTPATIENT
Start: 2020-09-02 | End: 2020-09-03 | Stop reason: HOSPADM

## 2020-09-02 RX ORDER — HYDROCODONE BITARTRATE AND ACETAMINOPHEN 7.5; 325 MG/1; MG/1
1 TABLET ORAL EVERY 4 HOURS PRN
Status: DISCONTINUED | OUTPATIENT
Start: 2020-09-02 | End: 2020-09-03 | Stop reason: HOSPADM

## 2020-09-02 RX ADMIN — INSULIN LISPRO 8 UNITS: 100 INJECTION, SOLUTION INTRAVENOUS; SUBCUTANEOUS at 20:49

## 2020-09-02 RX ADMIN — PHENYLEPHRINE HYDROCHLORIDE 200 MCG: 10 INJECTION INTRAVENOUS at 08:34

## 2020-09-02 RX ADMIN — ONDANSETRON 4 MG: 2 INJECTION INTRAMUSCULAR; INTRAVENOUS at 08:12

## 2020-09-02 RX ADMIN — Medication 10 ML: at 20:50

## 2020-09-02 RX ADMIN — DEXAMETHASONE SODIUM PHOSPHATE 1.3 MG: 4 INJECTION, SOLUTION INTRAMUSCULAR; INTRAVENOUS at 07:40

## 2020-09-02 RX ADMIN — LIDOCAINE HYDROCHLORIDE 100 MG: 20 INJECTION, SOLUTION EPIDURAL; INFILTRATION; INTRACAUDAL; PERINEURAL at 08:13

## 2020-09-02 RX ADMIN — INSULIN LISPRO 8 UNITS: 100 INJECTION, SOLUTION INTRAVENOUS; SUBCUTANEOUS at 17:08

## 2020-09-02 RX ADMIN — ROPIVACAINE HYDROCHLORIDE 20 ML: 5 INJECTION, SOLUTION EPIDURAL; INFILTRATION; PERINEURAL at 07:40

## 2020-09-02 RX ADMIN — SODIUM CHLORIDE 75 ML/HR: 900 INJECTION, SOLUTION INTRAVENOUS at 05:37

## 2020-09-02 RX ADMIN — INSULIN LISPRO 3 UNITS: 100 INJECTION, SOLUTION INTRAVENOUS; SUBCUTANEOUS at 11:55

## 2020-09-02 RX ADMIN — PHENYLEPHRINE HYDROCHLORIDE 100 MCG: 10 INJECTION INTRAVENOUS at 08:24

## 2020-09-02 RX ADMIN — PHENYLEPHRINE HYDROCHLORIDE 200 MCG: 10 INJECTION INTRAVENOUS at 08:39

## 2020-09-02 RX ADMIN — ONDANSETRON 4 MG: 2 INJECTION INTRAMUSCULAR; INTRAVENOUS at 09:56

## 2020-09-02 RX ADMIN — PHENYLEPHRINE HYDROCHLORIDE 200 MCG: 10 INJECTION INTRAVENOUS at 08:57

## 2020-09-02 RX ADMIN — PHENYLEPHRINE HYDROCHLORIDE 200 MCG: 10 INJECTION INTRAVENOUS at 08:49

## 2020-09-02 RX ADMIN — CEFTRIAXONE 2 G: 2 INJECTION, POWDER, FOR SOLUTION INTRAMUSCULAR; INTRAVENOUS at 20:03

## 2020-09-02 RX ADMIN — SODIUM CHLORIDE 125 ML/HR: 900 INJECTION, SOLUTION INTRAVENOUS at 20:49

## 2020-09-02 RX ADMIN — VANCOMYCIN HYDROCHLORIDE 1500 MG: 750 INJECTION, POWDER, LYOPHILIZED, FOR SOLUTION INTRAVENOUS at 08:13

## 2020-09-02 RX ADMIN — PHENYLEPHRINE HYDROCHLORIDE 200 MCG: 10 INJECTION INTRAVENOUS at 08:30

## 2020-09-02 RX ADMIN — PHENYLEPHRINE HYDROCHLORIDE 1 MCG/KG/MIN: 10 INJECTION INTRAVENOUS at 09:03

## 2020-09-02 RX ADMIN — FENTANYL CITRATE 50 MCG: 50 INJECTION, SOLUTION INTRAMUSCULAR; INTRAVENOUS at 08:12

## 2020-09-02 RX ADMIN — DEXAMETHASONE SODIUM PHOSPHATE 2.7 MG: 4 INJECTION, SOLUTION INTRAMUSCULAR; INTRAVENOUS at 07:44

## 2020-09-02 RX ADMIN — INSULIN LISPRO 8 UNITS: 100 INJECTION, SOLUTION INTRAVENOUS; SUBCUTANEOUS at 05:34

## 2020-09-02 RX ADMIN — SODIUM CHLORIDE, SODIUM LACTATE, POTASSIUM CHLORIDE, AND CALCIUM CHLORIDE 1000 ML: 600; 310; 30; 20 INJECTION, SOLUTION INTRAVENOUS at 07:24

## 2020-09-02 RX ADMIN — ROPIVACAINE HYDROCHLORIDE 40 ML: 5 INJECTION, SOLUTION EPIDURAL; INFILTRATION; PERINEURAL at 07:44

## 2020-09-02 RX ADMIN — FENTANYL CITRATE 50 MCG: 50 INJECTION, SOLUTION INTRAMUSCULAR; INTRAVENOUS at 09:22

## 2020-09-02 RX ADMIN — HYDROCODONE BITARTRATE AND ACETAMINOPHEN 1 TABLET: 7.5; 325 TABLET ORAL at 20:03

## 2020-09-02 RX ADMIN — INSULIN GLARGINE 15 UNITS: 100 INJECTION, SOLUTION SUBCUTANEOUS at 20:02

## 2020-09-02 RX ADMIN — VANCOMYCIN HYDROCHLORIDE 1500 MG: 10 INJECTION, POWDER, LYOPHILIZED, FOR SOLUTION INTRAVENOUS at 20:03

## 2020-09-02 RX ADMIN — MIDAZOLAM 2 MG: 1 INJECTION INTRAMUSCULAR; INTRAVENOUS at 07:35

## 2020-09-02 RX ADMIN — PHENYLEPHRINE HYDROCHLORIDE 200 MCG: 10 INJECTION INTRAVENOUS at 08:54

## 2020-09-02 RX ADMIN — TRANEXAMIC ACID 1000 MG: 100 INJECTION, SOLUTION INTRAVENOUS at 08:13

## 2020-09-02 RX ADMIN — PROPOFOL 150 MG: 10 INJECTION, EMULSION INTRAVENOUS at 08:12

## 2020-09-02 NOTE — PROGRESS NOTES
"      [facility] Medicine Services Daily Progress Note      Hospitalist Team  LOS 0 days      Patient Care Team:  Fred Lemons FNP as PCP - General (Family Medicine)    Patient Location: [facility]      Subjective   Subjective     Chief Complaint / Subjective  Chief Complaint   Patient presents with   • Fever         Brief Synopsis of Hospital Course/HPI      The patient is a 45 y.o. male with a history of IDDM s/p right BKA,  CAD and chronic left foot Charcot arthropathy with nonhealing ulcer /osteomyelitis s/p 8 weeks of Rocephin presented to the ED on 9/1/2020 with a complaint of fever, nausea, vomiting and persistent left foot wound.  The patient claimed T-max of 100.2 and nausea vomiting for about a day before coming to the ED    In the ED, glucose was 330,  C-reactive protein 12.91, WBC 13.10 and sed rate 103.          Date::      9/2/20: The patient consented to left BKA and was taken to the OR this a.m.  Patient seen after BKA.  Pain controlled.    Review of Systems   All other systems reviewed and are negative.        Objective   Objective      Vital Signs  Temp:  [97.9 °F (36.6 °C)-99.3 °F (37.4 °C)] 98.3 °F (36.8 °C)  Heart Rate:  [74-93] 82  Resp:  [10-24] 15  BP: ()/(51-83) 113/71  Oxygen Therapy  SpO2: 96 %  Pulse Oximetry Type: Continuous  Device (Oxygen Therapy): room air  Flow (L/min): 2  Oxygen Concentration (%): 2  Flowsheet Rows      First Filed Value   Admission Height  190.5 cm (75\") Documented at 09/01/2020 1723   Admission Weight  94.3 kg (208 lb) Documented at 09/01/2020 1723        Intake & Output (last 3 days)       08/30 0701 - 08/31 0700 08/31 0701 - 09/01 0700 09/01 0701 - 09/02 0700 09/02 0701 - 09/03 0700    P.O.   240 240    I.V. (mL/kg)   350 (3.7) 2120 (22.2)    Total Intake(mL/kg)   590 (6.2) 2360 (24.7)    Urine (mL/kg/hr)   1550 1000 (0.9)    Total Output   1550 1000    Net   -960 +1360                Lines, Drains & Airways    Active LDAs     Name:   " Placement date:   Placement time:   Site:   Days:    PICC Single Lumen 07/24/20 Right Basilic   07/24/20    1155    Basilic   40                  Physical Exam:    Physical Exam   Constitutional: He is oriented to person, place, and time. He appears well-developed and well-nourished.   HENT:   Head: Normocephalic and atraumatic.   Eyes: Pupils are equal, round, and reactive to light. EOM are normal.   Neck: Normal range of motion. Neck supple.   Cardiovascular: Normal rate and regular rhythm.   Pulmonary/Chest: Effort normal and breath sounds normal.   Abdominal: Soft. Bowel sounds are normal.   Musculoskeletal:   Right BKA and new left BKA with bandage.   Lymphadenopathy:     He has no cervical adenopathy.   Neurological: He is alert and oriented to person, place, and time. No cranial nerve deficit or sensory deficit.   Skin: Skin is warm and dry. No rash noted.   Psychiatric: He has a normal mood and affect. His speech is normal. Cognition and memory are normal.            Wounds (last 24 hours)      LDA Wound     Row Name 09/02/20 1047 09/02/20 1014 09/02/20 0959       Wound Right anterior;lower leg Amputation stump    Wound - Properties Group Side: Right  -MR Orientation: anterior;lower  -MR Location: leg  -MR Primary Wound Type: Amputation s  -MR Wound Outcome: Amputation  -MR    Dressing Appearance  open to air  -KS  --  --       [REMOVED] Wound 07/20/20 2128 Left heel Incision    Wound - Properties Group Date first assessed: 07/20/20  -MW Time first assessed: 2128  -MW Present on Hospital Admission: Y  -MW Side: Left  -MW Location: heel  -MW Primary Wound Type: Incision  -MW Additional Comments: Was blister from where foot collapsed MD cut open at office   -MW Resolution Date: 09/02/20  -KS Resolution Time: 1059  -KS       Wound 09/02/20 0833 Left anterior;lower;proximal leg Amputation stump    Wound - Properties Group Date first assessed: 09/02/20  - Time first assessed: 0833  - Present on Hospital  Admission: N  -MH Side: Left  -MH Orientation: anterior;lower;proximal  -MH Location: leg  -MH Primary Wound Type: Amputation s  -MH Additional Comments: DRESSING- STAPLES, XERFORM, 4X4, KERLIX, COBAN, ROOKE  -MH    Dressing Appearance  dry;intact;no drainage  -KS  dry;intact;no drainage  -NS  dry;intact;no drainage  -NS    Closure  DORIS  -KS  DORIS Staples, xeroform, 4x4's, kerlex, coban, Rooke  -NS  DORIS Staples, xeroform, 4x4's, kerlex, coban, Rooke  -NS    Base  dressing in place, unable to visualize  -KS  dressing in place, unable to visualize  -NS  dressing in place, unable to visualize  -NS    Drainage Amount  none  -KS  --  --    Row Name 09/02/20 0944 09/02/20 0929 09/02/20 0307       Wound Right anterior;lower leg Amputation stump    Wound - Properties Group Side: Right  -MR Orientation: anterior;lower  -MR Location: leg  -MR Primary Wound Type: Amputation s  -MR Wound Outcome: Amputation  -MR       [REMOVED] Wound 07/20/20 2128 Left heel Incision    Wound - Properties Group Date first assessed: 07/20/20  -MW Time first assessed: 2128  -MW Present on Hospital Admission: Y  -MW Side: Left  -MW Location: heel  -MW Primary Wound Type: Incision  -MW Additional Comments: Was blister from where foot collapsed MD cut open at office   -MW Resolution Date: 09/02/20  -KS Resolution Time: 1059  -KS    Closure  --  --  Other (Comment) Patient dressed wound refused to allow RN assistance.  -ER       Wound 09/02/20 0833 Left anterior;lower;proximal leg Amputation stump    Wound - Properties Group Date first assessed: 09/02/20  - Time first assessed: 0833  -MH Present on Hospital Admission: N  -MH Side: Left  - Orientation: anterior;lower;proximal  -MH Location: leg  -MH Primary Wound Type: Amputation s  -MH Additional Comments: DRESSING- STAPLES, XERFORM, 4X4, KERLIX, COBAN, ROOKE  -MH    Dressing Appearance  dry;intact;no drainage  -NS  dry;intact;no drainage  -NS  --    Closure  DORIS Staples, xeroform, 4x4's, kerlex,  coban, Rooke  -NS  DORIS Staples, xeroform, 4x4's, kerlex, coban, Rooke  -NS  --    Base  dressing in place, unable to visualize  -NS  dressing in place, unable to visualize  -NS  --    Row Name 09/01/20 2247 09/01/20 2223          Wound Right anterior;lower leg Amputation stump    Wound - Properties Group Side: Right  -MR Orientation: anterior;lower  -MR Location: leg  -MR Primary Wound Type: Amputation s  -MR Wound Outcome: Amputation  -MR    Wound Image  --  Images linked: 1  -ER     Dressing Appearance  open to air  -ER  --        [REMOVED] Wound 07/20/20 2128 Left heel Incision    Wound - Properties Group Date first assessed: 07/20/20  -MW Time first assessed: 2128  -MW Present on Hospital Admission: Y  -MW Side: Left  -MW Location: heel  -MW Primary Wound Type: Incision  -MW Additional Comments: Was blister from where foot collapsed MD cut open at office   -MW Resolution Date: 09/02/20  -KS Resolution Time: 1059  -KS    Wound Image  --  Images linked: 1  -ER     Dressing Appearance  open to air  -ER  --       User Key  (r) = Recorded By, (t) = Taken By, (c) = Cosigned By    Initials Name Provider Type    Vandana Jennings LPN Registered Nurse    Hanane Giraldo, RN Registered Nurse    Ami Kim RN Registered Nurse    Hope Stoner RN Registered Nurse    Charmaine Saldivar RN Registered Nurse    NS Sprigler, Naomi, RN Registered Nurse          Procedures:    Procedure(s):  AMPUTATION BELOW KNEE, left          Results Review:     I reviewed the patient's new clinical results.      Lab Results (last 24 hours)     Procedure Component Value Units Date/Time    BUN [[id number]]  (Abnormal) Collected:  09/02/20 1038    Specimen:  Blood Updated:  09/02/20 1633     BUN 22 mg/dL     POC Glucose Once [[id number]]  (Abnormal) Collected:  09/02/20 1621    Specimen:  Blood Updated:  09/02/20 1625     Glucose 270 mg/dL      Comment: Serial Number: [id number]:  [id number]       Tissue Pathology Exam  [[id number]] Collected:  09/02/20 0841    Specimen:  Tissue from Leg, Left Updated:  09/02/20 1452    Basic Metabolic Panel [[id number]]  (Abnormal) Collected:  09/02/20 1038    Specimen:  Blood Updated:  09/02/20 1159     Glucose 216 mg/dL      BUN --     Comment: Testing performed by alternate method        Creatinine 0.86 mg/dL      Sodium 135 mmol/L      Potassium 4.4 mmol/L      Chloride 101 mmol/L      CO2 26.0 mmol/L      Calcium 7.8 mg/dL      eGFR Non African Amer 96 mL/min/1.73      BUN/Creatinine Ratio --     Comment: Testing not performed        Anion Gap 8.0 mmol/L     Narrative:       GFR Normal >60  Chronic Kidney Disease <60  Kidney Failure <15      POC Glucose Once [[id number]]  (Abnormal) Collected:  09/02/20 1142    Specimen:  Blood Updated:  09/02/20 1151     Glucose 183 mg/dL      Comment: Serial Number: [id number]:  [id number]       POC Glucose Once [[id number]]  (Abnormal) Collected:  09/02/20 0930    Specimen:  Blood Updated:  09/02/20 0931     Glucose 145 mg/dL      Comment: Serial Number: [id number]:  [id number]       Wound Culture - Wound, Foot, Left [[id number]]  (Abnormal) Collected:  09/01/20 1804    Specimen:  Wound from Foot, Left Updated:  09/02/20 0914     Wound Culture Moderate growth (3+) Gram Positive Cocci     Gram Stain Moderate (3+) WBCs seen      Moderate (3+) Gram positive cocci in pairs    POC Glucose Once [[id number]]  (Abnormal) Collected:  09/02/20 0734    Specimen:  Blood Updated:  09/02/20 0820     Glucose 171 mg/dL      Comment: Serial Number: [id number]:  [id number]       BUN [[id number]]  (Abnormal) Collected:  09/02/20 0400    Specimen:  Blood Updated:  09/02/20 0808     BUN 24 mg/dL     Respiratory Panel PCR w/COVID-19(SARS-CoV-2) JOSÉ/ANAYELI/IRA/PAD/COR/MAD In-House, NP Swab in UTM/VTM, 3-4 HR TAT - Swab, Nasopharynx [[id number]]  (Normal) Collected:  09/01/20 2236    Specimen:  Swab from Nasopharynx Updated:  09/02/20 0700     ADENOVIRUS, PCR Not Detected      Coronavirus 229E Not Detected     Coronavirus HKU1 Not Detected     Coronavirus NL63 Not Detected     Coronavirus OC43 Not Detected     COVID19 Not Detected     Human Metapneumovirus Not Detected     Human Rhinovirus/Enterovirus Not Detected     Influenza A PCR Not Detected     Influenza A H1 Not Detected     Influenza A H1 2009 PCR Not Detected     Influenza A H3 Not Detected     Influenza B PCR Not Detected     Parainfluenza Virus 1 Not Detected     Parainfluenza Virus 2 Not Detected     Parainfluenza Virus 3 Not Detected     Parainfluenza Virus 4 Not Detected     RSV, PCR Not Detected     Bordetella pertussis pcr Not Detected     Bordetella parapertussis PCR Not Detected     Chlamydophila pneumoniae PCR Not Detected     Mycoplasma pneumo by PCR Not Detected    Narrative:       Fact sheet for providers: https://docs.seedtag/wp-content/uploads/CIQ4546-9264-MV8.1-EUA-Provider-Fact-Sheet-3.pdf    Fact sheet for patients: https://docs.seedtag/wp-content/uploads/VER3144-5776-ZJ0.1-EUA-Patient-Fact-Sheet-1.pdf    POC Glucose Once [[id number]]  (Abnormal) Collected:  09/02/20 0533    Specimen:  Blood Updated:  09/02/20 0534     Glucose 258 mg/dL      Comment: Serial Number: [id number]:  [id number]       Basic Metabolic Panel [[id number]]  (Abnormal) Collected:  09/02/20 0400    Specimen:  Blood Updated:  09/02/20 0429     Glucose 328 mg/dL      BUN --     Comment: Testing performed by alternate method        Creatinine 0.89 mg/dL      Sodium 134 mmol/L      Potassium 3.9 mmol/L      Chloride 100 mmol/L      CO2 27.0 mmol/L      Calcium 8.0 mg/dL      eGFR Non African Amer 92 mL/min/1.73      BUN/Creatinine Ratio --     Comment: Testing not performed        Anion Gap 7.0 mmol/L     Narrative:       GFR Normal >60  Chronic Kidney Disease <60  Kidney Failure <15      aPTT [[id number]]  (Normal) Collected:  09/02/20 0400    Specimen:  Blood Updated:  09/02/20 0417     PTT 24.2 seconds     Protime-INR  [[id number]]  (Normal) Collected:  09/02/20 0400    Specimen:  Blood Updated:  09/02/20 0417     Protime 11.7 Seconds      INR 1.09    CBC Auto Differential [[id number]]  (Abnormal) Collected:  09/02/20 0400    Specimen:  Blood Updated:  09/02/20 0407     WBC 7.60 10*3/mm3      RBC 3.58 10*6/mm3      Hemoglobin 10.6 g/dL      Hematocrit 31.3 %      MCV 87.4 fL      MCH 29.5 pg      MCHC 33.7 g/dL      RDW 12.9 %      RDW-SD 39.8 fl      MPV 7.6 fL      Platelets 262 10*3/mm3      Neutrophil % 71.8 %      Lymphocyte % 15.0 %      Monocyte % 10.2 %      Eosinophil % 2.2 %      Basophil % 0.8 %      Neutrophils, Absolute 5.50 10*3/mm3      Lymphocytes, Absolute 1.10 10*3/mm3      Monocytes, Absolute 0.80 10*3/mm3      Eosinophils, Absolute 0.20 10*3/mm3      Basophils, Absolute 0.10 10*3/mm3      nRBC 0.0 /100 WBC     MRSA Screen, PCR (Inpatient) - Swab, Nares [[id number]]  (Normal) Collected:  09/01/20 2236    Specimen:  Swab from Nares Updated:  09/02/20 0253     MRSA PCR No MRSA Detected    POC Glucose Once [[id number]]  (Abnormal) Collected:  09/01/20 2207    Specimen:  Blood Updated:  09/01/20 2208     Glucose 265 mg/dL      Comment: Serial Number: [id number]:  [id number]       Extra Tubes [[id number]] Collected:  09/01/20 1852    Specimen:  Blood, Venous Line Updated:  09/01/20 2000    Narrative:       The following orders were created for panel order Extra Tubes.  Procedure                               Abnormality         Status                     ---------                               -----------         ------                     Light Blue Top[[id number]]                                   Final result               Gold Top - SST[[id number]]                                   Final result               Green Top (Gel)[[id number]]                                  Final result                 Please view results for these tests on the individual orders.    Light Blue Top [[id number]] Collected:  09/01/20 1852     Specimen:  Blood Updated:  09/01/20 2000     Extra Tube hold for add-on     Comment: Auto resulted       Gold Top - SST [[id number]] Collected:  09/01/20 1852    Specimen:  Blood Updated:  09/01/20 2000     Extra Tube Hold for add-ons.     Comment: Auto resulted.       Green Top (Gel) [[id number]] Collected:  09/01/20 1852    Specimen:  Blood Updated:  09/01/20 2000     Extra Tube Hold for add-ons.     Comment: Auto resulted.       Sedimentation Rate [[id number]]  (Abnormal) Collected:  09/01/20 1852    Specimen:  Blood Updated:  09/01/20 1933     Sed Rate 103 mm/hr     BUN [[id number]]  (Abnormal) Collected:  09/01/20 1852    Specimen:  Blood Updated:  09/01/20 1926     BUN 23 mg/dL     Comprehensive Metabolic Panel [[id number]]  (Abnormal) Collected:  09/01/20 1852    Specimen:  Blood Updated:  09/01/20 1919     Glucose 330 mg/dL      BUN --     Comment: Testing performed by alternate method        Creatinine 1.10 mg/dL      Sodium 129 mmol/L      Potassium 4.0 mmol/L      Chloride 93 mmol/L      CO2 24.0 mmol/L      Calcium 8.3 mg/dL      Total Protein 7.4 g/dL      Albumin 3.10 g/dL      ALT (SGPT) 31 U/L      AST (SGOT) 15 U/L      Alkaline Phosphatase 152 U/L      Total Bilirubin 0.9 mg/dL      eGFR Non African Amer 72 mL/min/1.73      Globulin 4.3 gm/dL      A/G Ratio 0.7 g/dL      BUN/Creatinine Ratio --     Comment: Testing not performed        Anion Gap 12.0 mmol/L     Narrative:       GFR Normal >60  Chronic Kidney Disease <60  Kidney Failure <15      Lipase [[id number]]  (Normal) Collected:  09/01/20 1852    Specimen:  Blood Updated:  09/01/20 1919     Lipase 16 U/L     C-reactive Protein [[id number]]  (Abnormal) Collected:  09/01/20 1852    Specimen:  Blood Updated:  09/01/20 1919     C-Reactive Protein 12.91 mg/dL     POC Lactate [[id number]]  (Normal) Collected:  09/01/20 1902    Specimen:  Blood Updated:  09/01/20 1903     Lactate 1.7 mmol/L      Comment: Serial Number: [id number]:  [id number]        CBC & Differential [[id number]] Collected:  09/01/20 1852    Specimen:  Blood Updated:  09/01/20 1856    Narrative:       The following orders were created for panel order CBC & Differential.  Procedure                               Abnormality         Status                     ---------                               -----------         ------                     CBC Auto Differential[[id number]]        Abnormal            Final result                 Please view results for these tests on the individual orders.    CBC Auto Differential [[id number]]  (Abnormal) Collected:  09/01/20 1852    Specimen:  Blood Updated:  09/01/20 1856     WBC 13.10 10*3/mm3      RBC 3.85 10*6/mm3      Hemoglobin 11.4 g/dL      Hematocrit 33.5 %      MCV 87.1 fL      MCH 29.6 pg      MCHC 34.0 g/dL      RDW 12.7 %      RDW-SD 38.9 fl      MPV 7.8 fL      Platelets 316 10*3/mm3      Neutrophil % 78.7 %      Lymphocyte % 10.3 %      Monocyte % 10.5 %      Eosinophil % 0.1 %      Basophil % 0.4 %      Neutrophils, Absolute 10.30 10*3/mm3      Lymphocytes, Absolute 1.40 10*3/mm3      Monocytes, Absolute 1.40 10*3/mm3      Eosinophils, Absolute 0.00 10*3/mm3      Basophils, Absolute 0.10 10*3/mm3      nRBC 0.0 /100 WBC     Blood Culture - Blood, Arm, Right [[id number]] Collected:  09/01/20 1853    Specimen:  Blood from Arm, Right Updated:  09/01/20 1856    Blood Culture - Blood, Arm, Left [[id number]] Collected:  09/01/20 1852    Specimen:  Blood from Arm, Left Updated:  09/01/20 1852        No results found for: HGBA1C  Results from last 7 days   Lab Units 09/02/20  0400   INR  1.09           Lab Results   Component Value Date    LIPASE 16 09/01/2020     Lab Results   Component Value Date    CHOL 115 07/21/2020    TRIG 108 07/21/2020    HDL 28 (L) 07/21/2020    LDL 65 07/21/2020       Lab Results   Lab Value Date/Time    FINALDX  04/30/2020 0858     Lower extremity, right, below-knee amputation:    Skin ulceration, soft tissue ischemic changes,  and gangrenous necrosis    Severe atherosclerotic changes of small vessels    Skin, soft tissue and bone resection margin grossly viable    MONICA/sms       FINALDX  04/28/2020 1023     Specimen #1 (Right foot tissue, debridement):    Gangrenous necrosis of soft tissue    Bone with acute osteomyelitis    Specimen #2 (Right lower extremity, distal foot, amputation):    Gangrenous necrosis of soft tissue    Bone with acute osteomyelitis    JPR/ARASELI/sms          Microbiology Results (last 10 days)     Procedure Component Value - Date/Time    MRSA Screen, PCR (Inpatient) - Swab, Nares [[id number]]  (Normal) Collected:  09/01/20 2236    Lab Status:  Final result Specimen:  Swab from Nares Updated:  09/02/20 0253     MRSA PCR No MRSA Detected    Respiratory Panel PCR w/COVID-19(SARS-CoV-2) JOSÉ/ANAYELI/IRA/PAD/COR/MAD In-House, NP Swab in UTM/VTM, 3-4 HR TAT - Swab, Nasopharynx [[id number]]  (Normal) Collected:  09/01/20 2236    Lab Status:  Final result Specimen:  Swab from Nasopharynx Updated:  09/02/20 0700     ADENOVIRUS, PCR Not Detected     Coronavirus 229E Not Detected     Coronavirus HKU1 Not Detected     Coronavirus NL63 Not Detected     Coronavirus OC43 Not Detected     COVID19 Not Detected     Human Metapneumovirus Not Detected     Human Rhinovirus/Enterovirus Not Detected     Influenza A PCR Not Detected     Influenza A H1 Not Detected     Influenza A H1 2009 PCR Not Detected     Influenza A H3 Not Detected     Influenza B PCR Not Detected     Parainfluenza Virus 1 Not Detected     Parainfluenza Virus 2 Not Detected     Parainfluenza Virus 3 Not Detected     Parainfluenza Virus 4 Not Detected     RSV, PCR Not Detected     Bordetella pertussis pcr Not Detected     Bordetella parapertussis PCR Not Detected     Chlamydophila pneumoniae PCR Not Detected     Mycoplasma pneumo by PCR Not Detected    Narrative:       Fact sheet for providers:  https://docs.Cir[employer]/wp-content/uploads/XUA1002-9067-GR3.1-EUA-Provider-Fact-Sheet-3.pdf    Fact sheet for patients: https://docs.[employer]/wp-content/uploads/TVO6580-6471-JR3.1-EUA-Patient-Fact-Sheet-1.pdf    Wound Culture - Wound, Foot, Left [[id number]]  (Abnormal) Collected:  09/01/20 1804    Lab Status:  Preliminary result Specimen:  Wound from Foot, Left Updated:  09/02/20 0914     Wound Culture Moderate growth (3+) Gram Positive Cocci     Gram Stain Moderate (3+) WBCs seen      Moderate (3+) Gram positive cocci in pairs          ECG/EMG Results (most recent)     None               Results for orders placed during the hospital encounter of 01/18/20   Adult Transthoracic Echo Complete W/ Cont if Necessary Per Protocol    Narrative · Left ventricular systolic function is normal.  · Mild mitral valve regurgitation is present  · Mild tricuspid valve regurgitation is present.  · Ejection fraction is about 65%  · No pericardial effusion noted          Xr Tibia Fibula 2 View Left    Result Date: 9/2/2020  Satisfactory postoperative appearance status post left BKA.  Electronically Signed By-Dr. Yamileth Bourne MD On:9/2/2020 12:23 PM This report was finalized on [id number] by Dr. Yamileth Bourne MD.    Xr Foot 3+ View Left    Result Date: 9/1/2020  1. There is severe derangement of the midfoot consistent with neuropathic disease. This is unchanged except that there has been some widening of some of the tarsal-metatarsal joints. 2. Negative for plain film evidence of osteomyelitis. 3. Diffuse soft tissue swelling. 4. Multiple prior amputations.  Electronically Signed By-Arabella Mendez On:9/1/2020 6:44 PM This report was finalized on [id number] by  Arabella Mendez .          Xrays, labs reviewed personally by physician.    Medication Review:   I have reviewed the patient's current medication list      Scheduled Meds    [START ON 9/3/2020] !Vancomycin Level Draw Needed  Does not apply Once   [START ON  9/3/2020] aspirin 325 mg Oral Daily   cefTRIAXone 2 g Intravenous Q24H   insulin glargine 15 Units Subcutaneous Nightly   insulin lispro 0-14 Units Subcutaneous 4x Daily With Meals & Nightly   polyethylene glycol 17 g Oral Daily   vancomycin 15 mg/kg Intravenous Q12H       Meds Infusions    sodium chloride 125 mL/hr Last Rate: 125 mL/hr (09/02/20 1046)       Meds PRN  acetaminophen  •  albuterol  •  dextrose  •  dextrose  •  flumazenil  •  glucagon (human recombinant)  •  HYDROcodone-acetaminophen  •  HYDROcodone-acetaminophen  •  HYDROmorphone  •  HYDROmorphone  •  insulin lispro **AND** insulin lispro  •  magnesium hydroxide  •  melatonin  •  metoprolol tartrate  •  midazolam  •  Morphine **AND** naloxone  •  ondansetron **OR** ondansetron  •  sodium chloride        Assessment/Plan   Assessment/Plan     Active Hospital Problems    Diagnosis  POA   • **Wound of left foot [S91.302A]  Yes   • S/P BKA (below knee amputation), right (CMS/[facility]) [Z89.511]  Not Applicable     Priority: High   • CAD (coronary artery disease) [I25.10]  Yes     Priority: Medium   • Type 1 diabetes mellitus with circulatory complication (CMS/[facility]) [E10.59]  Yes     Priority: Medium      Resolved Hospital Problems   No resolved problems to display.       MEDICAL DECISION MAKING COMPLEXITY BY PROBLEM:     Nonhealing diabetic left foot wound/osteomyelitis (POA) with failed outpatient treatment with Rocephin x8 weeks  -- s/p left BKA 9/2/2020  --Continue wound care and pain control    Insulin-dependent diabetes mellitus  - Continue Lantus and ISS     CAD  -Denies chest pain    VTE Prophylaxis -   Mechanical Order History:      Ordered        09/01/20 2110  Place Sequential Compression Device  Once         09/01/20 2110  Maintain Sequential Compression Device  Continuous,   Status:  Canceled                 Pharmalogical Order History:     None            Code Status -   Code Status and Medical Interventions:   Ordered at: 09/02/20 1031     Level Of  Support Discussed With:    Patient     Code Status:    CPR     Medical Interventions (Level of Support Prior to Arrest):    Full       This patient has been examined wearing appropriate Personal Protective Equipment. 09/02/20        Electronically signed by Clay Fonseca DO, 09/02/20, 18:19.  [facility] Hospitalist Team

## 2020-09-02 NOTE — NURSING NOTE
Patient refused assistance with CHG baths'. States we are not going to take away the one thing he has the ability to do himself, bathe. Patient refused assistance and provided with CHG wipes.   Changed into hospital gown.   Continue to monitor.

## 2020-09-02 NOTE — ANESTHESIA PROCEDURE NOTES
Peripheral Block    Pre-sedation assessment completed: 9/2/2020 7:30 AM    Patient reassessed immediately prior to procedure    Patient location during procedure: pre-op  Reason for block: procedure for pain, at surgeon's request, post-op pain management and secondary anesthetic  Performed by  Anesthesiologist: Colin Chung MD  Preanesthetic Checklist  Completed: patient identified, site marked, surgical consent, pre-op evaluation, timeout performed, IV checked, risks and benefits discussed and monitors and equipment checked  Prep:  Pt Position: sitting  Sterile barriers:cap, gloves and mask  Prep: ChloraPrep  Patient monitoring: blood pressure monitoring, continuous pulse oximetry and EKG  Procedure  Sedation:yes  Performed under: local infiltration  Guidance:ultrasound guided, nerve stimulator and landmark technique  ULTRASOUND INTERPRETATION.  Using ultrasound guidance a 20 G gauge needle was placed in close proximity to the femoral nerve, at which point, under ultrasound guidance anesthetic was injected in the area of the nerve and spread of the anesthesia was seen on ultrasound in close proximity thereto.  There were no abnormalities seen on ultrasound; a digital image was taken; and the patient tolerated the procedure with no complications. Images:still images obtained, printed/placed on chart  Loss of twitch: 0.5 mA  Laterality:left  Block Type:femoral  Injection Technique:single-shot  Needle Type:echogenic  Needle Gauge:20 G  Resistance on Injection: less than 15 psi    Medications Used: ropivacaine (NAROPIN) 0.5 % injection, 20 mL  dexamethasone (DECADRON) injection, 1.3 mg  Med admintered at 9/2/2020 7:40 AM      Post Assessment  Injection Assessment: negative aspiration for heme, no paresthesia on injection and incremental injection  Patient Tolerance:comfortable throughout block  Complications:no  Additional Notes  Pre-procedure:  Peripheral nerve block performed preoperatively prior to the  start of anesthesia time at the request of the patient and the surgeon for the management of postoperative acute surgical pain as well as for a secondary intraoperative anesthetic (general anesthesia is the primary intraoperative anesthetic); patient identified; pre-procedure vital signs, all relevant labs/studies, complete medical/surgical/anesthetic history, full medication list, full allergy list, and NPO status obtained/reviewed; physical assessment performed; anesthetic options, side effects, potential complications, risks, and benefits discussed; questions answered; patient wishes to proceed with the procedure; written anesthesia procedure consent obtained; patient cleared for procedure; time out performed; IV access in situ    Procedure:  ASA monitor placed; supplemental oxygen provided; patient positioned; hand hygiene performed; sterile technique maintained throughout the procedure; sterile prep applied; insertion site determined by anatomical landmarks, palpation, and ultrasound imaging; live ultrasound guidance throughout the procedure; target nerves/landmarks identified on live ultrasound; skin and subcutaneous tissues numbed by injection of 1% lidocaine; 4 inch 20G StimupLinkCycle Ultra 360 Insulated Echogenic Needle used; realtime needle advancement and placement near the target nerves witnessed on live ultrasound; positive nerve stimulation resulting in motor response of the appropriate muscles at a current of less than or equal to 0.5 mA on the nerve stimulator; negative aspiration prior to injection; correct needle placement confirmed on live ultrasound by local anesthetic spread around the target nerves, as well as by nerve stimulation; local anesthetic mixture injected with negative aspiration prior to each injection and after each 1-5 mL injected; needle withdrawn; dressing applied; ultrasound image printed and placed in the patient's permanent medical record    Post-procedure:  Peripheral nerve block  placed successfully; good block; no apparent complications; minimal estimated blood loss; vital signs stable throughout; see nurse's notes for vitals; transported to the OR, general anesthesia induced, and surgery started

## 2020-09-02 NOTE — NURSING NOTE
JAYMIE Garcia made aware of patients refusal to allow cardiac monitoring.    Will continue to monitor

## 2020-09-02 NOTE — PROGRESS NOTES
Discharge Planning Assessment   Noel     Patient Name: Maynor Gregg  MRN: [id number]  Today's Date: 9/2/2020    Admit Date: 9/1/2020    Discharge Needs Assessment     Row Name 09/02/20 1048       Living Environment    Lives With  child(marcus), dependent;significant other    Current Living Arrangements  home/apartment/condo    Primary Care Provided by  self    Provides Primary Care For  child(marcus)    Family Caregiver if Needed  significant other    Quality of Family Relationships  supportive    Able to Return to Prior Arrangements  yes       Resource/Environmental Concerns    Resource/Environmental Concerns  none    Transportation Concerns  car, none       Transition Planning    Patient/Family Anticipates Transition to  home with help/services;home with family    Patient/Family Anticipated Services at Transition  home health care    Transportation Anticipated  family or friend will provide       Discharge Needs Assessment    Readmission Within the Last 30 Days  no previous admission in last 30 days    Concerns to be Addressed  discharge planning    Equipment Currently Used at Home  walker, standard;prosthesis    Anticipated Changes Related to Illness  none    Equipment Needed After Discharge  none    Provided Post Acute Provider List?  N/A    N/A Provider List Comment  [facility]     Discharge Coordination/Progress  Patient is current with [facility].         Discharge Plan     Row Name 09/02/20 1106       Plan    Plan  Current with [facility] (Order in epic and spoke with maryanne whitney).    Row Name 09/02/20 1050       Plan    Plan  Patient is current with [facility].     Patient/Family in Agreement with Plan  yes    Plan Comments  Spoke with patient for about 10-15 minutes from a distance of about 6 feet or greater, with mask and goggles in place. Patient verified PCP and pharmacy as well as current DME usage.   Patient prefers to go home with home health and family. Barriers to discharge: POD#0 Below the knee amputation.            Home Medical Care      Service Provider Request Status Selected Services Address Phone Number Fax Number    [facility]ending - Request Sent N/A [address] [place] [address] [phone] --           Expected Discharge Date and Time     Expected Discharge Date Expected Discharge Time    Sep 5, 2020         Demographic Summary     Row Name 09/02/20 1047       General Information    Admission Type  observation    Arrived From  emergency department    Referral Source  admission list;physician    Reason for Consult  discharge planning    Preferred Language  English     Used During This Interaction  no       Contact Information    Permission Granted to Share Info With                  Anna Naegele RN Case Manager  [address]  [address]   [phone]  office  [phone]  fax  [email or web address]  [email or web address]

## 2020-09-02 NOTE — PLAN OF CARE
Patient is anticipating surgery in the AM with [facility]. Site marked in ED by MD. Consent is signed and patient is NPO at this time. No pain medication has been given. Patient expresses sadness about situation he is in and states that he is hopeful for improvement. WOCN consulted for his wound on his right leg. Patient states it is getting better. Pictures from previous visit make it appear as if it has gotten worse. Patient refuses to allow labs from PICC line and refuses assistance with CHG bathing. No blood return on PICC. Plan of care is ongoing.     Problem: Patient Care Overview  Goal: Plan of Care Review  Outcome: Ongoing (interventions implemented as appropriate)  Flowsheets (Taken 9/2/2020 0017)  Progress: no change  Plan of Care Reviewed With: patient  Goal: Individualization and Mutuality  Outcome: Ongoing (interventions implemented as appropriate)  Goal: Discharge Needs Assessment  Outcome: Ongoing (interventions implemented as appropriate)  Goal: Interprofessional Rounds/Family Conf  Outcome: Ongoing (interventions implemented as appropriate)     Problem: Pain, Acute (Adult)  Goal: Identify Related Risk Factors and Signs and Symptoms  Description  Related risk factors and signs and symptoms are identified upon initiation of Human Response Clinical Practice Guideline (CPG).  Outcome: Ongoing (interventions implemented as appropriate)  Flowsheets (Taken 9/2/2020 0017)  Related Risk Factors (Acute Pain): surgery  Goal: Acceptable Pain Control/Comfort Level  Description  Patient will demonstrate the desired outcomes by discharge/transition of care.  Outcome: Ongoing (interventions implemented as appropriate)     Problem: Infection, Risk/Actual (Adult)  Goal: Identify Related Risk Factors and Signs and Symptoms  Description  Related risk factors and signs and symptoms are identified upon initiation of Human Response Clinical Practice Guideline (CPG).  Outcome: Ongoing (interventions implemented as  appropriate)  Goal: Infection Prevention/Resolution  Description  Patient will demonstrate the desired outcomes by discharge/transition of care.  Outcome: Ongoing (interventions implemented as appropriate)     Problem: Amputation, Lower Extremity (Adult)  Description  Prevent and manage potential problems includin. bleeding  2. bowel motility decreased  3. fluid/electrolyte imbalance  4. functional decline/self-care deficit  5. infection  6. pain  7. postoperative nausea and vomiting  8. postoperative urinary retention  9. respiratory compromise  10. situational response  11. skin breakdown  12. VTE (venous thromboembolism)  13. wound healing impaired  Goal: Signs and Symptoms of Listed Potential Problems Will be Absent, Minimized or Managed (Amputation, Lower Extremity)  Description  Signs and symptoms of listed potential problems will be absent, minimized or managed by discharge/transition of care (reference Amputation, Lower Extremity (Adult) CPG).  Outcome: Ongoing (interventions implemented as appropriate)  Goal: Anesthesia/Sedation Recovery  Outcome: Ongoing (interventions implemented as appropriate)

## 2020-09-02 NOTE — ANESTHESIA PROCEDURE NOTES
Airway  Urgency: elective    Date/Time: 9/2/2020 8:13 AM    General Information and Staff    Patient location during procedure: OR    Indications and Patient Condition  Indications for airway management: airway protection    Preoxygenated: yes  Mask difficulty assessment: 1 - vent by mask    Final Airway Details  Final airway type: supraglottic airway      Successful airway: LMA  Size 4    Number of attempts at approach: 1  Assessment: lips, teeth, and gum same as pre-op and atraumatic intubation

## 2020-09-02 NOTE — PROGRESS NOTES
"Pharmacy Antimicrobial Dosing Service    Subjective:  Maynor Gregg is a 45 y.o.male admitted with a left foot wound. Pharmacy has been consulted to dose Vancomycin for possible sepsis/ SSTI.    PMH:   Past Medical History:   Diagnosis Date    Coronary artery disease     Diabetes mellitus (CMS/HCC)     MI, old 2010       Assessment/Plan    1. Day #1 Vancomycin: Goal -600 mcg*h/mL. Pt received vancomycin 2 gm (20 mg/kg) loading dose and will be started on a 1500 mg (15 mg/kg) q12h maintenance regimen. Will obtain levels between 3rd and 4th dose of this regimen.    Will continue to monitor drug levels, renal function, culture and sensitivities, and patient clinical status.       Objective:  Relevant clinical data and objective history reviewed:  190.5 cm (75\")   94.3 kg (208 lb)   Ideal body weight: 84.5 kg (186 lb 4.6 oz)  Adjusted ideal body weight: 88.4 kg (194 lb 15.6 oz)  Body mass index is 26 kg/m².        Results from last 7 days   Lab Units 09/01/20  1852 08/28/20  1145   CREATININE mg/dL 1.10 1.09     Estimated Creatinine Clearance: 113.1 mL/min (by C-G formula based on SCr of 1.1 mg/dL).  No intake/output data recorded.    Results from last 7 days   Lab Units 09/01/20  1852 08/28/20  1145   WBC 10*3/mm3 13.10* 6.66     Temperature    09/01/20 1723   Temp: 99.6 °F (37.6 °C)     Baseline culture/source/susceptibility:  Microbiology Results (last 10 days)       Procedure Component Value - Date/Time    Wound Culture - Wound, Foot, Left [[id number]] Collected:  09/01/20 1804    Lab Status:  Preliminary result Specimen:  Wound from Foot, Left Updated:  09/01/20 2100     Gram Stain Moderate (3+) WBCs seen      Moderate (3+) Gram positive cocci in pairs             Anti-Infectives (From admission, onward)      Ordered     Dose/Rate Route Frequency Start Stop    09/01/20 2141  !Vancomycin Level Draw Needed     Ordering Provider:  Travis Gracia APRN     Does not apply Once 09/03/20 0700      09/01/20 " 2141  !Vancomycin Level Draw Needed     Ordering Provider:  Travis Garcia APRN     Does not apply Once 09/03/20 0000      09/01/20 2141  vancomycin 1500 mg/500 mL 0.9% NS IVPB ([facility])     Ordering Provider:  Travis Garcia APRN    15 mg/kg × 94.3 kg  over 90 Minutes Intravenous Every 12 Hours 09/02/20 0800 09/08/20 0759    09/01/20 2135  Pharmacy to dose vancomycin     Ordering Provider:  Travis Garcia APRN     Does not apply Continuous PRN 09/01/20 2135 09/08/20 2134 09/01/20 1954  vancomycin 2000 mg/500 mL 0.9% NS IVPB ([facility])     Ordering Provider:  Estrella Calix APRN    20 mg/kg × 94.3 kg Intravenous Once 09/01/20 1956 09/01/20 2017 09/01/20 1954  cefTRIAXone (ROCEPHIN) in SWFI 2 GRAMS/20ml IV PUSH syringe     Ordering Provider:  Estrella Calix APRN    2 g  over 5 Minutes Intravenous Once 09/01/20 1956 09/01/20 2022          Garfield [place], [place]  09/01/20 21:42

## 2020-09-02 NOTE — ANESTHESIA PREPROCEDURE EVALUATION
Anesthesia Evaluation     Patient summary reviewed and Nursing notes reviewed   no history of anesthetic complications:  NPO Solid Status: > 8 hours  NPO Liquid Status: > 8 hours           Airway   Dental      Pulmonary    (+) a smoker Former, shortness of breath,   Cardiovascular     (+) past MI , CAD, PVD,       Neuro/Psych  (+) numbness,     GI/Hepatic/Renal/Endo    (+)   diabetes mellitus,     Musculoskeletal     Abdominal    Substance History      OB/GYN          Other   blood dyscrasia anemia,     ROS/Med Hx Other: Hyponatremia, neuropathy, sepsis, osteomyelitis, N/V, chest pain, foot ulcer, hypotension    Stress  Echocardiogram Findings     Left Ventricle Left ventricular systolic function is normal. Calculated EF = 65.0%. Estimated EF appears to be in the range of 61 - 65%. Normal left ventricular cavity size noted.  Right Ventricle Normal right ventricular cavity size noted.  Left Atrium Normal left atrial size noted.  Right Atrium Normal right atrial size noted.  Aortic Valve The aortic valve is grossly normal in structure.  Mitral Valve Mild mitral valve regurgitation is present.  Tricuspid Valve The tricuspid valve is grossly normal. Mild tricuspid valve regurgitation is present.  Pulmonic Valve The pulmonic valve is not well visualized.  Greater Vessels No dilation of the aortic root is present.  Pericardium There is no evidence of pericardial effusion.    PSH  AMPUTATION FOOT / TOE CARDIAC SURGERY  TOE AMPUTATION WOUND DEBRIDEMENT  INCISION AND DRAINAGE LEG CARDIAC CATHETERIZATION  CARDIAC CATHETERIZATION AMPUTATION DIGIT  INCISION AND DRAINAGE LEG BELOW KNEE AMPUTATION                Anesthesia Plan    ASA 4     general with block   (Patient identified; pre-operative vital signs, all relevant labs/studies, complete medical/surgical/anesthetic history, full medication list, full allergy list, and NPO status obtained/reviewed; physical assessment performed; anesthetic options, side effects, potential  complications, risks, and benefits discussed; questions answered; written anesthesia consent obtained; patient cleared for procedure; anesthesia machine and equipment checked and functioning)  intravenous induction     Anesthetic plan, all risks, benefits, and alternatives have been provided, discussed and informed consent has been obtained with: patient.

## 2020-09-02 NOTE — PLAN OF CARE
Patient is doing well s/p left BKA. Denies pain at this time. Patient is resting comfortably in bed. Vitals stable.

## 2020-09-02 NOTE — ANESTHESIA POSTPROCEDURE EVALUATION
Patient: Maynor Gregg    Procedure Summary     Date:  09/02/20 Room / Location:  [facility] OR  / [facility] MAIN OR    Anesthesia Start:  0809 Anesthesia Stop:  0935    Procedure:  AMPUTATION BELOW KNEE, left (Left Thigh) Diagnosis:       Wound of left foot      (Wound of left foot [S91.302A])    Surgeon:  Donn Alcantara MD Provider:  Ubaldo Monteiro MD    Anesthesia Type:  general with block ASA Status:  4          Anesthesia Type: general with block    Vitals  Vitals Value Taken Time   /62 9/2/2020 10:20 AM   Temp 99.3 °F (37.4 °C) 9/2/2020 10:14 AM   Pulse 80 9/2/2020 10:21 AM   Resp 21 9/2/2020 10:14 AM   SpO2 97 % 9/2/2020 10:21 AM   Vitals shown include unvalidated device data.        Post Anesthesia Care and Evaluation    Patient location during evaluation: PACU  Patient participation: complete - patient participated  Level of consciousness: awake  Pain scale: See nurse's notes for pain score.  Pain management: adequate  Airway patency: patent  Anesthetic complications: No anesthetic complications  PONV Status: none  Cardiovascular status: acceptable  Respiratory status: acceptable  Hydration status: acceptable    Comments: Patient seen and examined postoperatively; vital signs stable; SpO2 greater than or equal to 90%; cardiopulmonary status stable; nausea/vomiting adequately controlled; pain adequately controlled; no apparent anesthesia complications; patient discharged from anesthesia care when discharge criteria were met

## 2020-09-02 NOTE — OP NOTE
AMPUTATION BELOW KNEE  Procedure Report    Patient Name:  Maynor Gregg  YOB: 1975    Date of Surgery:  9/2/2020         Pre-op Diagnosis: Left diabetic foot ulcer    Postop diagnosis: Same    Indication: 45-year-old white male with Charcot arthropathy with a foot ulcer which was not healing.  Had osteomyelitis and was getting sick.  [place] that should be amputated to prevent sepsis      Procedure/CPT® Codes:      Procedure(s):  AMPUTATION BELOW KNEE, left    Staff:  Surgeon(s):  Donn Alcantara MD    Assistant: Camila Sutton RN    Anesthesia: General with Block    Estimated Blood Loss: 100 mL    Implants:    Nothing was implanted during the procedure    Specimen:          Leg        Findings: Healthy tissue at the amputation level    Complications: 0    Description of Procedure: Patient was seen in holding room identified the left leg is correct one.  This initialed by me.  He had a block and then general anesthesia.  Was already on antibiotics.  It had 1000 mg tranexamic acid.  Had supine positioning with a bump under his left hip.  Foot was sealed off and [place] had sterile prep and draping the left lower extremity.  Tourniquet is applied around upper thigh to 250 mmHg.  Total tourniquet time was approximately 10 minutes.  [place] incision was made with the anterior flap after distance of the posterior flap.  The stump length was 13 cm.  The incisions were made and then the muscle was cut and exposed bone.  The tibia was transected and then the fibula transected 1 cm proximal to this.  Muscle flaps were then continued posteriorly.  Vessels and nerves were clamped and then tied with 2-0 silk stick ties.  Tourniquet was released.  Small bleeders were cauterized.  The tibia was chamfered anteriorly and the edges of the tibia and fibula smoothed with a saw.  The wound was then irrigated.  The muscle flap was then closed anterior to posterior with #1 Vicryl.  The subcu with 2-0 Vicryl and the skin  with staples.  Dogears were corrected.  Sterile dressings were applied patient was placed in a special knee immobilizer for amputations    Plan is for patient be on IV antibiotics for 3 days then p.o. for 5 more  Return in 2 weeks    Assistant: Camila Sutton RN  was responsible for performing the following activities: Retraction, suctioning, wound closure, and their skilled assistance was necessary for the success of this case.        Donn Alcantara MD     Date: 9/2/2020  Time: 09:26

## 2020-09-02 NOTE — PROGRESS NOTES
"Pharmacy Antimicrobial Dosing Service    Subjective:  Maynor Gregg is a 45 y.o.male admitted with a left foot wound. Pharmacy has been consulted to dose Vancomycin for possible sepsis/ SSTI.    PMH:   Past Medical History:   Diagnosis Date    Coronary artery disease     Diabetes mellitus (CMS/HCC)     MI, old 2010       Assessment/Plan    1. Day #2 Vancomycin: Goal -600 mcg*h/mL. Pt received vancomycin 2000mg (20 mg/kg) loading dose and will be started on a 1500 mg (15 mg/kg) q12h maintenance regimen. As pt is only to receive 3 days of IV abx before transitioning to PO, will check a trough on 9/3 @ 0700 to ensure therapeutic dose.     2. Day #2 Ceftriaxone: 2g IV q24h    Will continue to monitor drug levels, renal function, culture and sensitivities, and patient clinical status.       Objective:  Relevant clinical data and objective history reviewed:  190.5 cm (75\")   95.7 kg (211 lb)   Ideal body weight: 84.5 kg (186 lb 4.6 oz)  Adjusted ideal body weight: 89 kg (196 lb 2.8 oz)  Body mass index is 26.37 kg/m².        Results from last 7 days   Lab Units 09/02/20  0400 09/01/20 1852 08/28/20  1145   CREATININE mg/dL 0.89 1.10 1.09     Estimated Creatinine Clearance: 141.9 mL/min (by C-G formula based on SCr of 0.89 mg/dL).  I/O last 3 completed shifts:  In: 590 [P.O.:240; I.V.:350]  Out: 1550 [Urine:1550]    Results from last 7 days   Lab Units 09/02/20  0400 09/01/20  1852 08/28/20  1145   WBC 10*3/mm3 7.60 13.10* 6.66     Temperature    09/02/20 0712 09/02/20 0929 09/02/20 1014   Temp: 98.7 °F (37.1 °C) 98.7 °F (37.1 °C) 99.3 °F (37.4 °C)     Baseline culture/source/susceptibility:  Microbiology Results (last 10 days)       Procedure Component Value - Date/Time    MRSA Screen, PCR (Inpatient) - Swab, Nares [[id number]]  (Normal) Collected:  09/01/20 0808    Lab Status:  Final result Specimen:  Swab from Nares Updated:  09/02/20 0253     MRSA PCR No MRSA Detected    Respiratory Panel PCR " w/COVID-19(SARS-CoV-2) [facility]/ANAYELI/[facility] In-House, NP Swab in UTM/VTM, 3-4 HR TAT - Swab, Nasopharynx [[id number]]  (Normal) Collected:  09/01/20 2236    Lab Status:  Final result Specimen:  Swab from Nasopharynx Updated:  09/02/20 0700     ADENOVIRUS, PCR Not Detected     Coronavirus 229E Not Detected     Coronavirus HKU1 Not Detected     Coronavirus NL63 Not Detected     Coronavirus OC43 Not Detected     COVID19 Not Detected     Human Metapneumovirus Not Detected     Human Rhinovirus/Enterovirus Not Detected     Influenza A PCR Not Detected     Influenza A H1 Not Detected     Influenza A H1 2009 PCR Not Detected     Influenza A H3 Not Detected     Influenza B PCR Not Detected     Parainfluenza Virus 1 Not Detected     Parainfluenza Virus 2 Not Detected     Parainfluenza Virus 3 Not Detected     Parainfluenza Virus 4 Not Detected     RSV, PCR Not Detected     Bordetella pertussis pcr Not Detected     Bordetella parapertussis PCR Not Detected     Chlamydophila pneumoniae PCR Not Detected     Mycoplasma pneumo by PCR Not Detected    Narrative:       Fact sheet for providers: https://docs.[employer]/wp-content/uploads/ADQ0208-4301-PH9.1-EUA-Provider-Fact-Sheet-3.pdf    Fact sheet for patients: https://docs.[employer]/wp-content/uploads/ANK4147-2686-PW8.1-EUA-Patient-Fact-Sheet-1.pdf    Wound Culture - Wound, Foot, Left [[id number]]  (Abnormal) Collected:  09/01/20 1804    Lab Status:  Preliminary result Specimen:  Wound from Foot, Left Updated:  09/02/20 0914     Wound Culture Moderate growth (3+) Gram Positive Cocci     Gram Stain Moderate (3+) WBCs seen      Moderate (3+) Gram positive cocci in pairs             Anti-Infectives (From admission, onward)      Ordered     Dose/Rate Route Frequency Start Stop    09/02/20 1031  cefTRIAXone (ROCEPHIN) 2 g in sodium chloride 0.9 % 100 mL IVPB     Ordering Provider:  Donn Alcantara MD    2 g  200 mL/hr over 30 Minutes Intravenous Every 24 Hours 09/02/20 2000  09/05/20 1959 09/01/20 2141  vancomycin 1500 mg/500 mL 0.9% NS IVPB ([facility])  Review   Ordering Provider:  Donn Alcantara MD    15 mg/kg × 94.3 kg  over 90 Minutes Intravenous Every 12 Hours 09/02/20 0800 09/08/20 0759 09/01/20 1954  vancomycin 2000 mg/500 mL 0.9% NS IVPB ([facility])     Ordering Provider:  Estrella Calix APRN    20 mg/kg × 94.3 kg Intravenous Once 09/01/20 1956 09/01/20 2017 09/01/20 1954  cefTRIAXone (ROCEPHIN) in SWFI 2 GRAMS/20ml IV PUSH syringe     Ordering Provider:  Estrella Calix APRN    2 g  over 5 Minutes Intravenous Once 09/01/20 1956 09/01/20 2022            Clara Deluca PharmD  09/02/20 10:58

## 2020-09-02 NOTE — NURSING NOTE
Patient refusing heart monitor. States he is not here for his heart and does not want to wear one. Explained the importance of monitoring his heart with his cardiac history. Patient refused.

## 2020-09-02 NOTE — H&P
[facility]      Patient Name: Maynor Gregg  : 1975  MRN: [id number]  Primary Care Physician: Fred Lemons FNP  Date of admission: 2020    Patient Care Team:  Fred Lemons FNP as PCP - General (Family Medicine)          Subjective   History Present Illness     Chief Complaint:   Chief Complaint   Patient presents with   • Fever       Mr. Gregg is a 45 y.o. male with a history of diabetes, right below the knee amputee, CAD, chronic left foot ulcer resented to the [facility] ED on 2020 with a complaint of fever, nausea, vomiting and wound to his left foot.  Patient states he is being treated for a chronic wound to his left foot per Dr. simons and has been undergoing IV antibiotic treatment at home via PICC line.  Patient was to follow-up with Dr. Alcantara, orthopedics in a.m. regarding his wound on his foot.  Patient states he felt more ill and felt that he needed to be seen.  Patient states T-max 100.2, nausea and vomiting x1 day.  States he is also generally weak.  Patient states he just finished a an 8-week course of Rocephin IV.  Patient denies abdominal pain, shortness of breath, no ill contacts.    In the ED, glucose 330, sodium 129, potassium 4.0 BUN 23, creatinine 1.1, alkaline phosphatase 152, C-reactive protein 12.91, lipase 16, lactate 1.7, WBC 13.10, Hgb 11.4, HCT 33.5, platelets 316, sed rate 103.  Wound culture pending, blood culture pending.  Patient received 2 g IV ceftriaxone x1, vancomycin 2000 mg IV x1, 6 units Novolin R insulin IV x1, normal saline 1 L bolus IV x1.  Patient will be admitted for further evaluation and management.    Review of Systems   Constitution: Positive for chills, fever and malaise/fatigue.   HENT: Negative.    Eyes: Negative.    Cardiovascular: Negative.    Respiratory: Negative.    Endocrine: Negative.    Hematologic/Lymphatic: Negative.    Skin: Positive for poor wound healing.    Musculoskeletal: Positive for joint pain and myalgias.   Gastrointestinal: Negative.    Genitourinary: Negative.    Neurological: Negative.    Psychiatric/Behavioral: Negative.    Allergic/Immunologic: Negative.    All other systems reviewed and are negative.          Personal History     Past Medical History:   Past Medical History:   Diagnosis Date   • Coronary artery disease    • Diabetes mellitus (CMS/HCC)    • MI, old 2010       Surgical History:      Past Surgical History:   Procedure Laterality Date   • AMPUTATION DIGIT Right 2/28/2020    Procedure: PARTIAL FIRST RAY RESECTION RIGHT FOOT;  Surgeon: CROW Souza DPM;  Location: [facility];  Service: Podiatry;  Laterality: Right;   • AMPUTATION FOOT / TOE     • BELOW KNEE AMPUTATION Right 4/30/2020    Procedure: AMPUTATION BELOW KNEE;  Surgeon: Donn Alcantara MD;  Location: [facility];  Service: Orthopedics;  Laterality: Right;   • CARDIAC CATHETERIZATION  11/2010     RCA artery   • CARDIAC CATHETERIZATION  2015    LAD artery   • CARDIAC SURGERY     • INCISION AND DRAINAGE LEG Left 1/21/2020    Procedure: INCISION AND DRAINAGE LOWER EXTREMITY with second digit amputation;  Surgeon: CRWO Souza DPM;  Location: [facility];  Service: Podiatry   • INCISION AND DRAINAGE LEG Right 4/28/2020    Procedure: incision and drainage ,transmetatarsal amputation, fasciotomy;  Surgeon: CROW Souza DPM;  Location: [facility] OR;  Service: Podiatry;  Laterality: Right;   • TOE AMPUTATION Left    • WOUND DEBRIDEMENT Right 1/21/2020    Procedure: DEBRIDEMENT with sesamoid excision;  Surgeon: CROW Souza DPM;  Location: [facility];  Service: Podiatry           Family History: family history includes Diabetes in his father and paternal grandfather; Heart failure in his father. Otherwise pertinent FHx was reviewed and unremarkable.     Social History:  reports that he quit smoking about 16 months ago. He smoked 0.50 packs per day. He has  never used smokeless tobacco. He reports that he does not drink alcohol or use drugs.      Medications:  Prior to Admission medications    Medication Sig Start Date End Date Taking? Authorizing Provider   cefTRIAXone 2 g in sodium chloride 0.9 % 100 mL IVPB Infuse 2 g into a venous catheter Daily for 41 doses. Indications: Bone and/or Joint Infection 7/24/20 9/3/20 Yes Caitlyn Tubbs MD   insulin aspart (novoLOG) 100 UNIT/ML injection Inject 4-8 Units under the skin into the appropriate area as directed 3 (Three) Times a Day Before Meals.   Yes Katharina Joyce MD   Insulin Glargine (BASAGLAR KWIKPEN) 100 UNIT/ML injection pen INJECT 12 UNITS BY SUBCUTANEOUS ROUTE EVERY NIGHT 6/23/20  Yes Katharina Joyce MD   insulin lispro (humaLOG) 100 UNIT/ML injection Inject 7 Units under the skin into the appropriate area as directed 3 (Three) Times a Day With Meals.  Patient taking differently: Inject 4-8 Units under the skin into the appropriate area as directed 3 (Three) Times a Day With Meals. 5/3/20 9/1/20  Clay Fonseca DO       Allergies:    Allergies   Allergen Reactions   • Metformin Diarrhea and Nausea And Vomiting   • Oxycodone-Acetaminophen Nausea And Vomiting and Rash       Objective   Objective     Vital Signs  Temp:  [99.6 °F (37.6 °C)] 99.6 °F (37.6 °C)  Heart Rate:  [94] 94  Resp:  [16] 16  BP: ()/(49-51) 107/51  SpO2:  [97 %-100 %] 100 %  on   ;   Device (Oxygen Therapy): room air  Body mass index is 26 kg/m².    Physical Exam   Constitutional: He is oriented to person, place, and time. He appears well-developed and well-nourished.   HENT:   Head: Normocephalic.   Eyes: Conjunctivae are normal.   Neck: Normal range of motion.   Cardiovascular: Normal rate, regular rhythm, normal heart sounds and intact distal pulses.   Pulmonary/Chest: Effort normal and breath sounds normal.   Abdominal: Soft. Bowel sounds are normal.   Musculoskeletal: Normal range of motion. He exhibits edema and  deformity.   Neurological: He is alert and oriented to person, place, and time.   Skin: Skin is warm and dry.   Nonhealing wound of left foot   Vitals reviewed.      Results Review:  I have personally reviewed most recent cardiac tracings, lab results and radiology images and interpretations and agree with findings,    Results from last 7 days   Lab Units 09/01/20  1852   WBC 10*3/mm3 13.10*   HEMOGLOBIN g/dL 11.4*   HEMATOCRIT % 33.5*   PLATELETS 10*3/mm3 316     Results from last 7 days   Lab Units 09/01/20  1902 09/01/20  1852   SODIUM mmol/L  --  129*   POTASSIUM mmol/L  --  4.0   CHLORIDE mmol/L  --  93*   CO2 mmol/L  --  24.0   BUN   --  23*   CREATININE mg/dL  --  1.10   GLUCOSE mg/dL  --  330*   CALCIUM mg/dL  --  8.3*   ALT (SGPT) U/L  --  31   AST (SGOT) U/L  --  15   LACTATE mmol/L 1.7  --      Estimated Creatinine Clearance: 113.1 mL/min (by C-G formula based on SCr of 1.1 mg/dL).  Brief Urine Lab Results  (Last result in the past 365 days)      Color   Clarity   Blood   Leuk Est   Nitrite   Protein   CREAT   Urine HCG        07/21/20 0915 Yellow Clear Negative Negative Negative Negative               Microbiology Results (last 10 days)     ** No results found for the last 240 hours. **          ECG/EMG Results (most recent)     None               Results for orders placed during the hospital encounter of 01/18/20   Adult Transthoracic Echo Complete W/ Cont if Necessary Per Protocol    Narrative · Left ventricular systolic function is normal.  · Mild mitral valve regurgitation is present  · Mild tricuspid valve regurgitation is present.  · Ejection fraction is about 65%  · No pericardial effusion noted          Xr Foot 3+ View Left    Result Date: 9/1/2020  1. There is severe derangement of the midfoot consistent with neuropathic disease. This is unchanged except that there has been some widening of some of the tarsal-metatarsal joints. 2. Negative for plain film evidence of osteomyelitis. 3. Diffuse  soft tissue swelling. 4. Multiple prior amputations.  Electronically Signed By-Arabella Mendez On:9/1/2020 6:44 PM This report was finalized on [id number] by  Arabella Mendez, .        Estimated Creatinine Clearance: 113.1 mL/min (by C-G formula based on SCr of 1.1 mg/dL).    Assessment/Plan   Assessment/Plan       Active Hospital Problems    Diagnosis  POA   • **Wound of left foot [S91.302A]  Yes   • Type 1 diabetes mellitus with circulatory complication (CMS/[facility]) [E10.59]  Yes     Priority: High   • CAD (coronary artery disease) [I25.10]  Yes     Priority: Medium   • S/P BKA (below knee amputation), right (CMS/[facility]) [Z89.511]  Not Applicable      Resolved Hospital Problems   No resolved problems to display.     Nonhealing diabetic left foot wound  - Patient finished 8-week IV antibiotic Rocephin 2 g IV  - Continue vancomycin, Rocephin  -Pain meds as needed  -Antiemetics PRN  -Follow wound culture  - Orthopedic consult, Dr. Alcantara saw patient in ED.  - Patient n.p.o. for left BKA in a.m.    Type 1 diabetes  - Continue Lantus  - Accu-Cheks before meals and at bedtime  -Sliding scale insulin    CAD  -Cardiac monitor            VTE Prophylaxis -   Mechanical Order History:      Ordered        Signed and Held  Place Sequential Compression Device  Once         Signed and Held  Maintain Sequential Compression Device  Continuous                 Pharmalogical Order History:     None          CODE STATUS:    Code Status and Medical Interventions:   Ordered at: 09/01/20 2040     Level Of Support Discussed With:    Patient     Code Status:    CPR     Medical Interventions (Level of Support Prior to Arrest):    Full           I discussed the patient's findings and my recommendations with patient.        Electronically signed by KAMILLE Degroot, 09/01/20, 8:44 PM.  [facility]ist Team

## 2020-09-02 NOTE — CONSULTS
Consults       Referring Provider: Dr. Adan souza  Reason for Consultation: Left foot ulcer    Patient Care Team:  Fred Lemons FNP as PCP - General (Family Medicine)      Subjective .     History of present illness:  Maynor Gregg is a 45 y.o. male who presents with left foot ulcer for 8 weeks.  Has been treating with IV Rocephin for 6 weeks with no improvement.  He has drainage.  He has been feeling nauseated and had fevers and chills he is not on blood thinners.  He has not been able to return to work since I did a right below-knee amputation for similar ulcer May 2020.  He says his blood sugars have been running slightly high.     Review of Systems: Noncontributory  No chest pain shortness of breath fevers or chills.  Has had nausea.  No vomiting.  No rash.    History  Past Medical History:   Diagnosis Date   • Coronary artery disease    • Diabetes mellitus (CMS/HCC)    • MI, old 2010     Past Surgical History:   Procedure Laterality Date   • AMPUTATION DIGIT Right 2/28/2020    Procedure: PARTIAL FIRST RAY RESECTION RIGHT FOOT;  Surgeon: CROW Souza DPM;  Location: [facility] OR;  Service: Podiatry;  Laterality: Right;   • AMPUTATION FOOT / TOE     • BELOW KNEE AMPUTATION Right 4/30/2020    Procedure: AMPUTATION BELOW KNEE;  Surgeon: Donn Alcantara MD;  Location: [facility] OR;  Service: Orthopedics;  Laterality: Right;   • CARDIAC CATHETERIZATION  11/2010     RCA artery   • CARDIAC CATHETERIZATION  2015    LAD artery   • CARDIAC SURGERY     • INCISION AND DRAINAGE LEG Left 1/21/2020    Procedure: INCISION AND DRAINAGE LOWER EXTREMITY with second digit amputation;  Surgeon: CROW Souza DPM;  Location: [facility] OR;  Service: Podiatry   • INCISION AND DRAINAGE LEG Right 4/28/2020    Procedure: incision and drainage ,transmetatarsal amputation, fasciotomy;  Surgeon: CROW Souza DPM;  Location: [facility] OR;  Service: Podiatry;  Laterality: Right;   • TOE AMPUTATION Left    •  "WOUND DEBRIDEMENT Right 2020    Procedure: DEBRIDEMENT with sesamoid excision;  Surgeon: CROW Souza DPM;  Location: [facility] MAIN OR;  Service: Podiatry     Family History   Problem Relation Age of Onset   • Diabetes Father    • Heart failure Father    • Diabetes Paternal Grandfather       Social History     Tobacco Use   • Smoking status: Former Smoker     Packs/day: 0.50     Last attempt to quit: 2019     Years since quittin.3   • Smokeless tobacco: Never Used   Substance Use Topics   • Alcohol use: No     Frequency: Never   • Drug use: No       (Not in a hospital admission)   Metformin and Oxycodone-acetaminophen    Scheduled Meds:  Continuous Infusions:  No current facility-administered medications for this encounter.   PRN Meds:.    Objective     Vital Signs   Vitals:    20 1723 20 1732 20   BP:  99/49 107/51   Pulse:  94    Resp:  16    Temp: 99.6 °F (37.6 °C)     SpO2:  97% 100%   Weight: 94.3 kg (208 lb)     Height: 190.5 cm (75\")           Physical Exam:   Male, no apparent stress, alert and orient x3  HEENT is normocephalic atraumatic  Lungs clear bilaterally  Regular rate rhythm  Soft nontender positive bowel sounds  Right BKA stump shows small ulceration along the tibial tubercle only superficial though.  Says he has been wearing his prosthesis full-time breaking down the skin  Left foot shows a 6 cm m ulcer on the plantar aspect of the foot.  The shape of the foot is banana.  The ulcer goes down to bone.  There is drainage.  He has no sensation.  2+ dorsalis pedis pulse.  Skin along the leg looks good.  Knee functions well  Has had no hardware in the left tibia    Results Review:   I reviewed the patient's new clinical results.  I reviewed the patient's new imaging results    Lab Results (last 24 hours)     Procedure Component Value Units Date/Time    Extra Tubes [[id number]] Collected:  20    Specimen:  Blood, Venous Line Updated:  20    " Narrative:       The following orders were created for panel order Extra Tubes.  Procedure                               Abnormality         Status                     ---------                               -----------         ------                     Light Blue Top[[id number]]                                   Final result               Gold Top - SST[[id number]]                                   Final result               Green Top (Gel)[[id number]]                                  Final result                 Please view results for these tests on the individual orders.    Light Blue Top [[id number]] Collected:  09/01/20 1852    Specimen:  Blood Updated:  09/01/20 2000     Extra Tube hold for add-on     Comment: Auto resulted       Gold Top - SST [[id number]] Collected:  09/01/20 1852    Specimen:  Blood Updated:  09/01/20 2000     Extra Tube Hold for add-ons.     Comment: Auto resulted.       Green Top (Gel) [[id number]] Collected:  09/01/20 1852    Specimen:  Blood Updated:  09/01/20 2000     Extra Tube Hold for add-ons.     Comment: Auto resulted.       Sedimentation Rate [[id number]]  (Abnormal) Collected:  09/01/20 1852    Specimen:  Blood Updated:  09/01/20 1933     Sed Rate 103 mm/hr     BUN [[id number]]  (Abnormal) Collected:  09/01/20 1852    Specimen:  Blood Updated:  09/01/20 1926     BUN 23 mg/dL     Comprehensive Metabolic Panel [[id number]]  (Abnormal) Collected:  09/01/20 1852    Specimen:  Blood Updated:  09/01/20 1919     Glucose 330 mg/dL      BUN --     Comment: Testing performed by alternate method        Creatinine 1.10 mg/dL      Sodium 129 mmol/L      Potassium 4.0 mmol/L      Chloride 93 mmol/L      CO2 24.0 mmol/L      Calcium 8.3 mg/dL      Total Protein 7.4 g/dL      Albumin 3.10 g/dL      ALT (SGPT) 31 U/L      AST (SGOT) 15 U/L      Alkaline Phosphatase 152 U/L      Total Bilirubin 0.9 mg/dL      eGFR Non African Amer 72 mL/min/1.73      Globulin 4.3 gm/dL      A/G Ratio 0.7 g/dL       BUN/Creatinine Ratio --     Comment: Testing not performed        Anion Gap 12.0 mmol/L     Narrative:       GFR Normal >60  Chronic Kidney Disease <60  Kidney Failure <15      Lipase [[id number]]  (Normal) Collected:  09/01/20 1852    Specimen:  Blood Updated:  09/01/20 1919     Lipase 16 U/L     C-reactive Protein [[id number]]  (Abnormal) Collected:  09/01/20 1852    Specimen:  Blood Updated:  09/01/20 1919     C-Reactive Protein 12.91 mg/dL     POC Lactate [[id number]]  (Normal) Collected:  09/01/20 1902    Specimen:  Blood Updated:  09/01/20 1903     Lactate 1.7 mmol/L      Comment: Serial Number: [id number]:  [id number]       CBC & Differential [[id number]] Collected:  09/01/20 1852    Specimen:  Blood Updated:  09/01/20 1856    Narrative:       The following orders were created for panel order CBC & Differential.  Procedure                               Abnormality         Status                     ---------                               -----------         ------                     CBC Auto Differential[[id number]]        Abnormal            Final result                 Please view results for these tests on the individual orders.    CBC Auto Differential [[id number]]  (Abnormal) Collected:  09/01/20 1852    Specimen:  Blood Updated:  09/01/20 1856     WBC 13.10 10*3/mm3      RBC 3.85 10*6/mm3      Hemoglobin 11.4 g/dL      Hematocrit 33.5 %      MCV 87.1 fL      MCH 29.6 pg      MCHC 34.0 g/dL      RDW 12.7 %      RDW-SD 38.9 fl      MPV 7.8 fL      Platelets 316 10*3/mm3      Neutrophil % 78.7 %      Lymphocyte % 10.3 %      Monocyte % 10.5 %      Eosinophil % 0.1 %      Basophil % 0.4 %      Neutrophils, Absolute 10.30 10*3/mm3      Lymphocytes, Absolute 1.40 10*3/mm3      Monocytes, Absolute 1.40 10*3/mm3      Eosinophils, Absolute 0.00 10*3/mm3      Basophils, Absolute 0.10 10*3/mm3      nRBC 0.0 /100 WBC     Blood Culture - Blood, Arm, Right [[id number]] Collected:  09/01/20 1853    Specimen:  Blood  from Arm, Right Updated:  09/01/20 1856    Blood Culture - Blood, Arm, Left [[id number]] Collected:  09/01/20 1852    Specimen:  Blood from Arm, Left Updated:  09/01/20 1852    Wound Culture - Wound, Foot, Left [[id number]] Collected:  09/01/20 1804    Specimen:  Wound from Foot, Left Updated:  09/01/20 1810    POC Glucose Once [[id number]]  (Abnormal) Collected:  09/01/20 1731    Specimen:  Blood Updated:  09/01/20 1732     Glucose 324 mg/dL      Comment: Serial Number: [id number]:  [id number]             Imaging Results (Last 24 Hours)     Procedure Component Value Units Date/Time    XR Foot 3+ View Left [[id number]] Collected:  09/01/20 1836     Updated:  09/01/20 1846    Narrative:       XR FOOT 3+ VW LEFT-     Date of Exam: 9/1/2020 6:15 PM     Indication: foot wound.     Comparison: None available.     Technique: Three views of the foot were obtained.     FINDINGS: As was previously noted, there is marked abnormality of the  midfoot, consistent with neuropathic disease, with midfoot collapse.  There is fragmentation of multiple bones of the midfoot. The appearance  is unchanged except that there is widening of the tarsal-metatarsal  joints of the third and fourth digits.     The fifth metatarsal base is absent. There has been amputation of the  first, second, and third toes at the MTP joints. There is chronic  deformity of the heads of the second and third metacarpal tarsals.     There is no aggressive-appearing periostitis. There is diffuse soft  tissue swelling. No soft tissue gas or opaque foreign body is  identified.       Impression:       1. There is severe derangement of the midfoot consistent with  neuropathic disease. This is unchanged except that there has been some  widening of some of the tarsal-metatarsal joints.  2. Negative for plain film evidence of osteomyelitis.  3. Diffuse soft tissue swelling.  4. Multiple prior amputations.     Electronically Signed By-Arabella Mendez On:9/1/2020 6:44  PM  This report was finalized on [id number] by  Arabella Mendez .            Assessment/Plan     Left Charcot foot ulcer with osteomyelitis patient getting sick with fevers and chills    Feel patient would do best with a below-knee amputation on the left.  He understands risk.  These include bleeding, infection, damage to nerves or blood vessels, continued pain, phantom pain, possible need for further surgery, and the risk of medical or anesthetic complications including the risk of death.    He realizes ambulation be more difficult with 2 prostheses but can be done    Surgery will be 8 AM tomorrow.  Will be on IV antibiotics for 3 days afterwards      Donn Alcantara MD  09/01/20  20:43

## 2020-09-02 NOTE — NURSING NOTE
Chart reviewed and orders noted.  Consult received for left foot ulceration patient has an orthopedic consult has surgery scheduled for this morning will defer to Ortho at this time and follow as needed

## 2020-09-02 NOTE — DISCHARGE PLACEMENT REQUEST
"Ebenezer Gregg (45 y.o. Male)     Date of Birth Social Security Number Address Home Phone MRN    1975  [id number] [address] [phone] [id number]    Latter day Marital Status          Anabaptism Single       Admission Date Admission Type Admitting Provider Attending Provider Department, Room/Bed    9/1/20 Emergency Clay Fonseca DO Gebre-Egziabher, Aman I, DO [facility] SURGICAL INPATIENT, 4107/1    Discharge Date Discharge Disposition Discharge Destination                       Attending Provider:  Clay Fonseca DO    Allergies:  Metformin, Oxycodone-acetaminophen    Isolation:  None   Infection:  COVID Screen (preop/placement) (09/01/20)   Code Status:  CPR    Ht:  190.5 cm (75\")   Wt:  95.7 kg (211 lb)    Admission Cmt:  None   Principal Problem:  Wound of left foot [S91.302A]                 Active Insurance as of 9/1/2020     Primary Coverage     Payor Plan Insurance Group Employer/Plan Group    ANTHEM BLUE CROSS ANTHEM BLUE CROSS BLUE [facility] PPO [id number]     Payor Plan Address Payor Plan Phone Number Payor Plan Fax Number Effective Dates    [address] [phone]  10/13/2019 - None Entered    [facility] [id number]       Subscriber Name Subscriber Birth Date Member ID       EBENEZER GREGG 1975 [id number]                 Emergency Contacts      (Rel.) Home Phone Work Phone Mobile Phone    Marsha Garza (Relative) -- -- [phone]    REJI LANCASTER (Significant Other) -- -- [phone]              "

## 2020-09-02 NOTE — ANESTHESIA PROCEDURE NOTES
Peripheral Block    Pre-sedation assessment completed: 9/2/2020 7:30 AM    Patient reassessed immediately prior to procedure    Patient location during procedure: pre-op  Reason for block: procedure for pain, at surgeon's request, post-op pain management and secondary anesthetic  Performed by  Anesthesiologist: Colin Chung MD  Preanesthetic Checklist  Completed: patient identified, site marked, surgical consent, pre-op evaluation, timeout performed, IV checked, risks and benefits discussed and monitors and equipment checked  Prep:  Pt Position: supine  Sterile barriers:cap, gloves and mask  Prep: ChloraPrep  Patient monitoring: blood pressure monitoring, continuous pulse oximetry and EKG  Procedure  Sedation:yes  Performed under: local infiltration  Guidance:ultrasound guided and landmark technique  ULTRASOUND INTERPRETATION.  Using ultrasound guidance a 20 G gauge needle was placed in close proximity to the sciatic nerve, at which point, under ultrasound guidance anesthetic was injected in the area of the nerve and spread of the anesthesia was seen on ultrasound in close proximity thereto.  There were no abnormalities seen on ultrasound; a digital image was taken; and the patient tolerated the procedure with no complications. Images:still images obtained, printed/placed on chart    Laterality:left  Block Type:popliteal  Injection Technique:single-shot  Needle Type:echogenic  Needle Gauge:20 G  Resistance on Injection: less than 15 psi    Medications Used: ropivacaine (NAROPIN) 0.5 % injection, 40 mL  dexamethasone (DECADRON) injection, 2.7 mg  Med admintered at 9/2/2020 7:44 AM      Post Assessment  Injection Assessment: negative aspiration for heme, no paresthesia on injection and incremental injection  Patient Tolerance:comfortable throughout block  Complications:no  Additional Notes  Pre-procedure:  Peripheral nerve block performed preoperatively prior to the start of anesthesia time at the request of  the patient and the surgeon for the management of postoperative acute surgical pain as well as for a secondary intraoperative anesthetic (general anesthesia is the primary intraoperative anesthetic); patient identified; pre-procedure vital signs, all relevant labs/studies, complete medical/surgical/anesthetic history, full medication list, full allergy list, and NPO status obtained/reviewed; physical assessment performed; anesthetic options, side effects, potential complications, risks, and benefits discussed; questions answered; patient wishes to proceed with the procedure; written anesthesia procedure consent obtained; patient cleared for procedure; time out performed; IV access in situ    Procedure:  ASA monitor placed; supplemental oxygen provided; patient positioned; hand hygiene performed; sterile technique maintained throughout the procedure; sterile prep applied; insertion site determined by anatomical landmarks, palpation, and ultrasound imaging; live ultrasound guidance throughout the procedure; target nerves/landmarks identified on live ultrasound; skin and subcutaneous tissues numbed by injection of 1% lidocaine; 4 inch 20G [employer] Ultra 360 Insulated Echogenic Needle used; realtime needle advancement and placement near the target nerves witnessed on live ultrasound; negative aspiration prior to injection; correct needle placement confirmed on live ultrasound by local anesthetic spread around the target nerves; local anesthetic mixture injected with negative aspiration prior to each injection and after each 1-5 mL injected; needle withdrawn; dressing applied; ultrasound image printed and placed in the patient's permanent medical record    Post-procedure:  Peripheral nerve block placed successfully; good block; no apparent complications; minimal estimated blood loss; vital signs stable throughout; see nurse's notes for vitals; transported to the OR, general anesthesia induced, and surgery  started

## 2020-09-03 VITALS
BODY MASS INDEX: 26.28 KG/M2 | RESPIRATION RATE: 18 BRPM | HEIGHT: 75 IN | OXYGEN SATURATION: 96 % | TEMPERATURE: 98.1 F | WEIGHT: 211.4 LBS | SYSTOLIC BLOOD PRESSURE: 105 MMHG | DIASTOLIC BLOOD PRESSURE: 59 MMHG | HEART RATE: 70 BPM

## 2020-09-03 LAB
ANION GAP SERPL CALCULATED.3IONS-SCNC: 8 MMOL/L (ref 5–15)
BACTERIA SPEC AEROBE CULT: ABNORMAL
BASOPHILS # BLD AUTO: 0 10*3/MM3 (ref 0–0.2)
BASOPHILS NFR BLD AUTO: 0.2 % (ref 0–1.5)
BUN SERPL-MCNC: 15 MG/DL (ref 6–20)
BUN SERPL-MCNC: ABNORMAL MG/DL
BUN/CREAT SERPL: ABNORMAL
CALCIUM SPEC-SCNC: 7.5 MG/DL (ref 8.6–10.5)
CHLORIDE SERPL-SCNC: 103 MMOL/L (ref 98–107)
CO2 SERPL-SCNC: 24 MMOL/L (ref 22–29)
CREAT SERPL-MCNC: 0.74 MG/DL (ref 0.76–1.27)
CRP SERPL-MCNC: 9.46 MG/DL (ref 0–0.5)
DEPRECATED RDW RBC AUTO: 39.4 FL (ref 37–54)
EOSINOPHIL # BLD AUTO: 0 10*3/MM3 (ref 0–0.4)
EOSINOPHIL NFR BLD AUTO: 0.2 % (ref 0.3–6.2)
ERYTHROCYTE [DISTWIDTH] IN BLOOD BY AUTOMATED COUNT: 12.9 % (ref 12.3–15.4)
GFR SERPL CREATININE-BSD FRML MDRD: 114 ML/MIN/1.73
GLUCOSE BLDC GLUCOMTR-MCNC: 164 MG/DL (ref 70–105)
GLUCOSE BLDC GLUCOMTR-MCNC: 198 MG/DL (ref 70–105)
GLUCOSE BLDC GLUCOMTR-MCNC: 224 MG/DL (ref 70–105)
GLUCOSE SERPL-MCNC: 200 MG/DL (ref 65–99)
GRAM STN SPEC: ABNORMAL
HCT VFR BLD AUTO: 31.1 % (ref 37.5–51)
HGB BLD-MCNC: 10.5 G/DL (ref 13–17.7)
ISOLATED FROM: ABNORMAL
LAB AP CASE REPORT: NORMAL
LYMPHOCYTES # BLD AUTO: 2.2 10*3/MM3 (ref 0.7–3.1)
LYMPHOCYTES NFR BLD AUTO: 22.2 % (ref 19.6–45.3)
MAGNESIUM SERPL-MCNC: 2.1 MG/DL (ref 1.6–2.6)
MCH RBC QN AUTO: 29.3 PG (ref 26.6–33)
MCHC RBC AUTO-ENTMCNC: 33.6 G/DL (ref 31.5–35.7)
MCV RBC AUTO: 87.1 FL (ref 79–97)
MONOCYTES # BLD AUTO: 1.3 10*3/MM3 (ref 0.1–0.9)
MONOCYTES NFR BLD AUTO: 13.1 % (ref 5–12)
NEUTROPHILS NFR BLD AUTO: 6.3 10*3/MM3 (ref 1.7–7)
NEUTROPHILS NFR BLD AUTO: 64.3 % (ref 42.7–76)
NRBC BLD AUTO-RTO: 0 /100 WBC (ref 0–0.2)
PATH REPORT.FINAL DX SPEC: NORMAL
PATH REPORT.GROSS SPEC: NORMAL
PLATELET # BLD AUTO: 285 10*3/MM3 (ref 140–450)
PMV BLD AUTO: 7.5 FL (ref 6–12)
POTASSIUM SERPL-SCNC: 3.8 MMOL/L (ref 3.5–5.2)
RBC # BLD AUTO: 3.57 10*6/MM3 (ref 4.14–5.8)
SODIUM SERPL-SCNC: 135 MMOL/L (ref 136–145)
VANCOMYCIN TROUGH SERPL-MCNC: 9.5 MCG/ML (ref 5–20)
WBC # BLD AUTO: 9.8 10*3/MM3 (ref 3.4–10.8)

## 2020-09-03 PROCEDURE — 87040 BLOOD CULTURE FOR BACTERIA: CPT | Performed by: HOSPITALIST

## 2020-09-03 PROCEDURE — 25010000002 VANCOMYCIN 10 G RECONSTITUTED SOLUTION: Performed by: ORTHOPAEDIC SURGERY

## 2020-09-03 PROCEDURE — 99238 HOSP IP/OBS DSCHRG MGMT 30/<: CPT | Performed by: HOSPITALIST

## 2020-09-03 PROCEDURE — 83735 ASSAY OF MAGNESIUM: CPT | Performed by: HOSPITALIST

## 2020-09-03 PROCEDURE — 85025 COMPLETE CBC W/AUTO DIFF WBC: CPT | Performed by: HOSPITALIST

## 2020-09-03 PROCEDURE — 82962 GLUCOSE BLOOD TEST: CPT

## 2020-09-03 PROCEDURE — 63710000001 INSULIN LISPRO (HUMAN) PER 5 UNITS: Performed by: HOSPITALIST

## 2020-09-03 PROCEDURE — 25010000002 MORPHINE PER 10 MG: Performed by: ORTHOPAEDIC SURGERY

## 2020-09-03 PROCEDURE — 80202 ASSAY OF VANCOMYCIN: CPT | Performed by: ORTHOPAEDIC SURGERY

## 2020-09-03 PROCEDURE — 86140 C-REACTIVE PROTEIN: CPT | Performed by: HOSPITALIST

## 2020-09-03 PROCEDURE — 80048 BASIC METABOLIC PNL TOTAL CA: CPT | Performed by: ORTHOPAEDIC SURGERY

## 2020-09-03 RX ORDER — HYDROCODONE BITARTRATE AND ACETAMINOPHEN 7.5; 325 MG/1; MG/1
1 TABLET ORAL EVERY 4 HOURS PRN
Qty: 10 TABLET | Refills: 0 | Status: SHIPPED | OUTPATIENT
Start: 2020-09-03 | End: 2020-09-12

## 2020-09-03 RX ORDER — HYDROCODONE BITARTRATE AND ACETAMINOPHEN 10; 325 MG/1; MG/1
TABLET ORAL
Qty: 40 TABLET | Refills: 0 | Status: SHIPPED | OUTPATIENT
Start: 2020-09-03 | End: 2021-02-25 | Stop reason: HOSPADM

## 2020-09-03 RX ORDER — CEPHALEXIN 500 MG/1
500 CAPSULE ORAL 3 TIMES DAILY
Qty: 21 CAPSULE | Refills: 0 | Status: SHIPPED | OUTPATIENT
Start: 2020-09-05 | End: 2020-09-12

## 2020-09-03 RX ORDER — VANCOMYCIN 1.75 GRAM/500 ML IN 0.9 % SODIUM CHLORIDE INTRAVENOUS
1750 EVERY 12 HOURS
Status: DISCONTINUED | OUTPATIENT
Start: 2020-09-03 | End: 2020-09-03 | Stop reason: HOSPADM

## 2020-09-03 RX ADMIN — SODIUM CHLORIDE 125 ML/HR: 900 INJECTION, SOLUTION INTRAVENOUS at 04:43

## 2020-09-03 RX ADMIN — MORPHINE SULFATE 4 MG: 4 INJECTION INTRAVENOUS at 09:29

## 2020-09-03 RX ADMIN — Medication 10 ML: at 08:32

## 2020-09-03 RX ADMIN — VANCOMYCIN HYDROCHLORIDE 1500 MG: 10 INJECTION, POWDER, LYOPHILIZED, FOR SOLUTION INTRAVENOUS at 08:32

## 2020-09-03 RX ADMIN — INSULIN LISPRO 3 UNITS: 100 INJECTION, SOLUTION INTRAVENOUS; SUBCUTANEOUS at 08:33

## 2020-09-03 RX ADMIN — MORPHINE SULFATE 4 MG: 4 INJECTION INTRAVENOUS at 12:31

## 2020-09-03 RX ADMIN — Medication: at 08:33

## 2020-09-03 RX ADMIN — INSULIN LISPRO 3 UNITS: 100 INJECTION, SOLUTION INTRAVENOUS; SUBCUTANEOUS at 12:31

## 2020-09-03 RX ADMIN — HYDROCODONE BITARTRATE AND ACETAMINOPHEN 1 TABLET: 7.5; 325 TABLET ORAL at 08:32

## 2020-09-03 RX ADMIN — HYDROCODONE BITARTRATE AND ACETAMINOPHEN 1 TABLET: 7.5; 325 TABLET ORAL at 16:02

## 2020-09-03 RX ADMIN — MORPHINE SULFATE 4 MG: 4 INJECTION INTRAVENOUS at 16:04

## 2020-09-03 NOTE — PLAN OF CARE
Patient is anticipating DC in the AM. Blood cx tested positive for Aerobic Bottle Gram positive cocci. Anticipating DC with IV ABX. Stood at bedside independently with walker. Plan of care is ongoing.    Problem: Patient Care Overview  Goal: Plan of Care Review  Outcome: Ongoing (interventions implemented as appropriate)  Goal: Individualization and Mutuality  Outcome: Ongoing (interventions implemented as appropriate)  Goal: Discharge Needs Assessment  Outcome: Ongoing (interventions implemented as appropriate)  Goal: Interprofessional Rounds/Family Conf  Outcome: Ongoing (interventions implemented as appropriate)     Problem: Pain, Acute (Adult)  Goal: Acceptable Pain Control/Comfort Level  Description  Patient will demonstrate the desired outcomes by discharge/transition of care.  Outcome: Ongoing (interventions implemented as appropriate)     Problem: Infection, Risk/Actual (Adult)  Goal: Infection Prevention/Resolution  Description  Patient will demonstrate the desired outcomes by discharge/transition of care.  Outcome: Ongoing (interventions implemented as appropriate)     Problem: Amputation, Lower Extremity (Adult)  Description  Prevent and manage potential problems includin. bleeding  2. bowel motility decreased  3. fluid/electrolyte imbalance  4. functional decline/self-care deficit  5. infection  6. pain  7. postoperative nausea and vomiting  8. postoperative urinary retention  9. respiratory compromise  10. situational response  11. skin breakdown  12. VTE (venous thromboembolism)  13. wound healing impaired  Goal: Signs and Symptoms of Listed Potential Problems Will be Absent, Minimized or Managed (Amputation, Lower Extremity)  Description  Signs and symptoms of listed potential problems will be absent, minimized or managed by discharge/transition of care (reference Amputation, Lower Extremity (Adult) CPG).  Outcome: Ongoing (interventions implemented as appropriate)  Goal: Anesthesia/Sedation  Recovery  Outcome: Ongoing (interventions implemented as appropriate)  Goal: Signs and Symptoms of Listed Potential Problems Will be Absent, Minimized or Managed (Amputation, Lower Extremity)  Description  Signs and symptoms of listed potential problems will be absent, minimized or managed by discharge/transition of care (reference Amputation, Lower Extremity (Adult) CPG).  Outcome: Ongoing (interventions implemented as appropriate)  Goal: Anesthesia/Sedation Recovery  Outcome: Ongoing (interventions implemented as appropriate)     Problem: Fall Risk (Adult)  Goal: Absence of Fall  Description  Patient will demonstrate the desired outcomes by discharge/transition of care.  Outcome: Ongoing (interventions implemented as appropriate)

## 2020-09-03 NOTE — PROGRESS NOTES
"Pharmacy Antimicrobial Dosing Service    Subjective:  Maynor Gregg is a 45 y.o.male admitted with a left foot wound. Pharmacy has been consulted to dose Vancomycin for possible sepsis/ SSTI.    PMH:   Past Medical History:   Diagnosis Date    Coronary artery disease     Diabetes mellitus (CMS/HCC)     MI, old 2010       Assessment/Plan    1. Day #3 Vancomycin: Goal -600 mcg*h/mL. Current regimen 1500 mg (15 mg/kg). Trough 9/3= 9.5mcg/mL. Will increase to 1750mg (~18mg/kg ABW) IV q12h. As pt is only to receive 3 days of IV abx before transitioning to PO (last day tomorrow), no further levels necessary unless vancomycin duration extended.      2. Day #3 Ceftriaxone: 2g IV q24h    Will continue to monitor drug levels, renal function, culture and sensitivities, and patient clinical status.       Objective:  Relevant clinical data and objective history reviewed:  190.5 cm (75\")   95.9 kg (211 lb 6.4 oz)   Ideal body weight: 84.5 kg (186 lb 4.6 oz)  Adjusted ideal body weight: 89.1 kg (196 lb 5.3 oz)  Body mass index is 26.42 kg/m².    Results from last 7 days   Lab Units 09/03/20  0630   VANCOMYCIN TR mcg/mL 9.50     Results from last 7 days   Lab Units 09/03/20  0630 09/02/20  1038 09/02/20  0400   CREATININE mg/dL 0.74* 0.86 0.89     Estimated Creatinine Clearance: 171 mL/min (A) (by C-G formula based on SCr of 0.74 mg/dL (L)).  I/O last 3 completed shifts:  In: 4767 [P.O.:600; I.V.:4167]  Out: 3500 [Urine:3500]    Results from last 7 days   Lab Units 09/03/20  0630 09/02/20  0400 09/01/20  1852   WBC 10*3/mm3 9.80 7.60 13.10*     Temperature    09/02/20 2045 09/03/20 0222 09/03/20 0300   Temp: 98.9 °F (37.2 °C) 98.1 °F (36.7 °C) 98 °F (36.7 °C)     Baseline culture/source/susceptibility:  Microbiology Results (last 10 days)       Procedure Component Value - Date/Time    MRSA Screen, PCR (Inpatient) - Swab, Nares [[id number]]  (Normal) Collected:  09/01/20 2236    Lab Status:  Final result Specimen:  Swab " from Nares Updated:  09/02/20 0253     MRSA PCR No MRSA Detected    Respiratory Panel PCR w/COVID-19(SARS-CoV-2) JOSÉ/ANAYELI/IRA/PAD/COR/MAD In-House, NP Swab in UTM/VTM, 3-4 HR TAT - Swab, Nasopharynx [[id number]]  (Normal) Collected:  09/01/20 2236    Lab Status:  Final result Specimen:  Swab from Nasopharynx Updated:  09/02/20 0700     ADENOVIRUS, PCR Not Detected     Coronavirus 229E Not Detected     Coronavirus HKU1 Not Detected     Coronavirus NL63 Not Detected     Coronavirus OC43 Not Detected     COVID19 Not Detected     Human Metapneumovirus Not Detected     Human Rhinovirus/Enterovirus Not Detected     Influenza A PCR Not Detected     Influenza A H1 Not Detected     Influenza A H1 2009 PCR Not Detected     Influenza A H3 Not Detected     Influenza B PCR Not Detected     Parainfluenza Virus 1 Not Detected     Parainfluenza Virus 2 Not Detected     Parainfluenza Virus 3 Not Detected     Parainfluenza Virus 4 Not Detected     RSV, PCR Not Detected     Bordetella pertussis pcr Not Detected     Bordetella parapertussis PCR Not Detected     Chlamydophila pneumoniae PCR Not Detected     Mycoplasma pneumo by PCR Not Detected    Narrative:       Fact sheet for providers: https://docs.[employer]/wp-content/uploads/AIQ1235-7289-TS8.1-EUA-Provider-Fact-Sheet-3.pdf    Fact sheet for patients: https://docs.[employer]/wp-content/uploads/FCZ0950-6156-EB8.1-EUA-Patient-Fact-Sheet-1.pdf    Blood Culture - Blood, Arm, Right [[id number]] Collected:  09/01/20 1853    Lab Status:  Preliminary result Specimen:  Blood from Arm, Right Updated:  09/02/20 1900     Blood Culture No growth at 24 hours    Blood Culture - Blood, Arm, Left [[id number]]  (Abnormal) Collected:  09/01/20 1852    Lab Status:  Final result Specimen:  Blood from Arm, Left Updated:  09/03/20 0619     Blood Culture Staphylococcus, coagulase negative     Comment: Probable contaminant requires clinical correlation, susceptibility not performed unless requested by  physician.          Isolated from Aerobic Bottle     Gram Stain Aerobic Bottle Gram positive cocci in clusters    Blood Culture ID, PCR - Blood, Arm, Left [[id number]]  (Abnormal) Collected:  09/01/20 1852    Lab Status:  Final result Specimen:  Blood from Arm, Left Updated:  09/02/20 1839     BCID, PCR Staphylococcus spp, not aureus. Identification by BCID PCR.     BOTTLE TYPE Aerobic Bottle    Wound Culture - Wound, Foot, Left [[id number]]  (Abnormal) Collected:  09/01/20 1804    Lab Status:  Preliminary result Specimen:  Wound from Foot, Left Updated:  09/03/20 0731     Wound Culture Moderate growth (3+) Enterococcus species      Moderate growth (3+) Normal Skin Juliet     Gram Stain Moderate (3+) WBCs seen      Moderate (3+) Gram positive cocci in pairs             Anti-Infectives (From admission, onward)      Ordered     Dose/Rate Route Frequency Start Stop    09/03/20 0840  vancomycin IVPB 1750 mg in 0.9% Sodium Chloride (premix) 500 mL     Ordering Provider:  Donn Alcantara MD    1,750 mg  over 90 Minutes Intravenous Every 12 Hours 09/03/20 2000 09/08/20 0759    09/02/20 1100  !Vancomycin Level Draw Needed     Ordering Provider:  Clay Fonseca, DO     Does not apply Once 09/03/20 0700 09/03/20 0833    09/02/20 1031  cefTRIAXone (ROCEPHIN) 2 g in sodium chloride 0.9 % 100 mL IVPB  Review   Ordering Provider:  Donn Alcantara MD    2 g  200 mL/hr over 30 Minutes Intravenous Every 24 Hours 09/02/20 2000 09/05/20 1959 09/01/20 1954  vancomycin 2000 mg/500 mL 0.9% NS IVPB ([facility])     Ordering Provider:  Estrella Calix APRN    20 mg/kg × 94.3 kg Intravenous Once 09/01/20 1956 09/01/20 2017 09/01/20 1954  cefTRIAXone (ROCEPHIN) in SWFI 2 GRAMS/20ml IV PUSH syringe     Ordering Provider:  Estrella Calix APRN    2 g  over 5 Minutes Intravenous Once 09/01/20 1956 09/01/20 2022            Clara Deluca PharmD  09/03/20 08:41

## 2020-09-03 NOTE — PLAN OF CARE
Problem: Patient Care Overview  Goal: Plan of Care Review  Outcome: Ongoing (interventions implemented as appropriate)  Flowsheets (Taken 9/3/2020 1703)  Outcome Summary: Pt refused. PT follow-up 9/4/2020.

## 2020-09-03 NOTE — PROGRESS NOTES
"      [facility] Medicine Services Daily Progress Note      Hospitalist Team  LOS 1 days      Patient Care Team:  Fred Lemons FNP as PCP - General (Family Medicine)    Patient Location: [facility]/1      Subjective   Subjective     Chief Complaint / Subjective  Chief Complaint   Patient presents with   • Fever         Brief Synopsis of Hospital Course/HPI      The patient is a 45 y.o. male with a history of IDDM s/p right BKA,  CAD and chronic left foot Charcot arthropathy with nonhealing ulcer /osteomyelitis s/p 8 weeks of Rocephin presented to the ED on 9/1/2020 with a complaint of fever, nausea, vomiting and persistent left foot wound.  The patient claimed T-max of 100.2 and nausea vomiting for about a day before coming to the ED    In the ED, glucose was 330,  C-reactive protein 12.91, WBC 13.10 and sed rate 103.          Date::      9/2/20: The patient consented to left BKA and was taken to the OR this a.m.  Patient seen after BKA.  Pain controlled.    9/3/30: afebrile. Wound culture with enterococcus issues.  Consulted ID.    Review of Systems   All other systems reviewed and are negative.        Objective   Objective      Vital Signs  Temp:  [97.9 °F (36.6 °C)-99.1 °F (37.3 °C)] 99.1 °F (37.3 °C)  Heart Rate:  [76-92] 85  Resp:  [15-18] 16  BP: (105-122)/(67-78) 119/68  Oxygen Therapy  SpO2: 95 %  Pulse Oximetry Type: Intermittent  Device (Oxygen Therapy): room air  Flow (L/min): 2  Oxygen Concentration (%): 2  Flowsheet Rows      First Filed Value   Admission Height  190.5 cm (75\") Documented at 09/01/2020 1723   Admission Weight  94.3 kg (208 lb) Documented at 09/01/2020 1723        Intake & Output (last 3 days)       08/31 0701 - 09/01 0700 09/01 0701 - 09/02 0700 09/02 0701 - 09/03 0700 09/03 0701 - 09/04 0700    P.O.  240 360 240    I.V. (mL/kg)  350 (3.7) 3817 (39.8)     Total Intake(mL/kg)  590 (6.2) 4177 (43.6) 240 (2.5)    Urine (mL/kg/hr)  1550 1950 (0.8)     Stool   0     Total " Output  1550 1950     Net  -960 +2227 +240            Urine Unmeasured Occurrence   1 x     Stool Unmeasured Occurrence   1 x         Lines, Drains & Airways    Active LDAs     Name:   Placement date:   Placement time:   Site:   Days:    PICC Single Lumen 07/24/20 Right Basilic   07/24/20    1155    Basilic   40                  Physical Exam:    Physical Exam   Constitutional: He is oriented to person, place, and time. He appears well-developed and well-nourished.   HENT:   Head: Normocephalic and atraumatic.   Eyes: Pupils are equal, round, and reactive to light. EOM are normal.   Neck: Normal range of motion. Neck supple.   Cardiovascular: Normal rate and regular rhythm.   Pulmonary/Chest: Effort normal and breath sounds normal.   Abdominal: Soft. Bowel sounds are normal.   Musculoskeletal:   Right BKA and new left BKA with bandage.   Lymphadenopathy:     He has no cervical adenopathy.   Neurological: He is alert and oriented to person, place, and time. No cranial nerve deficit or sensory deficit.   Skin: Skin is warm and dry. No rash noted.   Psychiatric: He has a normal mood and affect. His speech is normal. Cognition and memory are normal.            Wounds (last 24 hours)      LDA Wound     Row Name 09/03/20 0724 09/03/20 0230 09/02/20 1938       Wound Right anterior;lower leg Amputation stump    Wound - Properties Group Side: Right  -MR Orientation: anterior;lower  -MR Location: leg  -MR Primary Wound Type: Amputation s  -MR Wound Outcome: Amputation  -MR    Dressing Appearance  open to air  -KS  --  open to air  -ER    Base  dressing in place, unable to visualize  -KS  dressing in place, unable to visualize  -ER  --       Wound 09/02/20 0833 Left anterior;lower;proximal leg Amputation stump    Wound - Properties Group Date first assessed: 09/02/20  - Time first assessed: 0833  - Present on Hospital Admission: N  -MH Side: Left  -MH Orientation: anterior;lower;proximal  - Location: leg  -MH Primary  Wound Type: Amputation s  - Additional Comments: DRESSING- STAPLES, XERFORM, 4X4, KERLIX, COBAN, ROOKE  -    Dressing Appearance  dry;intact  -KS  dry;intact  -ER  dry;intact;no drainage  -ER    Closure  DORIS  -KS  --  DORIS  -ER    Base  dressing in place, unable to visualize  -KS  --  dressing in place, unable to visualize  -ER    Drainage Amount  none  -KS  --  none  -ER      User Key  (r) = Recorded By, (t) = Taken By, (c) = Cosigned By    Initials Name Provider Type     Hanane Hargrove, RN Registered Nurse    MR ParisaAmi RN Registered Nurse    Hope Stoner RN Registered Nurse    Charmaine Saldivar RN Registered Nurse          Procedures:    Procedure(s):  AMPUTATION BELOW KNEE, left          Results Review:     I reviewed the patient's new clinical results.      Lab Results (last 24 hours)     Procedure Component Value Units Date/Time    POC Glucose Once [[id number]]  (Abnormal) Collected:  09/03/20 1103    Specimen:  Blood Updated:  09/03/20 1104     Glucose 198 mg/dL      Comment: Serial Number: [id number]:  [id number]       BUN [[id number]]  (Normal) Collected:  09/03/20 0630    Specimen:  Blood Updated:  09/03/20 1043     BUN 15 mg/dL     Blood Culture - Blood, Hand, Right [[id number]] Collected:  09/03/20 0856    Specimen:  Blood from Hand, Right Updated:  09/03/20 0956    Blood Culture - Blood, Blood, Arterial Line [[id number]] Collected:  09/03/20 0909    Specimen:  Blood, Arterial Line Updated:  09/03/20 0956    Wound Culture - Wound, Foot, Left [[id number]]  (Abnormal) Collected:  09/01/20 1804    Specimen:  Wound from Foot, Left Updated:  09/03/20 0731     Wound Culture Moderate growth (3+) Enterococcus species      Moderate growth (3+) Normal Skin Juliet     Gram Stain Moderate (3+) WBCs seen      Moderate (3+) Gram positive cocci in pairs    Basic Metabolic Panel [[id number]]  (Abnormal) Collected:  09/03/20 0630    Specimen:  Blood Updated:  09/03/20 0711     Glucose 200 mg/dL       BUN --     Comment: Testing performed by alternate method        Creatinine 0.74 mg/dL      Sodium 135 mmol/L      Potassium 3.8 mmol/L      Chloride 103 mmol/L      CO2 24.0 mmol/L      Calcium 7.5 mg/dL      eGFR Non African Amer 114 mL/min/1.73      BUN/Creatinine Ratio --     Comment: Testing not performed        Anion Gap 8.0 mmol/L     Narrative:       GFR Normal >60  Chronic Kidney Disease <60  Kidney Failure <15      Vancomycin, Trough [[id number]]  (Normal) Collected:  09/03/20 0630    Specimen:  Blood Updated:  09/03/20 0711     Vancomycin Trough 9.50 mcg/mL     C-reactive Protein [[id number]]  (Abnormal) Collected:  09/03/20 0630    Specimen:  Blood Updated:  09/03/20 0711     C-Reactive Protein 9.46 mg/dL     Magnesium [[id number]]  (Normal) Collected:  09/03/20 0630    Specimen:  Blood Updated:  09/03/20 0711     Magnesium 2.1 mg/dL     POC Glucose Once [[id number]]  (Abnormal) Collected:  09/03/20 0706    Specimen:  Blood Updated:  09/03/20 0708     Glucose 164 mg/dL      Comment: Serial Number: [id number]:  [id number]       CBC & Differential [[id number]] Collected:  09/03/20 0630    Specimen:  Blood Updated:  09/03/20 0643    Narrative:       The following orders were created for panel order CBC & Differential.  Procedure                               Abnormality         Status                     ---------                               -----------         ------                     CBC Auto Differential[[id number]]        Abnormal            Final result                 Please view results for these tests on the individual orders.    CBC Auto Differential [[id number]]  (Abnormal) Collected:  09/03/20 0630    Specimen:  Blood Updated:  09/03/20 0643     WBC 9.80 10*3/mm3      RBC 3.57 10*6/mm3      Hemoglobin 10.5 g/dL      Hematocrit 31.1 %      MCV 87.1 fL      MCH 29.3 pg      MCHC 33.6 g/dL      RDW 12.9 %      RDW-SD 39.4 fl      MPV 7.5 fL      Platelets 285 10*3/mm3      Neutrophil % 64.3 %       Lymphocyte % 22.2 %      Monocyte % 13.1 %      Eosinophil % 0.2 %      Basophil % 0.2 %      Neutrophils, Absolute 6.30 10*3/mm3      Lymphocytes, Absolute 2.20 10*3/mm3      Monocytes, Absolute 1.30 10*3/mm3      Eosinophils, Absolute 0.00 10*3/mm3      Basophils, Absolute 0.00 10*3/mm3      nRBC 0.0 /100 WBC     Blood Culture - Blood, Arm, Left [[id number]]  (Abnormal) Collected:  09/01/20 1852    Specimen:  Blood from Arm, Left Updated:  09/03/20 0619     Blood Culture Staphylococcus, coagulase negative     Comment: Probable contaminant requires clinical correlation, susceptibility not performed unless requested by physician.          Isolated from Aerobic Bottle     Gram Stain Aerobic Bottle Gram positive cocci in clusters    POC Glucose Once [[id number]]  (Abnormal) Collected:  09/02/20 2047    Specimen:  Blood Updated:  09/02/20 2048     Glucose 287 mg/dL      Comment: Serial Number: [id number]:  [id number]       Blood Culture - Blood, Arm, Right [[id number]] Collected:  09/01/20 1853    Specimen:  Blood from Arm, Right Updated:  09/02/20 1900     Blood Culture No growth at 24 hours    Blood Culture ID, PCR - Blood, Arm, Left [[id number]]  (Abnormal) Collected:  09/01/20 1852    Specimen:  Blood from Arm, Left Updated:  09/02/20 1839     BCID, PCR Staphylococcus spp, not aureus. Identification by BCID PCR.     BOTTLE TYPE Aerobic Bottle    BUN [[id number]]  (Abnormal) Collected:  09/02/20 1038    Specimen:  Blood Updated:  09/02/20 1633     BUN 22 mg/dL     POC Glucose Once [[id number]]  (Abnormal) Collected:  09/02/20 1621    Specimen:  Blood Updated:  09/02/20 1625     Glucose 270 mg/dL      Comment: Serial Number: [id number]:  [id number]       Tissue Pathology Exam [[id number]] Collected:  09/02/20 0841    Specimen:  Tissue from Leg, Left Updated:  09/02/20 1452        No results found for: HGBA1C  Results from last 7 days   Lab Units 09/02/20  0400   INR  1.09           Lab Results   Component  Value Date    LIPASE 16 09/01/2020     Lab Results   Component Value Date    CHOL 115 07/21/2020    TRIG 108 07/21/2020    HDL 28 (L) 07/21/2020    LDL 65 07/21/2020       Lab Results   Lab Value Date/Time    FINALDX  04/30/2020 0858     Lower extremity, right, below-knee amputation:    Skin ulceration, soft tissue ischemic changes, and gangrenous necrosis    Severe atherosclerotic changes of small vessels    Skin, soft tissue and bone resection margin grossly viable    MONICA/sms       FINALDX  04/28/2020 1023     Specimen #1 (Right foot tissue, debridement):    Gangrenous necrosis of soft tissue    Bone with acute osteomyelitis    Specimen #2 (Right lower extremity, distal foot, amputation):    Gangrenous necrosis of soft tissue    Bone with acute osteomyelitis    JPR/ARASELI/sms          Microbiology Results (last 10 days)     Procedure Component Value - Date/Time    MRSA Screen, PCR (Inpatient) - Swab, Nares [[id number]]  (Normal) Collected:  09/01/20 2236    Lab Status:  Final result Specimen:  Swab from Nares Updated:  09/02/20 0253     MRSA PCR No MRSA Detected    Respiratory Panel PCR w/COVID-19(SARS-CoV-2) JOSÉ/ANAYELI/IRA/PAD/COR/MAD In-House, NP Swab in UTM/VTM, 3-4 HR TAT - Swab, Nasopharynx [[id number]]  (Normal) Collected:  09/01/20 2236    Lab Status:  Final result Specimen:  Swab from Nasopharynx Updated:  09/02/20 0700     ADENOVIRUS, PCR Not Detected     Coronavirus 229E Not Detected     Coronavirus HKU1 Not Detected     Coronavirus NL63 Not Detected     Coronavirus OC43 Not Detected     COVID19 Not Detected     Human Metapneumovirus Not Detected     Human Rhinovirus/Enterovirus Not Detected     Influenza A PCR Not Detected     Influenza A H1 Not Detected     Influenza A H1 2009 PCR Not Detected     Influenza A H3 Not Detected     Influenza B PCR Not Detected     Parainfluenza Virus 1 Not Detected     Parainfluenza Virus 2 Not Detected     Parainfluenza Virus 3 Not Detected     Parainfluenza Virus 4 Not Detected      RSV, PCR Not Detected     Bordetella pertussis pcr Not Detected     Bordetella parapertussis PCR Not Detected     Chlamydophila pneumoniae PCR Not Detected     Mycoplasma pneumo by PCR Not Detected    Narrative:       Fact sheet for providers: https://docs.[employer].com/wp-content/uploads/XCN0706-8882-RM8.1-EUA-Provider-Fact-Sheet-3.pdf    Fact sheet for patients: https://docs.[employer].com/wp-content/uploads/DCL4654-9006-CP6.1-EUA-Patient-Fact-Sheet-1.pdf    Blood Culture - Blood, Arm, Right [[id number]] Collected:  09/01/20 1853    Lab Status:  Preliminary result Specimen:  Blood from Arm, Right Updated:  09/02/20 1900     Blood Culture No growth at 24 hours    Blood Culture - Blood, Arm, Left [[id number]]  (Abnormal) Collected:  09/01/20 1852    Lab Status:  Final result Specimen:  Blood from Arm, Left Updated:  09/03/20 0619     Blood Culture Staphylococcus, coagulase negative     Comment: Probable contaminant requires clinical correlation, susceptibility not performed unless requested by physician.          Isolated from Aerobic Bottle     Gram Stain Aerobic Bottle Gram positive cocci in clusters    Blood Culture ID, PCR - Blood, Arm, Left [[id number]]  (Abnormal) Collected:  09/01/20 1852    Lab Status:  Final result Specimen:  Blood from Arm, Left Updated:  09/02/20 1839     BCID, PCR Staphylococcus spp, not aureus. Identification by BCID PCR.     BOTTLE TYPE Aerobic Bottle    Wound Culture - Wound, Foot, Left [[id number]]  (Abnormal) Collected:  09/01/20 1804    Lab Status:  Preliminary result Specimen:  Wound from Foot, Left Updated:  09/03/20 0731     Wound Culture Moderate growth (3+) Enterococcus species      Moderate growth (3+) Normal Skin Juliet     Gram Stain Moderate (3+) WBCs seen      Moderate (3+) Gram positive cocci in pairs          ECG/EMG Results (most recent)     None               Results for orders placed during the hospital encounter of 01/18/20   Adult Transthoracic Echo Complete W/ Cont  if Necessary Per Protocol    Narrative · Left ventricular systolic function is normal.  · Mild mitral valve regurgitation is present  · Mild tricuspid valve regurgitation is present.  · Ejection fraction is about 65%  · No pericardial effusion noted          Xr Tibia Fibula 2 View Left    Result Date: 9/2/2020  Satisfactory postoperative appearance status post left BKA.  Electronically Signed By-Dr. Yamileth Bourne MD On:9/2/2020 12:23 PM This report was finalized on [id number] by Dr. Yamileth Bourne MD.    Xr Foot 3+ View Left    Result Date: 9/1/2020  1. There is severe derangement of the midfoot consistent with neuropathic disease. This is unchanged except that there has been some widening of some of the tarsal-metatarsal joints. 2. Negative for plain film evidence of osteomyelitis. 3. Diffuse soft tissue swelling. 4. Multiple prior amputations.  Electronically Signed By-Arabella Mendez On:9/1/2020 6:44 PM This report was finalized on [id number] by  Arabella Mendez .          Xrays, labs reviewed personally by physician.    Medication Review:   I have reviewed the patient's current medication list      Scheduled Meds    aspirin 325 mg Oral Daily   cefTRIAXone 2 g Intravenous Q24H   insulin glargine 15 Units Subcutaneous Nightly   insulin lispro 0-14 Units Subcutaneous 4x Daily With Meals & Nightly   polyethylene glycol 17 g Oral Daily   vancomycin 1,750 mg Intravenous Q12H       Meds Infusions       Meds PRN  •  acetaminophen  •  albuterol  •  dextrose  •  dextrose  •  flumazenil  •  glucagon (human recombinant)  •  HYDROcodone-acetaminophen  •  HYDROcodone-acetaminophen  •  HYDROmorphone  •  HYDROmorphone  •  insulin lispro **AND** insulin lispro  •  magnesium hydroxide  •  melatonin  •  metoprolol tartrate  •  midazolam  •  Morphine **AND** naloxone  •  ondansetron **OR** ondansetron  •  sodium chloride        Assessment/Plan   Assessment/Plan     Active Hospital Problems    Diagnosis  POA   • **Wound of left  foot [S91.302A]  Yes   • S/P BKA (below knee amputation), right (CMS/[facility]) [Z89.511]  Not Applicable     Priority: High   • CAD (coronary artery disease) [I25.10]  Yes     Priority: Medium   • Type 1 diabetes mellitus with circulatory complication (CMS/[facility]) [E10.59]  Yes     Priority: Medium   • Foot osteomyelitis, left (CMS/[facility]) [M86.9]  Yes      Resolved Hospital Problems   No resolved problems to display.       MEDICAL DECISION MAKING COMPLEXITY BY PROBLEM:     Nonhealing diabetic left foot wound/osteomyelitis (POA) with failed outpatient treatment with Rocephin x8 weeks  -- s/p left BKA 9/2/2020  --Continue wound care and pain control  --Wound culture grew enterococcus  --Consulted ID 9/3/20    Insulin-dependent diabetes mellitus  - Continue Lantus and ISS     CAD  -Denies chest pain    VTE Prophylaxis -   Mechanical Order History:      Ordered        09/01/20 2110  Place Sequential Compression Device  Once         09/01/20 2110  Maintain Sequential Compression Device  Continuous,   Status:  Canceled                 Pharmalogical Order History:     None            Code Status -   Code Status and Medical Interventions:   Ordered at: 09/02/20 1031     Level Of Support Discussed With:    Patient     Code Status:    CPR     Medical Interventions (Level of Support Prior to Arrest):    Full       This patient has been examined wearing appropriate Personal Protective Equipment. 09/03/20        Electronically signed by Clay Fonseca DO, 09/03/20, 12:02.  [facility] Hospitalist Team

## 2020-09-03 NOTE — CONSULTS
Infectious Diseases Consult Note    Referring Provider: Clay Fonseca,*    Reason for Consultation: Status post left below-knee amputation    Patient Care Team:  Fred Lemons FNP as PCP - General (Family Medicine)    Chief complaint left leg stump pain    Subjective     History of present illness:      This is 45-year-old white male who was hospitalized [facility] on September 1, 2020 for left below-knee amputation.  The patient has a chronic infection of his left foot and was receiving IV Rocephin for 6 weeks.  He had group B streptococcus bacteriuria associated with bacteremia in the past.  The patient was following with podiatry service and he decided to undergo like amputation since there was no improvement.  The surgery was done on September 2, 2020.  The patient wants to go home today.  The patient denied having any complaints except for left leg stump pain.    Review of Systems   Review of Systems   Constitutional: Negative.    HENT: Negative.    Eyes: Negative.    Respiratory: Negative.    Cardiovascular: Negative.    Gastrointestinal: Negative.    Genitourinary: Negative.    Skin: Negative.    Neurological: Negative.    Hematological: Negative.    Psychiatric/Behavioral: Negative.        Medications  Medications Prior to Admission   Medication Sig Dispense Refill Last Dose   • cefTRIAXone 2 g in sodium chloride 0.9 % 100 mL IVPB Infuse 2 g into a venous catheter Daily for 41 doses. Indications: Bone and/or Joint Infection   8/31/2020 at Unknown time   • insulin aspart (novoLOG) 100 UNIT/ML injection Inject 4-8 Units under the skin into the appropriate area as directed 3 (Three) Times a Day Before Meals.      • Insulin Glargine (BASAGLAR KWIKPEN) 100 UNIT/ML injection pen INJECT 12 UNITS BY SUBCUTANEOUS ROUTE EVERY NIGHT   8/31/2020 at Unknown time       History  Past Medical History:   Diagnosis Date   • Coronary artery disease    • Diabetes mellitus (CMS/[facility])    • MI, old 2010     Past  Surgical History:   Procedure Laterality Date   • AMPUTATION DIGIT Right 2/28/2020    Procedure: PARTIAL FIRST RAY RESECTION RIGHT FOOT;  Surgeon: CROW Souza DPM;  Location: [facility] MAIN OR;  Service: Podiatry;  Laterality: Right;   • AMPUTATION FOOT / TOE     • BELOW KNEE AMPUTATION Right 4/30/2020    Procedure: AMPUTATION BELOW KNEE;  Surgeon: Donn Alcantara MD;  Location: [facility] OR;  Service: Orthopedics;  Laterality: Right;   • CARDIAC CATHETERIZATION  11/2010     RCA artery   • CARDIAC CATHETERIZATION  2015    LAD artery   • CARDIAC SURGERY     • INCISION AND DRAINAGE LEG Left 1/21/2020    Procedure: INCISION AND DRAINAGE LOWER EXTREMITY with second digit amputation;  Surgeon: CROW Souza DPM;  Location: [facility] MAIN OR;  Service: Podiatry   • INCISION AND DRAINAGE LEG Right 4/28/2020    Procedure: incision and drainage ,transmetatarsal amputation, fasciotomy;  Surgeon: CROW Souaz DPM;  Location: [facility] OR;  Service: Podiatry;  Laterality: Right;   • TOE AMPUTATION Left    • WOUND DEBRIDEMENT Right 1/21/2020    Procedure: DEBRIDEMENT with sesamoid excision;  Surgeon: CROW Souza DPM;  Location: [facility] MAIN OR;  Service: Podiatry       Family History  Family History   Problem Relation Age of Onset   • Diabetes Father    • Heart failure Father    • Diabetes Paternal Grandfather        Social History   reports that he quit smoking about 16 months ago. He smoked 0.50 packs per day. He has never used smokeless tobacco. He reports that he does not drink alcohol or use drugs.    Allergies  Metformin and Oxycodone-acetaminophen    Objective     Vital Signs   Vital Signs (last 24 hours)       09/02 0700  -  09/03 0659 09/03 0700  -  09/03 1334   Most Recent    Temp (°F) 97.9 -  99.3    98.1 -  99.1     98.1 (36.7)    Heart Rate 74 -  93    70 -  85     70    Resp 10 -  24    16 -  18     18    /67 -  133/77    105/59 -  119/68     105/59    SpO2 (%) 93 -  100    95 -  96     96           Physical Exam:  Physical Exam   Constitutional: He is oriented to person, place, and time. He appears well-developed and well-nourished.   HENT:   Head: Normocephalic and atraumatic.   Eyes: Pupils are equal, round, and reactive to light. EOM are normal.   Neck: Normal range of motion. Neck supple.   Cardiovascular: Normal rate, regular rhythm and normal heart sounds.   Pulmonary/Chest: Effort normal and breath sounds normal. No respiratory distress. He has no wheezes. He has no rales.   Abdominal: Soft. Bowel sounds are normal. He exhibits no distension and no mass. There is no tenderness. There is no rebound and no guarding.   Musculoskeletal: Normal range of motion. He exhibits no edema or deformity.   Bilateral below-knee amputation.  The right leg stump healed nicely but he has pressure ulcer on the anterior knee from prosthesis      The patient is status post left below-knee amputation this admission with bulky surgical dressings on the left leg stump       Neurological: He is alert and oriented to person, place, and time. No cranial nerve deficit.   Skin: Skin is warm. No rash noted. No erythema.   Psychiatric: He has a normal mood and affect.   Nursing note and vitals reviewed.      Microbiology  Microbiology Results (last 10 days)     Procedure Component Value - Date/Time    MRSA Screen, PCR (Inpatient) - Swab, Nares [[id number]]  (Normal) Collected:  09/01/20 2236    Lab Status:  Final result Specimen:  Swab from Nares Updated:  09/02/20 0253     MRSA PCR No MRSA Detected    Respiratory Panel PCR w/COVID-19(SARS-CoV-2) JOSÉ/ANAYELI/IRA/PAD/COR/MAD In-House, NP Swab in UTM/VTM, 3-4 HR TAT - Swab, Nasopharynx [[id number]]  (Normal) Collected:  09/01/20 2236    Lab Status:  Final result Specimen:  Swab from Nasopharynx Updated:  09/02/20 0700     ADENOVIRUS, PCR Not Detected     Coronavirus 229E Not Detected     Coronavirus HKU1 Not Detected     Coronavirus NL63 Not Detected     Coronavirus OC43 Not  Detected     COVID19 Not Detected     Human Metapneumovirus Not Detected     Human Rhinovirus/Enterovirus Not Detected     Influenza A PCR Not Detected     Influenza A H1 Not Detected     Influenza A H1 2009 PCR Not Detected     Influenza A H3 Not Detected     Influenza B PCR Not Detected     Parainfluenza Virus 1 Not Detected     Parainfluenza Virus 2 Not Detected     Parainfluenza Virus 3 Not Detected     Parainfluenza Virus 4 Not Detected     RSV, PCR Not Detected     Bordetella pertussis pcr Not Detected     Bordetella parapertussis PCR Not Detected     Chlamydophila pneumoniae PCR Not Detected     Mycoplasma pneumo by PCR Not Detected    Narrative:       Fact sheet for providers: https://docs.[employer]/wp-content/uploads/JBH6962-5331-XG8.1-EUA-Provider-Fact-Sheet-3.pdf    Fact sheet for patients: https://docs.[employer]/wp-content/uploads/ODX0606-9452-BZ2.1-EUA-Patient-Fact-Sheet-1.pdf    Blood Culture - Blood, Arm, Right [[id number]] Collected:  09/01/20 1853    Lab Status:  Preliminary result Specimen:  Blood from Arm, Right Updated:  09/02/20 1900     Blood Culture No growth at 24 hours    Blood Culture - Blood, Arm, Left [[id number]]  (Abnormal) Collected:  09/01/20 1852    Lab Status:  Final result Specimen:  Blood from Arm, Left Updated:  09/03/20 0619     Blood Culture Staphylococcus, coagulase negative     Comment: Probable contaminant requires clinical correlation, susceptibility not performed unless requested by physician.          Isolated from Aerobic Bottle     Gram Stain Aerobic Bottle Gram positive cocci in clusters    Blood Culture ID, PCR - Blood, Arm, Left [[id number]]  (Abnormal) Collected:  09/01/20 1852    Lab Status:  Final result Specimen:  Blood from Arm, Left Updated:  09/02/20 1839     BCID, PCR Staphylococcus spp, not aureus. Identification by BCID PCR.     BOTTLE TYPE Aerobic Bottle    Wound Culture - Wound, Foot, Left [[id number]]  (Abnormal) Collected:  09/01/20 1804    Lab  Status:  Preliminary result Specimen:  Wound from Foot, Left Updated:  09/03/20 0731     Wound Culture Moderate growth (3+) Enterococcus species      Moderate growth (3+) Normal Skin Juliet     Gram Stain Moderate (3+) WBCs seen      Moderate (3+) Gram positive cocci in pairs          Laboratory  Results from last 7 days   Lab Units 09/03/20  0630   WBC 10*3/mm3 9.80   HEMOGLOBIN g/dL 10.5*   HEMATOCRIT % 31.1*   PLATELETS 10*3/mm3 285     Results from last 7 days   Lab Units 09/03/20  0630   SODIUM mmol/L 135*   POTASSIUM mmol/L 3.8   CHLORIDE mmol/L 103   CO2 mmol/L 24.0   BUN  15   CREATININE mg/dL 0.74*   GLUCOSE mg/dL 200*   CALCIUM mg/dL 7.5*     Results from last 7 days   Lab Units 09/03/20  0630   SODIUM mmol/L 135*   POTASSIUM mmol/L 3.8   CHLORIDE mmol/L 103   CO2 mmol/L 24.0   BUN  15   CREATININE mg/dL 0.74*   GLUCOSE mg/dL 200*   CALCIUM mg/dL 7.5*         Results from last 7 days   Lab Units 09/01/20  1852   SED RATE mm/hr 103*           Radiology  Imaging Results (Last 72 Hours)     Procedure Component Value Units Date/Time    XR Tibia Fibula 2 View Left [[id number]] Collected:  09/02/20 1222     Updated:  09/02/20 1225    Narrative:       DATE OF EXAM:  9/2/2020 11:30 AM     PROCEDURE:  XR TIBIA FIBULA 2 VW LEFT-     INDICATIONS:  Status post BKA; S91.302A-Unspecified open wound, left foot, initial  encounter; Z89.511-Acquired absence of right leg below knee     COMPARISON:  No Comparisons Available     TECHNIQUE:   2 views of the left tibia and fibula was/were obtained.     FINDINGS:  Surgical changes of left below-the-knee amputation are present. Surgical  staples project over the distal stump soft tissues. No subcutaneous gas  is identified. The knee joint appears appropriately aligned. No acute  fracture.       Impression:       Satisfactory postoperative appearance status post left BKA.     Electronically Signed By-Dr. Yamileth Bourne MD On:9/2/2020 12:23 PM  This report was finalized on  [id number] by Dr. Yamileth Bourne MD.    XR Foot 3+ View Left [[id number]] Collected:  09/01/20 1836     Updated:  09/01/20 1846    Narrative:       XR FOOT 3+ VW LEFT-     Date of Exam: 9/1/2020 6:15 PM     Indication: foot wound.     Comparison: None available.     Technique: Three views of the foot were obtained.     FINDINGS: As was previously noted, there is marked abnormality of the  midfoot, consistent with neuropathic disease, with midfoot collapse.  There is fragmentation of multiple bones of the midfoot. The appearance  is unchanged except that there is widening of the tarsal-metatarsal  joints of the third and fourth digits.     The fifth metatarsal base is absent. There has been amputation of the  first, second, and third toes at the MTP joints. There is chronic  deformity of the heads of the second and third metacarpal tarsals.     There is no aggressive-appearing periostitis. There is diffuse soft  tissue swelling. No soft tissue gas or opaque foreign body is  identified.       Impression:       1. There is severe derangement of the midfoot consistent with  neuropathic disease. This is unchanged except that there has been some  widening of some of the tarsal-metatarsal joints.  2. Negative for plain film evidence of osteomyelitis.  3. Diffuse soft tissue swelling.  4. Multiple prior amputations.     Electronically Signed By-Arabella Mendez On:9/1/2020 6:44 PM  This report was finalized on [id number] by  Arabella Mendez, .          Cardiology      Results Review:  I have reviewed all clinical data, test, lab, and imaging results.       Schedule Meds    aspirin 325 mg Oral Daily   cefTRIAXone 2 g Intravenous Q24H   insulin glargine 15 Units Subcutaneous Nightly   insulin lispro 0-14 Units Subcutaneous 4x Daily With Meals & Nightly   polyethylene glycol 17 g Oral Daily   vancomycin 1,750 mg Intravenous Q12H       Infusion Meds       PRN Meds  •  acetaminophen  •  albuterol  •  dextrose  •  dextrose  •   flumazenil  •  glucagon (human recombinant)  •  HYDROcodone-acetaminophen  •  HYDROcodone-acetaminophen  •  HYDROmorphone  •  HYDROmorphone  •  insulin lispro **AND** insulin lispro  •  magnesium hydroxide  •  melatonin  •  metoprolol tartrate  •  midazolam  •  Morphine **AND** naloxone  •  ondansetron **OR** ondansetron  •  sodium chloride      Assessment/Plan       Assessment    Status post left below-knee amputation on September 2, 2020 secondary to nonhealing left diabetic foot.  Patient had positive culture for group B streptococcus and was actively receiving Rocephin on admission.    Status post right below-knee amputation in May 2024 right diabetic foot.  Stump healed but patient had pressure also on the anterior aspect of the knee from the prosthesis    Diabetes mellitus with diabetic neuropathy    History of a group B streptococcus bacteremia secondary to diabetic foot infection in the past    Plan    Continue IV Rocephin 2 g daily until September 4, 2020.  After that PICC line can be removed  Patient will be started on p.o. Keflex 500 mg 3 times daily on September 5 for 7 days to protect left leg stump  Okay to discharge home from infectious disease point as long as okay with orthopedic surgery service    Bobo Garcia MD  09/03/20  13:34      Note is dictated utilizing voice recognition software/Dragon

## 2020-09-04 ENCOUNTER — READMISSION MANAGEMENT (OUTPATIENT)
Dept: CALL CENTER | Facility: HOSPITAL | Age: 45
End: 2020-09-04

## 2020-09-04 LAB
BACTERIA SPEC AEROBE CULT: ABNORMAL
BACTERIA SPEC AEROBE CULT: ABNORMAL
GRAM STN SPEC: ABNORMAL
GRAM STN SPEC: ABNORMAL

## 2020-09-04 NOTE — PROGRESS NOTES
Case Management Discharge Note      Final Note: [facility] (Order in epic and spoke with maryanne whitney).    Provided Post Acute Provider List?: N/A  N/A Provider List Comment: Patient is current with [facility] and wants to stay with [facility].           Home Medical Care      Service Provider Request Status Selected Services Address Phone Number Fax Number    [facility] Selected Home Health Services [address] [place] [address] [phone] --             Final Discharge Disposition Code: 06 - home with home health care

## 2020-09-04 NOTE — OUTREACH NOTE
Prep Survey      Responses   [facility] discharged from?  [facility]   Is LACE score < 7 ?  No   Eligibility  Readm Mgmt   Discharge diagnosis  **Wound of left foot    Does the patient have one of the following disease processes/diagnoses(primary or secondary)?  General Surgery   Does the patient have Home health ordered?  Yes   What is the Home health agency?   [facility]     Medication alerts for this patient  [facility]    Prep survey completed?  Yes          Christelle Sun RN

## 2020-09-06 LAB
BACTERIA SPEC AEROBE CULT: ABNORMAL
BACTERIA SPEC AEROBE CULT: NORMAL
GRAM STN SPEC: ABNORMAL

## 2020-09-07 ENCOUNTER — READMISSION MANAGEMENT (OUTPATIENT)
Dept: CALL CENTER | Facility: HOSPITAL | Age: 45
End: 2020-09-07

## 2020-09-07 NOTE — OUTREACH NOTE
General Surgery Week 1 Survey      Responses   [facility] patient discharged from?  [facility]   Does the patient have one of the following disease processes/diagnoses(primary or secondary)?  General Surgery   Is there a successful TCM telephone encounter documented?  No   Week 1 attempt successful?  Yes   Call start time  1651   Call end time  1655   Discharge diagnosis  **Wound of left foot    Meds reviewed with patient/caregiver?  Yes   Is the patient having any side effects they believe may be caused by any medication additions or changes?  No   Does the patient have all medications related to this admission filled (includes all antibiotics, pain medications, etc.)  Yes   Is the patient taking all medications as directed (includes completed medication regime)?  Yes   Does the patient have a follow up appointment scheduled with their surgeon?  Yes   Has the patient kept scheduled appointments due by today?  N/A   What is the Home health agency?   [facility]     Has home health visited the patient within 72 hours of discharge?  Yes   Psychosocial issues?  Yes   Did the patient receive a copy of their discharge instructions?  Yes   Nursing interventions  Reviewed instructions with patient   What is the patient's perception of their health status since discharge?  Same   Additional teach back comments  Brief call, says pain is the same.   Week 1 call completed?  Yes          Cora Vilchis RN

## 2020-09-08 LAB — BACTERIA SPEC AEROBE CULT: NORMAL

## 2020-09-16 ENCOUNTER — READMISSION MANAGEMENT (OUTPATIENT)
Dept: CALL CENTER | Facility: HOSPITAL | Age: 45
End: 2020-09-16

## 2020-09-16 NOTE — OUTREACH NOTE
General Surgery Week 2 Survey      Responses   [facility] patient discharged from?  [facility]   Does the patient have one of the following disease processes/diagnoses(primary or secondary)?  General Surgery   Week 2 attempt successful?  Yes   Call start time  1353   Call end time  1355   Discharge diagnosis  **Wound of left foot    Meds reviewed with patient/caregiver?  Yes   Is the patient having any side effects they believe may be caused by any medication additions or changes?  No   Does the patient have all medications related to this admission filled (includes all antibiotics, pain medications, etc.)  Yes   Is the patient taking all medications as directed (includes completed medication regime)?  Yes   Does the patient have a follow up appointment scheduled with their surgeon?  Yes   Has the patient kept scheduled appointments due by today?  Yes   Psychosocial issues?  No   Did the patient receive a copy of their discharge instructions?  Yes   Nursing interventions  Reviewed instructions with patient   What is the patient's perception of their health status since discharge?  Improving   Nursing interventions  Nurse provided patient education   Is the patient /caregiver able to teach back basic post-op care?  Take showers only when approved by MD-sponge bathe until then, No tub bath, swimming, or hot tub until instructed by MD, Lifting as instructed by MD in discharge instructions   Is the patient/caregiver able to teach back signs and symptoms of incisional infection?  Increased redness, swelling or pain at the incisonal site, Increased drainage or bleeding   Is the patient/caregiver able to teach back steps to recovery at home?  Set small, achievable goals for return to baseline health, Rest and rebuild strength, gradually increase activity   Is the patient/caregiver able to teach back the hierarchy of who to call/visit for symptoms/problems? PCP, Specialist, Home health nurse, Urgent Care, ED, 911  Yes    Week 2 call completed?  Yes          Wm Jordan RN

## 2020-09-24 ENCOUNTER — READMISSION MANAGEMENT (OUTPATIENT)
Dept: CALL CENTER | Facility: HOSPITAL | Age: 45
End: 2020-09-24

## 2020-09-24 NOTE — OUTREACH NOTE
General Surgery Week 3 Survey      Responses   [facility] patient discharged from?  [facility]   Does the patient have one of the following disease processes/diagnoses(primary or secondary)?  General Surgery   Week 3 attempt successful?  Yes   Call start time  0814   Call end time  0815   Meds reviewed with patient/caregiver?  Yes   Is the patient taking all medications as directed (includes completed medication regime)?  Yes   Has the patient kept scheduled appointments due by today?  Yes   What is the patient's perception of their health status since discharge?  Improving   Week 3 call completed?  Yes   Revoked  No further contact(revokes)-requires comment   Graduated/Revoked comments  States that he is fine.          Ramona Zuñiga, RN

## 2021-02-22 ENCOUNTER — HOSPITAL ENCOUNTER (INPATIENT)
Facility: HOSPITAL | Age: 46
LOS: 3 days | Discharge: HOME HEALTH | End: 2021-02-25
Attending: ORTHOPAEDIC SURGERY | Admitting: ORTHOPAEDIC SURGERY

## 2021-02-22 DIAGNOSIS — T87.43 INFECTION OF RIGHT BELOW KNEE AMPUTATION (HCC): Primary | ICD-10-CM

## 2021-02-22 LAB
ABO GROUP BLD: NORMAL
ALBUMIN SERPL-MCNC: 3.2 G/DL (ref 3.5–5.2)
ALBUMIN/GLOB SERPL: 0.7 G/DL
ALP SERPL-CCNC: 158 U/L (ref 39–117)
ALT SERPL W P-5'-P-CCNC: 30 U/L (ref 1–41)
ANION GAP SERPL CALCULATED.3IONS-SCNC: 8 MMOL/L (ref 5–15)
AST SERPL-CCNC: 22 U/L (ref 1–40)
BILIRUB SERPL-MCNC: 0.5 MG/DL (ref 0–1.2)
BLD GP AB SCN SERPL QL: NEGATIVE
BUN SERPL-MCNC: 13 MG/DL (ref 6–20)
BUN/CREAT SERPL: 13.4 (ref 7–25)
CALCIUM SPEC-SCNC: 8.8 MG/DL (ref 8.6–10.5)
CHLORIDE SERPL-SCNC: 96 MMOL/L (ref 98–107)
CO2 SERPL-SCNC: 30 MMOL/L (ref 22–29)
CREAT SERPL-MCNC: 0.97 MG/DL (ref 0.76–1.27)
DEPRECATED RDW RBC AUTO: 41.1 FL (ref 37–54)
ERYTHROCYTE [DISTWIDTH] IN BLOOD BY AUTOMATED COUNT: 13.2 % (ref 12.3–15.4)
GFR SERPL CREATININE-BSD FRML MDRD: 83 ML/MIN/1.73
GLOBULIN UR ELPH-MCNC: 4.9 GM/DL
GLUCOSE BLDC GLUCOMTR-MCNC: 95 MG/DL (ref 70–105)
GLUCOSE SERPL-MCNC: 95 MG/DL (ref 65–99)
HCT VFR BLD AUTO: 38.3 % (ref 37.5–51)
HGB BLD-MCNC: 12.9 G/DL (ref 13–17.7)
MCH RBC QN AUTO: 30.1 PG (ref 26.6–33)
MCHC RBC AUTO-ENTMCNC: 33.7 G/DL (ref 31.5–35.7)
MCV RBC AUTO: 89.2 FL (ref 79–97)
PLATELET # BLD AUTO: 461 10*3/MM3 (ref 140–450)
PMV BLD AUTO: 7.2 FL (ref 6–12)
POTASSIUM SERPL-SCNC: 3.7 MMOL/L (ref 3.5–5.2)
PROT SERPL-MCNC: 8.1 G/DL (ref 6–8.5)
RBC # BLD AUTO: 4.29 10*6/MM3 (ref 4.14–5.8)
RH BLD: POSITIVE
SARS-COV-2 RNA PNL SPEC NAA+PROBE: NOT DETECTED
SODIUM SERPL-SCNC: 134 MMOL/L (ref 136–145)
T&S EXPIRATION DATE: NORMAL
WBC # BLD AUTO: 10.8 10*3/MM3 (ref 3.4–10.8)

## 2021-02-22 PROCEDURE — 25010000002 CEFTRIAXONE PER 250 MG: Performed by: ORTHOPAEDIC SURGERY

## 2021-02-22 PROCEDURE — 99219 PR INITIAL OBSERVATION CARE/DAY 50 MINUTES: CPT | Performed by: NURSE PRACTITIONER

## 2021-02-22 PROCEDURE — 25010000002 VANCOMYCIN 10 G RECONSTITUTED SOLUTION: Performed by: ORTHOPAEDIC SURGERY

## 2021-02-22 PROCEDURE — U0003 INFECTIOUS AGENT DETECTION BY NUCLEIC ACID (DNA OR RNA); SEVERE ACUTE RESPIRATORY SYNDROME CORONAVIRUS 2 (SARS-COV-2) (CORONAVIRUS DISEASE [COVID-19]), AMPLIFIED PROBE TECHNIQUE, MAKING USE OF HIGH THROUGHPUT TECHNOLOGIES AS DESCRIBED BY CMS-2020-01-R: HCPCS | Performed by: ORTHOPAEDIC SURGERY

## 2021-02-22 PROCEDURE — 86850 RBC ANTIBODY SCREEN: CPT | Performed by: ORTHOPAEDIC SURGERY

## 2021-02-22 PROCEDURE — 80053 COMPREHEN METABOLIC PANEL: CPT | Performed by: ORTHOPAEDIC SURGERY

## 2021-02-22 PROCEDURE — 93010 ELECTROCARDIOGRAM REPORT: CPT | Performed by: INTERNAL MEDICINE

## 2021-02-22 PROCEDURE — 83036 HEMOGLOBIN GLYCOSYLATED A1C: CPT | Performed by: NURSE PRACTITIONER

## 2021-02-22 PROCEDURE — 86900 BLOOD TYPING SEROLOGIC ABO: CPT | Performed by: ORTHOPAEDIC SURGERY

## 2021-02-22 PROCEDURE — 85027 COMPLETE CBC AUTOMATED: CPT | Performed by: ORTHOPAEDIC SURGERY

## 2021-02-22 PROCEDURE — 93005 ELECTROCARDIOGRAM TRACING: CPT | Performed by: ORTHOPAEDIC SURGERY

## 2021-02-22 PROCEDURE — 82962 GLUCOSE BLOOD TEST: CPT

## 2021-02-22 PROCEDURE — 86901 BLOOD TYPING SEROLOGIC RH(D): CPT | Performed by: ORTHOPAEDIC SURGERY

## 2021-02-22 RX ORDER — SODIUM CHLORIDE 0.9 % (FLUSH) 0.9 %
3 SYRINGE (ML) INJECTION EVERY 12 HOURS SCHEDULED
Status: DISCONTINUED | OUTPATIENT
Start: 2021-02-22 | End: 2021-02-25 | Stop reason: HOSPADM

## 2021-02-22 RX ORDER — SODIUM CHLORIDE 0.9 % (FLUSH) 0.9 %
3-10 SYRINGE (ML) INJECTION AS NEEDED
Status: DISCONTINUED | OUTPATIENT
Start: 2021-02-22 | End: 2021-02-25 | Stop reason: HOSPADM

## 2021-02-22 RX ORDER — ONDANSETRON 4 MG/1
4 TABLET, FILM COATED ORAL EVERY 6 HOURS PRN
Status: DISCONTINUED | OUTPATIENT
Start: 2021-02-22 | End: 2021-02-25 | Stop reason: HOSPADM

## 2021-02-22 RX ORDER — INSULIN GLARGINE 100 [IU]/ML
12 INJECTION, SOLUTION SUBCUTANEOUS NIGHTLY
Status: DISCONTINUED | OUTPATIENT
Start: 2021-02-23 | End: 2021-02-24

## 2021-02-22 RX ORDER — SODIUM CHLORIDE 9 MG/ML
75 INJECTION, SOLUTION INTRAVENOUS CONTINUOUS
Status: DISCONTINUED | OUTPATIENT
Start: 2021-02-22 | End: 2021-02-25 | Stop reason: HOSPADM

## 2021-02-22 RX ORDER — CEFDINIR 300 MG/1
300 CAPSULE ORAL 2 TIMES DAILY
COMMUNITY
End: 2021-02-25 | Stop reason: HOSPADM

## 2021-02-22 RX ORDER — ACETAMINOPHEN 325 MG/1
650 TABLET ORAL EVERY 6 HOURS PRN
Status: DISCONTINUED | OUTPATIENT
Start: 2021-02-22 | End: 2021-02-25 | Stop reason: HOSPADM

## 2021-02-22 RX ORDER — CHOLECALCIFEROL (VITAMIN D3) 125 MCG
5 CAPSULE ORAL NIGHTLY PRN
Status: DISCONTINUED | OUTPATIENT
Start: 2021-02-22 | End: 2021-02-25 | Stop reason: HOSPADM

## 2021-02-22 RX ORDER — DEXTROSE MONOHYDRATE 25 G/50ML
25 INJECTION, SOLUTION INTRAVENOUS
Status: DISCONTINUED | OUTPATIENT
Start: 2021-02-22 | End: 2021-02-25 | Stop reason: HOSPADM

## 2021-02-22 RX ORDER — NICOTINE POLACRILEX 4 MG
15 LOZENGE BUCCAL
Status: DISCONTINUED | OUTPATIENT
Start: 2021-02-22 | End: 2021-02-25 | Stop reason: HOSPADM

## 2021-02-22 RX ORDER — INSULIN LISPRO 100 [IU]/ML
0-14 INJECTION, SOLUTION INTRAVENOUS; SUBCUTANEOUS
Status: DISCONTINUED | OUTPATIENT
Start: 2021-02-23 | End: 2021-02-24

## 2021-02-22 RX ORDER — ONDANSETRON 2 MG/ML
4 INJECTION INTRAMUSCULAR; INTRAVENOUS EVERY 6 HOURS PRN
Status: DISCONTINUED | OUTPATIENT
Start: 2021-02-22 | End: 2021-02-25 | Stop reason: HOSPADM

## 2021-02-22 RX ORDER — HYDROCODONE BITARTRATE AND ACETAMINOPHEN 7.5; 325 MG/1; MG/1
1 TABLET ORAL EVERY 4 HOURS PRN
Status: DISCONTINUED | OUTPATIENT
Start: 2021-02-22 | End: 2021-02-25 | Stop reason: HOSPADM

## 2021-02-22 RX ORDER — INSULIN LISPRO 100 [IU]/ML
0-14 INJECTION, SOLUTION INTRAVENOUS; SUBCUTANEOUS AS NEEDED
Status: DISCONTINUED | OUTPATIENT
Start: 2021-02-22 | End: 2021-02-24

## 2021-02-22 RX ADMIN — CEFTRIAXONE SODIUM 2 G: 2 INJECTION, POWDER, FOR SOLUTION INTRAMUSCULAR; INTRAVENOUS at 19:21

## 2021-02-22 RX ADMIN — ACETAMINOPHEN 650 MG: 325 TABLET ORAL at 21:39

## 2021-02-22 RX ADMIN — VANCOMYCIN HYDROCHLORIDE 2000 MG: 10 INJECTION, POWDER, LYOPHILIZED, FOR SOLUTION INTRAVENOUS at 21:12

## 2021-02-22 RX ADMIN — SODIUM CHLORIDE 75 ML/HR: 9 INJECTION, SOLUTION INTRAVENOUS at 18:00

## 2021-02-23 ENCOUNTER — ANESTHESIA EVENT (OUTPATIENT)
Dept: PERIOP | Facility: HOSPITAL | Age: 46
End: 2021-02-23

## 2021-02-23 ENCOUNTER — APPOINTMENT (OUTPATIENT)
Dept: GENERAL RADIOLOGY | Facility: HOSPITAL | Age: 46
End: 2021-02-23

## 2021-02-23 ENCOUNTER — ANESTHESIA (OUTPATIENT)
Dept: PERIOP | Facility: HOSPITAL | Age: 46
End: 2021-02-23

## 2021-02-23 LAB
ANION GAP SERPL CALCULATED.3IONS-SCNC: 8 MMOL/L (ref 5–15)
ANION GAP SERPL CALCULATED.3IONS-SCNC: 8 MMOL/L (ref 5–15)
BASOPHILS # BLD AUTO: 0 10*3/MM3 (ref 0–0.2)
BASOPHILS NFR BLD AUTO: 0.4 % (ref 0–1.5)
BUN SERPL-MCNC: 13 MG/DL (ref 6–20)
BUN SERPL-MCNC: 16 MG/DL (ref 6–20)
BUN/CREAT SERPL: 15.2 (ref 7–25)
BUN/CREAT SERPL: 15.7 (ref 7–25)
CALCIUM SPEC-SCNC: 7.9 MG/DL (ref 8.6–10.5)
CALCIUM SPEC-SCNC: 8.2 MG/DL (ref 8.6–10.5)
CHLORIDE SERPL-SCNC: 100 MMOL/L (ref 98–107)
CHLORIDE SERPL-SCNC: 99 MMOL/L (ref 98–107)
CO2 SERPL-SCNC: 26 MMOL/L (ref 22–29)
CO2 SERPL-SCNC: 26 MMOL/L (ref 22–29)
CREAT SERPL-MCNC: 0.83 MG/DL (ref 0.76–1.27)
CREAT SERPL-MCNC: 1.05 MG/DL (ref 0.76–1.27)
DEPRECATED RDW RBC AUTO: 40.3 FL (ref 37–54)
EOSINOPHIL # BLD AUTO: 0.2 10*3/MM3 (ref 0–0.4)
EOSINOPHIL NFR BLD AUTO: 2.4 % (ref 0.3–6.2)
ERYTHROCYTE [DISTWIDTH] IN BLOOD BY AUTOMATED COUNT: 13 % (ref 12.3–15.4)
GFR SERPL CREATININE-BSD FRML MDRD: 100 ML/MIN/1.73
GFR SERPL CREATININE-BSD FRML MDRD: 76 ML/MIN/1.73
GLUCOSE BLDC GLUCOMTR-MCNC: 137 MG/DL (ref 70–105)
GLUCOSE BLDC GLUCOMTR-MCNC: 157 MG/DL (ref 70–105)
GLUCOSE BLDC GLUCOMTR-MCNC: 162 MG/DL (ref 70–105)
GLUCOSE BLDC GLUCOMTR-MCNC: 179 MG/DL (ref 70–105)
GLUCOSE BLDC GLUCOMTR-MCNC: 191 MG/DL (ref 70–105)
GLUCOSE BLDC GLUCOMTR-MCNC: 333 MG/DL (ref 70–105)
GLUCOSE SERPL-MCNC: 108 MG/DL (ref 65–99)
GLUCOSE SERPL-MCNC: 299 MG/DL (ref 65–99)
HBA1C MFR BLD: 8.7 % (ref 3.5–5.6)
HCT VFR BLD AUTO: 35 % (ref 37.5–51)
HGB BLD-MCNC: 11.8 G/DL (ref 13–17.7)
LYMPHOCYTES # BLD AUTO: 2.1 10*3/MM3 (ref 0.7–3.1)
LYMPHOCYTES NFR BLD AUTO: 24.2 % (ref 19.6–45.3)
MCH RBC QN AUTO: 29.8 PG (ref 26.6–33)
MCHC RBC AUTO-ENTMCNC: 33.8 G/DL (ref 31.5–35.7)
MCV RBC AUTO: 88.3 FL (ref 79–97)
MONOCYTES # BLD AUTO: 1 10*3/MM3 (ref 0.1–0.9)
MONOCYTES NFR BLD AUTO: 11.2 % (ref 5–12)
NEUTROPHILS NFR BLD AUTO: 5.5 10*3/MM3 (ref 1.7–7)
NEUTROPHILS NFR BLD AUTO: 61.8 % (ref 42.7–76)
NRBC BLD AUTO-RTO: 0 /100 WBC (ref 0–0.2)
PLATELET # BLD AUTO: 371 10*3/MM3 (ref 140–450)
PMV BLD AUTO: 7.1 FL (ref 6–12)
POTASSIUM SERPL-SCNC: 4.1 MMOL/L (ref 3.5–5.2)
POTASSIUM SERPL-SCNC: 4.9 MMOL/L (ref 3.5–5.2)
RBC # BLD AUTO: 3.97 10*6/MM3 (ref 4.14–5.8)
SODIUM SERPL-SCNC: 133 MMOL/L (ref 136–145)
SODIUM SERPL-SCNC: 134 MMOL/L (ref 136–145)
WBC # BLD AUTO: 8.9 10*3/MM3 (ref 3.4–10.8)

## 2021-02-23 PROCEDURE — 25010000002 DEXAMETHASONE PER 1 MG: Performed by: ANESTHESIOLOGY

## 2021-02-23 PROCEDURE — 87206 SMEAR FLUORESCENT/ACID STAI: CPT | Performed by: ORTHOPAEDIC SURGERY

## 2021-02-23 PROCEDURE — 63710000001 INSULIN GLARGINE PER 5 UNITS: Performed by: ORTHOPAEDIC SURGERY

## 2021-02-23 PROCEDURE — 87147 CULTURE TYPE IMMUNOLOGIC: CPT | Performed by: ORTHOPAEDIC SURGERY

## 2021-02-23 PROCEDURE — 25010000002 ROPIVACAINE PER 1 MG: Performed by: ANESTHESIOLOGY

## 2021-02-23 PROCEDURE — 63710000001 INSULIN LISPRO (HUMAN) PER 5 UNITS: Performed by: NURSE PRACTITIONER

## 2021-02-23 PROCEDURE — 25010000002 DEXAMETHASONE PER 1 MG: Performed by: NURSE ANESTHETIST, CERTIFIED REGISTERED

## 2021-02-23 PROCEDURE — 88304 TISSUE EXAM BY PATHOLOGIST: CPT | Performed by: ORTHOPAEDIC SURGERY

## 2021-02-23 PROCEDURE — 99232 SBSQ HOSP IP/OBS MODERATE 35: CPT | Performed by: HOSPITALIST

## 2021-02-23 PROCEDURE — 76942 ECHO GUIDE FOR BIOPSY: CPT | Performed by: ORTHOPAEDIC SURGERY

## 2021-02-23 PROCEDURE — 87205 SMEAR GRAM STAIN: CPT | Performed by: ORTHOPAEDIC SURGERY

## 2021-02-23 PROCEDURE — 25010000002 PROPOFOL 10 MG/ML EMULSION: Performed by: NURSE ANESTHETIST, CERTIFIED REGISTERED

## 2021-02-23 PROCEDURE — 25010000002 DIPHENHYDRAMINE PER 50 MG: Performed by: ORTHOPAEDIC SURGERY

## 2021-02-23 PROCEDURE — 82962 GLUCOSE BLOOD TEST: CPT

## 2021-02-23 PROCEDURE — 85025 COMPLETE CBC W/AUTO DIFF WBC: CPT | Performed by: NURSE PRACTITIONER

## 2021-02-23 PROCEDURE — 80048 BASIC METABOLIC PNL TOTAL CA: CPT | Performed by: ORTHOPAEDIC SURGERY

## 2021-02-23 PROCEDURE — 87116 MYCOBACTERIA CULTURE: CPT | Performed by: ORTHOPAEDIC SURGERY

## 2021-02-23 PROCEDURE — 0Y6H0Z1 DETACHMENT AT RIGHT LOWER LEG, HIGH, OPEN APPROACH: ICD-10-PCS | Performed by: ORTHOPAEDIC SURGERY

## 2021-02-23 PROCEDURE — 87102 FUNGUS ISOLATION CULTURE: CPT | Performed by: ORTHOPAEDIC SURGERY

## 2021-02-23 PROCEDURE — 80048 BASIC METABOLIC PNL TOTAL CA: CPT | Performed by: NURSE PRACTITIONER

## 2021-02-23 PROCEDURE — 87075 CULTR BACTERIA EXCEPT BLOOD: CPT | Performed by: ORTHOPAEDIC SURGERY

## 2021-02-23 PROCEDURE — 25010000002 FENTANYL CITRATE (PF) 100 MCG/2ML SOLUTION: Performed by: NURSE ANESTHETIST, CERTIFIED REGISTERED

## 2021-02-23 PROCEDURE — 73590 X-RAY EXAM OF LOWER LEG: CPT

## 2021-02-23 PROCEDURE — 87070 CULTURE OTHR SPECIMN AEROBIC: CPT | Performed by: ORTHOPAEDIC SURGERY

## 2021-02-23 PROCEDURE — 63710000001 INSULIN LISPRO (HUMAN) PER 5 UNITS: Performed by: ORTHOPAEDIC SURGERY

## 2021-02-23 PROCEDURE — 25010000002 VANCOMYCIN 10 G RECONSTITUTED SOLUTION: Performed by: ORTHOPAEDIC SURGERY

## 2021-02-23 PROCEDURE — 0QBG0ZZ EXCISION OF RIGHT TIBIA, OPEN APPROACH: ICD-10-PCS | Performed by: ORTHOPAEDIC SURGERY

## 2021-02-23 PROCEDURE — 87186 SC STD MICRODIL/AGAR DIL: CPT | Performed by: ORTHOPAEDIC SURGERY

## 2021-02-23 PROCEDURE — 25010000002 ONDANSETRON PER 1 MG: Performed by: NURSE ANESTHETIST, CERTIFIED REGISTERED

## 2021-02-23 PROCEDURE — 25010000002 MIDAZOLAM PER 1 MG: Performed by: ANESTHESIOLOGY

## 2021-02-23 PROCEDURE — 25010000002 LINEZOLID 600 MG/300ML SOLUTION: Performed by: ORTHOPAEDIC SURGERY

## 2021-02-23 RX ORDER — SODIUM CHLORIDE 0.9 % (FLUSH) 0.9 %
10 SYRINGE (ML) INJECTION AS NEEDED
Status: DISCONTINUED | OUTPATIENT
Start: 2021-02-23 | End: 2021-02-23 | Stop reason: HOSPADM

## 2021-02-23 RX ORDER — ROPIVACAINE HYDROCHLORIDE 5 MG/ML
INJECTION, SOLUTION EPIDURAL; INFILTRATION; PERINEURAL
Status: COMPLETED | OUTPATIENT
Start: 2021-02-23 | End: 2021-02-23

## 2021-02-23 RX ORDER — MIDAZOLAM HYDROCHLORIDE 1 MG/ML
INJECTION INTRAMUSCULAR; INTRAVENOUS AS NEEDED
Status: DISCONTINUED | OUTPATIENT
Start: 2021-02-23 | End: 2021-02-23 | Stop reason: SURG

## 2021-02-23 RX ORDER — FENTANYL CITRATE 50 UG/ML
INJECTION, SOLUTION INTRAMUSCULAR; INTRAVENOUS AS NEEDED
Status: DISCONTINUED | OUTPATIENT
Start: 2021-02-23 | End: 2021-02-23 | Stop reason: SURG

## 2021-02-23 RX ORDER — LINEZOLID 2 MG/ML
600 INJECTION, SOLUTION INTRAVENOUS EVERY 12 HOURS
Status: DISCONTINUED | OUTPATIENT
Start: 2021-02-23 | End: 2021-02-25

## 2021-02-23 RX ORDER — DIPHENHYDRAMINE HYDROCHLORIDE 50 MG/ML
25 INJECTION INTRAMUSCULAR; INTRAVENOUS ONCE
Status: COMPLETED | OUTPATIENT
Start: 2021-02-23 | End: 2021-02-23

## 2021-02-23 RX ORDER — NALOXONE HCL 0.4 MG/ML
0.4 VIAL (ML) INJECTION
Status: DISCONTINUED | OUTPATIENT
Start: 2021-02-23 | End: 2021-02-25 | Stop reason: HOSPADM

## 2021-02-23 RX ORDER — ACETAMINOPHEN 325 MG/1
325 TABLET ORAL EVERY 4 HOURS PRN
Status: DISCONTINUED | OUTPATIENT
Start: 2021-02-23 | End: 2021-02-25 | Stop reason: HOSPADM

## 2021-02-23 RX ORDER — MEPERIDINE HYDROCHLORIDE 25 MG/ML
12.5 INJECTION INTRAMUSCULAR; INTRAVENOUS; SUBCUTANEOUS
Status: DISCONTINUED | OUTPATIENT
Start: 2021-02-23 | End: 2021-02-23 | Stop reason: HOSPADM

## 2021-02-23 RX ORDER — HYDROCODONE BITARTRATE AND ACETAMINOPHEN 10; 325 MG/1; MG/1
1 TABLET ORAL ONCE AS NEEDED
Status: DISCONTINUED | OUTPATIENT
Start: 2021-02-23 | End: 2021-02-23 | Stop reason: HOSPADM

## 2021-02-23 RX ORDER — DEXAMETHASONE SODIUM PHOSPHATE 4 MG/ML
INJECTION, SOLUTION INTRA-ARTICULAR; INTRALESIONAL; INTRAMUSCULAR; INTRAVENOUS; SOFT TISSUE
Status: COMPLETED | OUTPATIENT
Start: 2021-02-23 | End: 2021-02-23

## 2021-02-23 RX ORDER — MORPHINE SULFATE 4 MG/ML
4 INJECTION, SOLUTION INTRAMUSCULAR; INTRAVENOUS
Status: DISCONTINUED | OUTPATIENT
Start: 2021-02-23 | End: 2021-02-25 | Stop reason: HOSPADM

## 2021-02-23 RX ORDER — PHENYLEPHRINE HCL IN 0.9% NACL 0.5 MG/5ML
SYRINGE (ML) INTRAVENOUS AS NEEDED
Status: DISCONTINUED | OUTPATIENT
Start: 2021-02-23 | End: 2021-02-23 | Stop reason: SURG

## 2021-02-23 RX ORDER — PROMETHAZINE HYDROCHLORIDE 25 MG/1
25 TABLET ORAL ONCE AS NEEDED
Status: DISCONTINUED | OUTPATIENT
Start: 2021-02-23 | End: 2021-02-23 | Stop reason: HOSPADM

## 2021-02-23 RX ORDER — PROMETHAZINE HYDROCHLORIDE 25 MG/1
25 SUPPOSITORY RECTAL ONCE AS NEEDED
Status: DISCONTINUED | OUTPATIENT
Start: 2021-02-23 | End: 2021-02-23 | Stop reason: HOSPADM

## 2021-02-23 RX ORDER — SODIUM CHLORIDE 0.9 % (FLUSH) 0.9 %
1-10 SYRINGE (ML) INJECTION AS NEEDED
Status: DISCONTINUED | OUTPATIENT
Start: 2021-02-23 | End: 2021-02-25 | Stop reason: HOSPADM

## 2021-02-23 RX ORDER — ONDANSETRON 2 MG/ML
INJECTION INTRAMUSCULAR; INTRAVENOUS AS NEEDED
Status: DISCONTINUED | OUTPATIENT
Start: 2021-02-23 | End: 2021-02-23 | Stop reason: SURG

## 2021-02-23 RX ORDER — ONDANSETRON 4 MG/1
4 TABLET, FILM COATED ORAL EVERY 6 HOURS PRN
Status: DISCONTINUED | OUTPATIENT
Start: 2021-02-23 | End: 2021-02-25 | Stop reason: HOSPADM

## 2021-02-23 RX ORDER — SODIUM CHLORIDE 9 MG/ML
125 INJECTION, SOLUTION INTRAVENOUS CONTINUOUS
Status: DISPENSED | OUTPATIENT
Start: 2021-02-23 | End: 2021-02-24

## 2021-02-23 RX ORDER — PROPOFOL 10 MG/ML
VIAL (ML) INTRAVENOUS AS NEEDED
Status: DISCONTINUED | OUTPATIENT
Start: 2021-02-23 | End: 2021-02-23 | Stop reason: SURG

## 2021-02-23 RX ORDER — ACETAMINOPHEN 325 MG/1
650 TABLET ORAL ONCE AS NEEDED
Status: DISCONTINUED | OUTPATIENT
Start: 2021-02-23 | End: 2021-02-23 | Stop reason: HOSPADM

## 2021-02-23 RX ORDER — DEXAMETHASONE SODIUM PHOSPHATE 4 MG/ML
INJECTION, SOLUTION INTRA-ARTICULAR; INTRALESIONAL; INTRAMUSCULAR; INTRAVENOUS; SOFT TISSUE AS NEEDED
Status: DISCONTINUED | OUTPATIENT
Start: 2021-02-23 | End: 2021-02-23 | Stop reason: SURG

## 2021-02-23 RX ORDER — HYDROCODONE BITARTRATE AND ACETAMINOPHEN 7.5; 325 MG/1; MG/1
1 TABLET ORAL EVERY 4 HOURS PRN
Status: DISCONTINUED | OUTPATIENT
Start: 2021-02-23 | End: 2021-02-25 | Stop reason: HOSPADM

## 2021-02-23 RX ORDER — HYDROMORPHONE HCL 110MG/55ML
0.5 PATIENT CONTROLLED ANALGESIA SYRINGE INTRAVENOUS
Status: DISCONTINUED | OUTPATIENT
Start: 2021-02-23 | End: 2021-02-23 | Stop reason: HOSPADM

## 2021-02-23 RX ORDER — LIDOCAINE HYDROCHLORIDE 20 MG/ML
INJECTION, SOLUTION EPIDURAL; INFILTRATION; INTRACAUDAL; PERINEURAL AS NEEDED
Status: DISCONTINUED | OUTPATIENT
Start: 2021-02-23 | End: 2021-02-23 | Stop reason: SURG

## 2021-02-23 RX ORDER — POLYETHYLENE GLYCOL 3350 17 G/17G
17 POWDER, FOR SOLUTION ORAL DAILY
Status: DISCONTINUED | OUTPATIENT
Start: 2021-02-23 | End: 2021-02-25 | Stop reason: HOSPADM

## 2021-02-23 RX ORDER — ONDANSETRON 2 MG/ML
4 INJECTION INTRAMUSCULAR; INTRAVENOUS ONCE AS NEEDED
Status: DISCONTINUED | OUTPATIENT
Start: 2021-02-23 | End: 2021-02-23 | Stop reason: HOSPADM

## 2021-02-23 RX ORDER — SODIUM CHLORIDE, SODIUM LACTATE, POTASSIUM CHLORIDE, CALCIUM CHLORIDE 600; 310; 30; 20 MG/100ML; MG/100ML; MG/100ML; MG/100ML
1000 INJECTION, SOLUTION INTRAVENOUS CONTINUOUS
Status: DISPENSED | OUTPATIENT
Start: 2021-02-23 | End: 2021-02-25

## 2021-02-23 RX ORDER — ACETAMINOPHEN 650 MG/1
650 SUPPOSITORY RECTAL ONCE AS NEEDED
Status: DISCONTINUED | OUTPATIENT
Start: 2021-02-23 | End: 2021-02-23 | Stop reason: HOSPADM

## 2021-02-23 RX ORDER — ONDANSETRON 2 MG/ML
4 INJECTION INTRAMUSCULAR; INTRAVENOUS EVERY 6 HOURS PRN
Status: DISCONTINUED | OUTPATIENT
Start: 2021-02-23 | End: 2021-02-25 | Stop reason: HOSPADM

## 2021-02-23 RX ADMIN — DEXAMETHASONE SODIUM PHOSPHATE 8 MG: 4 INJECTION, SOLUTION INTRAMUSCULAR; INTRAVENOUS at 14:55

## 2021-02-23 RX ADMIN — SODIUM CHLORIDE, POTASSIUM CHLORIDE, SODIUM LACTATE AND CALCIUM CHLORIDE: 600; 310; 30; 20 INJECTION, SOLUTION INTRAVENOUS at 14:46

## 2021-02-23 RX ADMIN — SODIUM CHLORIDE, POTASSIUM CHLORIDE, SODIUM LACTATE AND CALCIUM CHLORIDE 1000 ML: 600; 310; 30; 20 INJECTION, SOLUTION INTRAVENOUS at 13:38

## 2021-02-23 RX ADMIN — MIDAZOLAM 2 MG: 1 INJECTION INTRAMUSCULAR; INTRAVENOUS at 13:44

## 2021-02-23 RX ADMIN — DIPHENHYDRAMINE HYDROCHLORIDE 25 MG: 50 INJECTION, SOLUTION INTRAMUSCULAR; INTRAVENOUS at 12:00

## 2021-02-23 RX ADMIN — Medication 100 MCG: at 15:32

## 2021-02-23 RX ADMIN — LINEZOLID 600 MG: 600 INJECTION, SOLUTION INTRAVENOUS at 22:30

## 2021-02-23 RX ADMIN — DEXAMETHASONE SODIUM PHOSPHATE 2.7 MG: 4 INJECTION, SOLUTION INTRAMUSCULAR; INTRAVENOUS at 13:47

## 2021-02-23 RX ADMIN — FENTANYL CITRATE 25 MCG: 50 INJECTION, SOLUTION INTRAMUSCULAR; INTRAVENOUS at 15:14

## 2021-02-23 RX ADMIN — Medication 100 MCG: at 15:28

## 2021-02-23 RX ADMIN — VANCOMYCIN HYDROCHLORIDE 1500 MG: 10 INJECTION, POWDER, LYOPHILIZED, FOR SOLUTION INTRAVENOUS at 08:33

## 2021-02-23 RX ADMIN — INSULIN GLARGINE 12 UNITS: 100 INJECTION, SOLUTION SUBCUTANEOUS at 22:30

## 2021-02-23 RX ADMIN — ROPIVACAINE HYDROCHLORIDE 40 ML: 5 INJECTION, SOLUTION EPIDURAL; INFILTRATION; PERINEURAL at 13:53

## 2021-02-23 RX ADMIN — FENTANYL CITRATE 50 MCG: 50 INJECTION, SOLUTION INTRAMUSCULAR; INTRAVENOUS at 14:48

## 2021-02-23 RX ADMIN — FENTANYL CITRATE 25 MCG: 50 INJECTION, SOLUTION INTRAMUSCULAR; INTRAVENOUS at 15:06

## 2021-02-23 RX ADMIN — Medication 100 MCG: at 15:31

## 2021-02-23 RX ADMIN — Medication 3 ML: at 22:30

## 2021-02-23 RX ADMIN — ROPIVACAINE HYDROCHLORIDE 20 ML: 5 INJECTION, SOLUTION EPIDURAL; INFILTRATION; PERINEURAL at 13:47

## 2021-02-23 RX ADMIN — INSULIN LISPRO 3 UNITS: 100 INJECTION, SOLUTION INTRAVENOUS; SUBCUTANEOUS at 17:21

## 2021-02-23 RX ADMIN — Medication 3 ML: at 08:33

## 2021-02-23 RX ADMIN — ONDANSETRON 4 MG: 2 INJECTION INTRAMUSCULAR; INTRAVENOUS at 15:24

## 2021-02-23 RX ADMIN — SODIUM CHLORIDE 125 ML/HR: 9 INJECTION, SOLUTION INTRAVENOUS at 22:38

## 2021-02-23 RX ADMIN — LIDOCAINE HYDROCHLORIDE 100 MG: 20 INJECTION, SOLUTION EPIDURAL; INFILTRATION; INTRACAUDAL; PERINEURAL at 14:52

## 2021-02-23 RX ADMIN — DEXAMETHASONE SODIUM PHOSPHATE 5.3 MG: 4 INJECTION, SOLUTION INTRAMUSCULAR; INTRAVENOUS at 13:53

## 2021-02-23 RX ADMIN — INSULIN LISPRO 3 UNITS: 100 INJECTION, SOLUTION INTRAVENOUS; SUBCUTANEOUS at 11:31

## 2021-02-23 RX ADMIN — PROPOFOL 200 MG: 10 INJECTION, EMULSION INTRAVENOUS at 14:52

## 2021-02-23 NOTE — ANESTHESIA POSTPROCEDURE EVALUATION
Patient: Maynor Gregg    Procedure Summary     Date: 02/23/21 Room / Location: [facility] OR 11 / [facility] MAIN OR    Anesthesia Start: 1446 Anesthesia Stop: 1600    Procedure: AMPUTATION BELOW KNEE (Right Thigh) Diagnosis:       Infection of right below knee amputation (CMS/HCC)      (Infection of right below knee amputation (CMS/HCC) [T87.43])    Surgeon: Donn Alcantara MD Provider: Colin Chung MD    Anesthesia Type: general with block ASA Status: 4          Anesthesia Type: general with block    Vitals  Vitals Value Taken Time   /57 02/23/21 1601   Temp     Pulse 74 02/23/21 1601   Resp     SpO2 94 % 02/23/21 1601   Vitals shown include unvalidated device data.        Post Anesthesia Care and Evaluation    Patient location during evaluation: PACU  Patient participation: complete - patient participated  Level of consciousness: awake  Pain score: 0  Pain management: adequate  Airway patency: patent  Anesthetic complications: No anesthetic complications  PONV Status: none  Cardiovascular status: acceptable  Respiratory status: acceptable  Hydration status: acceptable    Comments: Patient seen and examined postoperatively; vital signs stable; SpO2 greater than or equal to 90%; cardiopulmonary status stable; nausea/vomiting adequately controlled; pain adequately controlled; no apparent anesthesia complications; patient discharged from anesthesia care when discharge criteria were met

## 2021-02-23 NOTE — ANESTHESIA PROCEDURE NOTES
Peripheral Block    Pre-sedation assessment completed: 2/23/2021 1:00 PM    Patient reassessed immediately prior to procedure    Patient location during procedure: pre-op  Reason for block: procedure for pain, at surgeon's request, post-op pain management and secondary anesthetic  Performed by  Anesthesiologist: Colin Chung MD  Preanesthetic Checklist  Completed: patient identified, site marked, surgical consent, pre-op evaluation, timeout performed, IV checked, risks and benefits discussed and monitors and equipment checked  Prep:  Pt Position: sitting  Sterile barriers:cap, gloves, mask and washed/disinfected hands  Prep: ChloraPrep  Patient monitoring: blood pressure monitoring, continuous pulse oximetry and EKG  Procedure  Sedation:no  Performed under: local infiltration  Guidance:ultrasound guided and landmark technique  ULTRASOUND INTERPRETATION.  Using ultrasound guidance a 20 G gauge needle was placed in close proximity to the sciatic nerve, at which point, under ultrasound guidance anesthetic was injected in the area of the nerve and spread of the anesthesia was seen on ultrasound in close proximity thereto.  There were no abnormalities seen on ultrasound; a digital image was taken; and the patient tolerated the procedure with no complications. Images:still images obtained, printed/placed on chart    Laterality:right  Block Type:popliteal  Injection Technique:single-shot  Needle Type:echogenic  Needle Gauge:20 G  Resistance on Injection: less than 15 psi    Medications Used: dexamethasone (DECADRON) injection, 5.3 mg  ropivacaine (NAROPIN) 0.5 % injection, 40 mL  Med admintered at 2/23/2021 1:53 PM      Post Assessment  Injection Assessment: negative aspiration for heme, no paresthesia on injection and incremental injection  Patient Tolerance:comfortable throughout block  Complications:no  Additional Notes  Pre-procedure:  Peripheral nerve block performed preoperatively prior to the start of  anesthesia time at the request of the patient and the surgeon for the management of postoperative acute surgical pain as well as for a secondary intraoperative anesthetic (general anesthesia is the primary intraoperative anesthetic); patient identified; pre-procedure vital signs, all relevant labs/studies, complete medical/surgical/anesthetic history, full medication list, full allergy list, and NPO status obtained/reviewed; physical assessment performed; anesthetic options, side effects, potential complications, risks, and benefits discussed; questions answered; patient wishes to proceed with the procedure; written anesthesia procedure consent obtained; patient cleared for procedure; time out performed; IV access in situ    Procedure:  ASA monitor placed; supplemental oxygen provided; patient positioned; hand hygiene performed; sterile technique maintained throughout the procedure; sterile prep applied; insertion site determined by anatomical landmarks, palpation, and ultrasound imaging; live ultrasound guidance throughout the procedure; target nerves/landmarks identified on live ultrasound; skin and subcutaneous tissues numbed by injection of 1% lidocaine; 4 inch 20G StimupPixelle Ultra 360 Insulated Echogenic Needle used; realtime needle advancement and placement near the target nerves witnessed on live ultrasound; negative aspiration prior to injection; correct needle placement confirmed on live ultrasound by local anesthetic spread around the target nerves; local anesthetic mixture injected with negative aspiration prior to each injection and after each 1-5 mL injected; needle withdrawn; dressing applied; ultrasound image printed and placed in the patient's permanent medical record    Post-procedure:  Peripheral nerve block placed successfully; good block; no apparent complications; minimal estimated blood loss; vital signs stable throughout; see nurse's notes for vitals; transported to the OR, general anesthesia  induced, and surgery started

## 2021-02-23 NOTE — ANESTHESIA PROCEDURE NOTES
Airway  Urgency: elective    Date/Time: 2/23/2021 2:54 PM  Airway not difficult    General Information and Staff    Patient location during procedure: OR  Anesthesiologist: Rodney Kinney MD  CRNA: Yudy Dong CRNA    Indications and Patient Condition  Indications for airway management: airway protection    Preoxygenated: yes  Mask difficulty assessment: 0 - not attempted    Final Airway Details  Final airway type: supraglottic airway      Successful airway: unique and LMA  Size 4    Number of attempts at approach: 1  Assessment: lips, teeth, and gum same as pre-op

## 2021-02-23 NOTE — ANESTHESIA PREPROCEDURE EVALUATION
Anesthesia Evaluation     Patient summary reviewed and Nursing notes reviewed   no history of anesthetic complications:  NPO Solid Status: > 8 hours  NPO Liquid Status: > 8 hours           Airway   Dental      Pulmonary    (+) a smoker Former, shortness of breath,   Cardiovascular     ECG reviewed  PT is on anticoagulation therapy    (+) valvular problems/murmurs MR and TI, past MI , CAD,       Neuro/Psych  (+) numbness,     GI/Hepatic/Renal/Endo    (+)   diabetes mellitus,     Musculoskeletal     (+) chronic pain,   Abdominal    Substance History      OB/GYN          Other   blood dyscrasia anemia,     ROS/Med Hx Other: BKA infection, hyponatremia, neuropathy, osteomyelitis, h/o sepsis, N/V, hypotension    Echocardiogram Findings    Left Ventricle Left ventricular systolic function is normal. Calculated EF = 65.0%. Estimated EF appears to be in the range of 61 - 65%. Normal left ventricular cavity size noted.  Right Ventricle Normal right ventricular cavity size noted.  Left Atrium Normal left atrial size noted.  Right Atrium Normal right atrial size noted.  Aortic Valve The aortic valve is grossly normal in structure.  Mitral Valve Mild mitral valve regurgitation is present.  Tricuspid Valve The tricuspid valve is grossly normal.  Mild tricuspid valve regurgitation is present.  Pulmonic Valve The pulmonic valve is not well visualized.  Greater Vessels No dilation of the aortic root is present.  Pericardium There is no evidence of pericardial effusion.    PSH  AMPUTATION FOOT / TOE CARDIAC SURGERY  TOE AMPUTATION WOUND DEBRIDEMENT  INCISION AND DRAINAGE LEG CARDIAC CATHETERIZATION  CARDIAC CATHETERIZATION AMPUTATION DIGIT  INCISION AND DRAINAGE LEG BELOW KNEE AMPUTATION  BELOW KNEE AMPUTATION                     Anesthesia Plan    ASA 4     general with block   (Patient identified; pre-operative vital signs, all relevant labs/studies, complete medical/surgical/anesthetic history, full medication list, full allergy  list, and NPO status obtained/reviewed; physical assessment performed; anesthetic options, side effects, potential complications, risks, and benefits discussed; questions answered; written anesthesia consent obtained; patient cleared for procedure; anesthesia machine and equipment checked and functioning)  intravenous induction     Anesthetic plan, all risks, benefits, and alternatives have been provided, discussed and informed consent has been obtained with: patient.    Plan discussed with CRNA and CAA.

## 2021-02-23 NOTE — ANESTHESIA PROCEDURE NOTES
Peripheral Block    Pre-sedation assessment completed: 2/23/2021 1:00 PM    Patient reassessed immediately prior to procedure    Patient location during procedure: pre-op  Reason for block: procedure for pain, at surgeon's request, post-op pain management and secondary anesthetic  Performed by  Anesthesiologist: Colin Chung MD  Preanesthetic Checklist  Completed: patient identified, site marked, surgical consent, pre-op evaluation, timeout performed, IV checked, risks and benefits discussed and monitors and equipment checked  Prep:  Pt Position: sitting  Sterile barriers:cap, gloves, mask and washed/disinfected hands  Prep: ChloraPrep  Patient monitoring: blood pressure monitoring, continuous pulse oximetry and EKG  Procedure  Sedation:yes  Performed under: local infiltration  Guidance:ultrasound guided and landmark technique  ULTRASOUND INTERPRETATION.  Using ultrasound guidance a 20 G gauge needle was placed in close proximity to the femoral nerve, at which point, under ultrasound guidance anesthetic was injected in the area of the nerve and spread of the anesthesia was seen on ultrasound in close proximity thereto.  There were no abnormalities seen on ultrasound; a digital image was taken; and the patient tolerated the procedure with no complications. Images:still images obtained, printed/placed on chart    Laterality:right  Block Type:adductor canal block  Injection Technique:single-shot  Needle Type:echogenic  Needle Gauge:20 G  Resistance on Injection: less than 15 psi    Medications Used: dexamethasone (DECADRON) injection, 2.7 mg  ropivacaine (NAROPIN) 0.5 % injection, 20 mL  Med admintered at 2/23/2021 1:47 PM      Post Assessment  Injection Assessment: negative aspiration for heme, no paresthesia on injection and incremental injection  Patient Tolerance:comfortable throughout block  Complications:no  Additional Notes  Pre-procedure:  Peripheral nerve block performed preoperatively prior to the  start of anesthesia time at the request of the patient and the surgeon for the management of postoperative acute surgical pain as well as for a secondary intraoperative anesthetic (general anesthesia is the primary intraoperative anesthetic); patient identified; pre-procedure vital signs, all relevant labs/studies, complete medical/surgical/anesthetic history, full medication list, full allergy list, and NPO status obtained/reviewed; physical assessment performed; anesthetic options, side effects, potential complications, risks, and benefits discussed; questions answered; patient wishes to proceed with the procedure; written anesthesia procedure consent obtained; patient cleared for procedure; time out performed; IV access in situ    Procedure:  ASA monitor placed; supplemental oxygen provided; patient positioned; hand hygiene performed; sterile technique maintained throughout the procedure; sterile prep applied; insertion site determined by anatomical landmarks, palpation, and ultrasound imaging; live ultrasound guidance throughout the procedure; target nerves/landmarks identified on live ultrasound; skin and subcutaneous tissues numbed by injection of 1% lidocaine; 4 inch 20G StimupNet Element Ultra 360 Insulated Echogenic Needle used; realtime needle advancement and placement near the target nerves witnessed on live ultrasound; negative aspiration prior to injection; correct needle placement confirmed on live ultrasound by local anesthetic spread around the target nerves; local anesthetic mixture injected with negative aspiration prior to each injection and after each 1-5 mL injected; needle withdrawn; dressing applied; ultrasound image printed and placed in the patient's permanent medical record    Post-procedure:  Peripheral nerve block placed successfully; good block; no apparent complications; minimal estimated blood loss; vital signs stable throughout; see nurse's notes for vitals; transported to the OR, general  anesthesia induced, and surgery started

## 2021-02-24 LAB
ANION GAP SERPL CALCULATED.3IONS-SCNC: 8 MMOL/L (ref 5–15)
BASOPHILS # BLD AUTO: 0 10*3/MM3 (ref 0–0.2)
BASOPHILS NFR BLD AUTO: 0.2 % (ref 0–1.5)
BUN SERPL-MCNC: 19 MG/DL (ref 6–20)
BUN/CREAT SERPL: 18.8 (ref 7–25)
CALCIUM SPEC-SCNC: 7.6 MG/DL (ref 8.6–10.5)
CHLORIDE SERPL-SCNC: 102 MMOL/L (ref 98–107)
CO2 SERPL-SCNC: 25 MMOL/L (ref 22–29)
CREAT SERPL-MCNC: 1.01 MG/DL (ref 0.76–1.27)
CRP SERPL-MCNC: 11.54 MG/DL (ref 0–0.5)
DEPRECATED RDW RBC AUTO: 40.7 FL (ref 37–54)
EOSINOPHIL # BLD AUTO: 0 10*3/MM3 (ref 0–0.4)
EOSINOPHIL NFR BLD AUTO: 0 % (ref 0.3–6.2)
ERYTHROCYTE [DISTWIDTH] IN BLOOD BY AUTOMATED COUNT: 13 % (ref 12.3–15.4)
GFR SERPL CREATININE-BSD FRML MDRD: 80 ML/MIN/1.73
GLUCOSE BLDC GLUCOMTR-MCNC: 242 MG/DL (ref 70–105)
GLUCOSE BLDC GLUCOMTR-MCNC: 301 MG/DL (ref 70–105)
GLUCOSE BLDC GLUCOMTR-MCNC: 304 MG/DL (ref 70–105)
GLUCOSE BLDC GLUCOMTR-MCNC: 327 MG/DL (ref 70–105)
GLUCOSE SERPL-MCNC: 343 MG/DL (ref 65–99)
HCT VFR BLD AUTO: 31 % (ref 37.5–51)
HGB BLD-MCNC: 10.4 G/DL (ref 13–17.7)
LYMPHOCYTES # BLD AUTO: 1.2 10*3/MM3 (ref 0.7–3.1)
LYMPHOCYTES NFR BLD AUTO: 11.6 % (ref 19.6–45.3)
MCH RBC QN AUTO: 29.7 PG (ref 26.6–33)
MCHC RBC AUTO-ENTMCNC: 33.4 G/DL (ref 31.5–35.7)
MCV RBC AUTO: 88.9 FL (ref 79–97)
MONOCYTES # BLD AUTO: 0.6 10*3/MM3 (ref 0.1–0.9)
MONOCYTES NFR BLD AUTO: 5.5 % (ref 5–12)
NEUTROPHILS NFR BLD AUTO: 8.6 10*3/MM3 (ref 1.7–7)
NEUTROPHILS NFR BLD AUTO: 82.7 % (ref 42.7–76)
NRBC BLD AUTO-RTO: 0 /100 WBC (ref 0–0.2)
PLATELET # BLD AUTO: 403 10*3/MM3 (ref 140–450)
PMV BLD AUTO: 7.2 FL (ref 6–12)
POTASSIUM SERPL-SCNC: 4.4 MMOL/L (ref 3.5–5.2)
RBC # BLD AUTO: 3.49 10*6/MM3 (ref 4.14–5.8)
SODIUM SERPL-SCNC: 135 MMOL/L (ref 136–145)
WBC # BLD AUTO: 10.4 10*3/MM3 (ref 3.4–10.8)

## 2021-02-24 PROCEDURE — 80048 BASIC METABOLIC PNL TOTAL CA: CPT | Performed by: ORTHOPAEDIC SURGERY

## 2021-02-24 PROCEDURE — 82962 GLUCOSE BLOOD TEST: CPT

## 2021-02-24 PROCEDURE — 25010000002 LINEZOLID 600 MG/300ML SOLUTION: Performed by: ORTHOPAEDIC SURGERY

## 2021-02-24 PROCEDURE — 63710000001 INSULIN LISPRO (HUMAN) PER 5 UNITS: Performed by: HOSPITALIST

## 2021-02-24 PROCEDURE — 99232 SBSQ HOSP IP/OBS MODERATE 35: CPT | Performed by: HOSPITALIST

## 2021-02-24 PROCEDURE — 63710000001 INSULIN GLARGINE PER 5 UNITS: Performed by: HOSPITALIST

## 2021-02-24 PROCEDURE — 63710000001 INSULIN LISPRO (HUMAN) PER 5 UNITS: Performed by: ORTHOPAEDIC SURGERY

## 2021-02-24 PROCEDURE — 85025 COMPLETE CBC W/AUTO DIFF WBC: CPT | Performed by: ORTHOPAEDIC SURGERY

## 2021-02-24 PROCEDURE — 86140 C-REACTIVE PROTEIN: CPT | Performed by: NURSE PRACTITIONER

## 2021-02-24 RX ORDER — INSULIN LISPRO 100 [IU]/ML
0-14 INJECTION, SOLUTION INTRAVENOUS; SUBCUTANEOUS
Status: DISCONTINUED | OUTPATIENT
Start: 2021-02-24 | End: 2021-02-25 | Stop reason: HOSPADM

## 2021-02-24 RX ORDER — INSULIN LISPRO 100 [IU]/ML
3 INJECTION, SOLUTION INTRAVENOUS; SUBCUTANEOUS
Status: DISCONTINUED | OUTPATIENT
Start: 2021-02-24 | End: 2021-02-25

## 2021-02-24 RX ORDER — INSULIN LISPRO 100 [IU]/ML
0-14 INJECTION, SOLUTION INTRAVENOUS; SUBCUTANEOUS AS NEEDED
Status: DISCONTINUED | OUTPATIENT
Start: 2021-02-24 | End: 2021-02-25 | Stop reason: HOSPADM

## 2021-02-24 RX ORDER — INSULIN GLARGINE 100 [IU]/ML
20 INJECTION, SOLUTION SUBCUTANEOUS NIGHTLY
Status: DISCONTINUED | OUTPATIENT
Start: 2021-02-24 | End: 2021-02-25

## 2021-02-24 RX ADMIN — INSULIN LISPRO 10 UNITS: 100 INJECTION, SOLUTION INTRAVENOUS; SUBCUTANEOUS at 17:51

## 2021-02-24 RX ADMIN — INSULIN LISPRO 3 UNITS: 100 INJECTION, SOLUTION INTRAVENOUS; SUBCUTANEOUS at 17:51

## 2021-02-24 RX ADMIN — LINEZOLID 600 MG: 600 INJECTION, SOLUTION INTRAVENOUS at 21:01

## 2021-02-24 RX ADMIN — Medication 3 ML: at 21:02

## 2021-02-24 RX ADMIN — SODIUM CHLORIDE 125 ML/HR: 9 INJECTION, SOLUTION INTRAVENOUS at 08:52

## 2021-02-24 RX ADMIN — INSULIN LISPRO 10 UNITS: 100 INJECTION, SOLUTION INTRAVENOUS; SUBCUTANEOUS at 08:54

## 2021-02-24 RX ADMIN — INSULIN LISPRO 5 UNITS: 100 INJECTION, SOLUTION INTRAVENOUS; SUBCUTANEOUS at 21:01

## 2021-02-24 RX ADMIN — INSULIN LISPRO 10 UNITS: 100 INJECTION, SOLUTION INTRAVENOUS; SUBCUTANEOUS at 12:34

## 2021-02-24 RX ADMIN — LINEZOLID 600 MG: 600 INJECTION, SOLUTION INTRAVENOUS at 08:53

## 2021-02-24 RX ADMIN — Medication 3 ML: at 08:54

## 2021-02-24 RX ADMIN — INSULIN LISPRO 3 UNITS: 100 INJECTION, SOLUTION INTRAVENOUS; SUBCUTANEOUS at 12:34

## 2021-02-24 RX ADMIN — SODIUM CHLORIDE 75 ML/HR: 9 INJECTION, SOLUTION INTRAVENOUS at 19:37

## 2021-02-24 RX ADMIN — INSULIN GLARGINE 20 UNITS: 100 INJECTION, SOLUTION SUBCUTANEOUS at 21:01

## 2021-02-25 VITALS
SYSTOLIC BLOOD PRESSURE: 122 MMHG | HEIGHT: 75 IN | DIASTOLIC BLOOD PRESSURE: 76 MMHG | RESPIRATION RATE: 16 BRPM | HEART RATE: 74 BPM | WEIGHT: 220 LBS | OXYGEN SATURATION: 100 % | BODY MASS INDEX: 27.35 KG/M2 | TEMPERATURE: 98.8 F

## 2021-02-25 LAB
ANION GAP SERPL CALCULATED.3IONS-SCNC: 6 MMOL/L (ref 5–15)
BACTERIA FLD CULT: ABNORMAL
BASOPHILS # BLD AUTO: 0 10*3/MM3 (ref 0–0.2)
BASOPHILS NFR BLD AUTO: 0.4 % (ref 0–1.5)
BUN SERPL-MCNC: 16 MG/DL (ref 6–20)
BUN/CREAT SERPL: 18.2 (ref 7–25)
CALCIUM SPEC-SCNC: 7.2 MG/DL (ref 8.6–10.5)
CHLORIDE SERPL-SCNC: 108 MMOL/L (ref 98–107)
CO2 SERPL-SCNC: 26 MMOL/L (ref 22–29)
CREAT SERPL-MCNC: 0.88 MG/DL (ref 0.76–1.27)
DEPRECATED RDW RBC AUTO: 39.8 FL (ref 37–54)
EOSINOPHIL # BLD AUTO: 0.1 10*3/MM3 (ref 0–0.4)
EOSINOPHIL NFR BLD AUTO: 0.7 % (ref 0.3–6.2)
ERYTHROCYTE [DISTWIDTH] IN BLOOD BY AUTOMATED COUNT: 12.7 % (ref 12.3–15.4)
ERYTHROCYTE [SEDIMENTATION RATE] IN BLOOD: 75 MM/HR (ref 0–15)
GFR SERPL CREATININE-BSD FRML MDRD: 93 ML/MIN/1.73
GLUCOSE BLDC GLUCOMTR-MCNC: 180 MG/DL (ref 70–105)
GLUCOSE BLDC GLUCOMTR-MCNC: 250 MG/DL (ref 70–105)
GLUCOSE SERPL-MCNC: 226 MG/DL (ref 65–99)
GRAM STN SPEC: ABNORMAL
GRAM STN SPEC: ABNORMAL
HCT VFR BLD AUTO: 26.4 % (ref 37.5–51)
HGB BLD-MCNC: 9.3 G/DL (ref 13–17.7)
LAB AP CASE REPORT: NORMAL
LAB AP CLINICAL INFORMATION: NORMAL
LYMPHOCYTES # BLD AUTO: 3 10*3/MM3 (ref 0.7–3.1)
LYMPHOCYTES NFR BLD AUTO: 27.6 % (ref 19.6–45.3)
MCH RBC QN AUTO: 31 PG (ref 26.6–33)
MCHC RBC AUTO-ENTMCNC: 35.1 G/DL (ref 31.5–35.7)
MCV RBC AUTO: 88.2 FL (ref 79–97)
MONOCYTES # BLD AUTO: 0.9 10*3/MM3 (ref 0.1–0.9)
MONOCYTES NFR BLD AUTO: 8.5 % (ref 5–12)
NEUTROPHILS NFR BLD AUTO: 6.8 10*3/MM3 (ref 1.7–7)
NEUTROPHILS NFR BLD AUTO: 62.8 % (ref 42.7–76)
NRBC BLD AUTO-RTO: 0 /100 WBC (ref 0–0.2)
PATH REPORT.FINAL DX SPEC: NORMAL
PATH REPORT.GROSS SPEC: NORMAL
PLATELET # BLD AUTO: 381 10*3/MM3 (ref 140–450)
PMV BLD AUTO: 6.8 FL (ref 6–12)
POTASSIUM SERPL-SCNC: 4 MMOL/L (ref 3.5–5.2)
RBC # BLD AUTO: 2.99 10*6/MM3 (ref 4.14–5.8)
SODIUM SERPL-SCNC: 140 MMOL/L (ref 136–145)
WBC # BLD AUTO: 10.8 10*3/MM3 (ref 3.4–10.8)

## 2021-02-25 PROCEDURE — 97162 PT EVAL MOD COMPLEX 30 MIN: CPT

## 2021-02-25 PROCEDURE — 80048 BASIC METABOLIC PNL TOTAL CA: CPT | Performed by: ORTHOPAEDIC SURGERY

## 2021-02-25 PROCEDURE — 85025 COMPLETE CBC W/AUTO DIFF WBC: CPT | Performed by: ORTHOPAEDIC SURGERY

## 2021-02-25 PROCEDURE — 82962 GLUCOSE BLOOD TEST: CPT

## 2021-02-25 PROCEDURE — 63710000001 INSULIN LISPRO (HUMAN) PER 5 UNITS: Performed by: HOSPITALIST

## 2021-02-25 PROCEDURE — 99232 SBSQ HOSP IP/OBS MODERATE 35: CPT | Performed by: HOSPITALIST

## 2021-02-25 PROCEDURE — 85651 RBC SED RATE NONAUTOMATED: CPT | Performed by: NURSE PRACTITIONER

## 2021-02-25 PROCEDURE — 02HV33Z INSERTION OF INFUSION DEVICE INTO SUPERIOR VENA CAVA, PERCUTANEOUS APPROACH: ICD-10-PCS | Performed by: ORTHOPAEDIC SURGERY

## 2021-02-25 PROCEDURE — 25010000002 MORPHINE PER 10 MG: Performed by: ORTHOPAEDIC SURGERY

## 2021-02-25 PROCEDURE — C1751 CATH, INF, PER/CENT/MIDLINE: HCPCS

## 2021-02-25 RX ORDER — INSULIN GLARGINE 100 [IU]/ML
25 INJECTION, SOLUTION SUBCUTANEOUS NIGHTLY
Status: DISCONTINUED | OUTPATIENT
Start: 2021-02-25 | End: 2021-02-25 | Stop reason: HOSPADM

## 2021-02-25 RX ORDER — HYDROCODONE BITARTRATE AND ACETAMINOPHEN 7.5; 325 MG/1; MG/1
TABLET ORAL
Qty: 30 TABLET | Refills: 0 | Status: ON HOLD | OUTPATIENT
Start: 2021-02-25 | End: 2021-03-02 | Stop reason: SDUPTHER

## 2021-02-25 RX ORDER — INSULIN LISPRO 100 [IU]/ML
4 INJECTION, SOLUTION INTRAVENOUS; SUBCUTANEOUS
Status: DISCONTINUED | OUTPATIENT
Start: 2021-02-25 | End: 2021-02-25 | Stop reason: HOSPADM

## 2021-02-25 RX ORDER — OXYCODONE HYDROCHLORIDE AND ACETAMINOPHEN 5; 325 MG/1; MG/1
1 TABLET ORAL EVERY 4 HOURS PRN
Qty: 30 TABLET | Refills: 0 | Status: CANCELLED | OUTPATIENT
Start: 2021-02-25

## 2021-02-25 RX ADMIN — MORPHINE SULFATE 4 MG: 4 INJECTION INTRAVENOUS at 04:33

## 2021-02-25 RX ADMIN — MORPHINE SULFATE 4 MG: 4 INJECTION INTRAVENOUS at 02:09

## 2021-02-25 RX ADMIN — POLYETHYLENE GLYCOL 3350 17 G: 17 POWDER, FOR SOLUTION ORAL at 07:03

## 2021-02-25 RX ADMIN — NAFCILLIN SODIUM 12 G: 2 INJECTION, POWDER, LYOPHILIZED, FOR SOLUTION INTRAMUSCULAR; INTRAVENOUS at 18:05

## 2021-02-25 RX ADMIN — HYDROCODONE BITARTRATE AND ACETAMINOPHEN 1 TABLET: 7.5; 325 TABLET ORAL at 05:31

## 2021-02-25 RX ADMIN — INSULIN LISPRO 3 UNITS: 100 INJECTION, SOLUTION INTRAVENOUS; SUBCUTANEOUS at 11:31

## 2021-02-25 RX ADMIN — MORPHINE SULFATE 4 MG: 4 INJECTION INTRAVENOUS at 07:03

## 2021-02-25 RX ADMIN — MORPHINE SULFATE 4 MG: 4 INJECTION INTRAVENOUS at 11:00

## 2021-02-26 ENCOUNTER — READMISSION MANAGEMENT (OUTPATIENT)
Dept: CALL CENTER | Facility: HOSPITAL | Age: 46
End: 2021-02-26

## 2021-02-26 NOTE — OUTREACH NOTE
Prep Survey      Responses   [facility] patient discharged from?  [facility]   Is LACE score < 7 ?  No   Emergency Room discharge w/ pulse ox?  No   Eligibility  Readm Mgmt   Discharge diagnosis  Infection of right BKA [s/p Revision of amputation below knee]   Does the patient have one of the following disease processes/diagnoses(primary or secondary)?  General Surgery   Does the patient have Home health ordered?  Yes   What is the Home health agency?   [facility]   Is there a DME ordered?  No   Comments regarding appointments  Needs f/u scheduled   Medication alerts for this patient  Stop aspirin.  Medications per AVS.   Prep survey completed?  Yes          Bharati Fleming RN

## 2021-02-28 ENCOUNTER — READMISSION MANAGEMENT (OUTPATIENT)
Dept: CALL CENTER | Facility: HOSPITAL | Age: 46
End: 2021-02-28

## 2021-02-28 LAB
BACTERIA SPEC ANAEROBE CULT: NORMAL
QT INTERVAL: 325 MS

## 2021-02-28 NOTE — OUTREACH NOTE
General Surgery Week 1 Survey      Responses   [facility] [place] patient discharged from?  [facility]   Does the patient have one of the following disease processes/diagnoses(primary or secondary)?  General Surgery   Week 1 attempt successful?  Yes   Call start time  1545   Call end time  1552   Discharge diagnosis  Infection of right BKA   Medication alerts for this patient  Stop aspirin.  Medications per AVS.   Meds reviewed with patient/caregiver?  Yes   Is the patient having any side effects they believe may be caused by any medication additions or changes?  No   Does the patient have all medications related to this admission filled (includes all antibiotics, pain medications, etc.)  Yes   Is the patient taking all medications as directed (includes completed medication regime)?  Yes   Does the patient have a follow up appointment scheduled with their surgeon?  Yes   Has the patient kept scheduled appointments due by today?  N/A   What is the Home health agency?   [facility]   Has home health visited the patient within 72 hours of discharge?  Yes   Psychosocial issues?  No   Comments  Wound Vac, IV abx, lab work, and Picc dressing change all tomorrow.   Did the patient receive a copy of their discharge instructions?  Yes   Nursing interventions  Reviewed instructions with patient   What is the patient's perception of their health status since discharge?  Improving   Nursing interventions  Nurse provided patient education   Is the patient /caregiver able to teach back basic post-op care?  Continue use of incentive spirometry at least 1 week post discharge, Practice 'cough and deep breath', Take showers only when approved by MD-sponge bathe until then   Is the patient/caregiver able to teach back signs and symptoms of incisional infection?  Increased redness, swelling or pain at the incisonal site, Increased drainage or bleeding, Incisional warmth, Pus or odor  from incision, Fever   Is the patient/caregiver able to teach back steps to recovery at home?  Set small, achievable goals for return to baseline health, Rest and rebuild strength, gradually increase activity, Eat a well-balance diet, Make a list of questions for surgeon's appointment   If the patient is a current smoker, are they able to teach back resources for cessation?  Not a smoker   Is the patient/caregiver able to teach back the hierarchy of who to call/visit for symptoms/problems? PCP, Specialist, Home health nurse, Urgent Care, ED, 911  Yes   Additional teach back comments  Says sleep is difficult between IV tubing and wound vac, but he is eating ok.   Week 1 call completed?  Yes   Wrap up additional comments  They are familiar with post op care, he is doing ok, coming back Tuesday for more surgery.          Cora Vilchis RN

## 2021-03-01 ENCOUNTER — LAB (OUTPATIENT)
Dept: LAB | Facility: HOSPITAL | Age: 46
End: 2021-03-01

## 2021-03-01 ENCOUNTER — TRANSCRIBE ORDERS (OUTPATIENT)
Dept: ADMINISTRATIVE | Facility: HOSPITAL | Age: 46
End: 2021-03-01

## 2021-03-01 DIAGNOSIS — Z89.511 STATUS POST BILATERAL BELOW KNEE AMPUTATION (HCC): ICD-10-CM

## 2021-03-01 DIAGNOSIS — T87.43 INFECTION OF AMPUTATION STUMP OF RIGHT LOWER EXTREMITY (HCC): Primary | ICD-10-CM

## 2021-03-01 DIAGNOSIS — Z89.512 STATUS POST BILATERAL BELOW KNEE AMPUTATION (HCC): ICD-10-CM

## 2021-03-01 DIAGNOSIS — T87.43 INFECTION OF AMPUTATION STUMP OF RIGHT LOWER EXTREMITY (HCC): ICD-10-CM

## 2021-03-01 LAB
ABO GROUP BLD: NORMAL
ALBUMIN SERPL-MCNC: 2.6 G/DL (ref 3.5–5.2)
ALBUMIN/GLOB SERPL: 0.7 G/DL
ALP SERPL-CCNC: 117 U/L (ref 39–117)
ALT SERPL W P-5'-P-CCNC: 17 U/L (ref 1–41)
ANION GAP SERPL CALCULATED.3IONS-SCNC: 7.9 MMOL/L (ref 5–15)
AST SERPL-CCNC: 15 U/L (ref 1–40)
BASOPHILS # BLD AUTO: 0.04 10*3/MM3 (ref 0–0.2)
BASOPHILS NFR BLD AUTO: 0.3 % (ref 0–1.5)
BILIRUB SERPL-MCNC: 0.4 MG/DL (ref 0–1.2)
BLD GP AB SCN SERPL QL: NEGATIVE
BUN SERPL-MCNC: 11 MG/DL (ref 6–20)
BUN/CREAT SERPL: 11.5 (ref 7–25)
CALCIUM SPEC-SCNC: 8 MG/DL (ref 8.6–10.5)
CHLORIDE SERPL-SCNC: 99 MMOL/L (ref 98–107)
CO2 SERPL-SCNC: 27.1 MMOL/L (ref 22–29)
CREAT SERPL-MCNC: 0.96 MG/DL (ref 0.76–1.27)
DEPRECATED RDW RBC AUTO: 39.7 FL (ref 37–54)
EOSINOPHIL # BLD AUTO: 0.13 10*3/MM3 (ref 0–0.4)
EOSINOPHIL NFR BLD AUTO: 1.1 % (ref 0.3–6.2)
ERYTHROCYTE [DISTWIDTH] IN BLOOD BY AUTOMATED COUNT: 12.3 % (ref 12.3–15.4)
ERYTHROCYTE [SEDIMENTATION RATE] IN BLOOD: 29 MM/HR (ref 0–15)
GFR SERPL CREATININE-BSD FRML MDRD: 84 ML/MIN/1.73
GLOBULIN UR ELPH-MCNC: 3.9 GM/DL
GLUCOSE SERPL-MCNC: 297 MG/DL (ref 65–99)
HCT VFR BLD AUTO: 33.7 % (ref 37.5–51)
HGB BLD-MCNC: 11.1 G/DL (ref 13–17.7)
IMM GRANULOCYTES # BLD AUTO: 0.06 10*3/MM3 (ref 0–0.05)
IMM GRANULOCYTES NFR BLD AUTO: 0.5 % (ref 0–0.5)
LYMPHOCYTES # BLD AUTO: 1.77 10*3/MM3 (ref 0.7–3.1)
LYMPHOCYTES NFR BLD AUTO: 15.3 % (ref 19.6–45.3)
MCH RBC QN AUTO: 29.8 PG (ref 26.6–33)
MCHC RBC AUTO-ENTMCNC: 32.9 G/DL (ref 31.5–35.7)
MCV RBC AUTO: 90.3 FL (ref 79–97)
MONOCYTES # BLD AUTO: 0.47 10*3/MM3 (ref 0.1–0.9)
MONOCYTES NFR BLD AUTO: 4.1 % (ref 5–12)
MRSA DNA SPEC QL NAA+PROBE: NORMAL
NEUTROPHILS NFR BLD AUTO: 78.7 % (ref 42.7–76)
NEUTROPHILS NFR BLD AUTO: 9.08 10*3/MM3 (ref 1.7–7)
NRBC BLD AUTO-RTO: 0 /100 WBC (ref 0–0.2)
PLATELET # BLD AUTO: 549 10*3/MM3 (ref 140–450)
PMV BLD AUTO: 9.3 FL (ref 6–12)
POTASSIUM SERPL-SCNC: 3.6 MMOL/L (ref 3.5–5.2)
PROT SERPL-MCNC: 6.5 G/DL (ref 6–8.5)
RBC # BLD AUTO: 3.73 10*6/MM3 (ref 4.14–5.8)
RH BLD: POSITIVE
SARS-COV-2 RNA PNL SPEC NAA+PROBE: NOT DETECTED
SODIUM SERPL-SCNC: 134 MMOL/L (ref 136–145)
T&S EXPIRATION DATE: NORMAL
WBC # BLD AUTO: 11.55 10*3/MM3 (ref 3.4–10.8)

## 2021-03-01 PROCEDURE — 86900 BLOOD TYPING SEROLOGIC ABO: CPT | Performed by: ORTHOPAEDIC SURGERY

## 2021-03-01 PROCEDURE — 85652 RBC SED RATE AUTOMATED: CPT

## 2021-03-01 PROCEDURE — 85025 COMPLETE CBC W/AUTO DIFF WBC: CPT

## 2021-03-01 PROCEDURE — 87641 MR-STAPH DNA AMP PROBE: CPT | Performed by: ORTHOPAEDIC SURGERY

## 2021-03-01 PROCEDURE — U0003 INFECTIOUS AGENT DETECTION BY NUCLEIC ACID (DNA OR RNA); SEVERE ACUTE RESPIRATORY SYNDROME CORONAVIRUS 2 (SARS-COV-2) (CORONAVIRUS DISEASE [COVID-19]), AMPLIFIED PROBE TECHNIQUE, MAKING USE OF HIGH THROUGHPUT TECHNOLOGIES AS DESCRIBED BY CMS-2020-01-R: HCPCS

## 2021-03-01 PROCEDURE — 86850 RBC ANTIBODY SCREEN: CPT | Performed by: ORTHOPAEDIC SURGERY

## 2021-03-01 PROCEDURE — C9803 HOPD COVID-19 SPEC COLLECT: HCPCS

## 2021-03-01 PROCEDURE — 86901 BLOOD TYPING SEROLOGIC RH(D): CPT | Performed by: ORTHOPAEDIC SURGERY

## 2021-03-01 PROCEDURE — 80053 COMPREHEN METABOLIC PANEL: CPT

## 2021-03-01 PROCEDURE — 36415 COLL VENOUS BLD VENIPUNCTURE: CPT | Performed by: ORTHOPAEDIC SURGERY

## 2021-03-01 RX ORDER — ASPIRIN 325 MG
325 TABLET ORAL DAILY
Status: ON HOLD | COMMUNITY
End: 2021-07-29 | Stop reason: SDUPTHER

## 2021-03-02 ENCOUNTER — ANESTHESIA (OUTPATIENT)
Dept: PERIOP | Facility: HOSPITAL | Age: 46
End: 2021-03-02

## 2021-03-02 ENCOUNTER — HOSPITAL ENCOUNTER (OUTPATIENT)
Facility: HOSPITAL | Age: 46
Setting detail: SURGERY ADMIT
Discharge: HOME HEALTH | End: 2021-03-02
Attending: ORTHOPAEDIC SURGERY | Admitting: ORTHOPAEDIC SURGERY

## 2021-03-02 ENCOUNTER — ANESTHESIA EVENT (OUTPATIENT)
Dept: PERIOP | Facility: HOSPITAL | Age: 46
End: 2021-03-02

## 2021-03-02 VITALS
HEART RATE: 87 BPM | SYSTOLIC BLOOD PRESSURE: 134 MMHG | HEIGHT: 72 IN | WEIGHT: 230 LBS | RESPIRATION RATE: 16 BRPM | BODY MASS INDEX: 31.15 KG/M2 | TEMPERATURE: 97.2 F | DIASTOLIC BLOOD PRESSURE: 79 MMHG | OXYGEN SATURATION: 99 %

## 2021-03-02 DIAGNOSIS — T87.43 INFECTION OF RIGHT BELOW KNEE AMPUTATION (HCC): Primary | ICD-10-CM

## 2021-03-02 LAB
GLUCOSE BLDC GLUCOMTR-MCNC: 219 MG/DL (ref 70–105)
GLUCOSE BLDC GLUCOMTR-MCNC: 76 MG/DL (ref 70–105)

## 2021-03-02 PROCEDURE — 25010000002 METOCLOPRAMIDE PER 10 MG: Performed by: ANESTHESIOLOGIST ASSISTANT

## 2021-03-02 PROCEDURE — 82962 GLUCOSE BLOOD TEST: CPT

## 2021-03-02 PROCEDURE — 25010000002 PROPOFOL 10 MG/ML EMULSION: Performed by: ANESTHESIOLOGIST ASSISTANT

## 2021-03-02 PROCEDURE — 25010000002 ONDANSETRON PER 1 MG: Performed by: ANESTHESIOLOGIST ASSISTANT

## 2021-03-02 PROCEDURE — 25010000002 MIDAZOLAM PER 1 MG: Performed by: ANESTHESIOLOGY

## 2021-03-02 PROCEDURE — 25010000002 HYDROMORPHONE PER 4 MG: Performed by: ANESTHESIOLOGY

## 2021-03-02 RX ORDER — HYDROCODONE BITARTRATE AND ACETAMINOPHEN 7.5; 325 MG/1; MG/1
TABLET ORAL
Qty: 30 TABLET | Refills: 0 | Status: SHIPPED | OUTPATIENT
Start: 2021-03-02 | End: 2021-07-28

## 2021-03-02 RX ORDER — MIDAZOLAM HYDROCHLORIDE 1 MG/ML
INJECTION INTRAMUSCULAR; INTRAVENOUS AS NEEDED
Status: DISCONTINUED | OUTPATIENT
Start: 2021-03-02 | End: 2021-03-02 | Stop reason: SURG

## 2021-03-02 RX ORDER — LORAZEPAM 2 MG/ML
1 INJECTION INTRAMUSCULAR
Status: DISCONTINUED | OUTPATIENT
Start: 2021-03-02 | End: 2021-03-02 | Stop reason: HOSPADM

## 2021-03-02 RX ORDER — ONDANSETRON 2 MG/ML
INJECTION INTRAMUSCULAR; INTRAVENOUS AS NEEDED
Status: DISCONTINUED | OUTPATIENT
Start: 2021-03-02 | End: 2021-03-02 | Stop reason: SURG

## 2021-03-02 RX ORDER — PROPOFOL 10 MG/ML
VIAL (ML) INTRAVENOUS AS NEEDED
Status: DISCONTINUED | OUTPATIENT
Start: 2021-03-02 | End: 2021-03-02 | Stop reason: SURG

## 2021-03-02 RX ORDER — HYDROMORPHONE HCL 110MG/55ML
0.5 PATIENT CONTROLLED ANALGESIA SYRINGE INTRAVENOUS
Status: DISCONTINUED | OUTPATIENT
Start: 2021-03-02 | End: 2021-03-02 | Stop reason: HOSPADM

## 2021-03-02 RX ORDER — HYDROMORPHONE HCL 110MG/55ML
0.5 PATIENT CONTROLLED ANALGESIA SYRINGE INTRAVENOUS ONCE
Status: COMPLETED | OUTPATIENT
Start: 2021-03-02 | End: 2021-03-02

## 2021-03-02 RX ORDER — METOCLOPRAMIDE HYDROCHLORIDE 5 MG/ML
INJECTION INTRAMUSCULAR; INTRAVENOUS AS NEEDED
Status: DISCONTINUED | OUTPATIENT
Start: 2021-03-02 | End: 2021-03-02 | Stop reason: SURG

## 2021-03-02 RX ORDER — EPHEDRINE SULFATE 50 MG/ML
INJECTION INTRAVENOUS AS NEEDED
Status: DISCONTINUED | OUTPATIENT
Start: 2021-03-02 | End: 2021-03-02 | Stop reason: SURG

## 2021-03-02 RX ORDER — ONDANSETRON 2 MG/ML
4 INJECTION INTRAMUSCULAR; INTRAVENOUS ONCE AS NEEDED
Status: DISCONTINUED | OUTPATIENT
Start: 2021-03-02 | End: 2021-03-02 | Stop reason: HOSPADM

## 2021-03-02 RX ORDER — SODIUM CHLORIDE, SODIUM LACTATE, POTASSIUM CHLORIDE, CALCIUM CHLORIDE 600; 310; 30; 20 MG/100ML; MG/100ML; MG/100ML; MG/100ML
1000 INJECTION, SOLUTION INTRAVENOUS CONTINUOUS
Status: DISCONTINUED | OUTPATIENT
Start: 2021-03-02 | End: 2021-03-02 | Stop reason: HOSPADM

## 2021-03-02 RX ORDER — SODIUM CHLORIDE 0.9 % (FLUSH) 0.9 %
10 SYRINGE (ML) INJECTION AS NEEDED
Status: DISCONTINUED | OUTPATIENT
Start: 2021-03-02 | End: 2021-03-02 | Stop reason: HOSPADM

## 2021-03-02 RX ORDER — PHENYLEPHRINE HCL IN 0.9% NACL 1 MG/10 ML
SYRINGE (ML) INTRAVENOUS AS NEEDED
Status: DISCONTINUED | OUTPATIENT
Start: 2021-03-02 | End: 2021-03-02 | Stop reason: SURG

## 2021-03-02 RX ORDER — LIDOCAINE HYDROCHLORIDE 20 MG/ML
INJECTION, SOLUTION EPIDURAL; INFILTRATION; INTRACAUDAL; PERINEURAL AS NEEDED
Status: DISCONTINUED | OUTPATIENT
Start: 2021-03-02 | End: 2021-03-02 | Stop reason: SURG

## 2021-03-02 RX ADMIN — Medication 10 ML: at 10:35

## 2021-03-02 RX ADMIN — MIDAZOLAM 2 MG: 1 INJECTION INTRAMUSCULAR; INTRAVENOUS at 12:23

## 2021-03-02 RX ADMIN — Medication 100 MCG: at 12:49

## 2021-03-02 RX ADMIN — PROPOFOL 200 MG: 10 INJECTION, EMULSION INTRAVENOUS at 12:28

## 2021-03-02 RX ADMIN — SODIUM CHLORIDE, SODIUM LACTATE, POTASSIUM CHLORIDE, AND CALCIUM CHLORIDE: .6; .31; .03; .02 INJECTION, SOLUTION INTRAVENOUS at 13:03

## 2021-03-02 RX ADMIN — METOCLOPRAMIDE HYDROCHLORIDE 10 MG: 5 INJECTION INTRAMUSCULAR; INTRAVENOUS at 12:50

## 2021-03-02 RX ADMIN — LIDOCAINE HYDROCHLORIDE 50 MG: 20 INJECTION, SOLUTION EPIDURAL; INFILTRATION; INTRACAUDAL; PERINEURAL at 12:28

## 2021-03-02 RX ADMIN — Medication 200 MCG: at 13:08

## 2021-03-02 RX ADMIN — EPHEDRINE SULFATE 10 MG: 50 INJECTION INTRAVENOUS at 13:13

## 2021-03-02 RX ADMIN — Medication 200 MCG: at 12:53

## 2021-03-02 RX ADMIN — HYDROMORPHONE HYDROCHLORIDE 0.5 MG: 2 INJECTION, SOLUTION INTRAMUSCULAR; INTRAVENOUS; SUBCUTANEOUS at 10:34

## 2021-03-02 RX ADMIN — SODIUM CHLORIDE, SODIUM LACTATE, POTASSIUM CHLORIDE, AND CALCIUM CHLORIDE: .6; .31; .03; .02 INJECTION, SOLUTION INTRAVENOUS at 12:21

## 2021-03-02 RX ADMIN — Medication 200 MCG: at 13:13

## 2021-03-02 RX ADMIN — Medication 200 MCG: at 13:00

## 2021-03-02 RX ADMIN — Medication 100 MCG: at 12:39

## 2021-03-02 RX ADMIN — EPHEDRINE SULFATE 10 MG: 50 INJECTION INTRAVENOUS at 13:08

## 2021-03-02 RX ADMIN — EPHEDRINE SULFATE 5 MG: 50 INJECTION INTRAVENOUS at 13:01

## 2021-03-02 RX ADMIN — ONDANSETRON 4 MG: 2 INJECTION INTRAMUSCULAR; INTRAVENOUS at 13:13

## 2021-03-02 NOTE — ANESTHESIA PREPROCEDURE EVALUATION
Anesthesia Evaluation     Patient summary reviewed   NPO Solid Status: > 8 hours  NPO Liquid Status: > 8 hours           Airway   Mallampati: II  TM distance: >3 FB  Neck ROM: full  No difficulty expected  Dental - normal exam     Pulmonary - normal exam   Cardiovascular - normal exam  Exercise tolerance: poor (<4 METS)    (+) CAD,       Neuro/Psych  (+) numbness,     GI/Hepatic/Renal/Endo    (+)   diabetes mellitus poorly controlled using insulin,     Musculoskeletal     Abdominal  - normal exam    Bowel sounds: normal.   Substance History      OB/GYN          Other                        Anesthesia Plan    ASA 4     general     intravenous induction     Anesthetic plan, all risks, benefits, and alternatives have been provided, discussed and informed consent has been obtained with: patient.    Plan discussed with CAA.

## 2021-03-02 NOTE — ANESTHESIA PROCEDURE NOTES
Airway  Urgency: elective    Date/Time: 3/2/2021 12:28 PM  Airway not difficult    General Information and Staff    Patient location during procedure: OR    Indications and Patient Condition  Indications for airway management: airway protection    Preoxygenated: yes  Mask difficulty assessment: 0 - not attempted    Final Airway Details  Final airway type: supraglottic airway      Successful airway: unique  Size 4    Number of attempts at approach: 1  Assessment: lips, teeth, and gum same as pre-op

## 2021-03-02 NOTE — OP NOTE
AMPUTATION REVISION KNEE STUMP  Procedure Report    Patient Name:  Maynor Gregg  YOB: 1975    Date of Surgery:  3/2/2021         Pre-op Diagnosis: Right below-knee amputation wound    Postop diagnosis: Same    Indication: Patient is 46-year-old white male who had a prior right BKA April 2020.  Became infected recently had a excision of necrotic skin along his anterior leg with a center of the tibial tubercle and then a transverse lesion being excised.  The distal wound was also opened and had been treated with a VAC suction and IV nafcillin.  Patient's last surgery was on February 22.  It is felt that he should undergo closure of the anterior wound is now      Procedure/CPT® Codes:      Procedure(s):  Right below-knee AMPUTATION REVISION , excising skin muscle and bone    Staff:  Surgeon(s):  Donn Alcantara MD    Assistant: Minerva Horowitz APRN.  Was essential to case with retraction hemostasis and wound closure    Anesthesia: General    Estimated Blood Loss: 50 mL    Implants:    Nothing was implanted during the procedure    Specimen:          None        Findings: Mild necrotic edges to the anterior tibial tubercle wound.  The transverse excised area was healing well.  The distal wound was still open with good skin edges.  The deep muscle was okay.  There is still some bony overgrowth on the medial distal tibia which was removed    Complications: None    Description of Procedure: Patient was seen in the holding room.  In fact the right leg is correct leg.  He was already on nafcillin.  Using the OR we had timeout performed.  Had the right lower extremity initialed.  He had general anesthesia was positioned supine with a bump under his right hip.  Had sterile prep and draping was right lower extremity.  Sutures were removed.  Now that his swelling is gone down it was thought that the proximal wound was more closable.  The edges of the skin were excised removing necrotic skin and then was  irrigated and then approximated with 2-0 Vicryl and 3-0 nylon complex wound closure moving the distal flap up to close and the distal end of the wound and excising the central wound elliptically.  The distal wound was also opened cutting into the muscle layer down to the tibia and rondure was used to remove further medial tibial bone which appeared on the x-ray to need excision.  All wounds were then thoroughly irrigated the deep muscle layer was loosely closed with #1 Vicryl interrupted leaving gaps for drainage.  Distal wound was closed with 2-0 nylon only leaving large gaps at the medial and lateral edges which were packed with 1 inch plain Nu Gauze moistened with saline.  Sterile dressings were then applied.    Plan: Patient will start dressing changes in 2 days with wound packing every day with 1 inch plain Nu Gauze.  Continue nafcillin for 6 weeks from February 23.  Return in 3 weeks.        Donn Alcantara MD     Date: 3/2/2021  Time: 13:31 EST

## 2021-03-02 NOTE — H&P
Patient Care Team:  Fred Lemons FNP as PCP - General (Family Medicine)    Chief complaint right BKA wound    Subjective     Patient is a 46 y.o. male who presents with revision of right BKa February 23 after became infected.  Original surgery been done April 2020.  He had necrosis of the skin anterior tilting and skin defects which now needs coverage by pulling the flap anteriorly from distal.  He has been using a wound VAC for the distal wound and has been on continuous IV nafcillin.  Pain has been moderate.  He is getting some pain in his left BKA from using a prosthesis on that side only.    Review of Systems   Noncontributory   No chest pain, shortness of breath, fevers or chills.    History  Past Medical History:   Diagnosis Date   • Coronary artery disease    • Diabetes mellitus (CMS/[facility])    • MI, old 2010     Past Surgical History:   Procedure Laterality Date   • AMPUTATION DIGIT Right 2/28/2020    Procedure: PARTIAL FIRST RAY RESECTION RIGHT FOOT;  Surgeon: CROW Souza DPM;  Location: [facility] OR;  Service: Podiatry;  Laterality: Right;   • AMPUTATION FOOT / TOE     • BELOW KNEE AMPUTATION Right 4/30/2020    Procedure: AMPUTATION BELOW KNEE;  Surgeon: Donn Alcantara MD;  Location: [facility] OR;  Service: Orthopedics;  Laterality: Right;   • BELOW KNEE AMPUTATION Left 9/2/2020    Procedure: AMPUTATION BELOW KNEE, left;  Surgeon: Donn Alcantara MD;  Location: [facility] OR;  Service: Orthopedics;  Laterality: Left;   • BELOW KNEE AMPUTATION Right 2/23/2021    Procedure: Revision of amputaion below knee;  Surgeon: Donn Alcantara MD;  Location: [facility] OR;  Service: Orthopedics;  Laterality: Right;   • CARDIAC CATHETERIZATION  11/2010     RCA artery   • CARDIAC CATHETERIZATION  2015    LAD artery   • CARDIAC SURGERY     • INCISION AND DRAINAGE LEG Left 1/21/2020    Procedure: INCISION AND DRAINAGE LOWER EXTREMITY with second digit amputation;  Surgeon: CROW Souza DPM;   Location: [facility] MAIN OR;  Service: Podiatry   • INCISION AND DRAINAGE LEG Right 2020    Procedure: incision and drainage ,transmetatarsal amputation, fasciotomy;  Surgeon: CROW Souza DPM;  Location: [facility] MAIN OR;  Service: Podiatry;  Laterality: Right;   • TOE AMPUTATION Left    • WOUND DEBRIDEMENT Right 2020    Procedure: DEBRIDEMENT with sesamoid excision;  Surgeon: CROW Souza DPM;  Location: [facility] MAIN OR;  Service: Podiatry     Family History   Problem Relation Age of Onset   • Diabetes Father    • Heart failure Father    • Diabetes Paternal Grandfather      Social History     Tobacco Use   • Smoking status: Former Smoker     Packs/day: 0.50     Quit date: 2019     Years since quittin.8   • Smokeless tobacco: Never Used   Substance Use Topics   • Alcohol use: No     Frequency: Never   • Drug use: No     Medications Prior to Admission   Medication Sig Dispense Refill Last Dose   • aspirin 325 MG tablet Take 325 mg by mouth Daily.   2021   • HYDROcodone-acetaminophen (Norco) 7.5-325 MG per tablet Take 1 to 2 tablets by mouth every 4 hours as needed 30 tablet 0 3/1/2021 at Unknown time   • insulin aspart (novoLOG) 100 UNIT/ML injection Inject 4-8 Units under the skin into the appropriate area as directed 4 (Four) Times a Day Before Meals & at Bedtime.   3/2/2021 at 0845   • Insulin Glargine (BASAGLAR KWIKPEN) 100 UNIT/ML injection pen INJECT 12 UNITS BY SUBCUTANEOUS ROUTE EVERY NIGHT   3/1/2021 at Unknown time   • nafcillin 12 g in sodium chloride 0.9 % 500 mL IVPB Infuse 12 g into a venous catheter Daily for 42 doses. Indications: Bone and/or Joint Infection   2021 at Unknown time     Allergies:  Metformin and Oxycodone-acetaminophen    Objective     Vital Signs  Vitals:    21 0954   BP: 129/77   BP Location: Left arm   Patient Position: Lying   Pulse: 84   Resp: 15   Temp: 97.9 °F (36.6 °C)   TempSrc: Temporal   SpO2: 98%   Weight: 104 kg (230 lb)   Height: 182.9  "cm (72\")       Physical Exam:   Pleasant male, no apparent distress, normal height and weight  HEENT is normocephalic atraumatic  Clear bilaterally  Regular rhythm  Nontender positive bowel sounds  Right BKA shows some mild necrosis around the anterior wound which is about 2 x 3 cm.  Distal wound is packed with foam.  The left BKA does not have any fluctuance or erythema.  Some mild early skin breakdown over the tibial tubercle    Results Review:    I reviewed the patient's new clinical results.  I reviewed the patient's new imaging results Assessment/Plan     Lab Results (last 24 hours)     Procedure Component Value Units Date/Time    MRSA Screen, PCR (Inpatient) - Swab, Nares [[id number]]  (Normal) Collected: 03/01/21 1633    Specimen: Swab from Nares Updated: 03/01/21 1909     MRSA PCR No MRSA Detected        Imaging Results (Last 24 Hours)     ** No results found for the last 24 hours. **          Assessment/plan:  Right BKA wound    Plan advancement of flap over the distal tissue may need to resect further bone or muscle to this to advance.  He understands risk.  These include bleeding, infection, damage to nerves or blood vessels, possible need for further surgery, and the risk of medical or anesthetic complications going the risk of death.    I discussed the patient's findings and my recommendations with patient.     Donn Alcantara MD  03/02/21  10:23 EST    "

## 2021-03-08 ENCOUNTER — LAB REQUISITION (OUTPATIENT)
Dept: LAB | Facility: HOSPITAL | Age: 46
End: 2021-03-08

## 2021-03-08 DIAGNOSIS — T87.43 INFECTION OF AMPUTATION STUMP, RIGHT LOWER EXTREMITY (HCC): ICD-10-CM

## 2021-03-08 LAB
ALBUMIN SERPL-MCNC: 2.8 G/DL (ref 3.5–5.2)
ALBUMIN/GLOB SERPL: 0.7 G/DL
ALP SERPL-CCNC: 117 U/L (ref 39–117)
ALT SERPL W P-5'-P-CCNC: 20 U/L (ref 1–41)
ANION GAP SERPL CALCULATED.3IONS-SCNC: 6.4 MMOL/L (ref 5–15)
AST SERPL-CCNC: 14 U/L (ref 1–40)
BASOPHILS # BLD AUTO: 0.11 10*3/MM3 (ref 0–0.2)
BASOPHILS NFR BLD AUTO: 1.3 % (ref 0–1.5)
BILIRUB SERPL-MCNC: 0.4 MG/DL (ref 0–1.2)
BUN SERPL-MCNC: 14 MG/DL (ref 6–20)
BUN/CREAT SERPL: 15.9 (ref 7–25)
CALCIUM SPEC-SCNC: 8.3 MG/DL (ref 8.6–10.5)
CHLORIDE SERPL-SCNC: 102 MMOL/L (ref 98–107)
CO2 SERPL-SCNC: 27.6 MMOL/L (ref 22–29)
CREAT SERPL-MCNC: 0.88 MG/DL (ref 0.76–1.27)
DEPRECATED RDW RBC AUTO: 42 FL (ref 37–54)
EOSINOPHIL # BLD AUTO: 0.17 10*3/MM3 (ref 0–0.4)
EOSINOPHIL NFR BLD AUTO: 2.1 % (ref 0.3–6.2)
ERYTHROCYTE [DISTWIDTH] IN BLOOD BY AUTOMATED COUNT: 12.7 % (ref 12.3–15.4)
ERYTHROCYTE [SEDIMENTATION RATE] IN BLOOD: 63 MM/HR (ref 0–15)
GFR SERPL CREATININE-BSD FRML MDRD: 93 ML/MIN/1.73
GLOBULIN UR ELPH-MCNC: 4.1 GM/DL
GLUCOSE SERPL-MCNC: 386 MG/DL (ref 65–99)
HCT VFR BLD AUTO: 31.5 % (ref 37.5–51)
HGB BLD-MCNC: 10.2 G/DL (ref 13–17.7)
IMM GRANULOCYTES # BLD AUTO: 0.03 10*3/MM3 (ref 0–0.05)
IMM GRANULOCYTES NFR BLD AUTO: 0.4 % (ref 0–0.5)
LYMPHOCYTES # BLD AUTO: 2.09 10*3/MM3 (ref 0.7–3.1)
LYMPHOCYTES NFR BLD AUTO: 25.5 % (ref 19.6–45.3)
MCH RBC QN AUTO: 29.7 PG (ref 26.6–33)
MCHC RBC AUTO-ENTMCNC: 32.4 G/DL (ref 31.5–35.7)
MCV RBC AUTO: 91.8 FL (ref 79–97)
MONOCYTES # BLD AUTO: 0.56 10*3/MM3 (ref 0.1–0.9)
MONOCYTES NFR BLD AUTO: 6.8 % (ref 5–12)
NEUTROPHILS NFR BLD AUTO: 5.23 10*3/MM3 (ref 1.7–7)
NEUTROPHILS NFR BLD AUTO: 63.9 % (ref 42.7–76)
NRBC BLD AUTO-RTO: 0.1 /100 WBC (ref 0–0.2)
PLATELET # BLD AUTO: 494 10*3/MM3 (ref 140–450)
PMV BLD AUTO: 9.6 FL (ref 6–12)
POTASSIUM SERPL-SCNC: 4.5 MMOL/L (ref 3.5–5.2)
PROT SERPL-MCNC: 6.9 G/DL (ref 6–8.5)
RBC # BLD AUTO: 3.43 10*6/MM3 (ref 4.14–5.8)
SODIUM SERPL-SCNC: 136 MMOL/L (ref 136–145)
WBC # BLD AUTO: 8.19 10*3/MM3 (ref 3.4–10.8)

## 2021-03-08 PROCEDURE — 80053 COMPREHEN METABOLIC PANEL: CPT | Performed by: INTERNAL MEDICINE

## 2021-03-08 PROCEDURE — 85025 COMPLETE CBC W/AUTO DIFF WBC: CPT | Performed by: INTERNAL MEDICINE

## 2021-03-08 PROCEDURE — 85652 RBC SED RATE AUTOMATED: CPT | Performed by: INTERNAL MEDICINE

## 2021-03-09 ENCOUNTER — READMISSION MANAGEMENT (OUTPATIENT)
Dept: CALL CENTER | Facility: HOSPITAL | Age: 46
End: 2021-03-09

## 2021-03-09 NOTE — OUTREACH NOTE
General Surgery Week 2 Survey      Responses   [facility] patient discharged from?  [facility]   Does the patient have one of the following disease processes/diagnoses(primary or secondary)?  General Surgery   Week 2 attempt successful?  No   Unsuccessful attempts  Attempt 1          Sarita Haskins LPN

## 2021-03-10 ENCOUNTER — READMISSION MANAGEMENT (OUTPATIENT)
Dept: CALL CENTER | Facility: HOSPITAL | Age: 46
End: 2021-03-10

## 2021-03-10 NOTE — OUTREACH NOTE
General Surgery Week 2 Survey      Responses   [facility] patient discharged from?  [facility]   Does the patient have one of the following disease processes/diagnoses(primary or secondary)?  General Surgery   Week 2 attempt successful?  No   Unsuccessful attempts  Attempt 2          Brooklyn Bonner RN

## 2021-03-15 ENCOUNTER — LAB REQUISITION (OUTPATIENT)
Dept: LAB | Facility: HOSPITAL | Age: 46
End: 2021-03-15

## 2021-03-15 DIAGNOSIS — T87.89 OTHER COMPLICATIONS OF AMPUTATION STUMP (HCC): ICD-10-CM

## 2021-03-15 LAB
ALBUMIN SERPL-MCNC: 3.7 G/DL (ref 3.5–5.2)
ALBUMIN/GLOB SERPL: 0.9 G/DL
ALP SERPL-CCNC: 133 U/L (ref 39–117)
ALT SERPL W P-5'-P-CCNC: 56 U/L (ref 1–41)
ANION GAP SERPL CALCULATED.3IONS-SCNC: 7.9 MMOL/L (ref 5–15)
AST SERPL-CCNC: 40 U/L (ref 1–40)
BASOPHILS # BLD AUTO: 0.1 10*3/MM3 (ref 0–0.2)
BASOPHILS NFR BLD AUTO: 1.6 % (ref 0–1.5)
BILIRUB SERPL-MCNC: 0.5 MG/DL (ref 0–1.2)
BUN SERPL-MCNC: 15 MG/DL (ref 6–20)
BUN/CREAT SERPL: 16.1 (ref 7–25)
CALCIUM SPEC-SCNC: 8.4 MG/DL (ref 8.6–10.5)
CHLORIDE SERPL-SCNC: 101 MMOL/L (ref 98–107)
CO2 SERPL-SCNC: 28.1 MMOL/L (ref 22–29)
CREAT SERPL-MCNC: 0.93 MG/DL (ref 0.76–1.27)
DEPRECATED RDW RBC AUTO: 41.8 FL (ref 37–54)
EOSINOPHIL # BLD AUTO: 0.16 10*3/MM3 (ref 0–0.4)
EOSINOPHIL NFR BLD AUTO: 2.5 % (ref 0.3–6.2)
ERYTHROCYTE [DISTWIDTH] IN BLOOD BY AUTOMATED COUNT: 13.1 % (ref 12.3–15.4)
ERYTHROCYTE [SEDIMENTATION RATE] IN BLOOD: 55 MM/HR (ref 0–15)
GFR SERPL CREATININE-BSD FRML MDRD: 87 ML/MIN/1.73
GLOBULIN UR ELPH-MCNC: 4.1 GM/DL
GLUCOSE SERPL-MCNC: 352 MG/DL (ref 65–99)
HCT VFR BLD AUTO: 36 % (ref 37.5–51)
HGB BLD-MCNC: 12.2 G/DL (ref 13–17.7)
IMM GRANULOCYTES # BLD AUTO: 0.02 10*3/MM3 (ref 0–0.05)
IMM GRANULOCYTES NFR BLD AUTO: 0.3 % (ref 0–0.5)
LYMPHOCYTES # BLD AUTO: 2.37 10*3/MM3 (ref 0.7–3.1)
LYMPHOCYTES NFR BLD AUTO: 37.2 % (ref 19.6–45.3)
MCH RBC QN AUTO: 30.2 PG (ref 26.6–33)
MCHC RBC AUTO-ENTMCNC: 33.9 G/DL (ref 31.5–35.7)
MCV RBC AUTO: 89.1 FL (ref 79–97)
MONOCYTES # BLD AUTO: 0.5 10*3/MM3 (ref 0.1–0.9)
MONOCYTES NFR BLD AUTO: 7.8 % (ref 5–12)
NEUTROPHILS NFR BLD AUTO: 3.22 10*3/MM3 (ref 1.7–7)
NEUTROPHILS NFR BLD AUTO: 50.6 % (ref 42.7–76)
NRBC BLD AUTO-RTO: 0.2 /100 WBC (ref 0–0.2)
PLATELET # BLD AUTO: 376 10*3/MM3 (ref 140–450)
PMV BLD AUTO: 10 FL (ref 6–12)
POTASSIUM SERPL-SCNC: 4.2 MMOL/L (ref 3.5–5.2)
PROT SERPL-MCNC: 7.8 G/DL (ref 6–8.5)
RBC # BLD AUTO: 4.04 10*6/MM3 (ref 4.14–5.8)
SODIUM SERPL-SCNC: 137 MMOL/L (ref 136–145)
WBC # BLD AUTO: 6.37 10*3/MM3 (ref 3.4–10.8)

## 2021-03-15 PROCEDURE — 85652 RBC SED RATE AUTOMATED: CPT | Performed by: ORTHOPAEDIC SURGERY

## 2021-03-15 PROCEDURE — 85025 COMPLETE CBC W/AUTO DIFF WBC: CPT | Performed by: ORTHOPAEDIC SURGERY

## 2021-03-15 PROCEDURE — 80053 COMPREHEN METABOLIC PANEL: CPT | Performed by: ORTHOPAEDIC SURGERY

## 2021-03-23 LAB — FUNGUS WND CULT: NORMAL

## 2021-04-06 LAB
MYCOBACTERIUM SPEC CULT: NORMAL
NIGHT BLUE STAIN TISS: NORMAL

## 2021-04-08 ENCOUNTER — LAB REQUISITION (OUTPATIENT)
Dept: LAB | Facility: HOSPITAL | Age: 46
End: 2021-04-08

## 2021-04-08 DIAGNOSIS — T87.43 INFECTION OF AMPUTATION STUMP, RIGHT LOWER EXTREMITY (HCC): ICD-10-CM

## 2021-04-08 PROCEDURE — 88304 TISSUE EXAM BY PATHOLOGIST: CPT | Performed by: ORTHOPAEDIC SURGERY

## 2021-04-09 LAB
LAB AP CASE REPORT: NORMAL
PATH REPORT.FINAL DX SPEC: NORMAL
PATH REPORT.GROSS SPEC: NORMAL

## 2021-04-26 ENCOUNTER — LAB REQUISITION (OUTPATIENT)
Dept: LAB | Facility: HOSPITAL | Age: 46
End: 2021-04-26

## 2021-04-26 DIAGNOSIS — Z00.00 ENCOUNTER FOR GENERAL ADULT MEDICAL EXAMINATION WITHOUT ABNORMAL FINDINGS: ICD-10-CM

## 2021-04-26 PROCEDURE — 87205 SMEAR GRAM STAIN: CPT | Performed by: ORTHOPAEDIC SURGERY

## 2021-04-26 PROCEDURE — 87186 SC STD MICRODIL/AGAR DIL: CPT | Performed by: ORTHOPAEDIC SURGERY

## 2021-04-26 PROCEDURE — 87070 CULTURE OTHR SPECIMN AEROBIC: CPT | Performed by: ORTHOPAEDIC SURGERY

## 2021-04-29 ENCOUNTER — OFFICE VISIT (OUTPATIENT)
Dept: WOUND CARE | Facility: HOSPITAL | Age: 46
End: 2021-04-29

## 2021-04-29 LAB
BACTERIA FLD CULT: ABNORMAL
BACTERIA FLD CULT: ABNORMAL
GRAM STN SPEC: ABNORMAL
GRAM STN SPEC: ABNORMAL

## 2021-04-29 PROCEDURE — G0463 HOSPITAL OUTPT CLINIC VISIT: HCPCS

## 2021-05-03 ENCOUNTER — OFFICE VISIT (OUTPATIENT)
Dept: WOUND CARE | Facility: HOSPITAL | Age: 46
End: 2021-05-03

## 2021-05-03 PROCEDURE — G0463 HOSPITAL OUTPT CLINIC VISIT: HCPCS

## 2021-05-03 PROCEDURE — 97602 WOUND(S) CARE NON-SELECTIVE: CPT

## 2021-05-06 ENCOUNTER — APPOINTMENT (OUTPATIENT)
Dept: WOUND CARE | Facility: HOSPITAL | Age: 46
End: 2021-05-06

## 2021-05-18 ENCOUNTER — OFFICE VISIT (OUTPATIENT)
Dept: WOUND CARE | Facility: HOSPITAL | Age: 46
End: 2021-05-18

## 2021-05-18 PROCEDURE — G0463 HOSPITAL OUTPT CLINIC VISIT: HCPCS

## 2021-05-30 ENCOUNTER — HOSPITAL ENCOUNTER (EMERGENCY)
Facility: HOSPITAL | Age: 46
Discharge: HOME | End: 2021-05-31
Attending: EMERGENCY MEDICINE | Admitting: EMERGENCY MEDICINE

## 2021-05-30 DIAGNOSIS — S81.811A LACERATION OF RIGHT LEG EXCLUDING THIGH, INITIAL ENCOUNTER: Primary | ICD-10-CM

## 2021-05-30 PROCEDURE — 99283 EMERGENCY DEPT VISIT LOW MDM: CPT

## 2021-05-30 RX ORDER — AMOXICILLIN AND CLAVULANATE POTASSIUM 875; 125 MG/1; MG/1
1 TABLET, FILM COATED ORAL 2 TIMES DAILY
Qty: 14 TABLET | Refills: 0 | Status: SHIPPED | OUTPATIENT
Start: 2021-05-30 | End: 2021-06-06

## 2021-05-30 RX ORDER — AMOXICILLIN AND CLAVULANATE POTASSIUM 875; 125 MG/1; MG/1
1 TABLET, FILM COATED ORAL ONCE
Status: COMPLETED | OUTPATIENT
Start: 2021-05-30 | End: 2021-05-31

## 2021-05-31 VITALS
HEART RATE: 89 BPM | RESPIRATION RATE: 16 BRPM | DIASTOLIC BLOOD PRESSURE: 87 MMHG | OXYGEN SATURATION: 100 % | BODY MASS INDEX: 28.6 KG/M2 | HEIGHT: 75 IN | SYSTOLIC BLOOD PRESSURE: 135 MMHG | TEMPERATURE: 98.2 F | WEIGHT: 230 LBS

## 2021-05-31 RX ADMIN — AMOXICILLIN AND CLAVULANATE POTASSIUM 1 TABLET: 875; 125 TABLET, FILM COATED ORAL at 00:01

## 2021-06-08 ENCOUNTER — OFFICE VISIT (OUTPATIENT)
Dept: WOUND CARE | Facility: HOSPITAL | Age: 46
End: 2021-06-08

## 2021-06-08 PROCEDURE — G0463 HOSPITAL OUTPT CLINIC VISIT: HCPCS

## 2021-06-14 ENCOUNTER — OFFICE VISIT (OUTPATIENT)
Dept: WOUND CARE | Facility: HOSPITAL | Age: 46
End: 2021-06-14

## 2021-06-14 PROCEDURE — 97607 NEG PRS WND THR NDME<=50SQCM: CPT

## 2021-06-17 ENCOUNTER — OFFICE VISIT (OUTPATIENT)
Dept: WOUND CARE | Facility: HOSPITAL | Age: 46
End: 2021-06-17

## 2021-06-17 PROCEDURE — 97607 NEG PRS WND THR NDME<=50SQCM: CPT

## 2021-06-21 ENCOUNTER — OFFICE VISIT (OUTPATIENT)
Dept: WOUND CARE | Facility: HOSPITAL | Age: 46
End: 2021-06-21

## 2021-06-21 PROCEDURE — 97607 NEG PRS WND THR NDME<=50SQCM: CPT

## 2021-06-24 ENCOUNTER — OFFICE VISIT (OUTPATIENT)
Dept: WOUND CARE | Facility: HOSPITAL | Age: 46
End: 2021-06-24

## 2021-06-24 PROCEDURE — 97605 NEG PRS WND THER DME<=50SQCM: CPT

## 2021-06-28 ENCOUNTER — OFFICE VISIT (OUTPATIENT)
Dept: WOUND CARE | Facility: HOSPITAL | Age: 46
End: 2021-06-28

## 2021-06-28 PROCEDURE — 97607 NEG PRS WND THR NDME<=50SQCM: CPT

## 2021-07-01 ENCOUNTER — OFFICE VISIT (OUTPATIENT)
Dept: WOUND CARE | Facility: HOSPITAL | Age: 46
End: 2021-07-01

## 2021-07-01 PROCEDURE — 97607 NEG PRS WND THR NDME<=50SQCM: CPT

## 2021-07-06 ENCOUNTER — OFFICE VISIT (OUTPATIENT)
Dept: WOUND CARE | Facility: HOSPITAL | Age: 46
End: 2021-07-06

## 2021-07-06 PROCEDURE — 97605 NEG PRS WND THER DME<=50SQCM: CPT

## 2021-07-06 PROCEDURE — 97607 NEG PRS WND THR NDME<=50SQCM: CPT

## 2021-07-08 ENCOUNTER — OFFICE VISIT (OUTPATIENT)
Dept: WOUND CARE | Facility: HOSPITAL | Age: 46
End: 2021-07-08

## 2021-07-08 PROCEDURE — G0463 HOSPITAL OUTPT CLINIC VISIT: HCPCS

## 2021-07-09 ENCOUNTER — APPOINTMENT (OUTPATIENT)
Dept: WOUND CARE | Facility: HOSPITAL | Age: 46
End: 2021-07-09

## 2021-07-12 ENCOUNTER — OFFICE VISIT (OUTPATIENT)
Dept: WOUND CARE | Facility: HOSPITAL | Age: 46
End: 2021-07-12

## 2021-07-12 PROCEDURE — 97607 NEG PRS WND THR NDME<=50SQCM: CPT

## 2021-07-13 ENCOUNTER — TRANSCRIBE ORDERS (OUTPATIENT)
Dept: ADMINISTRATIVE | Facility: HOSPITAL | Age: 46
End: 2021-07-13

## 2021-07-13 ENCOUNTER — TRANSCRIBE ORDERS (OUTPATIENT)
Dept: PHYSICAL THERAPY | Facility: CLINIC | Age: 46
End: 2021-07-13

## 2021-07-13 ENCOUNTER — TRANSCRIBE ORDERS (OUTPATIENT)
Dept: PREADMISSION TESTING | Facility: HOSPITAL | Age: 46
End: 2021-07-13

## 2021-07-13 DIAGNOSIS — Z01.818 OTHER SPECIFIED PRE-OPERATIVE EXAMINATION: Primary | ICD-10-CM

## 2021-07-13 DIAGNOSIS — R91.1 RIGHT LOWER LOBE PULMONARY NODULE: Primary | ICD-10-CM

## 2021-07-13 DIAGNOSIS — M25.511 RIGHT SHOULDER PAIN, UNSPECIFIED CHRONICITY: Primary | ICD-10-CM

## 2021-07-15 ENCOUNTER — OFFICE VISIT (OUTPATIENT)
Dept: WOUND CARE | Facility: HOSPITAL | Age: 46
End: 2021-07-15

## 2021-07-15 PROCEDURE — 97607 NEG PRS WND THR NDME<=50SQCM: CPT

## 2021-07-19 ENCOUNTER — OFFICE VISIT (OUTPATIENT)
Dept: WOUND CARE | Facility: HOSPITAL | Age: 46
End: 2021-07-19

## 2021-07-19 ENCOUNTER — APPOINTMENT (OUTPATIENT)
Dept: PREADMISSION TESTING | Facility: HOSPITAL | Age: 46
End: 2021-07-19

## 2021-07-19 PROCEDURE — 97607 NEG PRS WND THR NDME<=50SQCM: CPT

## 2021-07-20 ENCOUNTER — HOSPITAL ENCOUNTER (OUTPATIENT)
Dept: CT IMAGING | Facility: HOSPITAL | Age: 46
Discharge: HOME | End: 2021-07-20
Admitting: NURSE PRACTITIONER

## 2021-07-20 DIAGNOSIS — R91.1 RIGHT LOWER LOBE PULMONARY NODULE: ICD-10-CM

## 2021-07-20 PROCEDURE — 71250 CT THORAX DX C-: CPT

## 2021-07-22 ENCOUNTER — APPOINTMENT (OUTPATIENT)
Dept: PREADMISSION TESTING | Facility: HOSPITAL | Age: 46
End: 2021-07-22

## 2021-07-27 ENCOUNTER — OFFICE VISIT (OUTPATIENT)
Dept: WOUND CARE | Facility: HOSPITAL | Age: 46
End: 2021-07-27

## 2021-07-27 PROCEDURE — G0463 HOSPITAL OUTPT CLINIC VISIT: HCPCS

## 2021-07-28 ENCOUNTER — PRE-ADMISSION TESTING (OUTPATIENT)
Dept: PREADMISSION TESTING | Facility: HOSPITAL | Age: 46
End: 2021-07-28

## 2021-07-28 VITALS
WEIGHT: 230 LBS | BODY MASS INDEX: 28.6 KG/M2 | TEMPERATURE: 97.5 F | RESPIRATION RATE: 16 BRPM | OXYGEN SATURATION: 97 % | HEART RATE: 90 BPM | SYSTOLIC BLOOD PRESSURE: 141 MMHG | DIASTOLIC BLOOD PRESSURE: 90 MMHG | HEIGHT: 75 IN

## 2021-07-28 LAB
ANION GAP SERPL CALCULATED.3IONS-SCNC: 11.3 MMOL/L (ref 5–15)
BUN SERPL-MCNC: 12 MG/DL (ref 6–20)
BUN/CREAT SERPL: 11.7 (ref 7–25)
CALCIUM SPEC-SCNC: 8.9 MG/DL (ref 8.6–10.5)
CHLORIDE SERPL-SCNC: 100 MMOL/L (ref 98–107)
CO2 SERPL-SCNC: 25.7 MMOL/L (ref 22–29)
CREAT SERPL-MCNC: 1.03 MG/DL (ref 0.76–1.27)
DEPRECATED RDW RBC AUTO: 40.9 FL (ref 37–54)
ERYTHROCYTE [DISTWIDTH] IN BLOOD BY AUTOMATED COUNT: 12.9 % (ref 12.3–15.4)
GFR SERPL CREATININE-BSD FRML MDRD: 78 ML/MIN/1.73
GLUCOSE SERPL-MCNC: 296 MG/DL (ref 65–99)
HCT VFR BLD AUTO: 44.2 % (ref 37.5–51)
HGB BLD-MCNC: 14.6 G/DL (ref 13–17.7)
MCH RBC QN AUTO: 28.9 PG (ref 26.6–33)
MCHC RBC AUTO-ENTMCNC: 33 G/DL (ref 31.5–35.7)
MCV RBC AUTO: 87.5 FL (ref 79–97)
PLATELET # BLD AUTO: 232 10*3/MM3 (ref 140–450)
PMV BLD AUTO: 10.4 FL (ref 6–12)
POTASSIUM SERPL-SCNC: 3.7 MMOL/L (ref 3.5–5.2)
RBC # BLD AUTO: 5.05 10*6/MM3 (ref 4.14–5.8)
SARS-COV-2 ORF1AB RESP QL NAA+PROBE: NOT DETECTED
SODIUM SERPL-SCNC: 137 MMOL/L (ref 136–145)
WBC # BLD AUTO: 7.36 10*3/MM3 (ref 3.4–10.8)

## 2021-07-28 PROCEDURE — 36415 COLL VENOUS BLD VENIPUNCTURE: CPT

## 2021-07-28 PROCEDURE — U0004 COV-19 TEST NON-CDC HGH THRU: HCPCS | Performed by: SURGERY

## 2021-07-28 PROCEDURE — 85027 COMPLETE CBC AUTOMATED: CPT | Performed by: SURGERY

## 2021-07-28 PROCEDURE — 80048 BASIC METABOLIC PNL TOTAL CA: CPT

## 2021-07-28 PROCEDURE — C9803 HOPD COVID-19 SPEC COLLECT: HCPCS | Performed by: SURGERY

## 2021-07-28 RX ORDER — CLINDAMYCIN PHOSPHATE 600 MG/50ML
600 INJECTION INTRAVENOUS EVERY 8 HOURS
Status: DISCONTINUED | OUTPATIENT
Start: 2021-07-29 | End: 2021-07-29 | Stop reason: HOSPADM

## 2021-07-28 RX ORDER — DULOXETIN HYDROCHLORIDE 60 MG/1
60 CAPSULE, DELAYED RELEASE ORAL DAILY
COMMUNITY

## 2021-07-28 RX ORDER — HYDROCODONE BITARTRATE AND ACETAMINOPHEN 10; 325 MG/1; MG/1
1 TABLET ORAL EVERY 6 HOURS PRN
Status: ON HOLD | COMMUNITY
End: 2021-07-29 | Stop reason: SDUPTHER

## 2021-07-28 RX ORDER — PANTOPRAZOLE SODIUM 40 MG/1
40 TABLET, DELAYED RELEASE ORAL EVERY EVENING
COMMUNITY

## 2021-07-28 RX ORDER — PREGABALIN 75 MG/1
75 CAPSULE ORAL 3 TIMES DAILY PRN
COMMUNITY
Start: 2021-03-22 | End: 2023-01-27

## 2021-07-29 ENCOUNTER — HOSPITAL ENCOUNTER (OUTPATIENT)
Facility: HOSPITAL | Age: 46
Setting detail: HOSPITAL OUTPATIENT SURGERY
Discharge: HOME | End: 2021-07-29
Attending: SURGERY | Admitting: SURGERY

## 2021-07-29 ENCOUNTER — ANESTHESIA EVENT (OUTPATIENT)
Dept: PERIOP | Facility: HOSPITAL | Age: 46
End: 2021-07-29

## 2021-07-29 ENCOUNTER — ANESTHESIA (OUTPATIENT)
Dept: PERIOP | Facility: HOSPITAL | Age: 46
End: 2021-07-29

## 2021-07-29 VITALS
DIASTOLIC BLOOD PRESSURE: 94 MMHG | BODY MASS INDEX: 28.53 KG/M2 | WEIGHT: 222.3 LBS | TEMPERATURE: 97.9 F | HEART RATE: 90 BPM | OXYGEN SATURATION: 97 % | SYSTOLIC BLOOD PRESSURE: 159 MMHG | RESPIRATION RATE: 18 BRPM | HEIGHT: 74 IN

## 2021-07-29 DIAGNOSIS — T87.43 INFECTION OF RIGHT BELOW KNEE AMPUTATION (HCC): Primary | ICD-10-CM

## 2021-07-29 LAB
GLUCOSE BLDC GLUCOMTR-MCNC: 275 MG/DL (ref 70–130)
GLUCOSE BLDC GLUCOMTR-MCNC: 330 MG/DL (ref 70–130)
GLUCOSE BLDC GLUCOMTR-MCNC: 372 MG/DL (ref 70–130)

## 2021-07-29 PROCEDURE — 25010000002 NEOSTIGMINE 5 MG/10ML SOLUTION: Performed by: NURSE ANESTHETIST, CERTIFIED REGISTERED

## 2021-07-29 PROCEDURE — 25010000002 PHENYLEPHRINE PER 1 ML: Performed by: NURSE ANESTHETIST, CERTIFIED REGISTERED

## 2021-07-29 PROCEDURE — 25010000002 ONDANSETRON PER 1 MG: Performed by: NURSE ANESTHETIST, CERTIFIED REGISTERED

## 2021-07-29 PROCEDURE — 82962 GLUCOSE BLOOD TEST: CPT

## 2021-07-29 PROCEDURE — 25010000002 FENTANYL CITRATE (PF) 50 MCG/ML SOLUTION: Performed by: NURSE ANESTHETIST, CERTIFIED REGISTERED

## 2021-07-29 PROCEDURE — 25010000002 PROPOFOL 10 MG/ML EMULSION: Performed by: NURSE ANESTHETIST, CERTIFIED REGISTERED

## 2021-07-29 PROCEDURE — 63710000001 INSULIN REGULAR HUMAN PER 5 UNITS: Performed by: ANESTHESIOLOGY

## 2021-07-29 PROCEDURE — 25010000002 DEXAMETHASONE PER 1 MG: Performed by: NURSE ANESTHETIST, CERTIFIED REGISTERED

## 2021-07-29 RX ORDER — IBUPROFEN 600 MG/1
600 TABLET ORAL ONCE AS NEEDED
Status: DISCONTINUED | OUTPATIENT
Start: 2021-07-29 | End: 2021-07-29 | Stop reason: HOSPADM

## 2021-07-29 RX ORDER — HYDROMORPHONE HYDROCHLORIDE 1 MG/ML
0.5 INJECTION, SOLUTION INTRAMUSCULAR; INTRAVENOUS; SUBCUTANEOUS
Status: DISCONTINUED | OUTPATIENT
Start: 2021-07-29 | End: 2021-07-29 | Stop reason: HOSPADM

## 2021-07-29 RX ORDER — DIPHENHYDRAMINE HYDROCHLORIDE 50 MG/ML
12.5 INJECTION INTRAMUSCULAR; INTRAVENOUS
Status: DISCONTINUED | OUTPATIENT
Start: 2021-07-29 | End: 2021-07-29 | Stop reason: HOSPADM

## 2021-07-29 RX ORDER — OXYCODONE AND ACETAMINOPHEN 10; 325 MG/1; MG/1
1 TABLET ORAL EVERY 4 HOURS PRN
Status: DISCONTINUED | OUTPATIENT
Start: 2021-07-29 | End: 2021-07-29 | Stop reason: HOSPADM

## 2021-07-29 RX ORDER — PROMETHAZINE HYDROCHLORIDE 25 MG/1
25 TABLET ORAL ONCE AS NEEDED
Status: DISCONTINUED | OUTPATIENT
Start: 2021-07-29 | End: 2021-07-29 | Stop reason: HOSPADM

## 2021-07-29 RX ORDER — NALOXONE HCL 0.4 MG/ML
0.2 VIAL (ML) INJECTION AS NEEDED
Status: DISCONTINUED | OUTPATIENT
Start: 2021-07-29 | End: 2021-07-29 | Stop reason: HOSPADM

## 2021-07-29 RX ORDER — LIDOCAINE HYDROCHLORIDE 20 MG/ML
INJECTION, SOLUTION INFILTRATION; PERINEURAL AS NEEDED
Status: DISCONTINUED | OUTPATIENT
Start: 2021-07-29 | End: 2021-07-29 | Stop reason: SURG

## 2021-07-29 RX ORDER — SULFAMETHOXAZOLE AND TRIMETHOPRIM 800; 160 MG/1; MG/1
1 TABLET ORAL 2 TIMES DAILY
Qty: 6 TABLET | Refills: 0 | Status: SHIPPED | OUTPATIENT
Start: 2021-07-29 | End: 2021-08-01

## 2021-07-29 RX ORDER — DIPHENHYDRAMINE HCL 25 MG
25 CAPSULE ORAL
Status: DISCONTINUED | OUTPATIENT
Start: 2021-07-29 | End: 2021-07-29 | Stop reason: HOSPADM

## 2021-07-29 RX ORDER — EPHEDRINE SULFATE 50 MG/ML
5 INJECTION, SOLUTION INTRAVENOUS ONCE AS NEEDED
Status: DISCONTINUED | OUTPATIENT
Start: 2021-07-29 | End: 2021-07-29 | Stop reason: HOSPADM

## 2021-07-29 RX ORDER — SODIUM CHLORIDE 0.9 % (FLUSH) 0.9 %
10 SYRINGE (ML) INJECTION EVERY 12 HOURS SCHEDULED
Status: DISCONTINUED | OUTPATIENT
Start: 2021-07-29 | End: 2021-07-29 | Stop reason: HOSPADM

## 2021-07-29 RX ORDER — FLUMAZENIL 0.1 MG/ML
0.2 INJECTION INTRAVENOUS AS NEEDED
Status: DISCONTINUED | OUTPATIENT
Start: 2021-07-29 | End: 2021-07-29 | Stop reason: HOSPADM

## 2021-07-29 RX ORDER — HYDROCODONE BITARTRATE AND ACETAMINOPHEN 7.5; 325 MG/1; MG/1
1 TABLET ORAL ONCE AS NEEDED
Status: DISCONTINUED | OUTPATIENT
Start: 2021-07-29 | End: 2021-07-29 | Stop reason: HOSPADM

## 2021-07-29 RX ORDER — FENTANYL CITRATE 50 UG/ML
INJECTION, SOLUTION INTRAMUSCULAR; INTRAVENOUS AS NEEDED
Status: DISCONTINUED | OUTPATIENT
Start: 2021-07-29 | End: 2021-07-29 | Stop reason: SURG

## 2021-07-29 RX ORDER — LABETALOL HYDROCHLORIDE 5 MG/ML
5 INJECTION, SOLUTION INTRAVENOUS
Status: DISCONTINUED | OUTPATIENT
Start: 2021-07-29 | End: 2021-07-29 | Stop reason: HOSPADM

## 2021-07-29 RX ORDER — BUPIVACAINE HYDROCHLORIDE AND EPINEPHRINE 2.5; 5 MG/ML; UG/ML
INJECTION, SOLUTION EPIDURAL; INFILTRATION; INTRACAUDAL; PERINEURAL AS NEEDED
Status: DISCONTINUED | OUTPATIENT
Start: 2021-07-29 | End: 2021-07-29 | Stop reason: HOSPADM

## 2021-07-29 RX ORDER — PROPOFOL 10 MG/ML
VIAL (ML) INTRAVENOUS AS NEEDED
Status: DISCONTINUED | OUTPATIENT
Start: 2021-07-29 | End: 2021-07-29 | Stop reason: SURG

## 2021-07-29 RX ORDER — HYDROCODONE BITARTRATE AND ACETAMINOPHEN 10; 325 MG/1; MG/1
1 TABLET ORAL EVERY 6 HOURS PRN
Qty: 30 TABLET | Refills: 0 | Status: SHIPPED | OUTPATIENT
Start: 2021-07-29 | End: 2021-09-24 | Stop reason: HOSPADM

## 2021-07-29 RX ORDER — ONDANSETRON 2 MG/ML
4 INJECTION INTRAMUSCULAR; INTRAVENOUS ONCE AS NEEDED
Status: DISCONTINUED | OUTPATIENT
Start: 2021-07-29 | End: 2021-07-29 | Stop reason: HOSPADM

## 2021-07-29 RX ORDER — PROMETHAZINE HYDROCHLORIDE 25 MG/1
25 SUPPOSITORY RECTAL ONCE AS NEEDED
Status: DISCONTINUED | OUTPATIENT
Start: 2021-07-29 | End: 2021-07-29 | Stop reason: HOSPADM

## 2021-07-29 RX ORDER — SODIUM CHLORIDE 0.9 % (FLUSH) 0.9 %
10 SYRINGE (ML) INJECTION AS NEEDED
Status: DISCONTINUED | OUTPATIENT
Start: 2021-07-29 | End: 2021-07-29 | Stop reason: HOSPADM

## 2021-07-29 RX ORDER — ACETAMINOPHEN 650 MG
TABLET, EXTENDED RELEASE ORAL AS NEEDED
Status: DISCONTINUED | OUTPATIENT
Start: 2021-07-29 | End: 2021-07-29 | Stop reason: HOSPADM

## 2021-07-29 RX ORDER — ONDANSETRON 2 MG/ML
INJECTION INTRAMUSCULAR; INTRAVENOUS AS NEEDED
Status: DISCONTINUED | OUTPATIENT
Start: 2021-07-29 | End: 2021-07-29 | Stop reason: SURG

## 2021-07-29 RX ORDER — MAGNESIUM HYDROXIDE 1200 MG/15ML
LIQUID ORAL AS NEEDED
Status: DISCONTINUED | OUTPATIENT
Start: 2021-07-29 | End: 2021-07-29 | Stop reason: HOSPADM

## 2021-07-29 RX ORDER — NEOSTIGMINE METHYLSULFATE 0.5 MG/ML
INJECTION, SOLUTION INTRAVENOUS AS NEEDED
Status: DISCONTINUED | OUTPATIENT
Start: 2021-07-29 | End: 2021-07-29 | Stop reason: SURG

## 2021-07-29 RX ORDER — ROCURONIUM BROMIDE 10 MG/ML
INJECTION, SOLUTION INTRAVENOUS AS NEEDED
Status: DISCONTINUED | OUTPATIENT
Start: 2021-07-29 | End: 2021-07-29 | Stop reason: SURG

## 2021-07-29 RX ORDER — MIDAZOLAM HYDROCHLORIDE 1 MG/ML
1 INJECTION INTRAMUSCULAR; INTRAVENOUS
Status: DISCONTINUED | OUTPATIENT
Start: 2021-07-29 | End: 2021-07-29 | Stop reason: HOSPADM

## 2021-07-29 RX ORDER — FAMOTIDINE 10 MG/ML
20 INJECTION, SOLUTION INTRAVENOUS ONCE
Status: COMPLETED | OUTPATIENT
Start: 2021-07-29 | End: 2021-07-29

## 2021-07-29 RX ORDER — DEXAMETHASONE SODIUM PHOSPHATE 10 MG/ML
INJECTION INTRAMUSCULAR; INTRAVENOUS AS NEEDED
Status: DISCONTINUED | OUTPATIENT
Start: 2021-07-29 | End: 2021-07-29 | Stop reason: SURG

## 2021-07-29 RX ORDER — GLYCOPYRROLATE 0.2 MG/ML
INJECTION INTRAMUSCULAR; INTRAVENOUS AS NEEDED
Status: DISCONTINUED | OUTPATIENT
Start: 2021-07-29 | End: 2021-07-29 | Stop reason: SURG

## 2021-07-29 RX ORDER — SODIUM CHLORIDE, SODIUM LACTATE, POTASSIUM CHLORIDE, CALCIUM CHLORIDE 600; 310; 30; 20 MG/100ML; MG/100ML; MG/100ML; MG/100ML
9 INJECTION, SOLUTION INTRAVENOUS CONTINUOUS
Status: DISCONTINUED | OUTPATIENT
Start: 2021-07-29 | End: 2021-07-29 | Stop reason: HOSPADM

## 2021-07-29 RX ORDER — FENTANYL CITRATE 50 UG/ML
50 INJECTION, SOLUTION INTRAMUSCULAR; INTRAVENOUS
Status: DISCONTINUED | OUTPATIENT
Start: 2021-07-29 | End: 2021-07-29 | Stop reason: HOSPADM

## 2021-07-29 RX ORDER — HYDRALAZINE HYDROCHLORIDE 20 MG/ML
5 INJECTION INTRAMUSCULAR; INTRAVENOUS
Status: DISCONTINUED | OUTPATIENT
Start: 2021-07-29 | End: 2021-07-29 | Stop reason: HOSPADM

## 2021-07-29 RX ORDER — ASPIRIN 325 MG
325 TABLET ORAL DAILY
Status: ON HOLD
Start: 2021-07-29 | End: 2021-10-14

## 2021-07-29 RX ORDER — MINERAL OIL 471.99 G/472ML
OIL TOPICAL AS NEEDED
Status: DISCONTINUED | OUTPATIENT
Start: 2021-07-29 | End: 2021-07-29 | Stop reason: HOSPADM

## 2021-07-29 RX ADMIN — PHENYLEPHRINE HYDROCHLORIDE 100 MCG: 10 INJECTION INTRAVENOUS at 08:46

## 2021-07-29 RX ADMIN — FENTANYL CITRATE 100 MCG: 50 INJECTION INTRAMUSCULAR; INTRAVENOUS at 08:08

## 2021-07-29 RX ADMIN — INSULIN HUMAN 8 UNITS: 100 INJECTION, SOLUTION PARENTERAL at 07:20

## 2021-07-29 RX ADMIN — FAMOTIDINE 20 MG: 10 INJECTION INTRAVENOUS at 07:46

## 2021-07-29 RX ADMIN — ROCURONIUM BROMIDE 40 MG: 50 INJECTION INTRAVENOUS at 08:10

## 2021-07-29 RX ADMIN — CLINDAMYCIN PHOSPHATE 600 MG: 600 INJECTION, SOLUTION INTRAVENOUS at 08:00

## 2021-07-29 RX ADMIN — SODIUM CHLORIDE, POTASSIUM CHLORIDE, SODIUM LACTATE AND CALCIUM CHLORIDE 9 ML/HR: 600; 310; 30; 20 INJECTION, SOLUTION INTRAVENOUS at 06:59

## 2021-07-29 RX ADMIN — DEXAMETHASONE SODIUM PHOSPHATE 4 MG: 10 INJECTION INTRAMUSCULAR; INTRAVENOUS at 08:18

## 2021-07-29 RX ADMIN — GLYCOPYRROLATE 0.4 MG: 0.2 INJECTION INTRAMUSCULAR; INTRAVENOUS at 09:22

## 2021-07-29 RX ADMIN — LIDOCAINE HYDROCHLORIDE 100 MG: 20 INJECTION, SOLUTION INFILTRATION; PERINEURAL at 08:10

## 2021-07-29 RX ADMIN — NEOSTIGMINE METHYLSULFATE 2 MG: 0.5 INJECTION INTRAVENOUS at 09:22

## 2021-07-29 RX ADMIN — PHENYLEPHRINE HYDROCHLORIDE 100 MCG: 10 INJECTION INTRAVENOUS at 09:06

## 2021-07-29 RX ADMIN — ONDANSETRON 4 MG: 2 INJECTION INTRAMUSCULAR; INTRAVENOUS at 09:22

## 2021-07-29 RX ADMIN — PROPOFOL 200 MG: 10 INJECTION, EMULSION INTRAVENOUS at 08:10

## 2021-07-29 NOTE — ANESTHESIA POSTPROCEDURE EVALUATION
"Patient: Maynor Gregg    Procedure Summary     Date: 07/29/21 Room / Location: [facility] OR [address] [place] [address] MAIN OR    Anesthesia Start: 0803 Anesthesia Stop: 0948    Procedure: EXCISION SOFT TISSUE Ulcer WITH FULL THICKNESS SKIN GRAFT to right lower leg. (Right ) Diagnosis:       Infection of right below knee amputation (CMS/HCC)      (Infection of right below knee amputation (CMS/HCC) [T87.43])    Surgeons: Sukhdeep Hernadez MD Provider: Stacie Mariee MD    Anesthesia Type: general ASA Status: 3          Anesthesia Type: general    Vitals  Vitals Value Taken Time   /99 07/29/21 1015   Temp 36.6 °C (97.9 °F) 07/29/21 0945   Pulse 90 07/29/21 1018   Resp 18 07/29/21 1000   SpO2 96 % 07/29/21 1019   Vitals shown include unvalidated device data.        Post Anesthesia Care and Evaluation    Patient location during evaluation: PHASE II  Patient participation: complete - patient participated  Level of consciousness: awake and alert  Pain management: adequate  Airway patency: patent  Anesthetic complications: No anesthetic complications    Cardiovascular status: acceptable  Respiratory status: acceptable  Hydration status: acceptable    Comments: /94   Pulse 90   Temp 36.6 °C (97.9 °F) (Oral)   Resp 18   Ht 188 cm (74\")   Wt 101 kg (222 lb 4.8 oz)   SpO2 97%   BMI 28.54 kg/m²         "

## 2021-07-29 NOTE — ANESTHESIA PREPROCEDURE EVALUATION
Anesthesia Evaluation     no history of anesthetic complications:               Airway   Dental      Pulmonary    (+) shortness of breath,   (-) not a smoker  Cardiovascular     (+) past MI , CAD, cardiac stents ( 2010 2015)   (-) hypertension, dysrhythmias, angina, hyperlipidemia    ROS comment: Sinus tach  EF%67 %    Neuro/Psych  (+) numbness, psychiatric history,     (-) dizziness/light headedness, syncope  GI/Hepatic/Renal/Endo    (+)  GERD,  diabetes mellitus,     Musculoskeletal     Abdominal    Substance History      OB/GYN          Other                        Anesthesia Plan    ASA 3     general     intravenous induction     Anesthetic plan, all risks, benefits, and alternatives have been provided, discussed and informed consent has been obtained with: patient.

## 2021-07-29 NOTE — ANESTHESIA PROCEDURE NOTES
Airway  Urgency: elective    Date/Time: 7/29/2021 8:14 AM  Airway not difficult    General Information and Staff    Patient location during procedure: OR  Anesthesiologist: Stacie Mariee MD  CRNA: Romeo Zavaleta CRNA    Indications and Patient Condition  Indications for airway management: airway protection    Preoxygenated: yes  MILS maintained throughout  Mask difficulty assessment: 2 - vent by mask + OA or adjuvant +/- NMBA    Final Airway Details  Final airway type: endotracheal airway      Successful airway: ETT  Cuffed: yes   Successful intubation technique: direct laryngoscopy  Facilitating devices/methods: intubating stylet  Endotracheal tube insertion site: oral  Blade: Wu  Blade size: 2  ETT size (mm): 7.5  Cormack-Lehane Classification: grade I - full view of glottis  Placement verified by: chest auscultation and capnometry   Cuff volume (mL): 6  Measured from: lips  ETT/EBT  to lips (cm): 21  Number of attempts at approach: 1  Assessment: lips, teeth, and gum same as pre-op and atraumatic intubation

## 2021-08-03 ENCOUNTER — TREATMENT (OUTPATIENT)
Dept: PHYSICAL THERAPY | Facility: CLINIC | Age: 46
End: 2021-08-03

## 2021-08-03 ENCOUNTER — OFFICE VISIT (OUTPATIENT)
Dept: WOUND CARE | Facility: HOSPITAL | Age: 46
End: 2021-08-03

## 2021-08-03 DIAGNOSIS — M25.511 RIGHT SHOULDER PAIN, UNSPECIFIED CHRONICITY: Primary | ICD-10-CM

## 2021-08-03 PROCEDURE — 97110 THERAPEUTIC EXERCISES: CPT | Performed by: PHYSICAL THERAPIST

## 2021-08-03 PROCEDURE — G0463 HOSPITAL OUTPT CLINIC VISIT: HCPCS

## 2021-08-03 PROCEDURE — 97161 PT EVAL LOW COMPLEX 20 MIN: CPT | Performed by: PHYSICAL THERAPIST

## 2021-08-03 NOTE — PROGRESS NOTES
Physical Therapy Initial Evaluation and Plan of Care    Patient: Maynor Gregg   : 1975  Diagnosis/ICD-10 Code:  Right shoulder pain, unspecified chronicity [M25.511]  Referring practitioner: Boni Ellsworth MD  Date of Initial Visit: 8/3/2021  Today's Date: 8/3/2021  Patient seen for 1 sessions           Subjective Questionnaire: QuickDASH: 18%    Subjective Evaluation    History of Present Illness  Mechanism of injury: Presents to PT with right shoulder pain initially caused by a fall in 2020. Had an x-ray but showed no fractures. Diagnosed with a soft tissue tear and received an injection for it, but has had no treatment since. 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 Was independent with ambulation without AD until he had revision of right LE a few months ago and is still undergoing surgical intervention for wound healing, therefore, is not currently ambulating and is reliant on w/c for mobility. He ocasionally uses a knee scooter at home.     Makes symptoms worse: laying on shoulder, tranfers, shoulder flexion, stair negotiation    Makes symptoms better: Massage      Patient Occupation: Retired  and now works at [employer].  Quality of life: good    Pain  Current pain ratin  At best pain ratin  At worst pain rating: 10    Social Support  Lives in: multiple-level home (Has a flight of stairs (14 stairs) he has to climb daily.)    Patient Goals  Patient goals for therapy: decreased pain, increased motion, increased strength, independence with ADLs/IADLs and return to sport/leisure activities           Precautions: CHF, MI, DM    Objective          Tenderness     Right Shoulder  Tenderness in the biceps tendon (proximal), coracoid process and supraspinatus tendon.     Active Range of Motion     Right Shoulder   Flexion: 158 (sitting in wc, in scapular plane) degrees   Abduction: 145 (sitting in wc) degrees      Passive Range of Motion     Right Shoulder   Flexion: 115 (supine) degrees with pain  Abduction: 113 (supine) degrees with pain  External rotation 90°: 85 (supine) degrees   Internal rotation 90°: 22 (supine) degrees with pain    Joint Play     Right Shoulder  Hypomobile in the inferior capsule.     Strength/Myotome Testing     Right Shoulder     Planes of Motion   Flexion: 5   External rotation at 0°: 5   External rotation at 45°: 4   Internal rotation at 0°: 5   Internal rotation at 45°: 5     Tests     Right Shoulder   Positive Hawkin's and Neer's.   Negative external rotation lag sign.      General Comments     Shoulder Comments   Painful shoulder extension when elbow extended.   Non painful shoulder extension with elbow flexed to 90.          Assessment & Plan     Assessment  Impairments: abnormal or restricted ROM, activity intolerance, impaired physical strength, lacks appropriate home exercise program, pain with function and safety issue  Assessment details: Pt is a 46 y.o who presents to physical therapy with right shoulder pain. During the initial evaluation, pt presented with tenderness of the right biceps long head tendon and supraspinatus tendon. He also lacked ROM (flexion, abduction, IR)  and has minimal impaired strength in his right shoulder (ER). He is very painful with passive flexion, abduction, and IR. Pt also had pain with shoulder extension with elbow extended. Due to these impairments, pt would benefit from skilled physical therapy by improving his strength and ROM to prevent painful transfers, increase ADLs, improve sleep, and improve quality of life.   Prognosis: good  Functional Limitations: carrying objects, lifting, sleeping, pulling, pushing, uncomfortable because of pain, moving in bed, reaching behind back, reaching overhead and unable to perform repetitive tasks  Goals  Plan Goals: STG: In 4 weeks:    Pt will improve Shoulder flexion PROM from 115 to 140 pain free.  Pt will  improve shoulder abduction PROM to 130 degrees.  Pt will be independent and compliant with HEP.   Pt will report being 30% improvement since initial visit.    LTG: (by DC)  Pt will have 5/5 strength with ER in 45 degrees.  Pt will have improve shoulder flexion AROM in right shoulder to 160 degrees without pain.  Pt will report 75% improvement in symptoms by discharge.   Pt will improve strength & ROM of shoulder to a level that allows him to negotiate stairs backwards without pain.   Pt will be able to sleep on his right side without pain by DC.       Plan  Therapy options: will be seen for skilled physical therapy services  Planned modality interventions: cryotherapy, dry needling, electrical stimulation/[place] stimulation, ultrasound, TENS, iontophoresis and thermotherapy (hydrocollator packs)  Planned therapy interventions: body mechanics training, flexibility, functional ROM exercises, home exercise program, joint mobilization, manual therapy, neuromuscular re-education, postural training, soft tissue mobilization, strengthening, stretching, therapeutic activities, transfer training and wheelchair management/propulsion training  Frequency: 2x week  Duration in visits: 15  Treatment plan discussed with: patient        See flowsheets for treatment detail.    History # of Personal Factors and/or Comorbidities: HIGH (3+)  Examination of Body System(s): # of elements: LOW (1-2)  Clinical Presentation: EVOLVING  Clinical Decision Making: LOW       Timed:         Manual Therapy:         mins  28260;     Therapeutic Exercise:    10     mins  02734;     Neuromuscular Jeanette:        mins  36165;    Therapeutic Activity:          mins  93629;     Gait Training:           mins  52203;     Ultrasound:          mins  78913;    Ionto                                   mins   43752  Self Care                            mins   76352      Un-Timed:  Electrical Stimulation:         mins  57218 ( );  Dry Needling          mins  self-pay  Traction          mins 54557  Low Eval     40     Mins  76599  Mod Eval          Mins  21967  High Eval                            Mins  13063  Re-Eval                               mins  58127      Timed Treatment:   10   mins   Total Treatment:     50   mins    Treatment provided by Regino Desai, Student PT.    I was present in the room guiding the student, directing the service and making the skilled judgement for all services rendered.     PT SIGNATURE: Becki Degroot, JOLENE   DATE TREATMENT INITIATED: 8/3/2021    Initial Certification  Certification Period: 11/1/2021  I certify that the therapy services are furnished while this patient is under my care.  The services outlined above are required by this patient, and will be reviewed every 90 days.     PHYSICIAN: Boni Ellsworth MD      DATE:     Please sign and return via fax to [phone].. Thank you, [facility].

## 2021-08-05 ENCOUNTER — APPOINTMENT (OUTPATIENT)
Dept: PREADMISSION TESTING | Facility: HOSPITAL | Age: 46
End: 2021-08-05

## 2021-08-05 ENCOUNTER — TREATMENT (OUTPATIENT)
Dept: PHYSICAL THERAPY | Facility: CLINIC | Age: 46
End: 2021-08-05

## 2021-08-05 DIAGNOSIS — M25.511 RIGHT SHOULDER PAIN, UNSPECIFIED CHRONICITY: Primary | ICD-10-CM

## 2021-08-05 PROCEDURE — 97140 MANUAL THERAPY 1/> REGIONS: CPT | Performed by: PHYSICAL THERAPIST

## 2021-08-05 PROCEDURE — 97110 THERAPEUTIC EXERCISES: CPT | Performed by: PHYSICAL THERAPIST

## 2021-08-05 NOTE — PROGRESS NOTES
Physical Therapy Daily Progress Note    Patient: Maynor Gregg  : 1975  Referring practitioner: Boni Ellsworth MD  Today's Date: 2021    VISIT#: 2    Subjective   Maynor Gregg reports: Doing well, shoulder felt a little better after stretching it out last time.    Objective     See Exercise, Manual, and Modality Logs for complete treatment.     Patient Education: continue HEP, progressed to GTB    Assessment/Plan   Good response to session, less pain with stretching after joint mobilizations. Good technique with exercises and able to progress resistance.     Progress per Plan of Care            Timed:         Manual Therapy:    18     mins  23500;     Therapeutic Exercise:    12     mins  31649;     Neuromuscular Jeanette:        mins  90585;    Therapeutic Activity:          mins  16391;     Gait Training:           mins  76027;     Ultrasound:          mins  21554;    Ionto:                                   mins   26315  Self Care:                            mins   13086    Un-Timed:  Electrical Stimulation:         mins  39159 ( );  Dry Needling          mins self-pay  Traction          mins 02695  Re-Eval                               mins  28992    Timed Treatment:   30   mins   Total Treatment:     30   mins    Becki Degroot PT  Physical Therapist

## 2021-08-10 ENCOUNTER — OFFICE VISIT (OUTPATIENT)
Dept: WOUND CARE | Facility: HOSPITAL | Age: 46
End: 2021-08-10

## 2021-08-10 PROCEDURE — 97602 WOUND(S) CARE NON-SELECTIVE: CPT

## 2021-08-10 RX ORDER — CIPROFLOXACIN 500 MG/1
500 TABLET, FILM COATED ORAL 2 TIMES DAILY
Qty: 14 TABLET | Refills: 0 | Status: SHIPPED | OUTPATIENT
Start: 2021-08-10 | End: 2021-09-24 | Stop reason: HOSPADM

## 2021-08-12 ENCOUNTER — TELEPHONE (OUTPATIENT)
Dept: PHYSICAL THERAPY | Facility: CLINIC | Age: 46
End: 2021-08-12

## 2021-08-17 ENCOUNTER — TREATMENT (OUTPATIENT)
Dept: PHYSICAL THERAPY | Facility: CLINIC | Age: 46
End: 2021-08-17

## 2021-08-17 ENCOUNTER — OFFICE VISIT (OUTPATIENT)
Dept: WOUND CARE | Facility: HOSPITAL | Age: 46
End: 2021-08-17

## 2021-08-17 DIAGNOSIS — M25.511 RIGHT SHOULDER PAIN, UNSPECIFIED CHRONICITY: Primary | ICD-10-CM

## 2021-08-17 PROCEDURE — 97140 MANUAL THERAPY 1/> REGIONS: CPT | Performed by: PHYSICAL THERAPIST

## 2021-08-17 PROCEDURE — 97014 ELECTRIC STIMULATION THERAPY: CPT | Performed by: PHYSICAL THERAPIST

## 2021-08-17 PROCEDURE — G0463 HOSPITAL OUTPT CLINIC VISIT: HCPCS

## 2021-08-17 PROCEDURE — 97110 THERAPEUTIC EXERCISES: CPT | Performed by: PHYSICAL THERAPIST

## 2021-08-17 NOTE — PROGRESS NOTES
Physical Therapy Daily Progress Note    VISIT#: 3    Subjective   Maynor Gregg reports that he did fine following his previous session but continues to have high levels of pain and increased pain and fatigue today due to pt having a doctors appointment earlier today and usually his son goes with him and assists, but his son is in school now, therefore he is responsible for his AD.  Pain Rating (0/10): 7    Objective     See Exercise, Manual, and Modality Logs for complete treatment.     Assessment/Plan   Increased time spent on manual therapy today with pain limiting ROM in flex and ER. ESTIM utilized at the end of the session with MHP with pt reporting decreased pain.     Plan  Progress per Plan of Care and Progress strengthening /stabilization /functional activity            Timed:         Manual Therapy:    20     mins  84345;     Therapeutic Exercise:    10     mins  30563;     Neuromuscular Jeanette:        mins  95801;    Therapeutic Activity:          mins  70239;     Gait Training:           mins  54323;     Ultrasound:          mins  65047;    Ionto                                   mins   02923  Self Care                            mins   28004    Un-Timed:  Electrical Stimulation:    15     mins  46730 ( );  Dry Needling          mins self-pay  Traction          mins 48892  Low Eval          Mins  02844  Mod Eval          Mins  94996  High Eval                            Mins  29726  Re-Eval                               mins  91110    Timed Treatment:   30   mins   Total Treatment:     45   mins    Mar Tavares PT, DPT  Physical Therapist

## 2021-08-20 ENCOUNTER — TREATMENT (OUTPATIENT)
Dept: PHYSICAL THERAPY | Facility: CLINIC | Age: 46
End: 2021-08-20

## 2021-08-20 DIAGNOSIS — M25.511 RIGHT SHOULDER PAIN, UNSPECIFIED CHRONICITY: Primary | ICD-10-CM

## 2021-08-20 PROCEDURE — 97140 MANUAL THERAPY 1/> REGIONS: CPT | Performed by: PHYSICAL THERAPIST

## 2021-08-20 PROCEDURE — 97110 THERAPEUTIC EXERCISES: CPT | Performed by: PHYSICAL THERAPIST

## 2021-08-20 PROCEDURE — 97014 ELECTRIC STIMULATION THERAPY: CPT | Performed by: PHYSICAL THERAPIST

## 2021-08-20 NOTE — PROGRESS NOTES
Physical Therapy Daily Progress Note    VISIT#: 4    Subjective   Maynor Gregg reports that he had decreased pain for about a day with pain returning following. Today pt has some pain with stretching performed prior to coming to therapy.     Objective     See Exercise, Manual, and Modality Logs for complete treatment.     Assessment/Plan   Manual therapy continued at the start of the session today with visual increase in flexion, abd, and ER PROM with improved tolerance of manual therapy. ESTIM continued today at the end of the session for decreased pain.     Plan  Progress per Plan of Care and Progress strengthening /stabilization /functional activity            Timed:         Manual Therapy:    20     mins  70517;     Therapeutic Exercise:    10     mins  63702;     Neuromuscular Jeanette:        mins  25315;    Therapeutic Activity:          mins  12482;     Gait Training:           mins  80677;     Ultrasound:          mins  05810;    Ionto                                  mins   65551  Self Care                            mins   46877    Un-Timed:  Electrical Stimulation:    15     mins  36567 ( );  Dry Needling          mins self-pay  Traction          mins 32884  Low Eval          Mins  19933  Mod Eval         Mins  86275  High Eval                            Mins  87478  Re-Eval                               mins  53060    Timed Treatment:   30   mins   Total Treatment:     45   mins    Mar Tavares PT, DPT  Physical Therapist

## 2021-08-24 ENCOUNTER — TRANSCRIBE ORDERS (OUTPATIENT)
Dept: ADMINISTRATIVE | Facility: HOSPITAL | Age: 46
End: 2021-08-24

## 2021-08-24 ENCOUNTER — LAB REQUISITION (OUTPATIENT)
Dept: LAB | Facility: HOSPITAL | Age: 46
End: 2021-08-24

## 2021-08-24 ENCOUNTER — OFFICE VISIT (OUTPATIENT)
Dept: WOUND CARE | Facility: HOSPITAL | Age: 46
End: 2021-08-24

## 2021-08-24 DIAGNOSIS — L97.212 NON-PRESSURE CHRONIC ULCER OF RIGHT CALF WITH FAT LAYER EXPOSED (HCC): ICD-10-CM

## 2021-08-24 DIAGNOSIS — L97.212 NON-PRESSURE CHRONIC ULCER OF RIGHT CALF WITH FAT LAYER EXPOSED (HCC): Primary | ICD-10-CM

## 2021-08-24 PROCEDURE — G0463 HOSPITAL OUTPT CLINIC VISIT: HCPCS

## 2021-08-24 PROCEDURE — 87070 CULTURE OTHR SPECIMN AEROBIC: CPT | Performed by: SURGERY

## 2021-08-24 PROCEDURE — 87075 CULTR BACTERIA EXCEPT BLOOD: CPT | Performed by: SURGERY

## 2021-08-24 PROCEDURE — 87205 SMEAR GRAM STAIN: CPT | Performed by: SURGERY

## 2021-08-27 LAB
BACTERIA SPEC AEROBE CULT: NORMAL
GRAM STN SPEC: NORMAL

## 2021-08-29 LAB — BACTERIA SPEC ANAEROBE CULT: NORMAL

## 2021-08-31 ENCOUNTER — TELEPHONE (OUTPATIENT)
Dept: PHYSICAL THERAPY | Facility: CLINIC | Age: 46
End: 2021-08-31

## 2021-08-31 ENCOUNTER — OFFICE VISIT (OUTPATIENT)
Dept: WOUND CARE | Facility: HOSPITAL | Age: 46
End: 2021-08-31

## 2021-08-31 PROCEDURE — G0463 HOSPITAL OUTPT CLINIC VISIT: HCPCS

## 2021-08-31 NOTE — TELEPHONE ENCOUNTER
Called patient due to no show. He answered and stated he tried to call yesterday but was unable to get through. Said his son is in the hospital and has been for 8 days. Hoping he will get out soon though. Wants to keep Thursday appointment and will call tomorrow if he can't make it.

## 2021-09-07 ENCOUNTER — HOSPITAL ENCOUNTER (OUTPATIENT)
Dept: MRI IMAGING | Facility: HOSPITAL | Age: 46
Discharge: HOME | End: 2021-09-07
Admitting: SURGERY

## 2021-09-07 ENCOUNTER — APPOINTMENT (OUTPATIENT)
Dept: WOUND CARE | Facility: HOSPITAL | Age: 46
End: 2021-09-07

## 2021-09-07 DIAGNOSIS — L97.212 NON-PRESSURE CHRONIC ULCER OF RIGHT CALF WITH FAT LAYER EXPOSED (HCC): ICD-10-CM

## 2021-09-07 LAB — CREAT BLDA-MCNC: 0.9 MG/DL (ref 0.6–1.3)

## 2021-09-07 PROCEDURE — 73720 MRI LWR EXTREMITY W/O&W/DYE: CPT

## 2021-09-07 PROCEDURE — 82565 ASSAY OF CREATININE: CPT

## 2021-09-07 PROCEDURE — 25010000002 GADOTERIDOL PER 1 ML: Performed by: SURGERY

## 2021-09-07 PROCEDURE — A9579 GAD-BASE MR CONTRAST NOS,1ML: HCPCS | Performed by: SURGERY

## 2021-09-07 RX ADMIN — GADOTERIDOL 20 ML: 279.3 INJECTION, SOLUTION INTRAVENOUS at 12:40

## 2021-09-17 ENCOUNTER — HOSPITAL ENCOUNTER (OUTPATIENT)
Dept: CARDIOLOGY | Facility: HOSPITAL | Age: 46
Discharge: HOME | End: 2021-09-17

## 2021-09-17 ENCOUNTER — LAB (OUTPATIENT)
Dept: LAB | Facility: HOSPITAL | Age: 46
End: 2021-09-17

## 2021-09-17 LAB
ANION GAP SERPL CALCULATED.3IONS-SCNC: 9 MMOL/L (ref 5–15)
BUN SERPL-MCNC: 16 MG/DL (ref 6–20)
BUN/CREAT SERPL: 15.8 (ref 7–25)
CALCIUM SPEC-SCNC: 8.7 MG/DL (ref 8.6–10.5)
CHLORIDE SERPL-SCNC: 101 MMOL/L (ref 98–107)
CO2 SERPL-SCNC: 27 MMOL/L (ref 22–29)
CREAT SERPL-MCNC: 1.01 MG/DL (ref 0.76–1.27)
DEPRECATED RDW RBC AUTO: 39.3 FL (ref 37–54)
ERYTHROCYTE [DISTWIDTH] IN BLOOD BY AUTOMATED COUNT: 12.5 % (ref 12.3–15.4)
GFR SERPL CREATININE-BSD FRML MDRD: 80 ML/MIN/1.73
GLUCOSE SERPL-MCNC: 117 MG/DL (ref 65–99)
HCT VFR BLD AUTO: 43.5 % (ref 37.5–51)
HGB BLD-MCNC: 14.3 G/DL (ref 13–17.7)
MCH RBC QN AUTO: 28.8 PG (ref 26.6–33)
MCHC RBC AUTO-ENTMCNC: 32.9 G/DL (ref 31.5–35.7)
MCV RBC AUTO: 87.7 FL (ref 79–97)
PLATELET # BLD AUTO: 212 10*3/MM3 (ref 140–450)
PMV BLD AUTO: 10.5 FL (ref 6–12)
POTASSIUM SERPL-SCNC: 3.5 MMOL/L (ref 3.5–5.2)
QT INTERVAL: 336 MS
RBC # BLD AUTO: 4.96 10*6/MM3 (ref 4.14–5.8)
SODIUM SERPL-SCNC: 137 MMOL/L (ref 136–145)
WBC # BLD AUTO: 8.42 10*3/MM3 (ref 3.4–10.8)

## 2021-09-17 PROCEDURE — 93005 ELECTROCARDIOGRAM TRACING: CPT | Performed by: ORTHOPAEDIC SURGERY

## 2021-09-17 PROCEDURE — 80048 BASIC METABOLIC PNL TOTAL CA: CPT

## 2021-09-17 PROCEDURE — 93010 ELECTROCARDIOGRAM REPORT: CPT | Performed by: INTERNAL MEDICINE

## 2021-09-17 PROCEDURE — 85027 COMPLETE CBC AUTOMATED: CPT

## 2021-09-21 ENCOUNTER — LAB (OUTPATIENT)
Dept: LAB | Facility: HOSPITAL | Age: 46
End: 2021-09-21

## 2021-09-21 LAB
MRSA DNA SPEC QL NAA+PROBE: NORMAL
SARS-COV-2 ORF1AB RESP QL NAA+PROBE: NOT DETECTED

## 2021-09-21 PROCEDURE — 87641 MR-STAPH DNA AMP PROBE: CPT | Performed by: ORTHOPAEDIC SURGERY

## 2021-09-21 PROCEDURE — U0004 COV-19 TEST NON-CDC HGH THRU: HCPCS

## 2021-09-21 PROCEDURE — C9803 HOPD COVID-19 SPEC COLLECT: HCPCS

## 2021-09-22 ENCOUNTER — ANESTHESIA EVENT (OUTPATIENT)
Dept: PERIOP | Facility: HOSPITAL | Age: 46
End: 2021-09-22

## 2021-09-23 ENCOUNTER — APPOINTMENT (OUTPATIENT)
Dept: GENERAL RADIOLOGY | Facility: HOSPITAL | Age: 46
End: 2021-09-23

## 2021-09-23 ENCOUNTER — HOSPITAL ENCOUNTER (OUTPATIENT)
Facility: HOSPITAL | Age: 46
Discharge: HOME | End: 2021-09-24
Attending: ORTHOPAEDIC SURGERY | Admitting: ORTHOPAEDIC SURGERY

## 2021-09-23 ENCOUNTER — ANESTHESIA (OUTPATIENT)
Dept: PERIOP | Facility: HOSPITAL | Age: 46
End: 2021-09-23

## 2021-09-23 DIAGNOSIS — T87.43 INFECTION OF AMPUTATION STUMP OF RIGHT LOWER EXTREMITY (HCC): ICD-10-CM

## 2021-09-23 DIAGNOSIS — T87.43 INFECTION OF RIGHT BELOW KNEE AMPUTATION (HCC): Primary | ICD-10-CM

## 2021-09-23 LAB
ANION GAP SERPL CALCULATED.3IONS-SCNC: 12 MMOL/L (ref 5–15)
BUN SERPL-MCNC: 20 MG/DL (ref 6–20)
BUN/CREAT SERPL: 23.8 (ref 7–25)
CALCIUM SPEC-SCNC: 7.8 MG/DL (ref 8.6–10.5)
CHLORIDE SERPL-SCNC: 103 MMOL/L (ref 98–107)
CO2 SERPL-SCNC: 18 MMOL/L (ref 22–29)
CREAT SERPL-MCNC: 0.84 MG/DL (ref 0.76–1.27)
GFR SERPL CREATININE-BSD FRML MDRD: 98 ML/MIN/1.73
GLUCOSE BLDC GLUCOMTR-MCNC: 155 MG/DL (ref 70–105)
GLUCOSE BLDC GLUCOMTR-MCNC: 201 MG/DL (ref 70–105)
GLUCOSE BLDC GLUCOMTR-MCNC: 210 MG/DL (ref 70–105)
GLUCOSE BLDC GLUCOMTR-MCNC: 223 MG/DL (ref 70–105)
GLUCOSE BLDC GLUCOMTR-MCNC: 259 MG/DL (ref 70–105)
GLUCOSE SERPL-MCNC: 235 MG/DL (ref 65–99)
POTASSIUM SERPL-SCNC: 4.6 MMOL/L (ref 3.5–5.2)
SODIUM SERPL-SCNC: 133 MMOL/L (ref 136–145)

## 2021-09-23 PROCEDURE — 25010000002 FENTANYL CITRATE (PF) 100 MCG/2ML SOLUTION: Performed by: ANESTHESIOLOGY

## 2021-09-23 PROCEDURE — 25010000002 ONDANSETRON PER 1 MG: Performed by: ANESTHESIOLOGY

## 2021-09-23 PROCEDURE — 63710000001 INSULIN GLARGINE PER 5 UNITS: Performed by: INTERNAL MEDICINE

## 2021-09-23 PROCEDURE — 25010000002 MORPHINE PER 10 MG: Performed by: ORTHOPAEDIC SURGERY

## 2021-09-23 PROCEDURE — 25010000002 PROPOFOL 10 MG/ML EMULSION: Performed by: ANESTHESIOLOGY

## 2021-09-23 PROCEDURE — 25010000002 ONDANSETRON PER 1 MG: Performed by: ORTHOPAEDIC SURGERY

## 2021-09-23 PROCEDURE — 87075 CULTR BACTERIA EXCEPT BLOOD: CPT | Performed by: ORTHOPAEDIC SURGERY

## 2021-09-23 PROCEDURE — 80048 BASIC METABOLIC PNL TOTAL CA: CPT | Performed by: ORTHOPAEDIC SURGERY

## 2021-09-23 PROCEDURE — 88307 TISSUE EXAM BY PATHOLOGIST: CPT | Performed by: ORTHOPAEDIC SURGERY

## 2021-09-23 PROCEDURE — 25010000002 LEVOFLOXACIN PER 250 MG: Performed by: ORTHOPAEDIC SURGERY

## 2021-09-23 PROCEDURE — 73590 X-RAY EXAM OF LOWER LEG: CPT

## 2021-09-23 PROCEDURE — 25010000002 DEXAMETHASONE PER 1 MG: Performed by: ANESTHESIOLOGY

## 2021-09-23 PROCEDURE — 87205 SMEAR GRAM STAIN: CPT | Performed by: ORTHOPAEDIC SURGERY

## 2021-09-23 PROCEDURE — 87070 CULTURE OTHR SPECIMN AEROBIC: CPT | Performed by: ORTHOPAEDIC SURGERY

## 2021-09-23 PROCEDURE — 88311 DECALCIFY TISSUE: CPT | Performed by: ORTHOPAEDIC SURGERY

## 2021-09-23 PROCEDURE — 25010000002 HYDROMORPHONE PER 4 MG: Performed by: ANESTHESIOLOGY

## 2021-09-23 PROCEDURE — 63710000001 INSULIN LISPRO (HUMAN) PER 5 UNITS: Performed by: INTERNAL MEDICINE

## 2021-09-23 PROCEDURE — 82962 GLUCOSE BLOOD TEST: CPT

## 2021-09-23 PROCEDURE — 83036 HEMOGLOBIN GLYCOSYLATED A1C: CPT | Performed by: INTERNAL MEDICINE

## 2021-09-23 RX ORDER — DEXTROSE MONOHYDRATE 25 G/50ML
25 INJECTION, SOLUTION INTRAVENOUS
Status: DISCONTINUED | OUTPATIENT
Start: 2021-09-23 | End: 2021-09-24 | Stop reason: HOSPADM

## 2021-09-23 RX ORDER — FENTANYL CITRATE 50 UG/ML
50 INJECTION, SOLUTION INTRAMUSCULAR; INTRAVENOUS
Status: DISCONTINUED | OUTPATIENT
Start: 2021-09-23 | End: 2021-09-23 | Stop reason: HOSPADM

## 2021-09-23 RX ORDER — MORPHINE SULFATE 4 MG/ML
4 INJECTION, SOLUTION INTRAMUSCULAR; INTRAVENOUS
Status: DISCONTINUED | OUTPATIENT
Start: 2021-09-23 | End: 2021-09-24 | Stop reason: HOSPADM

## 2021-09-23 RX ORDER — LEVOFLOXACIN 5 MG/ML
750 INJECTION, SOLUTION INTRAVENOUS EVERY 24 HOURS
Status: DISCONTINUED | OUTPATIENT
Start: 2021-09-23 | End: 2021-09-24 | Stop reason: HOSPADM

## 2021-09-23 RX ORDER — DEXAMETHASONE SODIUM PHOSPHATE 4 MG/ML
INJECTION, SOLUTION INTRA-ARTICULAR; INTRALESIONAL; INTRAMUSCULAR; INTRAVENOUS; SOFT TISSUE AS NEEDED
Status: DISCONTINUED | OUTPATIENT
Start: 2021-09-23 | End: 2021-09-23 | Stop reason: SURG

## 2021-09-23 RX ORDER — INSULIN GLARGINE 100 [IU]/ML
12 INJECTION, SOLUTION SUBCUTANEOUS NIGHTLY
Status: DISCONTINUED | OUTPATIENT
Start: 2021-09-23 | End: 2021-09-24 | Stop reason: HOSPADM

## 2021-09-23 RX ORDER — HYDROCODONE BITARTRATE AND ACETAMINOPHEN 10; 325 MG/1; MG/1
0.5 TABLET ORAL EVERY 4 HOURS PRN
Status: DISCONTINUED | OUTPATIENT
Start: 2021-09-23 | End: 2021-09-24 | Stop reason: HOSPADM

## 2021-09-23 RX ORDER — NICOTINE POLACRILEX 4 MG
15 LOZENGE BUCCAL
Status: DISCONTINUED | OUTPATIENT
Start: 2021-09-23 | End: 2021-09-24 | Stop reason: HOSPADM

## 2021-09-23 RX ORDER — ONDANSETRON 2 MG/ML
4 INJECTION INTRAMUSCULAR; INTRAVENOUS EVERY 6 HOURS PRN
Status: DISCONTINUED | OUTPATIENT
Start: 2021-09-23 | End: 2021-09-24 | Stop reason: HOSPADM

## 2021-09-23 RX ORDER — INSULIN LISPRO 100 [IU]/ML
0-9 INJECTION, SOLUTION INTRAVENOUS; SUBCUTANEOUS
Status: DISCONTINUED | OUTPATIENT
Start: 2021-09-23 | End: 2021-09-24 | Stop reason: HOSPADM

## 2021-09-23 RX ORDER — INSULIN LISPRO 100 [IU]/ML
0-9 INJECTION, SOLUTION INTRAVENOUS; SUBCUTANEOUS AS NEEDED
Status: DISCONTINUED | OUTPATIENT
Start: 2021-09-23 | End: 2021-09-24 | Stop reason: HOSPADM

## 2021-09-23 RX ORDER — HYDROMORPHONE HCL 110MG/55ML
0.5 PATIENT CONTROLLED ANALGESIA SYRINGE INTRAVENOUS
Status: DISCONTINUED | OUTPATIENT
Start: 2021-09-23 | End: 2021-09-23 | Stop reason: HOSPADM

## 2021-09-23 RX ORDER — ACETAMINOPHEN 325 MG/1
325 TABLET ORAL EVERY 4 HOURS PRN
Status: DISCONTINUED | OUTPATIENT
Start: 2021-09-23 | End: 2021-09-24 | Stop reason: HOSPADM

## 2021-09-23 RX ORDER — SODIUM CHLORIDE 0.9 % (FLUSH) 0.9 %
1-10 SYRINGE (ML) INJECTION AS NEEDED
Status: DISCONTINUED | OUTPATIENT
Start: 2021-09-23 | End: 2021-09-24 | Stop reason: HOSPADM

## 2021-09-23 RX ORDER — HYDROCODONE BITARTRATE AND ACETAMINOPHEN 10; 325 MG/1; MG/1
1 TABLET ORAL EVERY 4 HOURS PRN
Status: DISCONTINUED | OUTPATIENT
Start: 2021-09-23 | End: 2021-09-24 | Stop reason: HOSPADM

## 2021-09-23 RX ORDER — ASPIRIN 325 MG
325 TABLET ORAL DAILY
Status: DISCONTINUED | OUTPATIENT
Start: 2021-09-23 | End: 2021-09-24 | Stop reason: HOSPADM

## 2021-09-23 RX ORDER — PHENYLEPHRINE HCL IN 0.9% NACL 1 MG/10 ML
SYRINGE (ML) INTRAVENOUS AS NEEDED
Status: DISCONTINUED | OUTPATIENT
Start: 2021-09-23 | End: 2021-09-23 | Stop reason: SURG

## 2021-09-23 RX ORDER — PREGABALIN 75 MG/1
75 CAPSULE ORAL EVERY 12 HOURS SCHEDULED
Status: DISCONTINUED | OUTPATIENT
Start: 2021-09-23 | End: 2021-09-24 | Stop reason: HOSPADM

## 2021-09-23 RX ORDER — POLYETHYLENE GLYCOL 3350 17 G/17G
17 POWDER, FOR SOLUTION ORAL DAILY
Status: DISCONTINUED | OUTPATIENT
Start: 2021-09-23 | End: 2021-09-24 | Stop reason: HOSPADM

## 2021-09-23 RX ORDER — NALOXONE HCL 0.4 MG/ML
0.4 VIAL (ML) INJECTION
Status: DISCONTINUED | OUTPATIENT
Start: 2021-09-23 | End: 2021-09-24 | Stop reason: HOSPADM

## 2021-09-23 RX ORDER — SODIUM CHLORIDE 9 MG/ML
125 INJECTION, SOLUTION INTRAVENOUS CONTINUOUS
Status: DISPENSED | OUTPATIENT
Start: 2021-09-23 | End: 2021-09-24

## 2021-09-23 RX ORDER — HYDROCODONE BITARTRATE AND ACETAMINOPHEN 10; 325 MG/1; MG/1
1 TABLET ORAL ONCE AS NEEDED
Status: COMPLETED | OUTPATIENT
Start: 2021-09-23 | End: 2021-09-23

## 2021-09-23 RX ORDER — ONDANSETRON 2 MG/ML
INJECTION INTRAMUSCULAR; INTRAVENOUS AS NEEDED
Status: DISCONTINUED | OUTPATIENT
Start: 2021-09-23 | End: 2021-09-23 | Stop reason: SURG

## 2021-09-23 RX ORDER — FENTANYL CITRATE 50 UG/ML
INJECTION, SOLUTION INTRAMUSCULAR; INTRAVENOUS AS NEEDED
Status: DISCONTINUED | OUTPATIENT
Start: 2021-09-23 | End: 2021-09-23 | Stop reason: SURG

## 2021-09-23 RX ORDER — PROPOFOL 10 MG/ML
VIAL (ML) INTRAVENOUS AS NEEDED
Status: DISCONTINUED | OUTPATIENT
Start: 2021-09-23 | End: 2021-09-23 | Stop reason: SURG

## 2021-09-23 RX ORDER — SODIUM CHLORIDE 0.9 % (FLUSH) 0.9 %
10 SYRINGE (ML) INJECTION AS NEEDED
Status: DISCONTINUED | OUTPATIENT
Start: 2021-09-23 | End: 2021-09-23 | Stop reason: HOSPADM

## 2021-09-23 RX ORDER — SODIUM CHLORIDE 0.9 % (FLUSH) 0.9 %
10 SYRINGE (ML) INJECTION EVERY 12 HOURS SCHEDULED
Status: DISCONTINUED | OUTPATIENT
Start: 2021-09-23 | End: 2021-09-23 | Stop reason: HOSPADM

## 2021-09-23 RX ORDER — OLANZAPINE 10 MG/2ML
1 INJECTION, POWDER, LYOPHILIZED, FOR SOLUTION INTRAMUSCULAR AS NEEDED
Status: DISCONTINUED | OUTPATIENT
Start: 2021-09-23 | End: 2021-09-24 | Stop reason: HOSPADM

## 2021-09-23 RX ORDER — ONDANSETRON 4 MG/1
4 TABLET, FILM COATED ORAL EVERY 6 HOURS PRN
Status: DISCONTINUED | OUTPATIENT
Start: 2021-09-23 | End: 2021-09-24 | Stop reason: HOSPADM

## 2021-09-23 RX ORDER — PANTOPRAZOLE SODIUM 40 MG/1
40 TABLET, DELAYED RELEASE ORAL EVERY EVENING
Status: DISCONTINUED | OUTPATIENT
Start: 2021-09-23 | End: 2021-09-24 | Stop reason: HOSPADM

## 2021-09-23 RX ORDER — LIDOCAINE HYDROCHLORIDE 20 MG/ML
INJECTION, SOLUTION EPIDURAL; INFILTRATION; INTRACAUDAL; PERINEURAL AS NEEDED
Status: DISCONTINUED | OUTPATIENT
Start: 2021-09-23 | End: 2021-09-23 | Stop reason: SURG

## 2021-09-23 RX ORDER — MIDAZOLAM HYDROCHLORIDE 1 MG/ML
1 INJECTION INTRAMUSCULAR; INTRAVENOUS
Status: DISCONTINUED | OUTPATIENT
Start: 2021-09-23 | End: 2021-09-23 | Stop reason: HOSPADM

## 2021-09-23 RX ORDER — SODIUM CHLORIDE, SODIUM LACTATE, POTASSIUM CHLORIDE, CALCIUM CHLORIDE 600; 310; 30; 20 MG/100ML; MG/100ML; MG/100ML; MG/100ML
INJECTION, SOLUTION INTRAVENOUS CONTINUOUS PRN
Status: DISCONTINUED | OUTPATIENT
Start: 2021-09-23 | End: 2021-09-23 | Stop reason: SURG

## 2021-09-23 RX ORDER — DULOXETIN HYDROCHLORIDE 30 MG/1
60 CAPSULE, DELAYED RELEASE ORAL DAILY
Status: DISCONTINUED | OUTPATIENT
Start: 2021-09-23 | End: 2021-09-24 | Stop reason: HOSPADM

## 2021-09-23 RX ADMIN — INSULIN LISPRO 4 UNITS: 100 INJECTION, SOLUTION INTRAVENOUS; SUBCUTANEOUS at 17:03

## 2021-09-23 RX ADMIN — FENTANYL CITRATE 100 MCG: 50 INJECTION, SOLUTION INTRAMUSCULAR; INTRAVENOUS at 10:02

## 2021-09-23 RX ADMIN — CEFAZOLIN SODIUM 2 G: 1 INJECTION, POWDER, FOR SOLUTION INTRAMUSCULAR; INTRAVENOUS at 10:03

## 2021-09-23 RX ADMIN — HYDROCODONE BITARTRATE AND ACETAMINOPHEN 1 TABLET: 10; 325 TABLET ORAL at 18:28

## 2021-09-23 RX ADMIN — MORPHINE SULFATE 4 MG: 4 INJECTION INTRAVENOUS at 18:28

## 2021-09-23 RX ADMIN — PANTOPRAZOLE SODIUM 40 MG: 40 TABLET, DELAYED RELEASE ORAL at 17:04

## 2021-09-23 RX ADMIN — SODIUM CHLORIDE, SODIUM LACTATE, POTASSIUM CHLORIDE, AND CALCIUM CHLORIDE: .6; .31; .03; .02 INJECTION, SOLUTION INTRAVENOUS at 09:52

## 2021-09-23 RX ADMIN — HYDROCODONE BITARTRATE AND ACETAMINOPHEN 1 TABLET: 10; 325 TABLET ORAL at 13:41

## 2021-09-23 RX ADMIN — Medication 100 MCG: at 10:43

## 2021-09-23 RX ADMIN — LIDOCAINE HYDROCHLORIDE 50 MG: 20 INJECTION, SOLUTION EPIDURAL; INFILTRATION; INTRACAUDAL; PERINEURAL at 10:02

## 2021-09-23 RX ADMIN — MORPHINE SULFATE 4 MG: 4 INJECTION INTRAVENOUS at 20:38

## 2021-09-23 RX ADMIN — LEVOFLOXACIN 750 MG: 5 INJECTION, SOLUTION INTRAVENOUS at 17:04

## 2021-09-23 RX ADMIN — ASPIRIN 325 MG ORAL TABLET 325 MG: 325 PILL ORAL at 17:04

## 2021-09-23 RX ADMIN — ONDANSETRON 4 MG: 2 INJECTION INTRAMUSCULAR; INTRAVENOUS at 11:30

## 2021-09-23 RX ADMIN — INSULIN GLARGINE 12 UNITS: 100 INJECTION, SOLUTION SUBCUTANEOUS at 20:45

## 2021-09-23 RX ADMIN — Medication 100 MCG: at 10:26

## 2021-09-23 RX ADMIN — ONDANSETRON 4 MG: 2 INJECTION INTRAMUSCULAR; INTRAVENOUS at 20:39

## 2021-09-23 RX ADMIN — HYDROMORPHONE HYDROCHLORIDE 0.5 MG: 2 INJECTION, SOLUTION INTRAMUSCULAR; INTRAVENOUS; SUBCUTANEOUS at 13:42

## 2021-09-23 RX ADMIN — SODIUM CHLORIDE 125 ML/HR: 9 INJECTION, SOLUTION INTRAVENOUS at 17:14

## 2021-09-23 RX ADMIN — Medication 100 MCG: at 10:21

## 2021-09-23 RX ADMIN — HYDROMORPHONE HYDROCHLORIDE 0.5 MG: 2 INJECTION, SOLUTION INTRAMUSCULAR; INTRAVENOUS; SUBCUTANEOUS at 12:10

## 2021-09-23 RX ADMIN — DEXAMETHASONE SODIUM PHOSPHATE 8 MG: 4 INJECTION, SOLUTION INTRAMUSCULAR; INTRAVENOUS at 10:10

## 2021-09-23 RX ADMIN — HYDROMORPHONE HYDROCHLORIDE 0.5 MG: 2 INJECTION, SOLUTION INTRAMUSCULAR; INTRAVENOUS; SUBCUTANEOUS at 12:30

## 2021-09-23 RX ADMIN — PROPOFOL 200 MG: 10 INJECTION, EMULSION INTRAVENOUS at 10:02

## 2021-09-23 NOTE — ANESTHESIA POSTPROCEDURE EVALUATION
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          Post Anesthesia Care and Evaluation    Patient location during evaluation: PACU  Patient participation: complete - patient cannot participate  Level of consciousness: responsive to light touch and responsive to physical stimuli  Pain score: 0  Pain management: adequate  Airway patency: patent  Anesthetic complications: No anesthetic complications  PONV Status: controlled  Cardiovascular status: acceptable and hemodynamically stable  Respiratory status: acceptable and face mask  Hydration status: acceptable    Comments: Satisfactory progress.Patient seen and examined postoperatively; vital signs stable; SpO2 greater than or equal to 90%; cardiopulmonary status stable; nausea/vomiting adequately controlled; pain adequately controlled; no apparent anesthesia complications; patient discharged from anesthesia care when discharge criteria were met

## 2021-09-23 NOTE — ANESTHESIA PROCEDURE NOTES
Airway  Urgency: elective    Date/Time: 9/23/2021 10:03 AM  Airway not difficult    General Information and Staff    Patient location during procedure: OR  Anesthesiologist: Donn Olsno MD    Indications and Patient Condition  Indications for airway management: airway protection    Preoxygenated: yes  Mask difficulty assessment: 1 - vent by mask    Final Airway Details  Final airway type: supraglottic airway      Successful airway: LMA  Size 3    Number of attempts at approach: 1

## 2021-09-23 NOTE — CASE MANAGEMENT/SOCIAL WORK
[facility]  [facility]     Patient Name: Maynor Gregg  MRN: [id number]  Today's Date: 9/23/2021    Admit Date: 9/23/2021    Discharge Needs Assessment     Row Name 09/23/21 1523       Living Environment    Lives With  significant other;child(marcus), dependent    Current Living Arrangements  home/apartment/condo    Primary Care Provided by  self    Provides Primary Care For  no one    Family Caregiver if Needed  significant other    Quality of Family Relationships  helpful    Able to Return to Prior Arrangements  yes       Resource/Environmental Concerns    Resource/Environmental Concerns  none    Transportation Concerns  car, none       Transition Planning    Patient/Family Anticipates Transition to  home with help/services    Patient/Family Anticipated Services at Transition  none    Transportation Anticipated  family or friend will provide       Discharge Needs Assessment    Equipment Currently Used at Home  walker, rolling;wheelchair;shower chair;other (see comments) knee scooter Simultaneous filing. User may be unaware of other data.    Concerns to be Addressed  denies needs/concerns at this time    Anticipated Changes Related to Illness  none    Equipment Needed After Discharge  none        Discharge Plan     Row Name 09/23/21 6241       Plan    Plan  D/C Plan: Home with [facility] (pending acceptance, order placed) and I for IV abx.    Provided Post Acute Provider List?  Yes    Post Acute Provider List  Home Health    Delivered To  Support Person    Method of Delivery  Telephone    Patient/Family in Agreement with Plan  yes    Plan Comments  CM called and spoke to patient's significant other, Nancy, on the phone. Patient lives with significant other, is IADLs and drives. PCP and pharmacy verified-denies any difficulty affording meds. CM discussed need for IV abx-patient has used [facility] in the past and is agreeable. Referral to [facility] and [facility] for IV abx-liaison notified.  Significant other denies any d/c needs at this time and will provide transport at d/c. Barrier to D/C: POD#0 R BKA revision, ID consult pending.          Expected Discharge Date and Time     Expected Discharge Date Expected Discharge Time    Sep 24, 2021         Demographic Summary     Row Name 09/23/21 1522       General Information    Admission Type  inpatient    Arrived From  operating room    Referral Source  admission list    Reason for Consult  discharge planning    Preferred Language  English     Used During This Interaction  no        Functional Status     Row Name 09/23/21 1522       Functional Status    Usual Activity Tolerance  good    Current Activity Tolerance  good       Functional Status, IADL    Medications  independent    Meal Preparation  independent    Housekeeping  independent    Laundry  independent    Shopping  independent       Mental Status Summary    Recent Changes in Mental Status/Cognitive Functioning  unable to assess    Mental Status Comments  spoke to patient's significant other on the phone          Rebecca Reddy

## 2021-09-23 NOTE — OP NOTE
AMPUTATION REVISION KNEE STUMP  Procedure Report    Patient Name:  Maynor Gregg  YOB: 1975    Date of Surgery:  9/23/2021         Pre-op Diagnosis: Right BKA wound infection    Postop diagnosis: Same    Indication: 46-year-old white male who has had multiple BKA revisions and skin grafting by both me and Dr. Lock of plastic surgery continues to have nonhealing anterior skin. Had infection before with Pseudomonas and E. coli which was sensitive to ceftazidime and levofloxacin. He wanted to try 1 more time before going to above-knee amputation for revision      Procedure/CPT® Codes:      Procedure(s):  RIGHT BELOW KNEE AMPUTATION REVISION    Staff:  Surgeon(s):  Donn Alcantara MD         Anesthesia: General    Estimated Blood Loss: 200 mL    Implants:    Nothing was implanted during the procedure    Specimen:          Culture        Findings: 5 x 3 cm open area in the anterior tibia. There is a sequestrum of bone about 2 x 1 cm in the distal stump also    Complications: None    Description of Procedure: Patient was seen in the holding room and consented to surgery. He had preoperative 2 g Kefzol. Was seen in the OR where general anesthesia was positioned supine with a bump under his right hip. Had sterile prep and draping was right lower extremity. Tourniquet was applied around the upper thigh to 250 mils of mercury. Total grams approximately 15 minutes. The anterior scar was excised and then section was done down to bone. Decided that the flap from distal but had to be turned up. Therefore the previous fishmouth incision was completed and the area was transected taking about 2 cm more off and taking about another centimeter off the fibula. A sequestrum of bone was removed along the distal medial tibia. The tibia was chamfered anteriorly. The necrotic tissue was resected sharply. Tourniquet was released hemostasis obtained with electrocautery the wound was then irrigated with Betadine diluted  with saline. Was then irrigated with saline. The flaps were closed in a Z-type fashion with 2-0 Vicryl and the posterior flap was closed to this. The lateral flap had mild duskiness at its tip. The wounds were then closed with 3-0 nylon interrupted suture sterile dressings applied.    Plan: We will consult infectious disease for antibiotic recommendations. We will keep on levofloxacin for now.        Donn Alcantara MD     Date: 9/23/2021  Time: 11:38 EDT

## 2021-09-23 NOTE — DISCHARGE PLACEMENT REQUEST
"Maynor Gregg (46 y.o. Male)     Date of Birth Social Security Number Address Home Phone MRN    1975  [address] [facility] [place] [address] [phone] [id number]    [employer] Marital Status          Jainism Significant Other       Admission Date Admission Type Admitting Provider Attending Provider Department, Room/Bed    9/23/21 Elective Donn Alcantara MD Conner, John M, MD [facility] SURGICAL INPATIENT, 4112/1    Discharge Date Discharge Disposition Discharge Destination                       Attending Provider: Donn Alcantara MD    Allergies: Metformin, Oxycodone    Isolation: None   Infection: None   Code Status: CPR    Ht: 160 cm (63\")   Wt: 101 kg (222 lb)    Admission Cmt: None   Principal Problem: None                Active Insurance as of 9/23/2021     Primary Coverage     Payor Plan Insurance Group Employer/Plan Group    [employer]UE [employer] PPO [id number]     Payor Plan Address Payor Plan Phone Number Payor Plan Fax Number Effective Dates    [address] [phone]  10/13/2019 - None Entered    [facility] [id number]       Subscriber Name Subscriber Birth Date Member ID       MAYNOR GREGG 1975 [id number]                 Emergency Contacts      (Rel.) Home Phone Work Phone Mobile Phone    REJI LANCASTER (Significant Other) [phone] -- [phone]          "

## 2021-09-23 NOTE — PROGRESS NOTES
LOS: 0 days   Patient Care Team:  Fred Lemons FNP as PCP - General (Family Medicine)    Subjective     Interval History:    Patient Complaints: Right leg pain following BKA revision    History taken from: patient    Review of Systems   Constitutional: Positive for activity change. Negative for appetite change, fatigue and fever.   HENT: Negative for facial swelling.    Eyes: Negative for visual disturbance.   Respiratory: Negative for cough, shortness of breath, wheezing and stridor.    Cardiovascular: Negative for chest pain, palpitations and leg swelling.   Gastrointestinal: Negative for abdominal pain, constipation, diarrhea, nausea and vomiting.   Endocrine: Negative for polyuria.   Genitourinary: Negative for dysuria and enuresis.   Musculoskeletal: Positive for arthralgias and gait problem. Negative for back pain, joint swelling and myalgias.   Skin: Positive for wound. Negative for rash.   Neurological: Negative for dizziness, seizures, weakness, numbness and headaches.   Psychiatric/Behavioral: Negative for confusion.           Objective     Vital Signs  Temp:  [97.3 °F (36.3 °C)-97.9 °F (36.6 °C)] 97.6 °F (36.4 °C)  Heart Rate:  [77-83] 81  Resp:  [10-19] 16  BP: (104-125)/(63-84) 114/78    Physical Exam:     General Appearance:    Alert, cooperative, in no acute distress,   Head:    Normocephalic, without obvious abnormality, atraumatic   Eyes:            Lids and lashes normal, conjunctivae and sclerae normal, no   icterus, no pallor, corneas clear, PERRLA   Ears:    Ears appear intact with no abnormalities noted   Throat:   No oral lesions, no thrush, oral mucosa moist   Neck:   No adenopathy, supple, trachea midline, no thyromegaly, no   carotid bruit, no JVD   Lungs:     Clear to auscultation,respirations regular, even and                  unlabored    Heart:    Regular rhythm and normal rate, normal S1 and S2, no            murmur, no gallop, no rub, no click   Chest Wall:    No  abnormalities observed   Abdomen:     Normal bowel sounds, no masses, no organomegaly, soft        Non-tender non-distended, no guarding,   Extremities:  Left BKA; right BKA -wound dressed   Pulses:   Pulses palpable and equal bilaterally   Skin:  Multiple excoriated lesions on right upper extremity   Lymph nodes:   No palpable adenopathy   Neurologic:  Grossly nonfocal, gait not assessed        Results Review:    Lab Results (last 24 hours)     Procedure Component Value Units Date/Time    POC Glucose Once [[id number]]  (Abnormal) Collected: 09/23/21 1514    Specimen: Blood Updated: 09/23/21 1515     Glucose 223 mg/dL      Comment: Serial Number: [id number]:  [id number]       Tissue Pathology Exam [[id number]] Collected: 09/23/21 1028    Specimen: Bone from Knee, Left Updated: 09/23/21 1412    Wound Culture - Wound, Knee, Right [[id number]] Collected: 09/23/21 1020    Specimen: Wound from Knee, Right Updated: 09/23/21 1151     Gram Stain Few (2+) WBCs per low power field      Rare (1+) Gram positive bacilli    POC Glucose Once [[id number]]  (Abnormal) Collected: 09/23/21 1141    Specimen: Blood Updated: 09/23/21 1143     Glucose 155 mg/dL      Comment: Serial Number: [id number]:  [id number]       Anaerobic Culture - Wound, Knee, Right [[id number]] Collected: 09/23/21 1020    Specimen: Wound from Knee, Right Updated: 09/23/21 1032    POC Glucose Once [[id number]]  (Abnormal) Collected: 09/23/21 0904    Specimen: Blood Updated: 09/23/21 0905     Glucose 210 mg/dL      Comment: Serial Number: [id number]:  [id number]              Imaging Results (Last 24 Hours)     Procedure Component Value Units Date/Time    XR Tibia Fibula 2 View Right [[id number]] Collected: 09/23/21 1414     Updated: 09/23/21 1419    Narrative:      DATE OF EXAM:  9/23/2021 2:02 PM     PROCEDURE:  XR TIBIA FIBULA 2 VW RIGHT-     INDICATIONS:  BKA; T87.43-Infection of amputation stump, right lower extremity     COMPARISON:  Knee radiograph  2/23/2021     TECHNIQUE:   2 views of the right tibia and fibula was/were obtained.     FINDINGS:  There is a below-the-knee amputation. There is been progressive  amputation of the fibula and tibia since previous study. There is soft  tissue gas. Some lucency at the resected end of the tibia is present.  I'm uncertain to the timing of the repeat amputation. It is possible  this lucency could be seen with recent procedure or residual  osteomyelitis. There is some soft tissue gas is suspected the amputation  site however overlying dressing slightly limits evaluation.       Impression:      Progressive amputation of the distal fibula and tibia. Some lucency at  the distal tibia is of uncertain etiology. What is stated that timing of  the amputation? It is been performed recently than this may be related  to some residual or recurrent osteomyelitis. Alternatively, it may be  due to recent postoperative state. Attention on follow-up imaging is  recommended.     Electronically Signed By-Millicent Springer MD On:9/23/2021 2:17 PM  This report was finalized on [id number] by  Millicent Springer MD.               I reviewed the patient's new clinical results.    Medication Review:   Scheduled Meds:aspirin, 325 mg, Oral, Daily  DULoxetine, 60 mg, Oral, Daily  levoFLOXacin, 750 mg, Intravenous, Q24H  pantoprazole, 40 mg, Oral, Q PM  polyethylene glycol, 17 g, Oral, Daily      Continuous Infusions:sodium chloride, 125 mL/hr      PRN Meds:.•  acetaminophen  •  HYDROcodone-acetaminophen  •  HYDROcodone-acetaminophen  •  Morphine **AND** naloxone  •  ondansetron **OR** ondansetron  •  sodium chloride     Assessment/Plan       Infection of amputation stump of right lower extremity (HCC)  Type 1 diabetes with complication of vascular disease -resume Basaglar and sliding scale insulin; blood pressure will not tolerate ACE inhibitor  Right lower extremity osteomyelitis -await results of postoperative cultures' continue Levaquin.  Infectious  disease following  Diabetic peripheral neuropathy -continue Cymbalta and Lyrica        Plan for disposition: Home at discharge    Guera Contreras MD  09/23/21  16:01 EDT

## 2021-09-23 NOTE — PLAN OF CARE
Goal Outcome Evaluation:              Outcome Summary: pt up to floor from recovery. Pt states that he is wanting to discharge tomorrow by 2. He stated thats what Dr. Alcantara had told him.

## 2021-09-24 ENCOUNTER — HOME HEALTH ADMISSION (OUTPATIENT)
Dept: HOME HEALTH SERVICES | Facility: HOME HEALTHCARE | Age: 46
End: 2021-09-24

## 2021-09-24 VITALS
DIASTOLIC BLOOD PRESSURE: 73 MMHG | OXYGEN SATURATION: 97 % | WEIGHT: 222.05 LBS | BODY MASS INDEX: 39.34 KG/M2 | HEART RATE: 95 BPM | TEMPERATURE: 97.6 F | SYSTOLIC BLOOD PRESSURE: 126 MMHG | HEIGHT: 63 IN | RESPIRATION RATE: 18 BRPM

## 2021-09-24 LAB
ANION GAP SERPL CALCULATED.3IONS-SCNC: 9 MMOL/L (ref 5–15)
BASOPHILS # BLD AUTO: 0 10*3/MM3 (ref 0–0.2)
BASOPHILS NFR BLD AUTO: 0.2 % (ref 0–1.5)
BUN SERPL-MCNC: 19 MG/DL (ref 6–20)
BUN/CREAT SERPL: 22.9 (ref 7–25)
CALCIUM SPEC-SCNC: 7.8 MG/DL (ref 8.6–10.5)
CHLORIDE SERPL-SCNC: 103 MMOL/L (ref 98–107)
CO2 SERPL-SCNC: 24 MMOL/L (ref 22–29)
CREAT SERPL-MCNC: 0.83 MG/DL (ref 0.76–1.27)
DEPRECATED RDW RBC AUTO: 40.3 FL (ref 37–54)
EOSINOPHIL # BLD AUTO: 0 10*3/MM3 (ref 0–0.4)
EOSINOPHIL NFR BLD AUTO: 0.1 % (ref 0.3–6.2)
ERYTHROCYTE [DISTWIDTH] IN BLOOD BY AUTOMATED COUNT: 13.3 % (ref 12.3–15.4)
GFR SERPL CREATININE-BSD FRML MDRD: 100 ML/MIN/1.73
GLUCOSE BLDC GLUCOMTR-MCNC: 194 MG/DL (ref 70–105)
GLUCOSE SERPL-MCNC: 210 MG/DL (ref 65–99)
HBA1C MFR BLD: 11.4 % (ref 3.5–5.6)
HCT VFR BLD AUTO: 36.9 % (ref 37.5–51)
HGB BLD-MCNC: 12.4 G/DL (ref 13–17.7)
LYMPHOCYTES # BLD AUTO: 2.2 10*3/MM3 (ref 0.7–3.1)
LYMPHOCYTES NFR BLD AUTO: 18.9 % (ref 19.6–45.3)
MCH RBC QN AUTO: 29.3 PG (ref 26.6–33)
MCHC RBC AUTO-ENTMCNC: 33.6 G/DL (ref 31.5–35.7)
MCV RBC AUTO: 87.2 FL (ref 79–97)
MONOCYTES # BLD AUTO: 0.9 10*3/MM3 (ref 0.1–0.9)
MONOCYTES NFR BLD AUTO: 7.7 % (ref 5–12)
NEUTROPHILS NFR BLD AUTO: 73.1 % (ref 42.7–76)
NEUTROPHILS NFR BLD AUTO: 8.5 10*3/MM3 (ref 1.7–7)
NRBC BLD AUTO-RTO: 0.1 /100 WBC (ref 0–0.2)
PLATELET # BLD AUTO: 228 10*3/MM3 (ref 140–450)
PMV BLD AUTO: 8.5 FL (ref 6–12)
POTASSIUM SERPL-SCNC: 4.4 MMOL/L (ref 3.5–5.2)
RBC # BLD AUTO: 4.23 10*6/MM3 (ref 4.14–5.8)
SODIUM SERPL-SCNC: 136 MMOL/L (ref 136–145)
WBC # BLD AUTO: 11.6 10*3/MM3 (ref 3.4–10.8)

## 2021-09-24 PROCEDURE — 25010000002 ONDANSETRON PER 1 MG: Performed by: ORTHOPAEDIC SURGERY

## 2021-09-24 PROCEDURE — 85025 COMPLETE CBC W/AUTO DIFF WBC: CPT | Performed by: INTERNAL MEDICINE

## 2021-09-24 PROCEDURE — 82962 GLUCOSE BLOOD TEST: CPT

## 2021-09-24 PROCEDURE — 80048 BASIC METABOLIC PNL TOTAL CA: CPT | Performed by: ORTHOPAEDIC SURGERY

## 2021-09-24 PROCEDURE — 25010000002 MORPHINE PER 10 MG: Performed by: ORTHOPAEDIC SURGERY

## 2021-09-24 PROCEDURE — G0378 HOSPITAL OBSERVATION PER HR: HCPCS

## 2021-09-24 PROCEDURE — 63710000001 INSULIN LISPRO (HUMAN) PER 5 UNITS: Performed by: INTERNAL MEDICINE

## 2021-09-24 RX ORDER — LEVOFLOXACIN 750 MG/1
750 TABLET ORAL DAILY
Qty: 30 TABLET | Refills: 0 | Status: SHIPPED | OUTPATIENT
Start: 2021-09-24 | End: 2021-09-24 | Stop reason: SDUPTHER

## 2021-09-24 RX ORDER — ONDANSETRON 4 MG/1
4 TABLET, FILM COATED ORAL EVERY 8 HOURS PRN
Qty: 20 TABLET | Refills: 1 | Status: ON HOLD | OUTPATIENT
Start: 2021-09-24 | End: 2021-10-14

## 2021-09-24 RX ORDER — HYDROCODONE BITARTRATE AND ACETAMINOPHEN 10; 325 MG/1; MG/1
TABLET ORAL
Qty: 30 TABLET | Refills: 0 | Status: SHIPPED | OUTPATIENT
Start: 2021-09-24 | End: 2021-10-16 | Stop reason: HOSPADM

## 2021-09-24 RX ORDER — ASPIRIN 325 MG
325 TABLET ORAL DAILY
Start: 2021-09-24 | End: 2021-10-16 | Stop reason: HOSPADM

## 2021-09-24 RX ORDER — LEVOFLOXACIN 750 MG/1
750 TABLET ORAL DAILY
Qty: 30 TABLET | Refills: 0 | Status: ON HOLD | OUTPATIENT
Start: 2021-09-24 | End: 2021-10-14

## 2021-09-24 RX ADMIN — INSULIN LISPRO 2 UNITS: 100 INJECTION, SOLUTION INTRAVENOUS; SUBCUTANEOUS at 08:00

## 2021-09-24 RX ADMIN — HYDROCODONE BITARTRATE AND ACETAMINOPHEN 1 TABLET: 10; 325 TABLET ORAL at 00:45

## 2021-09-24 RX ADMIN — HYDROCODONE BITARTRATE AND ACETAMINOPHEN 1 TABLET: 10; 325 TABLET ORAL at 09:32

## 2021-09-24 RX ADMIN — DULOXETINE 60 MG: 30 CAPSULE, DELAYED RELEASE ORAL at 08:02

## 2021-09-24 RX ADMIN — POLYETHYLENE GLYCOL 3350 17 G: 17 POWDER, FOR SOLUTION ORAL at 08:02

## 2021-09-24 RX ADMIN — MORPHINE SULFATE 4 MG: 4 INJECTION INTRAVENOUS at 00:47

## 2021-09-24 RX ADMIN — PREGABALIN 75 MG: 75 CAPSULE ORAL at 08:02

## 2021-09-24 RX ADMIN — ONDANSETRON 4 MG: 2 INJECTION INTRAMUSCULAR; INTRAVENOUS at 08:01

## 2021-09-24 RX ADMIN — ASPIRIN 325 MG ORAL TABLET 325 MG: 325 PILL ORAL at 08:01

## 2021-09-24 NOTE — PLAN OF CARE
Goal Outcome Evaluation:  Patient doing well s/p revision of r bka, pain managed with prn medications, states of wanting to go home today, awaiting any orders

## 2021-09-24 NOTE — PROGRESS NOTES
Patient refused home health.  Benefits of home health explained, related to infection, incision care explained, antibiotics, fall safety, transfer training, ect.  Patient refused multiple times.   CM notified.

## 2021-09-24 NOTE — CASE MANAGEMENT/SOCIAL WORK
Continued Stay Note  [facility]     Patient Name: Maynor Gregg  MRN: [id number]  Today's Date: 9/24/2021    Admit Date: 9/23/2021    Discharge Plan     Row Name 09/24/21 1256       Plan    Plan  D/C Plan: Home with family.    Plan Comments  Patient discharged on oral abx. CM updated [facility] liaison.          Expected Discharge Date and Time     Expected Discharge Date Expected Discharge Time    Sep 24, 2021             Rebecca Reddy

## 2021-09-24 NOTE — CONSULTS
Requested by Dr. Alcantara      Infectious Diseases Consult Note    Referring Provider: Donn Alcantara MD    Reason for Consultation: Antibiotic advice    Patient Care Team:  Fred Lemons FNP as PCP - General (Family Medicine)    Chief complaint continuing wounds to the right BKA    Subjective     The patient has been afebrile for the last 24 hours.  The patient is on room air, hemodynamically stable, and is tolerating antimicrobial therapy.    History of present illness:     This is a 46-year-old male presents the hospital 9/23/2021 for a debridement of his right BKA.  Patient is aware that this intervention and antibiotics is likely to fail but wants to give it one more try before proceeding onto above-the-knee amputation.  Patient denies fever, chills, diaphoresis, shortness of breath, cough, GI symptoms, or any urinary symptoms.    Patient is currently on room air and does not appear to be in acute distress.  Patient's been afebrile since admission.  Patient has a creatinine of 0.83, white blood cell count 11.6, hemoglobin 12.4, platelets 228.  EKG is normal.  Currently cultures are pending and COVID-19 screen is negative.  Patient has been vaccinated for COVID-19.  X-ray of the tib-fib shows some lucency of the distal tibial and that could be result of surgery versus recurrent osteomyelitis.  Patient is currently on IV Levaquin and has no known antibiotic allergies.    Patient has a history of a left BKA in September 2020 and a right BKA in April 2020.  The history of diabetes, MI, CAD.  He is a former smoker denies any alcohol or illicit drug abuse.    Review of Systems   Review of Systems   Constitutional: Negative.    HENT: Negative.    Eyes: Negative.    Respiratory: Negative.    Cardiovascular: Negative.    Gastrointestinal: Negative.    Endocrine: Negative.    Genitourinary: Negative.    Musculoskeletal: Negative.    Skin: Positive for wound.   Neurological: Negative.    Psychiatric/Behavioral:  Negative.    All other systems reviewed and are negative.      Medications  Medications Prior to Admission   Medication Sig Dispense Refill Last Dose   • DULoxetine (CYMBALTA) 60 MG capsule Take 60 mg by mouth Daily. Take DOS   9/22/2021 at Unknown time   • insulin aspart (novoLOG) 100 UNIT/ML injection Inject  under the skin into the appropriate area as directed 3 (Three) Times a Day Before Meals. PER SS. BETWEEN 3-20 UNITS  Hold DOS      • Insulin Glargine (BASAGLAR KWIKPEN) 100 UNIT/ML injection pen Inject 12 Units under the skin into the appropriate area as directed Every Night.      • pantoprazole (PROTONIX) 40 MG EC tablet Take 40 mg by mouth Every Evening.   9/22/2021 at Unknown time   • aspirin 325 MG tablet Take 1 tablet by mouth Daily. Begin 48 hours after surgery.   Unknown at Unknown time   • ciprofloxacin (Cipro) 500 MG tablet Take 1 tablet by mouth 2 (Two) Times a Day. (Patient taking differently: Take 500 mg by mouth 2 (Two) Times a Day. Finished) 14 tablet 0    • HYDROcodone-acetaminophen (NORCO)  MG per tablet Take 1 tablet by mouth Every 6 (Six) Hours As Needed for Mild Pain  or Moderate Pain . (Patient taking differently: Take 1 tablet by mouth Every 6 (Six) Hours As Needed for Mild Pain  or Moderate Pain . No  Longer taking) 30 tablet 0    • pregabalin (Lyrica) 75 MG capsule Take 75 mg by mouth Every 8 (Eight) Hours. No longer taking          History  Past Medical History:   Diagnosis Date   • Anxiety    • Bone infection (CMS/HCC)     STATES CAUSED HIM TO LOSE RIGHT LEG   • CHF (congestive heart failure) (CMS/HCC)    • Coronary artery disease    • Depression    • Diabetes mellitus (CMS/HCC)     TYPE 1   • Gallstones    • GERD (gastroesophageal reflux disease)    • Headache    • History of amputation     PEDRO BELOW THE KNEE   • MI, old 2010    [facility] [place] [facility]   • Nodule of left lung     HAD CT SCAN BENIGN.    • Phantom limb pain (CMS/HCC)      LEFT AND RIGHT BOTH   • Seasonal allergies      Past Surgical History:   Procedure Laterality Date   • AMPUTATION DIGIT Right 2/28/2020    Procedure: PARTIAL FIRST RAY RESECTION RIGHT FOOT;  Surgeon: CROW Souza DPM;  Location: [facility] MAIN OR;  Service: Podiatry;  Laterality: Right;   • AMPUTATION FOOT / TOE     • AMPUTATION REVISION Right 3/2/2021    Procedure: AMPUTATION REVISION KNEE STUMP;  Surgeon: Donn Alcantara MD;  Location: [facility] MAIN OR;  Service: Orthopedics;  Laterality: Right;   • BELOW KNEE AMPUTATION Right 4/30/2020    Procedure: AMPUTATION BELOW KNEE;  Surgeon: Donn Alcantara MD;  Location: [facility] MAIN OR;  Service: Orthopedics;  Laterality: Right;   • BELOW KNEE AMPUTATION Left 9/2/2020    Procedure: AMPUTATION BELOW KNEE, left;  Surgeon: Donn Alcantara MD;  Location: [facility] MAIN OR;  Service: Orthopedics;  Laterality: Left;   • BELOW KNEE AMPUTATION Right 2/23/2021    Procedure: Revision of amputaion below knee;  Surgeon: Donn Alcantara MD;  Location: [facility] OR;  Service: Orthopedics;  Laterality: Right;   • CARDIAC CATHETERIZATION  11/2010     RCA artery   • CARDIAC CATHETERIZATION  2015    LAD artery   • CORONARY ANGIOPLASTY WITH STENT PLACEMENT      X2. 2015   • INCISION AND DRAINAGE LEG Left 1/21/2020    Procedure: INCISION AND DRAINAGE LOWER EXTREMITY with second digit amputation;  Surgeon: CROW Souza DPM;  Location: [facility] OR;  Service: Podiatry   • INCISION AND DRAINAGE LEG Right 4/28/2020    Procedure: incision and drainage ,transmetatarsal amputation, fasciotomy;  Surgeon: CROW Souza DPM;  Location: [facility] OR;  Service: Podiatry;  Laterality: Right;   • SKIN FULL THICKNESS GRAFT Right 7/29/2021    Procedure: EXCISION SOFT TISSUE Ulcer WITH FULL THICKNESS SKIN GRAFT to right lower leg.;  Surgeon: Sukhdeep Hernadez MD;  Location: [facility] OR;  Service: Plastics;  Laterality: Right;   • TOE AMPUTATION Left    • WOUND DEBRIDEMENT Right  1/21/2020    Procedure: DEBRIDEMENT with sesamoid excision;  Surgeon: CROW Souza DPM;  Location: [facility] MAIN OR;  Service: Podiatry       Family History  Family History   Problem Relation Age of Onset   • Diabetes Father    • Heart failure Father    • Diabetes Paternal Grandfather    • Malig Hyperthermia Neg Hx        Social History   reports that he quit smoking about 2 years ago. He has a 3.00 pack-year smoking history. He has never used smokeless tobacco. He reports that he does not drink alcohol and does not use drugs.    Allergies  Metformin and Oxycodone    Objective     Vital Signs   Vital Signs (last 24 hours)       09/23 0700  -  09/24 0659 09/24 0700  -  09/24 1258   Most Recent    Temp (°F) 97.3 -  97.9      97.6     97.6 (36.4)    Heart Rate 75 -  83      95     95    Resp 10 -  20      18     18    /71 -  157/70      126/73     126/73    SpO2 (%) 93 -  100      97     97          Physical Exam:  Physical Exam  Vitals and nursing note reviewed.   Constitutional:       General: He is not in acute distress.     Appearance: Normal appearance. He is well-developed and normal weight. He is not diaphoretic.   HENT:      Head: Normocephalic and atraumatic.   Eyes:      Conjunctiva/sclera: Conjunctivae normal.      Pupils: Pupils are equal, round, and reactive to light.   Cardiovascular:      Rate and Rhythm: Normal rate and regular rhythm.      Heart sounds: Normal heart sounds, S1 normal and S2 normal.   Pulmonary:      Effort: Pulmonary effort is normal. No respiratory distress.      Breath sounds: Normal breath sounds. No stridor. No wheezing or rales.   Abdominal:      General: Bowel sounds are normal. There is no distension.      Palpations: Abdomen is soft. There is no mass.      Tenderness: There is no abdominal tenderness. There is no guarding.   Musculoskeletal:         General: No deformity. Normal range of motion.      Cervical back: Neck supple.      Comments: Surgical dressing on right  BKA   Skin:     General: Skin is warm and dry.      Coloration: Skin is not pale.      Findings: No erythema or rash.   Neurological:      Mental Status: He is alert and oriented to person, place, and time.      Cranial Nerves: No cranial nerve deficit.   Psychiatric:         Mood and Affect: Mood normal.         Microbiology  Microbiology Results (last 10 days)     Procedure Component Value - Date/Time    Wound Culture - Wound, Knee, Right [[id number]] Collected: 09/23/21 1020    Lab Status: Preliminary result Specimen: Wound from Knee, Right Updated: 09/23/21 1151     Gram Stain Few (2+) WBCs per low power field      Rare (1+) Gram positive bacilli    MRSA Screen, PCR (Inpatient) - Swab, Nares [[id number]]  (Normal) Collected: 09/21/21 1057    Lab Status: Final result Specimen: Swab from Nares Updated: 09/21/21 1235     MRSA PCR No MRSA Detected    COVID PRE-OP / PRE-PROCEDURE SCREENING ORDER (NO ISOLATION) - Swab, Nasopharynx [[id number]]  (Normal) Collected: 09/21/21 1057    Lab Status: Final result Specimen: Swab from Nasopharynx Updated: 09/21/21 2043    Narrative:      The following orders were created for panel order COVID PRE-OP / PRE-PROCEDURE SCREENING ORDER (NO ISOLATION) - Swab, Nasopharynx.  Procedure                               Abnormality         Status                     ---------                               -----------         ------                     COVID-19,APTIMA PANTHER(...[[id number]]  Normal              Final result                 Please view results for these tests on the individual orders.    COVID-19,APTIMA PANTHER(TAMMY),[facility] JOSÉ, NP/OP SWAB IN UTM/VTM/SALINE TRANSPORT MEDIA,24 HR TAT - Swab, Nasopharynx [[id number]]  (Normal) Collected: 09/21/21 1057    Lab Status: Final result Specimen: Swab from Nasopharynx Updated: 09/21/21 2043     COVID19 Not Detected    Narrative:      Fact sheet for providers: https://www.fda.gov/media/452971/download     Fact sheet for patients:  https://www.fda.gov/media/923206/download    Test performed by RT PCR.          Laboratory  Results from last 7 days   Lab Units 09/24/21  0653   WBC 10*3/mm3 11.60*   HEMOGLOBIN g/dL 12.4*   HEMATOCRIT % 36.9*   PLATELETS 10*3/mm3 228     Results from last 7 days   Lab Units 09/24/21  0653   SODIUM mmol/L 136   POTASSIUM mmol/L 4.4   CHLORIDE mmol/L 103   CO2 mmol/L 24.0   BUN mg/dL 19   CREATININE mg/dL 0.83   GLUCOSE mg/dL 210*   CALCIUM mg/dL 7.8*     Results from last 7 days   Lab Units 09/24/21  0653   SODIUM mmol/L 136   POTASSIUM mmol/L 4.4   CHLORIDE mmol/L 103   CO2 mmol/L 24.0   BUN mg/dL 19   CREATININE mg/dL 0.83   GLUCOSE mg/dL 210*   CALCIUM mg/dL 7.8*                   Radiology  Imaging Results (Last 72 Hours)     Procedure Component Value Units Date/Time    XR Tibia Fibula 2 View Right [[id number]] Collected: 09/23/21 1414     Updated: 09/23/21 1419    Narrative:      DATE OF EXAM:  9/23/2021 2:02 PM     PROCEDURE:  XR TIBIA FIBULA 2 VW RIGHT-     INDICATIONS:  BKA; T87.43-Infection of amputation stump, right lower extremity     COMPARISON:  Knee radiograph 2/23/2021     TECHNIQUE:   2 views of the right tibia and fibula was/were obtained.     FINDINGS:  There is a below-the-knee amputation. There is been progressive  amputation of the fibula and tibia since previous study. There is soft  tissue gas. Some lucency at the resected end of the tibia is present.  I'm uncertain to the timing of the repeat amputation. It is possible  this lucency could be seen with recent procedure or residual  osteomyelitis. There is some soft tissue gas is suspected the amputation  site however overlying dressing slightly limits evaluation.       Impression:      Progressive amputation of the distal fibula and tibia. Some lucency at  the distal tibia is of uncertain etiology. What is stated that timing of  the amputation? It is been performed recently than this may be related  to some residual or recurrent osteomyelitis.  Alternatively, it may be  due to recent postoperative state. Attention on follow-up imaging is  recommended.     Electronically Signed By-Millicent Springer MD On:9/23/2021 2:17 PM  This report was finalized on [id number] by  Millicent Springer MD.          Cardiology      Results Review:  I have reviewed all clinical data, test, lab, and imaging results.       Schedule Meds  aspirin, 325 mg, Oral, Daily  DULoxetine, 60 mg, Oral, Daily  insulin glargine, 12 Units, Subcutaneous, Nightly  insulin lispro, 0-9 Units, Subcutaneous, TID AC  levoFLOXacin, 750 mg, Intravenous, Q24H  pantoprazole, 40 mg, Oral, Q PM  polyethylene glycol, 17 g, Oral, Daily  pregabalin, 75 mg, Oral, Q12H        Infusion Meds       PRN Meds  •  acetaminophen  •  dextrose  •  dextrose  •  glucagon (human recombinant)  •  HYDROcodone-acetaminophen  •  HYDROcodone-acetaminophen  •  insulin lispro **AND** insulin lispro  •  Morphine **AND** naloxone  •  ondansetron **OR** ondansetron  •  sodium chloride      Assessment/Plan       Assessment    Continued right BKA infection.  Patient originally had his BKA in September 2020 and has had multiple surgeries and a skin graft but continues to have nonhealing wounds.  Patient is aware bridget continued surgery and antibiotics are likely not going to resolve his issues but he wants to try one more time before proceeding to an AKA.  Patient is aware that long-term oral antibiotic use can lead to serious side effects.  -ID of the right BKA on 9/23/2021-cultures pending    History of left BKA in April 2020    Type 2 diabetes    Plan    Agree with keeping patient on Levaquin-can switch to oral 750 daily for 6 weeks  Discussed with patient about following with primary care provider for the final results of the current culture  Continue supportive care  Okay to discharge from Infectious Disease standpoint  Case discussed with ALESSIA Knight, KAMILLE  09/24/21  12:58 EDT

## 2021-09-24 NOTE — CASE MANAGEMENT/SOCIAL WORK
Case Management Discharge Note      Final Note: Home with family    Provided Post Acute Provider List?: Yes  Post Acute Provider List: Home Health  Delivered To: Support Person  Method of Delivery: Telephone    Selected Continued Care - Discharged on 9/24/2021 Admission date: 9/23/2021 - Discharge disposition: Home or Self Care        Final Discharge Disposition Code: 01 - home or self-care

## 2021-09-24 NOTE — PROGRESS NOTES
LOS: 1 day   Patient Care Team:  Fred Lemons FNP as PCP - General (Family Medicine)        Subjective     Postop day 1 from revision right BKA. Patient does not want to stay for culture results and PICC line as he says the PICC line dressings tear up his  skin. Pain moderate      Objective     Vital Signs  Vitals:    09/23/21 1656 09/23/21 2045 09/24/21 0040 09/24/21 0500   BP: 116/69 116/78 111/66 157/70   BP Location: Right arm  Right arm    Patient Position: Lying  Lying    Pulse: 80 79 78 75   Resp: 16 17 18 20   Temp: 97.7 °F (36.5 °C) 97.5 °F (36.4 °C) 97.5 °F (36.4 °C) 97.6 °F (36.4 °C)   TempSrc: Oral Oral     SpO2: 96% 94% 100% 96%   Weight:    101 kg (222 lb 0.8 oz)   Height:           Physical Exam:  Alert and oriented x3 sitting up in bed  Dressing clean dry and intact  Gram stain showed rare gram-positive bacilli     Results Review:     I reviewed the patient's new clinical results.  I reviewed the patient's new imaging results    Lab Results (last 24 hours)     Procedure Component Value Units Date/Time    POC Glucose Once [[id number]]  (Abnormal) Collected: 09/23/21 2047    Specimen: Blood Updated: 09/23/21 2048     Glucose 259 mg/dL      Comment: Serial Number: [id number]:  [id number]       Basic Metabolic Panel [[id number]]  (Abnormal) Collected: 09/23/21 1728    Specimen: Blood Updated: 09/23/21 1835     Glucose 235 mg/dL      BUN 20 mg/dL      Creatinine 0.84 mg/dL      Sodium 133 mmol/L      Potassium 4.6 mmol/L      Comment: Slight hemolysis detected by analyzer. Results may be affected.        Chloride 103 mmol/L      CO2 18.0 mmol/L      Calcium 7.8 mg/dL      eGFR Non African Amer 98 mL/min/1.73      BUN/Creatinine Ratio 23.8     Anion Gap 12.0 mmol/L     Narrative:      GFR Normal >60  Chronic Kidney Disease <60  Kidney Failure <15      Hemoglobin A1c [[id number]] Collected: 09/23/21 1728    Specimen: Blood Updated: 09/23/21 1750    POC Glucose Once [[id number]]  (Abnormal)  Collected: 09/23/21 1700    Specimen: Blood Updated: 09/23/21 1703     Glucose 201 mg/dL      Comment: Serial Number: [id number]:  [id number]       POC Glucose Once [[id number]]  (Abnormal) Collected: 09/23/21 1514    Specimen: Blood Updated: 09/23/21 1515     Glucose 223 mg/dL      Comment: Serial Number: [id number]:  [id number]       Tissue Pathology Exam [[id number]] Collected: 09/23/21 1028    Specimen: Bone from Knee, Left Updated: 09/23/21 1412    Wound Culture - Wound, Knee, Right [[id number]] Collected: 09/23/21 1020    Specimen: Wound from Knee, Right Updated: 09/23/21 1151     Gram Stain Few (2+) WBCs per low power field      Rare (1+) Gram positive bacilli    POC Glucose Once [[id number]]  (Abnormal) Collected: 09/23/21 1141    Specimen: Blood Updated: 09/23/21 1143     Glucose 155 mg/dL      Comment: Serial Number: [id number]:  [id number]           Imaging Results (Last 24 Hours)     Procedure Component Value Units Date/Time    XR Tibia Fibula 2 View Right [[id number]] Collected: 09/23/21 1414     Updated: 09/23/21 1419    Narrative:      DATE OF EXAM:  9/23/2021 2:02 PM     PROCEDURE:  XR TIBIA FIBULA 2 VW RIGHT-     INDICATIONS:  BKA; T87.43-Infection of amputation stump, right lower extremity     COMPARISON:  Knee radiograph 2/23/2021     TECHNIQUE:   2 views of the right tibia and fibula was/were obtained.     FINDINGS:  There is a below-the-knee amputation. There is been progressive  amputation of the fibula and tibia since previous study. There is soft  tissue gas. Some lucency at the resected end of the tibia is present.  I'm uncertain to the timing of the repeat amputation. It is possible  this lucency could be seen with recent procedure or residual  osteomyelitis. There is some soft tissue gas is suspected the amputation  site however overlying dressing slightly limits evaluation.       Impression:      Progressive amputation of the distal fibula and tibia. Some lucency at  the  distal tibia is of uncertain etiology. What is stated that timing of  the amputation? It is been performed recently than this may be related  to some residual or recurrent osteomyelitis. Alternatively, it may be  due to recent postoperative state. Attention on follow-up imaging is  recommended.     Electronically Signed By-Millicent Springer MD On:9/23/2021 2:17 PM  This report was finalized on [id number] by  Millicent Springer MD.            Medication Review:   Scheduled Meds:aspirin, 325 mg, Oral, Daily  DULoxetine, 60 mg, Oral, Daily  insulin glargine, 12 Units, Subcutaneous, Nightly  insulin lispro, 0-9 Units, Subcutaneous, TID AC  levoFLOXacin, 750 mg, Intravenous, Q24H  pantoprazole, 40 mg, Oral, Q PM  polyethylene glycol, 17 g, Oral, Daily  pregabalin, 75 mg, Oral, Q12H      Continuous Infusions:sodium chloride, 125 mL/hr, Last Rate: 125 mL/hr (09/23/21 2791)      PRN Meds:.•  acetaminophen  •  dextrose  •  dextrose  •  glucagon (human recombinant)  •  HYDROcodone-acetaminophen  •  HYDROcodone-acetaminophen  •  insulin lispro **AND** insulin lispro  •  Morphine **AND** naloxone  •  ondansetron **OR** ondansetron  •  sodium chloride      Assessment/Plan     Doing satisfactorily from right BKA revision    Plan for disposition: We will discharge home today per patient's request. We will plan on placing on Levaquin but await infectious disease input for recommendations for oral antibiotics since patient does not want IV antibiotics.  We will see back in 2 weeks for suture removal.  May remove dressing and shower in 7 days    Donn Alcantara MD  09/24/21  07:08 EDT

## 2021-09-24 NOTE — PROGRESS NOTES
LOS: 1 day   Patient Care Team:  Fred Lemons FNP as PCP - General (Family Medicine)    Subjective     Interval History:    Patient Complaints: mild nausea; pain is controlled    History taken from: patient    Review of Systems   Constitutional: Positive for activity change. Negative for appetite change and fever.   HENT: Negative for facial swelling.    Eyes: Negative for visual disturbance.   Respiratory: Negative for cough, shortness of breath, wheezing and stridor.    Cardiovascular: Negative for palpitations and leg swelling.   Gastrointestinal: Positive for nausea. Negative for vomiting.   Endocrine: Negative for polyuria.   Genitourinary: Negative for enuresis.   Musculoskeletal: Positive for arthralgias.   Skin: Negative for rash.   Neurological: Negative for headaches.   Psychiatric/Behavioral: Negative for confusion.           Objective     Vital Signs  Temp:  [97.5 °F (36.4 °C)-97.9 °F (36.6 °C)] 97.6 °F (36.4 °C)  Heart Rate:  [75-95] 95  Resp:  [13-20] 18  BP: (107-157)/(66-78) 126/73    Physical Exam:     General Appearance:    Alert, cooperative, in no acute distress,   Head:    Normocephalic, without obvious abnormality, atraumatic   Eyes:            Lids and lashes normal, conjunctivae and sclerae normal, no   icterus, no pallor, corneas clear, PERRLA   Ears:    Ears appear intact with no abnormalities noted   Throat:   No oral lesions, no thrush, oral mucosa moist   Neck:   No adenopathy, supple, trachea midline, no thyromegaly, no   carotid bruit, no JVD   Lungs:     Clear to auscultation,respirations regular, even and                  unlabored    Heart:    Regular rhythm and normal rate, normal S1 and S2, no            murmur, no gallop, no rub, no click   Chest Wall:    No abnormalities observed   Abdomen:     Normal bowel sounds, no masses, no organomegaly, soft        Non-tender non-distended, no guarding,   Extremities:  Left BKA -well-healed; right BKA -dressing intact   Pulses:    Pulses palpable and equal bilaterally   Skin:   No bleeding, bruising or rash   Lymph nodes:   No palpable adenopathy   Neurologic:   Cranial nerves 2 - 12 grossly intact, sensation intact, DTR       present and equal bilaterally        Results Review:    Lab Results (last 24 hours)     Procedure Component Value Units Date/Time    Hemoglobin A1c [[id number]]  (Abnormal) Collected: 09/23/21 1728    Specimen: Blood Updated: 09/24/21 1101     Hemoglobin A1C 11.4 %     Narrative:      Hemoglobin A1C Reference Range:    <5.7 %        Normal  5.7-6.4 %     Increased risk for diabetes  > 6.4 %        Diabetes       These guidelines have been recommended by the American Diabetic Association for Hgb A1c.      The following 2010 guidelines have been recommended by the American Diabetes Association for Hemoglobin A1c.    HBA1c 5.7-6.4% Increased risk for future diabetes (pre-diabetes)  HBA1c     >6.4% Diabetes      Basic Metabolic Panel [[id number]]  (Abnormal) Collected: 09/24/21 0653    Specimen: Blood Updated: 09/24/21 0815     Glucose 210 mg/dL      BUN 19 mg/dL      Creatinine 0.83 mg/dL      Sodium 136 mmol/L      Potassium 4.4 mmol/L      Comment: Slight hemolysis detected by analyzer. Results may be affected.        Chloride 103 mmol/L      CO2 24.0 mmol/L      Calcium 7.8 mg/dL      eGFR Non African Amer 100 mL/min/1.73      BUN/Creatinine Ratio 22.9     Anion Gap 9.0 mmol/L     Narrative:      GFR Normal >60  Chronic Kidney Disease <60  Kidney Failure <15      CBC & Differential [[id number]]  (Abnormal) Collected: 09/24/21 0653    Specimen: Blood Updated: 09/24/21 0751    Narrative:      The following orders were created for panel order CBC & Differential.  Procedure                               Abnormality         Status                     ---------                               -----------         ------                     CBC Auto Differential[[id number]]        Abnormal            Final result                  Please view results for these tests on the individual orders.    CBC Auto Differential [[id number]]  (Abnormal) Collected: 09/24/21 0653    Specimen: Blood Updated: 09/24/21 0751     WBC 11.60 10*3/mm3      RBC 4.23 10*6/mm3      Hemoglobin 12.4 g/dL      Hematocrit 36.9 %      MCV 87.2 fL      MCH 29.3 pg      MCHC 33.6 g/dL      RDW 13.3 %      RDW-SD 40.3 fl      MPV 8.5 fL      Platelets 228 10*3/mm3      Neutrophil % 73.1 %      Lymphocyte % 18.9 %      Monocyte % 7.7 %      Eosinophil % 0.1 %      Basophil % 0.2 %      Neutrophils, Absolute 8.50 10*3/mm3      Lymphocytes, Absolute 2.20 10*3/mm3      Monocytes, Absolute 0.90 10*3/mm3      Eosinophils, Absolute 0.00 10*3/mm3      Basophils, Absolute 0.00 10*3/mm3      nRBC 0.1 /100 WBC     POC Glucose Once [[id number]]  (Abnormal) Collected: 09/24/21 0730    Specimen: Blood Updated: 09/24/21 0731     Glucose 194 mg/dL      Comment: Serial Number: [id number]:  [id number]       POC Glucose Once [[id number]]  (Abnormal) Collected: 09/23/21 2047    Specimen: Blood Updated: 09/23/21 2048     Glucose 259 mg/dL      Comment: Serial Number: [id number]:  [id number]       Basic Metabolic Panel [[id number]]  (Abnormal) Collected: 09/23/21 1728    Specimen: Blood Updated: 09/23/21 1835     Glucose 235 mg/dL      BUN 20 mg/dL      Creatinine 0.84 mg/dL      Sodium 133 mmol/L      Potassium 4.6 mmol/L      Comment: Slight hemolysis detected by analyzer. Results may be affected.        Chloride 103 mmol/L      CO2 18.0 mmol/L      Calcium 7.8 mg/dL      eGFR Non African Amer 98 mL/min/1.73      BUN/Creatinine Ratio 23.8     Anion Gap 12.0 mmol/L     Narrative:      GFR Normal >60  Chronic Kidney Disease <60  Kidney Failure <15             Imaging Results (Last 24 Hours)     ** No results found for the last 24 hours. **               I reviewed the patient's new clinical results.    Medication Review:   Scheduled Meds:aspirin, 325 mg, Oral, Daily  DULoxetine, 60 mg,  Oral, Daily  insulin glargine, 12 Units, Subcutaneous, Nightly  insulin lispro, 0-9 Units, Subcutaneous, TID AC  levoFLOXacin, 750 mg, Intravenous, Q24H  pantoprazole, 40 mg, Oral, Q PM  polyethylene glycol, 17 g, Oral, Daily  pregabalin, 75 mg, Oral, Q12H      Continuous Infusions:   PRN Meds:.•  acetaminophen  •  dextrose  •  dextrose  •  glucagon (human recombinant)  •  HYDROcodone-acetaminophen  •  HYDROcodone-acetaminophen  •  insulin lispro **AND** insulin lispro  •  Morphine **AND** naloxone  •  ondansetron **OR** ondansetron  •  sodium chloride     Assessment/Plan       Infection of right below knee amputation (CMS/HCC)    Infection of amputation stump of right lower extremity (HCC)  Nausea -as needed Zofran while on antibiotics  Poorly controlled type 1 diabetes -blood sugar should improve with treatment of underlying infection however he will need to follow-up closely with his endocrinologist and PCP as an outpatient to further adjust insulin regimen to achieve better glucose control.  We will not make any insulin adjustments currently as his oral intake is decreased from his baseline.  Diabetic neuropathy -continue duloxetine and Lyrica        Plan for disposition: Okay from my perspective to discharge home.    Guera Contreras MD  09/24/21  13:10 EDT

## 2021-09-25 ENCOUNTER — READMISSION MANAGEMENT (OUTPATIENT)
Dept: CALL CENTER | Facility: HOSPITAL | Age: 46
End: 2021-09-25

## 2021-09-25 NOTE — OUTREACH NOTE
Prep Survey      Responses   [facility] discharged from?  [facility]   Is LACE score < 7 ?  No   Emergency Room discharge w/ pulse ox?  No   Eligibility  Readm Mgmt   Discharge diagnosis   debridement of his right BKA.    Does the patient have one of the following disease processes/diagnoses(primary or secondary)?  Other   Does the patient have Home health ordered?  Yes   What is the Home health agency?   [facility]    Is there a DME ordered?  No   Prep survey completed?  Yes          Brooklyn Bonner RN

## 2021-09-26 LAB
BACTERIA SPEC AEROBE CULT: NORMAL
GRAM STN SPEC: NORMAL
GRAM STN SPEC: NORMAL

## 2021-09-26 NOTE — DISCHARGE SUMMARY
Date of admission: 9/23/2021    Date of Discharge:  9/26/2021    Discharge Diagnosis: Right BKA wound infection    Procedures Performed    Procedure(s):  RIGHT BELOW KNEE AMPUTATION REVISION  -------------------       Presenting Problem/History of Present Illness  Active Hospital Problems    Diagnosis  POA   • Infection of amputation stump of right lower extremity (HCC) [T87.43]  Yes   • Infection of right below knee amputation (CMS/HCC) [T87.43]  Yes      Resolved Hospital Problems   No resolved problems to display.          Hospital Course  Patient is a 46 y.o. male presented with right BKA status post several revisions including treatment by plastic surgery.  Continued having a anterior skin area which would not heal.  Is decided to try to shorten his stump some and try to get better coverage that way.  He realized that this failed he would have to have a above-knee amputation.        Consults:   Consults     Date and Time Order Name Status Description    9/23/2021  3:12 PM Inpatient Infectious Diseases Consult Completed           Pertinent Test Results:     Condition on Discharge:  Stable            Discharge Disposition  Patient decided not to have IV antibiotics and would treat with p.o. only therefore was placed on Levaquin for 6 weeks    Discharge Medications     Discharge Medications      New Medications      Instructions Start Date   levoFLOXacin 750 MG tablet  Commonly known as: Levaquin   750 mg, Oral, Daily      ondansetron 4 MG tablet  Commonly known as: Zofran   4 mg, Oral, Every 8 Hours PRN         Changes to Medications      Instructions Start Date   aspirin 325 MG tablet  What changed: Another medication with the same name was added. Make sure you understand how and when to take each.   325 mg, Oral, Daily, Begin 48 hours after surgery.      aspirin 325 MG tablet  What changed: You were already taking a medication with the same name, and this prescription was added. Make sure you understand how and  when to take each.   325 mg, Oral, Daily      HYDROcodone-acetaminophen  MG per tablet  Commonly known as: Norco  What changed:   · how much to take  · how to take this  · when to take this  · reasons to take this  · additional instructions   1 or 2 tablets p.o. every 4 hours as needed         Continue These Medications      Instructions Start Date   BASAGLAR KWIKPEN 100 UNIT/ML injection pen   12 Units, Subcutaneous, Nightly      DULoxetine 60 MG capsule  Commonly known as: CYMBALTA   60 mg, Oral, Daily, Take DOS      insulin aspart 100 UNIT/ML injection  Commonly known as: novoLOG   Subcutaneous, 3 Times Daily Before Meals, PER SS. BETWEEN 3-20 UNITS Hold DOS      Lyrica 75 MG capsule  Generic drug: pregabalin   75 mg, Oral, Every 8 Hours Scheduled, No longer taking      pantoprazole 40 MG EC tablet  Commonly known as: PROTONIX   40 mg, Oral, Every Evening         Stop These Medications    ciprofloxacin 500 MG tablet  Commonly known as: Cipro            Discharge Diet:  Diabetic    Activity at Discharge: Dressing on for 5 days then may change.  Do not get wet for 7 days    Follow-up Appointments  No future appointments.  Additional Instructions for the Follow-ups that You Need to Schedule     Ambulatory Referral to Home Health   As directed      Face to Face Visit Date: 9/23/2021    Follow-up provider for Plan of Care?: I treated the patient in an acute care facility and will not continue treatment after discharge.    Follow-up provider: GUTIERREZ LARA [[id number]]    Reason/Clinical Findings: s/p R BKA revision, IV abx    Describe mobility limitations that make leaving home difficult: s/p R BKA revision, IV abx    Nursing/Therapeutic Services Requested: Skilled Nursing    Skilled nursing orders: PICC line care/instruction Infusion therapy    Frequency: 1 Week 1         Discharge Follow-up with Specialty: Dr. Alcantara; 2 Weeks   As directed      Specialty: Dr. Alcantara    Follow Up: 2 Weeks               Test Results  Pending at Discharge  Pending Labs     Order Current Status    Tissue Pathology Exam In process    Anaerobic Culture - Wound, Knee, Right Preliminary result           Donn Alcantara MD  09/26/21  10:42 EDT

## 2021-09-27 LAB
LAB AP CASE REPORT: NORMAL
PATH REPORT.FINAL DX SPEC: NORMAL
PATH REPORT.GROSS SPEC: NORMAL

## 2021-09-28 ENCOUNTER — READMISSION MANAGEMENT (OUTPATIENT)
Dept: CALL CENTER | Facility: HOSPITAL | Age: 46
End: 2021-09-28

## 2021-09-28 LAB — BACTERIA SPEC ANAEROBE CULT: NORMAL

## 2021-09-28 NOTE — OUTREACH NOTE
Medical Week 1 Survey      Responses   [facility] patient discharged from?  [facility]   Does the patient have one of the following disease processes/diagnoses(primary or secondary)?  Other   Week 1 attempt successful?  Yes   Call start time  1616   Call end time  1618   Discharge diagnosis   debridement of his right BKA.    Meds reviewed with patient/caregiver?  Yes   Is the patient having any side effects they believe may be caused by any medication additions or changes?  No   Does the patient have all medications ordered at discharge?  Yes   Is the patient taking all medications as directed (includes completed medication regime)?  Yes   Does the patient have a primary care provider?   Yes   Does the patient have an appointment with their PCP within 7 days of discharge?  Yes   Has the patient kept scheduled appointments due by today?  N/A   Psychosocial issues?  No   What is the patient's perception of their health status since discharge?  Improving   Additional teach back comments  Call was brief and was not engaged.  State he is doing fine.  No need for home health and he has medication and appts.   Week 1 call completed?  Yes   Wrap up additional comments  Deneis quesitons or needs at thsi time          Lora Mcclain LPN

## 2021-10-06 ENCOUNTER — READMISSION MANAGEMENT (OUTPATIENT)
Dept: CALL CENTER | Facility: HOSPITAL | Age: 46
End: 2021-10-06

## 2021-10-06 NOTE — OUTREACH NOTE
Medical Week 2 Survey      Responses   [facility] patient discharged from?  [facility]   Does the patient have one of the following disease processes/diagnoses(primary or secondary)?  Other   Week 2 attempt successful?  Yes   Call start time  1451   Call end time  1453   Meds reviewed with patient/caregiver?  Yes   Is the patient taking all medications as directed (includes completed medication regime)?  Yes   Medication comments  antibiotics addend yesterday   Has the patient kept scheduled appointments due by today?  Yes   What is the patient's perception of their health status since discharge?  Improving   Week 2 Call Completed?  Yes   Wrap up additional comments  Marylu any questions, no new issues, with get sutures out next week, no s/s infections he states          Bharti Johnson, RN

## 2021-10-12 ENCOUNTER — LAB (OUTPATIENT)
Dept: LAB | Facility: HOSPITAL | Age: 46
End: 2021-10-12

## 2021-10-12 LAB
ABO GROUP BLD: NORMAL
ANION GAP SERPL CALCULATED.3IONS-SCNC: 8.7 MMOL/L (ref 5–15)
BLD GP AB SCN SERPL QL: NEGATIVE
BUN SERPL-MCNC: 10 MG/DL (ref 6–20)
BUN/CREAT SERPL: 9.5 (ref 7–25)
CALCIUM SPEC-SCNC: 8.5 MG/DL (ref 8.6–10.5)
CHLORIDE SERPL-SCNC: 103 MMOL/L (ref 98–107)
CO2 SERPL-SCNC: 25.3 MMOL/L (ref 22–29)
CREAT SERPL-MCNC: 1.05 MG/DL (ref 0.76–1.27)
DEPRECATED RDW RBC AUTO: 38.4 FL (ref 37–54)
ERYTHROCYTE [DISTWIDTH] IN BLOOD BY AUTOMATED COUNT: 11.9 % (ref 12.3–15.4)
GFR SERPL CREATININE-BSD FRML MDRD: 76 ML/MIN/1.73
GLUCOSE SERPL-MCNC: 321 MG/DL (ref 65–99)
HCT VFR BLD AUTO: 43.2 % (ref 37.5–51)
HGB BLD-MCNC: 13.8 G/DL (ref 13–17.7)
MCH RBC QN AUTO: 28.5 PG (ref 26.6–33)
MCHC RBC AUTO-ENTMCNC: 31.9 G/DL (ref 31.5–35.7)
MCV RBC AUTO: 89.1 FL (ref 79–97)
PLATELET # BLD AUTO: 263 10*3/MM3 (ref 140–450)
PMV BLD AUTO: 11.1 FL (ref 6–12)
POTASSIUM SERPL-SCNC: 4.4 MMOL/L (ref 3.5–5.2)
RBC # BLD AUTO: 4.85 10*6/MM3 (ref 4.14–5.8)
RH BLD: POSITIVE
SODIUM SERPL-SCNC: 137 MMOL/L (ref 136–145)
T&S EXPIRATION DATE: NORMAL
WBC # BLD AUTO: 6.01 10*3/MM3 (ref 3.4–10.8)

## 2021-10-12 PROCEDURE — 85027 COMPLETE CBC AUTOMATED: CPT | Performed by: ORTHOPAEDIC SURGERY

## 2021-10-12 PROCEDURE — 86901 BLOOD TYPING SEROLOGIC RH(D): CPT | Performed by: ORTHOPAEDIC SURGERY

## 2021-10-12 PROCEDURE — U0004 COV-19 TEST NON-CDC HGH THRU: HCPCS

## 2021-10-12 PROCEDURE — C9803 HOPD COVID-19 SPEC COLLECT: HCPCS

## 2021-10-12 PROCEDURE — 86900 BLOOD TYPING SEROLOGIC ABO: CPT | Performed by: ORTHOPAEDIC SURGERY

## 2021-10-12 PROCEDURE — 80048 BASIC METABOLIC PNL TOTAL CA: CPT | Performed by: ORTHOPAEDIC SURGERY

## 2021-10-12 PROCEDURE — 86850 RBC ANTIBODY SCREEN: CPT | Performed by: ORTHOPAEDIC SURGERY

## 2021-10-12 PROCEDURE — 36415 COLL VENOUS BLD VENIPUNCTURE: CPT | Performed by: ORTHOPAEDIC SURGERY

## 2021-10-13 ENCOUNTER — ANESTHESIA EVENT (OUTPATIENT)
Dept: PERIOP | Facility: HOSPITAL | Age: 46
End: 2021-10-13

## 2021-10-13 LAB — SARS-COV-2 ORF1AB RESP QL NAA+PROBE: NOT DETECTED

## 2021-10-13 NOTE — PAT
Pt blood sugar is 321.  He said he forgot to take his insulin that day.  Advised him to cut out carbs  And sugars, will re check DOS.

## 2021-10-13 NOTE — ANESTHESIA PREPROCEDURE EVALUATION
Anesthesia Evaluation     Patient summary reviewed and Nursing notes reviewed   no history of anesthetic complications:  NPO Solid Status: > 8 hours  NPO Liquid Status: > 8 hours           Airway   Dental      Pulmonary    (+) a smoker Former, shortness of breath,   Cardiovascular     ECG reviewed  PT is on anticoagulation therapy    (+) valvular problems/murmurs MR and TI, past MI , CAD, cardiac stents CHF ,       Neuro/Psych  (+) headaches, numbness, psychiatric history Anxiety and Depression,     GI/Hepatic/Renal/Endo    (+)  GERD,  diabetes mellitus,     Musculoskeletal     Abdominal    Substance History      OB/GYN          Other        ROS/Med Hx Other: BKA infection, hyperglycemia, neuropathy, osteomyelitis, h/o sepsis, N/V, hypotension, chest pain, N/V, phantom limb pain, gallstones, allergies, lung nodule    Echocardiogram Findings    Left Ventricle Left ventricular systolic function is normal. Calculated EF = 65.0%. Estimated EF appears to be in the range of 61 - 65%. Normal left ventricular cavity size noted.  Right Ventricle Normal right ventricular cavity size noted.  Left Atrium Normal left atrial size noted.  Right Atrium Normal right atrial size noted.  Aortic Valve The aortic valve is grossly normal in structure.  Mitral Valve Mild mitral valve regurgitation is present.  Tricuspid Valve The tricuspid valve is grossly normal.  Mild tricuspid valve regurgitation is present.  Pulmonic Valve The pulmonic valve is not well visualized.  Greater Vessels No dilation of the aortic root is present.  Pericardium There is no evidence of pericardial effusion.    PSH  AMPUTATION FOOT / TOE TOE AMPUTATION  WOUND DEBRIDEMENT INCISION AND DRAINAGE LEG  CARDIAC CATHETERIZATION CARDIAC CATHETERIZATION  AMPUTATION DIGIT INCISION AND DRAINAGE LEG  BELOW KNEE AMPUTATION BELOW KNEE AMPUTATION  BELOW KNEE AMPUTATION AMPUTATION REVISION  CORONARY ANGIOPLASTY WITH STENT PLACEMENT SKIN FULL THICKNESS GRAFT  AMPUTATION  REVISION                   Anesthesia Plan    ASA 4     general with block   (Patient identified; pre-operative vital signs, all relevant labs/studies, complete medical/surgical/anesthetic history, full medication list, full allergy list, and NPO status obtained/reviewed; physical assessment performed; anesthetic options, side effects, potential complications, risks, and benefits discussed; questions answered; written anesthesia consent obtained; patient cleared for procedure; anesthesia machine and equipment checked and functioning  )  intravenous induction     Anesthetic plan, all risks, benefits, and alternatives have been provided, discussed and informed consent has been obtained with: patient.    Plan discussed with CRNA and CAA.

## 2021-10-14 ENCOUNTER — APPOINTMENT (OUTPATIENT)
Dept: GENERAL RADIOLOGY | Facility: HOSPITAL | Age: 46
End: 2021-10-14

## 2021-10-14 ENCOUNTER — HOSPITAL ENCOUNTER (INPATIENT)
Facility: HOSPITAL | Age: 46
LOS: 1 days | Discharge: HOME HEALTH | End: 2021-10-16
Attending: ORTHOPAEDIC SURGERY | Admitting: ORTHOPAEDIC SURGERY

## 2021-10-14 ENCOUNTER — ANESTHESIA (OUTPATIENT)
Dept: PERIOP | Facility: HOSPITAL | Age: 46
End: 2021-10-14

## 2021-10-14 DIAGNOSIS — T87.43 INFECTION OF RIGHT BELOW KNEE AMPUTATION (HCC): Primary | ICD-10-CM

## 2021-10-14 DIAGNOSIS — M86.661 OSTEOMYELITIS, CHRONIC, LOWER LEG, RIGHT (HCC): ICD-10-CM

## 2021-10-14 PROBLEM — K21.9 GERD (GASTROESOPHAGEAL REFLUX DISEASE): Chronic | Status: ACTIVE | Noted: 2021-10-14

## 2021-10-14 PROBLEM — G89.29 CHRONIC PAIN: Chronic | Status: ACTIVE | Noted: 2021-10-14

## 2021-10-14 LAB
ANION GAP SERPL CALCULATED.3IONS-SCNC: 14 MMOL/L (ref 5–15)
BUN SERPL-MCNC: 21 MG/DL (ref 6–20)
BUN/CREAT SERPL: 25.9 (ref 7–25)
CALCIUM SPEC-SCNC: 8 MG/DL (ref 8.6–10.5)
CHLORIDE SERPL-SCNC: 103 MMOL/L (ref 98–107)
CO2 SERPL-SCNC: 22 MMOL/L (ref 22–29)
CREAT SERPL-MCNC: 0.81 MG/DL (ref 0.76–1.27)
GFR SERPL CREATININE-BSD FRML MDRD: 103 ML/MIN/1.73
GLUCOSE BLDC GLUCOMTR-MCNC: 186 MG/DL (ref 70–105)
GLUCOSE BLDC GLUCOMTR-MCNC: 192 MG/DL (ref 70–105)
GLUCOSE BLDC GLUCOMTR-MCNC: 225 MG/DL (ref 70–105)
GLUCOSE SERPL-MCNC: 239 MG/DL (ref 65–99)
POTASSIUM SERPL-SCNC: 4.2 MMOL/L (ref 3.5–5.2)
SODIUM SERPL-SCNC: 139 MMOL/L (ref 136–145)

## 2021-10-14 PROCEDURE — 25010000002 MORPHINE PER 10 MG: Performed by: ORTHOPAEDIC SURGERY

## 2021-10-14 PROCEDURE — G0378 HOSPITAL OBSERVATION PER HR: HCPCS

## 2021-10-14 PROCEDURE — 80048 BASIC METABOLIC PNL TOTAL CA: CPT | Performed by: ORTHOPAEDIC SURGERY

## 2021-10-14 PROCEDURE — 25010000002 ONDANSETRON PER 1 MG: Performed by: ORTHOPAEDIC SURGERY

## 2021-10-14 PROCEDURE — 76942 ECHO GUIDE FOR BIOPSY: CPT | Performed by: ORTHOPAEDIC SURGERY

## 2021-10-14 PROCEDURE — 87176 TISSUE HOMOGENIZATION CULTR: CPT | Performed by: ORTHOPAEDIC SURGERY

## 2021-10-14 PROCEDURE — 0Y6C0Z3 DETACHMENT AT RIGHT UPPER LEG, LOW, OPEN APPROACH: ICD-10-PCS | Performed by: ORTHOPAEDIC SURGERY

## 2021-10-14 PROCEDURE — 87070 CULTURE OTHR SPECIMN AEROBIC: CPT | Performed by: ORTHOPAEDIC SURGERY

## 2021-10-14 PROCEDURE — 63710000001 INSULIN GLARGINE PER 5 UNITS: Performed by: PHYSICIAN ASSISTANT

## 2021-10-14 PROCEDURE — 25010000002 ROPIVACAINE PER 1 MG: Performed by: ANESTHESIOLOGY

## 2021-10-14 PROCEDURE — 63710000001 PROMETHAZINE PER 25 MG: Performed by: ORTHOPAEDIC SURGERY

## 2021-10-14 PROCEDURE — 87205 SMEAR GRAM STAIN: CPT | Performed by: ORTHOPAEDIC SURGERY

## 2021-10-14 PROCEDURE — 99222 1ST HOSP IP/OBS MODERATE 55: CPT | Performed by: INTERNAL MEDICINE

## 2021-10-14 PROCEDURE — 82962 GLUCOSE BLOOD TEST: CPT

## 2021-10-14 PROCEDURE — 27590 AMPUTATE LEG AT THIGH: CPT | Performed by: NURSE PRACTITIONER

## 2021-10-14 PROCEDURE — L1830 KO IMMOB CANVAS LONG PRE OTS: HCPCS | Performed by: ORTHOPAEDIC SURGERY

## 2021-10-14 PROCEDURE — 63710000001 INSULIN LISPRO (HUMAN) PER 5 UNITS: Performed by: PHYSICIAN ASSISTANT

## 2021-10-14 PROCEDURE — 25010000002 FENTANYL CITRATE (PF) 100 MCG/2ML SOLUTION: Performed by: NURSE ANESTHETIST, CERTIFIED REGISTERED

## 2021-10-14 PROCEDURE — 25010000002 PROPOFOL 10 MG/ML EMULSION: Performed by: NURSE ANESTHETIST, CERTIFIED REGISTERED

## 2021-10-14 PROCEDURE — 25010000002 DEXAMETHASONE PER 1 MG: Performed by: NURSE ANESTHETIST, CERTIFIED REGISTERED

## 2021-10-14 PROCEDURE — 25010000002 MIDAZOLAM PER 1 MG: Performed by: ANESTHESIOLOGY

## 2021-10-14 PROCEDURE — 88307 TISSUE EXAM BY PATHOLOGIST: CPT | Performed by: ORTHOPAEDIC SURGERY

## 2021-10-14 PROCEDURE — 25010000002 HYDROMORPHONE PER 4 MG: Performed by: NURSE ANESTHETIST, CERTIFIED REGISTERED

## 2021-10-14 PROCEDURE — 25010000002 PROCHLORPERAZINE 10 MG/2ML SOLUTION: Performed by: NURSE PRACTITIONER

## 2021-10-14 PROCEDURE — 73552 X-RAY EXAM OF FEMUR 2/>: CPT

## 2021-10-14 PROCEDURE — 25010000002 ONDANSETRON PER 1 MG: Performed by: NURSE ANESTHETIST, CERTIFIED REGISTERED

## 2021-10-14 PROCEDURE — 87075 CULTR BACTERIA EXCEPT BLOOD: CPT | Performed by: ORTHOPAEDIC SURGERY

## 2021-10-14 PROCEDURE — 25010000002 CEFTRIAXONE PER 250 MG: Performed by: ORTHOPAEDIC SURGERY

## 2021-10-14 RX ORDER — DEXAMETHASONE SODIUM PHOSPHATE 4 MG/ML
INJECTION, SOLUTION INTRA-ARTICULAR; INTRALESIONAL; INTRAMUSCULAR; INTRAVENOUS; SOFT TISSUE AS NEEDED
Status: DISCONTINUED | OUTPATIENT
Start: 2021-10-14 | End: 2021-10-14 | Stop reason: SURG

## 2021-10-14 RX ORDER — PROPOFOL 10 MG/ML
VIAL (ML) INTRAVENOUS AS NEEDED
Status: DISCONTINUED | OUTPATIENT
Start: 2021-10-14 | End: 2021-10-14 | Stop reason: SURG

## 2021-10-14 RX ORDER — PROMETHAZINE HYDROCHLORIDE 25 MG/1
25 TABLET ORAL ONCE AS NEEDED
Status: DISCONTINUED | OUTPATIENT
Start: 2021-10-14 | End: 2021-10-14 | Stop reason: HOSPADM

## 2021-10-14 RX ORDER — LABETALOL HYDROCHLORIDE 5 MG/ML
5 INJECTION, SOLUTION INTRAVENOUS
Status: DISCONTINUED | OUTPATIENT
Start: 2021-10-14 | End: 2021-10-14 | Stop reason: HOSPADM

## 2021-10-14 RX ORDER — FENTANYL CITRATE 50 UG/ML
100 INJECTION, SOLUTION INTRAMUSCULAR; INTRAVENOUS
Status: DISCONTINUED | OUTPATIENT
Start: 2021-10-14 | End: 2021-10-14 | Stop reason: HOSPADM

## 2021-10-14 RX ORDER — ONDANSETRON 2 MG/ML
4 INJECTION INTRAMUSCULAR; INTRAVENOUS EVERY 6 HOURS PRN
Status: DISCONTINUED | OUTPATIENT
Start: 2021-10-14 | End: 2021-10-16 | Stop reason: HOSPADM

## 2021-10-14 RX ORDER — SODIUM CHLORIDE, SODIUM LACTATE, POTASSIUM CHLORIDE, CALCIUM CHLORIDE 600; 310; 30; 20 MG/100ML; MG/100ML; MG/100ML; MG/100ML
9 INJECTION, SOLUTION INTRAVENOUS CONTINUOUS PRN
Status: DISCONTINUED | OUTPATIENT
Start: 2021-10-14 | End: 2021-10-16 | Stop reason: HOSPADM

## 2021-10-14 RX ORDER — INSULIN GLARGINE 100 [IU]/ML
12 INJECTION, SOLUTION SUBCUTANEOUS NIGHTLY
Status: DISCONTINUED | OUTPATIENT
Start: 2021-10-14 | End: 2021-10-16 | Stop reason: HOSPADM

## 2021-10-14 RX ORDER — DEXTROSE MONOHYDRATE 25 G/50ML
25 INJECTION, SOLUTION INTRAVENOUS
Status: DISCONTINUED | OUTPATIENT
Start: 2021-10-14 | End: 2021-10-16 | Stop reason: HOSPADM

## 2021-10-14 RX ORDER — LIDOCAINE HYDROCHLORIDE 20 MG/ML
INJECTION, SOLUTION EPIDURAL; INFILTRATION; INTRACAUDAL; PERINEURAL AS NEEDED
Status: DISCONTINUED | OUTPATIENT
Start: 2021-10-14 | End: 2021-10-14 | Stop reason: SURG

## 2021-10-14 RX ORDER — INSULIN LISPRO 100 [IU]/ML
0-14 INJECTION, SOLUTION INTRAVENOUS; SUBCUTANEOUS AS NEEDED
Status: DISCONTINUED | OUTPATIENT
Start: 2021-10-14 | End: 2021-10-16 | Stop reason: HOSPADM

## 2021-10-14 RX ORDER — NICOTINE POLACRILEX 4 MG
15 LOZENGE BUCCAL
Status: DISCONTINUED | OUTPATIENT
Start: 2021-10-14 | End: 2021-10-16 | Stop reason: HOSPADM

## 2021-10-14 RX ORDER — HYDROCODONE BITARTRATE AND ACETAMINOPHEN 7.5; 325 MG/1; MG/1
1 TABLET ORAL EVERY 4 HOURS PRN
Status: DISCONTINUED | OUTPATIENT
Start: 2021-10-14 | End: 2021-10-16 | Stop reason: HOSPADM

## 2021-10-14 RX ORDER — ACETAMINOPHEN 325 MG/1
325 TABLET ORAL EVERY 4 HOURS PRN
Status: DISCONTINUED | OUTPATIENT
Start: 2021-10-14 | End: 2021-10-16 | Stop reason: HOSPADM

## 2021-10-14 RX ORDER — SODIUM CHLORIDE 0.9 % (FLUSH) 0.9 %
10 SYRINGE (ML) INJECTION EVERY 12 HOURS SCHEDULED
Status: DISCONTINUED | OUTPATIENT
Start: 2021-10-14 | End: 2021-10-14 | Stop reason: HOSPADM

## 2021-10-14 RX ORDER — FENTANYL CITRATE 50 UG/ML
INJECTION, SOLUTION INTRAMUSCULAR; INTRAVENOUS AS NEEDED
Status: DISCONTINUED | OUTPATIENT
Start: 2021-10-14 | End: 2021-10-14 | Stop reason: SURG

## 2021-10-14 RX ORDER — PREGABALIN 75 MG/1
75 CAPSULE ORAL DAILY
Status: DISCONTINUED | OUTPATIENT
Start: 2021-10-14 | End: 2021-10-16 | Stop reason: HOSPADM

## 2021-10-14 RX ORDER — PANTOPRAZOLE SODIUM 40 MG/1
40 TABLET, DELAYED RELEASE ORAL EVERY EVENING
Status: DISCONTINUED | OUTPATIENT
Start: 2021-10-14 | End: 2021-10-16 | Stop reason: HOSPADM

## 2021-10-14 RX ORDER — ASPIRIN 325 MG
325 TABLET ORAL DAILY
Status: DISCONTINUED | OUTPATIENT
Start: 2021-10-15 | End: 2021-10-16 | Stop reason: HOSPADM

## 2021-10-14 RX ORDER — PROMETHAZINE HYDROCHLORIDE 25 MG/1
25 TABLET ORAL EVERY 6 HOURS PRN
Status: DISCONTINUED | OUTPATIENT
Start: 2021-10-14 | End: 2021-10-16 | Stop reason: HOSPADM

## 2021-10-14 RX ORDER — LEVOFLOXACIN 750 MG/1
750 TABLET ORAL EVERY 24 HOURS
Status: DISCONTINUED | OUTPATIENT
Start: 2021-10-14 | End: 2021-10-14

## 2021-10-14 RX ORDER — ROPIVACAINE HYDROCHLORIDE 5 MG/ML
INJECTION, SOLUTION EPIDURAL; INFILTRATION; PERINEURAL
Status: COMPLETED | OUTPATIENT
Start: 2021-10-14 | End: 2021-10-14

## 2021-10-14 RX ORDER — SODIUM CHLORIDE 0.9 % (FLUSH) 0.9 %
10 SYRINGE (ML) INJECTION AS NEEDED
Status: DISCONTINUED | OUTPATIENT
Start: 2021-10-14 | End: 2021-10-14 | Stop reason: HOSPADM

## 2021-10-14 RX ORDER — ACETAMINOPHEN 500 MG
TABLET ORAL AS NEEDED
Status: DISCONTINUED | OUTPATIENT
Start: 2021-10-14 | End: 2021-10-14 | Stop reason: SURG

## 2021-10-14 RX ORDER — SODIUM CHLORIDE, SODIUM LACTATE, POTASSIUM CHLORIDE, CALCIUM CHLORIDE 600; 310; 30; 20 MG/100ML; MG/100ML; MG/100ML; MG/100ML
INJECTION, SOLUTION INTRAVENOUS CONTINUOUS PRN
Status: DISCONTINUED | OUTPATIENT
Start: 2021-10-14 | End: 2021-10-14 | Stop reason: SURG

## 2021-10-14 RX ORDER — PROCHLORPERAZINE EDISYLATE 5 MG/ML
10 INJECTION INTRAMUSCULAR; INTRAVENOUS EVERY 6 HOURS PRN
Status: DISCONTINUED | OUTPATIENT
Start: 2021-10-14 | End: 2021-10-16 | Stop reason: HOSPADM

## 2021-10-14 RX ORDER — PROMETHAZINE HYDROCHLORIDE 25 MG/1
25 SUPPOSITORY RECTAL ONCE AS NEEDED
Status: DISCONTINUED | OUTPATIENT
Start: 2021-10-14 | End: 2021-10-14 | Stop reason: HOSPADM

## 2021-10-14 RX ORDER — DULOXETIN HYDROCHLORIDE 30 MG/1
60 CAPSULE, DELAYED RELEASE ORAL DAILY
Status: DISCONTINUED | OUTPATIENT
Start: 2021-10-15 | End: 2021-10-16 | Stop reason: HOSPADM

## 2021-10-14 RX ORDER — ONDANSETRON 4 MG/1
4 TABLET, FILM COATED ORAL EVERY 8 HOURS PRN
Status: DISCONTINUED | OUTPATIENT
Start: 2021-10-14 | End: 2021-10-14

## 2021-10-14 RX ORDER — OLANZAPINE 10 MG/2ML
1 INJECTION, POWDER, LYOPHILIZED, FOR SOLUTION INTRAMUSCULAR
Status: DISCONTINUED | OUTPATIENT
Start: 2021-10-14 | End: 2021-10-16 | Stop reason: HOSPADM

## 2021-10-14 RX ORDER — SODIUM CHLORIDE 0.9 % (FLUSH) 0.9 %
1-10 SYRINGE (ML) INJECTION AS NEEDED
Status: DISCONTINUED | OUTPATIENT
Start: 2021-10-14 | End: 2021-10-16 | Stop reason: HOSPADM

## 2021-10-14 RX ORDER — NALOXONE HCL 0.4 MG/ML
0.4 VIAL (ML) INJECTION AS NEEDED
Status: DISCONTINUED | OUTPATIENT
Start: 2021-10-14 | End: 2021-10-14 | Stop reason: HOSPADM

## 2021-10-14 RX ORDER — POLYETHYLENE GLYCOL 3350 17 G/17G
17 POWDER, FOR SOLUTION ORAL DAILY
Status: DISCONTINUED | OUTPATIENT
Start: 2021-10-14 | End: 2021-10-16 | Stop reason: HOSPADM

## 2021-10-14 RX ORDER — HYDRALAZINE HYDROCHLORIDE 20 MG/ML
5 INJECTION INTRAMUSCULAR; INTRAVENOUS
Status: DISCONTINUED | OUTPATIENT
Start: 2021-10-14 | End: 2021-10-14 | Stop reason: HOSPADM

## 2021-10-14 RX ORDER — ASPIRIN 325 MG
325 TABLET ORAL DAILY
Status: DISCONTINUED | OUTPATIENT
Start: 2021-10-14 | End: 2021-10-14

## 2021-10-14 RX ORDER — SODIUM CHLORIDE 9 MG/ML
125 INJECTION, SOLUTION INTRAVENOUS CONTINUOUS
Status: DISPENSED | OUTPATIENT
Start: 2021-10-14 | End: 2021-10-15

## 2021-10-14 RX ORDER — HYDROCODONE BITARTRATE AND ACETAMINOPHEN 10; 325 MG/1; MG/1
0.5 TABLET ORAL EVERY 4 HOURS PRN
Status: DISCONTINUED | OUTPATIENT
Start: 2021-10-14 | End: 2021-10-16 | Stop reason: HOSPADM

## 2021-10-14 RX ORDER — INSULIN LISPRO 100 [IU]/ML
0-14 INJECTION, SOLUTION INTRAVENOUS; SUBCUTANEOUS
Status: DISCONTINUED | OUTPATIENT
Start: 2021-10-14 | End: 2021-10-16 | Stop reason: HOSPADM

## 2021-10-14 RX ORDER — ONDANSETRON 2 MG/ML
4 INJECTION INTRAMUSCULAR; INTRAVENOUS ONCE AS NEEDED
Status: COMPLETED | OUTPATIENT
Start: 2021-10-14 | End: 2021-10-14

## 2021-10-14 RX ORDER — MIDAZOLAM HYDROCHLORIDE 1 MG/ML
INJECTION INTRAMUSCULAR; INTRAVENOUS AS NEEDED
Status: DISCONTINUED | OUTPATIENT
Start: 2021-10-14 | End: 2021-10-14 | Stop reason: SURG

## 2021-10-14 RX ORDER — MORPHINE SULFATE 4 MG/ML
4 INJECTION, SOLUTION INTRAMUSCULAR; INTRAVENOUS
Status: DISCONTINUED | OUTPATIENT
Start: 2021-10-14 | End: 2021-10-16 | Stop reason: HOSPADM

## 2021-10-14 RX ORDER — HYDROMORPHONE HCL 110MG/55ML
1 PATIENT CONTROLLED ANALGESIA SYRINGE INTRAVENOUS
Status: DISCONTINUED | OUTPATIENT
Start: 2021-10-14 | End: 2021-10-14 | Stop reason: HOSPADM

## 2021-10-14 RX ORDER — ONDANSETRON 4 MG/1
4 TABLET, FILM COATED ORAL EVERY 6 HOURS PRN
Status: DISCONTINUED | OUTPATIENT
Start: 2021-10-14 | End: 2021-10-16 | Stop reason: HOSPADM

## 2021-10-14 RX ORDER — NALOXONE HCL 0.4 MG/ML
0.4 VIAL (ML) INJECTION
Status: DISCONTINUED | OUTPATIENT
Start: 2021-10-14 | End: 2021-10-16 | Stop reason: HOSPADM

## 2021-10-14 RX ADMIN — PANTOPRAZOLE SODIUM 40 MG: 40 TABLET, DELAYED RELEASE ORAL at 19:45

## 2021-10-14 RX ADMIN — FENTANYL CITRATE 50 MCG: 50 INJECTION, SOLUTION INTRAMUSCULAR; INTRAVENOUS at 11:03

## 2021-10-14 RX ADMIN — DEXAMETHASONE SODIUM PHOSPHATE 4 MG: 4 INJECTION, SOLUTION INTRAMUSCULAR; INTRAVENOUS at 11:36

## 2021-10-14 RX ADMIN — FENTANYL CITRATE 50 MCG: 50 INJECTION, SOLUTION INTRAMUSCULAR; INTRAVENOUS at 10:25

## 2021-10-14 RX ADMIN — PREGABALIN 75 MG: 75 CAPSULE ORAL at 19:45

## 2021-10-14 RX ADMIN — MORPHINE SULFATE 4 MG: 4 INJECTION, SOLUTION INTRAMUSCULAR; INTRAVENOUS at 23:46

## 2021-10-14 RX ADMIN — ROPIVACAINE HYDROCHLORIDE 30 ML: 5 INJECTION, SOLUTION EPIDURAL; INFILTRATION; PERINEURAL at 10:12

## 2021-10-14 RX ADMIN — CEFAZOLIN SODIUM 2 G: 1 INJECTION, POWDER, FOR SOLUTION INTRAMUSCULAR; INTRAVENOUS at 10:29

## 2021-10-14 RX ADMIN — INSULIN LISPRO 5 UNITS: 100 INJECTION, SOLUTION INTRAVENOUS; SUBCUTANEOUS at 20:22

## 2021-10-14 RX ADMIN — MORPHINE SULFATE 4 MG: 4 INJECTION, SOLUTION INTRAMUSCULAR; INTRAVENOUS at 21:53

## 2021-10-14 RX ADMIN — PROPOFOL 200 MG: 10 INJECTION, EMULSION INTRAVENOUS at 10:25

## 2021-10-14 RX ADMIN — MORPHINE SULFATE 4 MG: 4 INJECTION, SOLUTION INTRAMUSCULAR; INTRAVENOUS at 19:43

## 2021-10-14 RX ADMIN — HYDROMORPHONE HYDROCHLORIDE 1 MG: 2 INJECTION INTRAMUSCULAR; INTRAVENOUS; SUBCUTANEOUS at 16:17

## 2021-10-14 RX ADMIN — HYDROMORPHONE HYDROCHLORIDE 1 MG: 2 INJECTION INTRAMUSCULAR; INTRAVENOUS; SUBCUTANEOUS at 12:25

## 2021-10-14 RX ADMIN — PROCHLORPERAZINE EDISYLATE 10 MG: 5 INJECTION INTRAMUSCULAR; INTRAVENOUS at 23:50

## 2021-10-14 RX ADMIN — LIDOCAINE HYDROCHLORIDE 50 MG: 20 INJECTION, SOLUTION EPIDURAL; INFILTRATION; INTRACAUDAL; PERINEURAL at 10:25

## 2021-10-14 RX ADMIN — ROPIVACAINE HYDROCHLORIDE 30 ML: 5 INJECTION EPIDURAL; INFILTRATION; PERINEURAL at 10:16

## 2021-10-14 RX ADMIN — PROMETHAZINE HYDROCHLORIDE 25 MG: 25 TABLET ORAL at 19:44

## 2021-10-14 RX ADMIN — SODIUM CHLORIDE 125 ML/HR: 9 INJECTION, SOLUTION INTRAVENOUS at 20:28

## 2021-10-14 RX ADMIN — ACETAMINOPHEN 1000 MG: 500 TABLET, FILM COATED ORAL at 10:03

## 2021-10-14 RX ADMIN — MIDAZOLAM 2 MG: 1 INJECTION INTRAMUSCULAR; INTRAVENOUS at 10:08

## 2021-10-14 RX ADMIN — SODIUM CHLORIDE, POTASSIUM CHLORIDE, SODIUM LACTATE AND CALCIUM CHLORIDE 9 ML/HR: 600; 310; 30; 20 INJECTION, SOLUTION INTRAVENOUS at 09:27

## 2021-10-14 RX ADMIN — HYDROMORPHONE HYDROCHLORIDE 1 MG: 2 INJECTION INTRAMUSCULAR; INTRAVENOUS; SUBCUTANEOUS at 12:58

## 2021-10-14 RX ADMIN — SODIUM CHLORIDE, SODIUM LACTATE, POTASSIUM CHLORIDE, AND CALCIUM CHLORIDE: .6; .31; .03; .02 INJECTION, SOLUTION INTRAVENOUS at 10:24

## 2021-10-14 RX ADMIN — MIDAZOLAM 2 MG: 1 INJECTION INTRAMUSCULAR; INTRAVENOUS at 10:04

## 2021-10-14 RX ADMIN — ONDANSETRON 4 MG: 2 INJECTION INTRAMUSCULAR; INTRAVENOUS at 21:52

## 2021-10-14 RX ADMIN — INSULIN GLARGINE 12 UNITS: 100 INJECTION, SOLUTION SUBCUTANEOUS at 20:22

## 2021-10-14 RX ADMIN — HYDROMORPHONE HYDROCHLORIDE 1 MG: 2 INJECTION INTRAMUSCULAR; INTRAVENOUS; SUBCUTANEOUS at 14:31

## 2021-10-14 RX ADMIN — ONDANSETRON 4 MG: 2 INJECTION INTRAMUSCULAR; INTRAVENOUS at 16:35

## 2021-10-14 RX ADMIN — CEFTRIAXONE SODIUM 2 G: 2 INJECTION, POWDER, FOR SOLUTION INTRAMUSCULAR; INTRAVENOUS at 19:46

## 2021-10-14 NOTE — OP NOTE
AMPUTATION ABOVE KNEE  Procedure Report    Patient Name:  Maynor Gregg  YOB: 1975    Date of Surgery:  10/14/2021         Pre-op Diagnosis: Right tibial osteomyelitis    Postop diagnosis: Same    Indication: 46-year-old white male who had multiple revisions of a right BKA but was not healing. [place] that he needed knee Amputation to get this to heal      Procedure/CPT® Codes:      Procedure(s):  AMPUTATION ABOVE KNEE, right    Staff:  Surgeon(s):  Donn Alcantara MD    Assistant: Minerva Horowitz APRN assisted retraction hemostasis and wound closure was essential to the case    Anesthesia: General with Block    Estimated Blood Loss: 100 mL    Implants:    Nothing was implanted during the procedure    Specimen:          Leg sent for gross  Tissue sample from the distal stump sent for culture        Findings: Healthy tissue at the amputation level    Complications: None    Description of Procedure: Patient seen preoperatively. Had the right lower extremity initialed. Had 2 g Kefzol IV preoperatively. Was taken the OR where timeout form. Had general anesthesia. Had already had a block. Had a bump under his right hip. Sterile prep and draping of the right lower extremity supine. Had tourniquet sterilely placed on the upper thigh. This inflated to 300 mmHg. Total tourniquet time was 20 minutes. [place] incisions were marked at the distal thigh. Distal stump of been sealed and Ioban before prepping and then a stockinette placed over it. This was sealed off again with Ioban. The [place] incisions were made with anterior posterior flaps. Dissected to the femur anteriorly and then the femur was transected transversely about 9 cm above the knee joint. The posterior flap was then continued after clamping large vessels and nerves cutting between the clamps. The leg was placed on the back table for later removal tissue for culture. The nerves and vessels were tied off with 2-0 silk stick ties and then  tourniquet released. Smaller vessels were cauterized. The muscle was trans-. This edge of the bone was smoothed with a rongeur. The wound was irrigated with saline and flaps were closed anterior to posteriorly with #1 Vicryl subcu with 2-0 Vicryl interrupted and skin with staples dogears were corrected. Some moderate size gaps were left for fluid drainage. Sterile dressings were applied.    Plan: Antibiotics for 5 days.        Donn Alcantara MD     Date: 10/14/2021  Time: 14:11 EDT

## 2021-10-14 NOTE — ANESTHESIA PROCEDURE NOTES
Peripheral Block    Pre-sedation assessment completed: 10/14/2021 8:30 AM    Patient reassessed immediately prior to procedure    Patient location during procedure: pre-op  Reason for block: procedure for pain, at surgeon's request, post-op pain management and secondary anesthetic  Performed by  Anesthesiologist: Colin Chung MD  Preanesthetic Checklist  Completed: patient identified, IV checked, site marked, risks and benefits discussed, surgical consent, monitors and equipment checked, pre-op evaluation and timeout performed  Prep:  Pt Position: supine  Sterile barriers:cap, gloves, mask and washed/disinfected hands  Prep: ChloraPrep  Patient monitoring: blood pressure monitoring, continuous pulse oximetry and EKG  Procedure  Sedation:yes  Performed under: local infiltration  Guidance:ultrasound guided, nerve stimulator and landmark technique  ULTRASOUND INTERPRETATION.  Using ultrasound guidance a 20 G gauge needle was placed in close proximity to the femoral nerve, at which point, under ultrasound guidance anesthetic was injected in the area of the nerve and spread of the anesthesia was seen on ultrasound in close proximity thereto.  There were no abnormalities seen on ultrasound; a digital image was taken; and the patient tolerated the procedure with no complications. Images:still images obtained, printed/placed on chart  Loss of twitch: 0.5 mA  Laterality:right  Block Type:femoral  Injection Technique:single-shot  Needle Type:echogenic  Needle Gauge:20 G  Resistance on Injection: less than 15 psi    Medications Used: ropivacaine (NAROPIN) 0.5 % injection, 30 mL  Med admintered at 10/14/2021 10:16 AM      Post Assessment  Injection Assessment: negative aspiration for heme, no paresthesia on injection and incremental injection  Patient Tolerance:comfortable throughout block  Complications:no  Additional Notes  Pre-procedure:  Peripheral nerve block performed preoperatively prior to the start of  anesthesia time at the request of the patient and the surgeon for the management of postoperative acute surgical pain as well as for a secondary intraoperative anesthetic (general anesthesia is the primary intraoperative anesthetic); patient identified; pre-procedure vital signs, all relevant labs/studies, complete medical/surgical/anesthetic history, full medication list, full allergy list, and NPO status obtained/reviewed; physical assessment performed; anesthetic options, side effects, potential complications, risks, and benefits discussed; questions answered; patient wishes to proceed with the procedure; written anesthesia procedure consent obtained; patient cleared for procedure; time out performed; IV access in situ    Procedure:  ASA monitor placed; supplemental oxygen provided; patient positioned; hand hygiene performed; sterile technique maintained throughout the procedure; sterile prep applied; insertion site determined by anatomical landmarks, palpation, and ultrasound imaging; live ultrasound guidance throughout the procedure; target nerves/landmarks identified on live ultrasound; skin and subcutaneous tissues numbed by injection of 1% lidocaine; 4 inch 20G [employer] Ultra 360 Insulated Echogenic Needle used; realtime needle advancement and placement near the target nerves witnessed on live ultrasound; positive nerve stimulation resulting in motor response of the appropriate muscles at a current of less than or equal to 0.5 mA on the nerve stimulator; negative aspiration prior to injection; correct needle placement confirmed on live ultrasound by local anesthetic spread around the target nerves, as well as by nerve stimulation; local anesthetic mixture injected with negative aspiration prior to each injection and after each 1-5 mL injected; needle withdrawn; dressing applied; ultrasound image printed and placed in the patient's permanent medical record    Post-procedure:  Peripheral nerve block placed  successfully; good block; no apparent complications; minimal estimated blood loss; vital signs stable throughout; see nurse's notes for vitals; transported to the OR, general anesthesia induced, and surgery started

## 2021-10-14 NOTE — ANESTHESIA POSTPROCEDURE EVALUATION
Patient: Maynor Gregg    Procedure Summary     Date: 10/14/21 Room / Location: [facility] OR 11 / [facility] MAIN OR    Anesthesia Start: 1024 Anesthesia Stop: 1152    Procedure: AMPUTATION ABOVE KNEE (Right Thigh) Diagnosis:       Osteomyelitis, chronic, lower leg, right (HCC)      (Osteomyelitis, chronic, lower leg, right (HCC) [M86.661])    Surgeons: Donn Alcantara MD Provider: Colin Chung MD    Anesthesia Type: general with block ASA Status: 4          Anesthesia Type: general with block    Vitals  Vitals Value Taken Time   /78 10/14/21 1318   Temp 97.2 °F (36.2 °C) 10/14/21 1150   Pulse 82 10/14/21 1318   Resp 9 10/14/21 1305   SpO2 100 % 10/14/21 1318   Vitals shown include unvalidated device data.        Post Anesthesia Care and Evaluation    Patient location during evaluation: PACU  Patient participation: complete - patient participated  Level of consciousness: awake  Pain scale: See nurse's notes for pain score.  Pain management: adequate  Airway patency: patent  Anesthetic complications: No anesthetic complications  PONV Status: none  Cardiovascular status: acceptable  Respiratory status: acceptable  Hydration status: acceptable    Comments: Patient seen and examined postoperatively; vital signs stable; SpO2 greater than or equal to 90%; cardiopulmonary status stable; nausea/vomiting adequately controlled; pain adequately controlled; no apparent anesthesia complications; patient discharged from anesthesia care when discharge criteria were met

## 2021-10-14 NOTE — ANESTHESIA PROCEDURE NOTES
Airway  Urgency: elective    Date/Time: 10/14/2021 10:27 AM  Airway not difficult    General Information and Staff    Patient location during procedure: OR  Anesthesiologist: Colin Chung MD  CRNA: Tenisha Solano CRNA    Indications and Patient Condition  Indications for airway management: airway protection    Preoxygenated: yes  MILS maintained throughout  Mask difficulty assessment: 0 - not attempted    Final Airway Details  Final airway type: supraglottic airway      Successful airway: classic  Size 4    Number of attempts at approach: 1  Assessment: lips, teeth, and gum same as pre-op and atraumatic intubation

## 2021-10-14 NOTE — CONSULTS
[facility]      Patient Name: Maynor Gregg  : 1975  MRN: [id number]  Primary Care Physician:  Fred Lemons FNP  Date of admission: 10/14/2021      Subjective      Chief Complaint: Right leg pain    History of Present Illness: Maynor Gregg is a 46 y.o. male who presented to [facility] on 10/14/2021 complaining of continued right leg pain.  Patient reports that for approximately the past 1.5 years he has had worsening pain which began with a laceration followed by subsequent infection in his right foot which have required multiple levels of amputations and revisions due to continued soft tissue injury and failed healing with subsequent infections.  He was noted to have continued drainage, pain and poor wound healing at recent site of his BKA with failed antibiotic therapy and above-the-knee amputation was performed on 10/14/2021.  Post procedure patient notes some intractable nausea and vomiting as well as pain at the site of his amputation but no chest pain, dyspnea, palpitations, fever or chills.  He reports that he had postop nausea and vomiting following his previous procedure as well and that it responded to Zofran however Zofran has not alleviated his symptoms in this case    Most recent labs notable for glucose of 321 with remainder of BMP and CBC generally unremarkable    Review of Systems   Constitutional: Negative.   HENT: Negative.    Eyes: Negative.    Cardiovascular: Negative.    Respiratory: Negative.    Endocrine: Negative.    Skin: Positive for poor wound healing.   Musculoskeletal: Negative.    Gastrointestinal: Positive for nausea and vomiting. Negative for abdominal pain, constipation, diarrhea, hematemesis, hematochezia and melena.   Genitourinary: Negative.    Neurological: Negative.    Psychiatric/Behavioral: Negative.         Personal History     Past Medical History:   Diagnosis Date   • Anxiety    • Bone infection (HCC)      STATES CAUSED HIM TO LOSE RIGHT LEG   • CHF (congestive heart failure) (HCC)    • Coronary artery disease    • Depression    • Diabetes mellitus (HCC)     TYPE 1   • Gallstones    • GERD (gastroesophageal reflux disease)    • Headache    • History of amputation     PEDRO BELOW THE KNEE   • MI, old 2010    WITH STENT PLACEMENT-CARDIO WANDA [place]-NO LONGER SEES   • Nodule of left lung     HAD CT SCAN BENIGN.    • Phantom limb pain (HCC)     LEFT AND RIGHT BOTH   • Seasonal allergies        Past Surgical History:   Procedure Laterality Date   • AMPUTATION DIGIT Right 2/28/2020    Procedure: PARTIAL FIRST RAY RESECTION RIGHT FOOT;  Surgeon: CROW Souza DPM;  Location: [facility] MAIN OR;  Service: Podiatry;  Laterality: Right;   • AMPUTATION FOOT / TOE     • AMPUTATION REVISION Right 3/2/2021    Procedure: AMPUTATION REVISION KNEE STUMP;  Surgeon: Donn Alcantara MD;  Location: [facility] MAIN OR;  Service: Orthopedics;  Laterality: Right;   • AMPUTATION REVISION Right 9/23/2021    Procedure: RIGHT BELOW KNEE AMPUTATION REVISION;  Surgeon: Donn Alcantara MD;  Location: [facility] MAIN OR;  Service: Orthopedics;  Laterality: Right;   • BELOW KNEE AMPUTATION Right 4/30/2020    Procedure: AMPUTATION BELOW KNEE;  Surgeon: Donn Alcantara MD;  Location: [facility] MAIN OR;  Service: Orthopedics;  Laterality: Right;   • BELOW KNEE AMPUTATION Left 9/2/2020    Procedure: AMPUTATION BELOW KNEE, left;  Surgeon: Donn Alcantara MD;  Location: [facility] MAIN OR;  Service: Orthopedics;  Laterality: Left;   • BELOW KNEE AMPUTATION Right 2/23/2021    Procedure: Revision of amputaion below knee;  Surgeon: Donn Alcantara MD;  Location: [facility] MAIN OR;  Service: Orthopedics;  Laterality: Right;   • CARDIAC CATHETERIZATION  11/2010     RCA artery   • CARDIAC CATHETERIZATION  2015    LAD artery   • CARDIAC SURGERY     • CORONARY ANGIOPLASTY WITH STENT PLACEMENT      X2. 2015   • INCISION AND DRAINAGE LEG Left 1/21/2020    Procedure:  INCISION AND DRAINAGE LOWER EXTREMITY with second digit amputation;  Surgeon: CROW Souza DPM;  Location: [facility] OR;  Service: Podiatry   • INCISION AND DRAINAGE LEG Right 4/28/2020    Procedure: incision and drainage ,transmetatarsal amputation, fasciotomy;  Surgeon: CROW Souza DPM;  Location: [facility] OR;  Service: Podiatry;  Laterality: Right;   • SKIN FULL THICKNESS GRAFT Right 7/29/2021    Procedure: EXCISION SOFT TISSUE Ulcer WITH FULL THICKNESS SKIN GRAFT to right lower leg.;  Surgeon: Sukhdeep Hernadez MD;  Location: [facility] OR;  Service: Plastics;  Laterality: Right;   • TOE AMPUTATION Left    • WOUND DEBRIDEMENT Right 1/21/2020    Procedure: DEBRIDEMENT with sesamoid excision;  Surgeon: CROW Souza DPM;  Location: [facility] OR;  Service: Podiatry       Family History: family history includes Diabetes in his father and paternal grandfather; Heart failure in his father. Otherwise pertinent FHx was reviewed and not pertinent to current issue.    Social History:  reports that he quit smoking about 2 years ago. He has a 3.00 pack-year smoking history. He has never used smokeless tobacco. He reports that he does not drink alcohol and does not use drugs.    Home Medications:  Prior to Admission Medications     Prescriptions Last Dose Informant Patient Reported? Taking?    aspirin 325 MG tablet Past Week  No Yes    Take 1 tablet by mouth Daily.    DULoxetine (CYMBALTA) 60 MG capsule 10/14/2021  Yes Yes    Take 60 mg by mouth Daily. Take DOS    HYDROcodone-acetaminophen (Norco)  MG per tablet 10/14/2021  No Yes    1 or 2 tablets p.o. every 4 hours as needed    insulin aspart (novoLOG) 100 UNIT/ML injection 10/13/2021  Yes Yes    Inject  under the skin into the appropriate area as directed 3 (Three) Times a Day Before Meals. PER SS. BETWEEN 3-20 UNITS  Hold DOS    Insulin Glargine (BASAGLAR KWIKPEN) 100 UNIT/ML injection pen 10/13/2021  Yes Yes    Inject 12 Units under the  skin into the appropriate area as directed Every Night.    pantoprazole (PROTONIX) 40 MG EC tablet 10/13/2021  Yes Yes    Take 40 mg by mouth Every Evening.    pregabalin (Lyrica) 75 MG capsule 10/13/2021  Yes Yes    Take 75 mg by mouth 2 (Two) Times a Day.            Allergies:  Allergies   Allergen Reactions   • Metformin Diarrhea and Nausea And Vomiting   • Oxycodone Nausea And Vomiting and Rash       Objective      Vitals:   Temp:  [97.1 °F (36.2 °C)-98.2 °F (36.8 °C)] 97.8 °F (36.6 °C)  Heart Rate:  [70-88] 88  Resp:  [9-19] 16  BP: (117-147)/(72-87) 137/83  Flow (L/min):  [2-6] 2    Physical Exam  Vitals reviewed.   Constitutional:       General: He is not in acute distress.     Appearance: Normal appearance. He is normal weight. He is not ill-appearing, toxic-appearing or diaphoretic.   HENT:      Head: Normocephalic and atraumatic.      Right Ear: External ear normal.      Left Ear: External ear normal.      Nose: Nose normal.      Mouth/Throat:      Mouth: Mucous membranes are moist.   Eyes:      Extraocular Movements: Extraocular movements intact.   Cardiovascular:      Rate and Rhythm: Normal rate and regular rhythm.      Pulses: Normal pulses.      Heart sounds: Normal heart sounds.   Pulmonary:      Effort: Pulmonary effort is normal.      Breath sounds: Normal breath sounds.   Abdominal:      General: Bowel sounds are normal. There is no distension.      Tenderness: There is no abdominal tenderness.   Musculoskeletal:         General: Normal range of motion.      Cervical back: Normal range of motion.   Skin:     General: Skin is warm and dry.      Capillary Refill: Capillary refill takes less than 2 seconds.      Comments: Serosanguineous drainage noted from dressing around patient recent procedure on right lower extremity   Neurological:      General: No focal deficit present.      Mental Status: He is alert and oriented to person, place, and time.   Psychiatric:         Mood and Affect: Mood normal.          Behavior: Behavior normal.         Thought Content: Thought content normal.         Judgment: Judgment normal.          Result Review    Result Review:  I have personally reviewed the results from the time of this admission to 10/14/2021 17:47 EDT and agree with these findings:  [x]  Laboratory  []  Microbiology  []  Radiology  []  EKG/Telemetry   []  Cardiology/Vascular   []  Pathology  []  Old records  []  Other:  Most notable findings include: BMP and CBC      Assessment/Plan        Active Hospital Problems:  Active Hospital Problems    Diagnosis    • **Osteomyelitis, chronic, lower leg, right (HCC)    • CAD (coronary artery disease)    • Type 1 diabetes mellitus with circulatory complication (HCC)    • GERD (gastroesophageal reflux disease)    • Chronic pain      Plan:     Osteomyelitis of right lower extremity  -Right AKA performed on 10/14/2021  -Rocephin and Levaquin ordered by surgeon  -Continue saline at 125 mils per hour  -Monitor CBC while admitted  -Morphine and Norco for pain per surgery  -Zofran initially ordered by surgery with Phenergan added    Diabetes mellitus  -Poorly controlled with most recent glucose of 321  -Restart basal insulin and add sliding scale  -Monitor before meals and at bedtime  -Diabetic diet    CAD  -Continue aspirin and cardiac monitoring    GERD  -PPI    Depression/anxiety  -Cymbalta    Chronic pain  -Lyrica and Norco      DVT prophylaxis:  No DVT prophylaxis order currently exists.    CODE STATUS:    Level Of Support Discussed With: Patient  Code Status: CPR  Medical Interventions (Level of Support Prior to Arrest): Full    Admission Status:  I believe this patient meets observation status.    I discussed the patient's findings and my recommendations with patient and nursing staff.        Signature: Electronically signed by Dominic Wu PA-C, 10/14/21, 5:55 PM EDT.    I have reviewed this documentation and agree.    I also personally evaluated this  patient.    46-year-old man with right tibial osteomyelitis who has failed antibiotic therapy and was brought in for right above-the-knee amputation on 10/14/2021.  Postprocedure, hospitalist was consulted for medical management.    On general examination patient is in no obvious painful or respiratory distress  Lungs are clear to auscultation  Abdomen benign  Extremities-surgical dressing to right lower extremity stump    Plan  Pain control  PT to evaluate  Restart on basal insulin, Premeal insulin and sliding scale insulin  Supportive care    Further recommendation following clinical course    Electronically signed by Oscar Pace MD, 10/15/21, 10:07 AM EDT.

## 2021-10-14 NOTE — ANESTHESIA PROCEDURE NOTES
Peripheral Block    Pre-sedation assessment completed: 10/14/2021 8:30 AM    Patient reassessed immediately prior to procedure    Patient location during procedure: pre-op  Reason for block: procedure for pain, at surgeon's request, post-op pain management and secondary anesthetic  Performed by  Anesthesiologist: Colin Chung MD  Preanesthetic Checklist  Completed: patient identified, IV checked, site marked, risks and benefits discussed, surgical consent, monitors and equipment checked, pre-op evaluation and timeout performed  Prep:  Pt Position: left lateral decubitus  Sterile barriers:cap, gloves, mask and washed/disinfected hands  Prep: ChloraPrep  Patient monitoring: blood pressure monitoring, continuous pulse oximetry and EKG  Procedure  Sedation:yes  Performed under: local infiltration  Guidance:nerve stimulator and landmark technique  ULTRASOUND INTERPRETATION.  Using ultrasound guidance a 20 G gauge needle was placed in close proximity to the sciatic nerve, at which point, under ultrasound guidance anesthetic was injected in the area of the nerve and spread of the anesthesia was seen on ultrasound in close proximity thereto.  There were no abnormalities seen on ultrasound; a digital image was taken; and the patient tolerated the procedure with no complications. Loss of twitch: 0.5 mA  Laterality:right  Block Type:sciatic  Injection Technique:single-shot  Needle Type:echogenic  Needle Gauge:20 G  Resistance on Injection: less than 15 psi    Medications Used: ropivacaine (NAROPIN) 0.5 % injection, 30 mL  Med admintered at 10/14/2021 10:12 AM      Post Assessment  Injection Assessment: negative aspiration for heme, no paresthesia on injection and incremental injection  Patient Tolerance:comfortable throughout block  Complications:no  Additional Notes  Pre-procedure:  Peripheral nerve block performed preoperatively prior to the start of anesthesia time at the request of the patient and the surgeon for  the management of postoperative acute surgical pain as well as for a secondary intraoperative anesthetic (general anesthesia is the primary intraoperative anesthetic); patient identified; pre-procedure vital signs, all relevant labs/studies, complete medical/surgical/anesthetic history, full medication list, full allergy list, and NPO status obtained/reviewed; physical assessment performed; anesthetic options, side effects, potential complications, risks, and benefits discussed; questions answered; patient wishes to proceed with the procedure; written anesthesia procedure consent obtained; patient cleared for procedure; time out performed; IV access in situ    Procedure:  ASA monitor placed; supplemental oxygen provided; patient positioned; hand hygiene performed; sterile technique maintained throughout the procedure; sterile prep applied; insertion site determined by anatomical landmarks and palpation; skin and subcutaneous tissues numbed by injection of 1% lidocaine; 4 inch 20G [email or web address] Ultra 360 Insulated Echogenic Needle used; positive nerve stimulation resulting in motor response of the appropriate muscles at a current of less than or equal to 0.5 mA on the nerve stimulator; negative aspiration prior to injection; correct needle placement confirmed by nerve stimulation; local anesthetic mixture injected with negative aspiration prior to each injection and after each 1-5 mL injected; needle withdrawn; dressing applied    Post-procedure:  Peripheral nerve block placed successfully; good block; no apparent complications; minimal estimated blood loss; vital signs stable throughout; see nurse's notes for vitals; transported to the OR, general anesthesia induced, and surgery started

## 2021-10-15 ENCOUNTER — READMISSION MANAGEMENT (OUTPATIENT)
Dept: CALL CENTER | Facility: HOSPITAL | Age: 46
End: 2021-10-15

## 2021-10-15 ENCOUNTER — HOME HEALTH ADMISSION (OUTPATIENT)
Dept: HOME HEALTH SERVICES | Facility: HOME HEALTHCARE | Age: 46
End: 2021-10-15

## 2021-10-15 LAB
ANION GAP SERPL CALCULATED.3IONS-SCNC: 12 MMOL/L (ref 5–15)
BUN SERPL-MCNC: 19 MG/DL (ref 6–20)
BUN/CREAT SERPL: 20.4 (ref 7–25)
CALCIUM SPEC-SCNC: 8 MG/DL (ref 8.6–10.5)
CHLORIDE SERPL-SCNC: 100 MMOL/L (ref 98–107)
CO2 SERPL-SCNC: 25 MMOL/L (ref 22–29)
CREAT SERPL-MCNC: 0.93 MG/DL (ref 0.76–1.27)
GFR SERPL CREATININE-BSD FRML MDRD: 87 ML/MIN/1.73
GLUCOSE BLDC GLUCOMTR-MCNC: 152 MG/DL (ref 70–105)
GLUCOSE BLDC GLUCOMTR-MCNC: 170 MG/DL (ref 70–105)
GLUCOSE BLDC GLUCOMTR-MCNC: 182 MG/DL (ref 70–105)
GLUCOSE SERPL-MCNC: 220 MG/DL (ref 65–99)
HCT VFR BLD AUTO: 37.5 % (ref 37.5–51)
HGB BLD-MCNC: 12.3 G/DL (ref 13–17.7)
LAB AP CASE REPORT: NORMAL
PATH REPORT.FINAL DX SPEC: NORMAL
PATH REPORT.GROSS SPEC: NORMAL
POTASSIUM SERPL-SCNC: 4.1 MMOL/L (ref 3.5–5.2)
SODIUM SERPL-SCNC: 137 MMOL/L (ref 136–145)

## 2021-10-15 PROCEDURE — 80048 BASIC METABOLIC PNL TOTAL CA: CPT | Performed by: ORTHOPAEDIC SURGERY

## 2021-10-15 PROCEDURE — 85014 HEMATOCRIT: CPT | Performed by: ORTHOPAEDIC SURGERY

## 2021-10-15 PROCEDURE — 63710000001 INSULIN GLARGINE PER 5 UNITS: Performed by: PHYSICIAN ASSISTANT

## 2021-10-15 PROCEDURE — 99233 SBSQ HOSP IP/OBS HIGH 50: CPT | Performed by: INTERNAL MEDICINE

## 2021-10-15 PROCEDURE — 82962 GLUCOSE BLOOD TEST: CPT

## 2021-10-15 PROCEDURE — 25010000002 CEFTRIAXONE PER 250 MG: Performed by: ORTHOPAEDIC SURGERY

## 2021-10-15 PROCEDURE — 63710000001 INSULIN LISPRO (HUMAN) PER 5 UNITS: Performed by: PHYSICIAN ASSISTANT

## 2021-10-15 PROCEDURE — 25010000002 MORPHINE PER 10 MG: Performed by: ORTHOPAEDIC SURGERY

## 2021-10-15 PROCEDURE — 85018 HEMOGLOBIN: CPT | Performed by: ORTHOPAEDIC SURGERY

## 2021-10-15 RX ORDER — HYDROMORPHONE HYDROCHLORIDE 4 MG/1
4 TABLET ORAL EVERY 4 HOURS PRN
Status: DISCONTINUED | OUTPATIENT
Start: 2021-10-15 | End: 2021-10-16 | Stop reason: HOSPADM

## 2021-10-15 RX ADMIN — MORPHINE SULFATE 4 MG: 4 INJECTION, SOLUTION INTRAMUSCULAR; INTRAVENOUS at 02:28

## 2021-10-15 RX ADMIN — HYDROMORPHONE HYDROCHLORIDE 4 MG: 4 TABLET ORAL at 09:00

## 2021-10-15 RX ADMIN — Medication 10 ML: at 09:01

## 2021-10-15 RX ADMIN — Medication 10 ML: at 20:45

## 2021-10-15 RX ADMIN — MORPHINE SULFATE 4 MG: 4 INJECTION, SOLUTION INTRAMUSCULAR; INTRAVENOUS at 16:28

## 2021-10-15 RX ADMIN — INSULIN LISPRO 5 UNITS: 100 INJECTION, SOLUTION INTRAVENOUS; SUBCUTANEOUS at 09:00

## 2021-10-15 RX ADMIN — MORPHINE SULFATE 4 MG: 4 INJECTION, SOLUTION INTRAMUSCULAR; INTRAVENOUS at 04:35

## 2021-10-15 RX ADMIN — HYDROMORPHONE HYDROCHLORIDE 4 MG: 4 TABLET ORAL at 12:49

## 2021-10-15 RX ADMIN — MORPHINE SULFATE 4 MG: 4 INJECTION, SOLUTION INTRAMUSCULAR; INTRAVENOUS at 18:34

## 2021-10-15 RX ADMIN — HYDROMORPHONE HYDROCHLORIDE 4 MG: 4 TABLET ORAL at 17:01

## 2021-10-15 RX ADMIN — INSULIN GLARGINE 12 UNITS: 100 INJECTION, SOLUTION SUBCUTANEOUS at 20:45

## 2021-10-15 RX ADMIN — MORPHINE SULFATE 4 MG: 4 INJECTION, SOLUTION INTRAMUSCULAR; INTRAVENOUS at 20:46

## 2021-10-15 RX ADMIN — CEFTRIAXONE SODIUM 2 G: 2 INJECTION, POWDER, FOR SOLUTION INTRAMUSCULAR; INTRAVENOUS at 20:45

## 2021-10-15 RX ADMIN — SODIUM CHLORIDE 125 ML/HR: 9 INJECTION, SOLUTION INTRAVENOUS at 12:48

## 2021-10-15 RX ADMIN — DULOXETINE HYDROCHLORIDE 60 MG: 30 CAPSULE, DELAYED RELEASE ORAL at 09:00

## 2021-10-15 RX ADMIN — INSULIN LISPRO 3 UNITS: 100 INJECTION, SOLUTION INTRAVENOUS; SUBCUTANEOUS at 12:48

## 2021-10-15 RX ADMIN — HYDROCODONE BITARTRATE AND ACETAMINOPHEN 0.5 TABLET: 10; 325 TABLET ORAL at 22:08

## 2021-10-15 RX ADMIN — PREGABALIN 75 MG: 75 CAPSULE ORAL at 09:00

## 2021-10-15 RX ADMIN — SODIUM CHLORIDE 125 ML/HR: 9 INJECTION, SOLUTION INTRAVENOUS at 04:35

## 2021-10-15 RX ADMIN — INSULIN LISPRO 3 UNITS: 100 INJECTION, SOLUTION INTRAVENOUS; SUBCUTANEOUS at 17:17

## 2021-10-15 RX ADMIN — MORPHINE SULFATE 4 MG: 4 INJECTION, SOLUTION INTRAMUSCULAR; INTRAVENOUS at 10:34

## 2021-10-15 RX ADMIN — MORPHINE SULFATE 4 MG: 4 INJECTION, SOLUTION INTRAMUSCULAR; INTRAVENOUS at 14:27

## 2021-10-15 RX ADMIN — PANTOPRAZOLE SODIUM 40 MG: 40 TABLET, DELAYED RELEASE ORAL at 16:29

## 2021-10-15 RX ADMIN — MORPHINE SULFATE 4 MG: 4 INJECTION, SOLUTION INTRAMUSCULAR; INTRAVENOUS at 07:32

## 2021-10-15 RX ADMIN — MORPHINE SULFATE 4 MG: 4 INJECTION, SOLUTION INTRAMUSCULAR; INTRAVENOUS at 22:59

## 2021-10-15 NOTE — PROGRESS NOTES
LOS: 0 days   Patient Care Team:  Fred Lemons FNP as PCP - General (Family Medicine)        Subjective     Severe pain.  Only doing IV medicines currently, cannot take Percocet      Objective     Vital Signs  Vitals:    10/14/21 1700 10/14/21 2100 10/15/21 0100 10/15/21 0500   BP: 137/83 128/83 133/83 126/77   BP Location: Right arm      Patient Position: Lying      Pulse: 88 92 109 100   Resp: 16 18 16 16   Temp: 97.8 °F (36.6 °C) 97.8 °F (36.6 °C) 98.3 °F (36.8 °C) 97.9 °F (36.6 °C)   TempSrc: Oral Oral Oral Oral   SpO2: 96% 97% 96% 100%   Weight:    100 kg (220 lb 10.9 oz)   Height:           Physical Exam:   Alert and orient x3  Dressing clean dry and intact but has been reinforced and was bleeding last night     Results Review:     I reviewed the patient's new clinical results.  I reviewed the patient's new imaging results    Lab Results (last 24 hours)     Procedure Component Value Units Date/Time    POC Glucose Once [[id number]]  (Abnormal) Collected: 10/14/21 1955    Specimen: Blood Updated: 10/14/21 1958     Glucose 225 mg/dL      Comment: Serial Number: [id number]:  [id number]       Basic Metabolic Panel [[id number]]  (Abnormal) Collected: 10/14/21 1832    Specimen: Blood Updated: 10/14/21 1907     Glucose 239 mg/dL      BUN 21 mg/dL      Creatinine 0.81 mg/dL      Sodium 139 mmol/L      Potassium 4.2 mmol/L      Chloride 103 mmol/L      CO2 22.0 mmol/L      Calcium 8.0 mg/dL      eGFR Non African Amer 103 mL/min/1.73      BUN/Creatinine Ratio 25.9     Anion Gap 14.0 mmol/L     Narrative:      GFR Normal >60  Chronic Kidney Disease <60  Kidney Failure <15      Tissue / Bone Culture - Tissue, Leg, Right [[id number]] Collected: 10/14/21 1143    Specimen: Tissue from Leg, Right Updated: 10/14/21 1257     Gram Stain Few (2+) WBCs per low power field      No organisms seen    Tissue Pathology Exam [[id number]] Collected: 10/14/21 1109    Specimen: Tissue from Leg, Right Updated: 10/14/21 8150     Anaerobic Culture - Tissue, Leg, Right [[id number]] Collected: 10/14/21 1143    Specimen: Tissue from Leg, Right Updated: 10/14/21 1215    POC Glucose Once [[id number]]  (Abnormal) Collected: 10/14/21 1202    Specimen: Blood Updated: 10/14/21 1203     Glucose 192 mg/dL      Comment: Serial Number: [id number]:  [id number]       POC Glucose Once [[id number]]  (Abnormal) Collected: 10/14/21 0908    Specimen: Blood Updated: 10/14/21 0909     Glucose 186 mg/dL      Comment: Serial Number: [id number]:  [id number]           Imaging Results (Last 24 Hours)     Procedure Component Value Units Date/Time    XR Femur 2 View Right [[id number]] Collected: 10/14/21 1344     Updated: 10/14/21 1349    Narrative:      DATE OF EXAM:  10/14/2021 1:42 PM     PROCEDURE:  XR FEMUR 2 VW RIGHT-     INDICATIONS:  Amputation; M86.661-Other chronic osteomyelitis, right tibia and fibula     COMPARISON:  Radiograph 9/23/2021. MRI 9/7/2021.     TECHNIQUE:   Two radiographic views of the right femur were obtained.        FINDINGS:  2 views of the right femur were obtained. New postoperative changes from  AKA with likely postoperative soft tissue swelling and air at the distal  stump. Distal surgical skin staples. Acute fracture or dislocation. No  lytic or sclerotic changes to suggest osteomyelitis.        Impression:      New postoperative changes from AKA.     Electronically Signed By-Delbert Dinero MD On:10/14/2021 1:47 PM  This report was finalized on [id number] by  Delbert Dinero MD.            Medication Review:   Scheduled Meds:aspirin, 325 mg, Oral, Daily  cefTRIAXone, 2 g, Intravenous, Q24H  DULoxetine, 60 mg, Oral, Daily  insulin glargine, 12 Units, Subcutaneous, Nightly  insulin lispro, 0-14 Units, Subcutaneous, TID AC  pantoprazole, 40 mg, Oral, Q PM  polyethylene glycol, 17 g, Oral, Daily  pregabalin, 75 mg, Oral, Daily      Continuous Infusions:lactated ringers, 9 mL/hr, Last Rate: 9 mL/hr (10/14/21 0927)  sodium chloride,  125 mL/hr, Last Rate: 125 mL/hr (10/15/21 0435)      PRN Meds:.•  acetaminophen  •  dextrose  •  dextrose  •  glucagon (human recombinant)  •  HYDROcodone-acetaminophen  •  HYDROcodone-acetaminophen  •  HYDROmorphone  •  insulin lispro **AND** insulin lispro  •  lactated ringers  •  Morphine **AND** naloxone  •  ondansetron **OR** ondansetron  •  prochlorperazine  •  promethazine  •  sodium chloride      Assessment/Plan     Stable but very painful    Plan for disposition: I will be out of town for the weekend.  Dr. Rivas will be covering.  We will keep for at least another day for pain control.  We will try Dilaudid p.o.  Do not remove dressing until postop day 5  Await hematocrit result    Donn Alcantara MD  10/15/21  07:22 EDT

## 2021-10-15 NOTE — PLAN OF CARE
Goal Outcome Evaluation:         Patient with significant pain today. Medication given frequently upon request. Dr. Alcantara aware of pain. Will continue to monitor

## 2021-10-15 NOTE — DISCHARGE PLACEMENT REQUEST
"Ebenezer Gregg (46 y.o. Male)             Date of Birth Social Security Number Address Home Phone MRN    1975  [id number]  [place] [address] [phone] [id number]    Jain Marital Status             Rastafari Significant Other       Admission Date Admission Type Admitting Provider Attending Provider Department, Room/Bed    10/14/21 Elective Donn Alcantara MD Conner, John M, MD [facility] SURGICAL INPATIENT, 4117/1    Discharge Date Discharge Disposition Discharge Destination                         Attending Provider: Donn Alcantara MD    Allergies: Metformin, Oxycodone    Isolation: None   Infection: None   Code Status: CPR   Advance Care Planning Activity    Ht: 190.5 cm (75\")   Wt: 100 kg (220 lb 10.9 oz)    Admission Cmt: None   Principal Problem: Osteomyelitis, chronic, lower leg, right (HCC) [M86.661]                 Active Insurance as of 10/14/2021     Primary Coverage     Payor Plan Insurance Group Employer/Plan Group    [facility] [employer] [facility] [id number] [id number]     Payor Plan Address Payor Plan Phone Number Payor Plan Fax Number Effective Dates    [address] [phone]  10/13/2019 - None Entered    [facility] [address]       Subscriber Name Subscriber Birth Date Member ID       BEENEZER GREGG 1975 [id number]                 Emergency Contacts      (Rel.) Home Phone Work Phone Mobile Phone    REJI LANCASTER (Significant Other) [phone] -- [phone]              "

## 2021-10-15 NOTE — PROGRESS NOTES
[facility] Medicine Services Daily Progress Note    Patient Name: Maynor Gregg  : 1975  MRN: [id number]  Primary Care Physician:  Fred Lemons FNP  Date of admission: 10/14/2021      Subjective      Chief Complaint: Status post right above-knee amputation      Patient Reports    10/15/2021  Complaining of severe incisional pain    Review of Systems   Constitutional: Negative for chills and fever.   HENT: Negative for congestion.    Eyes: Negative for blurred vision.   Cardiovascular: Negative for chest pain.   Respiratory: Negative for shortness of breath.    Endocrine: Negative for cold intolerance and heat intolerance.   Musculoskeletal: Positive for arthritis.   Gastrointestinal: Negative for abdominal pain, nausea and vomiting.   Genitourinary: Negative for dysuria.   Neurological: Positive for weakness.   Psychiatric/Behavioral: Negative for altered mental status.          Objective      Vitals:   Temp:  [97.1 °F (36.2 °C)-98.3 °F (36.8 °C)] 98.3 °F (36.8 °C)  Heart Rate:  [] 99  Resp:  [9-19] 17  BP: (117-147)/(76-87) 143/87  Flow (L/min):  [2-6] 2    Physical Exam  Vitals reviewed.   Constitutional:       General: He is not in acute distress.  HENT:      Head: Normocephalic and atraumatic.      Nose: Nose normal.      Mouth/Throat:      Mouth: Mucous membranes are moist.   Eyes:      Extraocular Movements: Extraocular movements intact.      Conjunctiva/sclera: Conjunctivae normal.      Pupils: Pupils are equal, round, and reactive to light.   Cardiovascular:      Rate and Rhythm: Normal rate and regular rhythm.   Pulmonary:      Effort: No respiratory distress.      Breath sounds: Normal breath sounds.   Abdominal:      General: Bowel sounds are normal.      Palpations: Abdomen is soft.      Tenderness: There is no abdominal tenderness.   Musculoskeletal:      Cervical back: Neck supple.      Comments: Left BKA  Right AKA with surgical dressing intact   Skin:      General: Skin is warm and dry.   Neurological:      Mental Status: He is alert and oriented to person, place, and time.   Psychiatric:         Mood and Affect: Mood normal.            Result Review    Result Review:  I have personally reviewed the results from the time of this admission to 10/15/2021 10:08 EDT and agree with these findings:  [x]  Laboratory  [x]  Microbiology  [x]  Radiology  []  EKG/Telemetry   []  Cardiology/Vascular   []  Pathology  []  Old records  []  Other:  Most notable findings include:     Wounds (last 24 hours)     LDA Wound     Row Name 10/15/21 0732 10/14/21 1903 10/14/21 1701       Wound 10/14/21 1046 Right distal thigh Incision    Wound - Properties Group Placement Date: 10/14/21  - Placement Time: 1046 -MH Present on Hospital Admission: N  -MH Side: Right  -MH Orientation: distal  -MH Location: thigh  -MH Primary Wound Type: Incision  -MH Additional Comments: DRESSING- XEROFORM, 4X4, WEBRIL, COBAN  -MH    Dressing Appearance intact; moist drainage  -CS intact; moist drainage  -DV intact; dried drainage  -CS    Closure DORIS  -CS DORIS  -DV DORIS  -CS    Base dressing in place, unable to visualize  -CS dressing in place, unable to visualize  -DV dressing in place, unable to visualize  -CS    Drainage Characteristics/Odor serosanguineous  -CS serosanguineous  -DV serosanguineous  -CS    Drainage Amount -- moderate  -DV small  -CS    Dressing Care -- dressing reinforced  -DV --    Retired Wound - Properties Group Date first assessed: 10/14/21  - Time first assessed: 1046 -MH Present on Hospital Admission: N  -MH Side: Right  -MH Location: thigh  -MH Primary Wound Type: Incision  -MH Additional Comments: DRESSING- XEROFORM, 4X4, WEBRIL, COBAN  -MH       Wound Right anterior leg Soft Tissue Necrosis    Wound - Properties Group Present on Hospital Admission: Y  -TL Side: Right  -TL Orientation: anterior  -TL Location: leg  -TL Primary Wound Type: Soft tissue  -TL    Retired Wound -  Properties Group Present on Hospital Admission: Y  -TL Side: Right  -TL Location: leg  -TL Primary Wound Type: Soft tissue  -TL    Row Name 10/14/21 1624 10/14/21 1544 10/14/21 1430       Wound 10/14/21 1046 Right distal thigh Incision    Wound - Properties Group Placement Date: 10/14/21  - Placement Time: 1046  -MH Present on Hospital Admission: N  -MH Side: Right  -MH Orientation: distal  -MH Location: thigh  -MH Primary Wound Type: Incision  -MH Additional Comments: DRESSING- XEROFORM, 4X4, WEBRIL, COBAN  -MH    Dressing Appearance intact; dry; dried drainage  -DM intact; moist drainage  -DM dry; intact; no drainage  -DM    Closure DORIS  -DM -- --    Drainage Characteristics/Odor serosanguineous  -DM serosanguineous  -DM --    Drainage Amount small  -DM small  -DM none  -DM    Dressing Care -- dressing reinforced  -DM --    Retired Wound - Properties Group Date first assessed: 10/14/21  - Time first assessed: 1046  -MH Present on Hospital Admission: N  -MH Side: Right  -MH Location: thigh  -MH Primary Wound Type: Incision  -MH Additional Comments: DRESSING- XEROFORM, 4X4, WEBRIL, COBAN  -MH       Wound Right anterior leg Soft Tissue Necrosis    Wound - Properties Group Present on Hospital Admission: Y  -TL Side: Right  -TL Orientation: anterior  -TL Location: leg  -TL Primary Wound Type: Soft tissue  -TL    Retired Wound - Properties Group Present on Hospital Admission: Y  -TL Side: Right  -TL Location: leg  -TL Primary Wound Type: Soft tissue  -TL    Row Name 10/14/21 1347 10/14/21 1320 10/14/21 1305       Wound 10/14/21 1046 Right distal thigh Incision    Wound - Properties Group Placement Date: 10/14/21  - Placement Time: 1046  -MH Present on Hospital Admission: N  -MH Side: Right  -MH Orientation: distal  -MH Location: thigh  -MH Primary Wound Type: Incision  -MH Additional Comments: DRESSING- XEROFORM, 4X4, WEBRIL, COBAN  -MH    Retired Wound - Properties Group Date first assessed: 10/14/21  - Time  first assessed: 1046  -MH Present on Hospital Admission: N  -MH Side: Right  -MH Location: thigh  -MH Primary Wound Type: Incision  -MH Additional Comments: DRESSING- XEROFORM, 4X4, WEBRIL, COBAN  -MH       Wound Right anterior leg Soft Tissue Necrosis    Wound - Properties Group Present on Hospital Admission: Y  -TL Side: Right  -TL Orientation: anterior  -TL Location: leg  -TL Primary Wound Type: Soft tissue  -TL    Dressing Appearance dry; intact; no drainage  -DM -- --    Closure DORIS  -DM DORIS  -DM DORIS  -DM    Retired Wound - Properties Group Present on Hospital Admission: Y  -TL Side: Right  -TL Location: leg  -TL Primary Wound Type: Soft tissue  -TL    Row Name 10/14/21 1250 10/14/21 1235 10/14/21 1220       Wound 10/14/21 1046 Right distal thigh Incision    Wound - Properties Group Placement Date: 10/14/21  - Placement Time: 1046  - Present on Hospital Admission: N  -MH Side: Right  - Orientation: distal  - Location: thigh  -MH Primary Wound Type: Incision  -MH Additional Comments: DRESSING- XEROFORM, 4X4, WEBRIL, COBAN  -MH    Retired Wound - Properties Group Date first assessed: 10/14/21  - Time first assessed: 1046  - Present on Hospital Admission: N  -MH Side: Right  -MH Location: thigh  -MH Primary Wound Type: Incision  -MH Additional Comments: DRESSING- XEROFORM, 4X4, WEBRIL, COBAN  -MH       Wound Right anterior leg Soft Tissue Necrosis    Wound - Properties Group Present on Hospital Admission: Y  -TL Side: Right  -TL Orientation: anterior  -TL Location: leg  -TL Primary Wound Type: Soft tissue  -TL    Dressing Appearance -- dry; intact; no drainage  -DM dry; intact; no drainage  -DM    Closure DORIS  -DM DORIS  -DM DORIS  -DM    Retired Wound - Properties Group Present on Hospital Admission: Y  -TL Side: Right  -TL Location: leg  -TL Primary Wound Type: Soft tissue  -TL    Row Name 10/14/21 1205 10/14/21 1150          Wound 10/14/21 1046 Right distal thigh Incision    Wound - Properties Group  Placement Date: 10/14/21  - Placement Time: 1046  -MH Present on Hospital Admission: N  -MH Side: Right  -MH Orientation: distal  -MH Location: thigh  -MH Primary Wound Type: Incision  -MH Additional Comments: DRESSING- XEROFORM, 4X4, WEBRIL, COBAN  -MH     Retired Wound - Properties Group Date first assessed: 10/14/21  - Time first assessed: 1046  -MH Present on Hospital Admission: N  -MH Side: Right  -MH Location: thigh  -MH Primary Wound Type: Incision  -MH Additional Comments: DRESSING- XEROFORM, 4X4, WEBRIL, COBAN  -MH            Wound Right anterior leg Soft Tissue Necrosis    Wound - Properties Group Present on Hospital Admission: Y  -TL Side: Right  -TL Orientation: anterior  -TL Location: leg  -TL Primary Wound Type: Soft tissue  -TL     Dressing Appearance dry; intact; no drainage  -DM --     Closure DORIS  -DM DORIS  -DM     Retired Wound - Properties Group Present on Hospital Admission: Y  -TL Side: Right  -TL Location: leg  -TL Primary Wound Type: Soft tissue  -TL           User Key  (r) = Recorded By, (t) = Taken By, (c) = Cosigned By    Initials Name Provider Type    TL Yoana Kim RN Registered Nurse    Daphne Blount RN Registered Nurse    Hanane Giraldo RN Registered Nurse    Shana Colbert RN Registered Nurse    Josi Mcknight LPN Licensed Nurse                  Assessment/Plan      Brief Patient Summary:  46-year-old man with right tibial osteomyelitis who has failed antibiotic therapy and was brought in for right above-the-knee amputation on 10/14/2021.  Postprocedure, hospitalist was consulted for medical management.      aspirin, 325 mg, Oral, Daily  cefTRIAXone, 2 g, Intravenous, Q24H  DULoxetine, 60 mg, Oral, Daily  insulin glargine, 12 Units, Subcutaneous, Nightly  insulin lispro, 0-14 Units, Subcutaneous, TID AC  pantoprazole, 40 mg, Oral, Q PM  polyethylene glycol, 17 g, Oral, Daily  pregabalin, 75 mg, Oral, Daily       lactated ringers, 9 mL/hr, Last Rate: 9 mL/hr  (10/14/21 0927)  sodium chloride, 125 mL/hr, Last Rate: 125 mL/hr (10/15/21 0742)         Active Hospital Problems:  Active Hospital Problems    Diagnosis    • **Osteomyelitis, chronic, lower leg, right (HCC)    • GERD (gastroesophageal reflux disease)    • Chronic pain    • CAD (coronary artery disease)    • Type 1 diabetes mellitus with circulatory complication (HCC)      Plan:       Osteomyelitis of right lower extremity refractory to antibiotics treatment  Right AKA  10/14/2021  Pain control  PT following  Postop management per orthopedic surgeon     Diabetes mellitus type 1 with hyperglycemia  On Lantus, Premeal insulin and sliding scale insulin  Monitor blood sugar and adjust insulin as needed       GERD-on PPI        Depression/anxiety-on Cymbalta     Chronic pain  On Lyrica and Norco       DVT prophylaxis:  No DVT prophylaxis order currently exists.    CODE STATUS:    Level Of Support Discussed With: Patient  Code Status: CPR  Medical Interventions (Level of Support Prior to Arrest): Full      Disposition: Pending clinical progress    This patient has been examined with appropriate PPE10/15/21      Electronically signed by Oscar Pace MD, 10/15/21, 10:08 EDT.  [facility] Hospitalist Team

## 2021-10-15 NOTE — OUTREACH NOTE
Medical Week 3 Survey      Responses   [facility] patient discharged from? [facility]   Does the patient have one of the following disease processes/diagnoses(primary or secondary)? Other   Week 3 attempt successful? No   Revoke Readmitted          Brooklyn Bonner RN

## 2021-10-15 NOTE — CASE MANAGEMENT/SOCIAL WORK
Discharge Planning Assessment  [facility]     Patient Name: Maynor Gregg  MRN: [id number]  Today's Date: 10/15/2021    Admit Date: 10/14/2021     Discharge Needs Assessment     Row Name 10/15/21 1210       Living Environment    Lives With significant other    Current Living Arrangements home/apartment/condo    Primary Care Provided by self    Provides Primary Care For no one    Family Caregiver if Needed significant other    Quality of Family Relationships helpful    Able to Return to Prior Arrangements yes       Resource/Environmental Concerns    Resource/Environmental Concerns none    Transportation Concerns car, none       Transition Planning    Patient/Family Anticipates Transition to home with family; home with help/services    Patient/Family Anticipated Services at Transition none    Transportation Anticipated family or friend will provide       Discharge Needs Assessment    Equipment Currently Used at Home walker, rolling; wheelchair; prosthesis; glucometer; other (see comments)  knee scooter    Concerns to be Addressed denies needs/concerns at this time    Anticipated Changes Related to Illness none    Equipment Needed After Discharge none               Discharge Plan     Row Name 10/15/21 1211       Plan    Plan D/C Plan: Home with [facility] (pending acceptance, will need order).    Patient/Family in Agreement with Plan yes    Plan Comments  spoke to patient at bedside wearing mask and goggles and keeping distance greater than 6 feet and spent less than 15 minutes in room. Patient lives with fiance, is IADLs and drives. PCP and pharmacy verified-denies any difficulty affording meds. Patient states he may need home health and prefers [facility]-referral sent in [facility] and liaison notified. Patient denies any further d/c needs at this time and fiance will provide transport at d/c. Barrier to D/C: POD#1 R AKA, pain control.              Continued Care and Services - Admitted Since  10/14/2021     Home Medical Care     Service Provider Request Status Selected Services Address Phone Fax Patient Preferred     [facility]  P[facility] [address] [facility] [place] [address] [phone] --              Expected Discharge Date and Time     Expected Discharge Date Expected Discharge Time    Oct 16, 2021          Demographic Summary     Row Name 10/15/21 1210       General Information    Admission Type inpatient    Arrived From operating room    Referral Source admission list    Reason for Consult discharge planning    Preferred Language English     Used During This Interaction no               Functional Status     Row Name 10/15/21 1210       Functional Status    Usual Activity Tolerance good    Current Activity Tolerance good       Functional Status, IADL    Medications independent    Meal Preparation independent    Housekeeping independent    Laundry independent    Shopping independent       Mental Status    General Appearance WDL WDL       Mental Status Summary    Recent Changes in Mental Status/Cognitive Functioning no changes                Rebecca Reddy

## 2021-10-15 NOTE — CASE MANAGEMENT/SOCIAL WORK
Continued Stay Note   [facility]     Patient Name: Maynro Gregg  MRN: [id number]  Today's Date: 10/15/2021    Admit Date: 10/14/2021     Discharge Plan     Row Name 10/15/21 1524       Plan    Plan D/C Plan: Home with [facility] (accepted, order placed).                Expected Discharge Date and Time     Expected Discharge Date Expected Discharge Time    Oct 16, 2021             Rebecca Reddy

## 2021-10-15 NOTE — PLAN OF CARE
Goal Outcome Evaluation:      Pt. Declines therapy this date, will follow up as appropriate.

## 2021-10-15 NOTE — PLAN OF CARE
Goal Outcome Evaluation:     Pt VSS this shift, c/o severe pain to surgical site all of shift. PRN meds administered as needed as ordered. Glucose elevated at HS and insulin given as ordered. Monitoring continued

## 2021-10-15 NOTE — PAYOR COMM NOTE
"Requesting IP Auth for Ebenezer Gregg  1975  Policy no [id number]    AUTHORIZATION PENDING  PLEASE FORWARD DETERMINATION TO FOLLOWING CONTACT:    RAGHAV HERNANDEZTOBY [facility]    [phone]    Ebenezer Gregg (46 y.o. Male)             Date of Birth Social Security Number Address Home Phone MRN    1975  [id number] [place] [phone] [id number]    Presybeterian Marital Status             Yarsanism Significant Other       Admission Date Admission Type Admitting Provider Attending Provider Department, Room/Bed    10/14/21 Elective Donn Alcantara MD Conner, John M, MD [facility] SURGICAL INPATIENT,     Discharge Date Discharge Disposition Discharge Destination                         Attending Provider: Donn Alcantara MD    Allergies: Metformin, Oxycodone    Isolation: None   Infection: None   Code Status: CPR   Advance Care Planning Activity    Ht: 190.5 cm (75\")   Wt: 100 kg (220 lb 10.9 oz)    Admission Cmt: None   Principal Problem: Osteomyelitis, chronic, lower leg, right (HCC) [M86.661]                 Active Insurance as of 10/14/2021     Primary Coverage     Payor Plan Insurance Group Employer/Plan Group    [employer]HEM BLUE CROSS ANTH BLUE CROSS BLUE SHIELD PPO [id number]     Payor Plan Address Payor Plan Phone Number Payor Plan Fax Number Effective Dates    [address] [phone]  10/13/2019 - None Entered    [facility] [id number]       Subscriber Name Subscriber Birth Date Member ID       EBENEZER GREGG 1975 [id number]                 Emergency Contacts      (Rel.) Home Phone Work Phone Mobile Phone    TONNYREJI (Significant Other) [phone] -- [phone]               History & Physical      Donn Alcantara MD at 10/14/21 0990        Use H&P faxed from office yesterday and again this morning.  Paper copy placed on chart    Electronically signed by Donn Alcantara MD at 10/14/21 0994     H&P signed by " [employer], [place] at 10/14/21 1137      Scan on 10/14/2021 by [employer], [employer]: RT BKA H&P NOTE, DR ALCANTARA, 10/11/2021          Electronically signed by [employer] On[employer], [employer] at 10/14/21 1137          Operative/Procedure Notes (last 48 hours)      Donn Alcantara MD at 10/14/21 1046        AMPUTATION ABOVE KNEE  Procedure Report    Patient Name:  Maynor Gregg  YOB: 1975    Date of Surgery:  10/14/2021         Pre-op Diagnosis: Right tibial osteomyelitis    Postop diagnosis: Same    Indication: 46-year-old white male who had multiple revisions of a right BKA but was not healing. [place] that he needed knee Amputation to get this to heal      Procedure/CPT® Codes:      Procedure(s):  AMPUTATION ABOVE KNEE, right    Staff:  Surgeon(s):  Donn Alcantara MD    Assistant: Minerva Horowitz APRN assisted retraction hemostasis and wound closure was essential to the case    Anesthesia: General with Block    Estimated Blood Loss: 100 mL    Implants:    Nothing was implanted during the procedure    Specimen:          Leg sent for gross  Tissue sample from the distal stump sent for culture        Findings: Healthy tissue at the amputation level    Complications: None    Description of Procedure: Patient seen preoperatively. Had the right lower extremity initialed. Had 2 g Kefzol IV preoperatively. Was taken the OR where timeout form. Had general anesthesia. Had already had a block. Had a bump under his right hip. Sterile prep and draping of the right lower extremity supine. Had tourniquet sterilely placed on the upper thigh. This inflated to 300 mmHg. Total tourniquet time was 20 minutes. [place] incisions were marked at the distal thigh. Distal stump of been sealed and Ioban before prepping and then a stockinette placed over it. This was sealed off again with Ioban. The fishmouth incisions were made with anterior posterior flaps. The present at the bone anteriorly and then the femur was transected  transversely about 9 cm above the knee joint. The posterior flap was then continued after clamping large vessels and nerves cutting between the clamps. The leg was placed on the back table for later removal tissue for culture. The nerves and vessels were tied off with 2-0 silk stick ties and then tourniquet released. Smaller vessels were cauterized. The muscle was trans-. This edge of the bone was smoothed with a rongeur. The wound was irrigated with saline and flaps were closed anterior to posteriorly with #1 Vicryl subcu with 2-0 Vicryl interrupted and skin with staples dogears were corrected. Some moderate size gaps were left for fluid drainage. Sterile dressings were applied.    Plan: Antibiotics for 5 days.        Donn Alcantara MD     Date: 10/14/2021  Time: 14:11 EDT        Electronically signed by Donn Alcantara MD at 10/14/21 1415          Physician Progress Notes (last 24 hours)      Donn Alcantara MD at 10/15/21 0722               LOS: 0 days   Patient Care Team:  Fred Lemons FNP as PCP - General (Family Medicine)        Subjective     Severe pain.  Only doing IV medicines currently, cannot take Percocet      Objective     Vital Signs  Vitals:    10/14/21 1700 10/14/21 2100 10/15/21 0100 10/15/21 0500   BP: 137/83 128/83 133/83 126/77   BP Location: Right arm      Patient Position: Lying      Pulse: 88 92 109 100   Resp: 16 18 16 16   Temp: 97.8 °F (36.6 °C) 97.8 °F (36.6 °C) 98.3 °F (36.8 °C) 97.9 °F (36.6 °C)   TempSrc: Oral Oral Oral Oral   SpO2: 96% 97% 96% 100%   Weight:    100 kg (220 lb 10.9 oz)   Height:           Physical Exam:   Alert and orient x3  Dressing clean dry and intact but has been reinforced and was bleeding last night     Results Review:     I reviewed the patient's new clinical results.  I reviewed the patient's new imaging results    Lab Results (last 24 hours)     Procedure Component Value Units Date/Time    POC Glucose Once [[id number]]  (Abnormal) Collected: 10/14/21 1955     Specimen: Blood Updated: 10/14/21 1958     Glucose 225 mg/dL      Comment: Serial Number: [id number]:  [id number]       Basic Metabolic Panel [[id number]]  (Abnormal) Collected: 10/14/21 1832    Specimen: Blood Updated: 10/14/21 1907     Glucose 239 mg/dL      BUN 21 mg/dL      Creatinine 0.81 mg/dL      Sodium 139 mmol/L      Potassium 4.2 mmol/L      Chloride 103 mmol/L      CO2 22.0 mmol/L      Calcium 8.0 mg/dL      eGFR Non African Amer 103 mL/min/1.73      BUN/Creatinine Ratio 25.9     Anion Gap 14.0 mmol/L     Narrative:      GFR Normal >60  Chronic Kidney Disease <60  Kidney Failure <15      Tissue / Bone Culture - Tissue, Leg, Right [[id number]] Collected: 10/14/21 1143    Specimen: Tissue from Leg, Right Updated: 10/14/21 1257     Gram Stain Few (2+) WBCs per low power field      No organisms seen    Tissue Pathology Exam [[id number]] Collected: 10/14/21 1109    Specimen: Tissue from Leg, Right Updated: 10/14/21 1249    Anaerobic Culture - Tissue, Leg, Right [[id number]] Collected: 10/14/21 1143    Specimen: Tissue from Leg, Right Updated: 10/14/21 1215    POC Glucose Once [[id number]]  (Abnormal) Collected: 10/14/21 1202    Specimen: Blood Updated: 10/14/21 1203     Glucose 192 mg/dL      Comment: Serial Number: [id number]:  [id number]       POC Glucose Once [[id number]]  (Abnormal) Collected: 10/14/21 0908    Specimen: Blood Updated: 10/14/21 0909     Glucose 186 mg/dL      Comment: Serial Number: [id number]:  [id number]           Imaging Results (Last 24 Hours)     Procedure Component Value Units Date/Time    XR Femur 2 View Right [[id number]] Collected: 10/14/21 1344     Updated: 10/14/21 1349    Narrative:      DATE OF EXAM:  10/14/2021 1:42 PM     PROCEDURE:  XR FEMUR 2 VW RIGHT-     INDICATIONS:  Amputation; M86.661-Other chronic osteomyelitis, right tibia and fibula     COMPARISON:  Radiograph 9/23/2021. MRI 9/7/2021.     TECHNIQUE:   Two radiographic views of the right femur were  obtained.        FINDINGS:  2 views of the right femur were obtained. New postoperative changes from  AKA with likely postoperative soft tissue swelling and air at the distal  stump. Distal surgical skin staples. Acute fracture or dislocation. No  lytic or sclerotic changes to suggest osteomyelitis.        Impression:      New postoperative changes from AKA.     Electronically Signed By-Delbert Dinero MD On:10/14/2021 1:47 PM  This report was finalized on [id number] by  Delbert Dinero MD.            Medication Review:   Scheduled Meds:aspirin, 325 mg, Oral, Daily  cefTRIAXone, 2 g, Intravenous, Q24H  DULoxetine, 60 mg, Oral, Daily  insulin glargine, 12 Units, Subcutaneous, Nightly  insulin lispro, 0-14 Units, Subcutaneous, TID AC  pantoprazole, 40 mg, Oral, Q PM  polyethylene glycol, 17 g, Oral, Daily  pregabalin, 75 mg, Oral, Daily      Continuous Infusions:lactated ringers, 9 mL/hr, Last Rate: 9 mL/hr (10/14/21 0927)  sodium chloride, 125 mL/hr, Last Rate: 125 mL/hr (10/15/21 0435)      PRN Meds:.•  acetaminophen  •  dextrose  •  dextrose  •  glucagon (human recombinant)  •  HYDROcodone-acetaminophen  •  HYDROcodone-acetaminophen  •  HYDROmorphone  •  insulin lispro **AND** insulin lispro  •  lactated ringers  •  Morphine **AND** naloxone  •  ondansetron **OR** ondansetron  •  prochlorperazine  •  promethazine  •  sodium chloride      Assessment/Plan     Stable but very painful    Plan for disposition: I will be out of town for the weekend.  Dr. Rivas will be covering.  We will keep for at least another day for pain control.  We will try Dilaudid p.o.  Do not remove dressing until postop day 5  Await hematocrit result    Donn Alcantara MD  10/15/21  07:22 EDT          Electronically signed by Donn Alcantara MD at 10/15/21 0731          Consult Notes (last 24 hours)      Oscar Pace MD at 10/14/21 1525              [facility] Medicine Services      Patient Name: Maynor Gregg  :  1975  MRN: [id number]  Primary Care Physician:  Fred Lemons FNP  Date of admission: 10/14/2021      Subjective      Chief Complaint: Right leg pain    History of Present Illness: Maynor Gregg is a 46 y.o. male who presented to [facility] on 10/14/2021 complaining of continued right leg pain.  Patient reports that for approximately the past 1.5 years he has had worsening pain which began with a laceration followed by subsequent infection in his right foot which have required multiple levels of amputations and revisions due to continued soft tissue injury and failed healing with subsequent infections.  He was noted to have continued drainage, pain and poor wound healing at recent site of his BKA with failed antibiotic therapy and above-the-knee amputation was performed on 10/14/2021.  Post procedure patient notes some intractable nausea and vomiting as well as pain at the site of his amputation but no chest pain, dyspnea, palpitations, fever or chills.  He reports that he had postop nausea and vomiting following his previous procedure as well and that it responded to Zofran however Zofran has not alleviated his symptoms in this case    Most recent labs notable for glucose of 321 with remainder of BMP and CBC generally unremarkable    Review of Systems   Constitutional: Negative.   HENT: Negative.    Eyes: Negative.    Cardiovascular: Negative.    Respiratory: Negative.    Endocrine: Negative.    Skin: Positive for poor wound healing.   Musculoskeletal: Negative.    Gastrointestinal: Positive for nausea and vomiting. Negative for abdominal pain, constipation, diarrhea, hematemesis, hematochezia and melena.   Genitourinary: Negative.    Neurological: Negative.    Psychiatric/Behavioral: Negative.         Personal History     Past Medical History:   Diagnosis Date   • Anxiety    • Bone infection (HCC)     STATES CAUSED HIM TO LOSE RIGHT LEG   • CHF (congestive heart failure) (HCC)    • Coronary  artery disease    • Depression    • Diabetes mellitus (HCC)     TYPE 1   • Gallstones    • GERD (gastroesophageal reflux disease)    • Headache    • History of amputation     PEDRO BELOW THE KNEE   • MI, old 2010    WITH STENT PLACEMENT-CARDIO WANDA [place]-NO LONGER SEES   • Nodule of left lung     HAD CT SCAN BENIGN.    • Phantom limb pain (HCC)     LEFT AND RIGHT BOTH   • Seasonal allergies        Past Surgical History:   Procedure Laterality Date   • AMPUTATION DIGIT Right 2/28/2020    Procedure: PARTIAL FIRST RAY RESECTION RIGHT FOOT;  Surgeon: CROW Souza DPM;  Location: [facility] MAIN OR;  Service: Podiatry;  Laterality: Right;   • AMPUTATION FOOT / TOE     • AMPUTATION REVISION Right 3/2/2021    Procedure: AMPUTATION REVISION KNEE STUMP;  Surgeon: Donn Alcantara MD;  Location: [facility] MAIN OR;  Service: Orthopedics;  Laterality: Right;   • AMPUTATION REVISION Right 9/23/2021    Procedure: RIGHT BELOW KNEE AMPUTATION REVISION;  Surgeon: Donn Alcantara MD;  Location: [facility] MAIN OR;  Service: Orthopedics;  Laterality: Right;   • BELOW KNEE AMPUTATION Right 4/30/2020    Procedure: AMPUTATION BELOW KNEE;  Surgeon: Donn Alcantara MD;  Location: [facility] MAIN OR;  Service: Orthopedics;  Laterality: Right;   • BELOW KNEE AMPUTATION Left 9/2/2020    Procedure: AMPUTATION BELOW KNEE, left;  Surgeon: Donn Alcantara MD;  Location: [facility] MAIN OR;  Service: Orthopedics;  Laterality: Left;   • BELOW KNEE AMPUTATION Right 2/23/2021    Procedure: Revision of amputaion below knee;  Surgeon: Donn Alcantara MD;  Location: [facility] MAIN OR;  Service: Orthopedics;  Laterality: Right;   • CARDIAC CATHETERIZATION  11/2010     RCA artery   • CARDIAC CATHETERIZATION  2015    LAD artery   • CARDIAC SURGERY     • CORONARY ANGIOPLASTY WITH STENT PLACEMENT      X2. 2015   • INCISION AND DRAINAGE LEG Left 1/21/2020    Procedure: INCISION AND DRAINAGE LOWER EXTREMITY with second digit amputation;  Surgeon: CROW Souza,  URIEL;  Location: [facility] OR;  Service: Podiatry   • INCISION AND DRAINAGE LEG Right 4/28/2020    Procedure: incision and drainage ,transmetatarsal amputation, fasciotomy;  Surgeon: CROW Souza DPM;  Location: [facility] OR;  Service: Podiatry;  Laterality: Right;   • SKIN FULL THICKNESS GRAFT Right 7/29/2021    Procedure: EXCISION SOFT TISSUE Ulcer WITH FULL THICKNESS SKIN GRAFT to right lower leg.;  Surgeon: Sukhdeep Hernadez MD;  Location: [facility] OR;  Service: Plastics;  Laterality: Right;   • TOE AMPUTATION Left    • WOUND DEBRIDEMENT Right 1/21/2020    Procedure: DEBRIDEMENT with sesamoid excision;  Surgeon: CROW Souza DPM;  Location: [facility] OR;  Service: Podiatry       Family History: family history includes Diabetes in his father and paternal grandfather; Heart failure in his father. Otherwise pertinent FHx was reviewed and not pertinent to current issue.    Social History:  reports that he quit smoking about 2 years ago. He has a 3.00 pack-year smoking history. He has never used smokeless tobacco. He reports that he does not drink alcohol and does not use drugs.    Home Medications:  Prior to Admission Medications     Prescriptions Last Dose Informant Patient Reported? Taking?    aspirin 325 MG tablet Past Week  No Yes    Take 1 tablet by mouth Daily.    DULoxetine (CYMBALTA) 60 MG capsule 10/14/2021  Yes Yes    Take 60 mg by mouth Daily. Take DOS    HYDROcodone-acetaminophen (Norco)  MG per tablet 10/14/2021  No Yes    1 or 2 tablets p.o. every 4 hours as needed    insulin aspart (novoLOG) 100 UNIT/ML injection 10/13/2021  Yes Yes    Inject  under the skin into the appropriate area as directed 3 (Three) Times a Day Before Meals. PER SS. BETWEEN 3-20 UNITS  Hold DOS    Insulin Glargine (BASAGLAR KWIKPEN) 100 UNIT/ML injection pen 10/13/2021  Yes Yes    Inject 12 Units under the skin into the appropriate area as directed Every Night.    pantoprazole (PROTONIX) 40 MG EC  tablet 10/13/2021  Yes Yes    Take 40 mg by mouth Every Evening.    pregabalin (Lyrica) 75 MG capsule 10/13/2021  Yes Yes    Take 75 mg by mouth 2 (Two) Times a Day.            Allergies:  Allergies   Allergen Reactions   • Metformin Diarrhea and Nausea And Vomiting   • Oxycodone Nausea And Vomiting and Rash       Objective      Vitals:   Temp:  [97.1 °F (36.2 °C)-98.2 °F (36.8 °C)] 97.8 °F (36.6 °C)  Heart Rate:  [70-88] 88  Resp:  [9-19] 16  BP: (117-147)/(72-87) 137/83  Flow (L/min):  [2-6] 2    Physical Exam  Vitals reviewed.   Constitutional:       General: He is not in acute distress.     Appearance: Normal appearance. He is normal weight. He is not ill-appearing, toxic-appearing or diaphoretic.   HENT:      Head: Normocephalic and atraumatic.      Right Ear: External ear normal.      Left Ear: External ear normal.      Nose: Nose normal.      Mouth/Throat:      Mouth: Mucous membranes are moist.   Eyes:      Extraocular Movements: Extraocular movements intact.   Cardiovascular:      Rate and Rhythm: Normal rate and regular rhythm.      Pulses: Normal pulses.      Heart sounds: Normal heart sounds.   Pulmonary:      Effort: Pulmonary effort is normal.      Breath sounds: Normal breath sounds.   Abdominal:      General: Bowel sounds are normal. There is no distension.      Tenderness: There is no abdominal tenderness.   Musculoskeletal:         General: Normal range of motion.      Cervical back: Normal range of motion.   Skin:     General: Skin is warm and dry.      Capillary Refill: Capillary refill takes less than 2 seconds.      Comments: Serosanguineous drainage noted from dressing around patient recent procedure on right lower extremity   Neurological:      General: No focal deficit present.      Mental Status: He is alert and oriented to person, place, and time.   Psychiatric:         Mood and Affect: Mood normal.         Behavior: Behavior normal.         Thought Content: Thought content normal.          Judgment: Judgment normal.          Result Review    Result Review:  I have personally reviewed the results from the time of this admission to 10/14/2021 17:47 EDT and agree with these findings:  [x]  Laboratory  []  Microbiology  []  Radiology  []  EKG/Telemetry   []  Cardiology/Vascular   []  Pathology  []  Old records  []  Other:  Most notable findings include: BMP and CBC      Assessment/Plan        Active Hospital Problems:  Active Hospital Problems    Diagnosis    • **Osteomyelitis, chronic, lower leg, right (HCC)    • CAD (coronary artery disease)    • Type 1 diabetes mellitus with circulatory complication (HCC)    • GERD (gastroesophageal reflux disease)    • Chronic pain      Plan:     Osteomyelitis of right lower extremity  -Right AKA performed on 10/14/2021  -Rocephin and Levaquin ordered by surgeon  -Continue saline at 125 mils per hour  -Monitor CBC while admitted  -Morphine and Norco for pain per surgery  -Zofran initially ordered by surgery with Phenergan added    Diabetes mellitus  -Poorly controlled with most recent glucose of 321  -Restart basal insulin and add sliding scale  -Monitor before meals and at bedtime  -Diabetic diet    CAD  -Continue aspirin and cardiac monitoring    GERD  -PPI    Depression/anxiety  -Cymbalta    Chronic pain  -Lyrica and Norco      DVT prophylaxis:  No DVT prophylaxis order currently exists.    CODE STATUS:    Level Of Support Discussed With: Patient  Code Status: CPR  Medical Interventions (Level of Support Prior to Arrest): Full    Admission Status:  I believe this patient meets observation status.    I discussed the patient's findings and my recommendations with patient and nursing staff.        Signature: Electronically signed by Dominic Wu PA-C, 10/14/21, 5:55 PM EDT.    I have reviewed this documentation and agree.    I also personally evaluated this patient.    46-year-old man with right tibial osteomyelitis who has failed antibiotic therapy and was  brought in for right above-the-knee amputation on 10/14/2021.  Postprocedure, hospitalist was consulted for medical management.    On general examination patient is in no obvious painful or respiratory distress  Lungs are clear to auscultation  Abdomen benign  Extremities-surgical dressing to right lower extremity stump    Plan  Pain control  PT to evaluate  Restart on basal insulin, Premeal insulin and sliding scale insulin  Supportive care    Further recommendation following clinical course    Electronically signed by Oscar Pace MD, 10/15/21, 10:07 AM EDT.    Electronically signed by Oscar Pace MD at 10/15/21 1007

## 2021-10-15 NOTE — PLAN OF CARE
PT Eval not completed: Pt w/ 10/10 R residual limb pain and declines therapy eval this date.     Mar Chang, PT, DPT

## 2021-10-16 ENCOUNTER — READMISSION MANAGEMENT (OUTPATIENT)
Dept: CALL CENTER | Facility: HOSPITAL | Age: 46
End: 2021-10-16

## 2021-10-16 VITALS
WEIGHT: 220.68 LBS | TEMPERATURE: 97.7 F | SYSTOLIC BLOOD PRESSURE: 118 MMHG | OXYGEN SATURATION: 96 % | HEIGHT: 75 IN | DIASTOLIC BLOOD PRESSURE: 73 MMHG | RESPIRATION RATE: 16 BRPM | BODY MASS INDEX: 27.44 KG/M2 | HEART RATE: 95 BPM

## 2021-10-16 LAB
ANION GAP SERPL CALCULATED.3IONS-SCNC: 10 MMOL/L (ref 5–15)
BACTERIA SPEC AEROBE CULT: NORMAL
BUN SERPL-MCNC: 13 MG/DL (ref 6–20)
BUN/CREAT SERPL: 16.7 (ref 7–25)
CALCIUM SPEC-SCNC: 7.1 MG/DL (ref 8.6–10.5)
CHLORIDE SERPL-SCNC: 99 MMOL/L (ref 98–107)
CO2 SERPL-SCNC: 24 MMOL/L (ref 22–29)
CREAT SERPL-MCNC: 0.78 MG/DL (ref 0.76–1.27)
GFR SERPL CREATININE-BSD FRML MDRD: 107 ML/MIN/1.73
GLUCOSE BLDC GLUCOMTR-MCNC: 175 MG/DL (ref 70–105)
GLUCOSE BLDC GLUCOMTR-MCNC: 200 MG/DL (ref 70–105)
GLUCOSE SERPL-MCNC: 177 MG/DL (ref 65–99)
GRAM STN SPEC: NORMAL
GRAM STN SPEC: NORMAL
HCT VFR BLD AUTO: 32.6 % (ref 37.5–51)
HGB BLD-MCNC: 10.9 G/DL (ref 13–17.7)
POTASSIUM SERPL-SCNC: 3.6 MMOL/L (ref 3.5–5.2)
SODIUM SERPL-SCNC: 133 MMOL/L (ref 136–145)

## 2021-10-16 PROCEDURE — 80048 BASIC METABOLIC PNL TOTAL CA: CPT | Performed by: ORTHOPAEDIC SURGERY

## 2021-10-16 PROCEDURE — 25010000002 MORPHINE PER 10 MG: Performed by: ORTHOPAEDIC SURGERY

## 2021-10-16 PROCEDURE — 97166 OT EVAL MOD COMPLEX 45 MIN: CPT

## 2021-10-16 PROCEDURE — 85018 HEMOGLOBIN: CPT | Performed by: ORTHOPAEDIC SURGERY

## 2021-10-16 PROCEDURE — 85014 HEMATOCRIT: CPT | Performed by: ORTHOPAEDIC SURGERY

## 2021-10-16 PROCEDURE — 82962 GLUCOSE BLOOD TEST: CPT

## 2021-10-16 PROCEDURE — 63710000001 INSULIN LISPRO (HUMAN) PER 5 UNITS: Performed by: PHYSICIAN ASSISTANT

## 2021-10-16 RX ORDER — HYDROCODONE BITARTRATE AND ACETAMINOPHEN 10; 325 MG/1; MG/1
1 TABLET ORAL EVERY 4 HOURS PRN
Qty: 40 TABLET | Refills: 0 | Status: SHIPPED | OUTPATIENT
Start: 2021-10-16 | End: 2023-02-01

## 2021-10-16 RX ORDER — LEVOFLOXACIN 750 MG/1
750 TABLET ORAL EVERY 24 HOURS
Status: DISCONTINUED | OUTPATIENT
Start: 2021-10-16 | End: 2021-10-16 | Stop reason: HOSPADM

## 2021-10-16 RX ADMIN — LEVOFLOXACIN 750 MG: 750 TABLET, FILM COATED ORAL at 13:41

## 2021-10-16 RX ADMIN — Medication 10 ML: at 09:08

## 2021-10-16 RX ADMIN — INSULIN LISPRO 3 UNITS: 100 INJECTION, SOLUTION INTRAVENOUS; SUBCUTANEOUS at 13:41

## 2021-10-16 RX ADMIN — HYDROCODONE BITARTRATE AND ACETAMINOPHEN 1 TABLET: 7.5; 325 TABLET ORAL at 03:06

## 2021-10-16 RX ADMIN — INSULIN LISPRO 3 UNITS: 100 INJECTION, SOLUTION INTRAVENOUS; SUBCUTANEOUS at 09:08

## 2021-10-16 RX ADMIN — HYDROCODONE BITARTRATE AND ACETAMINOPHEN 1 TABLET: 7.5; 325 TABLET ORAL at 10:54

## 2021-10-16 RX ADMIN — DULOXETINE HYDROCHLORIDE 60 MG: 30 CAPSULE, DELAYED RELEASE ORAL at 09:08

## 2021-10-16 RX ADMIN — PREGABALIN 75 MG: 75 CAPSULE ORAL at 09:08

## 2021-10-16 RX ADMIN — HYDROCODONE BITARTRATE AND ACETAMINOPHEN 1 TABLET: 7.5; 325 TABLET ORAL at 07:00

## 2021-10-16 RX ADMIN — MORPHINE SULFATE 4 MG: 4 INJECTION, SOLUTION INTRAMUSCULAR; INTRAVENOUS at 01:56

## 2021-10-16 RX ADMIN — HYDROCODONE BITARTRATE AND ACETAMINOPHEN 1 TABLET: 7.5; 325 TABLET ORAL at 14:54

## 2021-10-16 NOTE — OUTREACH NOTE
Prep Survey      Responses   [facility] facility patient discharged from? [facility]   Is LACE score < 7 ? No   Emergency Room discharge w/ pulse ox? No   Eligibility Readm Mgmt   Discharge diagnosis above-the-knee amputation    Does the patient have one of the following disease processes/diagnoses(primary or secondary)? General Surgery   Does the patient have Home health ordered? Yes   What is the Home health agency?  [facility]    Is there a DME ordered? No   Prep survey completed? Yes          Brooklyn Bonner RN

## 2021-10-16 NOTE — PLAN OF CARE
Goal Outcome Evaluation:  Plan of Care Reviewed With: patient        Progress: improving  Outcome Summary: Pt is 45 y/o FT employed M living at home w/ fiancee & 3 children. He had separate BLE BKA in 2020 and has overall had many surgical procedures to manage his non-healing LE wounds. He is admitted for Rt BKA conversion to AKA. Pt is painful but able to use RW and LLE prosthetic, which he dons himself, to access the bathrooom for toileting. He is modified (I) and does not desire IP rehab placement, but he does request [facility] wound care and is eager to be fitted for a new prosthetic when able to access the community. Pt appears safe for home w/ HHC. Does not want OT or PT at home. Pt does appear very motivated to maintain his mobility & (I). Recommend OP PT once able to consistently access the community. Pain is limiting factor.

## 2021-10-16 NOTE — THERAPY EVALUATION
Patient Name: Maynor Gregg  : 1975    MRN: [id number]                              Today's Date: 10/16/2021       Admit Date: 10/14/2021    Visit Dx:     ICD-10-CM ICD-9-CM   1. Osteomyelitis, chronic, lower leg, right (HCC)  M86.661 730.16     Patient Active Problem List   Diagnosis   • Diabetic peripheral neuropathy (HCC)   • Type 1 diabetes mellitus with circulatory complication (HCC)   • Open wound of second toe of left foot   • Chronic ulcer of right foot, with necrosis of bone (HCC)   • Chronic ulcer of great toe of right foot, with necrosis of muscle (HCC)   • Chronic osteomyelitis of right foot (HCC)   • CAD (coronary artery disease)   • Hypotension   • Dyspnea   • Osteomyelitis of right foot (HCC)   • Nausea and vomiting   • Chest pain   • Sepsis, Gram positive (HCC)   • Acute osteomyelitis of right foot (HCC)   • COVID-19 virus not detected   • S/P BKA (below knee amputation), right (HCC)   • Wound of foot   • Wound of left foot   • Foot osteomyelitis, left (HCC)   • Infection of right below knee amputation (HCC)   • Infection of amputation stump of right lower extremity (HCC)   • Osteomyelitis, chronic, lower leg, right (HCC)   • GERD (gastroesophageal reflux disease)   • Chronic pain     Past Medical History:   Diagnosis Date   • Anxiety    • Bone infection (HCC)     STATES CAUSED HIM TO LOSE RIGHT LEG   • CHF (congestive heart failure) (HCC)    • Coronary artery disease    • Depression    • Diabetes mellitus (HCC)     TYPE 1   • Gallstones    • GERD (gastroesophageal reflux disease)    • Headache    • History of amputation     PEDRO BELOW THE KNEE   • MI, old     WITH STENT PLACEMENT-CARDIO MIGDALIAHIJOSE MANUEL Arona-NO LONGER SEES   • Nodule of left lung     HAD CT SCAN BENIGN.    • Phantom limb pain (HCC)     LEFT AND RIGHT BOTH   • Seasonal allergies      Past Surgical History:   Procedure Laterality Date   • ABOVE KNEE AMPUTATION Right 10/14/2021    Procedure: AMPUTATION ABOVE KNEE;   Surgeon: Donn Alcantara MD;  Location: [facility] MAIN OR;  Service: Orthopedics;  Laterality: Right;   • AMPUTATION DIGIT Right 2/28/2020    Procedure: PARTIAL FIRST RAY RESECTION RIGHT FOOT;  Surgeon: CROW Souza DPM;  Location: [facility] MAIN OR;  Service: Podiatry;  Laterality: Right;   • AMPUTATION FOOT / TOE     • AMPUTATION REVISION Right 3/2/2021    Procedure: AMPUTATION REVISION KNEE STUMP;  Surgeon: Donn Alcantara MD;  Location: [facility] MAIN OR;  Service: Orthopedics;  Laterality: Right;   • AMPUTATION REVISION Right 9/23/2021    Procedure: RIGHT BELOW KNEE AMPUTATION REVISION;  Surgeon: Donn Alcantara MD;  Location: [facility] MAIN OR;  Service: Orthopedics;  Laterality: Right;   • BELOW KNEE AMPUTATION Right 4/30/2020    Procedure: AMPUTATION BELOW KNEE;  Surgeon: Donn Alcantara MD;  Location: [facility] MAIN OR;  Service: Orthopedics;  Laterality: Right;   • BELOW KNEE AMPUTATION Left 9/2/2020    Procedure: AMPUTATION BELOW KNEE, left;  Surgeon: Donn Alcantara MD;  Location: [facility] MAIN OR;  Service: Orthopedics;  Laterality: Left;   • BELOW KNEE AMPUTATION Right 2/23/2021    Procedure: Revision of amputaion below knee;  Surgeon: Donn Alcantara MD;  Location: [facility] MAIN OR;  Service: Orthopedics;  Laterality: Right;   • CARDIAC CATHETERIZATION  11/2010     RCA artery   • CARDIAC CATHETERIZATION  2015    LAD artery   • CARDIAC SURGERY     • CORONARY ANGIOPLASTY WITH STENT PLACEMENT      X2. 2015   • INCISION AND DRAINAGE LEG Left 1/21/2020    Procedure: INCISION AND DRAINAGE LOWER EXTREMITY with second digit amputation;  Surgeon: CROW Souza DPM;  Location: [facility] MAIN OR;  Service: Podiatry   • INCISION AND DRAINAGE LEG Right 4/28/2020    Procedure: incision and drainage ,transmetatarsal amputation, fasciotomy;  Surgeon: CROW Souza DPM;  Location: [facility] MAIN OR;  Service: Podiatry;  Laterality: Right;   • SKIN FULL THICKNESS GRAFT Right 7/29/2021    Procedure: EXCISION SOFT TISSUE Ulcer WITH  FULL THICKNESS SKIN GRAFT to right lower leg.;  Surgeon: Sukhdeep Hernadez MD;  Location: [facility] MAIN OR;  Service: Plastics;  Laterality: Right;   • TOE AMPUTATION Left    • WOUND DEBRIDEMENT Right 1/21/2020    Procedure: DEBRIDEMENT with sesamoid excision;  Surgeon: CROW Souza DPM;  Location: [facility] MAIN OR;  Service: Podiatry      General Information     Row Name 10/16/21 0858          General Information    Prior Level of Function independent:; community mobility; ADL's; home management; using stairs; driving; work  mod (I) for all since separate bilateral LE amputations in 2020. Has wheelchairs, walkers, one fitting prosthetic for the LLE, knee scooter which will not be useful at this time, SC, etc.  -     Existing Precautions/Restrictions weight bearing  NWB to residual Rt residual limb stump.  -     Barriers to Rehab medically complex  -     Row Name 10/16/21 0858          Living Environment    Lives With significant other; child(marcus), dependent  fiancee & 3 children  -     Row Name 10/16/21 0877          Stairs Within Home, Primary    Number of Stairs, Within Home, Primary other (see comments)  flight up to bed/bath. Pt scoots up & down stairs on his bottom normally per his report.  -     Row Name 10/16/21 0858          Cognition    Orientation Status (Cognition) oriented x 4  -     Row Name 10/16/21 0878          Safety Issues, Functional Mobility    Impairments Affecting Function (Mobility) pain; other (see comments)  NWB status & BLE amputations  -           User Key  (r) = Recorded By, (t) = Taken By, (c) = Cosigned By    Initials Name Provider Type     Breann Miller OT Occupational Therapist                 Mobility/ADL's     Row Name 10/16/21 0901          Bed Mobility    Bed Mobility bed mobility (all) activities  -     All Activities, [place] (Bed Mobility) modified independence  -     Row Name 10/16/21 0901          Transfers    Transfers sit-stand transfer;  bed-chair transfer  -     Bed-Chair [place] (Transfers) modified [facility]     Assistive Device (Bed-Chair Transfers) walker, front-wheeled; other (see comments)  LLE prosthetic  -     Sit-Stand [place] (Transfers) modified independence  -     Row Name 10/16/21 0901          Sit-Stand Transfer    Assistive Device (Sit-Stand Transfers) walker, front-wheeled; other (see comments)  LLE prosthetic  -     Row Name 10/16/21 0901          Functional Mobility    Functional Mobility- Ind. Level conditional independence  -     Functional Mobility- Device rolling walker; other (see comments)  LLE prosthetic  -     Functional Mobility- Comment pt denies Hx of falls  -     Row Name 10/16/21 0901          Activities of Daily Living    BADL Assessment/Intervention lower body dressing; toileting; grooming  -     Row Name 10/16/21 0901          Mobility    Extremity Weight-bearing Status right lower extremity  -     Right Lower Extremity (Weight-bearing Status) non weight-bearing (NWB)  -     Row Name 10/16/21 0901          Lower Body Dressing Assessment/Training    [place] Level (Lower Body Dressing) don; other (see comments); set up  prosthetic  -     Position (Lower Body Dressing) edge of bed sitting  -     Row Name 10/16/21 0901          Toileting Assessment/Training    [place] Level (Toileting) modified [facility]     Comment (Toileting) pt had just come out of bathroom where he denied difficulty using commode with mod (I) except painful after walking w/ RW & LLE prosthetic.  -     Row Name 10/16/21 0901          Grooming Assessment/Training    [place] Level (Grooming) grooming skills; modified [place]  -           User Key  (r) = Recorded By, (t) = Taken By, (c) = Cosigned By    Initials Name Provider Type     Breann Miller OT Occupational Therapist               Obj/Interventions     Row Name 10/16/21 0904          Sensory Assessment (Somatosensory)     Sensory Subjective Reports hypersensitivity  -[facility] Name 10/16/21 0904          Vision Assessment/Intervention    Visual Impairment/Limitations WFL  -[facility] Name 10/16/21 0904          Range of Motion Comprehensive    General Range of Motion no range of motion deficits identified  -MH     Row Name 10/16/21 0904          Strength Comprehensive (MMT)    Comment, General Manual Muscle Testing (MMT) Assessment residual limb not tested due to pain. WNL elsewhere.  -MH     Row Name 10/16/21 0904          Balance    Balance Assessment sitting static balance; sitting dynamic balance; standing static balance; standing dynamic balance  -     Static Sitting Balance WNL  -     Dynamic Sitting Balance WNL  -     Static Standing Balance WFL; supported  -     Dynamic Standing Balance mild impairment; supported  -           User Key  (r) = Recorded By, (t) = Taken By, (c) = Cosigned By    Initials Name Provider Type    Breann Chapman OT Occupational Therapist               Goals/Plan    No documentation.                Clinical Impression     [facility] Name 10/16/21 0906          Pain Assessment    Additional Documentation Pain Scale: FACES Pre/Post-Treatment (Group)  -MH     Row Name 10/16/21 0906          Pain Scale: FACES Pre/Post-Treatment    Pain: FACES Scale, Pretreatment 4-->hurts little more  -     Posttreatment Pain Rating 4-->hurts little more  -     Pain Location - Side Right  -     Pain Location - Orientation lower  -     Pain Location extremity  -MH     Row Name 10/16/21 0906          Plan of Care Review    Plan of Care Reviewed With patient  -     Progress improving  -     Outcome Summary Pt is 45 y/o FT employed M living at home w/ fiancee & 3 children. He had separate BLE BKA in 2020 and has overall had many surgical procedures to manage his non-healing LE wounds. IS admitted for Rt BKA revision to AKA. Pt is painful but able to use RW and LLE prosthetic which he dons himself to  access the bathrooom for toileting. He is modified (I) and does not desire IP rehab placement, but he does request [facility] wound care and is eager to be fitted for a new prosthetic when able to access the community. Pt appears safe for home w/ HHC. Does not want OT or PT at home. Pt does appear very motivated to maintain his mobility & (I). Recommend OP PT once able to consistently access the community. Pain is limiting factor.  -     Row Name 10/16/21 0906          Therapy Assessment/Plan (OT)    Therapy Frequency (OT) evaluation only  -     Row Name 10/16/21 0906          Therapy Plan Review/Discharge Plan (OT)    Anticipated Discharge Disposition (OT) home with home health  -     Row Name 10/16/21 0906          Vital Signs    O2 Delivery Pre Treatment room air  -     Pre Patient Position Sitting  -     Intra Patient Position Standing  -     Post Patient Position Sitting  -     Row Name 10/16/21 0906          Positioning and Restraints    Pre-Treatment Position sitting in chair/recliner  -     Post Treatment Position chair  -     In Chair notified nsg; sitting; call light within reach  -           User Key  (r) = Recorded By, (t) = Taken By, (c) = Cosigned By    Initials Name Provider Type    Breann Chapman OT Occupational Therapist               Outcome Measures    No documentation.                 Occupational Therapy Education                 Title: PT OT SLP Therapies (In Progress)     Topic: Occupational Therapy (In Progress)     Point: ADL training (Not Started)     Description:   Instruct learner(s) on proper safety adaptation and remediation techniques during self care or transfers.   Instruct in proper use of assistive devices.              Learner Progress:  Not documented in this visit.          Point: Home exercise program (Done)     Description:   Instruct learner(s) on appropriate technique for monitoring, assisting and/or progressing therapeutic exercises/activities.               Learning Progress Summary           Patient Acceptance, E,TB, VU by  at 10/16/2021 0912                   Point: Precautions (Done)     Description:   Instruct learner(s) on prescribed precautions during self-care and functional transfers.              Learning Progress Summary           Patient Acceptance, E,TB, VU by  at 10/16/2021 0912                   Point: Body mechanics (Done)     Description:   Instruct learner(s) on proper positioning and spine alignment during self-care, functional mobility activities and/or exercises.              Learning Progress Summary           Patient Acceptance, E,TB, VU by  at 10/16/2021 0912                               User Key     Initials Effective Dates Name Provider Type Discipline     06/16/21 -  Breann Miller, OT Occupational Therapist OT              OT Recommendation and Plan  Therapy Frequency (OT): evaluation only  Plan of Care Review  Plan of Care Reviewed With: patient  Progress: improving  Outcome Summary: Pt is 47 y/o FT employed M living at home w/ fiancee & 3 children. He had separate BLE BKA in 2020 and has overall had many surgical procedures to manage his non-healing LE wounds. IS admitted for Rt BKA revision to AKA. Pt is painful but able to use RW and LLE prosthetic which he dons himself to access the bathrooom for toileting. He is modified (I) and does not desire IP rehab placement, but he does request [facility] wound care and is eager to be fitted for a new prosthetic when able to access the community. Pt appears safe for home w/ [facility]. Does not want OT or PT at home. Pt does appear very motivated to maintain his mobility & (I). Recommend OP PT once able to consistently access the community. Pain is limiting factor.     Time Calculation:    Time Calculation- OT     Row Name 10/16/21 0913             Time Calculation- OT    OT Start Time 0847  -      OT Stop Time 0854  -      OT Time Calculation (min) 7 min  -      Total Timed Code Minutes- OT 0  minute(s)  -      OT Received On 10/16/21  -            User Key  (r) = Recorded By, (t) = Taken By, (c) = Cosigned By    Initials Name Provider Type    Breann Chapman OT Occupational Therapist              Therapy Charges for Today     Code Description Service Date Service Provider Modifiers Qty    [id number]  OT EVAL MOD COMPLEXITY 3 10/16/2021 Breann Miller OT GO 1               Breann Miller OT  10/16/2021

## 2021-10-16 NOTE — PLAN OF CARE
Goal Outcome Evaluation:        Patient alert oriented able to make needs known. Patient frequently request pain medication both IV and po. Patient is on room air. Continues on IV pain medication. Dressing on right leg dry intact.

## 2021-10-16 NOTE — PROGRESS NOTES
LOS: 1 day   Patient Care Team:  Fred Lemons FNP as PCP - General (Family Medicine)    Subjective     Interval History:    Patient Complaints: Appropriate postoperative right lower extremity pain.  Requesting to go home.  Denies nausea, vomiting or any other symptoms.    History taken from: patient    Review of Systems   Constitutional: Positive for activity change. Negative for appetite change, fatigue and fever.   HENT: Negative for facial swelling.    Eyes: Negative for visual disturbance.   Respiratory: Negative for cough, shortness of breath, wheezing and stridor.    Cardiovascular: Negative for chest pain, palpitations and leg swelling.   Gastrointestinal: Negative for constipation, diarrhea, nausea and vomiting.   Endocrine: Negative for polyuria.   Genitourinary: Negative for dysuria.   Musculoskeletal: Positive for arthralgias and gait problem.   Skin: Positive for wound. Negative for rash.   Neurological: Negative for tremors, syncope, weakness and headaches.   Psychiatric/Behavioral: Negative for confusion.           Objective     Vital Signs  Temp:  [98.1 °F (36.7 °C)-99.7 °F (37.6 °C)] 98.9 °F (37.2 °C)  Heart Rate:  [] 101  Resp:  [18-20] 18  BP: (125-133)/(75-84) 125/78    Physical Exam:     General Appearance:    Alert, cooperative, in no acute distress,   Head:    Normocephalic, without obvious abnormality, atraumatic   Eyes:            Lids and lashes normal, conjunctivae and sclerae normal, no   icterus, no pallor, corneas clear, PERRLA   Ears:    Ears appear intact with no abnormalities noted   Throat:   No oral lesions, no thrush, oral mucosa moist   Neck:   No adenopathy, supple, trachea midline, no thyromegaly, no   carotid bruit, no JVD   Lungs:     Clear to auscultation,respirations regular, even and                  unlabored    Heart:    Regular rhythm and normal rate, normal S1 and S2, no            murmur, no gallop, no rub, no click   Chest Wall:    No abnormalities  "observed   Abdomen:     Normal bowel sounds, no masses, no organomegaly, soft        Non-tender non-distended, no guarding,   Extremities:  Left BKA, right AKA.  Dressing intact   Pulses:   Pulses palpable and equal bilaterally   Skin:  Notable excoriated lesions on forearms that patient states are from \"picking\" related to anxiety   Lymph nodes:   No palpable adenopathy   Neurologic:   Cranial nerves 2 - 12 grossly intact, sensation intact, DTR       present and equal bilaterally        Results Review:    Lab Results (last 24 hours)     Procedure Component Value Units Date/Time    POC Glucose Once [[id number]]  (Abnormal) Collected: 10/16/21 0737    Specimen: Blood Updated: 10/16/21 0738     Glucose 175 mg/dL      Comment: Serial Number: [id number]:  [id number]       Tissue / Bone Culture - Tissue, Leg, Right [[id number]] Collected: 10/14/21 1143    Specimen: Tissue from Leg, Right Updated: 10/16/21 0712     Tissue Culture Light growth (2+) Normal Skin Juliet     Gram Stain Few (2+) WBCs per low power field      No organisms seen    Basic Metabolic Panel [[id number]]  (Abnormal) Collected: 10/16/21 0301    Specimen: Blood Updated: 10/16/21 0334     Glucose 177 mg/dL      BUN 13 mg/dL      Creatinine 0.78 mg/dL      Sodium 133 mmol/L      Potassium 3.6 mmol/L      Chloride 99 mmol/L      CO2 24.0 mmol/L      Calcium 7.1 mg/dL      eGFR Non African Amer 107 mL/min/1.73      BUN/Creatinine Ratio 16.7     Anion Gap 10.0 mmol/L     Narrative:      GFR Normal >60  Chronic Kidney Disease <60  Kidney Failure <15      Hemoglobin & Hematocrit, Blood [[id number]]  (Abnormal) Collected: 10/16/21 0301    Specimen: Blood Updated: 10/16/21 0317     Hemoglobin 10.9 g/dL      Hematocrit 32.6 %     POC Glucose Once [[id number]]  (Abnormal) Collected: 10/15/21 1949    Specimen: Blood Updated: 10/15/21 1949     Glucose 170 mg/dL      Comment: Serial Number: [id number]:  [id number]       POC Glucose Once [[id number]]  (Abnormal) " "Collected: 10/15/21 1646    Specimen: Blood Updated: 10/15/21 1646     Glucose 152 mg/dL      Comment: Serial Number: [id number]:  [id number]       Tissue Pathology Exam [[id number]] Collected: 10/14/21 1109    Specimen: Tissue from Leg, Right Updated: 10/15/21 1257     Case Report --     Surgical Pathology Report                         Case: [id number]                                  Authorizing Provider:  Donn Alcantara MD         Collected:           10/14/2021 11:09 AM          Ordering Location:     [facility] MAIN  Received:            10/14/2021 12:49 PM                                 OR                                                                           Pathologist:           Wm Flynn MD                                                            Specimen:    Leg, Right, RIGHT ABOVE THE KNEE AMPUTATION                                                 Final Diagnosis --     Lower extremity, right, above knee amputation revision:    Skin ulceration, soft tissue chronic cellulitis and ischemic changes    Severe atherosclerotic changes of large vessels    Skin and soft tissue resection margin viable    MONICA/tkd        Gross Description --     Received designated \"Right above knee amputation\" is an above knee amputation revision specimen measuring 38 cm from proximal bony resection margin to distal stump. The specimen is covered with tan hair bearing unremarkable skin notable for a sutured wound in the distal portion consistent with a stump. There is a gap in the closure suggestive of wound dehiscence. No other abnormalities are identified. The skin, soft tissue and bony resection margins appear viable. No softening of the underlying bony tissues is identified. Sections as follows:    A: Area from suspected wound dehiscence  B: Proximal vessels  C: Skin and soft tissue at resection margin    MONICA/tkd              Imaging Results (Last 24 Hours)     ** No results found for the last 24 " hours. **               I reviewed the patient's new clinical results.    Medication Review:   Scheduled Meds:aspirin, 325 mg, Oral, Daily  cefTRIAXone, 2 g, Intravenous, Q24H  DULoxetine, 60 mg, Oral, Daily  insulin glargine, 12 Units, Subcutaneous, Nightly  insulin lispro, 0-14 Units, Subcutaneous, TID AC  pantoprazole, 40 mg, Oral, Q PM  polyethylene glycol, 17 g, Oral, Daily  pregabalin, 75 mg, Oral, Daily      Continuous Infusions:lactated ringers, 9 mL/hr, Last Rate: 9 mL/hr (10/14/21 0927)      PRN Meds:.•  acetaminophen  •  dextrose  •  dextrose  •  glucagon (human recombinant)  •  HYDROcodone-acetaminophen  •  HYDROcodone-acetaminophen  •  HYDROmorphone  •  insulin lispro **AND** insulin lispro  •  lactated ringers  •  Morphine **AND** naloxone  •  ondansetron **OR** ondansetron  •  prochlorperazine  •  promethazine  •  sodium chloride     Assessment/Plan       Osteomyelitis, chronic, lower leg, right (HCC)    Type 1 diabetes mellitus with circulatory complication (HCC)    CAD (coronary artery disease)    GERD (gastroesophageal reflux disease)    Chronic pain    Patient was on Levaquin for osteomyelitis prior to admission for planned 6-week course which will end on November 2.  He will continue that at discharge but does not need an additional prescription.    Plan for disposition: Okay for discharge from medical perspective    Guera Contreras MD  10/16/21  12:27 EDT

## 2021-10-16 NOTE — SIGNIFICANT NOTE
10/16/21 1330   OTHER   Discipline physical therapist   Rehab Time/Intention   Session Not Performed patient/family declined evaluation  (Pt stating he is going home today and does not need PT. Educated to the importance of PT and functional mobility/balance training. PT will follow up when time allows.)   Recommendation   PT - Next Appointment 10/17/21

## 2021-10-17 ENCOUNTER — HOME CARE VISIT (OUTPATIENT)
Dept: HOME HEALTH SERVICES | Facility: HOME HEALTHCARE | Age: 46
End: 2021-10-17

## 2021-10-18 ENCOUNTER — HOME CARE VISIT (OUTPATIENT)
Dept: HOME HEALTH SERVICES | Facility: HOME HEALTHCARE | Age: 46
End: 2021-10-18

## 2021-10-18 VITALS
OXYGEN SATURATION: 98 % | DIASTOLIC BLOOD PRESSURE: 84 MMHG | SYSTOLIC BLOOD PRESSURE: 142 MMHG | HEART RATE: 88 BPM | TEMPERATURE: 98.2 F | RESPIRATION RATE: 17 BRPM

## 2021-10-18 PROCEDURE — G0299 HHS/HOSPICE OF RN EA 15 MIN: HCPCS

## 2021-10-18 NOTE — HOME HEALTH
Patient is a very pleasant 46 year old male who lives in his 2 story home with his fiance and their children. Patient is a bilateral amputee and was recently admitted to [facility] for revision of RBKA to AKA r/t dx of osteomyelitis. Patient was referred to home health care and requires [facility] for post operative assessment and education on disease process and medication

## 2021-10-18 NOTE — CASE MANAGEMENT/SOCIAL WORK
Case Management Discharge Note      Final Note: [facility]         Selected Continued Care - Discharged on 10/16/2021 Admission date: 10/14/2021 - Discharge disposition: Home or Self Care        Home Medical Care Coordination complete.    Service Provider Selected Services Address Phone Fax Patient Preferred    [facility]  Home Health Services [id number] [facility] [place] [address] [phone] --           Final Discharge Disposition Code: 06 - home with home health care

## 2021-10-19 LAB — BACTERIA SPEC ANAEROBE CULT: NORMAL

## 2021-10-20 ENCOUNTER — READMISSION MANAGEMENT (OUTPATIENT)
Dept: CALL CENTER | Facility: HOSPITAL | Age: 46
End: 2021-10-20

## 2021-10-20 NOTE — OUTREACH NOTE
General Surgery Week 1 Survey      Responses   [facility] patient discharged from? [facility]   Does the patient have one of the following disease processes/diagnoses(primary or secondary)? General Surgery   Week 1 attempt successful? Yes   Call start time 1114   Call end time 1119   Discharge diagnosis above-the-knee amputation    Meds reviewed with patient/caregiver? Yes   Is the patient having any side effects they believe may be caused by any medication additions or changes? No   Does the patient have all medications related to this admission filled (includes all antibiotics, pain medications, etc.) Yes   Is the patient taking all medications as directed (includes completed medication regime)? Yes   Does the patient have a follow up appointment scheduled with their surgeon? Yes  [10/28/21 appt]   Has the patient kept scheduled appointments due by today? N/A   What is the Home health agency?  [facility]    Has home health visited the patient within 72 hours of discharge? Yes   Home health comments  is coming one week, .   Psychosocial issues? No   Did the patient receive a copy of their discharge instructions? Yes   What is the patient's perception of their health status since discharge? Improving   Nursing interventions Nurse provided patient education   Is the patient /caregiver able to teach back basic post-op care? No tub bath, swimming, or hot tub until instructed by MD,  Keep incision areas clean,dry and protected   Is the patient/caregiver able to teach back signs and symptoms of incisional infection? Increased redness, swelling or pain at the incisonal site,  Increased drainage or bleeding,  Incisional warmth,  Pus or odor from incision   Is the patient/caregiver able to teach back the hierarchy of who to call/visit for symptoms/problems? PCP, Specialist, Home health nurse, Urgent Care, ED, 911 Yes   Week 1 call completed? Yes   Revoked No further contact(revokes)-requires comment    Graduated/Revoked comments Denies questions or needs. Pt short with answers. Pt irritated with questions at this time.    Wrap up additional comments Denies questions or needs. Pt short with answers. Pt irritated with questions at this time.           Ana Marks RN

## 2021-10-21 NOTE — DISCHARGE SUMMARY
Discharge summary on this patient who is a 46-year-old male who had multiple revisions of a right BKA but was not healing.  He was brought into the hospital by Dr. Alcantara for right above-knee amputation.  This was done according the operative note.  Postoperatively he did well.  He was begun on therapy but was unsuccessful in getting around well so he was transferred to rehab unit for continued therapy and care.  He was discharged there on all his prehospitalization medications as well as his routine diet.  His dressing changes were to occur daily as ordered by Dr. Alcantara postoperatively.  Dr. Alcantara was going to see him approximately 3 to 4 weeks postoperatively.

## 2021-10-26 ENCOUNTER — HOME CARE VISIT (OUTPATIENT)
Dept: HOME HEALTH SERVICES | Facility: HOME HEALTHCARE | Age: 46
End: 2021-10-26

## 2021-10-26 NOTE — PROGRESS NOTES
"Enter Query Response Below      Query Response:     Right chronic tibial osteomyelitis         If applicable, please update the problem list.        Patient: Maynor Gregg        : 1975  Account: [id number]           Admit Date: 10/14/2021        Options to Respond to Query:    1. Access the Encounter     a. From the To-Do Side bar, click Respond With Note.     b. Click New Note     c. Answer query within the yellow box.                d. Update the Problem List if applicable.     Dr. Alcantara,    Operative note dated 10/14 includes, \"46-year-old white male who had multiple revisions of a right BKA but was not healing. [place] that he needed knee Amputation to get this to heal\" and \"right tibial osteomyelitis\" as pre-op and post-op diagnosis.  Pathology showed:  \"Final Diagnosis -- -- -- [facility]  Result:   Lower extremity, right, above knee amputation revision:    Skin ulceration, soft tissue chronic cellulitis and ischemic changes    Severe atherosclerotic changes of large vessels    Skin and soft tissue resection margin viable\"    Can this be further clarified as:    - right tibial chronic osteomyelitis  - right tibial chronic osteomyelitis was ruled out  - other ____________________________  - unable to clinically determine    By submitting this query, we are merely seeking further clarification of documentation to accurately reflect all conditions that you are monitoring, evaluating, treating or that extend the hospitalization or utilize additional resources of care. Please utilize your independent clinical judgment when addressing the question(s) above.     This query and your response, once completed, will be entered into the legal medical record.    Sincerely,  Chelle Hughes RN CCDS CCS  Clinical Documentation Integrity Program   [email or web address]  "

## 2021-10-28 ENCOUNTER — HOME CARE VISIT (OUTPATIENT)
Dept: HOME HEALTH SERVICES | Facility: HOME HEALTHCARE | Age: 46
End: 2021-10-28

## 2021-10-28 VITALS
DIASTOLIC BLOOD PRESSURE: 90 MMHG | HEART RATE: 91 BPM | OXYGEN SATURATION: 100 % | SYSTOLIC BLOOD PRESSURE: 136 MMHG | TEMPERATURE: 99.3 F

## 2021-10-28 PROCEDURE — G0299 HHS/HOSPICE OF RN EA 15 MIN: HCPCS

## 2021-11-02 ENCOUNTER — HOME CARE VISIT (OUTPATIENT)
Dept: HOME HEALTH SERVICES | Facility: HOME HEALTHCARE | Age: 46
End: 2021-11-02

## 2021-11-02 VITALS
SYSTOLIC BLOOD PRESSURE: 140 MMHG | TEMPERATURE: 97.4 F | OXYGEN SATURATION: 100 % | DIASTOLIC BLOOD PRESSURE: 70 MMHG | HEART RATE: 112 BPM

## 2021-11-02 PROCEDURE — G0299 HHS/HOSPICE OF RN EA 15 MIN: HCPCS

## 2021-11-03 NOTE — HOME HEALTH
Patient assessed in home. Patient compliant with keeping incision site clean and dry. Denies acute symptoms at this time. All questions addressed.

## 2021-11-09 ENCOUNTER — HOME CARE VISIT (OUTPATIENT)
Dept: HOME HEALTH SERVICES | Facility: HOME HEALTHCARE | Age: 46
End: 2021-11-09

## 2021-11-09 VITALS
HEART RATE: 78 BPM | TEMPERATURE: 98.5 F | SYSTOLIC BLOOD PRESSURE: 140 MMHG | DIASTOLIC BLOOD PRESSURE: 92 MMHG | OXYGEN SATURATION: 99 %

## 2021-11-09 PROCEDURE — G0299 HHS/HOSPICE OF RN EA 15 MIN: HCPCS

## 2021-11-09 NOTE — HOME HEALTH
Patient denies any new symptoms of SOA, CP or other symptoms. No recent falls. Incision healing appropriately without signs of infection. Patient independent with wound care. Next visit will be discharge.

## 2021-11-16 ENCOUNTER — HOME CARE VISIT (OUTPATIENT)
Dept: HOME HEALTH SERVICES | Facility: HOME HEALTHCARE | Age: 46
End: 2021-11-16

## 2021-11-16 PROCEDURE — G0299 HHS/HOSPICE OF RN EA 15 MIN: HCPCS

## 2021-11-17 VITALS
RESPIRATION RATE: 20 BRPM | TEMPERATURE: 98.2 F | SYSTOLIC BLOOD PRESSURE: 126 MMHG | HEART RATE: 86 BPM | OXYGEN SATURATION: 97 % | DIASTOLIC BLOOD PRESSURE: 78 MMHG

## 2021-12-19 ENCOUNTER — APPOINTMENT (OUTPATIENT)
Dept: GENERAL RADIOLOGY | Facility: HOSPITAL | Age: 46
End: 2021-12-19

## 2021-12-19 ENCOUNTER — HOSPITAL ENCOUNTER (EMERGENCY)
Facility: HOSPITAL | Age: 46
Discharge: HOME | End: 2021-12-19
Admitting: EMERGENCY MEDICINE

## 2021-12-19 VITALS
BODY MASS INDEX: 27.6 KG/M2 | TEMPERATURE: 98 F | WEIGHT: 222 LBS | HEART RATE: 98 BPM | RESPIRATION RATE: 18 BRPM | OXYGEN SATURATION: 99 % | DIASTOLIC BLOOD PRESSURE: 80 MMHG | SYSTOLIC BLOOD PRESSURE: 145 MMHG | HEIGHT: 75 IN

## 2021-12-19 DIAGNOSIS — T87.89 PAIN OF AMPUTATION STUMP OF RIGHT LOWER EXTREMITY (HCC): Primary | ICD-10-CM

## 2021-12-19 DIAGNOSIS — M79.604 PAIN OF AMPUTATION STUMP OF RIGHT LOWER EXTREMITY (HCC): Primary | ICD-10-CM

## 2021-12-19 PROCEDURE — 73552 X-RAY EXAM OF FEMUR 2/>: CPT

## 2021-12-19 PROCEDURE — 99282 EMERGENCY DEPT VISIT SF MDM: CPT
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
## 2021-12-19 NOTE — DISCHARGE INSTRUCTIONS
Rotate Tylenol Motrin for pain.  Apply ice every 2 hours while awake, on for 20 minutes.  Schedule follow-up with your primary care physician for recheck.  If symptoms persist, follow-up with your orthopedist.  Return to the ER for any new or worsening symptoms.

## 2022-01-05 ENCOUNTER — TRANSCRIBE ORDERS (OUTPATIENT)
Dept: PHYSICAL THERAPY | Facility: CLINIC | Age: 47
End: 2022-01-05

## 2022-01-05 DIAGNOSIS — S78.111A ABOVE KNEE AMPUTATION OF RIGHT LOWER EXTREMITY: Primary | ICD-10-CM

## 2022-01-19 ENCOUNTER — TELEPHONE (OUTPATIENT)
Dept: PHYSICAL THERAPY | Facility: CLINIC | Age: 47
End: 2022-01-19

## 2022-01-25 ENCOUNTER — TREATMENT (OUTPATIENT)
Dept: PHYSICAL THERAPY | Facility: CLINIC | Age: 47
End: 2022-01-25

## 2022-01-25 DIAGNOSIS — S78.111A ABOVE-KNEE AMPUTATION OF RIGHT LOWER EXTREMITY: Primary | ICD-10-CM

## 2022-01-25 PROCEDURE — PTSP1 PR CUSTOM PT EVALUTATION & TREATMENT OF SELF-PAY PT 1ST VISIT

## 2022-01-25 NOTE — PROGRESS NOTES
Physical Therapy Initial Evaluation and Plan of Care    Patient: Maynor Gregg   : 1975  Diagnosis/ICD-10 Code:  Above-knee amputation of right lower extremity (HCC) [S78.111A]  Referring practitioner: Donn Alcantara MD  Date of Initial Visit: 2022  Today's Date: 2022  Patient seen for 1 sessions           Subjective Questionnaire: PLUS - M: raw score of 25; T score: 37.7      Subjective Evaluation    History of Present Illness  Date of onset: 10/14/2021  Mechanism of injury: Patient reported he passed out in the bathtub in early  cutting his foot.  Sought treatment and wound healed.     Patient reported he became ill in 2020 and bone infection was found.  (R) BKA performed 20.    20:  (L) BKA due to foot dysfunction    21:  Revision of bone on right side.  Following this surgery, residual limb would not heal due to bone infection. Pt ultimately had a skin graft which did not heal.      10/14/21: (R) EDWIN     Received new prosthesis in 2021    Prosthetist is Kris Torre at [facility]         Subjective comment:  See below   Patient Occupation: [employer].  Pt must be able to move quickly Pain  Current pain ratin  At best pain ratin  At worst pain rating: 10  Location: (R) groin and adductor; phantom  pain  (h/o (B) shoulder pain )  Quality: sharp  Relieving factors: rest  Aggravating factors: standing and ambulation  Progression: improved    Social Support  Lives in: one-story house (basement.  Enters home from basement )  Lives with: ex-girlfriend and children     Hand dominance: right    Treatments  Previous treatment: physical therapy (for right shoulder pain.  Patient has not received PT for prosthetic training or post- amputation)  Patient Goals  Patient goals for therapy: decreased pain, improved balance, increased motion, increased strength, return to work, independence with ADLs/IADLs and return to sport/leisure activities  Patient  goal: improve balance and stability; to be able to run     PMHX: past medical history and other relevant information reviewed in Epic/EMR    Objective          Static Posture     Pelvis   Pelvis (Left): Elevated (L) iliac crest slightly high when standing in evenly weight shifted position.     Active Range of Motion   Left Hip   Flexion: WFL  Extension: 12 degrees   Abduction: WFL    Right Hip   Flexion: WFL  Extension: 10 degrees   Abduction: WFL  Left Knee   Flexion: WFL  Extension: WFL    Strength/Myotome Testing     Left Hip   Planes of Motion   Flexion: 5  Extension: 5  Abduction: 5    Right Hip   Planes of Motion   Flexion: 4  Extension: 4+  Abduction: 5    Left Knee   Flexion: 5  Extension: 5    Right Knee   Flexion: 5  Extension: 5    Additional Strength Details  (L) glut med strength: 4+/5    Left Hip Flexibility Comments:   Mild hip flexor tightness  Mild ITB tightness    Right Hip Flexibility Comments:   Mild hip flexor tightness  Mild ITB tightness    Ambulation     Ambulation: Level Surfaces   Ambulation with assistive device: independent (rollator)    Additional Level Surfaces Ambulation Details  Assessment in // bars     Observational Gait   Decreased walking speed and right stance time.     Additional Observational Gait Details  Increased weight shift in standing and during gait onto (L) side   (L) hip drop in stance     Functional Assessment     Comments  Unsupported standing balance : able to perform static stance x 30 seconds  With horizontal and vertical head turning with intermittent CGA secondary to (L) knee instability; AP loss of balance          Assessment & Plan     Assessment  Impairments: abnormal gait, activity intolerance, impaired balance, impaired physical strength, lacks appropriate home exercise program and safety issue  Functional Limitations: carrying objects, lifting, walking, standing and unable to perform repetitive tasks  Assessment details: Patient is a 45 y/o male presenting  for OPPT evaluation for prosthetic training .  Patient received above knee right prosthesis 12/2021, prior to (R) AKA, pt has BKA (B) and was ambulating (I) and working full time as security. Patient currently using RWX or rollator for mobility; displays impaired balance and is unable to work secondary to deficits.  Patient would benefit from skilled therapy services to address noted deficits to maximize functional mobility/ambulaton, improve balance to decrease fall risk and progress toward PLOF including full time employment    Barriers to therapy: insurance   Prognosis: good    Goals  Plan Goals: SHORT TERM GOALS:  3 WEEKS  1). Pt will be (I) with HEP handout for self management of impairments  2).  Patient will have ability to ambulate 50 ft with lesser assistive device for progression of gait  3) Patient will have ability to stand unsupported > 5 minutes performing bimanual tasks for improve ability to perform ADL and work related tasks        LONG TERM GOALS: 6 WEEKS  1).  Pt ambulates 500 feet with (B) prosthetics and least restrictive AD for improved community ambulation  2) Pt will score 47/56 or better on [place] balance assessment  3). Pt will score 40 or better on PLUS- M 12 item short form  4) Patient will verbalize understanding of D/C HEP and prepared to D/C.    Plan  Therapy options: will be seen for skilled therapy services  Planned therapy interventions: abdominal trunk stabilization, balance/weight-bearing training, flexibility, gait training, home exercise program, IADL retraining, stretching, strengthening, therapeutic activities, prosthetic fitting/training and neuromuscular re-education  Frequency: 2x week  Duration in visits: 12  Duration in weeks: 6  Treatment plan discussed with: patient        History # of Personal Factors and/or Comorbidities: MODERATE (1-2)  Examination of Body System(s): # of elements: MODERATE (3)  Clinical Presentation: EVOLVING  Clinical Decision Making:  MODERATE      Timed:         Manual Therapy:    0     mins  04868;     Therapeutic Exercise:    0     mins  86839;     Neuromuscular Jeanette:    0    mins  88864;    Therapeutic Activity:     0     mins  14681;     Gait Trainin     mins  31827;     Ultrasound:     0     mins  84762;    Ionto                               0    mins   96836  Self Care                       0     mins   60915  Aquatic                          0     mins 80209      Un-Timed:  Electrical Stimulation:    0     mins  81039 (MC );  Dry Needling     0     mins self-pay  Traction     0     mins 88982  Low Eval     0     Mins  36657  Mod Eval     45     Mins  42633  High Eval                       0     Mins  07039  Re-Eval                           0    mins  13563        Timed Treatment:   0   mins   Total Treatment:     45   mins    PT SIGNATURE: Yahaira Dey PT   [place] License#: [id number]      Electronically signed by Yahaira Dey PT, 22, 10:56 AM EST      Initial Certification  Certification Period:  2022 thru 2022  I certify that the therapy services are furnished while this patient is under my care.  The services outlined above are required by this patient, and will be reviewed every 90 days.       Physician Signature:__________________________________________________    PHYSICIAN: Donn Alcantara MD      DATE:     Please sign and return via fax to [phone].. Thank you, [facility].

## 2022-02-22 ENCOUNTER — TREATMENT (OUTPATIENT)
Dept: PHYSICAL THERAPY | Facility: CLINIC | Age: 47
End: 2022-02-22

## 2022-02-22 DIAGNOSIS — S78.111A ABOVE-KNEE AMPUTATION OF RIGHT LOWER EXTREMITY: Primary | ICD-10-CM

## 2022-02-22 PROCEDURE — 97116 GAIT TRAINING THERAPY: CPT

## 2022-02-22 PROCEDURE — 97110 THERAPEUTIC EXERCISES: CPT

## 2022-02-22 NOTE — PROGRESS NOTES
Physical Therapy Daily Progress Note     Diagnosis Plan   1. Above-knee amputation of right lower extremity (HCC)         VISIT#: 2    Subjective   Maynor Gregg reports: insurance has been an issue and reason for delay in therapy.  Per pt, (R) prosthesis feels too short resulting     Objective      See Exercise, Manual, and Modality Logs for complete treatment.     Patient Education:    Assessment/Plan Prosthetic gait training performed in // bars.  Pt provided visual demo and verbal cueing for improved (R) hip extension, toe off and to pronounce (R) hip and knee flexion in swing phase of gait.   Patient displays leg length difference resulting in hip drop with lateral weight shift.  Patient reported he must change prosthetists and is attempting to get an appt with new facility.  Therapist contacted  to discuss concerns and to aid pt in getting scheduled.       Progress per Plan of Care            Timed:         Manual Therapy:    8     mins  01120;     Therapeutic Exercise:    37    mins  94454;     Neuromuscular Jeanette:    0    mins  33872;    Therapeutic Activity:     0     mins  91488;     Gait Trainin     mins  25849;     Ultrasound:     0     mins  65775;    Ionto                               0    mins   07272  Self Care                       0     mins   42562  Canalith Repos               0    mins  4209  Aquatic                          0     mins 25485    Un-Timed:  Electrical Stimulation:    0     mins  45254 ( );  Dry Needling     0     mins self-pay  Traction     0     mins 28842  Low Eval     0     Mins  45187  Mod Eval     0     Mins  74936  High Eval                       0     Mins  36549  Re-Eval                           0    mins  62210    Timed Treatment:   45   mins   Total Treatment:     45   mins    Yahaira Dey, PT PT, DPT, [id number]

## 2022-02-28 ENCOUNTER — TREATMENT (OUTPATIENT)
Dept: PHYSICAL THERAPY | Facility: CLINIC | Age: 47
End: 2022-02-28

## 2022-02-28 DIAGNOSIS — S78.111A ABOVE-KNEE AMPUTATION OF RIGHT LOWER EXTREMITY: Primary | ICD-10-CM

## 2022-02-28 PROCEDURE — 97110 THERAPEUTIC EXERCISES: CPT

## 2022-02-28 PROCEDURE — 97140 MANUAL THERAPY 1/> REGIONS: CPT

## 2022-02-28 PROCEDURE — 97116 GAIT TRAINING THERAPY: CPT

## 2022-02-28 NOTE — PROGRESS NOTES
Physical Therapy Daily Progress Note     Diagnosis Plan   1. Above-knee amputation of right lower extremity (HCC)         VISIT#: 3    Subjective   Maynor Gregg reports: Continues to c/o leg length discrepancy. Patient reported he must flex (L) knee in static stance for even weight shift and then feels loss of balance as a result.     Objective     See Exercise, Manual, and Modality Logs for complete treatment.     Patient Education:    Assessment/Plan: Able to progress gait training in // bars from (B) UE support to single UE support.  Hip drop observed which was more pronounced with single UE support.  Patient reported he has a consult with new prosthetist on 3/10/22      Progress per Plan of Care            Timed:         Manual Therapy:    8     mins  82105;     Therapeutic Exercise:    27     mins  71848;     Neuromuscular Jeanette:    0    mins  16192;    Therapeutic Activity:     0     mins  12968;     Gait Training:      10     mins  57052;     Ultrasound:     0     mins  73586;    Ionto                               0    mins   02718  Self Care                       0     mins   46102  Canalith Repos               0    mins  4209  Aquatic                          0     mins 44011    Un-Timed:  Electrical Stimulation:    0     mins  88527 ( );  Dry Needling     0     mins self-pay  Traction     0     mins 36640  Low Eval     0     Mins  84879  Mod Eval     0     Mins  93724  High Eval                       0     Mins  30674  Re-Eval                           0    mins  90887    Timed Treatment:   45   mins   Total Treatment:     45   mins    Yahaira Dey, PT PT, DPT, [id number]

## 2022-03-02 ENCOUNTER — TREATMENT (OUTPATIENT)
Dept: PHYSICAL THERAPY | Facility: CLINIC | Age: 47
End: 2022-03-02

## 2022-03-02 DIAGNOSIS — S78.111A ABOVE-KNEE AMPUTATION OF RIGHT LOWER EXTREMITY: Primary | ICD-10-CM

## 2022-03-02 DIAGNOSIS — M25.511 RIGHT SHOULDER PAIN, UNSPECIFIED CHRONICITY: ICD-10-CM

## 2022-03-02 PROCEDURE — 97110 THERAPEUTIC EXERCISES: CPT

## 2022-03-02 PROCEDURE — 97140 MANUAL THERAPY 1/> REGIONS: CPT

## 2022-03-02 NOTE — PROGRESS NOTES
Physical Therapy Daily Progress Note     Diagnosis Plan   1. Above-knee amputation of right lower extremity (HCC)     2. Right shoulder pain, unspecified chronicity         VISIT#: 4    Subjective   Maynor Gregg reports: he is having good days and bad days in regards to his prosthetics.  Patient reported (R)  Knee component is not as responsive as he needs it to be.    Objective     See Exercise, Manual, and Modality Logs for complete treatment.     Patient Education:    Assessment/Plan:  Patient continues to have difficulty  Advancing from terminal stance at (R) prosthesis  As foot drags and knee is flexing to advance into swing phase. Pt would like to trial (B) [place] crutches next week versus walker.  Will assess safety for lesser AD.       Progress per Plan of Care.  Patient has consult with [facility] on 3/10/22            Timed:         Manual Therapy:    8     mins  98280;     Therapeutic Exercise:    37     mins  60451;     Neuromuscular Jeanette:    0    mins  07593;    Therapeutic Activity:     0     mins  25102;     Gait Trainin     mins  80404;     Ultrasound:     0     mins  41883;    Ionto                               0    mins   34093  Self Care                       0     mins   14852  Canalith Repos               0    mins  4209  Aquatic                          0     mins 04531    Un-Timed:  Electrical Stimulation:    0     mins  27337 ( );  Dry Needling     0     mins self-pay  Traction     0     mins 45146  Low Eval     0     Mins  93232  Mod Eval     0     Mins  70015  High Eval                       0     Mins  82150  Re-Eval                           0    mins  59541    Timed Treatment:   45   mins   Total Treatment:     45   mins    Yahaira Dey, PT PT, DPT, [id number]

## 2022-03-07 ENCOUNTER — DOCUMENTATION (OUTPATIENT)
Dept: PHYSICAL THERAPY | Facility: CLINIC | Age: 47
End: 2022-03-07

## 2022-03-07 ENCOUNTER — TREATMENT (OUTPATIENT)
Dept: PHYSICAL THERAPY | Facility: CLINIC | Age: 47
End: 2022-03-07

## 2022-03-07 DIAGNOSIS — S78.111A ABOVE-KNEE AMPUTATION OF RIGHT LOWER EXTREMITY: Primary | ICD-10-CM

## 2022-03-07 PROCEDURE — 97140 MANUAL THERAPY 1/> REGIONS: CPT

## 2022-03-07 PROCEDURE — 97110 THERAPEUTIC EXERCISES: CPT

## 2022-03-07 NOTE — PROGRESS NOTES
Physical Therapy Daily Progress Note     Diagnosis Plan   1. Above-knee amputation of right lower extremity (HCC)         VISIT#: 5    Subjective   Maynor Nairgess reports: (R) knee mechanism is not staying in locked position when switch is engaged.  Therapist reached out to prosthetist for advice.     Objective     See Exercise, Manual, and Modality Logs for complete treatment.     Patient Education:    Assessment/Plan:  Increased reps for trunk strengthening this date. Added SLR to address hip strengthening.  Patient c/o muscular fatigue and cramping upon completion.  Performed standing hip flexion stretch to ease tightness and cramping with good outcome.  Gait training has been minimal to date due to impaired function and fit of prosthetics.  Awaiting adjustments and recommendations on 3/10.  Focus has been on stretching and strengthening and balance to ensure improved fit and alignment  Of prosthetics as well as improved mobility.       Progress per Plan of Care. Pt has consult with prosthetist on 3/10.             Timed:         Manual Therapy:    8     mins  22776;     Therapeutic Exercise:    37     mins  46668;     Neuromuscular Jeanette:    0    mins  00194;    Therapeutic Activity:     0     mins  87784;     Gait Trainin     mins  29917;     Ultrasound:     0     mins  93825;    Ionto                               0    mins   57478  Self Care                       0     mins   13992  Canalith Repos               0    mins  4209  Aquatic                          0     mins 45595    Un-Timed:  Electrical Stimulation:    0     mins  40655 ( );  Dry Needling     0     mins self-pay  Traction     0     mins 67665  Low Eval     0     Mins  23150  Mod Eval     0     Mins  14157  High Eval                       0     Mins  58143  Re-Eval                           0    mins  84365    Timed Treatment:   45   mins   Total Treatment:     45   mins    Yahaira Dey, PT PT, DPT, [id number]

## 2022-03-07 NOTE — PROGRESS NOTES
Discharge Summary  Discharge Summary from Physical/Occupational Therapy Report    Patient: Maynor Gregg   : 1975  Today's Date: 3/7/2022    Patient seen for 4 visits.  Dates of Service: 8/3/21 - 21    Discharge Status of Patient: He was making some mild gains with therapy. However he cancelled or missed a few appointments in a row and then never called back to reschedule.     Goals: Not Met    Discharge Plan: Patient to return to referring/providing physician        Thank you for this referral to [facility].    SIGNATURE: Becki Degroot, PT

## 2022-03-08 ENCOUNTER — TREATMENT (OUTPATIENT)
Dept: PHYSICAL THERAPY | Facility: CLINIC | Age: 47
End: 2022-03-08

## 2022-03-08 DIAGNOSIS — S78.111A ABOVE-KNEE AMPUTATION OF RIGHT LOWER EXTREMITY: Primary | ICD-10-CM

## 2022-03-08 PROCEDURE — 97140 MANUAL THERAPY 1/> REGIONS: CPT

## 2022-03-08 PROCEDURE — 97110 THERAPEUTIC EXERCISES: CPT

## 2022-03-08 NOTE — PROGRESS NOTES
Physical Therapy Daily Progress Note     Diagnosis Plan   1. Above-knee amputation of right lower extremity (HCC)         VISIT#: 6    Subjective   Maynor Gregg reports: he experienced muscle soreness at (B) hip flexor and quads following yesterday's treatment session. Patient reported muscles are still sore today.     Objective     See Exercise, Manual, and Modality Logs for complete treatment.     Patient Education:    Assessment/Plan During ambulation, pt continues to demonstrate difficulty initiating (R) knee flexion out of terminal stance with audible toe drag noted . Patient plans to discuss with prosthetist on Thursday.  Prior to (R) AKA, pt was very active , working full time and was able to run.  Patient feels limited with current hydraulic knee.  Microprocessor knee may be appropriate for patient  Based on PLOF and activity level.      Progress per Plan of Care.  Patient has consult with prosthetist on 3/10/22.            Timed:         Manual Therapy:    8     mins  63444;     Therapeutic Exercise:    30     mins  87726;     Neuromuscular Jeanette:    0    mins  84472;    Therapeutic Activity:     0     mins  51352;     Gait Trainin     mins  76208;     Ultrasound:     0     mins  16633;    Ionto                               0    mins   01704  Self Care                       0     mins   19520  Canalith Repos               0    mins  4209  Aquatic                          0     mins 85356    Un-Timed:  Electrical Stimulation:    0     mins  42672 ( );  Dry Needling     0    mins self-pay  Traction     0     mins 74103  Low Eval     0     Mins  65137  Mod Eval     0     Mins  04654  High Eval                       0     Mins  54556  Re-Eval                           0    mins  24895    Timed Treatment:   45   mins   Total Treatment:     45   mins    Yahaira Dey, PT PT, DPT, [id number]

## 2022-03-14 ENCOUNTER — TREATMENT (OUTPATIENT)
Dept: PHYSICAL THERAPY | Facility: CLINIC | Age: 47
End: 2022-03-14

## 2022-03-14 DIAGNOSIS — S78.111A ABOVE-KNEE AMPUTATION OF RIGHT LOWER EXTREMITY: Primary | ICD-10-CM

## 2022-03-14 PROCEDURE — 97140 MANUAL THERAPY 1/> REGIONS: CPT

## 2022-03-14 PROCEDURE — 97110 THERAPEUTIC EXERCISES: CPT

## 2022-03-14 NOTE — PROGRESS NOTES
Physical Therapy Daily Progress Note     Diagnosis Plan   1. Above-knee amputation of right lower extremity (HCC)         VISIT#: 7    Subjective   Maynor Gregg reports:  He saw prosthetist last week.  Patient reported adjustments made to existing prosthesis and alignment is feeling better.  Patient reported hydraulic knee continues to be not responsive enough and drags at times. Prosthetist is recommending microprocesser knee and is awaiting MD order and insurance approval .     Objective     See Exercise, Manual, and Modality Logs for complete treatment.     Patient Education:    Assessment/Plan:  Patient demonstrates improved iliac crest/pelvic alignment following adjustments.  Improved hitesh and fluidity of gait observed; however, intermittent difficulty with (R) toe off as knee mechanics does not initiate flexion in a timely manner into swing phase.       Progress per Plan of Care            Timed:         Manual Therapy:    10     mins  22399;     Therapeutic Exercise:    35     mins  06277;     Neuromuscular Jeanette:    0    mins  84805;    Therapeutic Activity:     0     mins  67737;     Gait Trainin     mins  70310;     Ultrasound:     0     mins  18916;    Ionto                               0    mins   43753  Self Care                       0     mins   97262  Canalith Repos               0    mins  4209  Aquatic                          0     mins 94740    Un-Timed:  Electrical Stimulation:    0     mins  57753 ( );  Dry Needling     0     mins self-pay  Traction     0     mins 81239  Low Eval     0     Mins  81126  Mod Eval     0     Mins  97536  High Eval                       0     Mins  73833  Re-Eval                           0    mins  70396    Timed Treatment:   45   mins   Total Treatment:     45   mins    Yahaira Dey, PT PT, DPT, [id number]

## 2022-03-17 ENCOUNTER — TRANSCRIBE ORDERS (OUTPATIENT)
Dept: ADMINISTRATIVE | Facility: HOSPITAL | Age: 47
End: 2022-03-17

## 2022-03-17 DIAGNOSIS — R91.1 PULMONARY NODULE, RIGHT: Primary | ICD-10-CM

## 2022-03-21 ENCOUNTER — TREATMENT (OUTPATIENT)
Dept: PHYSICAL THERAPY | Facility: CLINIC | Age: 47
End: 2022-03-21

## 2022-03-21 DIAGNOSIS — S78.111A ABOVE-KNEE AMPUTATION OF RIGHT LOWER EXTREMITY: Primary | ICD-10-CM

## 2022-03-21 PROCEDURE — 97140 MANUAL THERAPY 1/> REGIONS: CPT

## 2022-03-21 PROCEDURE — 97112 NEUROMUSCULAR REEDUCATION: CPT

## 2022-03-21 PROCEDURE — 97110 THERAPEUTIC EXERCISES: CPT

## 2022-03-21 NOTE — PROGRESS NOTES
Physical Therapy Daily Progress Note     Diagnosis Plan   1. Above-knee amputation of right lower extremity (HCC)         VISIT#: 8    Subjective   Maynor Gregg reports: her has a sore throat.  Patient reported he believes it was from yelling over the weekend.  Patient reported he is tired.     Objective     See Exercise, Manual, and Modality Logs for complete treatment.     Patient Education:    Assessment/Plan:  Patient tolerated treatment session well without c/o pain.  Patient demonstrated generalized fatigue this date.  Patient continues to use rollator for ambulation due to decreased confidence of knee prosthesis.  Patient is awaiting casting of new socket and new prosthetic components including microprocessor knee.       Progress per Plan of Care.  Due to 20 visit limit per year, pt would like to make next treatment final treatment for this episode of therapy.  Would like to resume therapy once he received new socket/prosthetic for gait training with microprocessor knee.            Timed:         Manual Therapy:    10     mins  59308;     Therapeutic Exercise:    25     mins  58217;     Neuromuscular Jeanette:    10  mins  94179;    Therapeutic Activity:     0     mins  29079;     Gait Trainin     mins  01151;     Ultrasound:     0     mins  99064;    Ionto                               0    mins   28634  Self Care                       0     mins   68259  Canalith Repos               0    mins  4209  Aquatic                          0     mins 40793    Un-Timed:  Electrical Stimulation:    0     mins  30040 ( );  Dry Needling     0     mins self-pay  Traction     0     mins 75478  Low Eval     0     Mins  14740  Mod Eval     0     Mins  15744  High Eval                       0     Mins  81079  Re-Eval                           0    mins  06158    Timed Treatment:   45   mins   Total Treatment:     45   mins    Yahaira Dey, PT PT, DPT, [id number]

## 2022-03-23 ENCOUNTER — HOSPITAL ENCOUNTER (OUTPATIENT)
Dept: CT IMAGING | Facility: HOSPITAL | Age: 47
Discharge: HOME | End: 2022-03-23
Admitting: NURSE PRACTITIONER

## 2022-03-23 ENCOUNTER — DOCUMENTATION (OUTPATIENT)
Dept: PHYSICAL THERAPY | Facility: CLINIC | Age: 47
End: 2022-03-23

## 2022-03-23 DIAGNOSIS — R91.1 PULMONARY NODULE, RIGHT: ICD-10-CM

## 2022-03-23 PROCEDURE — 71250 CT THORAX DX C-: CPT

## 2022-03-23 NOTE — PROGRESS NOTES
Discharge Summary  Discharge Summary from Physical Therapy Report  Maynor Gregg  : 1975    Dates  PT visit: 22- 3/21/22  Number of Visits: 8    Discharge Status of Patient: Pt called to self discharge.  Pt would like to wait until receiving new socket and prosthesis before returning to therapy due to limited insurance visits per calendar year    Goals: Partially Met    Discharge Plan: Continue with current home exercise program as instructed        Date of Discharge 3/21/2022        Yahaira Dey, PT  Physical Therapist

## 2022-05-16 ENCOUNTER — TRANSCRIBE ORDERS (OUTPATIENT)
Dept: PHYSICAL THERAPY | Facility: CLINIC | Age: 47
End: 2022-05-16

## 2022-05-16 DIAGNOSIS — Z89.611 HX OF AKA (ABOVE KNEE AMPUTATION), RIGHT: Primary | ICD-10-CM

## 2022-05-25 ENCOUNTER — TREATMENT (OUTPATIENT)
Dept: PHYSICAL THERAPY | Facility: CLINIC | Age: 47
End: 2022-05-25

## 2022-05-25 DIAGNOSIS — S78.111A ABOVE-KNEE AMPUTATION OF RIGHT LOWER EXTREMITY: ICD-10-CM

## 2022-05-25 PROCEDURE — 97116 GAIT TRAINING THERAPY: CPT

## 2022-05-25 PROCEDURE — 97162 PT EVAL MOD COMPLEX 30 MIN: CPT

## 2022-05-25 NOTE — PROGRESS NOTES
Physical Therapy Initial Evaluation and Plan of Care    Patient: Maynor Gregg   : 1975  Diagnosis/ICD-10 Code:  Above-knee amputation of right lower extremity (HCC) [S78.111A]  Referring practitioner: Donn Alcantara MD  Date of Initial Visit: 2022  Today's Date: 2022  Patient seen for 1 sessions               Subjective   Subjective Questionnaire: PLUS - M: raw score of  28 ; T score 39.7 indicating decreased mobility     Subjective Evaluation     History of Present Illness  Date of onset: 10/14/2021  Mechanism of injury: Patient reported he passed out in the bathtub in early  cutting his foot.  Sought treatment and wound healed.     Patient reported he became ill in 2020 and bone infection was found.  (R) BKA performed 20.    20:  (L) BKA due to foot dysfunction    21:  Revision of bone on right side.  Following this surgery, residual limb would not heal due to bone infection. Pt ultimately had a skin graft which did not heal.      10/14/21: (R) BKA     Received new (R) prosthesis on 22.  Patient now has microprocessor knee which he did not have with previous prosthesis component set up.      Subjective comment:  See below   Patient Occupation: [employer].  Pt must be able to move quickly   Pain  Current pain ratin  At best pain ratin  At worst pain rating: 10 right shoulder   ( right posterior thigh 8/10 with prolonged walking or standing)   Location: (R) shoulder   Quality: sharp  Relieving factors: rest  Aggravating factors: standing and ambulation  Progression: improved     Social Support  Lives in: first floor apartment   Lives with: son   Hand dominance: right    Treatments  Previous treatment: physical therapy (for right shoulder pain.  Patient has not received PT for prosthetic training or post- amputation)  Patient Goals  Patient goals for therapy: decreased pain, improved balance, increased motion, increased strength, return to work,  independence with ADLs/IADLs and return to sport/leisure activities  Patient goal: improve balance and stability; to be able to run      PMHX: past medical history and other relevant information reviewed in Epic/EMR    Objective     Static Posture      Pelvis   Pelvis : even in (B) stance     Active Range of Motion   Left Hip   Flexion: WFL  Extension: 11 degrees   Abduction: WFL     Right Hip   Flexion: WFL  Extension: 8 degrees   Abduction: WFL  Left Knee   Flexion: WFL  Extension: WFL     Strength/Myotome Testing      Left Hip   Planes of Motion   Flexion: 5  Extension: 5  Abduction: 5     Right Hip   Planes of Motion   Flexion: 4  Extension: 4+  Abduction: 5     Left Knee   Flexion: 5  Extension: 5   Additional Strength Details  (L) glut med strength: 4+/5     Shoulder strength:   Left Hip Flexibility Comments:   Mild hip flexor tightness  Mild ITB tightness     Right Hip Flexibility Comments:   Mild hip flexor tightness  Mild ITB tightness     Ambulation      Ambulation: Level Surfaces   Ambulation with assistive device: independent (rollator)    Additional Level Surfaces Ambulation Details  Assessment in // bars      Observational Gait   Decreased walking speed and right stance time.     Additional Observational Gait Details  Increased weight shift in standing and during gait onto (L) side        Functional Assessment      Comments  Unsupported standing balance : able to perform static stance x 30 seconds  With horizontal and vertical head turning with intermittent CGA secondary to (L) knee instability; AP loss of balance   Assessment/Plan    Assessment & Plan      Assessment  Impairments: abnormal gait, activity intolerance, impaired balance, impaired physical strength, lacks appropriate home exercise program and safety issue  Functional Limitations: carrying objects, lifting, walking, standing and unable to perform repetitive tasks  Assessment details: Patient is a 47 y/o male presenting for OPPT evaluation  for prosthetic training .  Patient received NEW above knee right prosthesis 5/16/21, prior to (R) AKA, pt has BKA (B) and was ambulating (I) and working full time as security. Patient currently using RWX or rollator for mobility; displays impaired balance and is unable to ambulate without AD, negotiate stairs or  work secondary to deficits.  Patient would benefit from skilled therapy services to address noted deficits to maximize functional mobility/ambulaton, improve balance to decrease fall risk and progress toward PLOF including full time employment    Barriers to therapy: insurance   Prognosis: good   Goals  Plan Goals: SHORT TERM GOALS:  3 WEEKS  1). Pt will be (I) with HEP handout for self management of impairments  2).  Patient will have ability to ambulate 50 ft with lesser assistive device for progression of gait  3) Patient will have ability to stand unsupported > 5 minutes performing bimanual tasks for improve ability to perform ADL and work related tasks        LONG TERM GOALS: 6 WEEKS  1).  Pt ambulates 500 feet with (B) prosthetics and least restrictive AD for improved community ambulation  2) Pt will score 47/56 or better on Cedeno balance assessment TO BE ASSESSED  3). Pt will score 40 or better on PLUS- M 12 item short form  4) Patient will verbalize understanding of D/C HEP and prepared to D/C.   Plan  Therapy options: will be seen for skilled therapy services  Planned therapy interventions: abdominal trunk stabilization, balance/weight-bearing training, flexibility, gait training, home exercise program, IADL retraining, stretching, strengthening, therapeutic activities, prosthetic fitting/training and neuromuscular re-education  Frequency: 2x week  Duration in visits: 16  Duration in weeks: 8  Treatment plan discussed with: patient  History # of Personal Factors and/or Comorbidities: HIGH (3+)  Examination of Body System(s): # of elements: MODERATE (3)  Clinical Presentation: EVOLVING  Clinical  Decision Making: MODERATE      Timed:         Manual Therapy:    0     mins  96079;     Therapeutic Exercise:    0     mins  46121;     Neuromuscular Jeanette:    0    mins  65401;    Therapeutic Activity:     0     mins  90893;     Gait Training:      15     mins  57942;     Ultrasound:     0     mins  15345;    Ionto                               0    mins   97446  Self Care                       0     mins   85584  Aquatic                          0     mins 18834      Un-Timed:  Electrical Stimulation:    0     mins  37210 ( );  Dry Needling     0     mins self-pay  Traction     0     mins 78647  Low Eval     0     Mins  27120  Mod Eval     30   Mins  51502  High Eval                       0     Mins  86080  Re-Eval                           0    mins  58574        Timed Treatment:   15  mins   Total Treatment:     30   mins    PT SIGNATURE: Yahaira Dey PT   [place] License#: [id number]      Electronically signed by Yahaira Dey PT, 05/25/22, 1:26 PM EDT      Initial Certification  Certification Period:  5/25/2022 thru 8/22/2022  I certify that the therapy services are furnished while this patient is under my care.  The services outlined above are required by this patient, and will be reviewed every 90 days.       Physician Signature:__________________________________________________    PHYSICIAN: Donn Alcantara MD      DATE:     Please sign and return via fax to [phone].. Thank you, [facility].

## 2022-06-08 ENCOUNTER — TREATMENT (OUTPATIENT)
Dept: PHYSICAL THERAPY | Facility: CLINIC | Age: 47
End: 2022-06-08

## 2022-06-08 DIAGNOSIS — S78.111A ABOVE-KNEE AMPUTATION OF RIGHT LOWER EXTREMITY: Primary | ICD-10-CM

## 2022-06-08 PROCEDURE — 97110 THERAPEUTIC EXERCISES: CPT

## 2022-06-08 PROCEDURE — 97116 GAIT TRAINING THERAPY: CPT

## 2022-06-08 PROCEDURE — 97140 MANUAL THERAPY 1/> REGIONS: CPT

## 2022-06-08 NOTE — PROGRESS NOTES
Physical Therapy Daily Progress Note     Diagnosis Plan   1. Above-knee amputation of right lower extremity (HCC)         VISIT#: 2    Subjective   Maynor Gregg reports: denied pain at (B) residual limbs upon arrival to therapy      Objective     See Exercise, Manual, and Modality Logs for complete treatment.     Patient Education:    Assessment/Plan  During gait training with (L) SPC, pt was limited by increased sharp, cramping type pain at distal portion of (R) lateral hamstring region. Patient is limited in ambulation and activity tolerance by generalized fatigue, low energy and impaired endurance.  Patient reported he recently had labs and will discuss with MD tomorrow.  During gait training with SPC, pt required intermittent balance checks on objects due to lateral sway/LOB and CGA/min A for safety.  Recommend continued therapy to address remaining deficits to maximize balance, safety and mobility with least restrictive AD.     Progress per Plan of Care            Timed:         Manual Therapy:    10     mins  47796;     Therapeutic Exercise:    20     mins  88372;     Neuromuscular Jeanette:    0    mins  91751;    Therapeutic Activity:     0     mins  05564;     Gait Training:      10     mins  37050;     Ultrasound:     0     mins  20314;    Ionto                               0    mins   08076  Self Care                       0     mins   05695  Canalith Repos               0    mins  4209  Aquatic                          0     mins 73153    Un-Timed:  Electrical Stimulation:    0     mins  98447 ( );  Dry Needling     0     mins self-pay  Traction     0     mins 26582  Low Eval     0     Mins  01932  Mod Eval     0     Mins  57917  High Eval                       0     Mins  72543  Re-Eval                           0    mins  42507    Timed Treatment:   40   mins   Total Treatment:     40   mins    Yahaira Dey, PT PT, DPT, [id number]

## 2022-06-20 ENCOUNTER — TREATMENT (OUTPATIENT)
Dept: PHYSICAL THERAPY | Facility: CLINIC | Age: 47
End: 2022-06-20

## 2022-06-20 DIAGNOSIS — S78.111A ABOVE-KNEE AMPUTATION OF RIGHT LOWER EXTREMITY: Primary | ICD-10-CM

## 2022-06-20 NOTE — PROGRESS NOTES
Note opened.  Patient was not seen.  Cancelled 5 minutes prior to appt time due to water leak at his apartment.

## 2022-06-22 ENCOUNTER — TREATMENT (OUTPATIENT)
Dept: PHYSICAL THERAPY | Facility: CLINIC | Age: 47
End: 2022-06-22

## 2022-06-22 DIAGNOSIS — S78.111A ABOVE-KNEE AMPUTATION OF RIGHT LOWER EXTREMITY: Primary | ICD-10-CM

## 2022-06-22 PROCEDURE — 97112 NEUROMUSCULAR REEDUCATION: CPT

## 2022-06-22 PROCEDURE — 97110 THERAPEUTIC EXERCISES: CPT

## 2022-06-22 NOTE — PROGRESS NOTES
Physical Therapy Daily Progress Note     Diagnosis Plan   1. Above-knee amputation of right lower extremity (HCC)         VISIT#: 3    Subjective   Maynor Gregg reports: rated shoulder pain as 6/10.  Patient denied pain at (B) residual limbs.  Patient reported increased frequency of (B) phantom pain.  Patient reported he is taking Lyrica several times daily. Patient reported this only helps a little bit.    Objective     See Exercise, Manual, and Modality Logs for complete treatment.     Patient Education:    Assessment/Plan  Trialed ambulation in // bars without UE support.  Patient demonstrated (L) lateral lean and increased lateral sway without use of AD or UE support. Imbalance static and dynamic standing balance observed.  Balance retraining activities performed   Patient reported he still feels as if his (R) prosthesis too long.  Pt has a f/u with adjustment with prosthetic on Friday, .      Progress per Plan of Care            Timed:         Manual Therapy:    0     mins  60142;     Therapeutic Exercise:    30     mins  52878;     Neuromuscular Jeanette:    10    mins  37481;    Therapeutic Activity:     0     mins  85197;     Gait Trainin     mins  81532;     Ultrasound:     0     mins  65604;    Ionto                               0    mins   38784  Self Care                       0     mins   01295  Canalith Repos               0    mins  4209  Aquatic                          0     mins 43283    Un-Timed:  Electrical Stimulation:    0     mins  26660 ( );  Dry Needling     0     mins self-pay  Traction     0     mins 55346  Low Eval     0    Mins  66570  Mod Eval     0    Mins  37053  High Eval                       0     Mins  61492  Re-Eval                           0    mins  87219    Timed Treatment:   45   mins   Total Treatment:     45   mins    Yahaira Dey, PT PT, DPT, [id number]

## 2022-06-27 ENCOUNTER — TREATMENT (OUTPATIENT)
Dept: PHYSICAL THERAPY | Facility: CLINIC | Age: 47
End: 2022-06-27

## 2022-06-27 DIAGNOSIS — S78.111A ABOVE-KNEE AMPUTATION OF RIGHT LOWER EXTREMITY: Primary | ICD-10-CM

## 2022-06-27 PROCEDURE — 97112 NEUROMUSCULAR REEDUCATION: CPT

## 2022-06-27 PROCEDURE — 97116 GAIT TRAINING THERAPY: CPT

## 2022-06-27 PROCEDURE — 97110 THERAPEUTIC EXERCISES: CPT

## 2022-06-27 NOTE — PROGRESS NOTES
Physical Therapy  Progress Note    Patient: Maynor Gregg   : 1975  Diagnosis/ICD-10 Code:  Above-knee amputation of right lower extremity (HCC) [S78.111A]  Referring practitioner: Donn Alcantara MD  Date of Initial Visit: Type: THERAPY  Noted: 2022  Today's Date: 2022  Patient seen for 4 sessions      Subjective:   Visit Diagnosis:    ICD-10-CM ICD-9-CM   1. Above-knee amputation of right lower extremity (HCC)  S78.111A 897.2       Maynor Gregg reports: his shoulder is bothering him.  Denied residual limb pain this date  Subjective Questionnaire: Not assessed on 22  Clinical Progress: unchanged  Home Program Compliance: Yes  Treatment has included: therapeutic exercise; gait training; manual therapy; neuro re-ed    Subjective     Objective     Strength/Myotome Testing      Left Hip   Planes of Motion   Flexion: 5  Extension: 5  Abduction: 5     Right Hip   Planes of Motion   Flexion: 4  Extension: 4+  Abduction: 5       Cedeno Score : 36/56: indicating moderate risk of fall   Assessment/Plan:  Patient demonstrates hip drop/trendelenburg type gait pattern during weight shift onto (R) side.  Patient is able to improve quality with cueing and attention to task . Pt's walker appears too short and is likely affecting function of microprocessor knee as hip remains in flexed position throughout gait cycle while using rollator walker.  Is unable to ambulate without AD at this time due to impaired confidence, safety and functional balance.  Patient reported he had adjustments made to prosthesis on  and is happy with adjustments so far.   Recommend continued therapy to address noted deficits to maximize safety, balance and functional mobility.     Progress toward previous goals: Partially Met    Goals  Plan Goals: SHORT TERM GOALS:  3 WEEKS  1). Pt will be (I) with HEP handout for self management of impairments :MET  2).  Patient will have ability to ambulate 50 ft with lesser assistive  device for progression of gait: PROGRESSING  3) Patient will have ability to stand unsupported > 5 minutes performing bimanual tasks for improve ability to perform ADL and work related tasks :MET         LONG TERM GOALS: 6 WEEKS  1).  Pt ambulates 500 feet with (B) prosthetics and least restrictive AD for improved community ambulation  2) Pt will score 47/56 or better on Cedeno balance assessment : PROGRESSING, Cedeno Score : 36/56: indicating moderate risk of fall   3). Pt will score 40 or better on PLUS- M 12 item short form: NOT ASSESSED  4) Patient will verbalize understanding of D/C HEP and prepared to D/C: PROGRESSING  Short-term goals (STG): 2 /3  Long-term goals (LTG):  0/4      Recommendations: Continue as planned  Timeframe: 2 months  Prognosis to achieve goals: good    Timed:         Manual Therapy:    0     mins  40916;     Therapeutic Exercise:    15     mins  64247;     Neuromuscular Jeanette:    8    mins  22894;    Therapeutic Activity:     0     mins  31841;     Gait Trainin     mins  85476;     Ultrasound:     0     mins  11625;    Ionto                               0    mins   09911  Self Care                       0     mins   72420  Aquatic                          0     mins 16646      Un-Timed:  Electrical Stimulation:    0     mins  74943 ( );  Dry Needling     0     mins self-pay  Traction     0     mins 09579  Low Eval     0     Mins  40232  Mod Eval     0     Mins  74203  High Eval                       0     Mins  78675  Re-Eval                           0    mins  09393      Timed Treatment:   43   mins   Total Treatment:     43   mins      PT Signature: Yahaira Dey PT  [place] License #: [id number]

## 2022-07-05 ENCOUNTER — TREATMENT (OUTPATIENT)
Dept: PHYSICAL THERAPY | Facility: CLINIC | Age: 47
End: 2022-07-05

## 2022-07-05 DIAGNOSIS — S78.111A ABOVE-KNEE AMPUTATION OF RIGHT LOWER EXTREMITY: Primary | ICD-10-CM

## 2022-07-05 PROCEDURE — 97110 THERAPEUTIC EXERCISES: CPT

## 2022-07-05 PROCEDURE — 97116 GAIT TRAINING THERAPY: CPT

## 2022-07-05 NOTE — PROGRESS NOTES
Physical Therapy Treatment  Note     Diagnosis Plan   1. Above-knee amputation of right lower extremity (HCC)         VISIT#: 5    Subjective   Maynor Gregg reports: he is experiencing some shoulder pain.  Patient missed appt last week due to not feeling well.  Patient reported mild pressure in his stomach but is getting better.  Still does not feel 100%      Objective     See Exercise, Manual, and Modality Logs for complete treatment.     Patient Education:    Assessment/Plan  Patient demonstrates decreased confidence and functional balance during gait training without AD.  Patient has difficulty shifting weight over (R) prosthesis due to impaired knee control in stance and impaired confidence.  Performed unsupported standing balance activities and (L) foot taps onto step to aid in promoting increased weight shift onto (R) prosthesis and improved functional balance in SLS.     Progress per Plan of Care            Timed:         Manual Therapy:    0     mins  76342;     Therapeutic Exercise:    20     mins  35894;     Neuromuscular Jeanette:    0    mins  25284;    Therapeutic Activity:     0     mins  95633;     Gait Trainin     mins  49624;     Ultrasound:     0     mins  86797;    Ionto                               0    mins   93009  Self Care                       0     mins   11120  Canalith Repos               0    mins  4209  Aquatic                          0     mins 11462    Un-Timed:  Electrical Stimulation:    0     mins  13011 ( );  Dry Needling     0     mins self-pay  Traction     0     mins 82933  Low Eval     0     Mins  01402  Mod Eval     0     Mins  41800  High Eval                       0     Mins  16386  Re-Eval                           0    mins  25563    Timed Treatment:   45   mins   Total Treatment:     45   mins    Yahaira Dey, PT PT, DPT, [id number]

## 2022-07-11 ENCOUNTER — TREATMENT (OUTPATIENT)
Dept: PHYSICAL THERAPY | Facility: CLINIC | Age: 47
End: 2022-07-11

## 2022-07-11 DIAGNOSIS — S78.111A ABOVE-KNEE AMPUTATION OF RIGHT LOWER EXTREMITY: Primary | ICD-10-CM

## 2022-07-11 PROCEDURE — 97110 THERAPEUTIC EXERCISES: CPT

## 2022-07-11 PROCEDURE — 97116 GAIT TRAINING THERAPY: CPT

## 2022-07-11 NOTE — PROGRESS NOTES
[facility] Treatment  Note     Diagnosis Plan   1. Above-knee amputation of right lower extremity (HCC)         VISIT#: 6    Subjective   Maynor Gregg reports: rated (R) shoulder pain as 7/10 upon arrival to therapy.  Patient denied pain at residual limbs.       Objective     See Exercise, Manual, and Modality Logs for complete treatment.     Patient Education:    Assessment/Plan  Patient continues to use rollator walker for stability and safety. Walker height continues to be too short resulting in flexed trunk and hip resulting in gait deviations and decreased ability to maximize microprocessor knee.  Gait training during therapy performed in //  Bars.  Awaiting DME company to check into reimbursement for proper height walker.  During gait training, increased stance control/resistance added per phone tristian as pt demonstrated early knee flexion in mid/terminal stance.  Patient reported improved confidence with setting adjustment.     Progress per Plan of Care       Addendum: documentation error in amount of gait training vivian corrected on flow sheet      Timed:         Manual Therapy:    0     mins  81219;     Therapeutic Exercise:    18     mins  08721;     Neuromuscular Jeanette:    0    mins  66638;    Therapeutic Activity:     0     mins  63493;     Gait Trainin     mins  67265;     Ultrasound:     0     mins  61320;    Ionto                               0    mins   84844  Self Care                       0     mins   73463  Canalith Repos               0    mins  4209  Aquatic                          0     mins 98617    Un-Timed:  Electrical Stimulation:    0     mins  18170 ( );  Dry Needling     0     mins self-pay  Traction     0     mins 60527  Low Eval     0     Mins  99365  Mod Eval     0     Mins  03486  High Eval                       0     Mins  43018  Re-Eval                           0    mins  28675    Timed Treatment:   43   mins   Total Treatment:     43   mins    Yahaira  Yissel, PT PT, DPT, [id number]

## 2022-07-13 ENCOUNTER — TREATMENT (OUTPATIENT)
Dept: PHYSICAL THERAPY | Facility: CLINIC | Age: 47
End: 2022-07-13

## 2022-07-13 DIAGNOSIS — S78.111A ABOVE-KNEE AMPUTATION OF RIGHT LOWER EXTREMITY: Primary | ICD-10-CM

## 2022-07-13 PROCEDURE — 97116 GAIT TRAINING THERAPY: CPT

## 2022-07-13 PROCEDURE — 97110 THERAPEUTIC EXERCISES: CPT

## 2022-07-13 NOTE — PROGRESS NOTES
Physical Therapy Treatment  Note     Diagnosis Plan   1. Above-knee amputation of right lower extremity (HCC)         VISIT#: 7    Subjective   Maynor Gregg reports: right shoulder is bothering him today.  Patient reported his son accidentally bumped into shoulder and is more sore as a result.     Objective     See Exercise, Manual, and Modality Logs for complete treatment.     Patient Education:    Assessment/Plan  Patient reported having a stomachache today/feeling like he has a gas bubble in his upper abdomen.  Patient has experienced this for several weeks, supine core strengthening held this date due to patient reported of discomfort in this area.  Patient otherwise tolerated treatment session with fatigue at conclusion.  Patient demonstrated improve balance without AD in // bars (without UE support) this date with decreased lateral sway and decreased occurrence of balance checks observed.  Patient is still unsafe to transition to lesser AD over ground due to impaired mechanics and imbalance.     Progress per Plan of Care            Timed:         Manual Therapy:    0     mins  29097;     Therapeutic Exercise:    15     mins  99919;     Neuromuscular Jeanette:    3    mins  57239;    Therapeutic Activity:     0     mins  67352;     Gait Trainin     mins  61712;     Ultrasound:     0     mins  08137;    Ionto                               0    mins   82497  Self Care                       0     mins   30479  Canalith Repos               0    mins  4209  Aquatic                          0     mins 81554    Un-Timed:  Electrical Stimulation:    0     mins  35705 ( );  Dry Needling     0     mins self-pay  Traction     0     mins 35799  Low Eval     0     Mins  17890  Mod Eval     0     Mins  58248  High Eval                       0     Mins  79485  Re-Eval                           0    mins  75537    Timed Treatment:   43   mins   Total Treatment:     43   mins    Yahaira Dey, PT PT, DPT,  [id number]

## 2022-08-04 ENCOUNTER — TREATMENT (OUTPATIENT)
Dept: PHYSICAL THERAPY | Facility: CLINIC | Age: 47
End: 2022-08-04

## 2022-08-04 DIAGNOSIS — S78.111A ABOVE-KNEE AMPUTATION OF RIGHT LOWER EXTREMITY: Primary | ICD-10-CM

## 2022-08-04 PROCEDURE — 97140 MANUAL THERAPY 1/> REGIONS: CPT

## 2022-08-04 PROCEDURE — 97116 GAIT TRAINING THERAPY: CPT

## 2022-08-04 PROCEDURE — 97110 THERAPEUTIC EXERCISES: CPT

## 2022-08-04 NOTE — PROGRESS NOTES
Physical Therapy  Progress Note/Reasessment      Patient: Maynor Gregg   : 1975  Diagnosis/ICD-10 Code:  Above-knee amputation of right lower extremity (HCC) [S78.111A]  Referring practitioner: Donn Alcantara MD  Date of Initial Visit: Type: THERAPY  Noted: 2022  Today's Date: 2022  Patient seen for 8 sessions      Subjective:   Visit Diagnosis:    ICD-10-CM ICD-9-CM   1. Above-knee amputation of right lower extremity (HCC)  S78.111A 897.2       Maynor Gregg reports: (R) shoulder is killing him, has had severe pain   Subjective Questionnaire: Not assessed   Clinical Progress: improved  Home Program Compliance: Yes  Treatment has included: therapeutic exercise; gait training; prosthetic training; neuro re-ed    Subjective   Pt reported he has been experiencing severe shoulder pain.   Objective   Gaiit: pt ambulated 300 ft with (L) SPC with CGA and 3 episodes of steadying with environmental objects   Assessment/Plan     Patient demonstrated improved functional mobility and balance as evidenced by  Ability  to ambulate 300 ft with (L) SPC and CGA with intermittent balance check on environmental objects. Patient reported he has been able to trial use of cane at home with external objects nearby for safety.  This is an improvement for relying only on rollator.  Recommend continue therapy to maximize function, improve (I); decrease fall risk and progress toward return to work as per pt goals.   Progress toward previous goals: Partially Met    Goals    Plan Goals: SHORT TERM GOALS:  3 WEEKS  1). Pt will be (I) with HEP handout for self management of impairments :MET  2).  Patient will have ability to ambulate 50 ft with lesser assistive device for progression of gait: MET (22)- see assessment for details.  3) Patient will have ability to stand unsupported > 5 minutes performing bimanual tasks for improve ability to perform ADL and work related tasks :MET         LONG TERM GOALS: 6 WEEKS  1).   Pt ambulates 500 feet with (B) prosthetics and least restrictive AD for improved community ambulation: Progressing, pt was able to ambulate 300 ft with (L) SPC and CGA with intermittent balance check on environmental objects.  2) Pt will score 47/56 or better on Cedeno balance assessment : PROGRESSING, Cedeno Score : 36/56: indicating moderate risk of fall   3). Pt will score 40 or better on PLUS- M 12 item short form: NOT ASSESSED  4) Patient will verbalize understanding of D/C HEP and prepared to D/C: PROGRESSING  Short-term goals (STG): 2 /3  Long-term goals (LTG):  0/4      Recommendations: Continue as planned  Timeframe: 2 weeks  Prognosis to achieve goals: fair    Timed:         Manual Therapy:    10     mins  01473;     Therapeutic Exercise:    20     mins  32731;     Neuromuscular Jeanette:    0    mins  37800;    Therapeutic Activity:     0     mins  33716;     Gait Training:      10     mins  25067;     Ultrasound:     0     mins  95114;    Ionto                               0    mins   23224  Self Care                       0     mins   95903  Aquatic                          0     mins 88372      Un-Timed:  Electrical Stimulation:    0     mins  42160 ( );  Dry Needling     0     mins self-pay  Traction     0     mins 60603  Low Eval     0     Mins  56186  Mod Eval     0     Mins  72483  High Eval                       0     Mins  50092  Re-Eval                           0    mins  74466      Timed Treatment:   40   mins   Total Treatment:     40   mins      PT Signature: Yahaira Dey PT  [place] License #: [id number]

## 2022-08-09 ENCOUNTER — TREATMENT (OUTPATIENT)
Dept: PHYSICAL THERAPY | Facility: CLINIC | Age: 47
End: 2022-08-09

## 2022-08-09 DIAGNOSIS — S78.111A ABOVE-KNEE AMPUTATION OF RIGHT LOWER EXTREMITY: Primary | ICD-10-CM

## 2022-08-09 PROCEDURE — 97110 THERAPEUTIC EXERCISES: CPT

## 2022-08-09 PROCEDURE — 97116 GAIT TRAINING THERAPY: CPT

## 2022-08-09 NOTE — PROGRESS NOTES
Physical Therapy  Progress Note/Reasessment      Patient: Maynor Gregg   : 1975  Diagnosis/ICD-10 Code:  Above-knee amputation of right lower extremity (HCC) [S78.111A]  Referring practitioner: Donn Alcantara MD  Date of Initial Visit: Type: THERAPY  Noted: 2022  Today's Date: 2022  Patient seen for 9 sessions      Subjective:   Visit Diagnosis:    ICD-10-CM ICD-9-CM   1. Above-knee amputation of right lower extremity (HCC)  S78.111A 897.2       Maynor Gregg reports: (R) shoulder is killing him, has had severe pain   Subjective Questionnaire:PLUS M12 T score 34.9 (regressed from last assessment- inconsistent)   Clinical Progress: improved  Home Program Compliance: Yes  Treatment has included: therapeutic exercise; gait training; prosthetic training; neuro re-ed    Subjective   Pt reported he has been experiencing severe shoulder pain.  Objective   Strength/Myotome Testing      Left Hip   Planes of Motion   Flexion: 5  Extension: 5  Abduction: 5     Right Hip   Planes of Motion   Flexion: 4  Extension: 4+  Abduction: 5        Cedeno Score : 36/56: indicating moderate risk of fall  Gait: pt ambulated 300 ft with (L) SPC with CGA and 3 episodes of steadying with environmental objects   Assessment/Plan     Patient demonstrated improved functional mobility and balance as evidenced by  Ability  to ambulate 300 ft with (L) SPC  Or (B) forearm crutches.  Patient reported he has been practicing at home with (B) forearm crutches and is feeling more comfortable.  CGA with intermittent balance check on environmental objects. Patient reported he has been able to trial use of cane at home with external objects nearby for safety.  This is an improvement for relying only on rollator. During session, pt's microprocessor knee mechanism would not connect and was not working properly.  Gait training had to be stopped at that time.  Pt to discuss with prosthetist.  Recommend continue therapy to maximize function,  improve (I); decrease fall risk and progress toward return to work as per pt goals.   Progress toward previous goals: Partially Met    Goals    Plan Goals: SHORT TERM GOALS:  3 WEEKS  1). Pt will be (I) with HEP handout for self management of impairments :MET  2).  Patient will have ability to ambulate 50 ft with lesser assistive device for progression of gait: MET (8/4/22)- see assessment for details.  3) Patient will have ability to stand unsupported > 5 minutes performing bimanual tasks for improve ability to perform ADL and work related tasks :MET         LONG TERM GOALS: 6 WEEKS  1).  Pt ambulates 500 feet with (B) prosthetics and least restrictive AD for improved community ambulation: Progressing, pt was able to ambulate 300 ft with (L) SPC or (B) forearm crutches  and SBA with intermittent balance check on environmental objects.  2) Pt will score 47/56 or better on Cedeno balance assessment : PROGRESSING, Cedeno Score : 36/56: indicating moderate risk of fall   3). Pt will score 40 or better on PLUS- M 12 item short form:   4) Patient will verbalize understanding of D/C HEP and prepared to D/C: PROGRESSING  Short-term goals (STG): 2 /3  Long-term goals (LTG):  0/4      Recommendations: Continue as planned  Timeframe: 2 weeks  Prognosis to achieve goals: fair   INITIAL EVALUATION RECOMMENDATIONS  Frequency: 2x week  Duration in visits: 16  Duration in weeks: 8    Timed:         Manual Therapy:    0     mins  61549;     Therapeutic Exercise:    30     mins  43738;     Neuromuscular Jeanette:    0    mins  58479;    Therapeutic Activity:     0     mins  29616;     Gait Training:      10     mins  46212;     Ultrasound:     0     mins  03412;    Ionto                               0    mins   20059  Self Care                       0     mins   26150  Aquatic                          0     mins 75391      Un-Timed:  Electrical Stimulation:    0     mins  03977 (MC );  Dry Needling     0     mins self-pay  Traction      0     mins 42629  Low Eval     0     Mins  41348  Mod Eval     0     Mins  90615  High Eval                       0     Mins  06231  Re-Eval                           0    mins  75357      Timed Treatment:   40   mins   Total Treatment:     40   mins      PT Signature: Yahaira Dey, PT  [place] License #: [id number]

## 2022-09-12 ENCOUNTER — TREATMENT (OUTPATIENT)
Dept: PHYSICAL THERAPY | Facility: CLINIC | Age: 47
End: 2022-09-12

## 2022-09-12 DIAGNOSIS — S78.111A ABOVE-KNEE AMPUTATION OF RIGHT LOWER EXTREMITY: Primary | ICD-10-CM

## 2022-09-12 PROCEDURE — 97110 THERAPEUTIC EXERCISES: CPT

## 2022-09-12 PROCEDURE — 97116 GAIT TRAINING THERAPY: CPT

## 2022-09-12 NOTE — PROGRESS NOTES
Re-Assessment / Re-Certification        Patient: Maynor Gregg   : 1975  Diagnosis/ICD-10 Code:  Above-knee amputation of right lower extremity (HCC) [S78.111A]  Referring practitioner: Donn Alcantara MD  Date of Initial Visit: Type: THERAPY  Noted: 2022  Today's Date: 2022  Patient seen for 10 sessions      Subjective:   Maynor Gregg reports: adjustments to microprocessor knee are doing well. Gait is more fluid, less to drag  Subjective Questionnaire:PLUS- M mobility short form: T score 40.3, percentile :16.6%  Clinical Progress: improved  Home Program Compliance: Inconsistent  Treatment has included: therapeutic exercise    Subjective       Objective   Strength/Myotome Testing      Left Hip   Planes of Motion   Flexion: 5  Extension: 5  Abduction: 5     Right Hip   Planes of Motion   Flexion: 4  Extension: 4+  Abduction: 5    Assessment/Plan   Patient demonstrates improved gait speed and fluidity this date versus previous sessions.  Patient demonstrates impaired confidence and stability without UE support. Recommend continued therapy to maximize function, reduce need for AD and adaptive equipment.   Progress toward previous goals: Partially Met  Goals     Plan Goals: SHORT TERM GOALS:  3 WEEKS  1). Pt will be (I) with HEP handout for self management of impairments :MET  2).  Patient will have ability to ambulate 50 ft with lesser assistive device for progression of gait: MET (22)- see assessment for details.  3) Patient will have ability to stand unsupported > 5 minutes performing bimanual tasks for improve ability to perform ADL and work related tasks :MET         LONG TERM GOALS: 6 WEEKS  1).  Pt ambulates 500 feet with (B) prosthetics and least restrictive AD for improved community ambulation: Progressing, pt was able to ambulate 300 ft with (L) SPC and CGA with intermittent balance check on environmental objects.  2) Pt will score 47/56 or better on Cedeno balance assessment :  PROGRESSING, Cedeno Score : 36/56: indicating moderate risk of fall   3). Pt will score 40 or better on PLUS- M 12 item short form: Progressing,  See scoring above   4) Patient will verbalize understanding of D/C HEP and prepared to D/C: PROGRESSING  Short-term goals (STG): 3 /3--- STGS met at previous assessment   Long-term goals (LTG):  0/4  New Goals  1. Patient will have ability to negotiate 1 flight of stairs mod (I) with use of handrails.        Recommendations: Continue with recommendations increased from 16 visits as per initial POC to 20 visits  Timeframe: 6 weeks  Prognosis to achieve goals: good    PT: Yahaira Dey PT     [place] License #:  [id number]    Electronically signed by Yahaira Dey PT, 22, 9:45 AM EDT    Certification Period: 2022 thru 12/10/2022  I certify that the therapy services are furnished while this patient is under my care.  The services outlined above are required by this patient, and will be reviewed every 90 days.         Physician Signature:__________________________________________________    PHYSICIAN: Donn Alcantara MD      DATE:     Please sign and return via fax to .[phone] . Thank you, [facility]      Timed:         Manual Therapy:    0     mins  82972;     Therapeutic Exercise:    15    mins  09115;     Neuromuscular Jeanette:    0    mins  79133;    Therapeutic Activity:     0     mins  45891;     Gait Trainin     mins  97990;     Ultrasound:     0     mins  30087;    Ionto                               0    mins   67989  Self Care                       0     mins   15222  Aquatic                          0     mins 18855      Un-Timed:  Electrical Stimulation:    0     mins  02926 ( );  Dry Needling     0     mins self-pay  Traction     0     mins 02486  Low Eval     0     Mins  15613  Mod Eval     0     Mins  85188  High Eval                       0     Mins  24235  Re-Eval                           0    mins   69999      Timed Treatment:   45   mins   Total Treatment:     45   mins

## 2022-10-02 ENCOUNTER — APPOINTMENT (OUTPATIENT)
Dept: GENERAL RADIOLOGY | Facility: HOSPITAL | Age: 47
End: 2022-10-02

## 2022-10-02 ENCOUNTER — HOSPITAL ENCOUNTER (INPATIENT)
Facility: HOSPITAL | Age: 47
LOS: 4 days | Discharge: HOME | End: 2022-10-06
Attending: EMERGENCY MEDICINE | Admitting: INTERNAL MEDICINE

## 2022-10-02 DIAGNOSIS — L08.9 WOUND INFECTION: ICD-10-CM

## 2022-10-02 DIAGNOSIS — R50.9 FEVER, UNSPECIFIED FEVER CAUSE: Primary | ICD-10-CM

## 2022-10-02 DIAGNOSIS — T14.8XXA WOUND INFECTION: ICD-10-CM

## 2022-10-02 DIAGNOSIS — U07.1 COVID-19: ICD-10-CM

## 2022-10-02 LAB
ALBUMIN SERPL-MCNC: 3 G/DL (ref 3.5–5.2)
ALBUMIN/GLOB SERPL: 0.6 G/DL
ALP SERPL-CCNC: 99 U/L (ref 39–117)
ALT SERPL W P-5'-P-CCNC: 34 U/L (ref 1–41)
ANION GAP SERPL CALCULATED.3IONS-SCNC: 12 MMOL/L (ref 5–15)
APTT PPP: 28 SECONDS (ref 61–76.5)
AST SERPL-CCNC: 15 U/L (ref 1–40)
BASOPHILS # BLD AUTO: 0.1 10*3/MM3 (ref 0–0.2)
BASOPHILS NFR BLD AUTO: 0.8 % (ref 0–1.5)
BILIRUB SERPL-MCNC: 0.8 MG/DL (ref 0–1.2)
BUN SERPL-MCNC: 11 MG/DL (ref 6–20)
BUN/CREAT SERPL: 13.4 (ref 7–25)
CALCIUM SPEC-SCNC: 8.8 MG/DL (ref 8.6–10.5)
CHLORIDE SERPL-SCNC: 97 MMOL/L (ref 98–107)
CO2 SERPL-SCNC: 27 MMOL/L (ref 22–29)
CREAT SERPL-MCNC: 0.82 MG/DL (ref 0.76–1.27)
D-LACTATE SERPL-SCNC: 0.9 MMOL/L (ref 0.5–2)
D-LACTATE SERPL-SCNC: 0.9 MMOL/L (ref 0.5–2)
DEPRECATED RDW RBC AUTO: 39.8 FL (ref 37–54)
EGFRCR SERPLBLD CKD-EPI 2021: 109 ML/MIN/1.73
EOSINOPHIL # BLD AUTO: 0 10*3/MM3 (ref 0–0.4)
EOSINOPHIL NFR BLD AUTO: 0.2 % (ref 0.3–6.2)
ERYTHROCYTE [DISTWIDTH] IN BLOOD BY AUTOMATED COUNT: 13 % (ref 12.3–15.4)
GLOBULIN UR ELPH-MCNC: 4.8 GM/DL
GLUCOSE BLDC GLUCOMTR-MCNC: 91 MG/DL (ref 70–105)
GLUCOSE SERPL-MCNC: 74 MG/DL (ref 65–99)
HCT VFR BLD AUTO: 39.7 % (ref 37.5–51)
HGB BLD-MCNC: 13.4 G/DL (ref 13–17.7)
HOLD SPECIMEN: NORMAL
INR PPP: 1.09 (ref 0.93–1.1)
LYMPHOCYTES # BLD AUTO: 2 10*3/MM3 (ref 0.7–3.1)
LYMPHOCYTES NFR BLD AUTO: 15.9 % (ref 19.6–45.3)
MAGNESIUM SERPL-MCNC: 1.9 MG/DL (ref 1.6–2.6)
MCH RBC QN AUTO: 29.4 PG (ref 26.6–33)
MCHC RBC AUTO-ENTMCNC: 33.7 G/DL (ref 31.5–35.7)
MCV RBC AUTO: 87.4 FL (ref 79–97)
MONOCYTES # BLD AUTO: 1.2 10*3/MM3 (ref 0.1–0.9)
MONOCYTES NFR BLD AUTO: 9.5 % (ref 5–12)
NEUTROPHILS NFR BLD AUTO: 73.6 % (ref 42.7–76)
NEUTROPHILS NFR BLD AUTO: 9.4 10*3/MM3 (ref 1.7–7)
NRBC BLD AUTO-RTO: 0 /100 WBC (ref 0–0.2)
PLATELET # BLD AUTO: 362 10*3/MM3 (ref 140–450)
PMV BLD AUTO: 7.5 FL (ref 6–12)
POTASSIUM SERPL-SCNC: 3.2 MMOL/L (ref 3.5–5.2)
PROT SERPL-MCNC: 7.8 G/DL (ref 6–8.5)
PROTHROMBIN TIME: 11.2 SECONDS (ref 9.6–11.7)
RBC # BLD AUTO: 4.54 10*6/MM3 (ref 4.14–5.8)
SARS-COV-2 RNA PNL SPEC NAA+PROBE: DETECTED
SODIUM SERPL-SCNC: 136 MMOL/L (ref 136–145)
WBC NRBC COR # BLD: 12.8 10*3/MM3 (ref 3.4–10.8)
WHOLE BLOOD HOLD SPECIMEN: NORMAL

## 2022-10-02 PROCEDURE — 73552 X-RAY EXAM OF FEMUR 2/>: CPT

## 2022-10-02 PROCEDURE — 87040 BLOOD CULTURE FOR BACTERIA: CPT | Performed by: EMERGENCY MEDICINE

## 2022-10-02 PROCEDURE — 25010000002 ONDANSETRON PER 1 MG: Performed by: EMERGENCY MEDICINE

## 2022-10-02 PROCEDURE — 87635 SARS-COV-2 COVID-19 AMP PRB: CPT | Performed by: EMERGENCY MEDICINE

## 2022-10-02 PROCEDURE — 25010000002 MORPHINE PER 10 MG: Performed by: EMERGENCY MEDICINE

## 2022-10-02 PROCEDURE — 80053 COMPREHEN METABOLIC PANEL: CPT | Performed by: EMERGENCY MEDICINE

## 2022-10-02 PROCEDURE — 82962 GLUCOSE BLOOD TEST: CPT

## 2022-10-02 PROCEDURE — 85025 COMPLETE CBC W/AUTO DIFF WBC: CPT | Performed by: EMERGENCY MEDICINE

## 2022-10-02 PROCEDURE — 36415 COLL VENOUS BLD VENIPUNCTURE: CPT

## 2022-10-02 PROCEDURE — 87070 CULTURE OTHR SPECIMN AEROBIC: CPT | Performed by: EMERGENCY MEDICINE

## 2022-10-02 PROCEDURE — 85730 THROMBOPLASTIN TIME PARTIAL: CPT | Performed by: EMERGENCY MEDICINE

## 2022-10-02 PROCEDURE — 83735 ASSAY OF MAGNESIUM: CPT | Performed by: EMERGENCY MEDICINE

## 2022-10-02 PROCEDURE — 71045 X-RAY EXAM CHEST 1 VIEW: CPT

## 2022-10-02 PROCEDURE — 85610 PROTHROMBIN TIME: CPT | Performed by: EMERGENCY MEDICINE

## 2022-10-02 PROCEDURE — 87205 SMEAR GRAM STAIN: CPT | Performed by: EMERGENCY MEDICINE

## 2022-10-02 PROCEDURE — 87150 DNA/RNA AMPLIFIED PROBE: CPT | Performed by: EMERGENCY MEDICINE

## 2022-10-02 PROCEDURE — 87077 CULTURE AEROBIC IDENTIFY: CPT | Performed by: EMERGENCY MEDICINE

## 2022-10-02 PROCEDURE — 99285 EMERGENCY DEPT VISIT HI MDM: CPT

## 2022-10-02 PROCEDURE — 25010000002 VANCOMYCIN 10 G RECONSTITUTED SOLUTION: Performed by: EMERGENCY MEDICINE

## 2022-10-02 PROCEDURE — 25010000002 PIPERACILLIN SOD-TAZOBACTAM PER 1 G: Performed by: EMERGENCY MEDICINE

## 2022-10-02 PROCEDURE — 83605 ASSAY OF LACTIC ACID: CPT

## 2022-10-02 PROCEDURE — 83605 ASSAY OF LACTIC ACID: CPT | Performed by: EMERGENCY MEDICINE

## 2022-10-02 RX ORDER — SODIUM CHLORIDE 0.9 % (FLUSH) 0.9 %
10 SYRINGE (ML) INJECTION AS NEEDED
Status: DISCONTINUED | OUTPATIENT
Start: 2022-10-02 | End: 2022-10-06 | Stop reason: HOSPADM

## 2022-10-02 RX ORDER — ACETAMINOPHEN 500 MG
1000 TABLET ORAL ONCE
Status: COMPLETED | OUTPATIENT
Start: 2022-10-02 | End: 2022-10-02

## 2022-10-02 RX ORDER — VANCOMYCIN 1.75 GRAM/500 ML IN 0.9 % SODIUM CHLORIDE INTRAVENOUS
20 ONCE
Status: COMPLETED | OUTPATIENT
Start: 2022-10-02 | End: 2022-10-02

## 2022-10-02 RX ORDER — ONDANSETRON 2 MG/ML
4 INJECTION INTRAMUSCULAR; INTRAVENOUS ONCE
Status: COMPLETED | OUTPATIENT
Start: 2022-10-02 | End: 2022-10-02

## 2022-10-02 RX ADMIN — SODIUM CHLORIDE 1000 ML: 9 INJECTION, SOLUTION INTRAVENOUS at 20:09

## 2022-10-02 RX ADMIN — ONDANSETRON 4 MG: 2 INJECTION INTRAMUSCULAR; INTRAVENOUS at 20:09

## 2022-10-02 RX ADMIN — MORPHINE SULFATE 4 MG: 4 INJECTION INTRAVENOUS at 20:10

## 2022-10-02 RX ADMIN — VANCOMYCIN HYDROCHLORIDE 1750 MG: 10 INJECTION, POWDER, LYOPHILIZED, FOR SOLUTION INTRAVENOUS at 21:08

## 2022-10-02 RX ADMIN — PIPERACILLIN AND TAZOBACTAM 3.38 G: 3; .375 INJECTION, POWDER, FOR SOLUTION INTRAVENOUS at 21:08

## 2022-10-02 RX ADMIN — ACETAMINOPHEN 1000 MG: 500 TABLET ORAL at 20:12

## 2022-10-02 NOTE — ED PROVIDER NOTES
"Subjective   History of Present Illness  Chief complaint: Patient is a pleasant 47-year-old who came in today \"feeling awful\".  He states that he has been vomiting since last night.  He has had pain in his leg today.  Has an open wound that it progressively enlarging for a long time behind his left distal posterior upper leg.  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  Does have a history of diabetes.  He said no cough.  He does have a headache.  Has had headaches like this before many times.  No new headache for him.  He is concerned about infection in his diabetic wound.  No diarrhea.  He does not have any chest pain he does not have any abdominal pain.  No cough.    Context: As above    Duration: 1 day    Timing: Started suddenly last night with vomiting is persisted and worsened all throughout the day.    Severity: Severe    Associated Symptoms: Negative except as noted above.        PCP:  LMP:        Review of Systems   Constitutional: Positive for fever.   Respiratory: Negative.    Cardiovascular: Negative.    Gastrointestinal: Positive for nausea and vomiting.   Genitourinary: Negative.    Musculoskeletal: Positive for arthralgias and myalgias.   Skin: Positive for wound.   Neurological: Positive for headaches.   Psychiatric/Behavioral: Negative.        Past Medical History:   Diagnosis Date   • Anxiety    • Bone infection (HCC)     STATES CAUSED HIM TO LOSE RIGHT LEG   • CHF (congestive heart failure) (HCC)    • Coronary artery disease    • Depression    • Diabetes mellitus (HCC)     TYPE 1   • Gallstones    • GERD (gastroesophageal reflux disease)    • Headache    • History of amputation     PEDRO BELOW THE KNEE   • MI, old 2010    WITH STENT PLACEMENT-CARDIO MIGDALIAHIJOSE MANUEL [place]-NO LONGER SEES   • Nodule of left lung     HAD CT SCAN BENIGN.    • Phantom limb pain (HCC)     LEFT AND RIGHT BOTH   • Seasonal allergies        Allergies   Allergen Reactions   • Metformin Diarrhea and Nausea And Vomiting   • " Oxycodone Nausea And Vomiting and Rash       Past Surgical History:   Procedure Laterality Date   • ABOVE KNEE AMPUTATION Right 10/14/2021    Procedure: AMPUTATION ABOVE KNEE;  Surgeon: Donn Alcantara MD;  Location: [facility] MAIN OR;  Service: Orthopedics;  Laterality: Right;   • AMPUTATION DIGIT Right 2/28/2020    Procedure: PARTIAL FIRST RAY RESECTION RIGHT FOOT;  Surgeon: CROW Souza DPM;  Location: [facility] MAIN OR;  Service: Podiatry;  Laterality: Right;   • AMPUTATION FOOT / TOE     • AMPUTATION REVISION Right 3/2/2021    Procedure: AMPUTATION REVISION KNEE STUMP;  Surgeon: Donn Alcantara MD;  Location: [facility] MAIN OR;  Service: Orthopedics;  Laterality: Right;   • AMPUTATION REVISION Right 9/23/2021    Procedure: RIGHT BELOW KNEE AMPUTATION REVISION;  Surgeon: Donn Alcantara MD;  Location: [facility] MAIN OR;  Service: Orthopedics;  Laterality: Right;   • BELOW KNEE AMPUTATION Right 4/30/2020    Procedure: AMPUTATION BELOW KNEE;  Surgeon: Donn Alcantara MD;  Location: [facility] MAIN OR;  Service: Orthopedics;  Laterality: Right;   • BELOW KNEE AMPUTATION Left 9/2/2020    Procedure: AMPUTATION BELOW KNEE, left;  Surgeon: Donn Alcantara MD;  Location: [facility] MAIN OR;  Service: Orthopedics;  Laterality: Left;   • BELOW KNEE AMPUTATION Right 2/23/2021    Procedure: Revision of amputaion below knee;  Surgeon: Donn Alcantara MD;  Location: [facility] MAIN OR;  Service: Orthopedics;  Laterality: Right;   • CARDIAC CATHETERIZATION  11/2010     RCA artery   • CARDIAC CATHETERIZATION  2015    LAD artery   • CARDIAC SURGERY     • CORONARY ANGIOPLASTY WITH STENT PLACEMENT      X2. 2015   • INCISION AND DRAINAGE LEG Left 1/21/2020    Procedure: INCISION AND DRAINAGE LOWER EXTREMITY with second digit amputation;  Surgeon: CROW Souza DPM;  Location: [facility] MAIN OR;  Service: Podiatry   • INCISION AND DRAINAGE LEG Right 4/28/2020    Procedure: incision and drainage ,transmetatarsal amputation, fasciotomy;  Surgeon:  CROW Souza DPM;  Location: [facility] MAIN OR;  Service: Podiatry;  Laterality: Right;   • SKIN FULL THICKNESS GRAFT Right 7/29/2021    Procedure: EXCISION SOFT TISSUE Ulcer WITH FULL THICKNESS SKIN GRAFT to right lower leg.;  Surgeon: Sukhdeep Hernadez MD;  Location: [facility] MAIN OR;  Service: Plastics;  Laterality: Right;   • TOE AMPUTATION Left    • WOUND DEBRIDEMENT Right 1/21/2020    Procedure: DEBRIDEMENT with sesamoid excision;  Surgeon: CROW Souza DPM;  Location: [facility] MAIN OR;  Service: Podiatry       Family History   Problem Relation Age of Onset   • Diabetes Father    • Heart failure Father    • Diabetes Paternal Grandfather    • Malig Hyperthermia Neg Hx        Social History     Socioeconomic History   • Marital status: Significant Other   Tobacco Use   • Smoking status: Former Smoker     Packs/day: 0.50     Years: 6.00     Pack years: 3.00     Quit date: 05/2019     Years since quitting: 3.4   • Smokeless tobacco: Never Used   Vaping Use   • Vaping Use: Never used   Substance and Sexual Activity   • Alcohol use: No   • Drug use: No   • Sexual activity: Defer           Objective   Physical Exam  Vitals and nursing note reviewed.   Constitutional:       Appearance: He is ill-appearing.   HENT:      Head: Normocephalic and atraumatic.   Eyes:      Extraocular Movements: Extraocular movements intact.      Pupils: Pupils are equal, round, and reactive to light.   Cardiovascular:      Rate and Rhythm: Normal rate and regular rhythm.      Pulses: Normal pulses.      Heart sounds: Normal heart sounds.   Pulmonary:      Effort: Pulmonary effort is normal.      Breath sounds: Normal breath sounds.   Abdominal:      Tenderness: There is no abdominal tenderness.   Musculoskeletal:      Cervical back: Neck supple.   Skin:     General: Skin is warm and dry.   Neurological:      General: No focal deficit present.      Mental Status: He is alert and oriented to person, place, and time.   Psychiatric:          Mood and Affect: Mood normal.         Behavior: Behavior normal.         Thought Content: Thought content normal.         Judgment: Judgment normal.         Procedures           ED Course      Results for orders placed or performed during the hospital encounter of 10/02/22   COVID-19,CEPHEID/COURTNEY,COR/IRA/PAD/VASQUEZ IN-HOUSE(OR EMERGENT/ADD-ON),NP SWAB IN TRANSPORT MEDIA 3-4 HR TAT, RT-PCR - Swab, Nasopharynx    Specimen: Nasopharynx; Swab   Result Value Ref Range    COVID19 Detected (C) Not Detected - Ref. Range   Comprehensive Metabolic Panel    Specimen: Blood   Result Value Ref Range    Glucose 74 65 - 99 mg/dL    BUN 11 6 - 20 mg/dL    Creatinine 0.82 0.76 - 1.27 mg/dL    Sodium 136 136 - 145 mmol/L    Potassium 3.2 (L) 3.5 - 5.2 mmol/L    Chloride 97 (L) 98 - 107 mmol/L    CO2 27.0 22.0 - 29.0 mmol/L    Calcium 8.8 8.6 - 10.5 mg/dL    Total Protein 7.8 6.0 - 8.5 g/dL    Albumin 3.00 (L) 3.50 - 5.20 g/dL    ALT (SGPT) 34 1 - 41 U/L    AST (SGOT) 15 1 - 40 U/L    Alkaline Phosphatase 99 39 - 117 U/L    Total Bilirubin 0.8 0.0 - 1.2 mg/dL    Globulin 4.8 gm/dL    A/G Ratio 0.6 g/dL    BUN/Creatinine Ratio 13.4 7.0 - 25.0    Anion Gap 12.0 5.0 - 15.0 mmol/L    eGFR 109.0 >60.0 mL/min/1.73   Protime-INR    Specimen: Blood   Result Value Ref Range    Protime 11.2 9.6 - 11.7 Seconds    INR 1.09 0.93 - 1.10   aPTT    Specimen: Blood   Result Value Ref Range    PTT 28.0 (L) 61.0 - 76.5 seconds   Magnesium    Specimen: Blood   Result Value Ref Range    Magnesium 1.9 1.6 - 2.6 mg/dL   CBC Auto Differential    Specimen: Blood   Result Value Ref Range    WBC 12.80 (H) 3.40 - 10.80 10*3/mm3    RBC 4.54 4.14 - 5.80 10*6/mm3    Hemoglobin 13.4 13.0 - 17.7 g/dL    Hematocrit 39.7 37.5 - 51.0 %    MCV 87.4 79.0 - 97.0 fL    MCH 29.4 26.6 - 33.0 pg    MCHC 33.7 31.5 - 35.7 g/dL    RDW 13.0 12.3 - 15.4 %    RDW-SD 39.8 37.0 - 54.0 fl    MPV 7.5 6.0 - 12.0 fL    Platelets 362 140 - 450 10*3/mm3    Neutrophil % 73.6 42.7 - 76.0 %     Lymphocyte % 15.9 (L) 19.6 - 45.3 %    Monocyte % 9.5 5.0 - 12.0 %    Eosinophil % 0.2 (L) 0.3 - 6.2 %    Basophil % 0.8 0.0 - 1.5 %    Neutrophils, Absolute 9.40 (H) 1.70 - 7.00 10*3/mm3    Lymphocytes, Absolute 2.00 0.70 - 3.10 10*3/mm3    Monocytes, Absolute 1.20 (H) 0.10 - 0.90 10*3/mm3    Eosinophils, Absolute 0.00 0.00 - 0.40 10*3/mm3    Basophils, Absolute 0.10 0.00 - 0.20 10*3/mm3    nRBC 0.0 0.0 - 0.2 /100 WBC   POC Lactate    Specimen: Blood   Result Value Ref Range    Lactate 0.9 0.5 - 2.0 mmol/L   Lavender Top   Result Value Ref Range    Extra Tube hold for add-on             No radiology results for the last day  X-ray reviewed by myself.                                MDM  Number of Diagnoses or Management Options  COVID-19  Fever, unspecified fever cause  Diagnosis management comments: On reevaluation patient is struggling with adequate blood pressure.  He is improving with treatment.  His wound does not have any active pus from it yet but it does go deep.  On x-ray I am concerned about reaching the bone.  Potentially osteo-.  However his COVID test did come back positive which could be causing all of his symptoms as well.  He does have elevated white count with his history of diabetes covering with antibiotics.  Rule out osteomyelitis.  Dr. Alcantara is his orthopedist.  Discussed with  who agreed to admit.       Amount and/or Complexity of Data Reviewed  Clinical lab tests: reviewed  Tests in the radiology section of CPT®: reviewed  Discussion of test results with the performing providers: yes  Discuss the patient with other providers: yes  Independent visualization of images, tracings, or specimens: yes    Patient Progress  Patient progress: stable      Final diagnoses:   None   Fever  Left wound infection lower extremity  COVID-19    ED Disposition  ED Disposition     None          No follow-up provider specified.       Medication List      No changes were made to your prescriptions  during this visit.          Ricki Mendoza,   10/02/22 [id number]

## 2022-10-02 NOTE — ED NOTES
Pt has open wound to back of left leg above knee, pt stated that MD was going to stitch it closed this week

## 2022-10-03 ENCOUNTER — APPOINTMENT (OUTPATIENT)
Dept: MRI IMAGING | Facility: HOSPITAL | Age: 47
End: 2022-10-03

## 2022-10-03 PROBLEM — T14.8XXA WOUND INFECTION: Status: ACTIVE | Noted: 2022-10-03

## 2022-10-03 PROBLEM — U07.1 COVID-19: Status: ACTIVE | Noted: 2022-10-03

## 2022-10-03 PROBLEM — R50.9 FEVER, UNSPECIFIED FEVER CAUSE: Status: ACTIVE | Noted: 2022-10-03

## 2022-10-03 PROBLEM — L08.9 WOUND INFECTION: Status: ACTIVE | Noted: 2022-10-03

## 2022-10-03 LAB
ANION GAP SERPL CALCULATED.3IONS-SCNC: 7 MMOL/L (ref 5–15)
BASOPHILS # BLD AUTO: 0.1 10*3/MM3 (ref 0–0.2)
BASOPHILS NFR BLD AUTO: 0.4 % (ref 0–1.5)
BILIRUB UR QL STRIP: NEGATIVE
BUN SERPL-MCNC: 13 MG/DL (ref 6–20)
BUN/CREAT SERPL: 17.3 (ref 7–25)
CALCIUM SPEC-SCNC: 8 MG/DL (ref 8.6–10.5)
CHLORIDE SERPL-SCNC: 105 MMOL/L (ref 98–107)
CLARITY UR: CLEAR
CO2 SERPL-SCNC: 27 MMOL/L (ref 22–29)
COLOR UR: ABNORMAL
CREAT SERPL-MCNC: 0.75 MG/DL (ref 0.76–1.27)
DEPRECATED RDW RBC AUTO: 40.3 FL (ref 37–54)
EGFRCR SERPLBLD CKD-EPI 2021: 112 ML/MIN/1.73
EOSINOPHIL # BLD AUTO: 0.1 10*3/MM3 (ref 0–0.4)
EOSINOPHIL NFR BLD AUTO: 0.8 % (ref 0.3–6.2)
ERYTHROCYTE [DISTWIDTH] IN BLOOD BY AUTOMATED COUNT: 12.9 % (ref 12.3–15.4)
GLUCOSE BLDC GLUCOMTR-MCNC: 184 MG/DL (ref 70–105)
GLUCOSE BLDC GLUCOMTR-MCNC: 198 MG/DL (ref 70–105)
GLUCOSE BLDC GLUCOMTR-MCNC: 202 MG/DL (ref 70–105)
GLUCOSE BLDC GLUCOMTR-MCNC: 253 MG/DL (ref 70–105)
GLUCOSE SERPL-MCNC: 231 MG/DL (ref 65–99)
GLUCOSE UR STRIP-MCNC: ABNORMAL MG/DL
HCT VFR BLD AUTO: 37.2 % (ref 37.5–51)
HGB BLD-MCNC: 12.4 G/DL (ref 13–17.7)
HGB UR QL STRIP.AUTO: NEGATIVE
KETONES UR QL STRIP: ABNORMAL
LEUKOCYTE ESTERASE UR QL STRIP.AUTO: NEGATIVE
LYMPHOCYTES # BLD AUTO: 3 10*3/MM3 (ref 0.7–3.1)
LYMPHOCYTES NFR BLD AUTO: 25.6 % (ref 19.6–45.3)
MCH RBC QN AUTO: 29.8 PG (ref 26.6–33)
MCHC RBC AUTO-ENTMCNC: 33.5 G/DL (ref 31.5–35.7)
MCV RBC AUTO: 89.1 FL (ref 79–97)
MONOCYTES # BLD AUTO: 1 10*3/MM3 (ref 0.1–0.9)
MONOCYTES NFR BLD AUTO: 8.9 % (ref 5–12)
NEUTROPHILS NFR BLD AUTO: 64.3 % (ref 42.7–76)
NEUTROPHILS NFR BLD AUTO: 7.5 10*3/MM3 (ref 1.7–7)
NITRITE UR QL STRIP: NEGATIVE
NRBC BLD AUTO-RTO: 0.1 /100 WBC (ref 0–0.2)
PH UR STRIP.AUTO: 6 [PH] (ref 5–8)
PLATELET # BLD AUTO: 310 10*3/MM3 (ref 140–450)
PMV BLD AUTO: 7.8 FL (ref 6–12)
POTASSIUM SERPL-SCNC: 3.6 MMOL/L (ref 3.5–5.2)
PROT UR QL STRIP: NEGATIVE
RBC # BLD AUTO: 4.17 10*6/MM3 (ref 4.14–5.8)
SODIUM SERPL-SCNC: 139 MMOL/L (ref 136–145)
SP GR UR STRIP: 1.02 (ref 1–1.03)
UROBILINOGEN UR QL STRIP: ABNORMAL
WBC NRBC COR # BLD: 11.6 10*3/MM3 (ref 3.4–10.8)

## 2022-10-03 PROCEDURE — 25010000002 VANCOMYCIN HCL 1.25 G RECONSTITUTED SOLUTION 1 EACH VIAL

## 2022-10-03 PROCEDURE — 85025 COMPLETE CBC W/AUTO DIFF WBC: CPT

## 2022-10-03 PROCEDURE — 73718 MRI LOWER EXTREMITY W/O DYE: CPT

## 2022-10-03 PROCEDURE — 82962 GLUCOSE BLOOD TEST: CPT

## 2022-10-03 PROCEDURE — 25010000002 LINEZOLID 600 MG/300ML SOLUTION: Performed by: NURSE PRACTITIONER

## 2022-10-03 PROCEDURE — 80048 BASIC METABOLIC PNL TOTAL CA: CPT

## 2022-10-03 PROCEDURE — 25010000002 PIPERACILLIN SOD-TAZOBACTAM PER 1 G: Performed by: NURSE PRACTITIONER

## 2022-10-03 PROCEDURE — 25010000002 MORPHINE PER 10 MG

## 2022-10-03 PROCEDURE — 81003 URINALYSIS AUTO W/O SCOPE: CPT | Performed by: EMERGENCY MEDICINE

## 2022-10-03 PROCEDURE — 63710000001 INSULIN GLARGINE PER 5 UNITS

## 2022-10-03 PROCEDURE — 25010000002 PIPERACILLIN SOD-TAZOBACTAM PER 1 G

## 2022-10-03 PROCEDURE — 63710000001 INSULIN LISPRO (HUMAN) PER 5 UNITS: Performed by: INTERNAL MEDICINE

## 2022-10-03 RX ORDER — ONDANSETRON 2 MG/ML
4 INJECTION INTRAMUSCULAR; INTRAVENOUS EVERY 6 HOURS PRN
Status: DISCONTINUED | OUTPATIENT
Start: 2022-10-03 | End: 2022-10-06 | Stop reason: HOSPADM

## 2022-10-03 RX ORDER — PANTOPRAZOLE SODIUM 40 MG/1
40 TABLET, DELAYED RELEASE ORAL EVERY EVENING
Status: DISCONTINUED | OUTPATIENT
Start: 2022-10-03 | End: 2022-10-06 | Stop reason: HOSPADM

## 2022-10-03 RX ORDER — LINEZOLID 2 MG/ML
600 INJECTION, SOLUTION INTRAVENOUS EVERY 12 HOURS
Status: DISCONTINUED | OUTPATIENT
Start: 2022-10-03 | End: 2022-10-06

## 2022-10-03 RX ORDER — ONDANSETRON 4 MG/1
4 TABLET, FILM COATED ORAL EVERY 6 HOURS PRN
Status: DISCONTINUED | OUTPATIENT
Start: 2022-10-03 | End: 2022-10-06 | Stop reason: HOSPADM

## 2022-10-03 RX ORDER — HYDROCODONE BITARTRATE AND ACETAMINOPHEN 10; 325 MG/1; MG/1
1 TABLET ORAL EVERY 4 HOURS PRN
Status: DISCONTINUED | OUTPATIENT
Start: 2022-10-03 | End: 2022-10-06 | Stop reason: HOSPADM

## 2022-10-03 RX ORDER — DULOXETIN HYDROCHLORIDE 30 MG/1
60 CAPSULE, DELAYED RELEASE ORAL DAILY
Status: DISCONTINUED | OUTPATIENT
Start: 2022-10-03 | End: 2022-10-03

## 2022-10-03 RX ORDER — SODIUM CHLORIDE 0.9 % (FLUSH) 0.9 %
3 SYRINGE (ML) INJECTION EVERY 12 HOURS SCHEDULED
Status: DISCONTINUED | OUTPATIENT
Start: 2022-10-03 | End: 2022-10-06 | Stop reason: HOSPADM

## 2022-10-03 RX ORDER — SODIUM CHLORIDE 0.9 % (FLUSH) 0.9 %
3-10 SYRINGE (ML) INJECTION AS NEEDED
Status: DISCONTINUED | OUTPATIENT
Start: 2022-10-03 | End: 2022-10-06 | Stop reason: HOSPADM

## 2022-10-03 RX ORDER — CHOLECALCIFEROL (VITAMIN D3) 125 MCG
5 CAPSULE ORAL NIGHTLY PRN
Status: DISCONTINUED | OUTPATIENT
Start: 2022-10-03 | End: 2022-10-06 | Stop reason: HOSPADM

## 2022-10-03 RX ORDER — ERGOCALCIFEROL 1.25 MG/1
50000 CAPSULE ORAL WEEKLY
COMMUNITY

## 2022-10-03 RX ORDER — PREGABALIN 75 MG/1
75 CAPSULE ORAL 2 TIMES DAILY
Status: DISCONTINUED | OUTPATIENT
Start: 2022-10-03 | End: 2022-10-06 | Stop reason: HOSPADM

## 2022-10-03 RX ORDER — ENOXAPARIN SODIUM 100 MG/ML
40 INJECTION SUBCUTANEOUS DAILY
Status: DISCONTINUED | OUTPATIENT
Start: 2022-10-03 | End: 2022-10-06 | Stop reason: HOSPADM

## 2022-10-03 RX ORDER — INSULIN LISPRO 100 [IU]/ML
0-14 INJECTION, SOLUTION INTRAVENOUS; SUBCUTANEOUS
Status: DISCONTINUED | OUTPATIENT
Start: 2022-10-03 | End: 2022-10-04

## 2022-10-03 RX ORDER — LORAZEPAM 1 MG/1
1 TABLET ORAL EVERY 6 HOURS PRN
Status: DISCONTINUED | OUTPATIENT
Start: 2022-10-03 | End: 2022-10-06 | Stop reason: HOSPADM

## 2022-10-03 RX ORDER — INSULIN LISPRO 100 [IU]/ML
3-20 INJECTION, SOLUTION INTRAVENOUS; SUBCUTANEOUS
Status: DISCONTINUED | OUTPATIENT
Start: 2022-10-03 | End: 2022-10-03

## 2022-10-03 RX ORDER — AMOXICILLIN AND CLAVULANATE POTASSIUM 875; 125 MG/1; MG/1
1 TABLET, FILM COATED ORAL 2 TIMES DAILY
COMMUNITY
End: 2023-01-27

## 2022-10-03 RX ADMIN — LINEZOLID 600 MG: 600 INJECTION, SOLUTION INTRAVENOUS at 19:07

## 2022-10-03 RX ADMIN — PIPERACILLIN AND TAZOBACTAM 3.38 G: 3; .375 INJECTION, POWDER, FOR SOLUTION INTRAVENOUS at 04:49

## 2022-10-03 RX ADMIN — INSULIN LISPRO 5 UNITS: 100 INJECTION, SOLUTION INTRAVENOUS; SUBCUTANEOUS at 12:39

## 2022-10-03 RX ADMIN — VANCOMYCIN HYDROCHLORIDE 1250 MG: 1.25 INJECTION, POWDER, LYOPHILIZED, FOR SOLUTION INTRAVENOUS at 10:01

## 2022-10-03 RX ADMIN — PIPERACILLIN AND TAZOBACTAM 3.38 G: 3; .375 INJECTION, POWDER, FOR SOLUTION INTRAVENOUS at 18:28

## 2022-10-03 RX ADMIN — MORPHINE SULFATE 4 MG: 4 INJECTION, SOLUTION INTRAMUSCULAR; INTRAVENOUS at 16:22

## 2022-10-03 RX ADMIN — PREGABALIN 75 MG: 75 CAPSULE ORAL at 12:38

## 2022-10-03 RX ADMIN — MORPHINE SULFATE 4 MG: 4 INJECTION, SOLUTION INTRAMUSCULAR; INTRAVENOUS at 10:03

## 2022-10-03 RX ADMIN — INSULIN GLARGINE 30 UNITS: 100 INJECTION, SOLUTION SUBCUTANEOUS at 20:48

## 2022-10-03 RX ADMIN — MORPHINE SULFATE 4 MG: 4 INJECTION, SOLUTION INTRAMUSCULAR; INTRAVENOUS at 04:47

## 2022-10-03 RX ADMIN — PREGABALIN 75 MG: 75 CAPSULE ORAL at 20:47

## 2022-10-03 RX ADMIN — INSULIN LISPRO 3 UNITS: 100 INJECTION, SOLUTION INTRAVENOUS; SUBCUTANEOUS at 16:32

## 2022-10-03 RX ADMIN — Medication 3 ML: at 10:02

## 2022-10-03 RX ADMIN — PANTOPRAZOLE SODIUM 40 MG: 40 TABLET, DELAYED RELEASE ORAL at 16:22

## 2022-10-03 RX ADMIN — MORPHINE SULFATE 4 MG: 4 INJECTION, SOLUTION INTRAMUSCULAR; INTRAVENOUS at 20:47

## 2022-10-03 RX ADMIN — Medication 3 ML: at 20:48

## 2022-10-03 RX ADMIN — DULOXETINE 60 MG: 30 CAPSULE, DELAYED RELEASE ORAL at 12:38

## 2022-10-03 NOTE — NURSING NOTE
47-year-old male who presents to the hospital with vomiting and pain to his left leg.  Patient is COVID confirmed.  Patient has a open wound.  There is a surgical consult pending will defer to Ortho for now and follow along as needed

## 2022-10-03 NOTE — NURSING NOTE
Gave patient PRN morphine in Left Wrist IV- medium red dots that are blanchable appeared. Patient complains of no pain or itching. Call light is in reach

## 2022-10-03 NOTE — PLAN OF CARE
Goal Outcome Evaluation:  Plan of Care Reviewed With: patient        Progress: no change  Outcome Evaluation: Patient admitted from home d/t n/v and (L) thigh wound, pt is a bilateral lower extremity amputee AKA on (R) BKA on (L), covid positive, prn pain meds given per order, npo since midnight, to be seen by otho in am and MRI, pt reminded to turn throughout the night, no new complaints at this time.

## 2022-10-03 NOTE — PROGRESS NOTES
"Pharmacy Antimicrobial Dosing Service    Subjective:  Maynor Gregg is a 47 y.o.male admitted with left leg pain and vomiting. Pharmacy has been consulted to dose Vancomycin for possible SSTI.     PMH: type 1 diabetes with peripheral neuropathy, history of osteomyelitis s/p left BKA and right AKA, CAD, hypotension, GERD      Assessment/Plan    1. Day #2 Vancomycin: Goal -600 mcg*h/mL. Initial 1750 mg (18 mg/kg actual body weight) dose given in ER. Will continue at 1250 mg (12.8 mg/kg actual body weight) IV every 12 hours to produce AUC of 462 mcg*h/mL and trough of 13.9 mcg/mL per Bayesian modeling software.    Check vancomycin trough 10/4 at 0700, prior to 4th total vancomycin dose.    2. Day #2 Piperacillin/Tazobactam: 3.375 grams IV q8h for estCrCl > 20 mL/min.    Will continue to monitor drug levels, renal function, culture and sensitivities, and patient clinical status.       Objective:  Relevant clinical data and objective history reviewed:  188 cm (74\")   96.3 kg (212 lb 4.9 oz)   Ideal body weight: 82.2 kg (181 lb 3.5 oz)  Adjusted ideal body weight: 87.8 kg (193 lb 10.4 oz)  Body mass index is 27.26 kg/m².        Results from last 7 days   Lab Units 10/02/22  1932   CREATININE mg/dL 0.82     Estimated Creatinine Clearance: 151.7 mL/min (by C-G formula based on SCr of 0.82 mg/dL).  No intake/output data recorded.    Results from last 7 days   Lab Units 10/02/22  2028   WBC 10*3/mm3 12.80*     Temperature    10/02/22 2114 10/02/22 2256 10/03/22 0415   Temp: 98.3 °F (36.8 °C) 98.1 °F (36.7 °C) 98.1 °F (36.7 °C)     Baseline culture/source/susceptibility:  Microbiology Results (last 10 days)       Procedure Component Value - Date/Time    COVID-19,CEPHEID/COURTNEY,COR/IRA/PAD/VASQUEZ IN-HOUSE(OR EMERGENT/ADD-ON),NP SWAB IN TRANSPORT MEDIA 3-4 HR TAT, RT-PCR - Swab, Nasopharynx [[id number]]  (Abnormal) Collected: 10/02/22 2018    Lab Status: Final result Specimen: Swab from Nasopharynx Updated: 10/02/22 2054 "     COVID19 Detected    Narrative:      Fact sheet for providers: https://www.fda.gov/media/245699/download     Fact sheet for patients: https://www.fda.gov/media/788302/download  Fact sheet for providers: https://www.fda.gov/media/175587/download    Fact sheet for patients: https://www.fda.gov/media/475381/download    Test performed by PCR.            Anti-Infectives (From admission, onward)      Ordered     Dose/Rate Route Frequency Start Stop    10/03/22 0414  Vancomycin HCl 1,250 mg in sodium chloride 0.9 % 250 mL IVPB        Ordering Provider: Michaela Le APRN    1,250 mg  200 mL/hr over 75 Minutes Intravenous Every 12 Hours 10/03/22 0800 10/08/22 0759    10/03/22 0414  piperacillin-tazobactam (ZOSYN) IVPB 3.375 g in 100 mL NS (CD)        Ordering Provider: Michaela Le APRN    3.375 g  25 mL/hr over 4 Hours Intravenous Every 8 Hours 10/03/22 0500 10/08/22 0459    10/03/22 0443  Pharmacy to dose vancomycin        Ordering Provider: Michaela Le APRN     Does not apply Continuous PRN 10/03/22 0443 10/08/22 0442    10/02/22 2050  vancomycin IVPB 1750 mg in 0.9% Sodium Chloride (premix) 500 mL        Ordering Provider: Ricki Mendoza DO    20 mg/kg × 89.7 kg (Adjusted) Intravenous Once 10/02/22 2052 10/02/22 2108    10/02/22 2050  piperacillin-tazobactam (ZOSYN) IVPB 3.375 g in 100 mL NS (CD)        Ordering Provider: Ricki Mendoza DO    3.375 g  over 30 Minutes Intravenous Once 10/02/22 2052 10/02/22 2147          Pavel Bowens RPH  10/03/22 04:43 EDT

## 2022-10-03 NOTE — PLAN OF CARE
Problem: Adult Inpatient Plan of Care  Goal: Plan of Care Review  Outcome: Ongoing, Progressing  Goal: Patient-Specific Goal (Individualized)  Outcome: Ongoing, Progressing  Goal: Absence of Hospital-Acquired Illness or Injury  Outcome: Ongoing, Progressing  Intervention: Identify and Manage Fall Risk  Recent Flowsheet Documentation  Taken 10/3/2022 1400 by Chikis Dasilva LPN  Safety Promotion/Fall Prevention: safety round/check completed  Taken 10/3/2022 1200 by Chikis Dasilva LPN  Safety Promotion/Fall Prevention: safety round/check completed  Taken 10/3/2022 1003 by Chikis Dasilva LPN  Safety Promotion/Fall Prevention: safety round/check completed  Taken 10/3/2022 0803 by Chikis Dasilva LPN  Safety Promotion/Fall Prevention: safety round/check completed  Intervention: Prevent Skin Injury  Recent Flowsheet Documentation  Taken 10/3/2022 1003 by Chikis Dasilva LPN  Body Position: position changed independently  Skin Protection: adhesive use limited  Intervention: Prevent and Manage VTE (Venous Thromboembolism) Risk  Recent Flowsheet Documentation  Taken 10/3/2022 1003 by Chikis Dasilva LPN  Activity Management: activity adjusted per tolerance  Intervention: Prevent Infection  Recent Flowsheet Documentation  Taken 10/3/2022 1400 by Chikis Dasilva LPN  Infection Prevention:   hand hygiene promoted   equipment surfaces disinfected   rest/sleep promoted   single patient room provided  Taken 10/3/2022 1003 by Chikis Dasilva LPN  Infection Prevention:   rest/sleep promoted   hand hygiene promoted   single patient room provided  Goal: Optimal Comfort and Wellbeing  Outcome: Ongoing, Progressing  Intervention: Provide Person-Centered Care  Recent Flowsheet Documentation  Taken 10/3/2022 1003 by Chikis Dasilva LPN  Trust Relationship/Rapport:   care explained   questions answered   thoughts/feelings acknowledged  Goal: Readiness for Transition of Care  Outcome: Ongoing, Progressing     Problem:  Behavioral Health Comorbidity  Goal: Maintenance of Behavioral Health Symptom Control  Outcome: Ongoing, Progressing  Intervention: Maintain Behavioral Health Symptom Control  Recent Flowsheet Documentation  Taken 10/3/2022 1400 by Chikis Dasilva LPN  Medication Review/Management: medications reviewed  Taken 10/3/2022 1200 by Chikis Dasilva LPN  Medication Review/Management: medications reviewed  Taken 10/3/2022 1003 by Chikis Dasilva LPN  Medication Review/Management: medications reviewed  Taken 10/3/2022 0803 by Chikis Dasilva LPN  Medication Review/Management: medications reviewed     Problem: Diabetes Comorbidity  Goal: Blood Glucose Level Within Targeted Range  Outcome: Ongoing, Progressing     Problem: Heart Failure Comorbidity  Goal: Maintenance of Heart Failure Symptom Control  Outcome: Ongoing, Progressing  Intervention: Maintain Heart Failure-Management  Recent Flowsheet Documentation  Taken 10/3/2022 1400 by Chikis Dasilva LPN  Medication Review/Management: medications reviewed  Taken 10/3/2022 1200 by Chikis Dasilva LPN  Medication Review/Management: medications reviewed  Taken 10/3/2022 1003 by Chikis Dasilva LPN  Medication Review/Management: medications reviewed  Taken 10/3/2022 0803 by Chikis Dasilva LPN  Medication Review/Management: medications reviewed     Problem: Fall Injury Risk  Goal: Absence of Fall and Fall-Related Injury  Outcome: Ongoing, Progressing  Intervention: Identify and Manage Contributors  Recent Flowsheet Documentation  Taken 10/3/2022 1400 by Chikis Dasilva LPN  Medication Review/Management: medications reviewed  Taken 10/3/2022 1200 by Chikis Dasilva LPN  Medication Review/Management: medications reviewed  Taken 10/3/2022 1003 by Chikis Dasilva LPN  Medication Review/Management: medications reviewed  Taken 10/3/2022 0803 by Chikis Dasilva LPN  Medication Review/Management: medications reviewed  Intervention: Promote Injury-Free Environment  Recent  Flowsheet Documentation  Taken 10/3/2022 1400 by Chikis Dasilva LPN  Safety Promotion/Fall Prevention: safety round/check completed  Taken 10/3/2022 1200 by Chikis Dasilva LPN  Safety Promotion/Fall Prevention: safety round/check completed  Taken 10/3/2022 1003 by Chikis Dasilva LPN  Safety Promotion/Fall Prevention: safety round/check completed  Taken 10/3/2022 0803 by Chikis Dasilva LPN  Safety Promotion/Fall Prevention: safety round/check completed   Goal Outcome Evaluation:

## 2022-10-03 NOTE — H&P
Patient Care Team:  Fred Lemons FNP as PCP - General (Family Medicine)    Chief complaint Left leg pain, vomiting    Subjective     Patient is a 47 y.o. male who presents with vomiting for one day and pain on the back side of his left leg where he has an open wound that is progressively enlarging. 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 Denies any cough, chest pain, abdominal pain, or shortness of breath. Complains of a mild headache. Dr. Schulz did patient's amputation surgeries. Was started on Vanc and Zosyn in Ed and wound cultures were obtained.         Onset of symptoms was 1 day      Review of Systems   Constitutional: Negative.    Eyes: Negative.    Respiratory: Negative.    Cardiovascular: Negative.    Gastrointestinal: Positive for nausea and vomiting. Negative for abdominal pain and diarrhea.   Endocrine: Negative.    Genitourinary: Negative.    Musculoskeletal: Negative.    Skin: Positive for wound.   Neurological: Positive for headaches.          History  Past Medical History:   Diagnosis Date   • Anxiety    • Bone infection (HCC)     STATES CAUSED HIM TO LOSE RIGHT LEG   • CHF (congestive heart failure) (HCC)    • Coronary artery disease    • Depression    • Diabetes mellitus (HCC)     TYPE 1   • Gallstones    • GERD (gastroesophageal reflux disease)    • Headache    • History of amputation     PEDRO BELOW THE KNEE   • MI, old 2010    WITH STENT PLACEMENT-CARDIO [facility]-NO LONGER SEES   • Nodule of left lung     HAD CT SCAN BENIGN.    • Phantom limb pain (HCC)     LEFT AND RIGHT BOTH   • Seasonal allergies      Past Surgical History:   Procedure Laterality Date   • ABOVE KNEE AMPUTATION Right 10/14/2021    Procedure: AMPUTATION ABOVE KNEE;  Surgeon: Donn Alcantara MD;  Location: [facility] MAIN OR;  Service: Orthopedics;  Laterality: Right;   • AMPUTATION DIGIT Right 2/28/2020    Procedure: PARTIAL FIRST RAY RESECTION RIGHT FOOT;  Surgeon: CROW Souza DPM;  Location: [facility]  MAIN OR;  Service: Podiatry;  Laterality: Right;   • AMPUTATION FOOT / TOE     • AMPUTATION REVISION Right 3/2/2021    Procedure: AMPUTATION REVISION KNEE STUMP;  Surgeon: Donn Alcantara MD;  Location: [facility] MAIN OR;  Service: Orthopedics;  Laterality: Right;   • AMPUTATION REVISION Right 9/23/2021    Procedure: RIGHT BELOW KNEE AMPUTATION REVISION;  Surgeon: Donn Alcantara MD;  Location: [facility] MAIN OR;  Service: Orthopedics;  Laterality: Right;   • BELOW KNEE AMPUTATION Right 4/30/2020    Procedure: AMPUTATION BELOW KNEE;  Surgeon: Donn Alcantara MD;  Location: [facility] MAIN OR;  Service: Orthopedics;  Laterality: Right;   • BELOW KNEE AMPUTATION Left 9/2/2020    Procedure: AMPUTATION BELOW KNEE, left;  Surgeon: Donn Alcantara MD;  Location: [facility] MAIN OR;  Service: Orthopedics;  Laterality: Left;   • BELOW KNEE AMPUTATION Right 2/23/2021    Procedure: Revision of amputaion below knee;  Surgeon: Donn Alcantara MD;  Location: [facility] MAIN OR;  Service: Orthopedics;  Laterality: Right;   • CARDIAC CATHETERIZATION  11/2010     RCA artery   • CARDIAC CATHETERIZATION  2015    LAD artery   • CARDIAC SURGERY     • CORONARY ANGIOPLASTY WITH STENT PLACEMENT      X2. 2015   • INCISION AND DRAINAGE LEG Left 1/21/2020    Procedure: INCISION AND DRAINAGE LOWER EXTREMITY with second digit amputation;  Surgeon: CROW Souza DPM;  Location: [facility] MAIN OR;  Service: Podiatry   • INCISION AND DRAINAGE LEG Right 4/28/2020    Procedure: incision and drainage ,transmetatarsal amputation, fasciotomy;  Surgeon: CROW Souza DPM;  Location: [facility] MAIN OR;  Service: Podiatry;  Laterality: Right;   • SKIN FULL THICKNESS GRAFT Right 7/29/2021    Procedure: EXCISION SOFT TISSUE Ulcer WITH FULL THICKNESS SKIN GRAFT to right lower leg.;  Surgeon: Sukhdeep Hernadez MD;  Location: [facility] MAIN OR;  Service: Plastics;  Laterality: Right;   • TOE AMPUTATION Left    • WOUND DEBRIDEMENT Right 1/21/2020    Procedure: DEBRIDEMENT  with sesamoid excision;  Surgeon: CROW Souza DPM;  Location: [facility] MAIN OR;  Service: Podiatry     Family History   Problem Relation Age of Onset   • Diabetes Father    • Heart failure Father    • Diabetes Paternal Grandfather    • Malig Hyperthermia Neg Hx      Social History     Tobacco Use   • Smoking status: Former Smoker     Packs/day: 0.50     Years: 6.00     Pack years: 3.00     Quit date: 05/2019     Years since quitting: 3.4   • Smokeless tobacco: Never Used   Vaping Use   • Vaping Use: Never used   Substance Use Topics   • Alcohol use: No   • Drug use: No     Medications Prior to Admission   Medication Sig Dispense Refill Last Dose   • DULoxetine (CYMBALTA) 60 MG capsule Take 60 mg by mouth Daily. Take DOS   10/1/2022 at Unknown time   • HYDROcodone-acetaminophen (NORCO)  MG per tablet Take 1 tablet by mouth Every 4 (Four) Hours As Needed for Moderate Pain  (Pain). 40 tablet 0 10/1/2022 at Unknown time   • insulin aspart (novoLOG) 100 UNIT/ML injection Inject  under the skin into the appropriate area as directed 3 (Three) Times a Day Before Meals. PER SS. BETWEEN 3-20 UNITS  Hold DOS   10/2/2022 at Unknown time   • Insulin Glargine (BASAGLAR KWIKPEN) 100 UNIT/ML injection pen Inject 30 Units under the skin into the appropriate area as directed Every Night.   10/1/2022 at Unknown time   • pantoprazole (PROTONIX) 40 MG EC tablet Take 40 mg by mouth Every Evening.   10/1/2022 at Unknown time   • pregabalin (LYRICA) 75 MG capsule Take 75 mg by mouth 2 (Two) Times a Day.   10/1/2022 at Unknown time     Allergies:  Metformin and Oxycodone    Objective     Vital Signs  Temp:  [98.1 °F (36.7 °C)-100.7 °F (38.2 °C)] 98.1 °F (36.7 °C)  Heart Rate:  [] 78  Resp:  [16-17] 17  BP: (115-139)/(73-79) 115/73     Physical Exam:      General Appearance:    Alert, cooperative, in no acute distress   Head:    Normocephalic, without obvious abnormality, atraumatic   Eyes:            Lids and lashes normal,  conjunctivae and sclerae normal, no   icterus, no pallor, corneas clear, PERRLA   Ears:    Ears appear intact with no abnormalities noted   Throat:   No oral lesions, no thrush, oral mucosa moist   Neck:   No adenopathy, supple, trachea midline, no thyromegaly, no   carotid bruit, no JVD   Lungs:     Clear to auscultation,respirations regular, even and                  unlabored    Heart:    Regular rhythm and normal rate, normal S1 and S2, no            murmur, no gallop, no rub, no click   Chest Wall:    No abnormalities observed   Abdomen:     Normal bowel sounds, no masses, no organomegaly, soft        non-tender, non-distended, no guarding, no rebound                tenderness   Extremities:   Moves all extremities well, no edema, no cyanosis, no             Redness, left BKA, right AKA   Pulses:   Pulses palpable and equal bilaterally   Skin:   Open wound to posterior left upper leg   Lymph nodes:   No palpable adenopathy   Neurologic:   Cranial nerves 2 - 12 grossly intact, sensation intact, DTR       present and equal bilaterally       Results Review:     Imaging Results (Last 24 Hours)     Procedure Component Value Units Date/Time    XR Chest 1 View [[id number]] Resulted: 10/02/22 2059     Updated: 10/02/22 2059    XR Femur 2 View Left [[id number]] Resulted: 10/02/22 2046     Updated: 10/02/22 2046           Lab Results (last 24 hours)     Procedure Component Value Units Date/Time    POC Glucose Once [[id number]]  (Normal) Collected: 10/02/22 2303    Specimen: Blood Updated: 10/02/22 2305     Glucose 91 mg/dL      Comment: Serial Number: [id number]:  [id number]       Lactic Acid, Plasma [[id number]]  (Normal) Collected: 10/02/22 2109    Specimen: Blood Updated: 10/02/22 2132     Lactate 0.9 mmol/L     Extra Tubes [[id number]] Collected: 10/02/22 2028    Specimen: Blood, Venous Line Updated: 10/02/22 2132    Narrative:      The following orders were created for panel order Extra Tubes.  Procedure                                Abnormality         Status                     ---------                               -----------         ------                     Green Top (Gel)[[id number]]                                  Final result                 Please view results for these tests on the individual orders.    Green Top (Gel) [[id number]] Collected: 10/02/22 2028    Specimen: Blood Updated: 10/02/22 2132     Extra Tube Hold for add-ons.     Comment: Auto resulted.       COVID-19,CEPHEID/COURTNEY,COR/IRA/PAD/VASQUEZ IN-HOUSE(OR EMERGENT/ADD-ON),NP SWAB IN TRANSPORT MEDIA 3-4 HR TAT, RT-PCR - Swab, Nasopharynx [[id number]]  (Abnormal) Collected: 10/02/22 2018    Specimen: Swab from Nasopharynx Updated: 10/02/22 2054     COVID19 Detected    Narrative:      Fact sheet for providers: https://www.fda.gov/media/717860/download     Fact sheet for patients: https://www.fda.gov/media/547178/download  Fact sheet for providers: https://www.fda.gov/media/420389/download    Fact sheet for patients: https://www.fda.gov/media/620878/download    Test performed by PCR.    [place] Draw [[id number]] Collected: 10/02/22 1932    Specimen: Blood Updated: 10/02/22 2047    Narrative:      The following orders were created for panel order [place] Draw.  Procedure                               Abnormality         Status                     ---------                               -----------         ------                     Green Top (Gel)[[id number]]                                  Final result               Lavender Top[[id number]]                                     Final result               Gold Top - SST[[id number]]                                   Final result               Light Blue Top[[id number]]                                                                Please view results for these tests on the individual orders.    Lavender Top [[id number]] Collected: 10/02/22 1932    Specimen: Blood Updated: 10/02/22 2047     Extra Tube hold for add-on      Comment: Auto resulted       Gold Top - SST [[id number]] Collected: 10/02/22 1932    Specimen: Blood Updated: 10/02/22 2040    Green Top (Gel) [[id number]] Collected: 10/02/22 1932    Specimen: Blood Updated: 10/02/22 2033    CBC & Differential [[id number]]  (Abnormal) Collected: 10/02/22 1932    Specimen: Blood Updated: 10/02/22 2033    Narrative:      The following orders were created for panel order CBC & Differential.  Procedure                               Abnormality         Status                     ---------                               -----------         ------                     CBC Auto Differential[[id number]]        Abnormal            Final result               Scan Slide[[id number]]                                                                    Please view results for these tests on the individual orders.    CBC Auto Differential [[id number]]  (Abnormal) Collected: 10/02/22 2028    Specimen: Blood Updated: 10/02/22 2033     WBC 12.80 10*3/mm3      RBC 4.54 10*6/mm3      Hemoglobin 13.4 g/dL      Hematocrit 39.7 %      MCV 87.4 fL      MCH 29.4 pg      MCHC 33.7 g/dL      RDW 13.0 %      RDW-SD 39.8 fl      MPV 7.5 fL      Platelets 362 10*3/mm3      Neutrophil % 73.6 %      Lymphocyte % 15.9 %      Monocyte % 9.5 %      Eosinophil % 0.2 %      Basophil % 0.8 %      Neutrophils, Absolute 9.40 10*3/mm3      Lymphocytes, Absolute 2.00 10*3/mm3      Monocytes, Absolute 1.20 10*3/mm3      Eosinophils, Absolute 0.00 10*3/mm3      Basophils, Absolute 0.10 10*3/mm3      nRBC 0.0 /100 WBC     Wound Culture - Wound, Leg, Left [[id number]] Collected: 10/02/22 2018    Specimen: Wound from Leg, Left Updated: 10/02/22 2025    Blood Culture - Blood, Arm, Left [[id number]] Collected: 10/02/22 2017    Specimen: Blood from Arm, Left Updated: 10/02/22 2025    Comprehensive Metabolic Panel [[id number]]  (Abnormal) Collected: 10/02/22 1932    Specimen: Blood Updated: 10/02/22 2021     Glucose 74 mg/dL      BUN 11 mg/dL       Creatinine 0.82 mg/dL      Sodium 136 mmol/L      Potassium 3.2 mmol/L      Comment: Slight hemolysis detected by analyzer. Results may be affected.        Chloride 97 mmol/L      CO2 27.0 mmol/L      Calcium 8.8 mg/dL      Total Protein 7.8 g/dL      Albumin 3.00 g/dL      ALT (SGPT) 34 U/L      AST (SGOT) 15 U/L      Alkaline Phosphatase 99 U/L      Total Bilirubin 0.8 mg/dL      Globulin 4.8 gm/dL      A/G Ratio 0.6 g/dL      BUN/Creatinine Ratio 13.4     Anion Gap 12.0 mmol/L      eGFR 109.0 mL/min/1.73      Comment: National Kidney Foundation and American Society of Nephrology (ASN) Task Force recommended calculation based on the Chronic Kidney Disease Epidemiology Collaboration (CKD-EPI) equation refit without adjustment for race.       Narrative:      GFR Normal >60  Chronic Kidney Disease <60  Kidney Failure <15      Protime-INR [[id number]]  (Normal) Collected: 10/02/22 2004    Specimen: Blood Updated: 10/02/22 2020     Protime 11.2 Seconds      INR 1.09    aPTT [[id number]]  (Abnormal) Collected: 10/02/22 2004    Specimen: Blood Updated: 10/02/22 2020     PTT 28.0 seconds     POC Lactate [[id number]]  (Normal) Collected: 10/02/22 2006    Specimen: Blood Updated: 10/02/22 2007     Lactate 0.9 mmol/L      Comment: Serial Number: [id number]:  [id number]       Blood Culture - Blood, Arm, Right [[id number]] Collected: 10/02/22 2004    Specimen: Blood from Arm, Right Updated: 10/02/22 2005    Magnesium [[id number]]  (Normal) Collected: 10/02/22 1932    Specimen: Blood Updated: 10/02/22 1952     Magnesium 1.9 mg/dL            I reviewed the patient's new clinical results.    Assessment & Plan     COVID19- monitor for symptoms, currently no respiratory complaints  Fever-treat as needed  Left wound infection lower extremity- given vanc and zosyn in ED- will continue pending wound cultures, MRI ordered for possible osteomyelitis.  Consulted Dr. Schulz- did patient's amputee surgeries  Nausea/vomiting-  antiemetics as needed    DVT prophylaxis- Lovenox  GI prophylaxis- protonix        I discussed the patient's findings and my recommendations with patient.     Michaela Le, APRN  10/03/22  04:00 EDT

## 2022-10-03 NOTE — PAYOR COMM NOTE
"          AUTHORIZATION PENDING:   PLEASE CALL OR FAX DETERMINATION TO CONTACT BELOW. THANK YOU.        Lauren Nair RN MSN  /UR  [facility]  [phone] office  [phone] fax  [email or web address]    [facility]  NPI: [id number]  Tax: [id number]            Ebenezer Gregg (47 y.o. Male)             Date of Birth   1975    Social Security Number       Address   [address] [place] [address]    Home Phone   [phone]    MRN   [id number]       Jew   Voodoo    Marital Status   Significant Other                            Admission Date   10/2/22    Admission Type   Emergency    Admitting Provider   Guera Contreras MD    Attending Provider   Guera Contreras MD    Department, Room/Bed   [facility] 3C MEDICAL INPATIENT, 378/1       Discharge Date       Discharge Disposition       Discharge Destination                               Attending Provider: Guera Contreras MD    Allergies: Metformin, Oxycodone    Isolation: Enh Drop/Con   Infection: COVID (confirmed) (10/02/22)   Code Status: CPR   Advance Care Planning Activity    Ht: 188 cm (74\")   Wt: 96.3 kg (212 lb 4.9 oz)    Admission Cmt: None   Principal Problem: None                Active Insurance as of 10/2/2022     Primary Coverage     Payor Plan Insurance Group Employer/Plan Group    AMBETTER MHS IND EXCHANGE AM[employer] IND EXCHANGE NGN     Payor Plan Address Payor Plan Phone Number Payor Plan Fax Number Effective Dates    [address] [phone]  2/1/2022 - None Entered    [facility] [address]       Subscriber Name Subscriber Birth Date Member ID       EBENEZER GREGG 1975 [id number]                 Emergency Contacts      (Rel.) Home Phone Work Phone Mobile Phone    Marsha Garza (Relative) [phone] -- [phone]        10/03/22 1254  Inpatient Admission  Once     Completed     Level of Care: Med/Surg    Diagnosis: Fever, unspecified " "fever cause [[id number]]    Admitting Physician: MARIANA CONTRERAS [[id number]]    Attending Physician: MARIANA CONTRERAS [[id number]]    Certification: I Certify That Inpatient Hospital Services Are Medically Necessary For Greater Than 2 Midnights                     History & Physical      Michaela Le APRN at 10/03/22 0400     Attestation signed by Mariana Contreras MD at 10/03/22 1251    I have reviewed this documentation and agree.  I have interviewed and examined patient, reviewed test results and directed plan of care.  He presented with vomiting and low-grade fever.  He was found to be COVID-positive.  He has a chronic open wound on his posterior left leg, where his prosthesis rubs against his leg.  There has been serosanguineous drainage from that wound for \"a long time\".  He has no upper respiratory symptoms related to his COVID diagnosis.  There is no cough or shortness of breath.  Vomiting has improved overnight.  MRI findings of inflammation in the muscle and tendon without signs of osteomyelitis are noted.  Vancomycin and Zosyn were started in the emergency room.  I will replace the Zosyn with Rocephin, given the increased risk of nephrotoxicity with vancomycin and Zosyn.  There is some preliminary growth on his wound culture.  I will ask infectious disease to consult.  Orthopedic evaluation is pending.                      Patient Care Team:  Fred Lemons FNP as PCP - General (Family Medicine)    Chief complaint Left leg pain, vomiting    Subjective     Patient is a 47 y.o. male who presents with vomiting for one day and pain on the back side of his left leg where he has an open wound that is progressively enlarging. 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 Denies any cough, chest pain, abdominal pain, or shortness of breath. Complains of a mild headache. Dr. Schulz did patient's amputation surgeries. Was started on Vanc and Zosyn in Ed and wound cultures were obtained.         Onset of symptoms was 1 day      Review of " Systems   Constitutional: Negative.    Eyes: Negative.    Respiratory: Negative.    Cardiovascular: Negative.    Gastrointestinal: Positive for nausea and vomiting. Negative for abdominal pain and diarrhea.   Endocrine: Negative.    Genitourinary: Negative.    Musculoskeletal: Negative.    Skin: Positive for wound.   Neurological: Positive for headaches.          History  Past Medical History:   Diagnosis Date   • Anxiety    • Bone infection (HCC)     STATES CAUSED HIM TO LOSE RIGHT LEG   • CHF (congestive heart failure) (HCC)    • Coronary artery disease    • Depression    • Diabetes mellitus (HCC)     TYPE 1   • Gallstones    • GERD (gastroesophageal reflux disease)    • Headache    • History of amputation     PEDRO BELOW THE KNEE   • MI, old 2010    WITH STENT PLACEMENT-CARDIO [facility]-NO LONGER SEES   • Nodule of left lung     HAD CT SCAN BENIGN.    • Phantom limb pain (HCC)     LEFT AND RIGHT BOTH   • Seasonal allergies      Past Surgical History:   Procedure Laterality Date   • ABOVE KNEE AMPUTATION Right 10/14/2021    Procedure: AMPUTATION ABOVE KNEE;  Surgeon: Donn Alcantara MD;  Location: [facility] OR;  Service: Orthopedics;  Laterality: Right;   • AMPUTATION DIGIT Right 2/28/2020    Procedure: PARTIAL FIRST RAY RESECTION RIGHT FOOT;  Surgeon: CROW Souza DPM;  Location: [facility] OR;  Service: Podiatry;  Laterality: Right;   • AMPUTATION FOOT / TOE     • AMPUTATION REVISION Right 3/2/2021    Procedure: AMPUTATION REVISION KNEE STUMP;  Surgeon: Donn Alcantara MD;  Location: [facility] OR;  Service: Orthopedics;  Laterality: Right;   • AMPUTATION REVISION Right 9/23/2021    Procedure: RIGHT BELOW KNEE AMPUTATION REVISION;  Surgeon: Donn Alcantara MD;  Location: [facility] OR;  Service: Orthopedics;  Laterality: Right;   • BELOW KNEE AMPUTATION Right 4/30/2020    Procedure: AMPUTATION BELOW KNEE;  Surgeon: Donn Alcantara MD;  Location: [facility] OR;  Service: Orthopedics;   Laterality: Right;   • BELOW KNEE AMPUTATION Left 9/2/2020    Procedure: AMPUTATION BELOW KNEE, left;  Surgeon: Donn Alcantara MD;  Location: [facility] MAIN OR;  Service: Orthopedics;  Laterality: Left;   • BELOW KNEE AMPUTATION Right 2/23/2021    Procedure: Revision of amputaion below knee;  Surgeon: Donn Alcantara MD;  Location: [facility] MAIN OR;  Service: Orthopedics;  Laterality: Right;   • CARDIAC CATHETERIZATION  11/2010     RCA artery   • CARDIAC CATHETERIZATION  2015    LAD artery   • CARDIAC SURGERY     • CORONARY ANGIOPLASTY WITH STENT PLACEMENT      X2. 2015   • INCISION AND DRAINAGE LEG Left 1/21/2020    Procedure: INCISION AND DRAINAGE LOWER EXTREMITY with second digit amputation;  Surgeon: CROW Souza DPM;  Location: [facility] MAIN OR;  Service: Podiatry   • INCISION AND DRAINAGE LEG Right 4/28/2020    Procedure: incision and drainage ,transmetatarsal amputation, fasciotomy;  Surgeon: CROW Souza DPM;  Location: [facility] MAIN OR;  Service: Podiatry;  Laterality: Right;   • SKIN FULL THICKNESS GRAFT Right 7/29/2021    Procedure: EXCISION SOFT TISSUE Ulcer WITH FULL THICKNESS SKIN GRAFT to right lower leg.;  Surgeon: Sukhdeep Hernadez MD;  Location: [facility] OR;  Service: Plastics;  Laterality: Right;   • TOE AMPUTATION Left    • WOUND DEBRIDEMENT Right 1/21/2020    Procedure: DEBRIDEMENT with sesamoid excision;  Surgeon: CROW Souza DPM;  Location: [facility] MAIN OR;  Service: Podiatry     Family History   Problem Relation Age of Onset   • Diabetes Father    • Heart failure Father    • Diabetes Paternal Grandfather    • Malig Hyperthermia Neg Hx      Social History     Tobacco Use   • Smoking status: Former Smoker     Packs/day: 0.50     Years: 6.00     Pack years: 3.00     Quit date: 05/2019     Years since quitting: 3.4   • Smokeless tobacco: Never Used   Vaping Use   • Vaping Use: Never used   Substance Use Topics   • Alcohol use: No   • Drug use: No     Medications Prior to Admission    Medication Sig Dispense Refill Last Dose   • DULoxetine (CYMBALTA) 60 MG capsule Take 60 mg by mouth Daily. Take DOS   10/1/2022 at Unknown time   • HYDROcodone-acetaminophen (NORCO)  MG per tablet Take 1 tablet by mouth Every 4 (Four) Hours As Needed for Moderate Pain  (Pain). 40 tablet 0 10/1/2022 at Unknown time   • insulin aspart (novoLOG) 100 UNIT/ML injection Inject  under the skin into the appropriate area as directed 3 (Three) Times a Day Before Meals. PER SS. BETWEEN 3-20 UNITS  Hold DOS   10/2/2022 at Unknown time   • Insulin Glargine (BASAGLAR KWIKPEN) 100 UNIT/ML injection pen Inject 30 Units under the skin into the appropriate area as directed Every Night.   10/1/2022 at Unknown time   • pantoprazole (PROTONIX) 40 MG EC tablet Take 40 mg by mouth Every Evening.   10/1/2022 at Unknown time   • pregabalin (LYRICA) 75 MG capsule Take 75 mg by mouth 2 (Two) Times a Day.   10/1/2022 at Unknown time     Allergies:  Metformin and Oxycodone    Objective     Vital Signs  Temp:  [98.1 °F (36.7 °C)-100.7 °F (38.2 °C)] 98.1 °F (36.7 °C)  Heart Rate:  [] 78  Resp:  [16-17] 17  BP: (115-139)/(73-79) 115/73     Physical Exam:      General Appearance:    Alert, cooperative, in no acute distress   Head:    Normocephalic, without obvious abnormality, atraumatic   Eyes:            Lids and lashes normal, conjunctivae and sclerae normal, no   icterus, no pallor, corneas clear, PERRLA   Ears:    Ears appear intact with no abnormalities noted   Throat:   No oral lesions, no thrush, oral mucosa moist   Neck:   No adenopathy, supple, trachea midline, no thyromegaly, no   carotid bruit, no JVD   Lungs:     Clear to auscultation,respirations regular, even and                  unlabored    Heart:    Regular rhythm and normal rate, normal S1 and S2, no            murmur, no gallop, no rub, no click   Chest Wall:    No abnormalities observed   Abdomen:     Normal bowel sounds, no masses, no organomegaly, soft         non-tender, non-distended, no guarding, no rebound                tenderness   Extremities:   Moves all extremities well, no edema, no cyanosis, no             Redness, left BKA, right AKA   Pulses:   Pulses palpable and equal bilaterally   Skin:   Open wound to posterior left upper leg   Lymph nodes:   No palpable adenopathy   Neurologic:   Cranial nerves 2 - 12 grossly intact, sensation intact, DTR       present and equal bilaterally       Results Review:     Imaging Results (Last 24 Hours)     Procedure Component Value Units Date/Time    XR Chest 1 View [[id number]] Resulted: 10/02/22 2059     Updated: 10/02/22 2059    XR Femur 2 View Left [[id number]] Resulted: 10/02/22 2046     Updated: 10/02/22 2046           Lab Results (last 24 hours)     Procedure Component Value Units Date/Time    POC Glucose Once [[id number]]  (Normal) Collected: 10/02/22 2303    Specimen: Blood Updated: 10/02/22 2305     Glucose 91 mg/dL      Comment: Serial Number: [id number]:  [id number]       Lactic Acid, Plasma [[id number]]  (Normal) Collected: 10/02/22 2109    Specimen: Blood Updated: 10/02/22 2132     Lactate 0.9 mmol/L     Extra Tubes [[id number]] Collected: 10/02/22 2028    Specimen: Blood, Venous Line Updated: 10/02/22 2132    Narrative:      The following orders were created for panel order Extra Tubes.  Procedure                               Abnormality         Status                     ---------                               -----------         ------                     Green Top (Gel)[[id number]]                                  Final result                 Please view results for these tests on the individual orders.    Green Top (Gel) [[id number]] Collected: 10/02/22 2028    Specimen: Blood Updated: 10/02/22 2132     Extra Tube Hold for add-ons.     Comment: Auto resulted.       COVID-19,CEPHEID/COURTNEY,COR/IRA/PAD/VASQUEZ IN-HOUSE(OR EMERGENT/ADD-ON),NP SWAB IN TRANSPORT MEDIA 3-4 HR TAT, RT-PCR - Swab, Nasopharynx [[id number]]   (Abnormal) Collected: 10/02/22 2018    Specimen: Swab from Nasopharynx Updated: 10/02/22 2054     COVID19 Detected    Narrative:      Fact sheet for providers: https://www.fda.gov/media/694006/download     Fact sheet for patients: https://www.fda.gov/media/329430/download  Fact sheet for providers: https://www.fda.gov/media/282410/download    Fact sheet for patients: https://www.fda.gov/media/565228/download    Test performed by PCR.    [place] Draw [[id number]] Collected: 10/02/22 1932    Specimen: Blood Updated: 10/02/22 2047    Narrative:      The following orders were created for panel order [place] Draw.  Procedure                               Abnormality         Status                     ---------                               -----------         ------                     Green Top (Gel)[[id number]]                                  Final result               Lavender Top[[id number]]                                     Final result               Gold Top - SST[[id number]]                                   Final result               Light Blue Top[[id number]]                                                                Please view results for these tests on the individual orders.    Lavender Top [[id number]] Collected: 10/02/22 1932    Specimen: Blood Updated: 10/02/22 2047     Extra Tube hold for add-on     Comment: Auto resulted       Gold Top - SST [[id number]] Collected: 10/02/22 1932    Specimen: Blood Updated: 10/02/22 2040    Green Top (Gel) [[id number]] Collected: 10/02/22 1932    Specimen: Blood Updated: 10/02/22 2033    CBC & Differential [[id number]]  (Abnormal) Collected: 10/02/22 1932    Specimen: Blood Updated: 10/02/22 2033    Narrative:      The following orders were created for panel order CBC & Differential.  Procedure                               Abnormality         Status                     ---------                               -----------         ------                     CBC Auto  Differential[[id number]]        Abnormal            Final result               Scan Slide[[id number]]                                                                    Please view results for these tests on the individual orders.    CBC Auto Differential [[id number]]  (Abnormal) Collected: 10/02/22 2028    Specimen: Blood Updated: 10/02/22 2033     WBC 12.80 10*3/mm3      RBC 4.54 10*6/mm3      Hemoglobin 13.4 g/dL      Hematocrit 39.7 %      MCV 87.4 fL      MCH 29.4 pg      MCHC 33.7 g/dL      RDW 13.0 %      RDW-SD 39.8 fl      MPV 7.5 fL      Platelets 362 10*3/mm3      Neutrophil % 73.6 %      Lymphocyte % 15.9 %      Monocyte % 9.5 %      Eosinophil % 0.2 %      Basophil % 0.8 %      Neutrophils, Absolute 9.40 10*3/mm3      Lymphocytes, Absolute 2.00 10*3/mm3      Monocytes, Absolute 1.20 10*3/mm3      Eosinophils, Absolute 0.00 10*3/mm3      Basophils, Absolute 0.10 10*3/mm3      nRBC 0.0 /100 WBC     Wound Culture - Wound, Leg, Left [[id number]] Collected: 10/02/22 2018    Specimen: Wound from Leg, Left Updated: 10/02/22 2025    Blood Culture - Blood, Arm, Left [[id number]] Collected: 10/02/22 2017    Specimen: Blood from Arm, Left Updated: 10/02/22 2025    Comprehensive Metabolic Panel [[id number]]  (Abnormal) Collected: 10/02/22 1932    Specimen: Blood Updated: 10/02/22 2021     Glucose 74 mg/dL      BUN 11 mg/dL      Creatinine 0.82 mg/dL      Sodium 136 mmol/L      Potassium 3.2 mmol/L      Comment: Slight hemolysis detected by analyzer. Results may be affected.        Chloride 97 mmol/L      CO2 27.0 mmol/L      Calcium 8.8 mg/dL      Total Protein 7.8 g/dL      Albumin 3.00 g/dL      ALT (SGPT) 34 U/L      AST (SGOT) 15 U/L      Alkaline Phosphatase 99 U/L      Total Bilirubin 0.8 mg/dL      Globulin 4.8 gm/dL      A/G Ratio 0.6 g/dL      BUN/Creatinine Ratio 13.4     Anion Gap 12.0 mmol/L      eGFR 109.0 mL/min/1.73      Comment: National Kidney Foundation and American Society of Nephrology (ASN) Task  Force recommended calculation based on the Chronic Kidney Disease Epidemiology Collaboration (CKD-EPI) equation refit without adjustment for race.       Narrative:      GFR Normal >60  Chronic Kidney Disease <60  Kidney Failure <15      Protime-INR [[id number]]  (Normal) Collected: 10/02/22 2004    Specimen: Blood Updated: 10/02/22 2020     Protime 11.2 Seconds      INR 1.09    aPTT [[id number]]  (Abnormal) Collected: 10/02/22 2004    Specimen: Blood Updated: 10/02/22 2020     PTT 28.0 seconds     POC Lactate [[id number]]  (Normal) Collected: 10/02/22 2006    Specimen: Blood Updated: 10/02/22 2007     Lactate 0.9 mmol/L      Comment: Serial Number: [id number]:  [id number]       Blood Culture - Blood, Arm, Right [[id number]] Collected: 10/02/22 2004    Specimen: Blood from Arm, Right Updated: 10/02/22 2005    Magnesium [[id number]]  (Normal) Collected: 10/02/22 1932    Specimen: Blood Updated: 10/02/22 1952     Magnesium 1.9 mg/dL            I reviewed the patient's new clinical results.    Assessment & Plan     COVID19- monitor for symptoms, currently no respiratory complaints  Fever-treat as needed  Left wound infection lower extremity- given vanc and zosyn in ED- will continue pending wound cultures, MRI ordered for possible osteomyelitis.  Consulted Dr. Schulz- did patient's amputee surgeries  Nausea/vomiting- antiemetics as needed    DVT prophylaxis- Lovenox  GI prophylaxis- protonix        I discussed the patient's findings and my recommendations with patient.     Michaela Le, APRN  10/03/22  04:00 EDT        Electronically signed by Guera Contreras MD at 10/03/22 1251          Emergency Department Notes      Anel Freeman, RN at 10/02/22 1937        Pt has open wound to back of left leg above knee, pt stated that MD was going to stitch it closed this week    Electronically signed by Anel Freeman, RN at 10/02/22 1937     Ricki Mendoza DO at 10/02/22 1941          Subjective   History of Present  "Illness  Chief complaint: Patient is a pleasant 47-year-old who came in today \"feeling awful\".  He states that he has been vomiting since last night.  He has had pain in his leg today.  Has an open wound that it progressively enlarging for a long time behind his left distal posterior upper leg.  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  Does have a history of diabetes.  He said no cough.  He does have a headache.  Has had headaches like this before many times.  No new headache for him.  He is concerned about infection in his diabetic wound.  No diarrhea.  He does not have any chest pain he does not have any abdominal pain.  No cough.    Context: As above    Duration: 1 day    Timing: Started suddenly last night with vomiting is persisted and worsened all throughout the day.    Severity: Severe    Associated Symptoms: Negative except as noted above.        PCP:  LMP:        Review of Systems   Constitutional: Positive for fever.   Respiratory: Negative.    Cardiovascular: Negative.    Gastrointestinal: Positive for nausea and vomiting.   Genitourinary: Negative.    Musculoskeletal: Positive for arthralgias and myalgias.   Skin: Positive for wound.   Neurological: Positive for headaches.   Psychiatric/Behavioral: Negative.        Past Medical History:   Diagnosis Date   • Anxiety    • Bone infection (HCC)     STATES CAUSED HIM TO LOSE RIGHT LEG   • CHF (congestive heart failure) (HCC)    • Coronary artery disease    • Depression    • Diabetes mellitus (HCC)     TYPE 1   • Gallstones    • GERD (gastroesophageal reflux disease)    • Headache    • History of amputation     PEDRO BELOW THE KNEE   • MI, old 2010    WITH STENT PLACEMENT-CARDIO [facility]-NO LONGER SEES   • Nodule of left lung     HAD CT SCAN BENIGN.    • Phantom limb pain (HCC)     LEFT AND RIGHT BOTH   • Seasonal allergies        Allergies   Allergen Reactions   • Metformin Diarrhea and Nausea And Vomiting   • Oxycodone Nausea And Vomiting " and Rash       Past Surgical History:   Procedure Laterality Date   • ABOVE KNEE AMPUTATION Right 10/14/2021    Procedure: AMPUTATION ABOVE KNEE;  Surgeon: Donn Alcantara MD;  Location: [facility] MAIN OR;  Service: Orthopedics;  Laterality: Right;   • AMPUTATION DIGIT Right 2/28/2020    Procedure: PARTIAL FIRST RAY RESECTION RIGHT FOOT;  Surgeon: CROW Souza DPM;  Location: [facility] MAIN OR;  Service: Podiatry;  Laterality: Right;   • AMPUTATION FOOT / TOE     • AMPUTATION REVISION Right 3/2/2021    Procedure: AMPUTATION REVISION KNEE STUMP;  Surgeon: Donn Alcantara MD;  Location: [facility] MAIN OR;  Service: Orthopedics;  Laterality: Right;   • AMPUTATION REVISION Right 9/23/2021    Procedure: RIGHT BELOW KNEE AMPUTATION REVISION;  Surgeon: Donn Alcantara MD;  Location: [facility] MAIN OR;  Service: Orthopedics;  Laterality: Right;   • BELOW KNEE AMPUTATION Right 4/30/2020    Procedure: AMPUTATION BELOW KNEE;  Surgeon: Donn Alcantara MD;  Location: [facility] MAIN OR;  Service: Orthopedics;  Laterality: Right;   • BELOW KNEE AMPUTATION Left 9/2/2020    Procedure: AMPUTATION BELOW KNEE, left;  Surgeon: Donn Alcantara MD;  Location: [facility] MAIN OR;  Service: Orthopedics;  Laterality: Left;   • BELOW KNEE AMPUTATION Right 2/23/2021    Procedure: Revision of amputaion below knee;  Surgeon: Donn Alcantara MD;  Location: [facility] MAIN OR;  Service: Orthopedics;  Laterality: Right;   • CARDIAC CATHETERIZATION  11/2010     RCA artery   • CARDIAC CATHETERIZATION  2015    LAD artery   • CARDIAC SURGERY     • CORONARY ANGIOPLASTY WITH STENT PLACEMENT      X2. 2015   • INCISION AND DRAINAGE LEG Left 1/21/2020    Procedure: INCISION AND DRAINAGE LOWER EXTREMITY with second digit amputation;  Surgeon: CROW Souza DPM;  Location: [facility] MAIN OR;  Service: Podiatry   • INCISION AND DRAINAGE LEG Right 4/28/2020    Procedure: incision and drainage ,transmetatarsal amputation, fasciotomy;  Surgeon: CROW Souza DPM;  Location:  [facility] MAIN OR;  Service: Podiatry;  Laterality: Right;   • SKIN FULL THICKNESS GRAFT Right 7/29/2021    Procedure: EXCISION SOFT TISSUE Ulcer WITH FULL THICKNESS SKIN GRAFT to right lower leg.;  Surgeon: Sukhdeep Hernadez MD;  Location: [facility] MAIN OR;  Service: Plastics;  Laterality: Right;   • TOE AMPUTATION Left    • WOUND DEBRIDEMENT Right 1/21/2020    Procedure: DEBRIDEMENT with sesamoid excision;  Surgeon: CROW Souza DPM;  Location: [facility] MAIN OR;  Service: Podiatry       Family History   Problem Relation Age of Onset   • Diabetes Father    • Heart failure Father    • Diabetes Paternal Grandfather    • Malig Hyperthermia Neg Hx        Social History     Socioeconomic History   • Marital status: Significant Other   Tobacco Use   • Smoking status: Former Smoker     Packs/day: 0.50     Years: 6.00     Pack years: 3.00     Quit date: 05/2019     Years since quitting: 3.4   • Smokeless tobacco: Never Used   Vaping Use   • Vaping Use: Never used   Substance and Sexual Activity   • Alcohol use: No   • Drug use: No   • Sexual activity: Defer           Objective   Physical Exam  Vitals and nursing note reviewed.   Constitutional:       Appearance: He is ill-appearing.   HENT:      Head: Normocephalic and atraumatic.   Eyes:      Extraocular Movements: Extraocular movements intact.      Pupils: Pupils are equal, round, and reactive to light.   Cardiovascular:      Rate and Rhythm: Normal rate and regular rhythm.      Pulses: Normal pulses.      Heart sounds: Normal heart sounds.   Pulmonary:      Effort: Pulmonary effort is normal.      Breath sounds: Normal breath sounds.   Abdominal:      Tenderness: There is no abdominal tenderness.   Musculoskeletal:      Cervical back: Neck supple.   Skin:     General: Skin is warm and dry.   Neurological:      General: No focal deficit present.      Mental Status: He is alert and oriented to person, place, and time.   Psychiatric:         Mood and Affect: Mood  normal.         Behavior: Behavior normal.         Thought Content: Thought content normal.         Judgment: Judgment normal.         Procedures          ED Course      Results for orders placed or performed during the hospital encounter of 10/02/22   COVID-19,CEPHEID/COURTNEY,COR/IRA/PAD/VASQUEZ IN-HOUSE(OR EMERGENT/ADD-ON),NP SWAB IN TRANSPORT MEDIA 3-4 HR TAT, RT-PCR - Swab, Nasopharynx    Specimen: Nasopharynx; Swab   Result Value Ref Range    COVID19 Detected (C) Not Detected - Ref. Range   Comprehensive Metabolic Panel    Specimen: Blood   Result Value Ref Range    Glucose 74 65 - 99 mg/dL    BUN 11 6 - 20 mg/dL    Creatinine 0.82 0.76 - 1.27 mg/dL    Sodium 136 136 - 145 mmol/L    Potassium 3.2 (L) 3.5 - 5.2 mmol/L    Chloride 97 (L) 98 - 107 mmol/L    CO2 27.0 22.0 - 29.0 mmol/L    Calcium 8.8 8.6 - 10.5 mg/dL    Total Protein 7.8 6.0 - 8.5 g/dL    Albumin 3.00 (L) 3.50 - 5.20 g/dL    ALT (SGPT) 34 1 - 41 U/L    AST (SGOT) 15 1 - 40 U/L    Alkaline Phosphatase 99 39 - 117 U/L    Total Bilirubin 0.8 0.0 - 1.2 mg/dL    Globulin 4.8 gm/dL    A/G Ratio 0.6 g/dL    BUN/Creatinine Ratio 13.4 7.0 - 25.0    Anion Gap 12.0 5.0 - 15.0 mmol/L    eGFR 109.0 >60.0 mL/min/1.73   Protime-INR    Specimen: Blood   Result Value Ref Range    Protime 11.2 9.6 - 11.7 Seconds    INR 1.09 0.93 - 1.10   aPTT    Specimen: Blood   Result Value Ref Range    PTT 28.0 (L) 61.0 - 76.5 seconds   Magnesium    Specimen: Blood   Result Value Ref Range    Magnesium 1.9 1.6 - 2.6 mg/dL   CBC Auto Differential    Specimen: Blood   Result Value Ref Range    WBC 12.80 (H) 3.40 - 10.80 10*3/mm3    RBC 4.54 4.14 - 5.80 10*6/mm3    Hemoglobin 13.4 13.0 - 17.7 g/dL    Hematocrit 39.7 37.5 - 51.0 %    MCV 87.4 79.0 - 97.0 fL    MCH 29.4 26.6 - 33.0 pg    MCHC 33.7 31.5 - 35.7 g/dL    RDW 13.0 12.3 - 15.4 %    RDW-SD 39.8 37.0 - 54.0 fl    MPV 7.5 6.0 - 12.0 fL    Platelets 362 140 - 450 10*3/mm3    Neutrophil % 73.6 42.7 - 76.0 %    Lymphocyte % 15.9 (L)  19.6 - 45.3 %    Monocyte % 9.5 5.0 - 12.0 %    Eosinophil % 0.2 (L) 0.3 - 6.2 %    Basophil % 0.8 0.0 - 1.5 %    Neutrophils, Absolute 9.40 (H) 1.70 - 7.00 10*3/mm3    Lymphocytes, Absolute 2.00 0.70 - 3.10 10*3/mm3    Monocytes, Absolute 1.20 (H) 0.10 - 0.90 10*3/mm3    Eosinophils, Absolute 0.00 0.00 - 0.40 10*3/mm3    Basophils, Absolute 0.10 0.00 - 0.20 10*3/mm3    nRBC 0.0 0.0 - 0.2 /100 WBC   POC Lactate    Specimen: Blood   Result Value Ref Range    Lactate 0.9 0.5 - 2.0 mmol/L   Lavender Top   Result Value Ref Range    Extra Tube hold for add-on             No radiology results for the last day  X-ray reviewed by myself.                                MDM  Number of Diagnoses or Management Options  COVID-19  Fever, unspecified fever cause  Diagnosis management comments: On reevaluation patient is struggling with adequate blood pressure.  He is improving with treatment.  His wound does not have any active pus from it yet but it does go deep.  On x-ray I am concerned about reaching the bone.  Potentially osteo-.  However his COVID test did come back positive which could be causing all of his symptoms as well.  He does have elevated white count with his history of diabetes covering with antibiotics.  Rule out osteomyelitis.  Dr. Alcantara is his orthopedist.  Discussed with  who agreed to admit.       Amount and/or Complexity of Data Reviewed  Clinical lab tests: reviewed  Tests in the radiology section of CPT®: reviewed  Discussion of test results with the performing providers: yes  Discuss the patient with other providers: yes  Independent visualization of images, tracings, or specimens: yes    Patient Progress  Patient progress: stable      Final diagnoses:   None   Fever  Left wound infection lower extremity  COVID-19    ED Disposition  ED Disposition     None          No follow-up provider specified.       Medication List      No changes were made to your prescriptions during this visit.           Ricki Mendoza DO  10/02/22 2128      Electronically signed by Ricki Mendoza DO at 10/02/22 2128

## 2022-10-03 NOTE — CONSULTS
Infectious Diseases Consult Note    Referring Provider: Guera Contreras MD    Reason for Consultation: Left leg wound    Patient Care Team:  Fred Lemons FNP as PCP - General (Family Medicine)    Chief complaint worsening left leg    Subjective     The patient has had fever as high as 100.7 degrees in the last 24 hours.  The patient is on room air, hemodynamically stable, and is tolerating antimicrobial therapy.  Patient is just started to have a rash on his right arm today    History of present illness:     This is a 47-year-old male presents the hospital on 10/2/2022 with complaints of worsening wound to his left posterior leg.  Patient feels that his prosthetic device caused a blister which now is turned into an open wound.  Patient also had 1 episode of vomiting before admission.  Patient has had some low-grade fevers but denies shortness of breath, cough, GI symptoms, or any urinary symptoms.  Patient is COVID-positive states he has no symptoms    Patient is currently on room air and does not appear to be in acute distress.  Patient's had 1 fever as high as 100.7 degrees since admission.  Patient is creatinine 0.75, white blood cell count of 11.6, LOW 12.4 and platelets 310.  Urinalysis is unremarkable.  COVID-19 screen is positive, blood cultures are pending and wound culture is pending.  Patient is currently IV Rocephin and IV vancomycin and has known known antibiotic allergies.  MRI does show a deep ulceration to the posterior medial knee with no obvious abscess or osteomyelitis.      Review of Systems   Review of Systems   Constitutional: Negative.    HENT: Negative.    Eyes: Negative.    Respiratory: Negative.    Cardiovascular: Negative.    Gastrointestinal: Negative.    Endocrine: Negative.    Genitourinary: Negative.    Musculoskeletal: Negative.    Skin: Positive for rash and wound.   Neurological: Negative.    Psychiatric/Behavioral: Negative.    All other systems reviewed and are  negative.      Medications  Medications Prior to Admission   Medication Sig Dispense Refill Last Dose    DULoxetine (CYMBALTA) 60 MG capsule Take 60 mg by mouth Daily. Take DOS   10/1/2022 at Unknown time    HYDROcodone-acetaminophen (NORCO)  MG per tablet Take 1 tablet by mouth Every 4 (Four) Hours As Needed for Moderate Pain  (Pain). (Patient taking differently: Take 1 tablet by mouth Every 6 (Six) Hours As Needed for Moderate Pain (Pain).) 40 tablet 0 10/1/2022 at Unknown time    insulin aspart (novoLOG) 100 UNIT/ML injection Inject  under the skin into the appropriate area as directed 3 (Three) Times a Day Before Meals. PER SS. BETWEEN 3-20 UNITS  Hold DOS   10/2/2022 at Unknown time    Insulin Glargine (BASAGLAR KWIKPEN) 100 UNIT/ML injection pen Inject 30 Units under the skin into the appropriate area as directed Every Night. Hasn't picked up new order yet. Old order was 12 units at bedtime   10/1/2022 at Unknown time    pantoprazole (PROTONIX) 40 MG EC tablet Take 40 mg by mouth Every Evening. Hasn't been filled in awhile   10/1/2022 at Unknown time    pregabalin (LYRICA) 75 MG capsule Take 75 mg by mouth 3 (Three) Times a Day As Needed.   10/1/2022 at Unknown time    amoxicillin-clavulanate (Augmentin) 875-125 MG per tablet Take 1 tablet by mouth 2 (Two) Times a Day. Day 4 of 10       vitamin D (ERGOCALCIFEROL) 1.25 MG (80482 UT) capsule capsule Take 50,000 Units by mouth 1 (One) Time Per Week.          History  Past Medical History:   Diagnosis Date    Anxiety     Bone infection (HCC)     STATES CAUSED HIM TO LOSE RIGHT LEG    CHF (congestive heart failure) (HCC)     Coronary artery disease     Depression     Diabetes mellitus (HCC)     TYPE 1    Gallstones     GERD (gastroesophageal reflux disease)     Headache     History of amputation     PEDRO BELOW THE KNEE    MI, old 2010    WITH STENT PLACEMENT-CARDIO WANDA Isaban-NO LONGER SEES    Nodule of left lung     HAD CT SCAN BENIGN.      Phantom limb pain (HCC)     LEFT AND RIGHT BOTH    Seasonal allergies      Past Surgical History:   Procedure Laterality Date    ABOVE KNEE AMPUTATION Right 10/14/2021    Procedure: AMPUTATION ABOVE KNEE;  Surgeon: Donn Alcantara MD;  Location: [facility] MAIN OR;  Service: Orthopedics;  Laterality: Right;    AMPUTATION DIGIT Right 2/28/2020    Procedure: PARTIAL FIRST RAY RESECTION RIGHT FOOT;  Surgeon: CROW Souza DPM;  Location: [facility] MAIN OR;  Service: Podiatry;  Laterality: Right;    AMPUTATION FOOT / TOE      AMPUTATION REVISION Right 3/2/2021    Procedure: AMPUTATION REVISION KNEE STUMP;  Surgeon: Donn Alcantara MD;  Location: [facility] MAIN OR;  Service: Orthopedics;  Laterality: Right;    AMPUTATION REVISION Right 9/23/2021    Procedure: RIGHT BELOW KNEE AMPUTATION REVISION;  Surgeon: Donn Alcantara MD;  Location: [facility] MAIN OR;  Service: Orthopedics;  Laterality: Right;    BELOW KNEE AMPUTATION Right 4/30/2020    Procedure: AMPUTATION BELOW KNEE;  Surgeon: Donn Alcantara MD;  Location: [facility] MAIN OR;  Service: Orthopedics;  Laterality: Right;    BELOW KNEE AMPUTATION Left 9/2/2020    Procedure: AMPUTATION BELOW KNEE, left;  Surgeon: Donn Alcantara MD;  Location: [facility] MAIN OR;  Service: Orthopedics;  Laterality: Left;    BELOW KNEE AMPUTATION Right 2/23/2021    Procedure: Revision of amputaion below knee;  Surgeon: Donn Alcantara MD;  Location: [facility] MAIN OR;  Service: Orthopedics;  Laterality: Right;    CARDIAC CATHETERIZATION  11/2010     RCA artery    CARDIAC CATHETERIZATION  2015    LAD artery    CARDIAC SURGERY      CORONARY ANGIOPLASTY WITH STENT PLACEMENT      X2. 2015    INCISION AND DRAINAGE LEG Left 1/21/2020    Procedure: INCISION AND DRAINAGE LOWER EXTREMITY with second digit amputation;  Surgeon: CROW Souza DPM;  Location: [facility] MAIN OR;  Service: Podiatry    INCISION AND DRAINAGE LEG Right 4/28/2020    Procedure: incision and drainage ,transmetatarsal amputation, fasciotomy;   Surgeon: CROW Souza DPM;  Location: [facility] MAIN OR;  Service: Podiatry;  Laterality: Right;    SKIN FULL THICKNESS GRAFT Right 7/29/2021    Procedure: EXCISION SOFT TISSUE Ulcer WITH FULL THICKNESS SKIN GRAFT to right lower leg.;  Surgeon: Sukhdeep Hernadez MD;  Location: [facility] MAIN OR;  Service: Plastics;  Laterality: Right;    TOE AMPUTATION Left     WOUND DEBRIDEMENT Right 1/21/2020    Procedure: DEBRIDEMENT with sesamoid excision;  Surgeon: CROW Souza DPM;  Location: [facility] MAIN OR;  Service: Podiatry       Family History  Family History   Problem Relation Age of Onset    Diabetes Father     Heart failure Father     Diabetes Paternal Grandfather     Malig Hyperthermia Neg Hx        Social History   reports that he quit smoking about 3 years ago. He has a 3.00 pack-year smoking history. He has never used smokeless tobacco. He reports that he does not drink alcohol and does not use drugs.    Allergies  Metformin and Oxycodone    Objective     Vital Signs   Vital Signs (last 24 hours)         10/02 0700  10/03 0659 10/03 0700  10/03 1726   Most Recent      Temp (°F) 98.1 -  100.7      97.3     97.3 (36.3) 10/03 1500    Heart Rate 78 -  103    82 -  85     82 10/03 1500    Resp 16 -  17    16 -  18     16 10/03 1500    /73 -  139/79    117/74 -  124/80     124/80 10/03 1500    SpO2 (%) 96 -  98      96     96 10/03 1500            Physical Exam:  Physical Exam  Vitals and nursing note reviewed.   Constitutional:       General: He is not in acute distress.     Appearance: Normal appearance. He is well-developed and normal weight. He is not diaphoretic.   HENT:      Head: Normocephalic and atraumatic.   Eyes:      Conjunctiva/sclera: Conjunctivae normal.      Pupils: Pupils are equal, round, and reactive to light.   Cardiovascular:      Rate and Rhythm: Normal rate and regular rhythm.      Heart sounds: Normal heart sounds, S1 normal and S2 normal.   Pulmonary:      Effort: Pulmonary effort  is normal. No respiratory distress.      Breath sounds: Normal breath sounds. No stridor. No wheezing or rales.   Abdominal:      General: Bowel sounds are normal. There is no distension.      Palpations: Abdomen is soft. There is no mass.      Tenderness: There is no abdominal tenderness. There is no guarding.   Musculoskeletal:         General: No deformity.      Cervical back: Neck supple.      Comments: Healed right AKA    Left BKA stump is healed but there is a wound to the posterior aspect of the knee that is open and muscle appears to be visible   Skin:     General: Skin is warm and dry.      Coloration: Skin is not pale.      Findings: Rash present. No erythema.      Comments: Flat patchy rash that starts from the patient's wrist to his mid upper arm   Neurological:      Mental Status: He is alert and oriented to person, place, and time.      Cranial Nerves: No cranial nerve deficit.   Psychiatric:         Mood and Affect: Mood normal.         Microbiology  Microbiology Results (last 10 days)       Procedure Component Value - Date/Time    Wound Culture - Wound, Leg, Left [[id number]] Collected: 10/02/22 2018    Lab Status: Preliminary result Specimen: Wound from Leg, Left Updated: 10/03/22 1104     Wound Culture Growth present, too young to evaluate     Gram Stain Many (4+) WBCs per low power field      Rare (1+) Gram positive cocci in pairs      Rare (1+) Gram positive bacilli    COVID-19,CEPHEID/COURTNEY,COR/IRA/PAD/VASQUEZ IN-HOUSE(OR EMERGENT/ADD-ON),NP SWAB IN TRANSPORT MEDIA 3-4 HR TAT, RT-PCR - Swab, Nasopharynx [[id number]]  (Abnormal) Collected: 10/02/22 2018    Lab Status: Final result Specimen: Swab from Nasopharynx Updated: 10/02/22 2054     COVID19 Detected    Narrative:      Fact sheet for providers: https://www.fda.gov/media/618083/download     Fact sheet for patients: https://www.fda.gov/media/472953/download  Fact sheet for providers: https://www.fda.gov/media/668704/download    Fact sheet for  patients: https://www.fda.gov/media/646960/download    Test performed by PCR.            Laboratory  Results from last 7 days   Lab Units 10/03/22  0526   WBC 10*3/mm3 11.60*   HEMOGLOBIN g/dL 12.4*   HEMATOCRIT % 37.2*   PLATELETS 10*3/mm3 310     Results from last 7 days   Lab Units 10/03/22  0526   SODIUM mmol/L 139   POTASSIUM mmol/L 3.6   CHLORIDE mmol/L 105   CO2 mmol/L 27.0   BUN mg/dL 13   CREATININE mg/dL 0.75*   GLUCOSE mg/dL 231*   CALCIUM mg/dL 8.0*     Results from last 7 days   Lab Units 10/03/22  0526   SODIUM mmol/L 139   POTASSIUM mmol/L 3.6   CHLORIDE mmol/L 105   CO2 mmol/L 27.0   BUN mg/dL 13   CREATININE mg/dL 0.75*   GLUCOSE mg/dL 231*   CALCIUM mg/dL 8.0*                   Radiology  Imaging Results (Last 72 Hours)       Procedure Component Value Units Date/Time    MRI Tibia Fibula Left Without Contrast [[id number]] Collected: 10/03/22 1056     Updated: 10/03/22 1104    Narrative:      MRI TIBIA FIBULA LEFT WO CONTRAST    Date of Exam: 10/3/2022 8:58 EDT    Indication: History of below-knee amputation. Evaluate for osteomyelitis. Wound..    Comparison Exams: Tibia-fibula radiograph 9/23/2021, femur radiograph 10/2/2022.    Technique: Multiplanar multisequence images performed according to routine MRI protocol. Imaging was protocoled to include the area of interest. There is exclusion of the distal resected into the tibia and fibula on this exam.    FINDINGS:  Markers placed at area of deep ulceration along the posterior lower leg the femoral condyles. There is skin thickening this location. There is an ulceration and mild soft tissue edema. There is high signal intensity of the distal semimembranosus muscle   and musculotendinous junction that could be seen with infectious myositis. There is no drainable fluid collection to suggest abscess. There is a small Baker cyst. There is no evidence of osteomyelitis.    No significant knee joint effusion is seen. Physiologic fluid is present. There is  mild soft tissue edema in the posterior aspect of the knee. This exam was not maximized to value for internal drainage of the knee. However, the cruciate ligaments appear   intact. No displaced meniscal tears on this exam. Extensor mechanism is intact.      Impression:      1.Deep ulceration the posterior medial knee with associated soft tissue edema and skin thickening. There is associated high signal intensity in the semimembranosus tendon and muscle distally that can be seen with myositis and tendinosis.  2.No drainable fluid collection to suggest abscess. No findings of osteomyelitis.  3.Small Baker cyst.    Electronically Signed: Millicent Springer MD 10/3/2022 11:01 EDT    XR Femur 2 View Left [[id number]] Collected: 10/03/22 0728     Updated: 10/03/22 0733    Narrative:      DATE OF EXAM:  10/2/2022 8:19 PM     PROCEDURE:  XR FEMUR 2 VW LEFT-     INDICATIONS:  wound/pain     COMPARISON:  No Comparisons Available     TECHNIQUE:   2 views of the left femur was/were obtained.     FINDINGS:  There is no evidence of acute fracture. There is a skin defect noted  over the posterior aspect of the knee region with overlying bandage  material. This presumably corresponds to the patient's reported wound.  No large knee effusion is identified. There is some periosteal  irregularity underlying the site of the wound. Cannot rule out  osteomyelitis. This could be more definitively characterized by MRI, if  clinically warranted.       Impression:         1. No evidence of acute bony injury.  2. There is some periosteal irregularity underlying the patient's  posterior knee wound site. Correlation with MRI may be of value to rule  out osteomyelitis, if clinically warranted.     Electronically Signed By-Sidney Pink MD On:10/3/2022 7:31 AM  This report was finalized on [id number] by  Sidney Pikn MD.    XR Chest 1 View [[id number]] Collected: 10/03/22 0711     Updated: 10/03/22 0720    Narrative:      DATE OF EXAM:  10/2/2022 8:43  PM     PROCEDURE:  XR CHEST 1 VW-     INDICATIONS:  fever     COMPARISON:  7/20/2020     TECHNIQUE:   Single radiographic view of the chest was obtained.     FINDINGS:  No focal pulmonary consolidations are evident. Pulmonary vascularity  appears within normal limits. There are low lung volumes. No pleural  fluid is seen. No pneumothorax is evident. Question calcified granuloma  in the right lung base. Right paratracheal soft tissue fullness may be  due to enlarged lymph node or dilated vascular pedicle.       Impression:         1. No acute cardiopulmonary findings.  2. Right paratracheal soft tissue fullness may be due to enlarged lymph  node or dilated vascular pedicle.     Electronically Signed By-Sidney Pink MD On:10/3/2022 7:18 AM  This report was finalized on [id number] by  Sidney Pink MD.            Cardiology      Results Review:  I have reviewed all clinical data, test, lab, and imaging results.       Schedule Meds  enoxaparin, 40 mg, Subcutaneous, Daily  insulin glargine, 30 Units, Subcutaneous, Nightly  insulin lispro, 0-14 Units, Subcutaneous, TID AC  Linezolid, 600 mg, Intravenous, Q12H  pantoprazole, 40 mg, Oral, Q PM  piperacillin-tazobactam, 3.375 g, Intravenous, Once  [START ON 10/4/2022] piperacillin-tazobactam, 3.375 g, Intravenous, Q8H  pregabalin, 75 mg, Oral, BID  sodium chloride, 3 mL, Intravenous, Q12H        Infusion Meds       PRN Meds  HYDROcodone-acetaminophen    LORazepam    melatonin    Morphine    ondansetron **OR** ondansetron    sodium chloride    [COMPLETED] Insert peripheral IV **AND** sodium chloride    sodium chloride      Assessment & Plan       Assessment    Wound to the posterior left knee that is likely resulted from rubbing from the patient's prostatic device.  Started the blister is currently an open wound that goes down to the muscle.  MRI did not show any obvious abscess or osteomyelitis  -Cultures are pending    Current COVID-19 infection.  Patient denies any  significant symptoms and is currently on room air  -Chest x-ray shows no acute findings    New rash that started earlier today on the patient's right arm.  Likely reaction to antibiotics.  Patient's been both on IV Rocephin and IV vancomycin.  Of note patient also received IV morphine in that IV    History of multiple surgeries to bilateral feet that ended up being bilateral BKA's with a revision of the right BKA to an AKA in October of last year.    Congestive heart failure, CAD    Type 1 diabetes    Plan    Discontinue IV Rocephin and IV vancomycin in case patient is having a reaction to one of these antibiotics  Start IV Zyvox 600 mg every 12 hours-we will need to hold Cymbalta while on Zyvox  Start IV Zosyn 3.375 g every 8 hours  Waiting on blood cultures  Waiting on wound cultures  Orthopedic surgery has already been consulted  Continue supportive care  A.m. labs  Discussed with primary MD    Patient will need to be in enhanced airborne isolation for 10 days from first positive COVID screen    Appropriate PPE was used during this assessment    KAMILLE Molina  10/03/22  17:26 EDT    Note is dictated utilizing voice recognition software/Dragon

## 2022-10-03 NOTE — CASE MANAGEMENT/SOCIAL WORK
Discharge Planning Assessment   Noel     Patient Name: Maynor Gregg  MRN: [id number]  Today's Date: 10/3/2022    Admit Date: 10/2/2022    Plan: Anticipate return home. Watch for IV abx.   Discharge Needs Assessment     Row Name 10/03/22 1549       Living Environment    People in Home child(marcus), dependent    Current Living Arrangements home    Primary Care Provided by self    Provides Primary Care For child(marcus)    Family Caregiver if Needed friend(s)    Quality of Family Relationships unable to assess    Able to Return to Prior Arrangements yes       Resource/Environmental Concerns    Resource/Environmental Concerns none    Transportation Concerns none       Transition Planning    Patient/Family Anticipates Transition to home    Patient/Family Anticipated Services at Transition durable medical equipment;outpatient care    Transportation Anticipated family or friend will provide       Discharge Needs Assessment    Readmission Within the Last 30 Days no previous admission in last 30 days    Equipment Currently Used at Home prosthesis;crutches, forarm;walker, rolling;rollator;shower chair;glucometer    Concerns to be Addressed care coordination/care conferences;discharge planning    Anticipated Changes Related to Illness none    Equipment Needed After Discharge wheelchair, manual    Provided Post Acute Provider List? N/A    Provided Post Acute Provider Quality & Resource List? N/A               Discharge Plan     Row Name 10/03/22 1552       Plan    Plan Anticipate return home. Watch for IV abx.    Patient/Family in Agreement with Plan yes    Provided Post Acute Provider List? N/A    Provided Post Acute Provider Quality & Resource List? N/A    Plan Comments D/T Covid+ isolation status, called and completed CM assessment over the phone. Pt reports he lives at home with 12-year old son; he drives and is independent with ADL's. PCP and pharmacy confirmed. Pt uses forearm crutches, walker, rollator, glucometer,  shower chair, and BLE prosthesis. Pt is not current with any HHC/PT/in-home services. States a friend will provide transportation at discharge. Awaiting ortho consult, watch for IV abx.              Expected Discharge Date and Time     Expected Discharge Date Expected Discharge Time    Oct 5, 2022          Demographic Summary     Row Name 10/03/22 1546       General Information    Admission Type inpatient    Arrived From emergency department    Referral Source admission list    Reason for Consult discharge planning;care coordination/care conference    Preferred Language English       Contact Information    Permission Granted to Share Info With                Functional Status     Row Name 10/03/22 1549       Functional Status    Usual Activity Tolerance moderate    Current Activity Tolerance moderate       Functional Status, IADL    Medications independent    Meal Preparation independent    Housekeeping independent    Laundry independent    Shopping independent              Phone communication or documentation only - no physical contact with patient or family.      Megan Naegele, RN      Office Phone: [phone]  Office Cell: [phone]

## 2022-10-03 NOTE — PROGRESS NOTES
Synopsis  Nursing report ED to floor  Maynor Gregg  47 y.o.  male    HPI:   Chief Complaint   Patient presents with    Vomiting       Admitting doctor:   Guera Contreras MD    Admitting diagnosis:   The primary encounter diagnosis was Fever, unspecified fever cause. Diagnoses of COVID-19 and Wound infection were also pertinent to this visit.    Code status:   Current Code Status       Date Active Code Status Order ID Comments User Context       Prior    Advance Care Planning Activity            Allergies:   Metformin and Oxycodone    Isolation:  No active isolations     Fall Risk:  Fall Risk Assessment was completed, and patient is at high risk for falls.   Predictive Model Details         2 (Low) Factor Value    Calculated 10/2/2022 19:19 Age 47    Risk of Fall Model Musculoskeletal Assessment WDL     Skin Assessment WDL     Magnesium not on file     Drug Use No     Tobacco Use Quit     Rich Scale not on file     Financial Class Private Insurance     Peripheral Vascular Assessment WDL     Diastolic BP 79     Calcium not on file     Sex Male     Gastrointestinal Assessment WDL     Albumin not on file     Cardiac Assessment WDL     Respiratory Rate 16     Total Bilirubin not on file     Chloride not on file     Potassium not on file     Creatinine not on file     ALT not on file         Weight:       10/02/22  1917      Weight: 101 kg (222 lb)          Intake and Output  No intake or output data in the 24 hours ending 10/02/22 2145    Diet:        Most recent vitals:   Vitals:    10/02/22 1934  10/02/22 1935  10/02/22 2045  10/02/22 2114  BP:          Pulse:  98  99  89    Resp:          Temp:        98.3 °F (36.8 °C)  TempSrc:        Oral  SpO2:  97%  97%  96%    Weight:          Height:              Active LDAs/IV Access:   Lines, Drains & Airways       Active LDAs       Name Placement date Placement time Site Days    Peripheral IV 10/02/22 1931 Left Wrist 10/02/22  1931  Wrist  less than 1    Peripheral IV  10/02/22 2009 Anterior;Right Forearm 10/02/22  2009  Forearm  less than 1                    Skin Condition:   Skin Assessments (last day)       Date/Time Skin WDL    10/02/22 19:30:52 WDL             Labs (abnormal labs have a star):   Labs Reviewed   COVID-19,CEPHEID/COURTNEY,COR/IRA/PAD/VASQUEZ IN-HOUSE,NP SWAB IN TRANSPORT MEDIA 3-4 HR TAT, RT-PCR - Abnormal; Notable for the following components:       Result Value    COVID19 Detected (*)     All other components within normal limits    Narrative:     Fact sheet for providers: https://www.fda.gov/media/881583/download     Fact sheet for patients: https://www.fda.gov/media/634698/download  Fact sheet for providers: https://www.fda.gov/media/913630/download    Fact sheet for patients: https://www.fda.gov/media/640153/download    Test performed by PCR.   COMPREHENSIVE METABOLIC PANEL - Abnormal; Notable for the following components:    Potassium 3.2 (*)     Chloride 97 (*)     Albumin 3.00 (*)     All other components within normal limits    Narrative:     GFR Normal >60  Chronic Kidney Disease <60  Kidney Failure <15     APTT - Abnormal; Notable for the following components:    PTT 28.0 (*)     All other components within normal limits   CBC WITH AUTO DIFFERENTIAL - Abnormal; Notable for the following components:    WBC 12.80 (*)     Lymphocyte % 15.9 (*)     Eosinophil % 0.2 (*)     Neutrophils, Absolute 9.40 (*)     Monocytes, Absolute 1.20 (*)     All other components within normal limits   PROTIME-INR - Normal   MAGNESIUM - Normal   LACTIC ACID, PLASMA - Normal   POC LACTATE - Normal   BLOOD CULTURE   BLOOD CULTURE   WOUND CULTURE   RAINBOW DRAW    Narrative:     The following orders were created for panel order [place] Draw.  Procedure                               Abnormality         Status                     ---------                               -----------         ------                     Green Top (Gel)[[id number]]                                  Final  result               Lavender Top[[id number]]                                     Final result               Gold Top - SST[[id number]]                                   Final result               Light Blue Top[[id number]]                                                                Please view results for these tests on the individual orders.   URINALYSIS W/ CULTURE IF INDICATED   POC LACTATE   GREEN TOP   LAVENDER TOP   GOLD TOP - SST   CBC AND DIFFERENTIAL    Narrative:     The following orders were created for panel order CBC & Differential.  Procedure                               Abnormality         Status                     ---------                               -----------         ------                     CBC Auto Differential[[id number]]        Abnormal            Final result               Scan Slide[[id number]]                                                                    Please view results for these tests on the individual orders.   EXTRA TUBES    Narrative:     The following orders were created for panel order Extra Tubes.  Procedure                               Abnormality         Status                     ---------                               -----------         ------                     Green Top (Gel)[[id number]]                                  Final result                 Please view results for these tests on the individual orders.   GREEN TOP       LOC: Person, Place, Time, and Situation    Telemetry:  Telemetry    Cardiac Monitoring Ordered: no    EKG:   No orders to display       Medications Given in the ED:   Medications   sodium chloride 0.9 % flush 10 mL (has no administration in time range)   sodium chloride 0.9 % flush 10 mL (has no administration in time range)   morphine injection 4 mg (4 mg Intravenous Given 10/2/22 2010)   ondansetron (ZOFRAN) injection 4 mg (4 mg Intravenous Given 10/2/22 2009)   acetaminophen (TYLENOL) tablet 1,000 mg (1,000 mg Oral Given 10/2/22 2012)    sodium chloride 0.9 % bolus 1,000 mL (1,000 mL Intravenous New Bag 10/2/22 2009)   vancomycin IVPB 1750 mg in 0.9% Sodium Chloride (premix) 500 mL (1,750 mg Intravenous New Bag 10/2/22 2108)   piperacillin-tazobactam (ZOSYN) IVPB 3.375 g in 100 mL NS (CD) (3.375 g Intravenous New Bag 10/2/22 2108)       Imaging results:  No radiology results for the last day    Social issues:   Social History     Socioeconomic History    Marital status: Significant Other   Tobacco Use    Smoking status: Former Smoker     Packs/day: 0.50     Years: 6.00     Pack years: 3.00     Quit date: 05/2019     Years since quitting: 3.4    Smokeless tobacco: Never Used   Vaping Use    Vaping Use: Never used   Substance and Sexual Activity    Alcohol use: No    Drug use: No    Sexual activity: Defer       NIH Stroke Scale:  Interval: (not recorded)  1a. Level of Consciousness: (not recorded)  1b. LOC Questions: (not recorded)  1c. LOC Commands: (not recorded)  2. Best Gaze: (not recorded)  3. Visual: (not recorded)  4. Facial Palsy: (not recorded)  5a. Motor Arm, Left: (not recorded)  5b. Motor Arm, Right: (not recorded)  6a. Motor Leg, Left: (not recorded)  6b. Motor Leg, Right: (not recorded)  7. Limb Ataxia: (not recorded)  8. Sensory: (not recorded)  9. Best Language: (not recorded)  10. Dysarthria: (not recorded)  11. Extinction and Inattention (formerly Neglect): (not recorded)    Total (NIH Stroke Scale): (not recorded)     Additional notable assessment information:     Nursing report ED to floor:    Anel Freeman RN   10/02/22 21:45 EDT        Other

## 2022-10-03 NOTE — NURSING NOTE
While disconnecting IV, medium to large red dots were noted on the arm. They are blanchable and not raised. Patient states they do not itch. Spoke to pharmacy and they said it could be from the morphine given earlier. Will continue to monitor. Patient is not in pain and call light is in reach   Has The Growth Been Previously Biopsied?: has been previously biopsied Body Location Override (Optional): Left central forehead

## 2022-10-04 PROBLEM — L89.894: Status: ACTIVE | Noted: 2022-10-04

## 2022-10-04 LAB
ANION GAP SERPL CALCULATED.3IONS-SCNC: 7 MMOL/L (ref 5–15)
BACTERIA BLD CULT: NORMAL
BASOPHILS # BLD AUTO: 0.1 10*3/MM3 (ref 0–0.2)
BASOPHILS NFR BLD AUTO: 0.6 % (ref 0–1.5)
BOTTLE TYPE: NORMAL
BUN SERPL-MCNC: 14 MG/DL (ref 6–20)
BUN/CREAT SERPL: 18.2 (ref 7–25)
CALCIUM SPEC-SCNC: 8.1 MG/DL (ref 8.6–10.5)
CHLORIDE SERPL-SCNC: 99 MMOL/L (ref 98–107)
CO2 SERPL-SCNC: 28 MMOL/L (ref 22–29)
CREAT SERPL-MCNC: 0.77 MG/DL (ref 0.76–1.27)
DEPRECATED RDW RBC AUTO: 40.7 FL (ref 37–54)
EGFRCR SERPLBLD CKD-EPI 2021: 111.1 ML/MIN/1.73
EOSINOPHIL # BLD AUTO: 0.1 10*3/MM3 (ref 0–0.4)
EOSINOPHIL NFR BLD AUTO: 1 % (ref 0.3–6.2)
ERYTHROCYTE [DISTWIDTH] IN BLOOD BY AUTOMATED COUNT: 13 % (ref 12.3–15.4)
GLUCOSE BLDC GLUCOMTR-MCNC: 191 MG/DL (ref 70–105)
GLUCOSE BLDC GLUCOMTR-MCNC: 200 MG/DL (ref 70–105)
GLUCOSE BLDC GLUCOMTR-MCNC: 235 MG/DL (ref 70–105)
GLUCOSE BLDC GLUCOMTR-MCNC: 261 MG/DL (ref 70–105)
GLUCOSE SERPL-MCNC: 263 MG/DL (ref 65–99)
HCT VFR BLD AUTO: 37.9 % (ref 37.5–51)
HGB BLD-MCNC: 12.6 G/DL (ref 13–17.7)
LYMPHOCYTES # BLD AUTO: 2.6 10*3/MM3 (ref 0.7–3.1)
LYMPHOCYTES NFR BLD AUTO: 22.7 % (ref 19.6–45.3)
MCH RBC QN AUTO: 29.2 PG (ref 26.6–33)
MCHC RBC AUTO-ENTMCNC: 33.2 G/DL (ref 31.5–35.7)
MCV RBC AUTO: 88.2 FL (ref 79–97)
MONOCYTES # BLD AUTO: 1 10*3/MM3 (ref 0.1–0.9)
MONOCYTES NFR BLD AUTO: 8.3 % (ref 5–12)
NEUTROPHILS NFR BLD AUTO: 67.4 % (ref 42.7–76)
NEUTROPHILS NFR BLD AUTO: 7.7 10*3/MM3 (ref 1.7–7)
NRBC BLD AUTO-RTO: 0 /100 WBC (ref 0–0.2)
PLATELET # BLD AUTO: 341 10*3/MM3 (ref 140–450)
PMV BLD AUTO: 7.9 FL (ref 6–12)
POTASSIUM SERPL-SCNC: 3.5 MMOL/L (ref 3.5–5.2)
RBC # BLD AUTO: 4.3 10*6/MM3 (ref 4.14–5.8)
SODIUM SERPL-SCNC: 134 MMOL/L (ref 136–145)
WBC NRBC COR # BLD: 11.4 10*3/MM3 (ref 3.4–10.8)

## 2022-10-04 PROCEDURE — 25010000002 LINEZOLID 600 MG/300ML SOLUTION: Performed by: NURSE PRACTITIONER

## 2022-10-04 PROCEDURE — 82962 GLUCOSE BLOOD TEST: CPT

## 2022-10-04 PROCEDURE — 63710000001 INSULIN GLARGINE PER 5 UNITS

## 2022-10-04 PROCEDURE — 25010000002 PIPERACILLIN SOD-TAZOBACTAM PER 1 G: Performed by: NURSE PRACTITIONER

## 2022-10-04 PROCEDURE — 25010000002 MORPHINE PER 10 MG

## 2022-10-04 PROCEDURE — 25010000002 ENOXAPARIN PER 10 MG

## 2022-10-04 PROCEDURE — 63710000001 INSULIN LISPRO (HUMAN) PER 5 UNITS: Performed by: INTERNAL MEDICINE

## 2022-10-04 RX ORDER — INSULIN LISPRO 100 [IU]/ML
0-24 INJECTION, SOLUTION INTRAVENOUS; SUBCUTANEOUS
Status: DISCONTINUED | OUTPATIENT
Start: 2022-10-04 | End: 2022-10-06 | Stop reason: HOSPADM

## 2022-10-04 RX ADMIN — PREGABALIN 75 MG: 75 CAPSULE ORAL at 07:37

## 2022-10-04 RX ADMIN — PIPERACILLIN AND TAZOBACTAM 3.38 G: 3; .375 INJECTION, POWDER, FOR SOLUTION INTRAVENOUS at 07:17

## 2022-10-04 RX ADMIN — PIPERACILLIN AND TAZOBACTAM 3.38 G: 3; .375 INJECTION, POWDER, FOR SOLUTION INTRAVENOUS at 17:56

## 2022-10-04 RX ADMIN — Medication 3 ML: at 07:17

## 2022-10-04 RX ADMIN — INSULIN GLARGINE 30 UNITS: 100 INJECTION, SOLUTION SUBCUTANEOUS at 21:03

## 2022-10-04 RX ADMIN — PREGABALIN 75 MG: 75 CAPSULE ORAL at 21:03

## 2022-10-04 RX ADMIN — MORPHINE SULFATE 4 MG: 4 INJECTION, SOLUTION INTRAMUSCULAR; INTRAVENOUS at 16:06

## 2022-10-04 RX ADMIN — LINEZOLID 600 MG: 600 INJECTION, SOLUTION INTRAVENOUS at 21:02

## 2022-10-04 RX ADMIN — INSULIN LISPRO 8 UNITS: 100 INJECTION, SOLUTION INTRAVENOUS; SUBCUTANEOUS at 17:56

## 2022-10-04 RX ADMIN — Medication 3 ML: at 21:03

## 2022-10-04 RX ADMIN — INSULIN LISPRO 5 UNITS: 100 INJECTION, SOLUTION INTRAVENOUS; SUBCUTANEOUS at 07:16

## 2022-10-04 RX ADMIN — MORPHINE SULFATE 4 MG: 4 INJECTION, SOLUTION INTRAMUSCULAR; INTRAVENOUS at 01:24

## 2022-10-04 RX ADMIN — INSULIN LISPRO 8 UNITS: 100 INJECTION, SOLUTION INTRAVENOUS; SUBCUTANEOUS at 21:03

## 2022-10-04 RX ADMIN — LINEZOLID 600 MG: 600 INJECTION, SOLUTION INTRAVENOUS at 05:34

## 2022-10-04 RX ADMIN — INSULIN LISPRO 3 UNITS: 100 INJECTION, SOLUTION INTRAVENOUS; SUBCUTANEOUS at 12:00

## 2022-10-04 RX ADMIN — PANTOPRAZOLE SODIUM 40 MG: 40 TABLET, DELAYED RELEASE ORAL at 17:56

## 2022-10-04 RX ADMIN — ENOXAPARIN SODIUM 40 MG: 100 INJECTION SUBCUTANEOUS at 17:56

## 2022-10-04 RX ADMIN — MORPHINE SULFATE 4 MG: 4 INJECTION, SOLUTION INTRAMUSCULAR; INTRAVENOUS at 21:17

## 2022-10-04 RX ADMIN — PIPERACILLIN AND TAZOBACTAM 3.38 G: 3; .375 INJECTION, POWDER, FOR SOLUTION INTRAVENOUS at 01:24

## 2022-10-04 RX ADMIN — HYDROCODONE BITARTRATE AND ACETAMINOPHEN 1 TABLET: 10; 325 TABLET ORAL at 05:41

## 2022-10-04 NOTE — CONSULTS
Consults       Referring Provider: Dr. Contreras  Reason for Consultation: Left popliteal ulceration    Patient Care Team:  Fred Lemons FNP as PCP - General (Family Medicine)      Subjective .     History of present illness:  Maynor Gergg is a 47 y.o. male who presents with 6 months of left popliteal ulceration from his BKA prosthesis.  He has had BKA for greater than a year.  The wound gradually worsened and then drained.  He went to his primary care 5 days ago and started on Augmentin.  However he started feeling worse and came into the hospital 2 days ago was started on vancomycin and Rocephin.  He has not had surgical debridement the wound.  Is just getting bandaged.  He has been switched to IV Zosyn today due to developing a rash on his arm.    Review of Systems:    .  No chest pain shortness of breath.  Did have some fevers and chills      History  Past Medical History:   Diagnosis Date   • Anxiety    • Bone infection (HCC)     STATES CAUSED HIM TO LOSE RIGHT LEG   • CHF (congestive heart failure) ([facility])    • Coronary artery disease    • Depression    • Diabetes mellitus (HCC)     TYPE 1   • Gallstones    • GERD (gastroesophageal reflux disease)    • Headache    • History of amputation     PEDRO BELOW THE KNEE   • MI, old 2010    WITH STENT PLACEMENT-CARDIO [facility]-NO LONGER SEES   • Nodule of left lung     HAD CT SCAN BENIGN.    • Phantom limb pain (HCC)     LEFT AND RIGHT BOTH   • Seasonal allergies      Past Surgical History:   Procedure Laterality Date   • ABOVE KNEE AMPUTATION Right 10/14/2021    Procedure: AMPUTATION ABOVE KNEE;  Surgeon: Donn Alcantara MD;  Location: [facility] MAIN OR;  Service: Orthopedics;  Laterality: Right;   • AMPUTATION DIGIT Right 2/28/2020    Procedure: PARTIAL FIRST RAY RESECTION RIGHT FOOT;  Surgeon: CROW Souza DPM;  Location: [facility] MAIN OR;  Service: Podiatry;  Laterality: Right;   • AMPUTATION FOOT / TOE     • AMPUTATION REVISION Right 3/2/2021     Procedure: AMPUTATION REVISION KNEE STUMP;  Surgeon: Donn Alcantara MD;  Location: [facility] MAIN OR;  Service: Orthopedics;  Laterality: Right;   • AMPUTATION REVISION Right 9/23/2021    Procedure: RIGHT BELOW KNEE AMPUTATION REVISION;  Surgeon: Donn Alcantara MD;  Location: [facility] MAIN OR;  Service: Orthopedics;  Laterality: Right;   • BELOW KNEE AMPUTATION Right 4/30/2020    Procedure: AMPUTATION BELOW KNEE;  Surgeon: Donn Alcantara MD;  Location: [facility] MAIN OR;  Service: Orthopedics;  Laterality: Right;   • BELOW KNEE AMPUTATION Left 9/2/2020    Procedure: AMPUTATION BELOW KNEE, left;  Surgeon: Donn Alcantara MD;  Location: [facility] MAIN OR;  Service: Orthopedics;  Laterality: Left;   • BELOW KNEE AMPUTATION Right 2/23/2021    Procedure: Revision of amputaion below knee;  Surgeon: Donn Alcantara MD;  Location: [facility] MAIN OR;  Service: Orthopedics;  Laterality: Right;   • CARDIAC CATHETERIZATION  11/2010     RCA artery   • CARDIAC CATHETERIZATION  2015    LAD artery   • CARDIAC SURGERY     • CORONARY ANGIOPLASTY WITH STENT PLACEMENT      X2. 2015   • INCISION AND DRAINAGE LEG Left 1/21/2020    Procedure: INCISION AND DRAINAGE LOWER EXTREMITY with second digit amputation;  Surgeon: CROW Souza DPM;  Location: [facility] MAIN OR;  Service: Podiatry   • INCISION AND DRAINAGE LEG Right 4/28/2020    Procedure: incision and drainage ,transmetatarsal amputation, fasciotomy;  Surgeon: CROW Souza DPM;  Location: [facility] MAIN OR;  Service: Podiatry;  Laterality: Right;   • SKIN FULL THICKNESS GRAFT Right 7/29/2021    Procedure: EXCISION SOFT TISSUE Ulcer WITH FULL THICKNESS SKIN GRAFT to right lower leg.;  Surgeon: Sukhdeep Hernadez MD;  Location: [facility] MAIN OR;  Service: Plastics;  Laterality: Right;   • TOE AMPUTATION Left    • WOUND DEBRIDEMENT Right 1/21/2020    Procedure: DEBRIDEMENT with sesamoid excision;  Surgeon: CROW Souza DPM;  Location: [facility] MAIN OR;  Service: Podiatry     Family  History   Problem Relation Age of Onset   • Diabetes Father    • Heart failure Father    • Diabetes Paternal Grandfather    • Malig Hyperthermia Neg Hx       Social History     Tobacco Use   • Smoking status: Former Smoker     Packs/day: 0.50     Years: 6.00     Pack years: 3.00     Quit date: 05/2019     Years since quitting: 3.4   • Smokeless tobacco: Never Used   Vaping Use   • Vaping Use: Never used   Substance Use Topics   • Alcohol use: No   • Drug use: No     Medications Prior to Admission   Medication Sig Dispense Refill Last Dose   • DULoxetine (CYMBALTA) 60 MG capsule Take 60 mg by mouth Daily. Take DOS   10/1/2022 at Unknown time   • HYDROcodone-acetaminophen (NORCO)  MG per tablet Take 1 tablet by mouth Every 4 (Four) Hours As Needed for Moderate Pain  (Pain). (Patient taking differently: Take 1 tablet by mouth Every 6 (Six) Hours As Needed for Moderate Pain (Pain).) 40 tablet 0 10/1/2022 at Unknown time   • insulin aspart (novoLOG) 100 UNIT/ML injection Inject  under the skin into the appropriate area as directed 3 (Three) Times a Day Before Meals. PER SS. BETWEEN 3-20 UNITS  Hold DOS   10/2/2022 at Unknown time   • Insulin Glargine (BASAGLAR KWIKPEN) 100 UNIT/ML injection pen Inject 30 Units under the skin into the appropriate area as directed Every Night. Hasn't picked up new order yet. Old order was 12 units at bedtime   10/1/2022 at Unknown time   • pantoprazole (PROTONIX) 40 MG EC tablet Take 40 mg by mouth Every Evening. Hasn't been filled in awhile   10/1/2022 at Unknown time   • pregabalin (LYRICA) 75 MG capsule Take 75 mg by mouth 3 (Three) Times a Day As Needed.   10/1/2022 at Unknown time   • amoxicillin-clavulanate (Augmentin) 875-125 MG per tablet Take 1 tablet by mouth 2 (Two) Times a Day. Day 4 of 10      • vitamin D (ERGOCALCIFEROL) 1.25 MG (47206 UT) capsule capsule Take 50,000 Units by mouth 1 (One) Time Per Week.         Metformin and Oxycodone    Scheduled Meds:enoxaparin, 40  mg, Subcutaneous, Daily  insulin glargine, 30 Units, Subcutaneous, Nightly  insulin lispro, 0-14 Units, Subcutaneous, TID AC  Linezolid, 600 mg, Intravenous, Q12H  pantoprazole, 40 mg, Oral, Q PM  piperacillin-tazobactam, 3.375 g, Intravenous, Q8H  pregabalin, 75 mg, Oral, BID  sodium chloride, 3 mL, Intravenous, Q12H      Continuous Infusions:   PRN Meds:.HYDROcodone-acetaminophen  •  LORazepam  •  melatonin  •  Morphine  •  ondansetron **OR** ondansetron  •  sodium chloride  •  [COMPLETED] Insert peripheral IV **AND** sodium chloride  •  sodium chloride    Objective     Vital Signs   Vitals:    10/03/22 1914 10/03/22 2313 10/04/22 0429 10/04/22 0727   BP: 120/77 127/74 114/75 148/91   BP Location: Right arm Right arm Right arm Right arm   Patient Position: Lying Lying Sitting Sitting   Pulse: 95 83 78 75   Resp: 18 16 15 18   Temp: 98.4 °F (36.9 °C) 98.3 °F (36.8 °C) 98.4 °F (36.9 °C) 97.8 °F (36.6 °C)   TempSrc: Oral Oral Oral Oral   SpO2: 97% 94% 96% 97%   Weight:       Height:             Physical Exam:   Pleasant male, in apparent stress, normal height and weight  Patient has a right above-knee amputation.  Left leg shows a posterior popliteal ulcer about 3 cm long by 2 cm wide by 15 mm deep.  There is purulent tissue in it.  There is a central longitudinal structure there is granulation tissue which could be a hamstring tendon but no exposed tendon.  he is able to flex and extend the knee well.  The distal end of the stump looks good    Results Review:   I reviewed the patient's new clinical results.  I reviewed the patient's new imaging results    Lab Results (last 24 hours)     Procedure Component Value Units Date/Time    Wound Culture - Wound, Leg, Left [[id number]] Collected: 10/02/22 2018    Specimen: Wound from Leg, Left Updated: 10/04/22 0908     Wound Culture Light growth (2+) Normal Skin Juliet     Gram Stain Many (4+) WBCs per low power field      Rare (1+) Gram positive cocci in pairs      Rare (1+)  Gram positive bacilli    POC Glucose Once [[id number]]  (Abnormal) Collected: 10/04/22 0716    Specimen: Blood Updated: 10/04/22 0721     Glucose 261 mg/dL      Comment: Serial Number: [id number]:  [id number]       Blood Culture - Blood, Arm, Right [[id number]]  (Abnormal) Collected: 10/02/22 2004    Specimen: Blood from Arm, Right Updated: 10/04/22 0534     Blood Culture Abnormal Stain     Gram Stain Aerobic Bottle Gram positive cocci in clusters    Blood Culture ID, PCR - Blood, Arm, Right [[id number]] Collected: 10/02/22 2004    Specimen: Blood from Arm, Right Updated: 10/04/22 0534     BCID, PCR Negative by BCID PCR. Culture to Follow.     BOTTLE TYPE Aerobic Bottle    Basic Metabolic Panel [[id number]]  (Abnormal) Collected: 10/03/22 2303    Specimen: Blood Updated: 10/04/22 0037     Glucose 263 mg/dL      BUN 14 mg/dL      Creatinine 0.77 mg/dL      Sodium 134 mmol/L      Potassium 3.5 mmol/L      Chloride 99 mmol/L      CO2 28.0 mmol/L      Calcium 8.1 mg/dL      BUN/Creatinine Ratio 18.2     Anion Gap 7.0 mmol/L      eGFR 111.1 mL/min/1.73      Comment: National Kidney Foundation and American Society of Nephrology (ASN) Task Force recommended calculation based on the Chronic Kidney Disease Epidemiology Collaboration (CKD-EPI) equation refit without adjustment for race.       Narrative:      GFR Normal >60  Chronic Kidney Disease <60  Kidney Failure <15      CBC & Differential [[id number]]  (Abnormal) Collected: 10/03/22 2303    Specimen: Blood Updated: 10/04/22 0012    Narrative:      The following orders were created for panel order CBC & Differential.  Procedure                               Abnormality         Status                     ---------                               -----------         ------                     CBC Auto Differential[[id number]]        Abnormal            Final result                 Please view results for these tests on the individual orders.    CBC Auto Differential  [[id number]]  (Abnormal) Collected: 10/03/22 2303    Specimen: Blood Updated: 10/04/22 0012     WBC 11.40 10*3/mm3      RBC 4.30 10*6/mm3      Hemoglobin 12.6 g/dL      Hematocrit 37.9 %      MCV 88.2 fL      MCH 29.2 pg      MCHC 33.2 g/dL      RDW 13.0 %      RDW-SD 40.7 fl      MPV 7.9 fL      Platelets 341 10*3/mm3      Neutrophil % 67.4 %      Lymphocyte % 22.7 %      Monocyte % 8.3 %      Eosinophil % 1.0 %      Basophil % 0.6 %      Neutrophils, Absolute 7.70 10*3/mm3      Lymphocytes, Absolute 2.60 10*3/mm3      Monocytes, Absolute 1.00 10*3/mm3      Eosinophils, Absolute 0.10 10*3/mm3      Basophils, Absolute 0.10 10*3/mm3      nRBC 0.0 /100 WBC     POC Glucose Once [[id number]]  (Abnormal) Collected: 10/03/22 2050    Specimen: Blood Updated: 10/03/22 2051     Glucose 253 mg/dL      Comment: Serial Number: [id number]:  [id number]       Blood Culture - Blood, Arm, Left [[id number]]  (Normal) Collected: 10/02/22 2017    Specimen: Blood from Arm, Left Updated: 10/03/22 2033     Blood Culture No growth at 24 hours    POC Glucose Once [[id number]]  (Abnormal) Collected: 10/03/22 1620    Specimen: Blood Updated: 10/03/22 1622     Glucose 184 mg/dL      Comment: Serial Number: [id number]:  [id number]       Urinalysis With Culture If Indicated - Urine, Clean Catch [[id number]]  (Abnormal) Collected: 10/03/22 1306    Specimen: Urine, Clean Catch Updated: 10/03/22 1321     Color, UA Rebecca     Appearance, UA Clear     pH, UA 6.0     Specific Gravity, UA 1.024     Glucose, UA >=1000 mg/dL (3+)     Ketones, UA 15 mg/dL (1+)     Bilirubin, UA Negative     Blood, UA Negative     Protein, UA Negative     Leuk Esterase, UA Negative     Nitrite, UA Negative     Urobilinogen, UA 4.0 E.U./dL    Narrative:      In absence of clinical symptoms, the presence of pyuria, bacteria, and/or nitrites on the urinalysis result does not correlate with infection.  Urine microscopic not indicated.    POC Glucose Once [[id number]]   (Abnormal) Collected: 10/03/22 1214    Specimen: Blood Updated: 10/03/22 1215     Glucose 202 mg/dL      Comment: Serial Number: [id number]:  [id number]             Imaging Results (Last 24 Hours)     Procedure Component Value Units Date/Time    MRI Tibia Fibula Left Without Contrast [[id number]] Collected: 10/03/22 1056     Updated: 10/03/22 1104    Narrative:      MRI TIBIA FIBULA LEFT WO CONTRAST    Date of Exam: 10/3/2022 8:58 EDT    Indication: History of below-knee amputation. Evaluate for osteomyelitis. Wound..    Comparison Exams: Tibia-fibula radiograph 9/23/2021, femur radiograph 10/2/2022.    Technique: Multiplanar multisequence images performed according to routine MRI protocol. Imaging was protocoled to include the area of interest. There is exclusion of the distal resected into the tibia and fibula on this exam.    FINDINGS:  Markers placed at area of deep ulceration along the posterior lower leg the femoral condyles. There is skin thickening this location. There is an ulceration and mild soft tissue edema. There is high signal intensity of the distal semimembranosus muscle   and musculotendinous junction that could be seen with infectious myositis. There is no drainable fluid collection to suggest abscess. There is a small Baker cyst. There is no evidence of osteomyelitis.    No significant knee joint effusion is seen. Physiologic fluid is present. There is mild soft tissue edema in the posterior aspect of the knee. This exam was not maximized to value for internal drainage of the knee. However, the cruciate ligaments appear   intact. No displaced meniscal tears on this exam. Extensor mechanism is intact.      Impression:      1.Deep ulceration the posterior medial knee with associated soft tissue edema and skin thickening. There is associated high signal intensity in the semimembranosus tendon and muscle distally that can be seen with myositis and tendinosis.  2.No drainable fluid collection to  suggest abscess. No findings of osteomyelitis.  3.Small Baker cyst.    Electronically Signed: Millicent Springer MD 10/3/2022 11:01 EDT            Assessment & Plan     Left popliteal ulceration from BKA prosthesis with infection    Antibiotic selection per Dr Garcia  Will start wound VAC and change every 3 days until the wound heals.  Explained that closing the wound primarily will not clear his infection.  Return to see me in 2 weeks      Donn Alcantara MD  10/04/22  10:25 EDT

## 2022-10-04 NOTE — PLAN OF CARE
Goal Outcome Evaluation:              Outcome Evaluation: pt requested pain medicine a couple of times throughout shift; remains on Iv antibiotics; continue to monitor

## 2022-10-04 NOTE — PROGRESS NOTES
37-year-old male who presented to the hospital with vomiting and complaints of pain to the popliteal area of his left leg.  Patient has a history of a left BKA and right AKA.  Patient was seen and evaluated by orthopedics and patient is seen today for a wound VAC application.    Patient states he has had wound vacs in the past prior to his amputations he is familiar with the procedure.  The old dressing was removed and irrigated well with normal saline.  The wound base is pale pink no erythema no warmth.  Moderate amount of serosanguineous exudate is noted.  Approximate size of the wound is 3-1/2 cm x 2 cm with a depth of point 5 cm there is a tunnel at approximately 5:00 of 1 cm centimeter.    Skin-Prep was applied to the periwound skin a black foam inserted sealed and bridged off of the wound.  The track pad was placed and suction set at 125 continuous good seal obtained.    Patient tolerated the procedure well.    Home VAC forms are completed and placed on the chart and awaiting signature of the physician.

## 2022-10-04 NOTE — PLAN OF CARE
Goal Outcome Evaluation:   Wound Vac placed. No other concerns this shift. Will continue to monitor.

## 2022-10-04 NOTE — PROGRESS NOTES
LOS: 2 days   Patient Care Team:  Fred Lemons FNP as PCP - General (Family Medicine)    Subjective      States nausea and vomiting have resolved    Review of Systems   Constitutional: Positive for activity change, appetite change and fatigue.   HENT: Negative.    Respiratory: Negative.    Cardiovascular: Negative.    Gastrointestinal: Positive for nausea. Negative for abdominal pain and diarrhea.   Genitourinary: Negative.    Musculoskeletal: Positive for gait problem.   Neurological: Positive for weakness.   Psychiatric/Behavioral: Negative.            Objective     Vital Signs  Temp:  [97.3 °F (36.3 °C)-98.4 °F (36.9 °C)] 97.8 °F (36.6 °C)  Heart Rate:  [75-95] 75  Resp:  [15-18] 18  BP: (114-148)/(74-91) 148/91      Physical Exam  Vitals reviewed.   Constitutional:       Appearance: He is not ill-appearing.   HENT:      Head: Normocephalic and atraumatic.      Right Ear: External ear normal.      Left Ear: External ear normal.      Nose: Nose normal.      Mouth/Throat:      Mouth: Mucous membranes are moist.   Eyes:      General:         Right eye: No discharge.         Left eye: No discharge.   Cardiovascular:      Rate and Rhythm: Normal rate and regular rhythm.      Pulses: Normal pulses.      Heart sounds: Normal heart sounds.   Pulmonary:      Effort: Pulmonary effort is normal.      Breath sounds: Normal breath sounds.   Abdominal:      General: Bowel sounds are normal.      Palpations: Abdomen is soft.   Musculoskeletal:         General: Normal range of motion.      Cervical back: Normal range of motion.   Skin:     General: Skin is warm and dry.   Neurological:      Mental Status: He is alert and oriented to person, place, and time.   Psychiatric:         Behavior: Behavior normal.              Results Review:    Lab Results (last 24 hours)     Procedure Component Value Units Date/Time    Wound Culture - Wound, Leg, Left [[id number]] Collected: 10/02/22 2018    Specimen: Wound from Leg, Left  Updated: 10/04/22 0908     Wound Culture Light growth (2+) Normal Skin Juliet     Gram Stain Many (4+) WBCs per low power field      Rare (1+) Gram positive cocci in pairs      Rare (1+) Gram positive bacilli    POC Glucose Once [[id number]]  (Abnormal) Collected: 10/04/22 0716    Specimen: Blood Updated: 10/04/22 0721     Glucose 261 mg/dL      Comment: Serial Number: [id number]:  [id number]       Blood Culture - Blood, Arm, Right [[id number]]  (Abnormal) Collected: 10/02/22 2004    Specimen: Blood from Arm, Right Updated: 10/04/22 0534     Blood Culture Abnormal Stain     Gram Stain Aerobic Bottle Gram positive cocci in clusters    Blood Culture ID, PCR - Blood, Arm, Right [[id number]] Collected: 10/02/22 2004    Specimen: Blood from Arm, Right Updated: 10/04/22 0534     BCID, PCR Negative by BCID PCR. Culture to Follow.     BOTTLE TYPE Aerobic Bottle    Basic Metabolic Panel [[id number]]  (Abnormal) Collected: 10/03/22 2303    Specimen: Blood Updated: 10/04/22 0037     Glucose 263 mg/dL      BUN 14 mg/dL      Creatinine 0.77 mg/dL      Sodium 134 mmol/L      Potassium 3.5 mmol/L      Chloride 99 mmol/L      CO2 28.0 mmol/L      Calcium 8.1 mg/dL      BUN/Creatinine Ratio 18.2     Anion Gap 7.0 mmol/L      eGFR 111.1 mL/min/1.73      Comment: National Kidney Foundation and American Society of Nephrology (ASN) Task Force recommended calculation based on the Chronic Kidney Disease Epidemiology Collaboration (CKD-EPI) equation refit without adjustment for race.       Narrative:      GFR Normal >60  Chronic Kidney Disease <60  Kidney Failure <15      CBC & Differential [[id number]]  (Abnormal) Collected: 10/03/22 2303    Specimen: Blood Updated: 10/04/22 0012    Narrative:      The following orders were created for panel order CBC & Differential.  Procedure                               Abnormality         Status                     ---------                               -----------         ------                      CBC Auto Differential[[id number]]        Abnormal            Final result                 Please view results for these tests on the individual orders.    CBC Auto Differential [[id number]]  (Abnormal) Collected: 10/03/22 2303    Specimen: Blood Updated: 10/04/22 0012     WBC 11.40 10*3/mm3      RBC 4.30 10*6/mm3      Hemoglobin 12.6 g/dL      Hematocrit 37.9 %      MCV 88.2 fL      MCH 29.2 pg      MCHC 33.2 g/dL      RDW 13.0 %      RDW-SD 40.7 fl      MPV 7.9 fL      Platelets 341 10*3/mm3      Neutrophil % 67.4 %      Lymphocyte % 22.7 %      Monocyte % 8.3 %      Eosinophil % 1.0 %      Basophil % 0.6 %      Neutrophils, Absolute 7.70 10*3/mm3      Lymphocytes, Absolute 2.60 10*3/mm3      Monocytes, Absolute 1.00 10*3/mm3      Eosinophils, Absolute 0.10 10*3/mm3      Basophils, Absolute 0.10 10*3/mm3      nRBC 0.0 /100 WBC     POC Glucose Once [[id number]]  (Abnormal) Collected: 10/03/22 2050    Specimen: Blood Updated: 10/03/22 2051     Glucose 253 mg/dL      Comment: Serial Number: [id number]:  [id number]       Blood Culture - Blood, Arm, Left [[id number]]  (Normal) Collected: 10/02/22 2017    Specimen: Blood from Arm, Left Updated: 10/03/22 2033     Blood Culture No growth at 24 hours    POC Glucose Once [[id number]]  (Abnormal) Collected: 10/03/22 1620    Specimen: Blood Updated: 10/03/22 1622     Glucose 184 mg/dL      Comment: Serial Number: [id number]:  [id number]       Urinalysis With Culture If Indicated - Urine, Clean Catch [[id number]]  (Abnormal) Collected: 10/03/22 1306    Specimen: Urine, Clean Catch Updated: 10/03/22 1321     Color, UA Rebecca     Appearance, UA Clear     pH, UA 6.0     Specific Gravity, UA 1.024     Glucose, UA >=1000 mg/dL (3+)     Ketones, UA 15 mg/dL (1+)     Bilirubin, UA Negative     Blood, UA Negative     Protein, UA Negative     Leuk Esterase, UA Negative     Nitrite, UA Negative     Urobilinogen, UA 4.0 E.U./dL    Narrative:      In absence of clinical symptoms,  the presence of pyuria, bacteria, and/or nitrites on the urinalysis result does not correlate with infection.  Urine microscopic not indicated.    POC Glucose Once [[id number]]  (Abnormal) Collected: 10/03/22 1214    Specimen: Blood Updated: 10/03/22 1215     Glucose 202 mg/dL      Comment: Serial Number: [id number]:  [id number]              Imaging Results (Last 24 Hours)     Procedure Component Value Units Date/Time    MRI Tibia Fibula Left Without Contrast [[id number]] Collected: 10/03/22 1056     Updated: 10/03/22 1104    Narrative:      MRI TIBIA FIBULA LEFT WO CONTRAST    Date of Exam: 10/3/2022 8:58 EDT    Indication: History of below-knee amputation. Evaluate for osteomyelitis. Wound..    Comparison Exams: Tibia-fibula radiograph 9/23/2021, femur radiograph 10/2/2022.    Technique: Multiplanar multisequence images performed according to routine MRI protocol. Imaging was protocoled to include the area of interest. There is exclusion of the distal resected into the tibia and fibula on this exam.    FINDINGS:  Markers placed at area of deep ulceration along the posterior lower leg the femoral condyles. There is skin thickening this location. There is an ulceration and mild soft tissue edema. There is high signal intensity of the distal semimembranosus muscle   and musculotendinous junction that could be seen with infectious myositis. There is no drainable fluid collection to suggest abscess. There is a small Baker cyst. There is no evidence of osteomyelitis.    No significant knee joint effusion is seen. Physiologic fluid is present. There is mild soft tissue edema in the posterior aspect of the knee. This exam was not maximized to value for internal drainage of the knee. However, the cruciate ligaments appear   intact. No displaced meniscal tears on this exam. Extensor mechanism is intact.      Impression:      1.Deep ulceration the posterior medial knee with associated soft tissue edema and skin  thickening. There is associated high signal intensity in the semimembranosus tendon and muscle distally that can be seen with myositis and tendinosis.  2.No drainable fluid collection to suggest abscess. No findings of osteomyelitis.  3.Small Baker cyst.    Electronically Signed: Millicent Springer MD 10/3/2022 11:01 EDT               I reviewed the patient's new clinical results.    Medication Review:   Scheduled Meds:enoxaparin, 40 mg, Subcutaneous, Daily  insulin glargine, 30 Units, Subcutaneous, Nightly  insulin lispro, 0-14 Units, Subcutaneous, TID AC  Linezolid, 600 mg, Intravenous, Q12H  pantoprazole, 40 mg, Oral, Q PM  piperacillin-tazobactam, 3.375 g, Intravenous, Q8H  pregabalin, 75 mg, Oral, BID  sodium chloride, 3 mL, Intravenous, Q12H      Continuous Infusions:   PRN Meds:.HYDROcodone-acetaminophen  •  LORazepam  •  melatonin  •  Morphine  •  ondansetron **OR** ondansetron  •  sodium chloride  •  [COMPLETED] Insert peripheral IV **AND** sodium chloride  •  sodium chloride     Interval History:    Assessment & Plan     COVID19- GI complaints resolving; no respiratory complaints  Fever-treat as needed    Left wound infection lower extremity-   -ID following with zyvox and zosyn will hold cymbalta while on zyvox  -BC and wound culture pending    Consulted Dr. Schulz   Nausea/vomiting- improved supportive care     DVT prophylaxis- Lovenox  GI prophylaxis- protonix     Plan for disposition:BROCK Carcamo, KAMILLE  10/04/22  09:59 EDT

## 2022-10-04 NOTE — PROGRESS NOTES
Infectious Diseases Progress Note      LOS: 2 days   Patient Care Team:  Fred Lemons FNP as PCP - General (Family Medicine)    Chief Complaint: Left leg wound    Subjective     The patient had no fever during the last 24 hours.  The patient remained hemodynamically stable.  He is currently on room air.  Denied having any new complaints today    Review of Systems:   Review of Systems   Constitutional: Negative.    HENT: Negative.    Eyes: Negative.    Respiratory: Negative.    Cardiovascular: Negative.    Gastrointestinal: Negative.    Genitourinary: Negative.    Skin: Positive for wound.   Neurological: Negative.    Hematological: Negative.    Psychiatric/Behavioral: Negative.         Objective     Vital Signs  Temp:  [97.8 °F (36.6 °C)-98.4 °F (36.9 °C)] 98.1 °F (36.7 °C)  Heart Rate:  [72-95] 72  Resp:  [15-18] 18  BP: (114-148)/(74-91) 125/82    Physical Exam:  Physical Exam  Vitals and nursing note reviewed.   Constitutional:       Appearance: He is well-developed.   HENT:      Head: Normocephalic and atraumatic.   Eyes:      Pupils: Pupils are equal, round, and reactive to light.   Cardiovascular:      Rate and Rhythm: Normal rate and regular rhythm.      Heart sounds: Normal heart sounds.   Pulmonary:      Effort: Pulmonary effort is normal. No respiratory distress.      Breath sounds: Normal breath sounds. No wheezing or rales.   Abdominal:      General: Bowel sounds are normal. There is no distension.      Palpations: Abdomen is soft. There is no mass.      Tenderness: There is no abdominal tenderness. There is no guarding or rebound.   Musculoskeletal:      Cervical back: Normal range of motion and neck supple.      Comments: Status post right above-knee amputation with healed stump  S/p left below-knee amputation with healed stump  Presence of wound VAC on the posterior left leg   Skin:     General: Skin is warm.      Findings: No erythema or rash.   Neurological:      Mental Status: He is alert and  oriented to person, place, and time.      Cranial Nerves: No cranial nerve deficit.          Results Review:    I have reviewed all clinical data, test, lab, and imaging results.     Radiology  No Radiology Exams Resulted Within Past 24 Hours    Cardiology    Laboratory    Results from last 7 days   Lab Units 10/03/22  2303 10/03/22  0526 10/02/22  2028   WBC 10*3/mm3 11.40* 11.60* 12.80*   HEMOGLOBIN g/dL 12.6* 12.4* 13.4   HEMATOCRIT % 37.9 37.2* 39.7   PLATELETS 10*3/mm3 341 310 362     Results from last 7 days   Lab Units 10/03/22  2303 10/03/22  0526 10/02/22  1932   SODIUM mmol/L 134* 139 136   POTASSIUM mmol/L 3.5 3.6 3.2*   CHLORIDE mmol/L 99 105 97*   CO2 mmol/L 28.0 27.0 27.0   BUN mg/dL 14 13 11   CREATININE mg/dL 0.77 0.75* 0.82   GLUCOSE mg/dL 263* 231* 74   ALBUMIN g/dL  --   --  3.00*   BILIRUBIN mg/dL  --   --  0.8   ALK PHOS U/L  --   --  99   AST (SGOT) U/L  --   --  15   ALT (SGPT) U/L  --   --  34   CALCIUM mg/dL 8.1* 8.0* 8.8                 Microbiology   Microbiology Results (last 10 days)     Procedure Component Value - Date/Time    Wound Culture - Wound, Leg, Left [[id number]] Collected: 10/02/22 2018    Lab Status: Preliminary result Specimen: Wound from Leg, Left Updated: 10/04/22 0908     Wound Culture Light growth (2+) Normal Skin Juliet     Gram Stain Many (4+) WBCs per low power field      Rare (1+) Gram positive cocci in pairs      Rare (1+) Gram positive bacilli    COVID-19,CEPHEID/COURTNEY,COR/IRA/PAD/VASQUEZ IN-HOUSE(OR EMERGENT/ADD-ON),NP SWAB IN TRANSPORT MEDIA 3-4 HR TAT, RT-PCR - Swab, Nasopharynx [[id number]]  (Abnormal) Collected: 10/02/22 2018    Lab Status: Final result Specimen: Swab from Nasopharynx Updated: 10/02/22 2054     COVID19 Detected    Narrative:      Fact sheet for providers: https://www.fda.gov/media/122447/download     Fact sheet for patients: https://www.fda.gov/media/857235/download  Fact sheet for providers: https://www.fda.gov/media/387733/download    Fact sheet for  patients: https://www.fda.gov/media/824140/download    Test performed by PCR.    Blood Culture - Blood, Arm, Left [[id number]]  (Normal) Collected: 10/02/22 2017    Lab Status: Preliminary result Specimen: Blood from Arm, Left Updated: 10/03/22 2033     Blood Culture No growth at 24 hours    Blood Culture - Blood, Arm, Right [[id number]]  (Abnormal) Collected: 10/02/22 2004    Lab Status: Preliminary result Specimen: Blood from Arm, Right Updated: 10/04/22 0534     Blood Culture Abnormal Stain     Gram Stain Aerobic Bottle Gram positive cocci in clusters    Blood Culture ID, PCR - Blood, Arm, Right [[id number]] Collected: 10/02/22 2004    Lab Status: Final result Specimen: Blood from Arm, Right Updated: 10/04/22 0534     BCID, PCR Negative by BCID PCR. Culture to Follow.     BOTTLE TYPE Aerobic Bottle          Medication Review:       Schedule Meds  enoxaparin, 40 mg, Subcutaneous, Daily  insulin glargine, 30 Units, Subcutaneous, Nightly  insulin lispro, 0-24 Units, Subcutaneous, 4x Daily With Meals & Nightly  Linezolid, 600 mg, Intravenous, Q12H  pantoprazole, 40 mg, Oral, Q PM  piperacillin-tazobactam, 3.375 g, Intravenous, Q8H  pregabalin, 75 mg, Oral, BID  sodium chloride, 3 mL, Intravenous, Q12H        Infusion Meds       PRN Meds  HYDROcodone-acetaminophen  •  LORazepam  •  melatonin  •  Morphine  •  ondansetron **OR** ondansetron  •  sodium chloride  •  [COMPLETED] Insert peripheral IV **AND** sodium chloride  •  sodium chloride        Assessment & Plan       Antimicrobial Therapy   1.  IV Zyvox       2.  IV Zosyn        3.        4.        5.            Assessment     Wound to the posterior left knee that is likely resulted from rubbing from the patient's prostatic device.  Started the blister is currently an open wound that goes down to the muscle.  MRI did not show any obvious abscess or osteomyelitis  -Cultures are pending-gram stain is showing gram-positive bacilli and gram-positive cocci in  pairs  Currently has wound VAC on    Positive blood culture in 1 out of 2 sets from October 2, 2022 for gram-positive cocci in clusters.  PCR did not detect specific pathogen.  I am suspecting micrococcus which usually contamination     Current COVID-19 infection.  Patient denies any significant symptoms and is currently on room air  -Chest x-ray shows no acute findings     New rash that started earlier today on the patient's right arm.  Likely reaction to antibiotics.  Patient's been both on IV Rocephin and IV vancomycin.  Of note patient also received IV morphine in that IV     History of multiple surgeries to bilateral feet that ended up being bilateral BKA's with a revision of the right BKA to an AKA in October of last year.     Congestive heart failure, CAD     Type 1 diabetes     Plan     Continue IV Zyvox 600 mg every 12 hours-we will need to hold Cymbalta while on Zyvox  Continue IV Zosyn 3.375 g every 8 hours  Waiting on final wound culture results   The patient will probably receive 3 weeks of antimicrobial therapy.  I will try to switch to p.o. antibiotics upon having the culture results  Continue isolation for COVID-19 for total of 10 days from first diagnosed  Appropriate PPE was placed on before entering the    A.m. labs        Bobo Garcia MD  10/04/22  15:40 EDT    Note is dictated utilizing voice recognition software/Dragon

## 2022-10-05 LAB
ANION GAP SERPL CALCULATED.3IONS-SCNC: 7 MMOL/L (ref 5–15)
BACTERIA SPEC AEROBE CULT: ABNORMAL
BASOPHILS # BLD AUTO: 0 10*3/MM3 (ref 0–0.2)
BASOPHILS NFR BLD AUTO: 0.5 % (ref 0–1.5)
BUN SERPL-MCNC: 11 MG/DL (ref 6–20)
BUN/CREAT SERPL: 13.3 (ref 7–25)
CALCIUM SPEC-SCNC: 8 MG/DL (ref 8.6–10.5)
CHLORIDE SERPL-SCNC: 100 MMOL/L (ref 98–107)
CO2 SERPL-SCNC: 31 MMOL/L (ref 22–29)
CREAT SERPL-MCNC: 0.83 MG/DL (ref 0.76–1.27)
DEPRECATED RDW RBC AUTO: 39.8 FL (ref 37–54)
EGFRCR SERPLBLD CKD-EPI 2021: 108.6 ML/MIN/1.73
EOSINOPHIL # BLD AUTO: 0.2 10*3/MM3 (ref 0–0.4)
EOSINOPHIL NFR BLD AUTO: 1.9 % (ref 0.3–6.2)
ERYTHROCYTE [DISTWIDTH] IN BLOOD BY AUTOMATED COUNT: 13 % (ref 12.3–15.4)
GLUCOSE BLDC GLUCOMTR-MCNC: 199 MG/DL (ref 70–105)
GLUCOSE BLDC GLUCOMTR-MCNC: 232 MG/DL (ref 70–105)
GLUCOSE BLDC GLUCOMTR-MCNC: 275 MG/DL (ref 70–105)
GLUCOSE BLDC GLUCOMTR-MCNC: 311 MG/DL (ref 70–105)
GLUCOSE SERPL-MCNC: 165 MG/DL (ref 65–99)
GRAM STN SPEC: ABNORMAL
HCT VFR BLD AUTO: 35.8 % (ref 37.5–51)
HGB BLD-MCNC: 12.4 G/DL (ref 13–17.7)
ISOLATED FROM: ABNORMAL
LYMPHOCYTES # BLD AUTO: 2.8 10*3/MM3 (ref 0.7–3.1)
LYMPHOCYTES NFR BLD AUTO: 32.6 % (ref 19.6–45.3)
MCH RBC QN AUTO: 30 PG (ref 26.6–33)
MCHC RBC AUTO-ENTMCNC: 34.7 G/DL (ref 31.5–35.7)
MCV RBC AUTO: 86.3 FL (ref 79–97)
MONOCYTES # BLD AUTO: 0.8 10*3/MM3 (ref 0.1–0.9)
MONOCYTES NFR BLD AUTO: 8.8 % (ref 5–12)
NEUTROPHILS NFR BLD AUTO: 4.9 10*3/MM3 (ref 1.7–7)
NEUTROPHILS NFR BLD AUTO: 56.2 % (ref 42.7–76)
NRBC BLD AUTO-RTO: 0.1 /100 WBC (ref 0–0.2)
PLATELET # BLD AUTO: 359 10*3/MM3 (ref 140–450)
PMV BLD AUTO: 7.2 FL (ref 6–12)
POTASSIUM SERPL-SCNC: 3.4 MMOL/L (ref 3.5–5.2)
RBC # BLD AUTO: 4.15 10*6/MM3 (ref 4.14–5.8)
SODIUM SERPL-SCNC: 138 MMOL/L (ref 136–145)
WBC NRBC COR # BLD: 8.7 10*3/MM3 (ref 3.4–10.8)

## 2022-10-05 PROCEDURE — 25010000002 ENOXAPARIN PER 10 MG

## 2022-10-05 PROCEDURE — 63710000001 INSULIN LISPRO (HUMAN) PER 5 UNITS: Performed by: INTERNAL MEDICINE

## 2022-10-05 PROCEDURE — 85025 COMPLETE CBC W/AUTO DIFF WBC: CPT | Performed by: INTERNAL MEDICINE

## 2022-10-05 PROCEDURE — 80048 BASIC METABOLIC PNL TOTAL CA: CPT | Performed by: INTERNAL MEDICINE

## 2022-10-05 PROCEDURE — 82962 GLUCOSE BLOOD TEST: CPT

## 2022-10-05 PROCEDURE — 63710000001 INSULIN GLARGINE PER 5 UNITS

## 2022-10-05 PROCEDURE — 25010000002 PIPERACILLIN SOD-TAZOBACTAM PER 1 G: Performed by: NURSE PRACTITIONER

## 2022-10-05 PROCEDURE — 25010000002 MORPHINE PER 10 MG

## 2022-10-05 PROCEDURE — 25010000002 LINEZOLID 600 MG/300ML SOLUTION: Performed by: NURSE PRACTITIONER

## 2022-10-05 RX ADMIN — INSULIN LISPRO 8 UNITS: 100 INJECTION, SOLUTION INTRAVENOUS; SUBCUTANEOUS at 11:25

## 2022-10-05 RX ADMIN — MORPHINE SULFATE 4 MG: 4 INJECTION, SOLUTION INTRAMUSCULAR; INTRAVENOUS at 17:38

## 2022-10-05 RX ADMIN — INSULIN GLARGINE 30 UNITS: 100 INJECTION, SOLUTION SUBCUTANEOUS at 20:52

## 2022-10-05 RX ADMIN — LINEZOLID 600 MG: 600 INJECTION, SOLUTION INTRAVENOUS at 18:14

## 2022-10-05 RX ADMIN — PREGABALIN 75 MG: 75 CAPSULE ORAL at 20:51

## 2022-10-05 RX ADMIN — PREGABALIN 75 MG: 75 CAPSULE ORAL at 08:24

## 2022-10-05 RX ADMIN — PIPERACILLIN AND TAZOBACTAM 3.38 G: 3; .375 INJECTION, POWDER, FOR SOLUTION INTRAVENOUS at 08:24

## 2022-10-05 RX ADMIN — Medication 3 ML: at 20:52

## 2022-10-05 RX ADMIN — MORPHINE SULFATE 4 MG: 4 INJECTION, SOLUTION INTRAMUSCULAR; INTRAVENOUS at 11:33

## 2022-10-05 RX ADMIN — PIPERACILLIN AND TAZOBACTAM 3.38 G: 3; .375 INJECTION, POWDER, FOR SOLUTION INTRAVENOUS at 01:44

## 2022-10-05 RX ADMIN — INSULIN LISPRO 16 UNITS: 100 INJECTION, SOLUTION INTRAVENOUS; SUBCUTANEOUS at 20:52

## 2022-10-05 RX ADMIN — HYDROCODONE BITARTRATE AND ACETAMINOPHEN 1 TABLET: 10; 325 TABLET ORAL at 20:51

## 2022-10-05 RX ADMIN — INSULIN LISPRO 12 UNITS: 100 INJECTION, SOLUTION INTRAVENOUS; SUBCUTANEOUS at 17:37

## 2022-10-05 RX ADMIN — LINEZOLID 600 MG: 600 INJECTION, SOLUTION INTRAVENOUS at 06:34

## 2022-10-05 RX ADMIN — MORPHINE SULFATE 4 MG: 4 INJECTION, SOLUTION INTRAMUSCULAR; INTRAVENOUS at 01:47

## 2022-10-05 RX ADMIN — Medication 3 ML: at 08:24

## 2022-10-05 RX ADMIN — PIPERACILLIN AND TAZOBACTAM 3.38 G: 3; .375 INJECTION, POWDER, FOR SOLUTION INTRAVENOUS at 17:37

## 2022-10-05 RX ADMIN — MORPHINE SULFATE 4 MG: 4 INJECTION, SOLUTION INTRAMUSCULAR; INTRAVENOUS at 05:46

## 2022-10-05 RX ADMIN — PANTOPRAZOLE SODIUM 40 MG: 40 TABLET, DELAYED RELEASE ORAL at 17:38

## 2022-10-05 RX ADMIN — ENOXAPARIN SODIUM 40 MG: 100 INJECTION SUBCUTANEOUS at 17:38

## 2022-10-05 RX ADMIN — INSULIN LISPRO 4 UNITS: 100 INJECTION, SOLUTION INTRAVENOUS; SUBCUTANEOUS at 08:40

## 2022-10-05 RX ADMIN — MORPHINE SULFATE 4 MG: 4 INJECTION, SOLUTION INTRAMUSCULAR; INTRAVENOUS at 21:45

## 2022-10-05 NOTE — PLAN OF CARE
Goal Outcome Evaluation:   Pt c/o pain and PRN pain meds given.  Pt states pain meds ease pain.  Pt sleeping between care.  Will continue to monitor pt status.

## 2022-10-05 NOTE — PLAN OF CARE
Problem: Adult Inpatient Plan of Care  Goal: Plan of Care Review  Outcome: Ongoing, Progressing  Goal: Patient-Specific Goal (Individualized)  Outcome: Ongoing, Progressing  Goal: Absence of Hospital-Acquired Illness or Injury  Outcome: Ongoing, Progressing  Intervention: Identify and Manage Fall Risk  Recent Flowsheet Documentation  Taken 10/5/2022 1400 by Chikis Dasilva LPN  Safety Promotion/Fall Prevention: safety round/check completed  Taken 10/5/2022 1200 by Chikis Dasilva LPN  Safety Promotion/Fall Prevention: safety round/check completed  Taken 10/5/2022 1000 by Chikis Dasilva LPN  Safety Promotion/Fall Prevention: safety round/check completed  Taken 10/5/2022 0803 by Chikis Dasilva LPN  Safety Promotion/Fall Prevention: safety round/check completed  Intervention: Prevent Skin Injury  Recent Flowsheet Documentation  Taken 10/5/2022 0803 by Chikis Dasilva LPN  Body Position: position changed independently  Intervention: Prevent and Manage VTE (Venous Thromboembolism) Risk  Recent Flowsheet Documentation  Taken 10/5/2022 0803 by Chikis Dasilva LPN  Activity Management: activity adjusted per tolerance  Intervention: Prevent Infection  Recent Flowsheet Documentation  Taken 10/5/2022 1400 by Chikis Dasilva LPN  Infection Prevention:   single patient room provided   hand hygiene promoted   rest/sleep promoted  Taken 10/5/2022 1200 by Chikis Dasilva LPN  Infection Prevention:   rest/sleep promoted   hand hygiene promoted   single patient room provided  Taken 10/5/2022 1000 by Chikis Dasilva LPN  Infection Prevention:   single patient room provided   rest/sleep promoted   hand hygiene promoted  Taken 10/5/2022 0803 by Chikis Dasilva LPN  Infection Prevention:   single patient room provided   rest/sleep promoted   hand hygiene promoted  Goal: Optimal Comfort and Wellbeing  Outcome: Ongoing, Progressing  Intervention: Provide Person-Centered Care  Recent Flowsheet Documentation  Taken 10/5/2022  0803 by Chikis Dasilva LPN  Trust Relationship/Rapport:   care explained   choices provided   questions answered   questions encouraged  Goal: Readiness for Transition of Care  Outcome: Ongoing, Progressing     Problem: Behavioral Health Comorbidity  Goal: Maintenance of Behavioral Health Symptom Control  Outcome: Ongoing, Progressing  Intervention: Maintain Behavioral Health Symptom Control  Recent Flowsheet Documentation  Taken 10/5/2022 1400 by Chikis Dasilva LPN  Medication Review/Management: medications reviewed  Taken 10/5/2022 1200 by Chikis Dasilva LPN  Medication Review/Management: medications reviewed  Taken 10/5/2022 1000 by Chikis Dasilva LPN  Medication Review/Management: medications reviewed  Taken 10/5/2022 0803 by Chikis Dasilva LPN  Medication Review/Management: medications reviewed     Problem: Diabetes Comorbidity  Goal: Blood Glucose Level Within Targeted Range  Outcome: Ongoing, Progressing     Problem: Heart Failure Comorbidity  Goal: Maintenance of Heart Failure Symptom Control  Outcome: Ongoing, Progressing  Intervention: Maintain Heart Failure-Management  Recent Flowsheet Documentation  Taken 10/5/2022 1400 by Chikis Dasilva LPN  Medication Review/Management: medications reviewed  Taken 10/5/2022 1200 by Chikis Dasilva LPN  Medication Review/Management: medications reviewed  Taken 10/5/2022 1000 by Chikis Dasilva LPN  Medication Review/Management: medications reviewed  Taken 10/5/2022 0803 by Chikis Dasilva LPN  Medication Review/Management: medications reviewed     Problem: Fall Injury Risk  Goal: Absence of Fall and Fall-Related Injury  Outcome: Ongoing, Progressing  Intervention: Identify and Manage Contributors  Recent Flowsheet Documentation  Taken 10/5/2022 1400 by Chikis Dasilva LPN  Medication Review/Management: medications reviewed  Taken 10/5/2022 1200 by Chikis Dasilva LPN  Medication Review/Management: medications reviewed  Taken 10/5/2022 1000 by Brown  Chikis, LPN  Medication Review/Management: medications reviewed  Taken 10/5/2022 0803 by Chikis Dasilva LPN  Medication Review/Management: medications reviewed  Intervention: Promote Injury-Free Environment  Recent Flowsheet Documentation  Taken 10/5/2022 1400 by Chikis Dasilva LPN  Safety Promotion/Fall Prevention: safety round/check completed  Taken 10/5/2022 1200 by Chikis Dasilva LPN  Safety Promotion/Fall Prevention: safety round/check completed  Taken 10/5/2022 1000 by Chikis Dasilva LPN  Safety Promotion/Fall Prevention: safety round/check completed  Taken 10/5/2022 0803 by Chikis Dasilva LPN  Safety Promotion/Fall Prevention: safety round/check completed   Goal Outcome Evaluation:

## 2022-10-05 NOTE — PROGRESS NOTES
LOS: 3 days   Patient Care Team:  Fred Lemons FNP as PCP - General (Family Medicine)    Subjective      Patient denies any complaints    Review of Systems   Constitutional: Positive for activity change, appetite change and fatigue.   HENT: Negative.    Respiratory: Negative.    Cardiovascular: Negative.    Gastrointestinal: Positive for nausea. Negative for abdominal pain and diarrhea.   Genitourinary: Negative.    Musculoskeletal: Positive for gait problem.   Neurological: Positive for weakness.   Psychiatric/Behavioral: Negative.            Objective     Vital Signs  Temp:  [97.8 °F (36.6 °C)-98.2 °F (36.8 °C)] 97.8 °F (36.6 °C)  Heart Rate:  [68-76] 73  Resp:  [17-18] 17  BP: (118-139)/(68-84) 139/83      Physical Exam  Vitals reviewed.   Constitutional:       Appearance: He is not ill-appearing.   HENT:      Head: Normocephalic and atraumatic.      Right Ear: External ear normal.      Left Ear: External ear normal.      Nose: Nose normal.      Mouth/Throat:      Mouth: Mucous membranes are moist.   Eyes:      General:         Right eye: No discharge.         Left eye: No discharge.   Cardiovascular:      Rate and Rhythm: Normal rate and regular rhythm.      Pulses: Normal pulses.      Heart sounds: Normal heart sounds.   Pulmonary:      Effort: Pulmonary effort is normal.      Breath sounds: Normal breath sounds.   Abdominal:      General: Bowel sounds are normal.      Palpations: Abdomen is soft.   Musculoskeletal:         General: Normal range of motion.      Cervical back: Normal range of motion.   Skin:     General: Skin is warm and dry.   Neurological:      Mental Status: He is alert and oriented to person, place, and time.   Psychiatric:         Behavior: Behavior normal.              Results Review:    Lab Results (last 24 hours)     Procedure Component Value Units Date/Time    POC Glucose Once [[id number]]  (Abnormal) Collected: 10/05/22 1051    Specimen: Blood Updated: 10/05/22 1052     Glucose  232 mg/dL      Comment: Serial Number: [id number]:  [id number]       Blood Culture - Blood, Arm, Right [[id number]]  (Abnormal) Collected: 10/02/22 2004    Specimen: Blood from Arm, Right Updated: 10/05/22 0751     Blood Culture Micrococcus species     Isolated from Aerobic Bottle     Gram Stain Aerobic Bottle Gram positive cocci in clusters    Narrative:      Probable contaminant requires clinical correlation, susceptibility not performed unless requested by physician.      POC Glucose Once [[id number]]  (Abnormal) Collected: 10/05/22 0716    Specimen: Blood Updated: 10/05/22 0717     Glucose 199 mg/dL      Comment: Serial Number: [id number]:  [id number]       Basic Metabolic Panel [[id number]]  (Abnormal) Collected: 10/05/22 0457    Specimen: Blood Updated: 10/05/22 0606     Glucose 165 mg/dL      BUN 11 mg/dL      Creatinine 0.83 mg/dL      Sodium 138 mmol/L      Potassium 3.4 mmol/L      Chloride 100 mmol/L      CO2 31.0 mmol/L      Calcium 8.0 mg/dL      BUN/Creatinine Ratio 13.3     Anion Gap 7.0 mmol/L      eGFR 108.6 mL/min/1.73      Comment: National Kidney Foundation and American Society of Nephrology (ASN) Task Force recommended calculation based on the Chronic Kidney Disease Epidemiology Collaboration (CKD-EPI) equation refit without adjustment for race.       Narrative:      GFR Normal >60  Chronic Kidney Disease <60  Kidney Failure <15      CBC & Differential [[id number]]  (Abnormal) Collected: 10/05/22 0457    Specimen: Blood Updated: 10/05/22 0547    Narrative:      The following orders were created for panel order CBC & Differential.  Procedure                               Abnormality         Status                     ---------                               -----------         ------                     CBC Auto Differential[[id number]]        Abnormal            Final result                 Please view results for these tests on the individual orders.    CBC Auto Differential [[id number]]   (Abnormal) Collected: 10/05/22 0457    Specimen: Blood Updated: 10/05/22 0547     WBC 8.70 10*3/mm3      RBC 4.15 10*6/mm3      Hemoglobin 12.4 g/dL      Hematocrit 35.8 %      MCV 86.3 fL      MCH 30.0 pg      MCHC 34.7 g/dL      RDW 13.0 %      RDW-SD 39.8 fl      MPV 7.2 fL      Platelets 359 10*3/mm3      Neutrophil % 56.2 %      Lymphocyte % 32.6 %      Monocyte % 8.8 %      Eosinophil % 1.9 %      Basophil % 0.5 %      Neutrophils, Absolute 4.90 10*3/mm3      Lymphocytes, Absolute 2.80 10*3/mm3      Monocytes, Absolute 0.80 10*3/mm3      Eosinophils, Absolute 0.20 10*3/mm3      Basophils, Absolute 0.00 10*3/mm3      nRBC 0.1 /100 WBC     POC Glucose Once [[id number]]  (Abnormal) Collected: 10/04/22 2034    Specimen: Blood Updated: 10/04/22 2036     Glucose 235 mg/dL      Comment: Serial Number: [id number]:  [id number]       Blood Culture - Blood, Arm, Left [[id number]]  (Normal) Collected: 10/02/22 2017    Specimen: Blood from Arm, Left Updated: 10/04/22 2031     Blood Culture No growth at 2 days    POC Glucose Once [[id number]]  (Abnormal) Collected: 10/04/22 1632    Specimen: Blood Updated: 10/04/22 1633     Glucose 200 mg/dL      Comment: Serial Number: [id number]:  [id number]              Imaging Results (Last 24 Hours)     ** No results found for the last 24 hours. **               I reviewed the patient's new clinical results.    Medication Review:   Scheduled Meds:enoxaparin, 40 mg, Subcutaneous, Daily  insulin glargine, 30 Units, Subcutaneous, Nightly  insulin lispro, 0-24 Units, Subcutaneous, 4x Daily With Meals & Nightly  Linezolid, 600 mg, Intravenous, Q12H  pantoprazole, 40 mg, Oral, Q PM  piperacillin-tazobactam, 3.375 g, Intravenous, Q8H  pregabalin, 75 mg, Oral, BID  sodium chloride, 3 mL, Intravenous, Q12H      Continuous Infusions:   PRN Meds:.HYDROcodone-acetaminophen  •  LORazepam  •  melatonin  •  Morphine  •  ondansetron **OR** ondansetron  •  sodium chloride  •  [COMPLETED]  Insert peripheral IV **AND** sodium chloride  •  sodium chloride     Interval History:    10/5 awaiting final culture for antibiotics; he will need wound vac and home health; orders placed    Assessment & Plan     COVID19- patient is asymptomatic and had tested positive for covid over 2 weeks ago therefore isolation can be discontinued    Left wound infection lower extremity   -ID following with zyvox and zosyn will hold cymbalta while on zyvox  -BC and wound culture pending    Nausea/vomiting- resolved     DVT prophylaxis- Lovenox  GI prophylaxis- protonix     Plan for disposition:KAMILLE Izaguirre  10/05/22  11:58 EDT

## 2022-10-05 NOTE — PROGRESS NOTES
Infectious Diseases Progress Note      LOS: 3 days   Patient Care Team:  Fred Lemons FNP as PCP - General (Family Medicine)    Chief Complaint: Left leg wound    Subjective     The patient had no fever during the last 24 hours.  The patient remained hemodynamically stable.  He is currently on room air.  Denied having any new complaints today    Review of Systems:   Review of Systems   Constitutional: Negative.    HENT: Negative.    Eyes: Negative.    Respiratory: Negative.    Cardiovascular: Negative.    Gastrointestinal: Negative.    Genitourinary: Negative.    Skin: Positive for wound.   Neurological: Negative.    Hematological: Negative.    Psychiatric/Behavioral: Negative.         Objective     Vital Signs  Temp:  [97.8 °F (36.6 °C)-98.2 °F (36.8 °C)] 97.8 °F (36.6 °C)  Heart Rate:  [68-76] 73  Resp:  [17-18] 17  BP: (118-139)/(68-84) 139/83    Physical Exam:  Physical Exam  Vitals and nursing note reviewed.   Constitutional:       Appearance: He is well-developed.   HENT:      Head: Normocephalic and atraumatic.   Eyes:      Pupils: Pupils are equal, round, and reactive to light.   Cardiovascular:      Rate and Rhythm: Normal rate and regular rhythm.      Heart sounds: Normal heart sounds.   Pulmonary:      Effort: Pulmonary effort is normal. No respiratory distress.      Breath sounds: Normal breath sounds. No wheezing or rales.   Abdominal:      General: Bowel sounds are normal. There is no distension.      Palpations: Abdomen is soft. There is no mass.      Tenderness: There is no abdominal tenderness. There is no guarding or rebound.   Musculoskeletal:      Cervical back: Normal range of motion and neck supple.      Comments: Status post right above-knee amputation with healed stump  S/p left below-knee amputation with healed stump  Presence of wound VAC on the posterior left leg   Skin:     General: Skin is warm.      Findings: No erythema or rash.   Neurological:      Mental Status: He is alert and  oriented to person, place, and time.      Cranial Nerves: No cranial nerve deficit.          Results Review:    I have reviewed all clinical data, test, lab, and imaging results.     Radiology  No Radiology Exams Resulted Within Past 24 Hours    Cardiology    Laboratory    Results from last 7 days   Lab Units 10/05/22  0457 10/03/22  2303 10/03/22  0526 10/02/22  2028   WBC 10*3/mm3 8.70 11.40* 11.60* 12.80*   HEMOGLOBIN g/dL 12.4* 12.6* 12.4* 13.4   HEMATOCRIT % 35.8* 37.9 37.2* 39.7   PLATELETS 10*3/mm3 359 341 310 362     Results from last 7 days   Lab Units 10/05/22  0457 10/03/22  2303 10/03/22  0526 10/02/22  1932   SODIUM mmol/L 138 134* 139 136   POTASSIUM mmol/L 3.4* 3.5 3.6 3.2*   CHLORIDE mmol/L 100 99 105 97*   CO2 mmol/L 31.0* 28.0 27.0 27.0   BUN mg/dL 11 14 13 11   CREATININE mg/dL 0.83 0.77 0.75* 0.82   GLUCOSE mg/dL 165* 263* 231* 74   ALBUMIN g/dL  --   --   --  3.00*   BILIRUBIN mg/dL  --   --   --  0.8   ALK PHOS U/L  --   --   --  99   AST (SGOT) U/L  --   --   --  15   ALT (SGPT) U/L  --   --   --  34   CALCIUM mg/dL 8.0* 8.1* 8.0* 8.8                 Microbiology   Microbiology Results (last 10 days)       Procedure Component Value - Date/Time    Wound Culture - Wound, Leg, Left [[id number]] Collected: 10/02/22 2018    Lab Status: Preliminary result Specimen: Wound from Leg, Left Updated: 10/05/22 1333     Wound Culture Light growth (2+) Normal Skin Juliet     Gram Stain Many (4+) WBCs per low power field      Rare (1+) Gram positive cocci in pairs      Rare (1+) Gram positive bacilli    Narrative:      Organism under investigation.      COVID-19,CEPHEID/COURTNEY,COR/IRA/PAD/[facility]ASQUEZ IN-HOUSE(OR EMERGENT/ADD-ON),NP SWAB IN TRANSPORT MEDIA 3-4 HR TAT, RT-PCR - Swab, Nasopharynx [[id number]]  (Abnormal) Collected: 10/02/22 2018    Lab Status: Final result Specimen: Swab from Nasopharynx Updated: 10/02/22 2054     COVID19 Detected    Narrative:      Fact sheet for providers:  https://www.fda.gov/media/052814/download     Fact sheet for patients: https://www.fda.gov/media/608254/download  Fact sheet for providers: https://www.fda.gov/media/507292/download    Fact sheet for patients: https://www.fda.gov/media/791229/download    Test performed by PCR.    Blood Culture - Blood, Arm, Left [[id number]]  (Normal) Collected: 10/02/22 2017    Lab Status: Preliminary result Specimen: Blood from Arm, Left Updated: 10/04/22 2031     Blood Culture No growth at 2 days    Blood Culture - Blood, Arm, Right [[id number]]  (Abnormal) Collected: 10/02/22 2004    Lab Status: Final result Specimen: Blood from Arm, Right Updated: 10/05/22 0751     Blood Culture Micrococcus species     Isolated from Aerobic Bottle     Gram Stain Aerobic Bottle Gram positive cocci in clusters    Narrative:      Probable contaminant requires clinical correlation, susceptibility not performed unless requested by physician.      Blood Culture ID, PCR - Blood, Arm, Right [[id number]] Collected: 10/02/22 2004    Lab Status: Final result Specimen: Blood from Arm, Right Updated: 10/04/22 0534     BCID, PCR Negative by BCID PCR. Culture to Follow.     BOTTLE TYPE Aerobic Bottle            Medication Review:       Schedule Meds  enoxaparin, 40 mg, Subcutaneous, Daily  insulin glargine, 30 Units, Subcutaneous, Nightly  insulin lispro, 0-24 Units, Subcutaneous, 4x Daily With Meals & Nightly  Linezolid, 600 mg, Intravenous, Q12H  pantoprazole, 40 mg, Oral, Q PM  piperacillin-tazobactam, 3.375 g, Intravenous, Q8H  pregabalin, 75 mg, Oral, BID  sodium chloride, 3 mL, Intravenous, Q12H        Infusion Meds       PRN Meds  HYDROcodone-acetaminophen    LORazepam    melatonin    Morphine    ondansetron **OR** ondansetron    sodium chloride    [COMPLETED] Insert peripheral IV **AND** sodium chloride    sodium chloride        Assessment & Plan       Antimicrobial Therapy   1.  IV Zyvox       2.  IV  Zosyn        3.        4.        5.            Assessment     Wound to the posterior left knee that is likely resulted from rubbing from the patient's prostatic device.  Started the blister is currently an open wound that goes down to the muscle.  MRI did not show any obvious abscess or osteomyelitis  -Cultures are pending-gram stain is showing gram-positive bacilli and gram-positive cocci in pairs however cultures are growing skin rachel  Currently has wound VAC on    Positive blood culture in 1 out of 2 sets from October 2, 2022 for gram-positive cocci in clusters.  PCR did not detect specific pathogen.  Resulted as micrococcus which is a contamination     Current COVID-19 infection.  Patient denies any significant symptoms and is currently on room air  -Chest x-ray shows no acute findings     New rash that started earlier today on the patient's right arm.  Likely reaction to antibiotics.  Patient's been both on IV Rocephin and IV vancomycin.  Of note patient also received IV morphine in that IV     History of multiple surgeries to bilateral feet that ended up being bilateral BKA's with a revision of the right BKA to an AKA in October of last year.     Congestive heart failure, CAD     Type 1 diabetes     Plan     Continue IV Zyvox 600 mg every 12 hours-we will need to hold Cymbalta while on Zyvox  Continue IV Zosyn 3.375 g every 8 hours  Waiting on final wound culture results   If culture remains just skin rachel we can switch patient over to p.o. Augmentin and p.o. doxycycline for 3 weeks treatment before discharge and restart Cymbalta  Continue supportive care  A.m. labs      Continue isolation for COVID-19 for total of 10 days from first diagnosed  Appropriate PPE was placed on before entering the            KAMILLE Molina  10/05/22  14:22 EDT    Note is dictated utilizing voice recognition software/Dragon

## 2022-10-06 ENCOUNTER — READMISSION MANAGEMENT (OUTPATIENT)
Dept: CALL CENTER | Facility: HOSPITAL | Age: 47
End: 2022-10-06

## 2022-10-06 VITALS
OXYGEN SATURATION: 100 % | TEMPERATURE: 97.7 F | DIASTOLIC BLOOD PRESSURE: 82 MMHG | SYSTOLIC BLOOD PRESSURE: 135 MMHG | HEIGHT: 74 IN | HEART RATE: 68 BPM | RESPIRATION RATE: 19 BRPM | BODY MASS INDEX: 28.49 KG/M2 | WEIGHT: 222 LBS

## 2022-10-06 LAB
ANION GAP SERPL CALCULATED.3IONS-SCNC: 5 MMOL/L (ref 5–15)
BACTERIA SPEC AEROBE CULT: ABNORMAL
BACTERIA SPEC AEROBE CULT: ABNORMAL
BASOPHILS # BLD AUTO: 0 10*3/MM3 (ref 0–0.2)
BASOPHILS NFR BLD AUTO: 0.5 % (ref 0–1.5)
BUN SERPL-MCNC: 11 MG/DL (ref 6–20)
BUN/CREAT SERPL: 13.3 (ref 7–25)
CALCIUM SPEC-SCNC: 8.5 MG/DL (ref 8.6–10.5)
CHLORIDE SERPL-SCNC: 103 MMOL/L (ref 98–107)
CO2 SERPL-SCNC: 32 MMOL/L (ref 22–29)
CREAT SERPL-MCNC: 0.83 MG/DL (ref 0.76–1.27)
DEPRECATED RDW RBC AUTO: 39.4 FL (ref 37–54)
EGFRCR SERPLBLD CKD-EPI 2021: 108.6 ML/MIN/1.73
EOSINOPHIL # BLD AUTO: 0.2 10*3/MM3 (ref 0–0.4)
EOSINOPHIL NFR BLD AUTO: 2.6 % (ref 0.3–6.2)
ERYTHROCYTE [DISTWIDTH] IN BLOOD BY AUTOMATED COUNT: 12.8 % (ref 12.3–15.4)
GLUCOSE BLDC GLUCOMTR-MCNC: 111 MG/DL (ref 70–105)
GLUCOSE BLDC GLUCOMTR-MCNC: 213 MG/DL (ref 70–105)
GLUCOSE SERPL-MCNC: 94 MG/DL (ref 65–99)
GRAM STN SPEC: ABNORMAL
HCT VFR BLD AUTO: 36.7 % (ref 37.5–51)
HGB BLD-MCNC: 12.4 G/DL (ref 13–17.7)
LYMPHOCYTES # BLD AUTO: 2.8 10*3/MM3 (ref 0.7–3.1)
LYMPHOCYTES NFR BLD AUTO: 42.6 % (ref 19.6–45.3)
MCH RBC QN AUTO: 29.4 PG (ref 26.6–33)
MCHC RBC AUTO-ENTMCNC: 33.9 G/DL (ref 31.5–35.7)
MCV RBC AUTO: 86.6 FL (ref 79–97)
MONOCYTES # BLD AUTO: 0.5 10*3/MM3 (ref 0.1–0.9)
MONOCYTES NFR BLD AUTO: 8.1 % (ref 5–12)
NEUTROPHILS NFR BLD AUTO: 3 10*3/MM3 (ref 1.7–7)
NEUTROPHILS NFR BLD AUTO: 46.2 % (ref 42.7–76)
NRBC BLD AUTO-RTO: 0.2 /100 WBC (ref 0–0.2)
PLATELET # BLD AUTO: 380 10*3/MM3 (ref 140–450)
PMV BLD AUTO: 7.2 FL (ref 6–12)
POTASSIUM SERPL-SCNC: 3.7 MMOL/L (ref 3.5–5.2)
RBC # BLD AUTO: 4.24 10*6/MM3 (ref 4.14–5.8)
SODIUM SERPL-SCNC: 140 MMOL/L (ref 136–145)
WBC NRBC COR # BLD: 6.5 10*3/MM3 (ref 3.4–10.8)

## 2022-10-06 PROCEDURE — 63710000001 INSULIN LISPRO (HUMAN) PER 5 UNITS: Performed by: INTERNAL MEDICINE

## 2022-10-06 PROCEDURE — 85025 COMPLETE CBC W/AUTO DIFF WBC: CPT | Performed by: INTERNAL MEDICINE

## 2022-10-06 PROCEDURE — 25010000002 LINEZOLID 600 MG/300ML SOLUTION: Performed by: NURSE PRACTITIONER

## 2022-10-06 PROCEDURE — 80048 BASIC METABOLIC PNL TOTAL CA: CPT | Performed by: INTERNAL MEDICINE

## 2022-10-06 PROCEDURE — 97162 PT EVAL MOD COMPLEX 30 MIN: CPT

## 2022-10-06 PROCEDURE — 25010000002 PIPERACILLIN SOD-TAZOBACTAM PER 1 G: Performed by: NURSE PRACTITIONER

## 2022-10-06 PROCEDURE — 25010000002 MORPHINE PER 10 MG

## 2022-10-06 PROCEDURE — 82962 GLUCOSE BLOOD TEST: CPT

## 2022-10-06 RX ORDER — AMOXICILLIN AND CLAVULANATE POTASSIUM 875; 125 MG/1; MG/1
1 TABLET, FILM COATED ORAL EVERY 12 HOURS SCHEDULED
Status: DISCONTINUED | OUTPATIENT
Start: 2022-10-06 | End: 2022-10-06 | Stop reason: HOSPADM

## 2022-10-06 RX ORDER — AMOXICILLIN AND CLAVULANATE POTASSIUM 875; 125 MG/1; MG/1
1 TABLET, FILM COATED ORAL EVERY 12 HOURS SCHEDULED
Qty: 12 TABLET | Refills: 0 | Status: SHIPPED | OUTPATIENT
Start: 2022-10-06 | End: 2022-10-12

## 2022-10-06 RX ADMIN — HYDROCODONE BITARTRATE AND ACETAMINOPHEN 1 TABLET: 10; 325 TABLET ORAL at 12:31

## 2022-10-06 RX ADMIN — LINEZOLID 600 MG: 600 INJECTION, SOLUTION INTRAVENOUS at 08:27

## 2022-10-06 RX ADMIN — Medication 3 ML: at 08:27

## 2022-10-06 RX ADMIN — MORPHINE SULFATE 4 MG: 4 INJECTION, SOLUTION INTRAMUSCULAR; INTRAVENOUS at 02:47

## 2022-10-06 RX ADMIN — PIPERACILLIN AND TAZOBACTAM 3.38 G: 3; .375 INJECTION, POWDER, FOR SOLUTION INTRAVENOUS at 08:26

## 2022-10-06 RX ADMIN — PIPERACILLIN AND TAZOBACTAM 3.38 G: 3; .375 INJECTION, POWDER, FOR SOLUTION INTRAVENOUS at 02:40

## 2022-10-06 RX ADMIN — MORPHINE SULFATE 4 MG: 4 INJECTION, SOLUTION INTRAMUSCULAR; INTRAVENOUS at 08:24

## 2022-10-06 RX ADMIN — PREGABALIN 75 MG: 75 CAPSULE ORAL at 08:24

## 2022-10-06 RX ADMIN — INSULIN LISPRO 8 UNITS: 100 INJECTION, SOLUTION INTRAVENOUS; SUBCUTANEOUS at 12:31

## 2022-10-06 NOTE — PLAN OF CARE
Goal Outcome Evaluation:     Pt is a 48 y/o male who presents to [facility] with worsening chronic open wound on his posterior left leg where his prosthesis rubs against his leg. Pt was admitted for LLE wound infection, fever, and COVID-19. Pt had tested positive for covid over 2 weeks ago and per nurse practitioner isolation can be discontinued.  Pt has a h/o L BKA and R AKA with B prosthetic. He was independent w/ ambulation utilizing forearm crutches and drives with hand controls. Pt does not have his prosthetics with him at this time. He has good UE and core strength and is independent w/ bed mobility. Pt would benefit from the use of a wheelchair to prevent shearing of his wound with the use of his LLE prosthetic. Pt states he does not want rehab as he needs to care for his 11 y/o son. Plan is to return home with HHPT pending progress with transfers.

## 2022-10-06 NOTE — THERAPY EVALUATION
Patient Name: Maynor Gregg  : 1975    MRN: [id number]                              Today's Date: 10/6/2022       Admit Date: 10/2/2022    Visit Dx:     ICD-10-CM ICD-9-CM   1. Fever, unspecified fever cause  R50.9 780.60   2. COVID-19  U07.1 079.89   3. Wound infection  T14.8XXA 958.3    L08.9      Patient Active Problem List   Diagnosis   • Diabetic peripheral neuropathy (HCC)   • Type 1 diabetes mellitus with circulatory complication (HCC)   • Open wound of second toe of left foot   • Chronic ulcer of right foot, with necrosis of bone (HCC)   • Chronic ulcer of great toe of right foot, with necrosis of muscle (HCC)   • Chronic osteomyelitis of right foot (HCC)   • CAD (coronary artery disease)   • Hypotension   • Dyspnea   • Osteomyelitis of right foot (HCC)   • Nausea and vomiting   • Chest pain   • Sepsis, Gram positive ([facility])   • Acute osteomyelitis of right foot ([facility])   • COVID-19 virus not detected   • S/P BKA (below knee amputation), right (HCC)   • Wound of foot   • Wound of left foot   • Foot osteomyelitis, left (HCC)   • Infection of right below knee amputation (HCC)   • Infection of amputation stump of right lower extremity ([facility])   • Osteomyelitis, chronic, lower leg, right (HCC)   • GERD (gastroesophageal reflux disease)   • Chronic pain   • COVID-19   • Wound infection   • Fever, unspecified fever cause   • Pressure injury of knee, stage 4 (HCC)     Past Medical History:   Diagnosis Date   • Anxiety    • Bone infection (HCC)     STATES CAUSED HIM TO LOSE RIGHT LEG   • CHF (congestive heart failure) ([facility])    • Coronary artery disease    • Depression    • Diabetes mellitus (HCC)     TYPE 1   • Gallstones    • GERD (gastroesophageal reflux disease)    • Headache    • History of amputation     PEDRO BELOW THE KNEE   • MI, old     WITH STENT PLACEMENT-CARDIO WANDA [place]-NO LONGER SEES   • Nodule of left lung     HAD CT SCAN BENIGN.    • Phantom limb pain (HCC)     LEFT AND RIGHT  BOTH   • Seasonal allergies      Past Surgical History:   Procedure Laterality Date   • ABOVE KNEE AMPUTATION Right 10/14/2021    Procedure: AMPUTATION ABOVE KNEE;  Surgeon: Donn Alcantara MD;  Location: [facility] MAIN OR;  Service: Orthopedics;  Laterality: Right;   • AMPUTATION DIGIT Right 2/28/2020    Procedure: PARTIAL FIRST RAY RESECTION RIGHT FOOT;  Surgeon: CROW Souza DPM;  Location: [facility] MAIN OR;  Service: Podiatry;  Laterality: Right;   • AMPUTATION FOOT / TOE     • AMPUTATION REVISION Right 3/2/2021    Procedure: AMPUTATION REVISION KNEE STUMP;  Surgeon: Donn Alcantara MD;  Location: [facility] MAIN OR;  Service: Orthopedics;  Laterality: Right;   • AMPUTATION REVISION Right 9/23/2021    Procedure: RIGHT BELOW KNEE AMPUTATION REVISION;  Surgeon: Donn Alcantara MD;  Location: [facility] MAIN OR;  Service: Orthopedics;  Laterality: Right;   • BELOW KNEE AMPUTATION Right 4/30/2020    Procedure: AMPUTATION BELOW KNEE;  Surgeon: Donn Alcantara MD;  Location: [facility] MAIN OR;  Service: Orthopedics;  Laterality: Right;   • BELOW KNEE AMPUTATION Left 9/2/2020    Procedure: AMPUTATION BELOW KNEE, left;  Surgeon: Donn Alcantara MD;  Location: [facility] MAIN OR;  Service: Orthopedics;  Laterality: Left;   • BELOW KNEE AMPUTATION Right 2/23/2021    Procedure: Revision of amputaion below knee;  Surgeon: Donn Alcantara MD;  Location: [facility] MAIN OR;  Service: Orthopedics;  Laterality: Right;   • CARDIAC CATHETERIZATION  11/2010     RCA artery   • CARDIAC CATHETERIZATION  2015    LAD artery   • CARDIAC SURGERY     • CORONARY ANGIOPLASTY WITH STENT PLACEMENT      X2. 2015   • INCISION AND DRAINAGE LEG Left 1/21/2020    Procedure: INCISION AND DRAINAGE LOWER EXTREMITY with second digit amputation;  Surgeon: CROW Souza DPM;  Location: [facility] MAIN OR;  Service: Podiatry   • INCISION AND DRAINAGE LEG Right 4/28/2020    Procedure: incision and drainage ,transmetatarsal amputation, fasciotomy;  Surgeon: CROW Souza  URIEL;  Location: [facility] MAIN OR;  Service: Podiatry;  Laterality: Right;   • SKIN FULL THICKNESS GRAFT Right 7/29/2021    Procedure: EXCISION SOFT TISSUE Ulcer WITH FULL THICKNESS SKIN GRAFT to right lower leg.;  Surgeon: Sukhdeep Hernadez MD;  Location: [facility] MAIN OR;  Service: Plastics;  Laterality: Right;   • TOE AMPUTATION Left    • WOUND DEBRIDEMENT Right 1/21/2020    Procedure: DEBRIDEMENT with sesamoid excision;  Surgeon: CROW oSuza DPM;  Location: [facility] MAIN OR;  Service: Podiatry      General Information     Row Name 10/06/22 1054          Physical Therapy Time and Intention    Document Type evaluation  -     Mode of Treatment physical therapy  -     Row Name 10/06/22 1054          General Information    Prior Level of Function independent:;community mobility;gait;transfer;bed mobility;driving  Utilized B forearm crutches; DME available RW and rollator  -     Existing Precautions/Restrictions fall;other (see comments)  LLE wound vac  -     Row Name 10/06/22 1054          Living Environment    People in Home child(marcus), dependent  -     Row Name 10/06/22 1054          Home Main Entrance    Number of Stairs, Main Entrance none  -     Row Name 10/06/22 1054          Stairs Within Home, Primary    Number of Stairs, Within Home, Primary none  -     Row Name 10/06/22 1054          Cognition    Orientation Status (Cognition) oriented x 4  -     Row Name 10/06/22 1054          Safety Issues, Functional Mobility    Impairments Affecting Function (Mobility) pain;range of motion (ROM)  -           User Key  (r) = Recorded By, (t) = Taken By, (c) = Cosigned By    Initials Name Provider Type    NICHOLAS Leeanna Faustin, PT Physical Therapist               Mobility     Row Name 10/06/22 1056          Bed Mobility    Bed Mobility bed mobility (all) activities  -     All Activities, [place] (Bed Mobility) independent  -     Comment, (Bed Mobility) Able to complete fwd crunch and sit into  long sitting w/ BUE  -     Row Name 10/06/22 1056          Transfers    Comment, (Transfers) Unable to assess at this time as pt does not have any prosthetics; pt has good ability to scoot at EOB  -           User Key  (r) = Recorded By, (t) = Taken By, (c) = Cosigned By    Initials Name Provider Type    Leeanna Osborne PT Physical Therapist               Obj/Interventions     Row Name 10/06/22 1058          Range of Motion Comprehensive    General Range of Motion bilateral upper extremity ROM WFL  -     Comment, General Range of Motion LLE knee extension limited d/t increased pain; remaining WFL  -     Row Name 10/06/22 1058          Strength Comprehensive (MMT)    General Manual Muscle Testing (MMT) Assessment no strength deficits identified  -     Comment, General Manual Muscle Testing (MMT) Assessment BLE grossly 4+/5; BUE grossly 5/5  -     Row Name 10/06/22 1058          Balance    Balance Assessment sitting static balance;sitting dynamic balance  -     Static Sitting Balance independent  -     Dynamic Sitting Balance independent  -     Position, Sitting Balance sitting edge of bed  -     Row Name 10/06/22 1058          Sensory Assessment (Somatosensory)    Sensory Assessment (Somatosensory) sensation intact  -           User Key  (r) = Recorded By, (t) = Taken By, (c) = Cosigned By    Initials Name Provider Type    Leeanna Osborne PT Physical Therapist               Goals/Plan     Row Name 10/06/22 1101          Transfer Goal 1 (PT)    Activity/Assistive Device (Transfer Goal 1, PT) sit-to-stand/stand-to-sit;bed-to-chair/chair-to-bed;wheelchair transfer  -     [place] Level/Cues Needed (Transfer Goal 1, PT) modified independence  -     Time Frame (Transfer Goal 1, PT) long term goal (LTG);2 weeks  -     Strategies/Barriers (Transfers Goal 1, PT) STS with RLE prosthetic  -     Row Name 10/06/22 1101          Gait Training Goal 1 (PT)    Activity/Assistive Device (Gait  Training Goal 1, PT) gait (walking locomotion)  -     [place] Level (Gait Training Goal 1, PT) modified independence  -     Distance (Gait Training Goal 1, PT) 15ft w/ RLE prosthetic  -     Time Frame (Gait Training Goal 1, PT) long term goal (LTG);2 weeks  -     Row Name 10/06/22 1103          Therapy Assessment/Plan (PT)    Planned Therapy Interventions (PT) balance training;bed mobility training;gait training;home exercise program;strengthening;neuromuscular re-education;transfer training;wheelchair management/propulsion training  -           User Key  (r) = Recorded By, (t) = Taken By, (c) = Cosigned By    Initials Name Provider Type    NICHOLAS Leeanna Faustin, JOLENE Physical Therapist               Clinical Impression     Row Name 10/06/22 1050          Pain    Pretreatment Pain Rating 8/10  -     Posttreatment Pain Rating 8/10  -     Pain Location - Side/Orientation Left  -     Pain Location - residual limb  -     Row Name 10/06/22 4291          Plan of Care Review    Plan of Care Reviewed With patient  -     Outcome Evaluation Pt is a 46 y/o male who presents to [facility] with worsening chronic open wound on his posterior left leg where his prosthesis rubs against his leg. Pt was admitted for LLE wound infection, fever, and COVID-19. Pt had tested positive for covid over 2 weeks ago and per nurse practitioner isolation can be discontinued.  Pt has a h/o L BKA and R AKA with B prosthetic. He was independent w/ ambulation utilizing forearm crutches and drives with hand controls. Pt does not have his prosthetics with him at this time. He has good UE and core strength and is independent w/ bed mobility. Pt would benefit from the use of a wheelchair to prevent shearing of his wound with the use of his LLE prosthetic. Pt states he does not want rehab as he needs to care for his 11 y/o son. Plan is to return home with PT pending pt progress with transfers.  -     Row Name 10/06/22 6857           Therapy Assessment/Plan (PT)    Therapy Frequency (PT) 5 times/wk  -NICHOLAS     Predicted Duration of Therapy Intervention (PT) Until d/c  -NICHOLAS     Row Name 10/06/22 1059          Vital Signs    O2 Delivery Pre Treatment room air  -NICHOLAS     O2 Delivery Intra Treatment room air  -NICHOLAS     O2 Delivery Post Treatment room air  -NICHOLAS     Row Name 10/06/22 1059          Positioning and Restraints    Pre-Treatment Position in bed  -NICHOLAS     Post Treatment Position bed  -NICHOLAS     In Bed notified nsg;fowlers;call light within reach;encouraged to call for assist;exit alarm on  -NICHOLAS           User Key  (r) = Recorded By, (t) = Taken By, (c) = Cosigned By    Initials Name Provider Type    Leeanna Osborne, JOLENE Physical Therapist               Outcome Measures     Row Name 10/06/22 1102 10/06/22 0815       How much help from another person do you currently need...    Turning from your back to your side while in flat bed without using bedrails? 4  -NICHOLAS 4  -DP    Moving from lying on back to sitting on the side of a flat bed without bedrails? 4  -NICHOLAS 3  -DP    Moving to and from a bed to a chair (including a wheelchair)? 3  -NICHOLAS 1  -DP    Standing up from a chair using your arms (e.g., wheelchair, bedside chair)? 3  With RLE prosthetic  -NICHOLAS 1  -DP    Climbing 3-5 steps with a railing? 1  -NICHOLAS 1  -DP    To walk in hospital room? 3  with RLE prosthetic  -NICHOLAS 1  -DP    AM-PAC 6 Clicks Score (PT) 18  -NICHOLAS 11  -DP    Highest level of mobility 6 --> Walked 10 steps or more  -NICHOLAS 4 --> Transferred to chair/commode  -DP    Row Name 10/06/22 1102          Functional Assessment    Outcome Measure Options AM-PAC 6 Clicks Basic Mobility (PT)  -           User Key  (r) = Recorded By, (t) = Taken By, (c) = Cosigned By    Initials Name Provider Type    Christie Molina, RN Registered Nurse    Leeanna Osborne, PT Physical Therapist                             Physical Therapy Education                 Title: PT OT SLP Therapies (In Progress)     Topic:  Physical Therapy (In Progress)     Point: Mobility training (Done)     Learning Progress Summary           Patient Acceptance, E,TB, VU by  at 10/6/2022 1103                   Point: Home exercise program (Not Started)     Learner Progress:  Not documented in this visit.          Point: Body mechanics (Done)     Learning Progress Summary           Patient Acceptance, E,TB, VU by  at 10/6/2022 1103                   Point: Precautions (Done)     Learning Progress Summary           Patient Acceptance, E,TB, VU by  at 10/6/2022 1103                               User Key     Initials Effective Dates Name Provider Type Discipline     08/23/21 -  Leeanna Faustin, PT Physical Therapist PT              PT Recommendation and Plan  Planned Therapy Interventions (PT): balance training, bed mobility training, gait training, home exercise program, strengthening, neuromuscular re-education, transfer training, wheelchair management/propulsion training  Plan of Care Reviewed With: patient  Outcome Evaluation: Pt is a 48 y/o male who presents to [facility] with worsening chronic open wound on his posterior left leg where his prosthesis rubs against his leg. Pt was admitted for LLE wound infection, fever, and COVID-19. Pt had tested positive for covid over 2 weeks ago and per nurse practitioner isolation can be discontinued.  Pt has a h/o L BKA and R AKA with B prosthetic. He was independent w/ ambulation utilizing forearm crutches and drives with hand controls. Pt does not have his prosthetics with him at this time. He has good UE and core strength and is independent w/ bed mobility. Pt would benefit from the use of a wheelchair to prevent shearing of his wound with the use of his LLE prosthetic. Pt states he does not want rehab as he needs to care for his 13 y/o son. Plan is to return home with HHPT pending pt progress with transfers.     Time Calculation:    PT Charges     Row Name 10/06/22 1103             Time Calculation     Start Time 0820  -NICHOLAS      Stop Time 0842  -NICHOLAS      Time Calculation (min) 22 min  -NICHOLAS      PT Received On 10/06/22  -NICHOLAS      PT - Next Appointment 10/07/22  -NICHOLAS      PT Goal Re-Cert Due Date 10/20/22  -NICHOLAS            User Key  (r) = Recorded By, (t) = Taken By, (c) = Cosigned By    Initials Name Provider Type    Leeanna Osborne, PT Physical Therapist              Therapy Charges for Today     Code Description Service Date Service Provider Modifiers Qty    [id number] HC PT EVAL MOD COMPLEXITY 4 10/6/2022 Leeanna Faustin, PT GP 1          PT G-Codes  Outcome Measure Options: AM-PAC 6 Clicks Basic Mobility (PT)  AM-PAC 6 Clicks Score (PT): 18    Leeanna Faustin PT  10/6/2022

## 2022-10-06 NOTE — PROGRESS NOTES
Infectious Diseases Progress Note      LOS: 4 days   Patient Care Team:  Fred Lemons FNP as PCP - General (Family Medicine)    Chief Complaint: Left leg wound    Subjective     The patient had no fever during the last 24 hours.  The patient remained hemodynamically stable.  He is currently on room air.  Denied having any new complaints today    Review of Systems:   Review of Systems   Constitutional: Negative.    HENT: Negative.    Eyes: Negative.    Respiratory: Negative.    Cardiovascular: Negative.    Gastrointestinal: Negative.    Genitourinary: Negative.    Skin: Positive for wound.   Neurological: Negative.    Hematological: Negative.    Psychiatric/Behavioral: Negative.         Objective     Vital Signs  Temp:  [97.7 °F (36.5 °C)-98.9 °F (37.2 °C)] 97.7 °F (36.5 °C)  Heart Rate:  [63-75] 68  Resp:  [18-19] 19  BP: (119-136)/(72-84) 135/82    Physical Exam:  Physical Exam  Vitals and nursing note reviewed.   Constitutional:       Appearance: He is well-developed.   HENT:      Head: Normocephalic and atraumatic.   Eyes:      Pupils: Pupils are equal, round, and reactive to light.   Cardiovascular:      Rate and Rhythm: Normal rate and regular rhythm.      Heart sounds: Normal heart sounds.   Pulmonary:      Effort: Pulmonary effort is normal. No respiratory distress.      Breath sounds: Normal breath sounds. No wheezing or rales.   Abdominal:      General: Bowel sounds are normal. There is no distension.      Palpations: Abdomen is soft. There is no mass.      Tenderness: There is no abdominal tenderness. There is no guarding or rebound.   Musculoskeletal:      Cervical back: Normal range of motion and neck supple.      Comments: Status post right above-knee amputation with healed stump  S/p left below-knee amputation with healed stump  Presence of wound VAC on the posterior left leg   Skin:     General: Skin is warm.      Findings: No erythema or rash.   Neurological:      Mental Status: He is alert and  oriented to person, place, and time.      Cranial Nerves: No cranial nerve deficit.          Results Review:    I have reviewed all clinical data, test, lab, and imaging results.     Radiology  No Radiology Exams Resulted Within Past 24 Hours    Cardiology    Laboratory    Results from last 7 days   Lab Units 10/06/22  0400 10/05/22  0457 10/03/22  2303 10/03/22  0526 10/02/22  2028   WBC 10*3/mm3 6.50 8.70 11.40* 11.60* 12.80*   HEMOGLOBIN g/dL 12.4* 12.4* 12.6* 12.4* 13.4   HEMATOCRIT % 36.7* 35.8* 37.9 37.2* 39.7   PLATELETS 10*3/mm3 380 359 341 310 362     Results from last 7 days   Lab Units 10/06/22  0400 10/05/22  0457 10/03/22  2303 10/03/22  0526 10/02/22  1932   SODIUM mmol/L 140 138 134* 139 136   POTASSIUM mmol/L 3.7 3.4* 3.5 3.6 3.2*   CHLORIDE mmol/L 103 100 99 105 97*   CO2 mmol/L 32.0* 31.0* 28.0 27.0 27.0   BUN mg/dL 11 11 14 13 11   CREATININE mg/dL 0.83 0.83 0.77 0.75* 0.82   GLUCOSE mg/dL 94 165* 263* 231* 74   ALBUMIN g/dL  --   --   --   --  3.00*   BILIRUBIN mg/dL  --   --   --   --  0.8   ALK PHOS U/L  --   --   --   --  99   AST (SGOT) U/L  --   --   --   --  15   ALT (SGPT) U/L  --   --   --   --  34   CALCIUM mg/dL 8.5* 8.0* 8.1* 8.0* 8.8                 Microbiology   Microbiology Results (last 10 days)     Procedure Component Value - Date/Time    Wound Culture - Wound, Leg, Left [[id number]]  (Abnormal) Collected: 10/02/22 2018    Lab Status: Final result Specimen: Wound from Leg, Left Updated: 10/06/22 1236     Wound Culture Moderate growth (3+) Haemophilus influenzae     Comment: This isolate is beta-lactamase NEGATIVE and are routinely susceptible to ampicillin and other beta-lactams.           Light growth (2+) Normal Skin Juliet     Gram Stain Many (4+) WBCs per low power field      Rare (1+) Gram positive cocci in pairs      Rare (1+) Gram positive bacilli    Narrative:            COVID-19,CEPHEID/COURTNEY,COR/IRA/PAD/[facility] IN-HOUSE(OR EMERGENT/ADD-ON),NP SWAB IN TRANSPORT MEDIA 3-4 HR  TAT, RT-PCR - Swab, Nasopharynx [[id number]]  (Abnormal) Collected: 10/02/22 2018    Lab Status: Final result Specimen: Swab from Nasopharynx Updated: 10/02/22 2054     COVID19 Detected    Narrative:      Fact sheet for providers: https://www.fda.gov/media/047172/download     Fact sheet for patients: https://www.fda.gov/media/174077/download  Fact sheet for providers: https://www.fda.gov/media/858467/download    Fact sheet for patients: https://www.fda.gov/media/281902/download    Test performed by PCR.    Blood Culture - Blood, Arm, Left [[id number]]  (Normal) Collected: 10/02/22 2017    Lab Status: Preliminary result Specimen: Blood from Arm, Left Updated: 10/05/22 2032     Blood Culture No growth at 3 days    Blood Culture - Blood, Arm, Right [[id number]]  (Abnormal) Collected: 10/02/22 2004    Lab Status: Final result Specimen: Blood from Arm, Right Updated: 10/05/22 0751     Blood Culture Micrococcus species     Isolated from Aerobic Bottle     Gram Stain Aerobic Bottle Gram positive cocci in clusters    Narrative:      Probable contaminant requires clinical correlation, susceptibility not performed unless requested by physician.      Blood Culture ID, PCR - Blood, Arm, Right [[id number]] Collected: 10/02/22 2004    Lab Status: Final result Specimen: Blood from Arm, Right Updated: 10/04/22 0534     BCID, PCR Negative by BCID PCR. Culture to Follow.     BOTTLE TYPE Aerobic Bottle          Medication Review:       Schedule Meds  amoxicillin-clavulanate, 1 tablet, Oral, Q12H  enoxaparin, 40 mg, Subcutaneous, Daily  insulin glargine, 30 Units, Subcutaneous, Nightly  insulin lispro, 0-24 Units, Subcutaneous, 4x Daily With Meals & Nightly  pantoprazole, 40 mg, Oral, Q PM  pregabalin, 75 mg, Oral, BID  sodium chloride, 3 mL, Intravenous, Q12H        Infusion Meds       PRN Meds  HYDROcodone-acetaminophen  •  LORazepam  •  melatonin  •  Morphine  •  ondansetron **OR** ondansetron  •  sodium chloride  •  [COMPLETED] Insert  peripheral IV **AND** sodium chloride  •  sodium chloride        Assessment & Plan       Antimicrobial Therapy   1.  IV Zyvox       2.  IV Zosyn        3.        4.        5.            Assessment     Wound to the posterior left knee that is likely resulted from rubbing from the patient's prostatic device.  Started the blister is currently an open wound that goes down to the muscle.  MRI did not show any obvious abscess or osteomyelitis  -Cultures are pending-gram stain is showing gram-positive bacilli and gram-positive cocci in pairs however cultures are growing skin rachel and 3+ Haemophilus influenzae  Currently has wound VAC on    Positive blood culture in 1 out of 2 sets from October 2, 2022 for gram-positive cocci in clusters.  PCR did not detect specific pathogen.  Resulted as micrococcus which is a contamination     Current COVID-19 infection.  Patient denies any significant symptoms and is currently on room air  -Chest x-ray shows no acute findings     New rash that started earlier today on the patient's right arm.  Likely reaction to antibiotics.  Patient's been both on IV Rocephin and IV vancomycin.  Of note patient also received IV morphine in that IV     History of multiple surgeries to bilateral feet that ended up being bilateral BKA's with a revision of the right BKA to an AKA in October of last year.     Congestive heart failure, CAD     Type 1 diabetes     Plan     Discontinue IV Zyvox   Discontinue IV Zosyn  Start p.o. Augmentin 875/125 mg twice daily for 3 weeks treatment  Can restart Cymbalta  Continue supportive care  Okay to discharge from Infectious Disease standpoint  Case discussed with primary MD      Continue isolation for COVID-19 for total of 10 days from first diagnosed  Appropriate PPE was placed on before entering the            KAMILLE Molina  10/06/22  15:42 EDT    Note is dictated utilizing voice recognition software/Dragon

## 2022-10-06 NOTE — CASE MANAGEMENT/SOCIAL WORK
Continued Stay Note  [facility]     Patient Name: Maynor Gregg  MRN: [id number]  Today's Date: 10/6/2022    Admit Date: 10/2/2022    Plan: Home with [facility] (pending) vs. [facility].   Discharge Plan     Row Name 10/06/22 1558       Plan    Plan Home with [facility] (pending) vs. [facility].    Plan Comments PT's recommendations noted for [facility] along with wound vac. Referral sent to [facility] and liacorazon Pelayo who reports they are not in network with patient's insurance. Called and spoke to patient over the phone about the difficulty of finding a home healthcare agency that will accept insurance. CM contacted [facility] in [place] and spoke to Boni. He reported he would have  CM this afternoon but never received phone call. Called again around 16:00 and learned that the office closed at 3 PM. Left contact information along with patient information and manager Tao will contact  first thing 10/7 AM. If [facility] is unable to accept pt, will contact outpatient wound care center to set pt up. Dr Contreras updated and she will discharge patient this evening with home health order.              Phone communication or documentation only - no physical contact with patient or family.      Megan Naegele, RN      Office Phone: [phone]  Office Cell: [phone]

## 2022-10-06 NOTE — DISCHARGE SUMMARY
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          Hospital Course  Patient is a 47 y.o. male with type 1 diabetes and peripheral vascular disease who presented with fever and nonhealing wound on the posterior left leg.  Wound had started as a blister and regressed to an open wound with exposed muscle.  He relates the blister to pressure from his prosthetic leg.  Wound culture grew 3+ haemophilus.  He was treated with broad-spectrum antibiotics, initially Zosyn and Zyvox.  Once the culture resulted he was changed to oral Augmentin.  MRI showed possible involvement of the tendon but no osteomyelitis.  Infectious disease was consulted and recommended a total of 21 days of antibiotic therapy.  He had completed 4 days prior to admission, an additional 5 days while in the hospital.  He has been provided a prescription with a quantity of Augmentin sufficient to complete a total of 21 days.  He has a wound VAC in place and outpatient follow-up with either home health or wound care clinic is being arranged.  Patient tested positive on admission for COVID.  He had a symptomatic COVID infection  several weeks prior to admission.  He was asymptomatic from COVID during this hospitalization.  At the time of discharge he is hemodynamically stable and ready to go home.    Procedures Performed         Consults:   Consults     Date and Time Order Name Status Description    10/3/2022 12:51 PM Inpatient Infectious Diseases Consult Completed     10/3/2022  9:36 AM Inpatient Orthopedic Surgery Consult      10/2/2022  9:05 PM Family Medicine Consult            Pertinent Test Results:    Lab Results (most recent)     Procedure Component Value Units Date/Time    Wound Culture - Wound, Leg, Left [[id number]]  (Abnormal) Collected: 10/02/22 2018    Specimen: Wound from Leg, Left Updated: 10/06/22 1236     Wound Culture Moderate growth (3+) Haemophilus influenzae     Comment: This isolate is beta-lactamase NEGATIVE and are routinely susceptible to ampicillin and other beta-lactams.           Light growth (2+) Normal Skin Juliet     Gram Stain Many (4+) WBCs per low power field      Rare (1+) Gram positive cocci in pairs      Rare (1+) Gram positive bacilli    Narrative:            POC Glucose Once [[id number]]  (Abnormal) Collected: 10/06/22 1136    Specimen: Blood Updated: 10/06/22 1137     Glucose 213 mg/dL      Comment: Serial Number: [id number]:  [id number]       POC Glucose Once [[id number]]  (Abnormal) Collected: 10/06/22 0745    Specimen: Blood Updated: 10/06/22 0746     Glucose 111 mg/dL      Comment: Serial Number: [id number]:  [id number]       Basic Metabolic Panel [[id number]]  (Abnormal) Collected: 10/06/22 0400    Specimen: Blood Updated: 10/06/22 0507     Glucose 94 mg/dL      BUN 11 mg/dL      Creatinine 0.83 mg/dL      Sodium 140 mmol/L      Potassium 3.7 mmol/L      Chloride 103 mmol/L      CO2 32.0 mmol/L      Calcium 8.5 mg/dL      BUN/Creatinine Ratio 13.3     Anion Gap 5.0 mmol/L      eGFR 108.6 mL/min/1.73      Comment: National Kidney Foundation and American Society of Nephrology (ASN) Task  Force recommended calculation based on the Chronic Kidney Disease Epidemiology Collaboration (CKD-EPI) equation refit without adjustment for race.       Narrative:      GFR Normal >60  Chronic Kidney Disease <60  Kidney Failure <15      CBC & Differential [[id number]]  (Abnormal) Collected: 10/06/22 0400    Specimen: Blood Updated: 10/06/22 0444    Narrative:      The following orders were created for panel order CBC & Differential.  Procedure                               Abnormality         Status                     ---------                               -----------         ------                     CBC Auto Differential[[id number]]        Abnormal            Final result                 Please view results for these tests on the individual orders.    CBC Auto Differential [[id number]]  (Abnormal) Collected: 10/06/22 0400    Specimen: Blood Updated: 10/06/22 0444     WBC 6.50 10*3/mm3      RBC 4.24 10*6/mm3      Hemoglobin 12.4 g/dL      Hematocrit 36.7 %      MCV 86.6 fL      MCH 29.4 pg      MCHC 33.9 g/dL      RDW 12.8 %      RDW-SD 39.4 fl      MPV 7.2 fL      Platelets 380 10*3/mm3      Neutrophil % 46.2 %      Lymphocyte % 42.6 %      Monocyte % 8.1 %      Eosinophil % 2.6 %      Basophil % 0.5 %      Neutrophils, Absolute 3.00 10*3/mm3      Lymphocytes, Absolute 2.80 10*3/mm3      Monocytes, Absolute 0.50 10*3/mm3      Eosinophils, Absolute 0.20 10*3/mm3      Basophils, Absolute 0.00 10*3/mm3      nRBC 0.2 /100 WBC     Blood Culture - Blood, Arm, Left [[id number]]  (Normal) Collected: 10/02/22 2017    Specimen: Blood from Arm, Left Updated: 10/05/22 2032     Blood Culture No growth at 3 days    Blood Culture - Blood, Arm, Right [[id number]]  (Abnormal) Collected: 10/02/22 2004    Specimen: Blood from Arm, Right Updated: 10/05/22 0751     Blood Culture Micrococcus species     Isolated from Aerobic Bottle     Gram Stain Aerobic Bottle Gram positive cocci in clusters    Narrative:      Probable contaminant  requires clinical correlation, susceptibility not performed unless requested by physician.      Basic Metabolic Panel [[id number]]  (Abnormal) Collected: 10/05/22 0457    Specimen: Blood Updated: 10/05/22 0606     Glucose 165 mg/dL      BUN 11 mg/dL      Creatinine 0.83 mg/dL      Sodium 138 mmol/L      Potassium 3.4 mmol/L      Chloride 100 mmol/L      CO2 31.0 mmol/L      Calcium 8.0 mg/dL      BUN/Creatinine Ratio 13.3     Anion Gap 7.0 mmol/L      eGFR 108.6 mL/min/1.73      Comment: National Kidney Foundation and American Society of Nephrology (ASN) Task Force recommended calculation based on the Chronic Kidney Disease Epidemiology Collaboration (CKD-EPI) equation refit without adjustment for race.       Narrative:      GFR Normal >60  Chronic Kidney Disease <60  Kidney Failure <15      CBC & Differential [[id number]]  (Abnormal) Collected: 10/05/22 0457    Specimen: Blood Updated: 10/05/22 0547    Narrative:      The following orders were created for panel order CBC & Differential.  Procedure                               Abnormality         Status                     ---------                               -----------         ------                     CBC Auto Differential[[id number]]        Abnormal            Final result                 Please view results for these tests on the individual orders.    CBC Auto Differential [[id number]]  (Abnormal) Collected: 10/05/22 0457    Specimen: Blood Updated: 10/05/22 0547     WBC 8.70 10*3/mm3      RBC 4.15 10*6/mm3      Hemoglobin 12.4 g/dL      Hematocrit 35.8 %      MCV 86.3 fL      MCH 30.0 pg      MCHC 34.7 g/dL      RDW 13.0 %      RDW-SD 39.8 fl      MPV 7.2 fL      Platelets 359 10*3/mm3      Neutrophil % 56.2 %      Lymphocyte % 32.6 %      Monocyte % 8.8 %      Eosinophil % 1.9 %      Basophil % 0.5 %      Neutrophils, Absolute 4.90 10*3/mm3      Lymphocytes, Absolute 2.80 10*3/mm3      Monocytes, Absolute 0.80 10*3/mm3      Eosinophils, Absolute 0.20  10*3/mm3      Basophils, Absolute 0.00 10*3/mm3      nRBC 0.1 /100 WBC     Blood Culture ID, PCR - Blood, Arm, Right [[id number]] Collected: 10/02/22 2004    Specimen: Blood from Arm, Right Updated: 10/04/22 0534     BCID, PCR Negative by BCID PCR. Culture to Follow.     BOTTLE TYPE Aerobic Bottle    Urinalysis With Culture If Indicated - Urine, Clean Catch [[id number]]  (Abnormal) Collected: 10/03/22 1306    Specimen: Urine, Clean Catch Updated: 10/03/22 1321     Color, UA Rebecca     Appearance, UA Clear     pH, UA 6.0     Specific Gravity, UA 1.024     Glucose, UA >=1000 mg/dL (3+)     Ketones, UA 15 mg/dL (1+)     Bilirubin, UA Negative     Blood, UA Negative     Protein, UA Negative     Leuk Esterase, UA Negative     Nitrite, UA Negative     Urobilinogen, UA 4.0 E.U./dL    Narrative:      In absence of clinical symptoms, the presence of pyuria, bacteria, and/or nitrites on the urinalysis result does not correlate with infection.  Urine microscopic not indicated.    Lactic Acid, Plasma [[id number]]  (Normal) Collected: 10/02/22 2109    Specimen: Blood Updated: 10/02/22 2132     Lactate 0.9 mmol/L     Extra Tubes [[id number]] Collected: 10/02/22 2028    Specimen: Blood, Venous Line Updated: 10/02/22 2132    Narrative:      The following orders were created for panel order Extra Tubes.  Procedure                               Abnormality         Status                     ---------                               -----------         ------                     Green Top (Gel)[[id number]]                                  Final result                 Please view results for these tests on the individual orders.    Green Top (Gel) [[id number]] Collected: 10/02/22 2028    Specimen: Blood Updated: 10/02/22 2132     Extra Tube Hold for add-ons.     Comment: Auto resulted.       COVID-19,CEPHEID/COURTNEY,COR/IRA/PAD/VASQUEZ IN-HOUSE(OR EMERGENT/ADD-ON),NP SWAB IN TRANSPORT MEDIA 3-4 HR TAT, RT-PCR - Swab, Nasopharynx [[id number]]   (Abnormal) Collected: 10/02/22 2018    Specimen: Swab from Nasopharynx Updated: 10/02/22 2054     COVID19 Detected    Narrative:      Fact sheet for providers: https://www.fda.gov/media/650725/download     Fact sheet for patients: https://www.fda.gov/media/824167/download  Fact sheet for providers: https://www.fda.gov/media/398275/download    Fact sheet for patients: https://www.fda.gov/media/238168/download    Test performed by PCR.    Ceres Draw [[id number]] Collected: 10/02/22 1932    Specimen: Blood Updated: 10/02/22 2047    Narrative:      The following orders were created for panel order Ceres Draw.  Procedure                               Abnormality         Status                     ---------                               -----------         ------                     Green Top (Gel)[[id number]]                                  Final result               Lavender Top[[id number]]                                     Final result               Gold Top - SST[[id number]]                                   Final result               Light Blue Top[[id number]]                                                                Please view results for these tests on the individual orders.    Lavender Top [[id number]] Collected: 10/02/22 1932    Specimen: Blood Updated: 10/02/22 2047     Extra Tube hold for add-on     Comment: Auto resulted       Gold Top - SST [[id number]] Collected: 10/02/22 1932    Specimen: Blood Updated: 10/02/22 2040    Green Top (Gel) [[id number]] Collected: 10/02/22 1932    Specimen: Blood Updated: 10/02/22 2033    Comprehensive Metabolic Panel [[id number]]  (Abnormal) Collected: 10/02/22 1932    Specimen: Blood Updated: 10/02/22 2021     Glucose 74 mg/dL      BUN 11 mg/dL      Creatinine 0.82 mg/dL      Sodium 136 mmol/L      Potassium 3.2 mmol/L      Comment: Slight hemolysis detected by analyzer. Results may be affected.        Chloride 97 mmol/L      CO2 27.0 mmol/L      Calcium 8.8 mg/dL       Total Protein 7.8 g/dL      Albumin 3.00 g/dL      ALT (SGPT) 34 U/L      AST (SGOT) 15 U/L      Alkaline Phosphatase 99 U/L      Total Bilirubin 0.8 mg/dL      Globulin 4.8 gm/dL      A/G Ratio 0.6 g/dL      BUN/Creatinine Ratio 13.4     Anion Gap 12.0 mmol/L      eGFR 109.0 mL/min/1.73      Comment: National Kidney Foundation and American Society of Nephrology (ASN) Task Force recommended calculation based on the Chronic Kidney Disease Epidemiology Collaboration (CKD-EPI) equation refit without adjustment for race.       Narrative:      GFR Normal >60  Chronic Kidney Disease <60  Kidney Failure <15      Protime-INR [[id number]]  (Normal) Collected: 10/02/22 2004    Specimen: Blood Updated: 10/02/22 2020     Protime 11.2 Seconds      INR 1.09    aPTT [[id number]]  (Abnormal) Collected: 10/02/22 2004    Specimen: Blood Updated: 10/02/22 2020     PTT 28.0 seconds     POC Lactate [[id number]]  (Normal) Collected: 10/02/22 2006    Specimen: Blood Updated: 10/02/22 2007     Lactate 0.9 mmol/L      Comment: Serial Number: [id number]:  [id number]       Magnesium [[id number]]  (Normal) Collected: 10/02/22 1932    Specimen: Blood Updated: 10/02/22 1952     Magnesium 1.9 mg/dL            Results for orders placed during the hospital encounter of 01/18/20    Adult Transthoracic Echo Complete W/ Cont if Necessary Per Protocol    Interpretation Summary  · Left ventricular systolic function is normal.  · Mild mitral valve regurgitation is present  · Mild tricuspid valve regurgitation is present.  · Ejection fraction is about 65%  · No pericardial effusion noted              Condition on Discharge:      Vital Signs  Temp:  [97.7 °F (36.5 °C)-98.9 °F (37.2 °C)] 97.7 °F (36.5 °C)  Heart Rate:  [63-75] 68  Resp:  [18-19] 19  BP: (119-136)/(72-84) 135/82    Physical Exam:     General Appearance:    Alert, cooperative, in no acute distress   Head:    Normocephalic, without obvious abnormality, atraumatic   Eyes:            Lids and  lashes normal, conjunctivae and sclerae normal, no   icterus, no pallor, corneas clear, PERRLA   Ears:    Ears appear intact with no abnormalities noted   Throat:   No oral lesions, no thrush, oral mucosa moist   Neck:   No adenopathy, supple, trachea midline, no thyromegaly, no   carotid bruit, no JVD   Lungs:     Clear to auscultation,respirations regular, even and                  unlabored    Heart:    Regular rhythm and normal rate, normal S1 and S2, no            murmur, no gallop, no rub, no click   Chest Wall:    No abnormalities observed   Abdomen:     Normal bowel sounds, no masses, no organomegaly, soft        non-tender, non-distended, no guarding, no rebound                tenderness   Extremities:  Left BKA; wound VAC in place on left distal thigh; right AKA   Pulses:   Pulses palpable and equal bilaterally   Skin:   No bleeding, bruising or rash   Lymph nodes:   No palpable adenopathy   Neurologic:   Cranial nerves 2 - 12 grossly intact, sensation intact, DTR       present and equal bilaterally       Discharge Disposition  Home or Self Care    Discharge Medications     Discharge Medications      Changes to Medications      Instructions Start Date   Augmentin 875-125 MG per tablet  Generic drug: amoxicillin-clavulanate  What changed: Another medication with the same name was added. Make sure you understand how and when to take each.   1 tablet, Oral, 2 Times Daily, Day 4 of 10      amoxicillin-clavulanate 875-125 MG per tablet  Commonly known as: AUGMENTIN  What changed: You were already taking a medication with the same name, and this prescription was added. Make sure you understand how and when to take each.   1 tablet, Oral, Every 12 Hours Scheduled      HYDROcodone-acetaminophen  MG per tablet  Commonly known as: NORCO  What changed: when to take this   1 tablet, Oral, Every 4 Hours PRN         Continue These Medications      Instructions Start Date   BASAGLAR KWIKPEN 100 UNIT/ML injection  pen   30 Units, Subcutaneous, Nightly, Hasn't picked up new order yet. Old order was 12 units at bedtime      DULoxetine 60 MG capsule  Commonly known as: CYMBALTA   60 mg, Oral, Daily, Take DOS      insulin aspart 100 UNIT/ML injection  Commonly known as: novoLOG   Subcutaneous, 3 Times Daily Before Meals, PER SS. BETWEEN 3-20 UNITS Hold DOS      pantoprazole 40 MG EC tablet  Commonly known as: PROTONIX   40 mg, Oral, Every Evening, Hasn't been filled in awhile      pregabalin 75 MG capsule  Commonly known as: LYRICA   75 mg, Oral, 3 Times Daily PRN      vitamin D 1.25 MG (18338 UT) capsule capsule  Commonly known as: ERGOCALCIFEROL   50,000 Units, Oral, Weekly             Discharge Diet: Low concentrated sweet    Activity at Discharge: No restrictions    Follow-up Appointments  Future Appointments   Date Time Provider Department Center   10/17/2022  9:45 AM Yahaira Dey PT [facility]   10/25/2022  9:45 AM Yahaira Dey PT [facility]   11/3/2022  9:45 AM Yahaira Dey, PT [facility]     Additional Instructions for the Follow-ups that You Need to Schedule     Ambulatory Referral to Home Health (Hospital)   As directed      Face to Face Visit Date: 10/5/2022    Follow-up provider for Plan of Care?: I treated the patient in an acute care facility and will not continue treatment after discharge.    Follow-up provider: KACIE RIVERA [[id number]]    Reason/Clinical Findings: post hospitalization    Describe mobility limitations that make leaving home difficult: weakness    Nursing/Therapeutic Services Requested: Skilled Nursing    Skilled nursing orders: Medication education Wound vac application and instruction Infusion therapy    Frequency: 1 Week 1         Discharge Follow-up with PCP   As directed       Currently Documented PCP:    Kacie Rivera FNP    PCP Phone Number:    [phone]     Follow Up Details: Office will contact you tomorrow to schedule follow up appointment           APPT  WITH KACIE RIVERA AT [facility]:  October 20 AT 4 PM    Test Results Pending at Discharge  Pending Labs     Order Current Status    Blood Culture - Blood, Arm, Left Preliminary result           Guera Contreras MD  10/06/22  15:36 EDT    Time: Discharge 25 min

## 2022-10-06 NOTE — PROGRESS NOTES
LOS: 4 days   Patient Care Team:  Fred Lemons FNP as PCP - General (Family Medicine)    Subjective      Patient denies any complaints    Review of Systems   Constitutional: Positive for activity change, appetite change and fatigue.   HENT: Negative.    Respiratory: Negative.    Cardiovascular: Negative.    Gastrointestinal: Positive for nausea. Negative for abdominal pain and diarrhea.   Genitourinary: Negative.    Musculoskeletal: Positive for gait problem.   Neurological: Positive for weakness.   Psychiatric/Behavioral: Negative.            Objective     Vital Signs  Temp:  [97.7 °F (36.5 °C)-98.9 °F (37.2 °C)] 97.7 °F (36.5 °C)  Heart Rate:  [63-75] 63  Resp:  [18] 18  BP: (119-136)/(72-84) 134/84      Physical Exam  Vitals reviewed.   Constitutional:       Appearance: He is not ill-appearing.   HENT:      Head: Normocephalic and atraumatic.      Right Ear: External ear normal.      Left Ear: External ear normal.      Nose: Nose normal.      Mouth/Throat:      Mouth: Mucous membranes are moist.   Eyes:      General:         Right eye: No discharge.         Left eye: No discharge.   Cardiovascular:      Rate and Rhythm: Normal rate and regular rhythm.      Pulses: Normal pulses.      Heart sounds: Normal heart sounds.   Pulmonary:      Effort: Pulmonary effort is normal.      Breath sounds: Normal breath sounds.   Abdominal:      General: Bowel sounds are normal.      Palpations: Abdomen is soft.   Musculoskeletal:         General: Normal range of motion.      Cervical back: Normal range of motion.   Skin:     General: Skin is warm and dry.   Neurological:      Mental Status: He is alert and oriented to person, place, and time.   Psychiatric:         Behavior: Behavior normal.              Results Review:    Lab Results (last 24 hours)     Procedure Component Value Units Date/Time    POC Glucose Once [[id number]]  (Abnormal) Collected: 10/06/22 0745    Specimen: Blood Updated: 10/06/22 0746     Glucose  111 mg/dL      Comment: Serial Number: [id number]:  [id number]       Basic Metabolic Panel [[id number]]  (Abnormal) Collected: 10/06/22 0400    Specimen: Blood Updated: 10/06/22 0507     Glucose 94 mg/dL      BUN 11 mg/dL      Creatinine 0.83 mg/dL      Sodium 140 mmol/L      Potassium 3.7 mmol/L      Chloride 103 mmol/L      CO2 32.0 mmol/L      Calcium 8.5 mg/dL      BUN/Creatinine Ratio 13.3     Anion Gap 5.0 mmol/L      eGFR 108.6 mL/min/1.73      Comment: National Kidney Foundation and American Society of Nephrology (ASN) Task Force recommended calculation based on the Chronic Kidney Disease Epidemiology Collaboration (CKD-EPI) equation refit without adjustment for race.       Narrative:      GFR Normal >60  Chronic Kidney Disease <60  Kidney Failure <15      CBC & Differential [[id number]]  (Abnormal) Collected: 10/06/22 0400    Specimen: Blood Updated: 10/06/22 0444    Narrative:      The following orders were created for panel order CBC & Differential.  Procedure                               Abnormality         Status                     ---------                               -----------         ------                     CBC Auto Differential[[id number]]        Abnormal            Final result                 Please view results for these tests on the individual orders.    CBC Auto Differential [[id number]]  (Abnormal) Collected: 10/06/22 0400    Specimen: Blood Updated: 10/06/22 0444     WBC 6.50 10*3/mm3      RBC 4.24 10*6/mm3      Hemoglobin 12.4 g/dL      Hematocrit 36.7 %      MCV 86.6 fL      MCH 29.4 pg      MCHC 33.9 g/dL      RDW 12.8 %      RDW-SD 39.4 fl      MPV 7.2 fL      Platelets 380 10*3/mm3      Neutrophil % 46.2 %      Lymphocyte % 42.6 %      Monocyte % 8.1 %      Eosinophil % 2.6 %      Basophil % 0.5 %      Neutrophils, Absolute 3.00 10*3/mm3      Lymphocytes, Absolute 2.80 10*3/mm3      Monocytes, Absolute 0.50 10*3/mm3      Eosinophils, Absolute 0.20 10*3/mm3      Basophils,  Absolute 0.00 10*3/mm3      nRBC 0.2 /100 WBC     Blood Culture - Blood, Arm, Left [[id number]]  (Normal) Collected: 10/02/22 2017    Specimen: Blood from Arm, Left Updated: 10/05/22 2032     Blood Culture No growth at 3 days    POC Glucose Once [[id number]]  (Abnormal) Collected: 10/05/22 1949    Specimen: Blood Updated: 10/05/22 1950     Glucose 311 mg/dL      Comment: Serial Number: [id number]:  [id number]       POC Glucose Once [[id number]]  (Abnormal) Collected: 10/05/22 1625    Specimen: Blood Updated: 10/05/22 1626     Glucose 275 mg/dL      Comment: Serial Number: [id number]:  [id number]       Wound Culture - Wound, Leg, Left [[id number]] Collected: 10/02/22 2018    Specimen: Wound from Leg, Left Updated: 10/05/22 1333     Wound Culture Light growth (2+) Normal Skin Juliet     Gram Stain Many (4+) WBCs per low power field      Rare (1+) Gram positive cocci in pairs      Rare (1+) Gram positive bacilli    Narrative:      Organism under investigation.             Imaging Results (Last 24 Hours)     ** No results found for the last 24 hours. **               I reviewed the patient's new clinical results.    Medication Review:   Scheduled Meds:enoxaparin, 40 mg, Subcutaneous, Daily  insulin glargine, 30 Units, Subcutaneous, Nightly  insulin lispro, 0-24 Units, Subcutaneous, 4x Daily With Meals & Nightly  Linezolid, 600 mg, Intravenous, Q12H  pantoprazole, 40 mg, Oral, Q PM  piperacillin-tazobactam, 3.375 g, Intravenous, Q8H  pregabalin, 75 mg, Oral, BID  sodium chloride, 3 mL, Intravenous, Q12H      Continuous Infusions:   PRN Meds:.HYDROcodone-acetaminophen  •  LORazepam  •  melatonin  •  Morphine  •  ondansetron **OR** ondansetron  •  sodium chloride  •  [COMPLETED] Insert peripheral IV **AND** sodium chloride  •  sodium chloride     Interval History:    10/5 awaiting final culture for antibiotics; he will need wound vac and home health; orders placed    Assessment & Plan     COVID19- patient is  asymptomatic and had tested positive for covid over 2 weeks ago therefore isolation can be discontinued    Left wound infection lower extremity   -ID following with zyvox and zosyn will hold cymbalta while on zyvox  -BC and wound culture pending    Nausea/vomiting- resolved     DVT prophylaxis- Lovenox  GI prophylaxis- protonix     Plan for disposition:BROCK Carcamo, APRN  10/06/22  11:00 EDT

## 2022-10-06 NOTE — PLAN OF CARE
Goal Outcome Evaluation:  Plan of Care Reviewed With: patient        Progress: no change  Outcome Evaluation: Pt AOx4 on RA. Wound Vac to L leg intact. Pt L BKA and R AKA. No s/s of distress noted. PRN pain medication given per orders. Pt cont on IV ABT. Call light within reach.

## 2022-10-06 NOTE — DISCHARGE PLACEMENT REQUEST
"Maynor Gregg (47 y.o. Male)             Date of Birth   1975    Social Security Number       Address   [address] [place] [facility]    Home Phone   [phone]    MRN   [id number]       [place]   Mandaeism    Marital Status   Significant Other                            Admission Date   10/2/22    Admission Type   Emergency    Admitting Provider   Guera Contreras MD    Attending Provider   Guera Contreras MD    Department, Room/Bed   [facility] 3C MEDICAL INPATIENT, 378/1       Discharge Date       Discharge Disposition       Discharge Destination                               Attending Provider: Guera Contreras MD    Allergies: Metformin, Oxycodone, Rocephin [Ceftriaxone]    Isolation: Enh Drop/Con   Infection: COVID (confirmed) (10/02/22)   Code Status: CPR   Advance Care Planning Activity    Ht: 188 cm (74\")   Wt: 101 kg (222 lb 0.1 oz)    Admission Cmt: None   Principal Problem: None                Active Insurance as of 10/2/2022     Primary Coverage     Payor Plan Insurance Group Employer/Plan Group    [employer] IND EXCHANGE [employer] EXCHANGE NGN     Payor Plan Address Payor Plan Phone Number Payor Plan Fax Number Effective Dates    [address] [phone]  2/1/2022 - None Entered    [facility] [id number]       Subscriber Name Subscriber Birth Date Member ID       MAYNOR GREGG 1975 [id number]                 Emergency Contacts      (Rel.) Home Phone Work Phone Mobile Phone    Marsha Garza (Relative) [phone] -- [phone]        "

## 2022-10-06 NOTE — CONSULTS
Diabetes Education  Assessment/Teaching    Patient Name:  Maynor Gregg  YOB: 1975  MRN: [id number]  Admit Date:  10/2/2022      Assessment Date:  10/6/2022  Flowsheet Row Most Recent Value   General Information     Referral From: --  [Pt contacted due to diabetes on problem list.]   Height --  [5ft 2in without bilateral prostetics on 6ft 2in with prosttics on]   Weight 101 kg (222 lb 0.1 oz)   Weight Method Bed scale   Pregnancy Assessment    Diabetes History    What type of diabetes do you have? Type 2   Length of Diabetes Diagnosis --  [Dx in 2018.]   Do you test your blood sugar at home? yes   Frequency of checks 3-4 times/day   Meter type One Touch, he has 2 meters (one is 1 year old and the other is 2 years old)   Who performs the test? self   Have you had low blood sugar? (<70mg/dl) no   Education Preferences    Nutrition Information    Assessment Topics    DM Goals           Flowsheet Row Most Recent Value   DM Education Needs    Meter Has own   Frequency of Testing AC/HS  [Encouraged pt to record bs and take readings to MD visits.]   Blood Glucose Target Range Discussed previous A1c result from 9/23/2021 of 11.5%. Pt states he has had recent A1c done and was in the 11% range. Reviewed healthy bs range and healthy A1c target. Discussed importance of bs control.   Medication Insulin, Vial, Pen  [Pt taking Basaglar (pen) 30 units at hs and Novolog (vial) per sliding scale.]   Problem Solving Hypoglycemia, Signs, Symptoms, Treatment   Reducing Risks A1C testing   Healthy Eating --  [Pt eating 2-3 meals/day]   Motivation Engaged   Teaching Method Explanation, Discussion   Patient Response Verbalized understanding            Other Comments:  Contacted pt per phone call due to pt having Covid. Pt states recently saw MD and due to uncontrolled bs, Basaglar was increased to 30 units at hs. Discussed importance of routine follow up with MD if bs continues running high. Pt does have PCP. Pt with no  additional questions.       Electronically signed by:  Vibha Tobin RN  10/06/22 17:47 EDT

## 2022-10-07 ENCOUNTER — HOME HEALTH ADMISSION (OUTPATIENT)
Dept: HOME HEALTH SERVICES | Facility: HOME HEALTHCARE | Age: 47
End: 2022-10-07

## 2022-10-07 LAB — BACTERIA SPEC AEROBE CULT: NORMAL

## 2022-10-07 NOTE — PAYOR COMM NOTE
"NOTIFICATION OF DISCHARGE:          Ebenezer Gregg (47 y.o. Male) 1975  AUTH # [id number]          PATIENT DISCHARGED TO HOME ON 10/06/2022. SEE SUMMARY BELOW. THANKS                  Lauren Nair, RN MSN  /UR  [facility]  [phone] office  [phone] fax  [email or web address]    [facility]  NPI: [id number]  Tax: [id number]          Ebenezer Gregg (47 y.o. Male)             Date of Birth   1975    Social Security Number       Address   [address] [place] [facility]    Home Phone   [phone]    MRN   [id number]       [place]   [employer]    Marital Status   Significant Other                            Admission Date   10/2/22    Admission Type   Emergency    Admitting Provider   Guera Contreras MD    Attending Provider       Department, Room/Bed   [facility] 3C MEDICAL INPATIENT, 378/1       Discharge Date   10/6/2022    Discharge Disposition   Home or Self Care    Discharge Destination                               Attending Provider: (none)   Allergies: Metformin, Oxycodone, Rocephin [Ceftriaxone]    Isolation: None   Infection: COVID (confirmed) (10/02/22)   Code Status: Prior   Advance Care Planning Activity    Ht: 188 cm (74\")   Wt: 101 kg (222 lb 0.1 oz)    Admission Cmt: None   Principal Problem: Wound infection [T14.8XXA,L08.9]                 Active Insurance as of 10/2/2022     Primary Coverage     Payor Plan Insurance Group Employer/Plan Group    AMBETTER MHS IND EXCHANGE AM[employer]TER [employer] IND EXCHANGE NGN     Payor Plan Address Payor Plan Phone Number Payor Plan Fax Number Effective Dates    [address] [phone]  2/1/2022 - None Entered    [facility] [address]       Subscriber Name Subscriber Birth Date Member ID       EBENEZER GREGG 1975 [id number]                 Emergency Contacts      (Rel.) Home Phone Work Phone Mobile Phone    Marsha Garza (Relative) [phone] " -- [phone]               Discharge Summary      Guera Contreras MD at 10/06/22 1536            Date of Discharge:  10/6/2022    Discharge Diagnosis:   **Wound infection [T14.8XXA, L08.9]   Pressure injury of knee, stage 4 (HCC) [L89.894]   COVID-19 [U07.1]   Fever, unspecified fever cause [R50.9]   CAD (coronary artery disease) [I25.10]   Diabetic peripheral neuropathy (HCC) [E11.42]   Type 1 diabetes mellitus with circulatory complication (HCC) [E10.59]       Presenting Problem/History of Present Illness  Active Hospital Problems    Diagnosis  POA   • **Wound infection [T14.8XXA, L08.9]  Yes   • Pressure injury of knee, stage 4 (HCC) [L89.894]  Yes   • COVID-19 [U07.1]  Yes   • Fever, unspecified fever cause [R50.9]  Yes   • CAD (coronary artery disease) [I25.10]  Yes   • Diabetic peripheral neuropathy (HCC) [E11.42]  Yes   • 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          Hospital Course  Patient is a 47 y.o. male with type 1 diabetes and peripheral vascular disease who presented with fever and nonhealing wound on the posterior left leg.  Wound had started as a blister and regressed to an open wound with exposed muscle.  He relates the blister to pressure from his prosthetic leg.  Wound culture grew 3+ haemophilus.  He was treated with broad-spectrum antibiotics, initially Zosyn and Zyvox.  Once the culture resulted he was changed to oral Augmentin.  MRI showed possible involvement of the tendon but no osteomyelitis.  Infectious disease was consulted and recommended a total of 21 days of antibiotic therapy.  He had completed 4 days prior to admission, an additional 5 days while in the hospital.  He has been provided a prescription with a quantity of Augmentin sufficient to complete a total of 21 days.  He has a wound VAC in place and outpatient follow-up with either home health or wound care clinic is being  arranged.  Patient tested positive on admission for COVID.  He had a symptomatic COVID infection several weeks prior to admission.  He was asymptomatic from COVID during this hospitalization.  At the time of discharge he is hemodynamically stable and ready to go home.    Procedures Performed         Consults:   Consults     Date and Time Order Name Status Description    10/3/2022 12:51 PM Inpatient Infectious Diseases Consult Completed     10/3/2022  9:36 AM Inpatient Orthopedic Surgery Consult      10/2/2022  9:05 PM Family Medicine Consult            Pertinent Test Results:    Lab Results (most recent)     Procedure Component Value Units Date/Time    Wound Culture - Wound, Leg, Left [[id number]]  (Abnormal) Collected: 10/02/22 2018    Specimen: Wound from Leg, Left Updated: 10/06/22 1236     Wound Culture Moderate growth (3+) Haemophilus influenzae     Comment: This isolate is beta-lactamase NEGATIVE and are routinely susceptible to ampicillin and other beta-lactams.           Light growth (2+) Normal Skin Juliet     Gram Stain Many (4+) WBCs per low power field      Rare (1+) Gram positive cocci in pairs      Rare (1+) Gram positive bacilli    Narrative:            POC Glucose Once [[id number]]  (Abnormal) Collected: 10/06/22 1136    Specimen: Blood Updated: 10/06/22 1137     Glucose 213 mg/dL      Comment: Serial Number: [id number]:  [id number]       POC Glucose Once [[id number]]  (Abnormal) Collected: 10/06/22 0745    Specimen: Blood Updated: 10/06/22 0746     Glucose 111 mg/dL      Comment: Serial Number: [id number]:  [id number]       Basic Metabolic Panel [[id number]]  (Abnormal) Collected: 10/06/22 0400    Specimen: Blood Updated: 10/06/22 0507     Glucose 94 mg/dL      BUN 11 mg/dL      Creatinine 0.83 mg/dL      Sodium 140 mmol/L      Potassium 3.7 mmol/L      Chloride 103 mmol/L      CO2 32.0 mmol/L      Calcium 8.5 mg/dL      BUN/Creatinine Ratio 13.3     Anion Gap 5.0 mmol/L      eGFR 108.6  mL/min/1.73      Comment: National Kidney Foundation and American Society of Nephrology (ASN) Task Force recommended calculation based on the Chronic Kidney Disease Epidemiology Collaboration (CKD-EPI) equation refit without adjustment for race.       Narrative:      GFR Normal >60  Chronic Kidney Disease <60  Kidney Failure <15      CBC & Differential [[id number]]  (Abnormal) Collected: 10/06/22 0400    Specimen: Blood Updated: 10/06/22 0444    Narrative:      The following orders were created for panel order CBC & Differential.  Procedure                               Abnormality         Status                     ---------                               -----------         ------                     CBC Auto Differential[[id number]]        Abnormal            Final result                 Please view results for these tests on the individual orders.    CBC Auto Differential [[id number]]  (Abnormal) Collected: 10/06/22 0400    Specimen: Blood Updated: 10/06/22 0444     WBC 6.50 10*3/mm3      RBC 4.24 10*6/mm3      Hemoglobin 12.4 g/dL      Hematocrit 36.7 %      MCV 86.6 fL      MCH 29.4 pg      MCHC 33.9 g/dL      RDW 12.8 %      RDW-SD 39.4 fl      MPV 7.2 fL      Platelets 380 10*3/mm3      Neutrophil % 46.2 %      Lymphocyte % 42.6 %      Monocyte % 8.1 %      Eosinophil % 2.6 %      Basophil % 0.5 %      Neutrophils, Absolute 3.00 10*3/mm3      Lymphocytes, Absolute 2.80 10*3/mm3      Monocytes, Absolute 0.50 10*3/mm3      Eosinophils, Absolute 0.20 10*3/mm3      Basophils, Absolute 0.00 10*3/mm3      nRBC 0.2 /100 WBC     Blood Culture - Blood, Arm, Left [[id number]]  (Normal) Collected: 10/02/22 2017    Specimen: Blood from Arm, Left Updated: 10/05/22 2032     Blood Culture No growth at 3 days    Blood Culture - Blood, Arm, Right [[id number]]  (Abnormal) Collected: 10/02/22 2004    Specimen: Blood from Arm, Right Updated: 10/05/22 0751     Blood Culture Micrococcus species     Isolated from Aerobic Bottle     Gram  Stain Aerobic Bottle Gram positive cocci in clusters    Narrative:      Probable contaminant requires clinical correlation, susceptibility not performed unless requested by physician.      Basic Metabolic Panel [[id number]]  (Abnormal) Collected: 10/05/22 0457    Specimen: Blood Updated: 10/05/22 0606     Glucose 165 mg/dL      BUN 11 mg/dL      Creatinine 0.83 mg/dL      Sodium 138 mmol/L      Potassium 3.4 mmol/L      Chloride 100 mmol/L      CO2 31.0 mmol/L      Calcium 8.0 mg/dL      BUN/Creatinine Ratio 13.3     Anion Gap 7.0 mmol/L      eGFR 108.6 mL/min/1.73      Comment: National Kidney Foundation and American Society of Nephrology (ASN) Task Force recommended calculation based on the Chronic Kidney Disease Epidemiology Collaboration (CKD-EPI) equation refit without adjustment for race.       Narrative:      GFR Normal >60  Chronic Kidney Disease <60  Kidney Failure <15      CBC & Differential [[id number]]  (Abnormal) Collected: 10/05/22 0457    Specimen: Blood Updated: 10/05/22 0547    Narrative:      The following orders were created for panel order CBC & Differential.  Procedure                               Abnormality         Status                     ---------                               -----------         ------                     CBC Auto Differential[[id number]]        Abnormal            Final result                 Please view results for these tests on the individual orders.    CBC Auto Differential [[id number]]  (Abnormal) Collected: 10/05/22 0457    Specimen: Blood Updated: 10/05/22 0547     WBC 8.70 10*3/mm3      RBC 4.15 10*6/mm3      Hemoglobin 12.4 g/dL      Hematocrit 35.8 %      MCV 86.3 fL      MCH 30.0 pg      MCHC 34.7 g/dL      RDW 13.0 %      RDW-SD 39.8 fl      MPV 7.2 fL      Platelets 359 10*3/mm3      Neutrophil % 56.2 %      Lymphocyte % 32.6 %      Monocyte % 8.8 %      Eosinophil % 1.9 %      Basophil % 0.5 %      Neutrophils, Absolute 4.90 10*3/mm3      Lymphocytes, Absolute  2.80 10*3/mm3      Monocytes, Absolute 0.80 10*3/mm3      Eosinophils, Absolute 0.20 10*3/mm3      Basophils, Absolute 0.00 10*3/mm3      nRBC 0.1 /100 WBC     Blood Culture ID, PCR - Blood, Arm, Right [[id number]] Collected: 10/02/22 2004    Specimen: Blood from Arm, Right Updated: 10/04/22 0534     BCID, PCR Negative by BCID PCR. Culture to Follow.     BOTTLE TYPE Aerobic Bottle    Urinalysis With Culture If Indicated - Urine, Clean Catch [[id number]]  (Abnormal) Collected: 10/03/22 1306    Specimen: Urine, Clean Catch Updated: 10/03/22 1321     Color, UA Rebecca     Appearance, UA Clear     pH, UA 6.0     Specific Gravity, UA 1.024     Glucose, UA >=1000 mg/dL (3+)     Ketones, UA 15 mg/dL (1+)     Bilirubin, UA Negative     Blood, UA Negative     Protein, UA Negative     Leuk Esterase, UA Negative     Nitrite, UA Negative     Urobilinogen, UA 4.0 E.U./dL    Narrative:      In absence of clinical symptoms, the presence of pyuria, bacteria, and/or nitrites on the urinalysis result does not correlate with infection.  Urine microscopic not indicated.    Lactic Acid, Plasma [[id number]]  (Normal) Collected: 10/02/22 2109    Specimen: Blood Updated: 10/02/22 2132     Lactate 0.9 mmol/L     Extra Tubes [[id number]] Collected: 10/02/22 2028    Specimen: Blood, Venous Line Updated: 10/02/22 2132    Narrative:      The following orders were created for panel order Extra Tubes.  Procedure                               Abnormality         Status                     ---------                               -----------         ------                     Green Top (Gel)[[id number]]                                  Final result                 Please view results for these tests on the individual orders.    Green Top (Gel) [[id number]] Collected: 10/02/22 2028    Specimen: Blood Updated: 10/02/22 2132     Extra Tube Hold for add-ons.     Comment: Auto resulted.       COVID-19,CEPHEID/COURTNEY,COR/IRA/PAD/VASQUEZ IN-HOUSE(OR EMERGENT/ADD-ON),NP  SWAB IN TRANSPORT MEDIA 3-4 HR TAT, RT-PCR - Swab, Nasopharynx [[id number]]  (Abnormal) Collected: 10/02/22 2018    Specimen: Swab from Nasopharynx Updated: 10/02/22 2054     COVID19 Detected    Narrative:      Fact sheet for providers: https://www.fda.gov/media/651762/download     Fact sheet for patients: https://www.fda.gov/media/887035/download  Fact sheet for providers: https://www.fda.gov/media/549500/download    Fact sheet for patients: https://www.fda.gov/media/126395/download    Test performed by PCR.    [place] Draw [[id number]] Collected: 10/02/22 1932    Specimen: Blood Updated: 10/02/22 2047    Narrative:      The following orders were created for panel order [place] Draw.  Procedure                               Abnormality         Status                     ---------                               -----------         ------                     Green Top (Gel)[[id number]]                                  Final result               Lavender Top[[id number]]                                     Final result               Gold Top - SST[[id number]]                                   Final result               Light Blue Top[[id number]]                                                                Please view results for these tests on the individual orders.    Lavender Top [[id number]] Collected: 10/02/22 1932    Specimen: Blood Updated: 10/02/22 2047     Extra Tube hold for add-on     Comment: Auto resulted       Gold Top - SST [[id number]] Collected: 10/02/22 1932    Specimen: Blood Updated: 10/02/22 2040    Green Top (Gel) [[id number]] Collected: 10/02/22 1932    Specimen: Blood Updated: 10/02/22 2033    Comprehensive Metabolic Panel [[id number]]  (Abnormal) Collected: 10/02/22 1932    Specimen: Blood Updated: 10/02/22 2021     Glucose 74 mg/dL      BUN 11 mg/dL      Creatinine 0.82 mg/dL      Sodium 136 mmol/L      Potassium 3.2 mmol/L      Comment: Slight hemolysis detected by analyzer. Results may be affected.         Chloride 97 mmol/L      CO2 27.0 mmol/L      Calcium 8.8 mg/dL      Total Protein 7.8 g/dL      Albumin 3.00 g/dL      ALT (SGPT) 34 U/L      AST (SGOT) 15 U/L      Alkaline Phosphatase 99 U/L      Total Bilirubin 0.8 mg/dL      Globulin 4.8 gm/dL      A/G Ratio 0.6 g/dL      BUN/Creatinine Ratio 13.4     Anion Gap 12.0 mmol/L      eGFR 109.0 mL/min/1.73      Comment: National Kidney Foundation and American Society of Nephrology (ASN) Task Force recommended calculation based on the Chronic Kidney Disease Epidemiology Collaboration (CKD-EPI) equation refit without adjustment for race.       Narrative:      GFR Normal >60  Chronic Kidney Disease <60  Kidney Failure <15      Protime-INR [[id number]]  (Normal) Collected: 10/02/22 2004    Specimen: Blood Updated: 10/02/22 2020     Protime 11.2 Seconds      INR 1.09    aPTT [[id number]]  (Abnormal) Collected: 10/02/22 2004    Specimen: Blood Updated: 10/02/22 2020     PTT 28.0 seconds     POC Lactate [[id number]]  (Normal) Collected: 10/02/22 2006    Specimen: Blood Updated: 10/02/22 2007     Lactate 0.9 mmol/L      Comment: Serial Number: [id number]:  [id number]       Magnesium [[id number]]  (Normal) Collected: 10/02/22 1932    Specimen: Blood Updated: 10/02/22 1952     Magnesium 1.9 mg/dL            Results for orders placed during the hospital encounter of 01/18/20    Adult Transthoracic Echo Complete W/ Cont if Necessary Per Protocol    Interpretation Summary  · Left ventricular systolic function is normal.  · Mild mitral valve regurgitation is present  · Mild tricuspid valve regurgitation is present.  · Ejection fraction is about 65%  · No pericardial effusion noted              Condition on Discharge:      Vital Signs  Temp:  [97.7 °F (36.5 °C)-98.9 °F (37.2 °C)] 97.7 °F (36.5 °C)  Heart Rate:  [63-75] 68  Resp:  [18-19] 19  BP: (119-136)/(72-84) 135/82    Physical Exam:     General Appearance:    Alert, cooperative, in no acute distress   Head:     Normocephalic, without obvious abnormality, atraumatic   Eyes:            Lids and lashes normal, conjunctivae and sclerae normal, no   icterus, no pallor, corneas clear, PERRLA   Ears:    Ears appear intact with no abnormalities noted   Throat:   No oral lesions, no thrush, oral mucosa moist   Neck:   No adenopathy, supple, trachea midline, no thyromegaly, no   carotid bruit, no JVD   Lungs:     Clear to auscultation,respirations regular, even and                  unlabored    Heart:    Regular rhythm and normal rate, normal S1 and S2, no            murmur, no gallop, no rub, no click   Chest Wall:    No abnormalities observed   Abdomen:     Normal bowel sounds, no masses, no organomegaly, soft        non-tender, non-distended, no guarding, no rebound                tenderness   Extremities:  Left BKA; wound VAC in place on left distal thigh; right AKA   Pulses:   Pulses palpable and equal bilaterally   Skin:   No bleeding, bruising or rash   Lymph nodes:   No palpable adenopathy   Neurologic:   Cranial nerves 2 - 12 grossly intact, sensation intact, DTR       present and equal bilaterally       Discharge Disposition  Home or Self Care    Discharge Medications     Discharge Medications      Changes to Medications      Instructions Start Date   Augmentin 875-125 MG per tablet  Generic drug: amoxicillin-clavulanate  What changed: Another medication with the same name was added. Make sure you understand how and when to take each.   1 tablet, Oral, 2 Times Daily, Day 4 of 10      amoxicillin-clavulanate 875-125 MG per tablet  Commonly known as: AUGMENTIN  What changed: You were already taking a medication with the same name, and this prescription was added. Make sure you understand how and when to take each.   1 tablet, Oral, Every 12 Hours Scheduled      HYDROcodone-acetaminophen  MG per tablet  Commonly known as: NORCO  What changed: when to take this   1 tablet, Oral, Every 4 Hours PRN         Continue These  Medications      Instructions Start Date   BASAGLAR KWIKPEN 100 UNIT/ML injection pen   30 Units, Subcutaneous, Nightly, Hasn't picked up new order yet. Old order was 12 units at bedtime      DULoxetine 60 MG capsule  Commonly known as: CYMBALTA   60 mg, Oral, Daily, Take DOS      insulin aspart 100 UNIT/ML injection  Commonly known as: novoLOG   Subcutaneous, 3 Times Daily Before Meals, PER SS. BETWEEN 3-20 UNITS Hold DOS      pantoprazole 40 MG EC tablet  Commonly known as: PROTONIX   40 mg, Oral, Every Evening, Hasn't been filled in awhile      pregabalin 75 MG capsule  Commonly known as: LYRICA   75 mg, Oral, 3 Times Daily PRN      vitamin D 1.25 MG (95168 UT) capsule capsule  Commonly known as: ERGOCALCIFEROL   50,000 Units, Oral, Weekly             Discharge Diet: Low concentrated sweet    Activity at Discharge: No restrictions    Follow-up Appointments  Future Appointments   Date Time Provider Department Center   10/17/2022  9:45 AM Yahaira Dey PT [facility]   10/25/2022  9:45 AM Yahaira Dey PT [facility]   11/3/2022  9:45 AM Yahaira Dey PT [facility]     Additional Instructions for the Follow-ups that You Need to Schedule     Ambulatory Referral to Home Health (Hospital)   As directed      Face to Face Visit Date: 10/5/2022    Follow-up provider for Plan of Care?: I treated the patient in an acute care facility and will not continue treatment after discharge.    Follow-up provider: KACIE RIVERA [[id number]]    Reason/Clinical Findings: post hospitalization    Describe mobility limitations that make leaving home difficult: weakness    Nursing/Therapeutic Services Requested: Skilled Nursing    Skilled nursing orders: Medication education Wound vac application and instruction Infusion therapy    Frequency: 1 Week 1         Discharge Follow-up with PCP   As directed       Currently Documented PCP:    Kacie Rivera FNP    PCP Phone Number:    [phone]     Follow Up Details:  Office will contact you tomorrow to schedule follow up appointment           APPT WITH KACIE GONZALEZEN AT [facility]:  October 20 AT 4 PM    Test Results Pending at Discharge  Pending Labs     Order Current Status    Blood Culture - Blood, Arm, Left Preliminary result           Mariana Levine MD  10/06/22  15:36 EDT    Time: Discharge 25 min          Electronically signed by Mariana Levine MD at 10/06/22 1544       Discharge Order (From admission, onward)     Start     Ordered    10/06/22 1530  Discharge patient  Once        Expected Discharge Date: 10/06/22    Discharge Disposition: Home or Self Care    Physician of Record for Attribution - Please select from Treatment Team: MARIANA LEVINE [[id number]]    Review needed by CMO to determine Physician of Record: No       Question Answer Comment   Physician of Record for Attribution - Please select from Treatment Team MARIANA LEVINE    Review needed by CMO to determine Physician of Record No        10/06/22 8592

## 2022-10-07 NOTE — CASE MANAGEMENT/SOCIAL WORK
Case Management Discharge Note      Final Note: Home with outpatient care.    Selected Continued Care - Discharged on 10/6/2022 Admission date: 10/2/2022 - Discharge disposition: Home or Self Care     Transportation Services  Private: Car    Final Discharge Disposition Code: 01 - home or self-care

## 2022-10-07 NOTE — OUTREACH NOTE
Prep Survey    Flowsheet Row Responses   [facility] facility patient discharged from? [facility]   Is LACE score < 7 ? No   Emergency Room discharge w/ pulse ox? No   Eligibility Readm Mgmt   Discharge diagnosis T1DM, nonhealing wound LLE, asymptomatic COVID-19 (symptomatic infection several weekd ago)   Does the patient have one of the following disease processes/diagnoses(primary or secondary)? Other   Does the patient have Home health ordered? Yes   What is the Home health agency?  [facility]   Is there a DME ordered? No   Prep survey completed? Yes          NICK JAIME - Registered Nurse

## 2022-10-07 NOTE — CASE MANAGEMENT/SOCIAL WORK
Continued Stay Note  [facility]     Patient Name: Maynor Gregg  MRN: [id number]  Today's Date: 10/7/2022    Admit Date: 10/2/2022    Plan: Routine home, needs f/u at [facility].   Discharge Plan     Row Name 10/07/22 1242       Plan    Plan Routine home, needs f/u at [facility].    Plan Comments CM spoke to manager Tao at [facility] this AM. Discussed patient's needs of wound vac change Q 3 days. They require 40 hours/week for their services, which would not be met by wound vac needs. CM also called [facility] and they stated they are not able to take a wound vac. CM contacted [facility] at 08:48 and had to leave a voicemail. Received phone call from patient asking for an update and that he was trying to contact wound clinic as well. CM placed second/third calls this afternoon but still had to leave a voicemail. Reached out to CM supervisor Juanita for assistance. She was able to reach someone at wound clinic and provided patient information; they will be contacting patient.              Phone communication or documentation only - no physical contact with patient or family.    Megan Naegele, RN      Office Phone: [phone]  Office Cell: [phone]

## 2022-10-10 ENCOUNTER — READMISSION MANAGEMENT (OUTPATIENT)
Dept: CALL CENTER | Facility: HOSPITAL | Age: 47
End: 2022-10-10

## 2022-10-10 NOTE — OUTREACH NOTE
"Medical Week 1 Survey    Flowsheet Row Responses   [facility] patient discharged from? [facility]   Does the patient have one of the following disease processes/diagnoses(primary or secondary)? Other   Week 1 attempt successful? Yes   Call start time 0905   Revoke Decline to participate  [Person answered stating, \"I can't hear you, the phone's broke, please stop calling.\", and hung up.]   Call end time 0905          VERNA JAIME - Registered Nurse  "

## 2022-11-07 RX ORDER — ASPIRIN 325 MG
325 TABLET ORAL DAILY
COMMUNITY

## 2022-11-09 ENCOUNTER — ANESTHESIA EVENT (OUTPATIENT)
Dept: PERIOP | Facility: HOSPITAL | Age: 47
End: 2022-11-09

## 2022-11-10 ENCOUNTER — HOSPITAL ENCOUNTER (OUTPATIENT)
Facility: HOSPITAL | Age: 47
Setting detail: HOSPITAL OUTPATIENT SURGERY
Discharge: HOME | End: 2022-11-10
Attending: ORTHOPAEDIC SURGERY | Admitting: ORTHOPAEDIC SURGERY

## 2022-11-10 ENCOUNTER — ANESTHESIA (OUTPATIENT)
Dept: PERIOP | Facility: HOSPITAL | Age: 47
End: 2022-11-10

## 2022-11-10 VITALS
DIASTOLIC BLOOD PRESSURE: 75 MMHG | HEIGHT: 75 IN | WEIGHT: 229.6 LBS | BODY MASS INDEX: 28.55 KG/M2 | HEART RATE: 90 BPM | RESPIRATION RATE: 16 BRPM | SYSTOLIC BLOOD PRESSURE: 106 MMHG | OXYGEN SATURATION: 96 % | TEMPERATURE: 98.5 F

## 2022-11-10 RX ORDER — LIDOCAINE HYDROCHLORIDE AND EPINEPHRINE 10; 10 MG/ML; UG/ML
INJECTION, SOLUTION INFILTRATION; PERINEURAL AS NEEDED
Status: DISCONTINUED | OUTPATIENT
Start: 2022-11-10 | End: 2022-11-10 | Stop reason: HOSPADM

## 2022-11-10 RX ORDER — SODIUM CHLORIDE 0.9 % (FLUSH) 0.9 %
10 SYRINGE (ML) INJECTION EVERY 12 HOURS SCHEDULED
Status: DISCONTINUED | OUTPATIENT
Start: 2022-11-10 | End: 2022-11-10

## 2022-11-10 RX ORDER — SODIUM CHLORIDE 0.9 % (FLUSH) 0.9 %
10 SYRINGE (ML) INJECTION AS NEEDED
Status: DISCONTINUED | OUTPATIENT
Start: 2022-11-10 | End: 2022-11-10

## 2022-11-10 RX ORDER — SODIUM CHLORIDE, SODIUM LACTATE, POTASSIUM CHLORIDE, AND CALCIUM CHLORIDE .6; .31; .03; .02 G/100ML; G/100ML; G/100ML; G/100ML
20 INJECTION, SOLUTION INTRAVENOUS CONTINUOUS
Status: DISCONTINUED | OUTPATIENT
Start: 2022-11-10 | End: 2022-11-10

## 2022-11-10 NOTE — OP NOTE
LACERATION REPAIR  Procedure Report    Patient Name:  Maynor Gregg  YOB: 1975    Date of Surgery:  11/10/2022         Pre-op Diagnosis: Left popliteal ulcer    Postop diagnosis: Same    Indication: 47-year-old white male with a left BKA prosthesis which or an ulcer in his popliteal space.  He been treating with a VAC suction but has been healing slowly and he wished to have it partially closed and do wound packing only      Procedure/CPT® Codes:      Procedure(s):  Left KNEE DELAYED WOUND CLOSURE    Staff:  Surgeon(s):  Donn Alcantara MD         Anesthesia: Local    Estimated Blood Loss: 0    Implants:    Nothing was implanted during the procedure    Specimen:          None        Findings: 3 cm longitudinal by 12 mm wide by 3 mm deep popliteal wound granulation tissue in the base    Complications: None    Description of Procedure: Patient was seen preoperative.  Had the left leg initialed.  Was taken the OR we had timeout performed.  Was positioned in the lateral decubitus position had sterile prep and draping of his left leg.  The wound was infiltrated with about 10 cc of 1% lidocaine with epinephrine.  Wound edges were undermined with a scalpel and scissors the wound was full together the superior two thirds of the wound using 2-0 Vicryl and 3-0 nylon.  A 8 mm wound was left distally for wound packing.  Wound was then packed with quarter inch plain Nu Gauze and sterile dressings applied    Plan: Do daily wound packing.  Remove sutures in 2 weeks        Donn Alcantara MD     Date: 11/10/2022  Time: 12:38 EST

## 2022-11-10 NOTE — ANESTHESIA PREPROCEDURE EVALUATION
Anesthesia Evaluation     Patient summary reviewed and Nursing notes reviewed   no history of anesthetic complications:  NPO Solid Status: > 8 hours  NPO Liquid Status: > 8 hours           Airway   Dental      Pulmonary    (+) a smoker Former, shortness of breath,   Cardiovascular     ECG reviewed    (+) valvular problems/murmurs MR and TI, past MI , CAD, cardiac stents CHF ,       Neuro/Psych  (+) headaches, numbness, psychiatric history Depression and Anxiety,    GI/Hepatic/Renal/Endo    (+)  GERD,  diabetes mellitus poorly controlled using insulin,     Musculoskeletal     (+) chronic pain,   Abdominal    Substance History      OB/GYN          Other        ROS/Med Hx Other: Stump ulcer, h/o BKA infection, hyperglycemia, neuropathy, h/o osteomyelitis, h/o sepsis, N/V, hypotension, chest pain, phantom limb pain, gallstones, allergies, lung nodule, h/o COVID-19      Echocardiogram Findings    Left Ventricle Left ventricular systolic function is normal. Calculated EF = 65.0%. Estimated EF appears to be in the range of 61 - 65%. Normal left ventricular cavity size noted.  Right Ventricle Normal right ventricular cavity size noted.  Left Atrium Normal left atrial size noted.  Right Atrium Normal right atrial size noted.  Aortic Valve The aortic valve is grossly normal in structure.  Mitral Valve Mild mitral valve regurgitation is present.  Tricuspid Valve The tricuspid valve is grossly normal.  Mild tricuspid valve regurgitation is present.  Pulmonic Valve The pulmonic valve is not well visualized.  Greater Vessels No dilation of the aortic root is present.  Pericardium There is no evidence of pericardial effusion.    PSH  AMPUTATION FOOT / TOE TOE AMPUTATION  WOUND DEBRIDEMENT INCISION AND DRAINAGE LEG  CARDIAC CATHETERIZATION CARDIAC CATHETERIZATION  AMPUTATION DIGIT INCISION AND DRAINAGE LEG  BELOW KNEE AMPUTATION BELOW KNEE AMPUTATION  BELOW KNEE AMPUTATION AMPUTATION REVISION  CORONARY ANGIOPLASTY WITH STENT  PLACEMENT SKIN FULL THICKNESS GRAFT  AMPUTATION REVISION CARDIAC SURGERY  ABOVE KNEE AMPUTATION                   Anesthesia Plan    ASA 4     MAC     (Patient identified; pre-operative vital signs, all relevant labs/studies, complete medical/surgical/anesthetic history, full medication list, full allergy list, and NPO status obtained/reviewed; physical assessment performed; anesthetic options, side effects, potential complications, risks, and benefits discussed; questions answered; written anesthesia consent obtained; patient cleared for procedure; anesthesia machine and equipment checked and functioning)    Anesthetic plan, risks, benefits, and alternatives have been provided, discussed and informed consent has been obtained with: patient.    Plan discussed with CRNA and CAA.        CODE STATUS:

## 2022-11-10 NOTE — PRE-PROCEDURE NOTE
"Dr. Chung, anesthesiologist, at bedside, states he discussed case with Dr. Alcantara and to be done as MAC.  Patient refuses MAC, tells Dr. Chung he \"just wants it numbed up, no sedation\".  Dr. Chung discussed with Dr. Alcantara again, states no IV, no anesthesia, will proceed with local only.    "

## 2022-12-14 ENCOUNTER — DOCUMENTATION (OUTPATIENT)
Dept: PHYSICAL THERAPY | Facility: CLINIC | Age: 47
End: 2022-12-14

## 2022-12-14 NOTE — PROGRESS NOTES
Discharge Summary  Discharge Summary from Physical Therapy Report      Dates  PT visit:  5/25/22 - 9/12/22  Number of Visits: 10    Discharge Status of Patient: See MD Note dated 9/12/2022    Goals: Partially Met    Discharge Plan: Future need for rehabilitation activities    Comments Pt called to cancel all remaining scheduled appts due to hospitalization and wound            Yahaira Dey, PT  Physical Therapist

## 2023-01-11 NOTE — PROGRESS NOTES
"CHIEF COMPLAINT  R shoulder pain      Subjective   Maynor Gregg is a 47 y.o. male who was referred by , Boni Burrell MD  to our pain management clinic for consultation, evaluation and treatment of R shoulder pain. His pain started around 1/2020 after he slipped and had a fall injuring his right shoulder. At that time, he also slipped and scraped his right foot along a drain piece which led to osteomyelitis, with a below the knee amputation of the right leg. Patient was doing well and got a prosthetic in April 2020 however noticed having increase in left leg pain due to overcompensation. At that time he was diagnosed with separation of his left foot bones and only course of treatment was to amputate below the left knee.    MRI of shoulder showed endochondroma. He had seen Ortho oncologist in [place] who told him t it was most likely non-cancerous and possible surgical resection if it increased in size.  He was recommended PT for R arm. He had R shoulder injection X2 previously with short term relief.  Phantom pain in b/l legs. He had seen [facility] spine and changed care as per patient is felt that \"they didn't do anything.\" Norco doesn't help significantly and causes constipation. Takes 1 tab every 3-4 days.  He states that when he was hospitalized he was given morphine which helped significantly with his pain.  He is supposed to start PT soon.  He was previously on gabapentin and was switched to Lyrica as it was not helping significantly.  Patient states the Lyrica was not significantly effective and thus he was switched back to gabapentin recently.  Some relief with gabapentin but no significant relief.  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  He has 12-year-old son.     Right arm lateral/triceps  is 8/10 on VAS, at maximum is 9/10. Pain is sharp, stabbing pain in nature. Pain is referred R triceps. The pain is constant. The pain is improved by Norco a bit. The " pain is worse with laying on that R side and flexion.    B/l phantom pain where he feels like both foot are in severe spasms.     PHQ-9- 17   SOAPP- 2  [place] back disability scale - 60    PMH:   DM-1, history of AKA right- multiple revisions, history of BKA left (9/20), GERD, hyperlipidemia, CAD and history of MI, history of left BKA stump ulceration, right foot amputation, diabetic peripheral neuropathy, history of osteomyelitis of right foot    Current Medications:   Gabapentin 300 mg BID  Norco  mg QID PRN (last script - 1/5/23)  Aspirin 325 mg  Cymbalta      Past Medications:  Lyrica 75 mg TID - didn't help much    Past Modalities:  TENS:       no          Physical Therapy Within The Last 6 Months     no  Psychotherapy     no  Massage Therapy      no    Patient Complains Of:  Uro-Fecal Incontinence no  Weight Gain/Loss  no  Fever/Chills   no  Weakness   no      PEG Assessment   What number best describes your pain on average in the past week?8  What number best describes how, during the past week, pain has interfered with your enjoyment of life?8  What number best describes how, during the past week, pain has interfered with your general activity?  8        Current Outpatient Medications:   •  amoxicillin-clavulanate (AUGMENTIN) 875-125 MG per tablet, Take 1 tablet by mouth 2 (Two) Times a Day. Day 4 of 10, Disp: , Rfl:   •  aspirin 325 MG tablet, Take 1 tablet by mouth Daily., Disp: , Rfl:   •  DULoxetine (CYMBALTA) 60 MG capsule, Take 60 mg by mouth Daily. Take DOS, Disp: , Rfl:   •  HYDROcodone-acetaminophen (NORCO)  MG per tablet, Take 1 tablet by mouth Every 4 (Four) Hours As Needed for Moderate Pain  (Pain). (Patient taking differently: Take 1 tablet by mouth Every 6 (Six) Hours As Needed for Moderate Pain (Pain).), Disp: 40 tablet, Rfl: 0  •  insulin aspart (novoLOG) 100 UNIT/ML injection, Inject  under the skin into the appropriate area as directed 3 (Three) Times a Day Before Meals. PER SS.  BETWEEN 3-20 UNITS Hold DOS, Disp: , Rfl:   •  Insulin Glargine (BASAGLAR KWIKPEN) 100 UNIT/ML injection pen, Inject 30 Units under the skin into the appropriate area as directed Every Night., Disp: , Rfl:   •  pantoprazole (PROTONIX) 40 MG EC tablet, Take 40 mg by mouth Every Evening. Hasn't been filled in awhile, Disp: , Rfl:   •  pregabalin (LYRICA) 75 MG capsule, Take 75 mg by mouth 3 (Three) Times a Day As Needed., Disp: , Rfl:   •  rosuvastatin (CRESTOR) 20 MG tablet, rosuvastatin 20 mg tablet, Disp: , Rfl:   •  vitamin D (ERGOCALCIFEROL) 1.25 MG (10326 UT) capsule capsule, Take 1 capsule by mouth 1 (One) Time Per Week. Sunday, Disp: , Rfl:   •  gabapentin (NEURONTIN) 600 MG tablet, Take 1 tablet by mouth 2 (Two) Times a Day for 30 days., Disp: 60 tablet, Rfl: 0  •  Morphine (MSIR) 15 MG tablet, Take 1 tablet by mouth 2 (Two) Times a Day As Needed for Severe Pain for up to 7 days., Disp: 14 tablet, Rfl: 0  •  [START ON 1/23/2023] Morphine (MSIR) 15 MG tablet, Take 1 tablet by mouth 2 (Two) Times a Day As Needed for Severe Pain for up to 7 days., Disp: 14 tablet, Rfl: 0  •  naloxone (NARCAN) 4 MG/0.1ML nasal spray, 1 spray into the nostril(s) as directed by provider As Needed (in case of respiratory depression) for up to 30 days., Disp: 1 each, Rfl: 0    The following portions of the patient's history were reviewed and updated as appropriate: allergies, current medications, past family history, past medical history, past social history, past surgical history, and problem list.      REVIEW OF PERTINENT MEDICAL DATA    Past Medical History:   Diagnosis Date   • Anxiety    • Bone infection (HCC)     STATES CAUSED HIM TO LOSE RIGHT LEG   • CHF (congestive heart failure) (HCC)     history, no current SX's   • Coronary artery disease     no cardiologist   • Depression    • Diabetes mellitus (HCC)     TYPE 1   • GERD (gastroesophageal reflux disease)    • Headache    • History of amputation     PEDRO BELOW THE KNEE   •  MI, old 2010    WITH STENT PLACEMENT-CARDIO WANDA [place]-NO LONGER SEES   • Nodule of left lung     HAD CT SCAN BENIGN.    • Phantom limb pain (HCC)     LEFT AND RIGHT BOTH   • Seasonal allergies      Past Surgical History:   Procedure Laterality Date   • ABOVE KNEE AMPUTATION Right 10/14/2021    Procedure: AMPUTATION ABOVE KNEE;  Surgeon: Donn Alcantara MD;  Location: [facility] MAIN OR;  Service: Orthopedics;  Laterality: Right;   • AMPUTATION DIGIT Right 2/28/2020    Procedure: PARTIAL FIRST RAY RESECTION RIGHT FOOT;  Surgeon: CROW Souza DPM;  Location: [facility] MAIN OR;  Service: Podiatry;  Laterality: Right;   • AMPUTATION FOOT / TOE     • AMPUTATION REVISION Right 3/2/2021    Procedure: AMPUTATION REVISION KNEE STUMP;  Surgeon: Donn Alcantara MD;  Location: [facility] MAIN OR;  Service: Orthopedics;  Laterality: Right;   • AMPUTATION REVISION Right 9/23/2021    Procedure: RIGHT BELOW KNEE AMPUTATION REVISION;  Surgeon: Donn Alcantara MD;  Location: [facility] MAIN OR;  Service: Orthopedics;  Laterality: Right;   • BELOW KNEE AMPUTATION Right 4/30/2020    Procedure: AMPUTATION BELOW KNEE;  Surgeon: Donn Alcantara MD;  Location: [facility] MAIN OR;  Service: Orthopedics;  Laterality: Right;   • BELOW KNEE AMPUTATION Left 9/2/2020    Procedure: AMPUTATION BELOW KNEE, left;  Surgeon: Donn Alcantara MD;  Location: [facility] MAIN OR;  Service: Orthopedics;  Laterality: Left;   • BELOW KNEE AMPUTATION Right 2/23/2021    Procedure: Revision of amputaion below knee;  Surgeon: Donn Alcantara MD;  Location: [facility] MAIN OR;  Service: Orthopedics;  Laterality: Right;   • CARDIAC CATHETERIZATION  11/2010     RCA artery   • CARDIAC CATHETERIZATION  2015    LAD artery   • CARDIAC SURGERY     • CORONARY ANGIOPLASTY WITH STENT PLACEMENT      X2. 2015   • INCISION AND DRAINAGE LEG Left 1/21/2020    Procedure: INCISION AND DRAINAGE LOWER EXTREMITY with second digit amputation;  Surgeon: CROW Souza DPM;  Location:   [facility] MAIN OR;  Service: Podiatry   • INCISION AND DRAINAGE LEG Right 2020    Procedure: incision and drainage ,transmetatarsal amputation, fasciotomy;  Surgeon: CROW Souza DPM;  Location: [facility] MAIN OR;  Service: Podiatry;  Laterality: Right;   • LACERATION REPAIR Left 11/10/2022    Procedure: KNEE DELAYED WOUND CLOSURE;  Surgeon: Donn Alcantara MD;  Location: [facility] MAIN OR;  Service: Orthopedics;  Laterality: Left;   • SKIN FULL THICKNESS GRAFT Right 2021    Procedure: EXCISION SOFT TISSUE Ulcer WITH FULL THICKNESS SKIN GRAFT to right lower leg.;  Surgeon: Sukhdeep Hernadez MD;  Location: [facility] MAIN OR;  Service: Plastics;  Laterality: Right;   • TOE AMPUTATION Left    • WOUND DEBRIDEMENT Right 2020    Procedure: DEBRIDEMENT with sesamoid excision;  Surgeon: CROW Souza DPM;  Location: [facility] MAIN OR;  Service: Podiatry     Family History   Problem Relation Age of Onset   • Diabetes Father    • Heart failure Father    • Diabetes Paternal Grandfather    • Malig Hyperthermia Neg Hx      Social History     Socioeconomic History   • Marital status: Significant Other   Tobacco Use   • Smoking status: Former     Packs/day: 0.50     Years: 6.00     Pack years: 3.00     Types: Cigarettes     Quit date: 2010     Years since quittin.0   • Smokeless tobacco: Never   Vaping Use   • Vaping Use: Never used   Substance and Sexual Activity   • Alcohol use: No   • Drug use: No   • Sexual activity: Yes     Partners: Female     Birth control/protection: Condom         Review of Systems   Musculoskeletal: Positive for arthralgias.         Vitals:    23 0937   BP: 132/85   Pulse: 85   Resp: 16   SpO2: 97%   PainSc:   8         Objective   Physical Exam  Musculoskeletal:         General: Tenderness present.        Arms:            Imaging Reviewed:  MRI humerus without contrast R - 22:   - Large chondroid lesion within the diaphysis of humerus measuring up to 10.8 cm. Most  consistent with enchondroma. Superior portion stable since 2020. If he develops pain, surgical evaluation is recommended.   - mild tendinosis distal supraspinatus and superior subscapularis tendons.   - Tear or superior labrum from anterior posterior.     MRI left tib-fib without contrast-10/3/2022  - Deep ulceration of posterior medial knee with associated soft tissue edema and skin thickening.  Severe high signal intensity and semimembranous tendons and muscle distally that can be seen with myositis or tendinosis  - Small Baker's cyst    Assessment:    1. Enchondroma of humerus, right    2. Chronic right shoulder pain    3. Opioid use    4. High risk medication use    5. Opioid contract exists    6. Phantom pain after amputation of lower extremity (HCC)    7. Therapeutic opioid induced constipation         Plan:   1. New patient visit, urine drug screen per office policy. UDS today to monitor for compliance with medication use. The UDS is medically necessary to monitor for compliance due to the potential side effects and complications of misuse of opioids. UDS done today will review on next visit.   Narcotic contract signed and Narcan sent on 1/16/2023.  2. We discussed trying a course of formal physical therapy.  Physical therapy can help strengthen and stretch the muscles around the joints. Continue to be as active as possible.  Recommend starting PT as soon as possible.    3.  Stop Norco as it is not helping him significantly and causing severe constipation.  Patient had significant pain relief with morphine previously. Start Morphine 15 mg IR BID PRN. Discussed with the patient regarding long-term side effects of opioids including but not limited to opioid induced hormonal suppression, hyperalgesia, fatigue, weight gain, possible opioid induced altered immune system, addiction, tolerance, dependence, risk of hearing loss and elevated risk of myocardial infarction. Proper use and potential life threatening side  effects of over use discussed with patient. Patient states understanding of their use and risks.  4.  Increase gabapentin to 600 mg BID.  Sent prescription. Side effects discussed with the patient including but not limited to somnolence, dizziness, ataxia, headache, fatigue, blurred vision, cold or flu-like symptoms,delusions, hoarseness, lack or loss of strength, lower back or side pain, swelling of the hands, feet, or lower legs trembling or shaking. Patient understands and agrees with the plan.  5.  Recommend trying MiraLAX up to TID PRN for opioid-induced constipation..   6.  Patient has severe right shoulder pain.  Discussed with patient that he may benefit temporarily with right suprascapular nerve block so he could participate in physical therapy without significant pain.  Benefits and risks discussed with patient and he agreed to proceed.    7.  Continue following up with Ortho for possible surgical resection of endochondroma in future.  8. Narcan ordered as patient is on chronic opioid therapy. Narcan Nasal Spray/ Naloxone is used to treat an opioid overdose in an emergency situation.  It blocks or reverses the effects of opioid medication, including extreme drowsiness, slowed breathing, or loss of consciousness.   Administer 1 spray intranasally into 1 nostril, if desired response is not achieved after 2 or 3 minutes, give a second dose intranasally into alternate nostril.  Because Narcan reverses opioid effects, this medicine may cause sudden withdrawal symptoms such as: nausea, vomiting, diarrhea, stomach pain,fever, sweating, body aches, weakness; tremors or shivering, fast heart rate, pounding heartbeats, increased blood pressure; feeling nervous, restless, or irritable; goosebumps, shivering; runny nose, yawning.      RTC for injection and then 3 week follow up.     John Beltran DO  Pain Management   [facility]     Greater than 60 minutes was spent with the patient, reviewing records, reviewing  images, performing the exam, discussing diagnosis and further treatment options, etc., and greater than 50% was spent on education      INSPECT REPORT    As part of the patient's treatment plan, I may be prescribing controlled substances. The patient has been made aware of appropriate use of such medications, including potential risk of somnolence, limited ability to drive and/or work safely, and the potential for dependence or overdose. It has also been made clear that these medications are for use by this patient only, without concomitant use of alcohol or other substances unless prescribed.     Patient has completed prescribing agreement detailing terms of continued prescribing of controlled substances, including monitoring INSPECT reports, urine drug screening, and pill counts if necessary. The patient is aware that inappropriate use will results in cessation of prescribing such medications.    INSPECT report has been reviewed and scanned into the patient's chart.      EMR Dragon/Transcription Disclaimer:   Much of this encounter note is an electronic transcription/translation of spoken language to printed text. The electronic translation of spoken language may permit erroneous, or at times, nonsensical words or phrases to be inadvertently transcribed; Although I have reviewed the note for such errors, some may still exist.

## 2023-01-16 ENCOUNTER — OFFICE VISIT (OUTPATIENT)
Dept: PAIN MEDICINE | Facility: CLINIC | Age: 48
End: 2023-01-16
Payer: MEDICARE

## 2023-01-16 VITALS
RESPIRATION RATE: 16 BRPM | DIASTOLIC BLOOD PRESSURE: 85 MMHG | HEART RATE: 85 BPM | SYSTOLIC BLOOD PRESSURE: 132 MMHG | OXYGEN SATURATION: 97 %

## 2023-01-16 DIAGNOSIS — T40.2X5A THERAPEUTIC OPIOID INDUCED CONSTIPATION: ICD-10-CM

## 2023-01-16 DIAGNOSIS — Z79.891 OPIOID CONTRACT EXISTS: ICD-10-CM

## 2023-01-16 DIAGNOSIS — M25.511 CHRONIC RIGHT SHOULDER PAIN: ICD-10-CM

## 2023-01-16 DIAGNOSIS — K59.03 THERAPEUTIC OPIOID INDUCED CONSTIPATION: ICD-10-CM

## 2023-01-16 DIAGNOSIS — D16.01 ENCHONDROMA OF HUMERUS, RIGHT: Primary | ICD-10-CM

## 2023-01-16 DIAGNOSIS — G54.6 PHANTOM PAIN AFTER AMPUTATION OF LOWER EXTREMITY: ICD-10-CM

## 2023-01-16 DIAGNOSIS — F11.90 OPIOID USE: ICD-10-CM

## 2023-01-16 DIAGNOSIS — G89.29 CHRONIC RIGHT SHOULDER PAIN: ICD-10-CM

## 2023-01-16 DIAGNOSIS — Z79.899 HIGH RISK MEDICATION USE: ICD-10-CM

## 2023-01-16 PROCEDURE — 99205 OFFICE O/P NEW HI 60 MIN: CPT | Performed by: STUDENT IN AN ORGANIZED HEALTH CARE EDUCATION/TRAINING PROGRAM

## 2023-01-16 RX ORDER — ROSUVASTATIN CALCIUM 20 MG/1
TABLET, COATED ORAL
COMMUNITY

## 2023-01-16 RX ORDER — NALOXONE HYDROCHLORIDE 4 MG/.1ML
1 SPRAY NASAL AS NEEDED
Qty: 1 EACH | Refills: 0 | Status: SHIPPED | OUTPATIENT
Start: 2023-01-16 | End: 2023-02-15

## 2023-01-16 RX ORDER — MORPHINE SULFATE 15 MG/1
15 TABLET ORAL 2 TIMES DAILY PRN
Qty: 14 TABLET | Refills: 0 | Status: SHIPPED | OUTPATIENT
Start: 2023-01-23 | End: 2023-01-30

## 2023-01-16 RX ORDER — MORPHINE SULFATE 15 MG/1
15 TABLET ORAL 2 TIMES DAILY PRN
Qty: 14 TABLET | Refills: 0 | Status: SHIPPED | OUTPATIENT
Start: 2023-01-16 | End: 2023-01-23

## 2023-01-16 RX ORDER — GABAPENTIN 600 MG/1
600 TABLET ORAL 2 TIMES DAILY
Qty: 60 TABLET | Refills: 0 | Status: SHIPPED | OUTPATIENT
Start: 2023-01-16 | End: 2023-02-01 | Stop reason: SDUPTHER

## 2023-01-16 RX ORDER — GABAPENTIN 300 MG/1
CAPSULE ORAL
COMMUNITY
Start: 2022-12-15 | End: 2023-01-16

## 2023-01-27 ENCOUNTER — HOSPITAL ENCOUNTER (OUTPATIENT)
Dept: PAIN MEDICINE | Facility: HOSPITAL | Age: 48
Discharge: HOME | End: 2023-01-27
Payer: MEDICARE

## 2023-01-27 VITALS
HEIGHT: 75 IN | BODY MASS INDEX: 28.47 KG/M2 | HEART RATE: 79 BPM | WEIGHT: 229 LBS | DIASTOLIC BLOOD PRESSURE: 75 MMHG | TEMPERATURE: 98.7 F | RESPIRATION RATE: 16 BRPM | OXYGEN SATURATION: 99 % | SYSTOLIC BLOOD PRESSURE: 118 MMHG

## 2023-01-27 DIAGNOSIS — G89.29 CHRONIC RIGHT SHOULDER PAIN: Primary | ICD-10-CM

## 2023-01-27 DIAGNOSIS — R52 PAIN: ICD-10-CM

## 2023-01-27 DIAGNOSIS — M25.511 CHRONIC RIGHT SHOULDER PAIN: Primary | ICD-10-CM

## 2023-01-27 PROCEDURE — 77003 FLUOROGUIDE FOR SPINE INJECT: CPT

## 2023-01-27 PROCEDURE — 25010000002 METHYLPREDNISOLONE PER 40 MG: Performed by: STUDENT IN AN ORGANIZED HEALTH CARE EDUCATION/TRAINING PROGRAM

## 2023-01-27 PROCEDURE — 64450 NJX AA&/STRD OTHER PN/BRANCH: CPT | Performed by: STUDENT IN AN ORGANIZED HEALTH CARE EDUCATION/TRAINING PROGRAM

## 2023-01-27 PROCEDURE — 0 IOPAMIDOL 41 % SOLUTION: Performed by: STUDENT IN AN ORGANIZED HEALTH CARE EDUCATION/TRAINING PROGRAM

## 2023-01-27 RX ORDER — BUPIVACAINE HYDROCHLORIDE 2.5 MG/ML
10 INJECTION, SOLUTION EPIDURAL; INFILTRATION; INTRACAUDAL ONCE
Status: COMPLETED | OUTPATIENT
Start: 2023-01-27 | End: 2023-01-27

## 2023-01-27 RX ORDER — METHYLPREDNISOLONE ACETATE 40 MG/ML
40 INJECTION, SUSPENSION INTRA-ARTICULAR; INTRALESIONAL; INTRAMUSCULAR; SOFT TISSUE ONCE
Status: COMPLETED | OUTPATIENT
Start: 2023-01-27 | End: 2023-01-27

## 2023-01-27 RX ADMIN — BUPIVACAINE HYDROCHLORIDE 10 ML: 2.5 INJECTION, SOLUTION EPIDURAL; INFILTRATION; INTRACAUDAL; PERINEURAL at 09:32

## 2023-01-27 RX ADMIN — METHYLPREDNISOLONE ACETATE 40 MG: 40 INJECTION, SUSPENSION INTRA-ARTICULAR; INTRALESIONAL; INTRAMUSCULAR; INTRASYNOVIAL; SOFT TISSUE at 09:32

## 2023-01-27 RX ADMIN — IOPAMIDOL 3 ML: 408 INJECTION, SOLUTION INTRATHECAL at 09:31

## 2023-01-27 NOTE — H&P
H and P reviewed from previous visit and no changes to patient's clinical presentation. Will proceed with procedure as planned. DM-1 - glucose 78; Aspirin 325 mg     John Beltran DO  Pain Management   [facility]

## 2023-01-27 NOTE — DISCHARGE INSTRUCTIONS
Peripheral Nerve Block  Peripheral nerve block is an injection of numbing medicine (regional anesthetic) near a nerve. The regional anesthetic numbs everything below the injection site. This provides pain relief during and after a medical procedure.  You will most likely be awake while a peripheral nerve block is done.   Tell a health care provider about:  Any allergies you have.  All medicines you are taking, including vitamins, herbs, eye drops, creams, and over-the-counter medicines.  Any problems you or family members have had with anesthetic medicines.  Any bleeding problems you have.  Any surgeries you have had.  Any medical conditions you have.  Whether you are pregnant or may be pregnant.  What are the risks?  Generally, this is a safe procedure. However, problems may occur, including:  Infection.  Bleeding.  Allergic reactions to medicines.  Damage to nearby structures or organs, such as temporary or permanent nerve damage.     Medicines  Ask your health care provider about:  Changing or stopping your regular medicines. This is especially important if you are taking diabetes medicines or blood thinners.  Taking medicines such as aspirin and ibuprofen. These medicines can thin your blood. Do not take these medicines unless your health care provider tells you to take them.  Taking over-the-counter medicines, vitamins, herbs, and supplements.  General instructions  If you will be going home right after the procedure, plan to have a responsible adult:  Take you home from the hospital or clinic. You will not be allowed to drive.  Care for you for the time you are told.  No heat to area for 24 hours.  You may use ice to the area for comfort. Do not apply ice pack directly to the skin, apply over a thin towel or cloth. Use ice for 15-20 minutes 3 times a day if needed.  Keep your bandaid(s) clean and dry for 24 hours then you may remove them.  No swimming, hot tub or getting area wet for 24 hours.  What happens  during the procedure?  Your nerve may be located by using:  Sound waves that create images of the area (ultrasound).  A device that activates the nerve and causes your muscles to twitch (nerve stimulator).  Fluoroscopy  The skin around your injection site will be cleaned with a germ-killing solution.  Medicine to numb the injection site (local anesthetic) may be injected into the tissue above the nerve.  Regional anesthetic will be injected into the area near your nerve, not directly into the nerve. You should not feel any pain. The area of your peripheral nerve block will begin to feel warm and numb.  Your injection site may be covered with a bandage (dressing) when your medical procedure is done.  The procedure may vary among health care providers and hospitals.  What can I expect after the procedure?  Your blood pressure, heart rate, breathing rate, and blood oxygen level will be monitored until you leave the hospital or clinic.  2. You may continue to be numb for up to 36 hours.  3. If you have a catheter in place, you will continue to be numb until the catheter is removed.  4. To reduce your risk of injury:  Do not expose the numb area to heat or cold.  Do not stand up or try to walk without help if you have a nerve block in one leg or both legs. Get help and limit your activity as told by your health care provider.  As the medicine wears off, you will have a gradual return of feeling in the area that is supplied by the nerve. It might start as an unpleasant tingling or as a prickling and tingling sensation.  Follow these instructions at home:  Injection site care  If you have a dressing, remove it 24 hours after your procedure, or as told by your health care provider.  Check your injection site every day for signs of infection. Check for:  Redness, swelling, or pain.  Fluid or blood.  Warmth.  Pus or a bad smell.  General instructions  Take over-the-counter and prescription medicines only as told by your  health care provider.  Do not take a bath or soak in water--such as in a swimming pool, lake, or hot tub--until your health care provider approves.  Keep all follow-up visits.  Contact a health care provider if:  You have a fever or chills.  You continue to have numbness, weakness, or tingling after 36 hours.  You have redness, swelling, or pain around your injection site.  Get help right away if:  You have trouble breathing.  You have chest pain.  You have uncontrolled pain in the area that should be numbed.  You have symptoms of a reaction to the nerve block. Symptoms of a reaction include:  Numb lips.  Changes to vision or hearing, such as blurred vision or ringing in your ears.  A metallic taste in your mouth.  Increased anxiety.  Dizziness.  Shaking (such as tremors), twitching, or seizures.  Changes in bowel or bladder control.  These symptoms may be an emergency. Get help right away. Call 911.  Do not wait to see if the symptoms will go away.  Do not drive yourself to the hospital.  Summary  Peripheral nerve block is an injection of numbing medicine (regional anesthetic) near a nerve. This provides pain relief during and after a medical procedure.  Feeling will gradually return to the area that is supplied by the nerve.  Contact a health care provider if you continue to have numbness, weakness, or tingling after 36 hours.  This information is not intended to replace advice given to you by your health care provider. Make sure you discuss any questions you have with your health care provider.  Document Revised: 08/25/2022 Document Reviewed: 06/20/2022  Elsevier Patient Education © 2022 [employer]

## 2023-02-01 NOTE — PROGRESS NOTES
"  Subjective   Maynor Gregg is a 47 y.o. male is here for follow up for lower back pain. Patient was last seen on 1/17/23 for R suprascapular N block with 40% pain relief.  He will be starting his job again.    On last visit: increased gabapentin-helps          ; started Morphine -takes sparingly    Right arm lateral/triceps  is 4/10 on VAS, at maximum is 5/10. Pain is sharp, stabbing pain in nature. Pain is referred R triceps. The pain is constant. The pain is improved by Norco a bit. The pain is worse with laying on that R side and flexion.     B/l phantom pain where he feels like both foot are in severe spasms.     Previous Injection:   1/27/23 - R suprascapular N block -40% pain relief.  VAS score decreased from 8/10-4/10 today.    Hx: Referred by , Boni Burrell MD for R shoulder pain. His pain started around 1/2020 after he slipped and had a fall injuring his right shoulder. At that time, he also slipped and scraped his right foot along a drain piece which led to osteomyelitis, with a below the knee amputation of the right leg. Patient was doing well and got a prosthetic in April 2020 however noticed having increase in left leg pain due to overcompensation. At that time he was diagnosed with separation of his left foot bones and only course of treatment was to amputate below the left knee.    MRI of shoulder showed endochondroma. He had seen Ortho oncologist in [place] who told him t it was most likely non-cancerous and possible surgical resection if it increased in size.  He was recommended PT for R arm. He had R shoulder injection X2 previously with short term relief.  Phantom pain in b/l legs. [facility] as per patient is felt that \"they didn't do anything.\" Norco doesn't help significantly and causes constipation. Takes 1 tab every 3-4 days.  He states that when he was hospitalized he was given morphine which helped significantly with his pain.  He " is supposed to start PT soon.  He was previously on gabapentin and was switched to Lyrica as it was not helping significantly.  Patient states the Lyrica was not significantly effective and thus he was switched back to gabapentin recently.  Some relief with gabapentin but no significant relief.  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  He has 12-year-old son.      PHQ-9- 17         SOAPP- 2  [place] back disability scale - 60     PMH:   DM-1, history of AKA right- multiple revisions, history of BKA left (9/20), GERD, hyperlipidemia, CAD and history of MI, history of left BKA stump ulceration, right foot amputation, diabetic peripheral neuropathy, history of osteomyelitis of right foot     Current Medications:   Morphine 15 mg IR BID PRN  Gabapentin 600 mg BID  Aspirin 325 mg  Cymbalta        Past Medications:  Lyrica 75 mg TID - didn't help much   Norco  mg QID PRN (last script - 1/5/23)    Past Modalities:  TENS:                                                                          no                                                  Physical Therapy Within The Last 6 Months              no  Psychotherapy                                                            no  Massage Therapy                                                       no     Patient Complains Of:  Uro-Fecal Incontinence          no  Weight Gain/Loss                   no  Fever/Chills                             no  Weakness                               no            Current Outpatient Medications:   •  aspirin 325 MG tablet, Take 1 tablet by mouth Daily., Disp: , Rfl:   •  DULoxetine (CYMBALTA) 60 MG capsule, Take 60 mg by mouth Daily. Take DOS, Disp: , Rfl:   •  gabapentin (NEURONTIN) 600 MG tablet, Take 1 tablet by mouth 2 (Two) Times a Day for 90 days., Disp: 60 tablet, Rfl: 2  •  insulin aspart (novoLOG) 100 UNIT/ML injection, Inject  under the skin into the appropriate area as directed 3 (Three) Times a Day  Before Meals. PER SS. BETWEEN 3-20 UNITS Hold DOS, Disp: , Rfl:   •  Insulin Glargine (BASAGLAR KWIKPEN) 100 UNIT/ML injection pen, Inject 30 Units under the skin into the appropriate area as directed Every Night., Disp: , Rfl:   •  naloxone (NARCAN) 4 MG/0.1ML nasal spray, 1 spray into the nostril(s) as directed by provider As Needed (in case of respiratory depression) for up to 30 days., Disp: 1 each, Rfl: 0  •  pantoprazole (PROTONIX) 40 MG EC tablet, Take 40 mg by mouth Every Evening. Hasn't been filled in awhile, Disp: , Rfl:   •  rosuvastatin (CRESTOR) 20 MG tablet, rosuvastatin 20 mg tablet, Disp: , Rfl:   •  vitamin D (ERGOCALCIFEROL) 1.25 MG (09269 UT) capsule capsule, Take 1 capsule by mouth 1 (One) Time Per Week. Sunday, Disp: , Rfl:     The following portions of the patient's history were reviewed and updated as appropriate: allergies, current medications, past family history, past medical history, past social history, past surgical history, and problem list.      REVIEW OF PERTINENT MEDICAL DATA    Past Medical History:   Diagnosis Date   • Anxiety    • Bone infection (HCC)     STATES CAUSED HIM TO LOSE RIGHT LEG   • CHF (congestive heart failure) (HCC)     history, no current SX's   • Coronary artery disease     no cardiologist   • Depression    • Diabetes mellitus (HCC)     TYPE 1   • GERD (gastroesophageal reflux disease)    • Headache    • History of amputation     PEDRO BELOW THE KNEE   • MI, old 2010    WITH STENT PLACEMENT-CARDIO [facility]-NO LONGER SEES   • Nodule of left lung     HAD CT SCAN BENIGN.    • Phantom limb pain (HCC)     LEFT AND RIGHT BOTH   • Seasonal allergies      Past Surgical History:   Procedure Laterality Date   • ABOVE KNEE AMPUTATION Right 10/14/2021    Procedure: AMPUTATION ABOVE KNEE;  Surgeon: Donn Alcantara MD;  Location: [facility] MAIN OR;  Service: Orthopedics;  Laterality: Right;   • AMPUTATION DIGIT Right 2/28/2020    Procedure: PARTIAL FIRST RAY  RESECTION RIGHT FOOT;  Surgeon: CROW Souza DPM;  Location: [facility] MAIN OR;  Service: Podiatry;  Laterality: Right;   • AMPUTATION FOOT / TOE     • AMPUTATION REVISION Right 3/2/2021    Procedure: AMPUTATION REVISION KNEE STUMP;  Surgeon: Donn Alcantara MD;  Location: [facility] MAIN OR;  Service: Orthopedics;  Laterality: Right;   • AMPUTATION REVISION Right 9/23/2021    Procedure: RIGHT BELOW KNEE AMPUTATION REVISION;  Surgeon: Donn Alcantara MD;  Location: [facility] MAIN OR;  Service: Orthopedics;  Laterality: Right;   • BELOW KNEE AMPUTATION Right 4/30/2020    Procedure: AMPUTATION BELOW KNEE;  Surgeon: Donn Alcantara MD;  Location: [facility] MAIN OR;  Service: Orthopedics;  Laterality: Right;   • BELOW KNEE AMPUTATION Left 9/2/2020    Procedure: AMPUTATION BELOW KNEE, left;  Surgeon: Donn Alcantara MD;  Location: [facility] MAIN OR;  Service: Orthopedics;  Laterality: Left;   • BELOW KNEE AMPUTATION Right 2/23/2021    Procedure: Revision of amputaion below knee;  Surgeon: Donn Alcantara MD;  Location: [facility] MAIN OR;  Service: Orthopedics;  Laterality: Right;   • CARDIAC CATHETERIZATION  11/2010     RCA artery   • CARDIAC CATHETERIZATION  2015    LAD artery   • CARDIAC SURGERY     • CORONARY ANGIOPLASTY WITH STENT PLACEMENT      X2. 2015   • INCISION AND DRAINAGE LEG Left 1/21/2020    Procedure: INCISION AND DRAINAGE LOWER EXTREMITY with second digit amputation;  Surgeon: CROW Souza DPM;  Location: [facility] MAIN OR;  Service: Podiatry   • INCISION AND DRAINAGE LEG Right 4/28/2020    Procedure: incision and drainage ,transmetatarsal amputation, fasciotomy;  Surgeon: CROW Souza DPM;  Location: [facility] MAIN OR;  Service: Podiatry;  Laterality: Right;   • LACERATION REPAIR Left 11/10/2022    Procedure: KNEE DELAYED WOUND CLOSURE;  Surgeon: Donn Alcantara MD;  Location: [facility] MAIN OR;  Service: Orthopedics;  Laterality: Left;   • SKIN FULL THICKNESS GRAFT Right 7/29/2021    Procedure: EXCISION SOFT TISSUE  Ulcer WITH FULL THICKNESS SKIN GRAFT to right lower leg.;  Surgeon: Sukhdeep Hernadez MD;  Location: [facility] MAIN OR;  Service: Plastics;  Laterality: Right;   • TOE AMPUTATION Left    • WOUND DEBRIDEMENT Right 2020    Procedure: DEBRIDEMENT with sesamoid excision;  Surgeon: CROW Souza DPM;  Location: [facility] MAIN OR;  Service: Podiatry     Family History   Problem Relation Age of Onset   • Diabetes Father    • Heart failure Father    • Diabetes Paternal Grandfather    • Malig Hyperthermia Neg Hx      Social History     Socioeconomic History   • Marital status: Significant Other   Tobacco Use   • Smoking status: Former     Packs/day: 0.50     Years: 6.00     Pack years: 3.00     Types: Cigarettes     Quit date: 2010     Years since quittin.0   • Smokeless tobacco: Never   Vaping Use   • Vaping Use: Never used   Substance and Sexual Activity   • Alcohol use: No   • Drug use: No   • Sexual activity: Yes     Partners: Female     Birth control/protection: Condom         Review of Systems   Musculoskeletal: Positive for arthralgias.         Vitals:    23 1437   BP: 125/74   Pulse: 89   Resp: 16   SpO2: 100%   PainSc:   4         Objective   Physical Exam  Musculoskeletal:         General: Tenderness present.        Arms:            Imaging Reviewed:  MRI humerus without contrast R - 22:   - Large chondroid lesion within the diaphysis of humerus measuring up to 10.8 cm. Most consistent with enchondroma. Superior portion stable since . If he develops pain, surgical evaluation is recommended.   - mild tendinosis distal supraspinatus and superior subscapularis tendons.   - Tear or superior labrum from anterior posterior.      MRI left tib-fib without contrast-10/3/2022  - Deep ulceration of posterior medial knee with associated soft tissue edema and skin thickening.  Severe high signal intensity and semimembranous tendons and muscle distally that can be seen with myositis or  tendinosis  - Small Baker's cyst         Assessment:    1. Enchondroma of humerus, right    2. Chronic right shoulder pain    3. 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  UDS consistent with patient's injury on 1/16/2023.   Narcotic contract signed and Narcan sent on 1/16/2023.  2. We discussed trying a course of formal physical therapy.  Physical therapy can help strengthen and stretch the muscles around the joints. Continue to be as active as possible.  Recommend starting PT as soon as possible.    3.  Stop Norco as it is not helping him significantly and causing severe constipation.  Patient had significant pain relief with morphine previously.  Continue morphine 15 mg IR BID PRN-takes sparingly.  No need for refill. Discussed with the patient regarding long-term side effects of opioids including but not limited to opioid induced hormonal suppression, hyperalgesia, fatigue, weight gain, possible opioid induced altered immune system, addiction, tolerance, dependence, risk of hearing loss and elevated risk of myocardial infarction. Proper use and potential life threatening side effects of over use discussed with patient. Patient states understanding of their use and risks.  4.    Continue gabapentin to 600 mg BID (till 5/3/23).  Sent prescription. Side effects discussed with the patient including but not limited to somnolence, dizziness, ataxia, headache, fatigue, blurred vision, cold or flu-like symptoms,delusions, hoarseness, lack or loss of strength, lower back or side pain, swelling of the hands, feet, or lower legs trembling or shaking. Patient understands and agrees with the plan.  5.  Recommend trying MiraLAX up to TID PRN for opioid-induced constipation..   6.  Continue following up with Ortho for possible surgical resection of endochondroma in future.       RTC in 2 months.     John Beltran DO  Pain Management   [facility]         INSPECT REPORT    As part of the  patient's treatment plan, I may be prescribing controlled substances. The patient has been made aware of appropriate use of such medications, including potential risk of somnolence, limited ability to drive and/or work safely, and the potential for dependence or overdose. It has also been made clear that these medications are for use by this patient only, without concomitant use of alcohol or other substances unless prescribed.     Patient has completed prescribing agreement detailing terms of continued prescribing of controlled substances, including monitoring INSPECT reports, urine drug screening, and pill counts if necessary. The patient is aware that inappropriate use will results in cessation of prescribing such medications.    INSPECT report has been reviewed and scanned into the patient's chart.

## 2023-02-02 ENCOUNTER — OFFICE VISIT (OUTPATIENT)
Dept: PAIN MEDICINE | Facility: CLINIC | Age: 48
End: 2023-02-02
Payer: COMMERCIAL

## 2023-02-02 VITALS
SYSTOLIC BLOOD PRESSURE: 125 MMHG | DIASTOLIC BLOOD PRESSURE: 74 MMHG | RESPIRATION RATE: 16 BRPM | HEART RATE: 89 BPM | OXYGEN SATURATION: 100 %

## 2023-02-02 DIAGNOSIS — D16.01 ENCHONDROMA OF HUMERUS, RIGHT: Primary | ICD-10-CM

## 2023-02-02 DIAGNOSIS — G89.29 CHRONIC RIGHT SHOULDER PAIN: ICD-10-CM

## 2023-02-02 DIAGNOSIS — G54.6 PHANTOM PAIN AFTER AMPUTATION OF LOWER EXTREMITY: ICD-10-CM

## 2023-02-02 DIAGNOSIS — M25.511 CHRONIC RIGHT SHOULDER PAIN: ICD-10-CM

## 2023-02-02 PROCEDURE — 99214 OFFICE O/P EST MOD 30 MIN: CPT | Performed by: STUDENT IN AN ORGANIZED HEALTH CARE EDUCATION/TRAINING PROGRAM

## 2023-02-02 RX ORDER — GABAPENTIN 600 MG/1
600 TABLET ORAL 2 TIMES DAILY
Qty: 60 TABLET | Refills: 2 | Status: SHIPPED | OUTPATIENT
Start: 2023-02-02 | End: 2023-05-03

## 2023-03-14 ENCOUNTER — TRANSCRIBE ORDERS (OUTPATIENT)
Dept: ADMINISTRATIVE | Facility: HOSPITAL | Age: 48
End: 2023-03-14
Payer: MEDICARE

## 2023-03-14 DIAGNOSIS — R91.1 PULMONARY NODULE: Primary | ICD-10-CM

## 2023-03-21 ENCOUNTER — TELEPHONE (OUTPATIENT)
Dept: PAIN MEDICINE | Facility: CLINIC | Age: 48
End: 2023-03-21

## 2023-03-21 NOTE — TELEPHONE ENCOUNTER
I'm little confused. If he is taking Norco then he doesn't need to be on morphine. He was started on morphine on last visit but has been switched to Norco. Not sure what the question is here? Norco was filled on 3/3/23 and was written by PCP.

## 2023-03-21 NOTE — TELEPHONE ENCOUNTER
"    Caller: Maynor Gregg \"Jeffry\"    Relationship: Self    Best call back number:[phone]    Requested Prescriptions: MORPHINE       Pharmacy where request should be sent:  [facility]     Last office visit with prescribing clinician: 2/2/2023   Last telemedicine visit with prescribing clinician: 4/3/2023   Next office visit with prescribing clinician: 4/3/2023     Additional details provided by patient:PT EXPLAINED THAT EN FEB HE HAD A NERVE BLOCK PROCEDURE AND HE WAS INSTRUCTED BY DR. PARK TO CALL BACK IF HE EVER NEEDED MORPHINE REFILLS.     Does the patient have less than a 3 day supply:  [x] Yes  [] No    Would you like a call back once the refill request has been completed: [x] Yes [] No    If the office needs to give you a call back, can they leave a voicemail: [x] Yes [] No    Kermit Hale Rep   03/21/23 14:20 EDT           "

## (undated) DEVICE — BANDAGE,GAUZE,BULKEE II,4.5"X4.1YD,STRL: Brand: MEDLINE

## (undated) DEVICE — FOAM BUMP, LARGE: Brand: MEDLINE INDUSTRIES, INC.

## (undated) DEVICE — GLV SURG SENSICARE PI LF PF 8 GRN STRL

## (undated) DEVICE — GLV SURG SENSICARE PI ORTHO SZ8 LF STRL

## (undated) DEVICE — SUT VIC 3/0 FS1 27IN J442H

## (undated) DEVICE — 3M™ STERI-DRAPE™ U-DRAPE 1015: Brand: STERI-DRAPE™

## (undated) DEVICE — SOL IRRIG SOD CHL 0.9PCT 3000ML

## (undated) DEVICE — SUT SILK 2/0 SH CR8 18IN CR8 C012D

## (undated) DEVICE — GLV SURG BIOGEL M LTX PF 7 1/2

## (undated) DEVICE — SPNG GZ AVANT 6PLY 4X4IN STRL PK/2

## (undated) DEVICE — UNDERGLV SURG BIOGEL INDICAT PI SZ8 BLU

## (undated) DEVICE — 2108 SERIES SAGITTAL BLADE, NO OFFSET  (24.8 X 1.24 X 80.1MM)

## (undated) DEVICE — GOWN,REINFORCE,POLY,SIRUS,BREATH SLV,XLG: Brand: MEDLINE

## (undated) DEVICE — SUT VIC 2/0 CT1 36IN

## (undated) DEVICE — DRSNG WND GZ CURAD OIL EMULSION 3X8IN LF STRL 1PK

## (undated) DEVICE — CUFF TOURNI 1BLADDER 1PRT 30IN STRL

## (undated) DEVICE — SUT VIC 2/0 CT2 27IN J269H

## (undated) DEVICE — GAUZE,SPONGE,FLUFF,6"X6.75",STRL,5/TRAY: Brand: MEDLINE

## (undated) DEVICE — SOL IRRIG NACL 9PCT 1000ML BTL

## (undated) DEVICE — PK EXTREM 50

## (undated) DEVICE — CUFF SCD HEMOFORCE SEQ CALF STD MD

## (undated) DEVICE — SUT VIC 3/0 SH 27IN J416H

## (undated) DEVICE — TOWEL,OR,DSP,ST,BLUE,STD,4/PK,20PK/CS: Brand: MEDLINE

## (undated) DEVICE — SUT VIC COAT 1 CP-1 27IN

## (undated) DEVICE — SUT ETHLN 3/0 FSLX 30IN 1673H

## (undated) DEVICE — SUT VIC 1 CTX 36IN J977H

## (undated) DEVICE — DRSNG GZ CURAD XEROFORM NONADHS 5X9IN STRL

## (undated) DEVICE — STAPLER, SKIN, 35W, A: Brand: MEDLINE INDUSTRIES, INC.

## (undated) DEVICE — DRP SURG UNIV BASIC BILAMINATE 53X77IN DISP

## (undated) DEVICE — SPNG LAP PREWSH SFTPK 18X18IN STRL PK/5

## (undated) DEVICE — PAD UNDERCAST WYTEX 4IN 4YD LF STRL

## (undated) DEVICE — SPNG GZ WOVN 4X4IN 12PLY 10/BX STRL

## (undated) DEVICE — KT SURG TURNOVER 050

## (undated) DEVICE — INTENDED FOR TISSUE SEPARATION, AND OTHER PROCEDURES THAT REQUIRE A SHARP SURGICAL BLADE TO PUNCTURE OR CUT.: Brand: BARD-PARKER ® CARBON RIB-BACK BLADES

## (undated) DEVICE — GAUZE,PACKING STRIP,PLAIN,1/4"X5YD,STRL: Brand: CURAD

## (undated) DEVICE — PISTOL GRIP SKIN STAPLER: Brand: MULTIFIRE PREMIUM

## (undated) DEVICE — OPTIFOAM GENTLE SA, POSTOP, 4X8: Brand: MEDLINE

## (undated) DEVICE — 3M™ IOBAN™ 2 ANTIMICROBIAL INCISE DRAPE 6650EZ: Brand: IOBAN™ 2

## (undated) DEVICE — SUT ETHILON 3/0 30IN BLK

## (undated) DEVICE — DRAPE,U/ SHT,SPLIT,PLAS,STERIL: Brand: MEDLINE

## (undated) DEVICE — DRSNG WND BORDR/ADHS NONADHR/GZ LF 4X14IN STRL

## (undated) DEVICE — BNDG ESMARK 4IN 12FT LF STRL BLU

## (undated) DEVICE — PLAIN PACKING STRIP: Brand: CURITY

## (undated) DEVICE — BNDG ELAS ELITE V/CLOSE 4IN 5YD LF STRL

## (undated) DEVICE — PAD UNDERCAST WYTEX 6IN 4YD LF STRL

## (undated) DEVICE — SUT ETHLN 2/0 PS 18IN 585H

## (undated) DEVICE — PROXIMATE RH ROTATING HEAD SKIN STAPLERS (35 REGULAR) CONTAINS 35 STAINLESS STEEL STAPLES: Brand: PROXIMATE

## (undated) DEVICE — UNDRGLV SURG BIOGEL PIMICROINDICATOR SYNTH SZ8 LF STRL

## (undated) DEVICE — SOL IRRIG NACL 1000ML

## (undated) DEVICE — 2108 SERIES SAGITTAL BLADE (24.8 X 1.24 X 73.8MM)

## (undated) DEVICE — HOOD: Brand: FLYTE

## (undated) DEVICE — DRSNG GZ CURAD XEROFORM NONADHR OVERWRAP 5X9IN

## (undated) DEVICE — VIOLET BRAIDED (POLYGLACTIN 910), SYNTHETIC ABSORBABLE SUTURE: Brand: COATED VICRYL

## (undated) DEVICE — MARKR SKIN W/RULR AND LBL

## (undated) DEVICE — DRSNG PAD ABD 8X10IN STRL

## (undated) DEVICE — SPNG LAP 18X18IN LF STRL PK/5

## (undated) DEVICE — 1010 S-DRAPE TOWEL DRAPE 10/BX: Brand: STERI-DRAPE™

## (undated) DEVICE — WET SKIN PREP TRAY: Brand: MEDLINE INDUSTRIES, INC.

## (undated) DEVICE — SOLUTION,WATER,IRRIGATION,1000ML,STERILE: Brand: MEDLINE

## (undated) DEVICE — SLV SCD CALF HEMOFORCE DVT THERP REPROC MD

## (undated) DEVICE — GAUZE,PACKING STRIP,IODOFORM,1/2"X5YD,ST: Brand: CURAD

## (undated) DEVICE — CONTAINER,SPECIMEN,OR STERILE,4OZ: Brand: MEDLINE

## (undated) DEVICE — PENCL ES MEGADINE EZ/CLEAN BUTN W/HOLSTR 10FT

## (undated) DEVICE — SUT SILK 2/0 SH 75CM 30IN BLK C016D

## (undated) DEVICE — SOL IRRIG H2O 1000ML STRL

## (undated) DEVICE — OCCLUSIVE GAUZE STRIP OVERWRAP,3% BISMUTH TRIBROMOPHENATE IN PETROLATUM BLEND: Brand: XEROFORM

## (undated) DEVICE — IRRIGATOR BULB ASEPTO 60CC STRL

## (undated) DEVICE — SYR CONTRL LUERLOK 10CC

## (undated) DEVICE — PK ORTHO MINOR 40

## (undated) DEVICE — CUFF TOURNI 1BLADDER 1PRT 18IN STRL

## (undated) DEVICE — SUT ETHLN 4/0 PS2 PLSTC 1667G

## (undated) DEVICE — PAD,ABDOMINAL,5"X9",STERILE,LF,1/PK: Brand: MEDLINE INDUSTRIES, INC.

## (undated) DEVICE — BNDG ELAS CO-FLEX SLF ADHR 4IN5YD LF STRL

## (undated) DEVICE — DRSNG WND GZ CURAD OIL EMULSION 3X3IN STRL

## (undated) DEVICE — NDL HYPO PRECISIONGLIDE/REG 18G 11/2 PNK

## (undated) DEVICE — PATIENT RETURN ELECTRODE, SINGLE-USE, CONTACT QUALITY MONITORING, ADULT, WITH 9FT CORD, FOR PATIENTS WEIGING OVER 33LBS. (15KG): Brand: MEGADYNE

## (undated) DEVICE — BNDG ELAS MATRX V/CLS 4IN 5YD LF

## (undated) DEVICE — Device

## (undated) DEVICE — STERILE COTTON BALLS LARGE 5/P: Brand: MEDLINE

## (undated) DEVICE — DRSNG WND GZ PAD BORDERED 4X8IN STRL

## (undated) DEVICE — SWAB CULT AERO TWIN MD

## (undated) DEVICE — PK PROC TURNOVER

## (undated) DEVICE — PENCL EVAC ULTRAVAC SMOKE W/BLD

## (undated) DEVICE — DRSNG GZ CURAD XEROFORM PETROLTM OVERWRP 1X8IN STRL

## (undated) DEVICE — GLV SURG BIOGEL SENSR LTX PF SZ7.5

## (undated) DEVICE — PAD CAST SPECIST 4IN STRL

## (undated) DEVICE — COVER,TABLE,44X90,STERILE: Brand: MEDLINE

## (undated) DEVICE — SUT MNCRYL 3/0 PS2 18IN MCP497G

## (undated) DEVICE — MICRO SAGITTAL BLADE (9.5 X 0.4 X 25.5MM)

## (undated) DEVICE — ANTIBACTERIAL UNDYED BRAIDED (POLYGLACTIN 910), SYNTHETIC ABSORBABLE SUTURE: Brand: COATED VICRYL

## (undated) DEVICE — SUT GUT CHRM 4/0 PS2 18IN 1637G

## (undated) DEVICE — GLV SURG TRIUMPH GREEN W/ALOE PF LTX 8 STRL

## (undated) DEVICE — SUT ETHLN 4/0 PS2 18IN 1667H

## (undated) DEVICE — APPL CHLORAPREP HI/LITE TINTED 10.5ML ORNG

## (undated) DEVICE — CUFF TOURNI 1BLADDER 1PRT 34IN STRL